# Patient Record
Sex: FEMALE | Race: WHITE | Employment: OTHER | URBAN - NONMETROPOLITAN AREA
[De-identification: names, ages, dates, MRNs, and addresses within clinical notes are randomized per-mention and may not be internally consistent; named-entity substitution may affect disease eponyms.]

---

## 2017-01-03 ENCOUNTER — PREP FOR PROCEDURE (OUTPATIENT)
Dept: CARDIOTHORACIC SURGERY | Age: 67
End: 2017-01-03

## 2017-01-03 DIAGNOSIS — C34.32 MALIGNANT NEOPLASM OF LOWER LOBE OF LEFT LUNG (HCC): Primary | ICD-10-CM

## 2017-01-03 RX ORDER — CHLORHEXIDINE GLUCONATE 4 G/100ML
SOLUTION TOPICAL ONCE
Status: CANCELLED | OUTPATIENT
Start: 2017-01-04

## 2017-01-03 RX ORDER — SODIUM CHLORIDE, SODIUM LACTATE, POTASSIUM CHLORIDE, CALCIUM CHLORIDE 600; 310; 30; 20 MG/100ML; MG/100ML; MG/100ML; MG/100ML
INJECTION, SOLUTION INTRAVENOUS CONTINUOUS
Status: CANCELLED | OUTPATIENT
Start: 2017-01-03

## 2017-01-04 ENCOUNTER — HOSPITAL ENCOUNTER (OUTPATIENT)
Dept: PREADMISSION TESTING | Age: 67
Discharge: HOME | DRG: 165 | End: 2017-01-04
Payer: MEDICARE

## 2017-01-04 ENCOUNTER — HOSPITAL ENCOUNTER (OUTPATIENT)
Dept: GENERAL RADIOLOGY | Age: 67
Discharge: HOME | DRG: 165 | End: 2017-01-04
Payer: MEDICARE

## 2017-01-04 VITALS — HEIGHT: 64 IN | WEIGHT: 118 LBS | BODY MASS INDEX: 20.14 KG/M2

## 2017-01-04 DIAGNOSIS — C34.32 MALIGNANT NEOPLASM OF LOWER LOBE OF LEFT LUNG (HCC): ICD-10-CM

## 2017-01-04 LAB
ABO/RH: NORMAL
ALBUMIN SERPL-MCNC: 4.6 G/DL (ref 3.5–5.2)
ALP BLD-CCNC: 82 U/L (ref 35–104)
ALT SERPL-CCNC: 14 U/L (ref 5–33)
ANION GAP SERPL CALCULATED.3IONS-SCNC: 17 MMOL/L (ref 7–19)
ANTIBODY SCREEN: NORMAL
AST SERPL-CCNC: 15 U/L (ref 5–32)
BACTERIA: ABNORMAL /HPF
BASOPHILS ABSOLUTE: 0 K/UL (ref 0–0.2)
BASOPHILS RELATIVE PERCENT: 0.2 % (ref 0–1)
BILIRUB SERPL-MCNC: 0.5 MG/DL (ref 0.2–1.2)
BILIRUBIN URINE: ABNORMAL
BLOOD, URINE: NEGATIVE
BUN BLDV-MCNC: 18 MG/DL (ref 8–23)
CALCIUM SERPL-MCNC: 10 MG/DL (ref 8.8–10.2)
CHLORIDE BLD-SCNC: 93 MMOL/L (ref 98–111)
CLARITY: ABNORMAL
CO2: 28 MMOL/L (ref 22–29)
COLOR: ABNORMAL
CREAT SERPL-MCNC: 0.5 MG/DL (ref 0.5–0.9)
EOSINOPHILS ABSOLUTE: 0.1 K/UL (ref 0–0.6)
EOSINOPHILS RELATIVE PERCENT: 1.7 % (ref 0–5)
EPITHELIAL CELLS, UA: ABNORMAL /HPF
GFR NON-AFRICAN AMERICAN: >60
GLOBULIN: 2.3 G/DL
GLUCOSE BLD-MCNC: 131 MG/DL (ref 74–109)
GLUCOSE URINE: NEGATIVE MG/DL
HCT VFR BLD CALC: 39.5 % (ref 37–47)
HEMOGLOBIN: 13.1 G/DL (ref 12–16)
KETONES, URINE: NEGATIVE MG/DL
LEUKOCYTE ESTERASE, URINE: ABNORMAL
LYMPHOCYTES ABSOLUTE: 1.4 K/UL (ref 1.1–4.5)
LYMPHOCYTES RELATIVE PERCENT: 28.4 % (ref 20–40)
MCH RBC QN AUTO: 29.4 PG (ref 27–31)
MCHC RBC AUTO-ENTMCNC: 33.2 G/DL (ref 33–37)
MCV RBC AUTO: 88.6 FL (ref 81–99)
MONOCYTES ABSOLUTE: 0.4 K/UL (ref 0–0.9)
MONOCYTES RELATIVE PERCENT: 7.4 % (ref 0–10)
MUCUS: ABNORMAL /LPF
NEUTROPHILS ABSOLUTE: 3 K/UL (ref 1.5–7.5)
NEUTROPHILS RELATIVE PERCENT: 62.1 % (ref 50–65)
NITRITE, URINE: NEGATIVE
PDW BLD-RTO: 13.5 % (ref 11.5–14.5)
PH UA: 6
PLATELET # BLD: 172 K/UL (ref 130–400)
PMV BLD AUTO: 10.8 FL (ref 7.4–10.4)
POTASSIUM SERPL-SCNC: 2.9 MMOL/L (ref 3.5–5)
PROTEIN UA: NEGATIVE MG/DL
RBC # BLD: 4.46 M/UL (ref 4.2–5.4)
RBC UA: ABNORMAL /HPF (ref 0–2)
SODIUM BLD-SCNC: 138 MMOL/L (ref 136–145)
SPECIFIC GRAVITY UA: 1.03
TOTAL PROTEIN: 6.9 G/DL (ref 6.6–8.7)
UROBILINOGEN, URINE: 0.2 E.U./DL
WBC # BLD: 4.8 K/UL (ref 4.8–10.8)
WBC UA: ABNORMAL /HPF (ref 0–5)

## 2017-01-04 PROCEDURE — 87070 CULTURE OTHR SPECIMN AEROBIC: CPT

## 2017-01-04 PROCEDURE — 86900 BLOOD TYPING SEROLOGIC ABO: CPT

## 2017-01-04 PROCEDURE — 93005 ELECTROCARDIOGRAM TRACING: CPT

## 2017-01-04 PROCEDURE — 71020 XR CHEST STANDARD TWO VW: CPT

## 2017-01-04 PROCEDURE — 86850 RBC ANTIBODY SCREEN: CPT

## 2017-01-04 PROCEDURE — 85025 COMPLETE CBC W/AUTO DIFF WBC: CPT

## 2017-01-04 PROCEDURE — 80053 COMPREHEN METABOLIC PANEL: CPT

## 2017-01-04 PROCEDURE — 86901 BLOOD TYPING SEROLOGIC RH(D): CPT

## 2017-01-04 PROCEDURE — 81001 URINALYSIS AUTO W/SCOPE: CPT

## 2017-01-04 PROCEDURE — 87086 URINE CULTURE/COLONY COUNT: CPT

## 2017-01-04 RX ORDER — CHLORHEXIDINE GLUCONATE 4 G/100ML
SOLUTION TOPICAL DAILY PRN
Status: CANCELLED | OUTPATIENT
Start: 2017-01-04

## 2017-01-04 RX ORDER — CHLORHEXIDINE GLUCONATE 4 G/100ML
SOLUTION TOPICAL ONCE
Status: DISCONTINUED | OUTPATIENT
Start: 2017-01-04 | End: 2017-01-04 | Stop reason: CLARIF

## 2017-01-04 RX ORDER — POTASSIUM CHLORIDE 20 MEQ/1
TABLET, EXTENDED RELEASE ORAL
Status: ON HOLD | COMMUNITY
Start: 2016-11-01 | End: 2017-01-11 | Stop reason: HOSPADM

## 2017-01-04 RX ORDER — CYCLOBENZAPRINE HCL 10 MG
10 TABLET ORAL 3 TIMES DAILY PRN
COMMUNITY
End: 2017-09-15

## 2017-01-05 ENCOUNTER — ANESTHESIA EVENT (OUTPATIENT)
Dept: OPERATING ROOM | Age: 67
DRG: 165 | End: 2017-01-05
Payer: MEDICARE

## 2017-01-06 ENCOUNTER — APPOINTMENT (OUTPATIENT)
Dept: GENERAL RADIOLOGY | Age: 67
DRG: 165 | End: 2017-01-06
Attending: THORACIC SURGERY (CARDIOTHORACIC VASCULAR SURGERY)
Payer: MEDICARE

## 2017-01-06 ENCOUNTER — ANESTHESIA (OUTPATIENT)
Dept: OPERATING ROOM | Age: 67
DRG: 165 | End: 2017-01-06
Payer: MEDICARE

## 2017-01-06 VITALS — RESPIRATION RATE: 6 BRPM | OXYGEN SATURATION: 100 %

## 2017-01-06 PROBLEM — C34.32 MALIGNANT NEOPLASM OF LOWER LOBE OF LEFT LUNG (HCC): Status: ACTIVE | Noted: 2017-01-06

## 2017-01-06 LAB
MRSA CULTURE ONLY: NORMAL
URINE CULTURE, ROUTINE: NORMAL

## 2017-01-06 PROCEDURE — 71010 XR CHEST PORTABLE: CPT

## 2017-01-06 PROCEDURE — 6360000002 HC RX W HCPCS

## 2017-01-06 PROCEDURE — 62325 NJX INTERLAMINAR CRV/THRC: CPT

## 2017-01-06 PROCEDURE — 6360000002 HC RX W HCPCS: Performed by: THORACIC SURGERY (CARDIOTHORACIC VASCULAR SURGERY)

## 2017-01-06 PROCEDURE — 2580000003 HC RX 258

## 2017-01-06 PROCEDURE — 2500000003 HC RX 250 WO HCPCS

## 2017-01-06 RX ORDER — SODIUM CHLORIDE, SODIUM LACTATE, POTASSIUM CHLORIDE, CALCIUM CHLORIDE 600; 310; 30; 20 MG/100ML; MG/100ML; MG/100ML; MG/100ML
INJECTION, SOLUTION INTRAVENOUS CONTINUOUS PRN
Status: DISCONTINUED | OUTPATIENT
Start: 2017-01-06 | End: 2017-01-06 | Stop reason: SDUPTHER

## 2017-01-06 RX ORDER — PROPOFOL 10 MG/ML
INJECTION, EMULSION INTRAVENOUS PRN
Status: DISCONTINUED | OUTPATIENT
Start: 2017-01-06 | End: 2017-01-06 | Stop reason: SDUPTHER

## 2017-01-06 RX ORDER — ONDANSETRON 2 MG/ML
INJECTION INTRAMUSCULAR; INTRAVENOUS PRN
Status: DISCONTINUED | OUTPATIENT
Start: 2017-01-06 | End: 2017-01-06 | Stop reason: SDUPTHER

## 2017-01-06 RX ORDER — FENTANYL CITRATE 50 UG/ML
INJECTION, SOLUTION INTRAMUSCULAR; INTRAVENOUS PRN
Status: DISCONTINUED | OUTPATIENT
Start: 2017-01-06 | End: 2017-01-06 | Stop reason: SDUPTHER

## 2017-01-06 RX ORDER — LIDOCAINE HYDROCHLORIDE 10 MG/ML
INJECTION, SOLUTION EPIDURAL; INFILTRATION; INTRACAUDAL; PERINEURAL PRN
Status: DISCONTINUED | OUTPATIENT
Start: 2017-01-06 | End: 2017-01-06 | Stop reason: SDUPTHER

## 2017-01-06 RX ORDER — ROCURONIUM BROMIDE 10 MG/ML
INJECTION, SOLUTION INTRAVENOUS PRN
Status: DISCONTINUED | OUTPATIENT
Start: 2017-01-06 | End: 2017-01-06 | Stop reason: SDUPTHER

## 2017-01-06 RX ORDER — DEXAMETHASONE SODIUM PHOSPHATE 10 MG/ML
INJECTION INTRAMUSCULAR; INTRAVENOUS PRN
Status: DISCONTINUED | OUTPATIENT
Start: 2017-01-06 | End: 2017-01-06 | Stop reason: SDUPTHER

## 2017-01-06 RX ORDER — BUPIVACAINE HYDROCHLORIDE 2.5 MG/ML
INJECTION, SOLUTION EPIDURAL; INFILTRATION; INTRACAUDAL PRN
Status: DISCONTINUED | OUTPATIENT
Start: 2017-01-06 | End: 2017-01-06 | Stop reason: SDUPTHER

## 2017-01-06 RX ADMIN — BUPIVACAINE HYDROCHLORIDE 2 ML: 2.5 INJECTION, SOLUTION EPIDURAL; INFILTRATION; INTRACAUDAL; PERINEURAL at 10:08

## 2017-01-06 RX ADMIN — HYDROMORPHONE HYDROCHLORIDE 0.25 MG: 1 INJECTION, SOLUTION INTRAMUSCULAR; INTRAVENOUS; SUBCUTANEOUS at 10:23

## 2017-01-06 RX ADMIN — BUPIVACAINE HYDROCHLORIDE 2 ML: 2.5 INJECTION, SOLUTION EPIDURAL; INFILTRATION; INTRACAUDAL; PERINEURAL at 10:00

## 2017-01-06 RX ADMIN — Medication 2 G: at 08:57

## 2017-01-06 RX ADMIN — PROPOFOL 100 MG: 10 INJECTION, EMULSION INTRAVENOUS at 08:46

## 2017-01-06 RX ADMIN — LIDOCAINE HYDROCHLORIDE 50 MG: 10 INJECTION, SOLUTION EPIDURAL; INFILTRATION; INTRACAUDAL; PERINEURAL at 08:46

## 2017-01-06 RX ADMIN — ROCURONIUM BROMIDE 20 MG: 10 INJECTION INTRAVENOUS at 09:31

## 2017-01-06 RX ADMIN — ONDANSETRON HYDROCHLORIDE 4 MG: 2 INJECTION, SOLUTION INTRAVENOUS at 10:02

## 2017-01-06 RX ADMIN — FENTANYL CITRATE 50 MCG: 50 INJECTION INTRAMUSCULAR; INTRAVENOUS at 09:17

## 2017-01-06 RX ADMIN — DEXAMETHASONE SODIUM PHOSPHATE 10 MG: 10 INJECTION INTRAMUSCULAR; INTRAVENOUS at 10:02

## 2017-01-06 RX ADMIN — FENTANYL CITRATE 50 MCG: 50 INJECTION INTRAMUSCULAR; INTRAVENOUS at 08:46

## 2017-01-06 RX ADMIN — ROCURONIUM BROMIDE 40 MG: 10 INJECTION INTRAVENOUS at 08:46

## 2017-01-06 RX ADMIN — BUPIVACAINE HYDROCHLORIDE 3 ML: 2.5 INJECTION, SOLUTION EPIDURAL; INFILTRATION; INTRACAUDAL; PERINEURAL at 10:13

## 2017-01-06 RX ADMIN — SODIUM CHLORIDE, SODIUM LACTATE, POTASSIUM CHLORIDE, AND CALCIUM CHLORIDE: 600; 310; 30; 20 INJECTION, SOLUTION INTRAVENOUS at 09:05

## 2017-01-06 RX ADMIN — PHENYLEPHRINE HYDROCHLORIDE 10 MCG/MIN: 10 INJECTION INTRAVENOUS at 09:28

## 2017-01-06 RX ADMIN — FENTANYL CITRATE 50 MCG: 50 INJECTION INTRAMUSCULAR; INTRAVENOUS at 09:31

## 2017-01-06 RX ADMIN — ROCURONIUM BROMIDE 10 MG: 10 INJECTION INTRAVENOUS at 09:17

## 2017-01-06 RX ADMIN — SODIUM CHLORIDE, SODIUM LACTATE, POTASSIUM CHLORIDE, AND CALCIUM CHLORIDE: 600; 310; 30; 20 INJECTION, SOLUTION INTRAVENOUS at 08:37

## 2017-01-06 RX ADMIN — PROPOFOL 50 MG: 10 INJECTION, EMULSION INTRAVENOUS at 09:31

## 2017-01-06 RX ADMIN — SUGAMMADEX 107 MG: 100 INJECTION, SOLUTION INTRAVENOUS at 10:21

## 2017-01-06 RX ADMIN — HYDROMORPHONE HYDROCHLORIDE 0.25 MG: 1 INJECTION, SOLUTION INTRAMUSCULAR; INTRAVENOUS; SUBCUTANEOUS at 10:39

## 2017-01-06 RX ADMIN — PROPOFOL 50 MG: 10 INJECTION, EMULSION INTRAVENOUS at 09:19

## 2017-01-07 ENCOUNTER — APPOINTMENT (OUTPATIENT)
Dept: GENERAL RADIOLOGY | Age: 67
DRG: 165 | End: 2017-01-07
Attending: THORACIC SURGERY (CARDIOTHORACIC VASCULAR SURGERY)
Payer: MEDICARE

## 2017-01-07 PROCEDURE — 71010 XR CHEST PORTABLE: CPT

## 2017-01-08 LAB
EKG P AXIS: -72 DEGREES
EKG P-R INTERVAL: 134 MS
EKG Q-T INTERVAL: 370 MS
EKG QRS DURATION: 96 MS
EKG QTC CALCULATION (BAZETT): 384 MS
EKG T AXIS: -142 DEGREES

## 2017-01-09 ENCOUNTER — APPOINTMENT (OUTPATIENT)
Dept: GENERAL RADIOLOGY | Age: 67
DRG: 165 | End: 2017-01-09
Attending: THORACIC SURGERY (CARDIOTHORACIC VASCULAR SURGERY)
Payer: MEDICARE

## 2017-01-09 PROCEDURE — 71010 XR CHEST PORTABLE: CPT

## 2017-01-10 ENCOUNTER — APPOINTMENT (OUTPATIENT)
Dept: GENERAL RADIOLOGY | Age: 67
DRG: 165 | End: 2017-01-10
Attending: THORACIC SURGERY (CARDIOTHORACIC VASCULAR SURGERY)
Payer: MEDICARE

## 2017-01-10 PROBLEM — E78.2 MIXED HYPERLIPIDEMIA: Chronic | Status: ACTIVE | Noted: 2017-01-10

## 2017-01-10 PROCEDURE — 71010 XR CHEST PORTABLE: CPT

## 2017-01-11 ENCOUNTER — APPOINTMENT (OUTPATIENT)
Dept: GENERAL RADIOLOGY | Age: 67
DRG: 165 | End: 2017-01-11
Attending: THORACIC SURGERY (CARDIOTHORACIC VASCULAR SURGERY)
Payer: MEDICARE

## 2017-01-11 PROCEDURE — 71020 XR CHEST STANDARD TWO VW: CPT

## 2017-02-07 ENCOUNTER — OFFICE VISIT (OUTPATIENT)
Dept: CARDIOTHORACIC SURGERY | Age: 67
End: 2017-02-07

## 2017-02-07 ENCOUNTER — HOSPITAL ENCOUNTER (OUTPATIENT)
Dept: GENERAL RADIOLOGY | Age: 67
Discharge: HOME | End: 2017-02-07
Payer: MEDICARE

## 2017-02-07 VITALS
WEIGHT: 117 LBS | DIASTOLIC BLOOD PRESSURE: 64 MMHG | HEIGHT: 64 IN | HEART RATE: 75 BPM | OXYGEN SATURATION: 98 % | SYSTOLIC BLOOD PRESSURE: 121 MMHG | BODY MASS INDEX: 19.97 KG/M2

## 2017-02-07 DIAGNOSIS — C34.32 PRIMARY MALIGNANT NEOPLASM OF BRONCHUS OF LEFT LOWER LOBE (HCC): ICD-10-CM

## 2017-02-07 DIAGNOSIS — C34.32 PRIMARY MALIGNANT NEOPLASM OF BRONCHUS OF LEFT LOWER LOBE (HCC): Primary | ICD-10-CM

## 2017-02-07 PROCEDURE — 99024 POSTOP FOLLOW-UP VISIT: CPT | Performed by: THORACIC SURGERY (CARDIOTHORACIC VASCULAR SURGERY)

## 2017-02-07 PROCEDURE — 71020 XR CHEST STANDARD TWO VW: CPT

## 2017-02-10 ENCOUNTER — TELEPHONE (OUTPATIENT)
Dept: CARDIOTHORACIC SURGERY | Age: 67
End: 2017-02-10

## 2017-02-28 ENCOUNTER — HOSPITAL ENCOUNTER (OUTPATIENT)
Dept: ULTRASOUND IMAGING | Age: 67
Discharge: HOME | End: 2017-02-28
Payer: MEDICARE

## 2017-02-28 DIAGNOSIS — N28.1 ACQUIRED CYST OF KIDNEY: ICD-10-CM

## 2017-02-28 PROCEDURE — 76770 US EXAM ABDO BACK WALL COMP: CPT

## 2017-03-08 ENCOUNTER — HOSPITAL ENCOUNTER (OUTPATIENT)
Dept: CT IMAGING | Age: 67
Discharge: HOME | End: 2017-03-08
Payer: MEDICARE

## 2017-03-08 DIAGNOSIS — N28.1 CYST OF KIDNEY, ACQUIRED: ICD-10-CM

## 2017-03-08 PROCEDURE — 6360000004 HC RX CONTRAST MEDICATION: Performed by: INTERNAL MEDICINE

## 2017-03-08 PROCEDURE — 74178 CT ABD&PLV WO CNTR FLWD CNTR: CPT

## 2017-03-08 RX ADMIN — IOVERSOL 75 ML: 741 INJECTION INTRA-ARTERIAL; INTRAVENOUS at 09:50

## 2017-07-27 ENCOUNTER — HOSPITAL ENCOUNTER (OUTPATIENT)
Dept: CT IMAGING | Age: 67
Discharge: HOME | End: 2017-07-27
Payer: MEDICARE

## 2017-07-27 ENCOUNTER — HOSPITAL ENCOUNTER (OUTPATIENT)
Dept: WOMENS IMAGING | Age: 67
Discharge: HOME | End: 2017-07-27
Payer: MEDICARE

## 2017-07-27 DIAGNOSIS — C34.32 CANCER OF BRONCHUS OF LEFT LOWER LOBE (HCC): ICD-10-CM

## 2017-07-27 DIAGNOSIS — Z12.31 VISIT FOR SCREENING MAMMOGRAM: ICD-10-CM

## 2017-07-27 DIAGNOSIS — C34.12 CANCER OF BRONCHUS OF LEFT UPPER LOBE (HCC): ICD-10-CM

## 2017-07-27 DIAGNOSIS — N28.1 RENAL CYST: ICD-10-CM

## 2017-07-27 LAB
GFR NON-AFRICAN AMERICAN: >60
PERFORMED ON: NORMAL
POC CREATININE: 0.9 MG/DL (ref 0.3–1.3)
POC SAMPLE TYPE: NORMAL

## 2017-07-27 PROCEDURE — 77063 BREAST TOMOSYNTHESIS BI: CPT

## 2017-07-27 PROCEDURE — 6360000004 HC RX CONTRAST MEDICATION: Performed by: INTERNAL MEDICINE

## 2017-07-27 PROCEDURE — 71260 CT THORAX DX C+: CPT

## 2017-07-27 PROCEDURE — 74177 CT ABD & PELVIS W/CONTRAST: CPT

## 2017-07-27 PROCEDURE — 82565 ASSAY OF CREATININE: CPT

## 2017-07-27 RX ADMIN — IOVERSOL 75 ML: 741 INJECTION INTRA-ARTERIAL; INTRAVENOUS at 10:53

## 2017-09-15 ENCOUNTER — HOSPITAL ENCOUNTER (EMERGENCY)
Age: 67
Discharge: HOME | End: 2017-09-15
Payer: MEDICARE

## 2017-09-15 VITALS
RESPIRATION RATE: 18 BRPM | TEMPERATURE: 97.8 F | BODY MASS INDEX: 20.83 KG/M2 | HEART RATE: 62 BPM | HEIGHT: 64 IN | OXYGEN SATURATION: 98 % | DIASTOLIC BLOOD PRESSURE: 68 MMHG | WEIGHT: 122 LBS | SYSTOLIC BLOOD PRESSURE: 109 MMHG
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
## 2017-09-15 DIAGNOSIS — S61.452A DOG BITE, HAND, LEFT, INITIAL ENCOUNTER: Primary | ICD-10-CM

## 2017-09-15 PROCEDURE — 99282 EMERGENCY DEPT VISIT SF MDM: CPT | Performed by: NURSE PRACTITIONER

## 2017-09-15 PROCEDURE — 99283 EMERGENCY DEPT VISIT LOW MDM: CPT

## 2017-09-15 RX ORDER — AMOXICILLIN AND CLAVULANATE POTASSIUM 875; 125 MG/1; MG/1
1 TABLET, FILM COATED ORAL 2 TIMES DAILY
Qty: 20 TABLET | Refills: 0 | Status: SHIPPED | OUTPATIENT
Start: 2017-09-15 | End: 2017-09-25

## 2017-10-05 ENCOUNTER — OFFICE VISIT (OUTPATIENT)
Dept: URGENT CARE | Age: 67
End: 2017-10-05
Payer: MEDICARE

## 2017-10-05 VITALS
SYSTOLIC BLOOD PRESSURE: 121 MMHG | BODY MASS INDEX: 20.77 KG/M2 | DIASTOLIC BLOOD PRESSURE: 76 MMHG | TEMPERATURE: 97.9 F | OXYGEN SATURATION: 98 % | RESPIRATION RATE: 20 BRPM | HEART RATE: 66 BPM | WEIGHT: 121 LBS

## 2017-10-05 DIAGNOSIS — N39.0 URINARY TRACT INFECTION WITH HEMATURIA, SITE UNSPECIFIED: ICD-10-CM

## 2017-10-05 DIAGNOSIS — R35.0 FREQUENCY OF URINATION: Primary | ICD-10-CM

## 2017-10-05 DIAGNOSIS — B37.9 YEAST INFECTION: ICD-10-CM

## 2017-10-05 DIAGNOSIS — R31.9 URINARY TRACT INFECTION WITH HEMATURIA, SITE UNSPECIFIED: ICD-10-CM

## 2017-10-05 LAB
BILIRUBIN, POC: ABNORMAL
BLOOD URINE, POC: ABNORMAL
CLARITY, POC: ABNORMAL
COLOR, POC: YELLOW
GLUCOSE URINE, POC: ABNORMAL
KETONES, POC: ABNORMAL
LEUKOCYTE EST, POC: ABNORMAL
NITRITE, POC: ABNORMAL
PH, POC: 6
PROTEIN, POC: 30
SPECIFIC GRAVITY, POC: 1.02
UROBILINOGEN, POC: 0.2

## 2017-10-05 PROCEDURE — 1123F ACP DISCUSS/DSCN MKR DOCD: CPT | Performed by: NURSE PRACTITIONER

## 2017-10-05 PROCEDURE — 4040F PNEUMOC VAC/ADMIN/RCVD: CPT | Performed by: NURSE PRACTITIONER

## 2017-10-05 PROCEDURE — G8484 FLU IMMUNIZE NO ADMIN: HCPCS | Performed by: NURSE PRACTITIONER

## 2017-10-05 PROCEDURE — 3014F SCREEN MAMMO DOC REV: CPT | Performed by: NURSE PRACTITIONER

## 2017-10-05 PROCEDURE — 3017F COLORECTAL CA SCREEN DOC REV: CPT | Performed by: NURSE PRACTITIONER

## 2017-10-05 PROCEDURE — G8427 DOCREV CUR MEDS BY ELIG CLIN: HCPCS | Performed by: NURSE PRACTITIONER

## 2017-10-05 PROCEDURE — G8399 PT W/DXA RESULTS DOCUMENT: HCPCS | Performed by: NURSE PRACTITIONER

## 2017-10-05 PROCEDURE — 1036F TOBACCO NON-USER: CPT | Performed by: NURSE PRACTITIONER

## 2017-10-05 PROCEDURE — 99213 OFFICE O/P EST LOW 20 MIN: CPT | Performed by: NURSE PRACTITIONER

## 2017-10-05 PROCEDURE — 81002 URINALYSIS NONAUTO W/O SCOPE: CPT | Performed by: NURSE PRACTITIONER

## 2017-10-05 PROCEDURE — G8420 CALC BMI NORM PARAMETERS: HCPCS | Performed by: NURSE PRACTITIONER

## 2017-10-05 PROCEDURE — 1090F PRES/ABSN URINE INCON ASSESS: CPT | Performed by: NURSE PRACTITIONER

## 2017-10-05 RX ORDER — FLUCONAZOLE 150 MG/1
150 TABLET ORAL ONCE
Qty: 1 TABLET | Refills: 0 | Status: SHIPPED | OUTPATIENT
Start: 2017-10-05 | End: 2017-10-05

## 2017-10-05 RX ORDER — NITROFURANTOIN 25; 75 MG/1; MG/1
100 CAPSULE ORAL 2 TIMES DAILY
Qty: 10 CAPSULE | Refills: 0 | Status: SHIPPED | OUTPATIENT
Start: 2017-10-05 | End: 2017-10-10

## 2017-10-05 ASSESSMENT — ENCOUNTER SYMPTOMS
ABDOMINAL DISTENTION: 0
VOMITING: 0
EYES NEGATIVE: 1
CONSTIPATION: 0
ALLERGIC/IMMUNOLOGIC NEGATIVE: 1
SHORTNESS OF BREATH: 0
NAUSEA: 0
ABDOMINAL PAIN: 0

## 2017-10-05 NOTE — PROGRESS NOTES
[address] [facility] E CARE  [address] [place] [id number]  Dept: [phone]  Loc: [phone]    Precious Ceja is a 78 y.o. female who presents today for her medical conditions/complaints as noted below. Precious Ceja is c/o of Urinary Frequency and Dysuria        HPI:     HPI   Pt presents to clinic with dysuria since yesterday. States she wants to be treated before it gets any worse. Has history of UTI. Denies fever or any other symptoms.      Past Medical History:   Diagnosis Date    Anxiety     Arthritis     Bowel obstruction     adhesions with colectomy/colostomy    Cancer ([address]     Concussion with no loss of consciousness     COPD (chronic obstructive pulmonary disease) (HCC)     Depression     Hernia     History of blood transfusion     History of broken collarbone     Hyperlipidemia     Seasonal allergies       Past Surgical History:   Procedure Laterality Date    ABDOMEN SURGERY      bowel blockage from scar tissue    COLONOSCOPY      COLOSTOMY      still has    ECTOPIC PREGNANCY SURGERY      ENDOSCOPY, COLON, DIAGNOSTIC      FRACTURE SURGERY      left arm and elbow and finger    HERNIA REPAIR      HYSTERECTOMY      LOBECTOMY Left 1/6/2017    LEFT THORACOTOMY WITH LOBECTOMY  performed by Agapito Roblero MD at [place] [address]         Family History   Problem Relation Age of Onset    High Blood Pressure Mother     Stroke Mother     Cancer Father        Social History   Substance Use Topics    Smoking status: Former Smoker     Quit date: 10/22/2016    Smokeless tobacco: Not on file      Comment: smoked since 15, quit a few times    Alcohol use Yes      Comment: 2-3 glasses of wine      Current Outpatient Prescriptions   Medication Sig Dispense Refill    nitrofurantoin, macrocrystal-monohydrate, (MACROBID) 100 MG capsule Take 1 capsule by mouth 2 times daily for 5 days 10 capsule 0    fluconazole (DIFLUCAN) 150 MG tablet Take 1 tablet by mouth once for 1 dose 1 tablet 0    potassium chloride (KLOR-CON M) 20 MEQ extended release tablet Take 1 tablet by mouth 2 times daily (with meals) 60 tablet 1    olopatadine (PATANASE) 0.6 % SOLN nassl soln 2 sprays 2 times daily Indications: Seasonal Allergy       atorvastatin (LIPITOR) 10 MG tablet 10 mg daily Indications: Changes in Cholesterol       hydrochlorothiazide (HYDRODIURIL) 50 MG tablet Take 50 mg by mouth daily as needed Indications: Fluid Retention       montelukast (SINGULAIR) 10 MG tablet Take 10 mg by mouth nightly Indications: Seasonal Allergy       tiZANidine (ZANAFLEX) 2 MG tablet Take 2 mg by mouth every 6 hours as needed      digoxin (LANOXIN) 250 MCG tablet Take 250 mcg by mouth daily Indications: Rapid Heartbeat       amphetamine-dextroamphetamine (ADDERALL XR) 10 MG extended release capsule Take 10 mg by mouth daily as needed  Indications: Attention Deficit Disorder .  pantoprazole (PROTONIX) 40 MG tablet Take 40 mg by mouth daily Indications: Gastroesophageal Reflux Disease       sertraline (ZOLOFT) 100 MG tablet Take 200 mg by mouth daily Indications: Feeling Anxious       cetirizine (ZYRTEC) 10 MG tablet Take 10 mg by mouth daily Indications: Seasonal Allergy       albuterol-ipratropium (COMBIVENT)  MCG/ACT inhaler Inhale 2 puffs into the lungs every 6 hours as needed for Wheezing       No current facility-administered medications for this visit. No Known Allergies    Health Maintenance   Topic Date Due    Hepatitis C screen  1950    DTaP/Tdap/Td vaccine (1 - Tdap) 10/29/1968    Lipid screen  10/29/1989    Colon cancer screen colonoscopy  10/29/1999    Zostavax vaccine  10/29/2009    Pneumococcal low/med risk (1 of 2 - PCV13) 10/29/2014    Flu vaccine (1) 09/01/2017    Breast cancer screen  07/27/2019    DEXA (modify frequency per FRAX score)  Completed       Subjective:      Review of Systems   Constitutional: Negative. Negative for activity change, appetite change, chills, fatigue and fever. HENT: Negative. Eyes: Negative. Respiratory: Negative for shortness of breath. Gastrointestinal: Negative for abdominal distention, abdominal pain, constipation, nausea and vomiting. Genitourinary: Positive for frequency and urgency. Negative for dysuria, flank pain, hematuria, pelvic pain, vaginal bleeding, vaginal discharge and vaginal pain. Musculoskeletal: Negative. Skin: Negative. Allergic/Immunologic: Negative. Neurological: Negative. Hematological: Negative. Psychiatric/Behavioral: Negative. Objective:     Physical Exam   Constitutional: She is oriented to person, place, and time. Vital signs are normal. She appears well-developed and well-nourished. Non-toxic appearance. She does not have a sickly appearance. She does not appear ill. No distress. HENT:   Head: Normocephalic. Eyes: Conjunctivae are normal.   Cardiovascular: Normal rate and regular rhythm. Pulmonary/Chest: Effort normal and breath sounds normal.   Abdominal: Soft. Bowel sounds are normal. There is no tenderness. There is no rebound and no CVA tenderness. Neurological: She is alert and oriented to person, place, and time. Skin: Skin is warm and intact. Psychiatric: She has a normal mood and affect. Her behavior is normal. Judgment and thought content normal. Cognition and memory are normal.   Vitals reviewed. /76  Pulse 66  Temp 97.9 °F (36.6 °C)  Resp 20  Wt 121 lb (54.9 kg)  SpO2 98%  BMI 20.77 kg/m2    Assessment:      1. Frequency of urination  POCT urinalysis dipstick   2. Urinary tract infection with hematuria, site unspecified  nitrofurantoin, macrocrystal-monohydrate, (MACROBID) 100 MG capsule    Urine Culture   3.  Yeast infection  fluconazole (DIFLUCAN) 150 MG tablet       Plan:      Orders Placed This Encounter   Procedures    Urine Culture     Order Specific Question:   Specify (ex-cath,

## 2017-10-05 NOTE — MR AVS SNAPSHOT
After Visit Summary             Gabe Almaguer   10/5/2017 12:15 PM   Office Visit    Description:  Female : 1950   Provider:  Berenice Louis CNP   Department:  [facility]              Your Follow-Up and Future Appointments         Below is a list of your follow-up and future appointments. This may not be a complete list as you may have made appointments directly with providers that we are not aware of or your providers may have made some for you. Please call your providers to confirm appointments. It is important to keep your appointments. Please bring your current insurance card, photo ID, co-pay, and all medication bottles to your appointment. If self-pay, payment is expected at the time of service. Information from Your Visit        Department     Name Address Phone       [facility] [address] [place] [address] [phone]       You Were Seen for:         Comments    Frequency of urination   [[id number]]         Vital Signs     Blood Pressure Pulse Temperature Respirations Weight Oxygen Saturation    121/76 66 97.9 °F (36.6 °C) 20 121 lb (54.9 kg) 98%    Body Mass Index Smoking Status                20.77 kg/m2 Former Smoker          Instructions    1. Take antibiotic as prescribed  2. Will call with results of urine culture  3. Return to clinic if symptoms worsen or fail to improve            Today's Medication Changes          These changes are accurate as of: 10/5/17 12:51 PM.  If you have any questions, ask your nurse or doctor. START taking these medications           nitrofurantoin (macrocrystal-monohydrate) 100 MG capsule   Commonly known as:  MACROBID   Instructions:   Take 1 capsule by mouth 2 times daily for 5 days   Quantity:  10 capsule   Refills:  0   Started by:  Berenice Louis CNP            Where to Get Your Medications      These medications were sent to [address] [place] [address] - 1950 Female White Non-/Non  English English      Problem List as of 10/5/2017  Date Reviewed: 2/9/2017                Malignant neoplasm of lower lobe of left lung (HCC)    Mixed hyperlipidemia (Chronic)      Preventive Care        Date Due    Hepatitis C screening is recommended for all adults regardless of risk factors born between [facility] at least once (lifetime) who have never been tested. 1950    Tetanus Combination Vaccine (1 - Tdap) 10/29/1968    Cholesterol Screening 10/29/1989    Colonoscopy 10/29/1999    Zoster Vaccine 10/29/2009    Pneumococcal Vaccines (two) for all adults aged 72 and over (1 of 2 - PCV13) 10/29/2014    Yearly Flu Vaccine (1) 9/1/2017    Mammograms are recommended every 2 years for low/average risk patients aged 48 - 69, and every year for high risk patients per updated national guidelines. However these guidelines can be individualized by your provider. 7/27/2019            [employer] Signup           Our records indicate that you have an active [employer] account. You can view your After Visit Summary by going to [email or web address] and logging in with your [employer] username and password. If you don't have a [employer] username and password but a parent or guardian has access to your record, the parent or guardian should login with their own [employer] username and password and access your record to view the After Visit Summary. Additional Information  If you have questions, please contact the physician practice where you receive care. Remember, [employer] is NOT to be used for urgent needs. For medical emergencies, dial 911. For questions regarding your [employer] account call [phone]. If you have a clinical question, please call your doctor's office.

## 2017-10-06 ENCOUNTER — TELEPHONE (OUTPATIENT)
Dept: URGENT CARE | Age: 67
End: 2017-10-06

## 2017-10-07 LAB
ORGANISM: ABNORMAL
ORGANISM: ABNORMAL
URINE CULTURE, ROUTINE: ABNORMAL

## 2017-10-08 DIAGNOSIS — B37.41 YEAST CYSTITIS: Primary | ICD-10-CM

## 2017-10-08 RX ORDER — FLUCONAZOLE 150 MG/1
150 TABLET ORAL ONCE
Qty: 1 TABLET | Refills: 0 | Status: SHIPPED | OUTPATIENT
Start: 2017-10-08 | End: 2017-10-08

## 2017-12-15 ENCOUNTER — OFFICE VISIT (OUTPATIENT)
Dept: URGENT CARE | Age: 67
End: 2017-12-15
Payer: MEDICARE

## 2017-12-15 VITALS
TEMPERATURE: 97.3 F | SYSTOLIC BLOOD PRESSURE: 110 MMHG | DIASTOLIC BLOOD PRESSURE: 82 MMHG | WEIGHT: 128.38 LBS | HEART RATE: 73 BPM | BODY MASS INDEX: 21.92 KG/M2 | RESPIRATION RATE: 18 BRPM | HEIGHT: 64 IN | OXYGEN SATURATION: 98 %

## 2017-12-15 DIAGNOSIS — B02.9 HERPES ZOSTER WITHOUT COMPLICATION: Primary | ICD-10-CM

## 2017-12-15 DIAGNOSIS — R41.0 CONFUSION: ICD-10-CM

## 2017-12-15 LAB
APPEARANCE FLUID: ABNORMAL
BILIRUBIN, POC: NEGATIVE
BLOOD URINE, POC: ABNORMAL
CLARITY, POC: ABNORMAL
COLOR, POC: YELLOW
GLUCOSE URINE, POC: NEGATIVE
KETONES, POC: ABNORMAL
LEUKOCYTE EST, POC: ABNORMAL
NITRITE, POC: NEGATIVE
PH, POC: 6.5
PROTEIN, POC: 30
SPECIFIC GRAVITY, POC: 1.02
UROBILINOGEN, POC: 0.2

## 2017-12-15 PROCEDURE — 3014F SCREEN MAMMO DOC REV: CPT | Performed by: PHYSICIAN ASSISTANT

## 2017-12-15 PROCEDURE — 1036F TOBACCO NON-USER: CPT | Performed by: PHYSICIAN ASSISTANT

## 2017-12-15 PROCEDURE — G8420 CALC BMI NORM PARAMETERS: HCPCS | Performed by: PHYSICIAN ASSISTANT

## 2017-12-15 PROCEDURE — G8484 FLU IMMUNIZE NO ADMIN: HCPCS | Performed by: PHYSICIAN ASSISTANT

## 2017-12-15 PROCEDURE — 99213 OFFICE O/P EST LOW 20 MIN: CPT | Performed by: PHYSICIAN ASSISTANT

## 2017-12-15 PROCEDURE — 1123F ACP DISCUSS/DSCN MKR DOCD: CPT | Performed by: PHYSICIAN ASSISTANT

## 2017-12-15 PROCEDURE — G8399 PT W/DXA RESULTS DOCUMENT: HCPCS | Performed by: PHYSICIAN ASSISTANT

## 2017-12-15 PROCEDURE — 1090F PRES/ABSN URINE INCON ASSESS: CPT | Performed by: PHYSICIAN ASSISTANT

## 2017-12-15 PROCEDURE — G8427 DOCREV CUR MEDS BY ELIG CLIN: HCPCS | Performed by: PHYSICIAN ASSISTANT

## 2017-12-15 PROCEDURE — 4040F PNEUMOC VAC/ADMIN/RCVD: CPT | Performed by: PHYSICIAN ASSISTANT

## 2017-12-15 PROCEDURE — 3017F COLORECTAL CA SCREEN DOC REV: CPT | Performed by: PHYSICIAN ASSISTANT

## 2017-12-15 RX ORDER — ATORVASTATIN CALCIUM 20 MG/1
10 TABLET, FILM COATED ORAL NIGHTLY
COMMUNITY
Start: 2017-11-29

## 2017-12-15 RX ORDER — FLUCONAZOLE 150 MG/1
TABLET ORAL
Status: ON HOLD | COMMUNITY
Start: 2017-10-09 | End: 2017-12-22 | Stop reason: HOSPADM

## 2017-12-15 RX ORDER — SULFAMETHOXAZOLE AND TRIMETHOPRIM 800; 160 MG/1; MG/1
TABLET ORAL
Status: ON HOLD | COMMUNITY
Start: 2017-10-30 | End: 2017-12-22 | Stop reason: HOSPADM

## 2017-12-15 RX ORDER — VALACYCLOVIR HYDROCHLORIDE 1 G/1
1000 TABLET, FILM COATED ORAL 3 TIMES DAILY
Qty: 21 TABLET | Refills: 0 | Status: SHIPPED | OUTPATIENT
Start: 2017-12-15 | End: 2017-12-22

## 2017-12-15 NOTE — PROGRESS NOTES
Subjective:      Patient ID: Sb Juan is a 75 y.o. female. HPI    Ryan Molina presents today with a rash. Rash is located on her right side. States that she was sick approximately 1 week ago. She was having pain in the area before the rash developed. Rash developed a few days ago. She has had an increase in the frequency of her headaches. No fever noted. States that it hurts for her clothes to rub up against the rash. Has applied Cortizone to rash. Caregiver with patient today states that Ryan Molina has been just slightly more confused than normal. She is misplacing things. This symptom is already being evaluated by another physical and a PET scan has been ordered. Review of Systems   Constitutional: Negative for chills and fever. Skin: Positive for rash. Psychiatric/Behavioral: Positive for confusion. All other systems reviewed and are negative. Objective:   Physical Exam   Constitutional: She appears well-developed and well-nourished. No distress. HENT:   Head: Normocephalic and atraumatic. Right Ear: External ear normal.   Left Ear: External ear normal.   Nose: Nose normal.   Mouth/Throat: Oropharynx is clear and moist. No oropharyngeal exudate. Eyes: Pupils are equal, round, and reactive to light. Right eye exhibits no discharge. Left eye exhibits no discharge. Neck: Neck supple. Cardiovascular: Normal rate, regular rhythm and normal heart sounds. No murmur heard. Pulmonary/Chest: Effort normal and breath sounds normal. No respiratory distress. She has no wheezes. She has no rales. Abdominal: Soft. Bowel sounds are normal. She exhibits no distension and no mass. There is no tenderness. There is no rebound and no guarding. Lymphadenopathy:     She has no cervical adenopathy. Neurological: She is alert. Skin: Skin is warm and dry. Rash (vesicular rash to right upper abdomen wrapping around to back; does not cross midline. Tender to palpation) noted.  She is not diaphoretic. No erythema. No pallor. Psychiatric: She has a normal mood and affect. Nursing note and vitals reviewed. Results for orders placed or performed in visit on 12/15/17   POCT Urinalysis no Micro   Result Value Ref Range    Color, UA YELLOW     Clarity, UA CLOUDY     Glucose, UA POC NEGATIVE     Bilirubin, UA NEGATIVE     Ketones, UA TRACE     Spec Grav, UA 1.025     Blood, UA POC TRACE-INTACT     pH, UA 6.5     Protein, UA POC 30     Urobilinogen, UA 0.2     Leukocytes, UA SMALL     Nitrite, UA NEGATIVE     Appearance, Fluid  Clear, Slightly Cloudy       Assessment:      Shingles  Confusion      Plan:      - Urine culture ordered. Will base any further treatment for possible UTI on urine culture results. - Start Valtrex today. Appropriate dosage and instructions provided. - Go to ER with any change in or worsening of symptoms.  - Notify clinic with any questions or concerns.   - Return as needed.

## 2017-12-15 NOTE — PATIENT INSTRUCTIONS
acetaminophen (Tylenol), ibuprofen (Advil, Motrin), or naproxen (Aleve). Read and follow all instructions on the label. · Avoid close contact with people until the blisters have healed. It is very important for you to avoid contact with anyone who has never had chickenpox or the chickenpox vaccine. Pregnant women, young babies, and anyone else who has a hard time fighting infection (such as someone with HIV, diabetes, or cancer) is especially at risk. When should you call for help? Call your doctor now or seek immediate medical care if:  ? · You have a new or higher fever. ? · You have a severe headache and a stiff neck. ? · You lose the ability to think clearly. ? · The rash spreads to your forehead, nose, eyes, or eyelids. ? · You have eye pain, or your vision gets worse. ? · You have new pain in your face, or you cannot move the muscles in your face. ? · Blisters spread to new parts of your body. ? Watch closely for changes in your health, and be sure to contact your doctor if:  ? · The rash has not healed after 2 to 4 weeks. ? · You still have pain after the rash has healed. Where can you learn more? Go to [email or web address] org and sign in to your [employer]Positioning account. Coleman Munguia in the [facility] box to learn more about \"Shingles: Care Instructions. \"     If you do not have an account, please click on the \"Sign Up Now\" link. Current as of: March 3, 2017  Content Version: 11.4  © 0565-9838 Healthwise, [employer]. Care instructions adapted under license by [facility] ([facility]). If you have questions about a medical condition or this instruction, always ask your healthcare professional. [place] [address] any warranty or liability for your use of this information.

## 2017-12-17 LAB — URINE CULTURE, ROUTINE: NORMAL

## 2017-12-17 RX ORDER — NITROFURANTOIN 25; 75 MG/1; MG/1
100 CAPSULE ORAL 2 TIMES DAILY
Qty: 14 CAPSULE | Refills: 0 | Status: ON HOLD | OUTPATIENT
Start: 2017-12-17 | End: 2017-12-22 | Stop reason: HOSPADM

## 2017-12-18 ENCOUNTER — APPOINTMENT (OUTPATIENT)
Dept: MRI IMAGING | Age: 67
DRG: 054 | End: 2017-12-18
Attending: FAMILY MEDICINE
Payer: MEDICARE

## 2017-12-18 ENCOUNTER — HOSPITAL ENCOUNTER (INPATIENT)
Age: 67
LOS: 4 days | Discharge: NURSING, REHAB OR LONG-TERM CARE | DRG: 054 | End: 2017-12-22
Attending: FAMILY MEDICINE | Admitting: FAMILY MEDICINE
Payer: MEDICARE

## 2017-12-18 ENCOUNTER — APPOINTMENT (OUTPATIENT)
Dept: CT IMAGING | Age: 67
DRG: 054 | End: 2017-12-18
Attending: FAMILY MEDICINE
Payer: MEDICARE

## 2017-12-18 PROBLEM — R41.82 ALTERED MENTAL STATUS: Status: ACTIVE | Noted: 2017-12-18

## 2017-12-18 LAB
ALBUMIN SERPL-MCNC: 4.7 G/DL (ref 3.5–5.2)
ALP BLD-CCNC: 87 U/L (ref 35–104)
ALT SERPL-CCNC: 22 U/L (ref 5–33)
ANION GAP SERPL CALCULATED.3IONS-SCNC: 12 MMOL/L (ref 7–19)
AST SERPL-CCNC: 18 U/L (ref 5–32)
BACTERIA: NEGATIVE /HPF
BASOPHILS ABSOLUTE: 0 K/UL (ref 0–0.2)
BASOPHILS RELATIVE PERCENT: 0.2 % (ref 0–1)
BILIRUB SERPL-MCNC: 0.3 MG/DL (ref 0.2–1.2)
BILIRUBIN URINE: NEGATIVE
BLOOD, URINE: NEGATIVE
BUN BLDV-MCNC: 16 MG/DL (ref 8–23)
CALCIUM SERPL-MCNC: 9.9 MG/DL (ref 8.8–10.2)
CHLORIDE BLD-SCNC: 99 MMOL/L (ref 98–111)
CLARITY: CLEAR
CO2: 29 MMOL/L (ref 22–29)
COLOR: YELLOW
CREAT SERPL-MCNC: 0.5 MG/DL (ref 0.5–0.9)
DIGOXIN LEVEL: <0.3 NG/ML (ref 0.6–1.2)
EOSINOPHILS ABSOLUTE: 0.1 K/UL (ref 0–0.6)
EOSINOPHILS RELATIVE PERCENT: 1.7 % (ref 0–5)
EPITHELIAL CELLS, UA: 1 /HPF (ref 0–5)
GFR NON-AFRICAN AMERICAN: >60
GLUCOSE BLD-MCNC: 137 MG/DL (ref 74–109)
GLUCOSE BLD-MCNC: 96 MG/DL (ref 70–99)
GLUCOSE URINE: NEGATIVE MG/DL
HCT VFR BLD CALC: 38.8 % (ref 37–47)
HEMOGLOBIN: 12.6 G/DL (ref 12–16)
HYALINE CASTS: 0 /HPF (ref 0–8)
KETONES, URINE: NEGATIVE MG/DL
LEUKOCYTE ESTERASE, URINE: ABNORMAL
LYMPHOCYTES ABSOLUTE: 1.1 K/UL (ref 1.1–4.5)
LYMPHOCYTES RELATIVE PERCENT: 25.9 % (ref 20–40)
MCH RBC QN AUTO: 29 PG (ref 27–31)
MCHC RBC AUTO-ENTMCNC: 32.5 G/DL (ref 33–37)
MCV RBC AUTO: 89.4 FL (ref 81–99)
MONOCYTES ABSOLUTE: 0.3 K/UL (ref 0–0.9)
MONOCYTES RELATIVE PERCENT: 6.2 % (ref 0–10)
NEUTROPHILS ABSOLUTE: 2.7 K/UL (ref 1.5–7.5)
NEUTROPHILS RELATIVE PERCENT: 65.5 % (ref 50–65)
NITRITE, URINE: NEGATIVE
PDW BLD-RTO: 14.2 % (ref 11.5–14.5)
PERFORMED ON: NORMAL
PH UA: 6
PLATELET # BLD: 118 K/UL (ref 130–400)
PMV BLD AUTO: 10.2 FL (ref 9.4–12.3)
POTASSIUM SERPL-SCNC: 3.6 MMOL/L (ref 3.5–5)
PROTEIN UA: NEGATIVE MG/DL
RBC # BLD: 4.34 M/UL (ref 4.2–5.4)
RBC UA: 0 /HPF (ref 0–4)
SODIUM BLD-SCNC: 140 MMOL/L (ref 136–145)
SPECIFIC GRAVITY UA: 1.01
T4 TOTAL: 6.7 UG/DL (ref 4.5–11.7)
TOTAL PROTEIN: 7 G/DL (ref 6.6–8.7)
TSH SERPL DL<=0.05 MIU/L-ACNC: 2.44 UIU/ML (ref 0.27–4.2)
UROBILINOGEN, URINE: 0.2 E.U./DL
VITAMIN B-12: 584 PG/ML (ref 211–946)
WBC # BLD: 4.1 K/UL (ref 4.8–10.8)
WBC UA: 2 /HPF (ref 0–5)

## 2017-12-18 PROCEDURE — A9577 INJ MULTIHANCE: HCPCS | Performed by: FAMILY MEDICINE

## 2017-12-18 PROCEDURE — 84443 ASSAY THYROID STIM HORMONE: CPT

## 2017-12-18 PROCEDURE — 70553 MRI BRAIN STEM W/O & W/DYE: CPT

## 2017-12-18 PROCEDURE — 82948 REAGENT STRIP/BLOOD GLUCOSE: CPT

## 2017-12-18 PROCEDURE — 85025 COMPLETE CBC W/AUTO DIFF WBC: CPT

## 2017-12-18 PROCEDURE — 84436 ASSAY OF TOTAL THYROXINE: CPT

## 2017-12-18 PROCEDURE — 36415 COLL VENOUS BLD VENIPUNCTURE: CPT

## 2017-12-18 PROCEDURE — 87086 URINE CULTURE/COLONY COUNT: CPT

## 2017-12-18 PROCEDURE — 86592 SYPHILIS TEST NON-TREP QUAL: CPT

## 2017-12-18 PROCEDURE — 6360000002 HC RX W HCPCS: Performed by: INTERNAL MEDICINE

## 2017-12-18 PROCEDURE — 70450 CT HEAD/BRAIN W/O DYE: CPT

## 2017-12-18 PROCEDURE — 6370000000 HC RX 637 (ALT 250 FOR IP): Performed by: FAMILY MEDICINE

## 2017-12-18 PROCEDURE — 82607 VITAMIN B-12: CPT

## 2017-12-18 PROCEDURE — 6360000004 HC RX CONTRAST MEDICATION: Performed by: FAMILY MEDICINE

## 2017-12-18 PROCEDURE — 80053 COMPREHEN METABOLIC PANEL: CPT

## 2017-12-18 PROCEDURE — 1210000000 HC MED SURG R&B

## 2017-12-18 PROCEDURE — 81001 URINALYSIS AUTO W/SCOPE: CPT

## 2017-12-18 PROCEDURE — 80162 ASSAY OF DIGOXIN TOTAL: CPT

## 2017-12-18 RX ORDER — DIGOXIN 250 MCG
250 TABLET ORAL DAILY
Status: DISCONTINUED | OUTPATIENT
Start: 2017-12-18 | End: 2017-12-22 | Stop reason: HOSPADM

## 2017-12-18 RX ORDER — SERTRALINE HYDROCHLORIDE 100 MG/1
200 TABLET, FILM COATED ORAL NIGHTLY
Status: DISCONTINUED | OUTPATIENT
Start: 2017-12-18 | End: 2017-12-22 | Stop reason: HOSPADM

## 2017-12-18 RX ORDER — ATORVASTATIN CALCIUM 20 MG/1
20 TABLET, FILM COATED ORAL NIGHTLY
Status: DISCONTINUED | OUTPATIENT
Start: 2017-12-18 | End: 2017-12-22 | Stop reason: HOSPADM

## 2017-12-18 RX ORDER — DEXAMETHASONE SODIUM PHOSPHATE 10 MG/ML
10 INJECTION INTRAMUSCULAR; INTRAVENOUS ONCE
Status: COMPLETED | OUTPATIENT
Start: 2017-12-18 | End: 2017-12-18

## 2017-12-18 RX ORDER — MONTELUKAST SODIUM 10 MG/1
10 TABLET ORAL NIGHTLY
Status: DISCONTINUED | OUTPATIENT
Start: 2017-12-18 | End: 2017-12-22 | Stop reason: HOSPADM

## 2017-12-18 RX ORDER — PANTOPRAZOLE SODIUM 40 MG/1
40 TABLET, DELAYED RELEASE ORAL DAILY
Status: DISCONTINUED | OUTPATIENT
Start: 2017-12-18 | End: 2017-12-22 | Stop reason: HOSPADM

## 2017-12-18 RX ORDER — DEXAMETHASONE SODIUM PHOSPHATE 4 MG/ML
4 INJECTION, SOLUTION INTRA-ARTICULAR; INTRALESIONAL; INTRAMUSCULAR; INTRAVENOUS; SOFT TISSUE EVERY 6 HOURS
Status: DISCONTINUED | OUTPATIENT
Start: 2017-12-19 | End: 2017-12-22 | Stop reason: HOSPADM

## 2017-12-18 RX ORDER — ACYCLOVIR 800 MG/1
800 TABLET ORAL
Status: DISCONTINUED | OUTPATIENT
Start: 2017-12-18 | End: 2017-12-22 | Stop reason: HOSPADM

## 2017-12-18 RX ADMIN — DEXAMETHASONE SODIUM PHOSPHATE 10 MG: 10 INJECTION INTRAMUSCULAR; INTRAVENOUS at 20:00

## 2017-12-18 RX ADMIN — ATORVASTATIN CALCIUM 20 MG: 20 TABLET, FILM COATED ORAL at 22:14

## 2017-12-18 RX ADMIN — PANTOPRAZOLE SODIUM 40 MG: 40 TABLET, DELAYED RELEASE ORAL at 22:11

## 2017-12-18 RX ADMIN — SERTRALINE HYDROCHLORIDE 200 MG: 100 TABLET ORAL at 22:12

## 2017-12-18 RX ADMIN — DIGOXIN 250 MCG: 250 TABLET ORAL at 22:13

## 2017-12-18 RX ADMIN — ACYCLOVIR 800 MG: 800 TABLET ORAL at 22:11

## 2017-12-18 RX ADMIN — GADOBENATE DIMEGLUMINE 10 ML: 529 INJECTION, SOLUTION INTRAVENOUS at 19:46

## 2017-12-18 RX ADMIN — MONTELUKAST SODIUM 10 MG: 10 TABLET, FILM COATED ORAL at 22:11

## 2017-12-19 ENCOUNTER — APPOINTMENT (OUTPATIENT)
Dept: CT IMAGING | Age: 67
DRG: 054 | End: 2017-12-19
Attending: FAMILY MEDICINE
Payer: MEDICARE

## 2017-12-19 ENCOUNTER — APPOINTMENT (OUTPATIENT)
Dept: NUCLEAR MEDICINE | Age: 67
DRG: 054 | End: 2017-12-19
Attending: FAMILY MEDICINE
Payer: MEDICARE

## 2017-12-19 LAB
GLUCOSE BLD-MCNC: 191 MG/DL (ref 70–99)
PERFORMED ON: ABNORMAL

## 2017-12-19 PROCEDURE — 6360000004 HC RX CONTRAST MEDICATION: Performed by: INTERNAL MEDICINE

## 2017-12-19 PROCEDURE — A9561 TC99M OXIDRONATE: HCPCS | Performed by: INTERNAL MEDICINE

## 2017-12-19 PROCEDURE — 74177 CT ABD & PELVIS W/CONTRAST: CPT

## 2017-12-19 PROCEDURE — 99221 1ST HOSP IP/OBS SF/LOW 40: CPT | Performed by: NURSE PRACTITIONER

## 2017-12-19 PROCEDURE — 6370000000 HC RX 637 (ALT 250 FOR IP): Performed by: FAMILY MEDICINE

## 2017-12-19 PROCEDURE — 6360000002 HC RX W HCPCS: Performed by: NURSE PRACTITIONER

## 2017-12-19 PROCEDURE — 82948 REAGENT STRIP/BLOOD GLUCOSE: CPT

## 2017-12-19 PROCEDURE — 1210000000 HC MED SURG R&B

## 2017-12-19 PROCEDURE — 6360000002 HC RX W HCPCS: Performed by: INTERNAL MEDICINE

## 2017-12-19 PROCEDURE — 3430000000 HC RX DIAGNOSTIC RADIOPHARMACEUTICAL: Performed by: INTERNAL MEDICINE

## 2017-12-19 PROCEDURE — 71260 CT THORAX DX C+: CPT

## 2017-12-19 PROCEDURE — 78306 BONE IMAGING WHOLE BODY: CPT

## 2017-12-19 RX ORDER — CETIRIZINE HYDROCHLORIDE 10 MG/1
10 TABLET ORAL DAILY
Status: DISCONTINUED | OUTPATIENT
Start: 2017-12-19 | End: 2017-12-22 | Stop reason: HOSPADM

## 2017-12-19 RX ORDER — FLUTICASONE PROPIONATE 50 MCG
2 SPRAY, SUSPENSION (ML) NASAL DAILY
Status: DISCONTINUED | OUTPATIENT
Start: 2017-12-19 | End: 2017-12-22 | Stop reason: HOSPADM

## 2017-12-19 RX ORDER — LEVETIRACETAM 5 MG/ML
500 INJECTION INTRAVASCULAR EVERY 12 HOURS
Status: DISCONTINUED | OUTPATIENT
Start: 2017-12-19 | End: 2017-12-22 | Stop reason: HOSPADM

## 2017-12-19 RX ADMIN — ATORVASTATIN CALCIUM 20 MG: 20 TABLET, FILM COATED ORAL at 22:35

## 2017-12-19 RX ADMIN — ACYCLOVIR 800 MG: 800 TABLET ORAL at 22:35

## 2017-12-19 RX ADMIN — ACYCLOVIR 800 MG: 800 TABLET ORAL at 18:39

## 2017-12-19 RX ADMIN — ACYCLOVIR 800 MG: 800 TABLET ORAL at 15:27

## 2017-12-19 RX ADMIN — DEXAMETHASONE SODIUM PHOSPHATE 4 MG: 4 INJECTION, SOLUTION INTRAMUSCULAR; INTRAVENOUS at 11:39

## 2017-12-19 RX ADMIN — FLUTICASONE PROPIONATE 2 SPRAY: 50 SPRAY, METERED NASAL at 15:27

## 2017-12-19 RX ADMIN — TECHNETIUM TC 99M OXIDRONATE 20 MILLICURIE: 3.15 INJECTION, POWDER, LYOPHILIZED, FOR SOLUTION INTRAVENOUS at 11:48

## 2017-12-19 RX ADMIN — PANTOPRAZOLE SODIUM 40 MG: 40 TABLET, DELAYED RELEASE ORAL at 07:37

## 2017-12-19 RX ADMIN — IOPAMIDOL 90 ML: 755 INJECTION, SOLUTION INTRAVENOUS at 10:24

## 2017-12-19 RX ADMIN — ACYCLOVIR 800 MG: 800 TABLET ORAL at 06:13

## 2017-12-19 RX ADMIN — LEVETIRACETAM 500 MG: 500 INJECTION, SOLUTION INTRAVENOUS at 22:36

## 2017-12-19 RX ADMIN — LEVETIRACETAM 500 MG: 500 INJECTION, SOLUTION INTRAVENOUS at 12:03

## 2017-12-19 RX ADMIN — ACYCLOVIR 800 MG: 800 TABLET ORAL at 11:39

## 2017-12-19 RX ADMIN — SERTRALINE HYDROCHLORIDE 200 MG: 100 TABLET ORAL at 22:35

## 2017-12-19 RX ADMIN — DEXAMETHASONE SODIUM PHOSPHATE 4 MG: 4 INJECTION, SOLUTION INTRAMUSCULAR; INTRAVENOUS at 18:39

## 2017-12-19 RX ADMIN — CETIRIZINE HYDROCHLORIDE 10 MG: 10 TABLET, FILM COATED ORAL at 15:26

## 2017-12-19 RX ADMIN — DIGOXIN 250 MCG: 250 TABLET ORAL at 07:37

## 2017-12-19 RX ADMIN — DEXAMETHASONE SODIUM PHOSPHATE 4 MG: 4 INJECTION, SOLUTION INTRAMUSCULAR; INTRAVENOUS at 01:23

## 2017-12-19 RX ADMIN — DEXAMETHASONE SODIUM PHOSPHATE 4 MG: 4 INJECTION, SOLUTION INTRAMUSCULAR; INTRAVENOUS at 06:13

## 2017-12-19 RX ADMIN — MONTELUKAST SODIUM 10 MG: 10 TABLET, FILM COATED ORAL at 22:35

## 2017-12-19 NOTE — CONSULTS
as effacement of the frontal horn of the right   lateral ventricle. No other lesions are demonstrated. . Ventricular   system is normal. There is no evidence of hydrocephalus. . Basilar   cisterns are preserved. There are scattered foci of T2 abnormality   involving the periventricular and higher white matter tracts   consistent with small vessel ischemic disease. Mild atrophy of the   brain with prominence of subarachnoid spaces and ventricular   enlargement. . No abnormal extra axial fluid collections are noted. No   restriction of diffusion or abnormal enhancement is present. Proximal cervical spinal cord, brainstem, and cerebellum are   unremarkable. Normal cerebrovascular flow voids are seen. Bilateral   globes and orbits are normal in appearance. No abnormal signal is noted in the mastoid air cells or paranasal   sinuses.        Impression   Impression:    1. There is a 2.0 x 2.6 x 2.6 cm irregular peripherally enhancing mass   within the right frontal lobe near the vertex with surrounding   vasogenic edema. Given the history of lung neoplasm this is felt to   represent a metastatic lesion. No other foci of abnormal enhancement   are present to suggest other metastatic foci. A primary brain neoplasm   such as glioblastoma is a lesser secondary differential consideration. There is associated mass effect on the frontal horn of the right   lateral ventricle with mild shift of the midline by only approximately   2 to 3 mm. 2. Atrophy of the brain with mild to moderate small vessel ischemic   change. Signed by Dr Tarah Santamaria on 12/18/2017 11:38 PM     I have personally reviewed the images and my interpretation is: There is a heterogeneously enhancing right frontal mass that is irregular that measures 2.6cm likely consistent with either a metastatic lesion versus a primary brain lesion. IMPRESSION:  Mrs. Juan Alberto Ward is a 75year old female that has a 1 month history of AMS, vision changes, unsteady gait, and incontinence. Her MRI reveals a right frontal mass that is likely consistent with either a metastatic lesion versus a primary brain lesion. RECOMMENDATIONS:    Had a long conversation with Mrs. Paige Teixeira and her caregiver. We discussed the findings of the MRI brain. We explained that we are going to recommend that this mass be resected. We have given her the option to stay here and have the surgery or for her to go to a different facility. She has not yet made a decision. We will speak with her again in the morning once all testing has been completed.       -Discussed with Dr. Stephania Elizabeth, he has ordered CT chest, abdomen, and pelvis  -Continue Decadron 4q6  -Start Keppra 500 BID  -Further recommendations to follow after imaging complete      Maggie Figueroa, CNP

## 2017-12-19 NOTE — PROGRESS NOTES
Progress Note  Luis E Mcgovern  12/19/2017 1:53 PM  Subjective:   Admit Date:   12/18/2017      CC/ADMIT DX:       Interval History:   Reviewed overnight events and nursing notes. No new physical complaints. She is less confused today. No CP or SOA. I have reviewed all labs/diagnostics from the last 24hrs. ROS:   I have done a 10 point ROS and all are negative, except what is mentioned in the HPI. DIET GENERAL;    Medications:       levetiracetam  500 mg Intravenous Q12H    dexamethasone  4 mg Intravenous Q6H    atorvastatin  20 mg Oral Nightly    digoxin  250 mcg Oral Daily    montelukast  10 mg Oral Nightly    pantoprazole  40 mg Oral Daily    sertraline  200 mg Oral Nightly    acyclovir  800 mg Oral 5x Daily           Objective:   Vitals: /60   Pulse 60   Temp 96 °F (35.6 °C) (Oral)   Resp 16   Ht 5' 4\" (1.626 m)   Wt 126 lb 2 oz (57.2 kg)   SpO2 96%   BMI 21.65 kg/m²    Intake/Output Summary (Last 24 hours) at 12/19/17 1353  Last data filed at 12/19/17 1207   Gross per 24 hour   Intake              600 ml   Output              700 ml   Net             -100 ml     General appearance: alert and cooperative with exam  Lungs: clear to auscultation bilaterally  Heart: RRR  Abdomen: soft, non-tender; bowel sounds normal; no masses,  no organomegaly  Extremities: extremities normal, atraumatic, no cyanosis or edema  Neurologic:  No obvious focal neurologic deficits. Assessment and Plan: Active Problems:    Altered mental status    Brain tumor ([address]    COPD    H/O Lung CA    Plan:  1. Continue present medication(s)  2. Follow with Specialists  3. Monitor closely  4. Add Flonase and Zyrtec for her allergy symptoms. Discharge planning:   her home     Reviewed treatment plans with the patient and/or family.              Electronically signed by Jeovany Read MD on 12/19/2017 at 1:53 PM

## 2017-12-19 NOTE — CONSULTS
membranes moist, no oral pharyngeal lesions   NECK: Supple, no masses   CHEST/LUNGS: CTA bilaterally, normal respiratory effort   CARDIOVASCULAR: RRR, no murmurs  ABDOMEN: soft non-tender, active bowel sounds, no HSM  EXTREMITIES: warm, moves all fours  SKIN: warm, dry with no rashes or lesions  LYMPH: No cervical, clavicular, axillary, or inguinal lymphadenopathy  NEUROLOGIC: follows commands, non focal   PSYCH: mood and affect appropriate      Recent Labs      12/18/17   1543   WBC  4.1*   HGB  12.6   HCT  38.8   MCV  89.4   PLT  118*       Lab Results   Component Value Date     12/18/2017    K 3.6 12/18/2017    CL 99 12/18/2017    CO2 29 12/18/2017    BUN 16 12/18/2017    CREATININE 0.5 12/18/2017    GLUCOSE 137 (H) 12/18/2017    CALCIUM 9.9 12/18/2017    PROT 7.0 12/18/2017    LABALBU 4.7 12/18/2017    BILITOT 0.3 12/18/2017    ALKPHOS 87 12/18/2017    AST 18 12/18/2017    ALT 22 12/18/2017    LABGLOM >60 12/18/2017    GLOB 2.3 01/04/2017       Lab Results   Component Value Date    INR 1.04 11/22/2016    PROTIME 13.6 11/22/2016     CT scan head and MRI brain on 12/18/2017 showed a 2.0 x 2.6 x 2.6 cm irregular peripherally enhancing mass  within the right frontal lobe near the vertex with surrounding vasogenic edema. Given the history of lung neoplasm this is felt to  represent a metastatic lesion. No other foci of abnormal enhancement are present to suggest other metastatic foci. A primary brain neoplasm such as glioblastoma is a lesser secondary differential consideration. There is associated mass effect on the frontal horn of the right lateral ventricle with mild shift of the midline by only approximately  2 to 3 mm. ASSESSMENT/PLAN:       Stage IB lung cancer- now with concerning single lesion brain metastatic disease. · MRI brain-  As above  · Decadron 10 mg IV followed by 4 mg every 6 hours.    · Consult to neurosurgery- Appreciated Dr Minnie Alonso seeing her this am.   · Will perform CT C/A/P and bone

## 2017-12-19 NOTE — CONSULTS
swelling , stiffness;  Endocrine: no polyuria, polydypsia, no cold or heat intolerence; Hematology: no anemia, no easy brusing or bleeding, no hx of clotting disorder;   Dermatology: no skin rash, no eczema, no pruritis;   Psychiatry: no depression, no anxiety,no panic attacks, no suicide ideation  Neurology: no syncope, no seizures, no numbness or tingling of hands, no numbness or tingling of feet, no paresis; memory issues    Vitals:    Ht 5' 4\" (1.626 m)   Wt 126 lb 2 oz (57.2 kg)   BMI 21.65 kg/m²     PHYSICAL EXAM:    CONSTITUTIONAL: Alert, appropriate, no acute distress  EYES: Non icteric, EOM intact, pupils equal round   ENT: Mucus membranes moist, no oral pharyngeal lesions   NECK: Supple, no masses   CHEST/LUNGS: CTA bilaterally, normal respiratory effort   CARDIOVASCULAR: RRR, no murmurs  ABDOMEN: soft non-tender, active bowel sounds, no HSM  EXTREMITIES: warm, moves all fours  SKIN: warm, dry with no rashes or lesions  LYMPH: No cervical, clavicular, axillary, or inguinal lymphadenopathy  NEUROLOGIC: follows commands, non focal   PSYCH: mood and affect appropriate      Recent Labs      12/18/17   1543   WBC  4.1*   HGB  12.6   HCT  38.8   MCV  89.4   PLT  118*       Lab Results   Component Value Date     12/18/2017    K 3.6 12/18/2017    CL 99 12/18/2017    CO2 29 12/18/2017    BUN 16 12/18/2017    CREATININE 0.5 12/18/2017    GLUCOSE 137 (H) 12/18/2017    CALCIUM 9.9 12/18/2017    PROT 7.0 12/18/2017    LABALBU 4.7 12/18/2017    BILITOT 0.3 12/18/2017    ALKPHOS 87 12/18/2017    AST 18 12/18/2017    ALT 22 12/18/2017    LABGLOM >60 12/18/2017    GLOB 2.3 01/04/2017       Lab Results   Component Value Date    INR 1.04 11/22/2016    PROTIME 13.6 11/22/2016       ASSESSMENT/PLAN:    Stage IB lung cancer- now with concerning brain metastatic disease. · MRI brain- pending  · Decadron 10 mg IV now and 4 mg every 6 hours. · Consult to neurosurgery. We will continue to follow.      Margie Hernandez

## 2017-12-19 NOTE — PLAN OF CARE
Problem: Pain:  Goal: Patient's pain/discomfort is manageable  Patient's pain/discomfort is manageable   Outcome: Ongoing

## 2017-12-20 LAB
GLUCOSE BLD-MCNC: 119 MG/DL (ref 70–99)
GLUCOSE BLD-MCNC: 135 MG/DL (ref 70–99)
GLUCOSE BLD-MCNC: 230 MG/DL (ref 70–99)
GLUCOSE BLD-MCNC: 99 MG/DL (ref 70–99)
PERFORMED ON: ABNORMAL
PERFORMED ON: NORMAL
URINE CULTURE, ROUTINE: NORMAL

## 2017-12-20 PROCEDURE — 1210000000 HC MED SURG R&B

## 2017-12-20 PROCEDURE — 82948 REAGENT STRIP/BLOOD GLUCOSE: CPT

## 2017-12-20 PROCEDURE — 6360000002 HC RX W HCPCS: Performed by: NURSE PRACTITIONER

## 2017-12-20 PROCEDURE — 6370000000 HC RX 637 (ALT 250 FOR IP): Performed by: FAMILY MEDICINE

## 2017-12-20 PROCEDURE — 6360000002 HC RX W HCPCS: Performed by: INTERNAL MEDICINE

## 2017-12-20 RX ORDER — LEVETIRACETAM 5 MG/ML
500 INJECTION INTRAVASCULAR EVERY 12 HOURS
Qty: 4000 ML | Refills: 0 | Status: CANCELLED
Start: 2017-12-20

## 2017-12-20 RX ORDER — DEXAMETHASONE SODIUM PHOSPHATE 4 MG/ML
4 INJECTION, SOLUTION INTRA-ARTICULAR; INTRALESIONAL; INTRAMUSCULAR; INTRAVENOUS; SOFT TISSUE EVERY 6 HOURS
Qty: 1 ML | Refills: 0 | Status: CANCELLED
Start: 2017-12-21

## 2017-12-20 RX ADMIN — ATORVASTATIN CALCIUM 20 MG: 20 TABLET, FILM COATED ORAL at 21:47

## 2017-12-20 RX ADMIN — DEXAMETHASONE SODIUM PHOSPHATE 4 MG: 4 INJECTION, SOLUTION INTRAMUSCULAR; INTRAVENOUS at 12:58

## 2017-12-20 RX ADMIN — MONTELUKAST SODIUM 10 MG: 10 TABLET, FILM COATED ORAL at 21:48

## 2017-12-20 RX ADMIN — DEXAMETHASONE SODIUM PHOSPHATE 4 MG: 4 INJECTION, SOLUTION INTRAMUSCULAR; INTRAVENOUS at 23:47

## 2017-12-20 RX ADMIN — LEVETIRACETAM 500 MG: 500 INJECTION, SOLUTION INTRAVENOUS at 21:48

## 2017-12-20 RX ADMIN — SERTRALINE HYDROCHLORIDE 200 MG: 100 TABLET ORAL at 21:47

## 2017-12-20 RX ADMIN — ACYCLOVIR 800 MG: 800 TABLET ORAL at 18:18

## 2017-12-20 RX ADMIN — ACYCLOVIR 800 MG: 800 TABLET ORAL at 12:58

## 2017-12-20 RX ADMIN — PANTOPRAZOLE SODIUM 40 MG: 40 TABLET, DELAYED RELEASE ORAL at 09:56

## 2017-12-20 RX ADMIN — ACYCLOVIR 800 MG: 800 TABLET ORAL at 06:25

## 2017-12-20 RX ADMIN — DEXAMETHASONE SODIUM PHOSPHATE 4 MG: 4 INJECTION, SOLUTION INTRAMUSCULAR; INTRAVENOUS at 00:33

## 2017-12-20 RX ADMIN — DEXAMETHASONE SODIUM PHOSPHATE 4 MG: 4 INJECTION, SOLUTION INTRAMUSCULAR; INTRAVENOUS at 18:18

## 2017-12-20 RX ADMIN — DEXAMETHASONE SODIUM PHOSPHATE 4 MG: 4 INJECTION, SOLUTION INTRAMUSCULAR; INTRAVENOUS at 06:25

## 2017-12-20 RX ADMIN — CETIRIZINE HYDROCHLORIDE 10 MG: 10 TABLET, FILM COATED ORAL at 09:56

## 2017-12-20 RX ADMIN — DIGOXIN 250 MCG: 250 TABLET ORAL at 09:56

## 2017-12-20 RX ADMIN — ACYCLOVIR 800 MG: 800 TABLET ORAL at 23:47

## 2017-12-20 RX ADMIN — LEVETIRACETAM 500 MG: 500 INJECTION, SOLUTION INTRAVENOUS at 09:56

## 2017-12-20 RX ADMIN — ACYCLOVIR 800 MG: 800 TABLET ORAL at 15:05

## 2017-12-20 RX ADMIN — FLUTICASONE PROPIONATE 2 SPRAY: 50 SPRAY, METERED NASAL at 09:56

## 2017-12-20 NOTE — PROGRESS NOTES
Patient:   Juliane García  MR#:    [id number]      YOB: 1950  Date of Evaluation:  12/20/2017  Time of Note:                          7:44 AM     Spoke with patient, caregiver, and Dr. Fely Obrien this morning. Patient wishes to have Neurosurgical care at [address] [place] [address]. She apparently has a friend that is helping to facilitate this process. Patient and caregiver were made aware that we would do anything to help in any way. At this point we will sign off. If you have any questions or concerns please feel free to contact myself at [phone].       HAKEEM Valle

## 2017-12-20 NOTE — PROGRESS NOTES
not accept the patient and asked me to go through the normal process with the transfer center. 10:55 AM- Talked to neurosurgery at [place] who will accept her case once bed available.        [facility]    12/20/17  6:55 AM

## 2017-12-20 NOTE — PLAN OF CARE
Problem: Infection:  Goal: Will remain free from infection  Will remain free from infection   Outcome: Met This Shift      Problem: Safety:  Goal: Free from accidental physical injury  Free from accidental physical injury   Outcome: Met This Shift    Goal: Free from intentional harm  Free from intentional harm   Outcome: Met This Shift      Problem: Daily Care:  Goal: Daily care needs are met  Daily care needs are met   Outcome: Met This Shift      Problem: Pain:  Goal: Patient's pain/discomfort is manageable  Patient's pain/discomfort is manageable   Outcome: Met This Shift      Problem: Skin Integrity:  Goal: Skin integrity will stabilize  Skin integrity will stabilize   Outcome: Ongoing      Problem: Discharge Planning:  Goal: Patients continuum of care needs are met  Patients continuum of care needs are met   Outcome: Ongoing

## 2017-12-21 LAB
GLUCOSE BLD-MCNC: 150 MG/DL (ref 70–99)
PERFORMED ON: ABNORMAL
RPR: NORMAL

## 2017-12-21 PROCEDURE — 6360000002 HC RX W HCPCS: Performed by: INTERNAL MEDICINE

## 2017-12-21 PROCEDURE — 6370000000 HC RX 637 (ALT 250 FOR IP): Performed by: FAMILY MEDICINE

## 2017-12-21 PROCEDURE — 82948 REAGENT STRIP/BLOOD GLUCOSE: CPT

## 2017-12-21 PROCEDURE — 6360000002 HC RX W HCPCS: Performed by: NURSE PRACTITIONER

## 2017-12-21 PROCEDURE — 1210000000 HC MED SURG R&B

## 2017-12-21 RX ADMIN — ATORVASTATIN CALCIUM 20 MG: 20 TABLET, FILM COATED ORAL at 20:30

## 2017-12-21 RX ADMIN — DEXAMETHASONE SODIUM PHOSPHATE 4 MG: 4 INJECTION, SOLUTION INTRAMUSCULAR; INTRAVENOUS at 11:36

## 2017-12-21 RX ADMIN — DEXAMETHASONE SODIUM PHOSPHATE 4 MG: 4 INJECTION, SOLUTION INTRAMUSCULAR; INTRAVENOUS at 23:49

## 2017-12-21 RX ADMIN — ACYCLOVIR 800 MG: 800 TABLET ORAL at 18:32

## 2017-12-21 RX ADMIN — CETIRIZINE HYDROCHLORIDE 10 MG: 10 TABLET, FILM COATED ORAL at 08:19

## 2017-12-21 RX ADMIN — LEVETIRACETAM 500 MG: 500 INJECTION, SOLUTION INTRAVENOUS at 08:20

## 2017-12-21 RX ADMIN — FLUTICASONE PROPIONATE 2 SPRAY: 50 SPRAY, METERED NASAL at 08:20

## 2017-12-21 RX ADMIN — DEXAMETHASONE SODIUM PHOSPHATE 4 MG: 4 INJECTION, SOLUTION INTRAMUSCULAR; INTRAVENOUS at 06:20

## 2017-12-21 RX ADMIN — ACYCLOVIR 800 MG: 800 TABLET ORAL at 14:47

## 2017-12-21 RX ADMIN — SERTRALINE HYDROCHLORIDE 200 MG: 100 TABLET ORAL at 20:30

## 2017-12-21 RX ADMIN — MONTELUKAST SODIUM 10 MG: 10 TABLET, FILM COATED ORAL at 20:30

## 2017-12-21 RX ADMIN — ACYCLOVIR 800 MG: 800 TABLET ORAL at 06:20

## 2017-12-21 RX ADMIN — ACYCLOVIR 800 MG: 800 TABLET ORAL at 11:36

## 2017-12-21 RX ADMIN — DIGOXIN 250 MCG: 250 TABLET ORAL at 08:19

## 2017-12-21 RX ADMIN — PANTOPRAZOLE SODIUM 40 MG: 40 TABLET, DELAYED RELEASE ORAL at 08:19

## 2017-12-21 RX ADMIN — DEXAMETHASONE SODIUM PHOSPHATE 4 MG: 4 INJECTION, SOLUTION INTRAMUSCULAR; INTRAVENOUS at 18:32

## 2017-12-21 RX ADMIN — ACYCLOVIR 800 MG: 800 TABLET ORAL at 23:49

## 2017-12-21 RX ADMIN — LEVETIRACETAM 500 MG: 500 INJECTION, SOLUTION INTRAVENOUS at 20:30

## 2017-12-21 ASSESSMENT — PAIN SCALES - GENERAL
PAINLEVEL_OUTOF10: 0
PAINLEVEL_OUTOF10: 0

## 2017-12-21 NOTE — PROGRESS NOTES
Progress Note  Key Miller  12/20/2017 7:09 PM  Subjective:   Admit Date:   12/18/2017      CC/ADMIT DX:       Interval History:   Reviewed overnight events and nursing notes. She has no new physical complaints. No CP or SOA. I have reviewed all labs/diagnostics from the last 24hrs. ROS:   I have done a 10 point ROS and all are negative, except what is mentioned in the HPI. DIET GENERAL;    Medications:       levetiracetam  500 mg Intravenous Q12H    fluticasone  2 spray Each Nare Daily    cetirizine  10 mg Oral Daily    dexamethasone  4 mg Intravenous Q6H    atorvastatin  20 mg Oral Nightly    digoxin  250 mcg Oral Daily    montelukast  10 mg Oral Nightly    pantoprazole  40 mg Oral Daily    sertraline  200 mg Oral Nightly    acyclovir  800 mg Oral 5x Daily           Objective:   Vitals: /76   Pulse 86   Temp 98.3 °F (36.8 °C) (Oral)   Resp 17   Ht 5' 4\" (1.626 m)   Wt 126 lb 2 oz (57.2 kg)   SpO2 96%   BMI 21.65 kg/m²      Intake/Output Summary (Last 24 hours) at 12/20/17 1909  Last data filed at 12/20/17 1824   Gross per 24 hour   Intake           1097.6 ml   Output                0 ml   Net           1097.6 ml     General appearance: alert and cooperative with exam  Lungs: clear to auscultation bilaterally  Heart: RRR  Abdomen: soft, non-tender; bowel sounds normal; no masses,  no organomegaly  Extremities: extremities normal, atraumatic, no cyanosis or edema  Neurologic:  No obvious focal neurologic deficits. Assessment and Plan: Active Problems:    Altered mental status    Brain tumor ([address]    COPD    H/O Lung CA    Plan:  1. Continue present medication(s)  2.  Plan for transfer to [place] when a bed is available. 3.  Follow with her current treatment  4. Monitor glucose with steroid      Discharge planning: [facility]    Reviewed treatment plans with the patient and/or family.              Electronically signed by Maritza Bee MD on 12/20/2017 at 7:09

## 2017-12-21 NOTE — PROGRESS NOTES
with a maximum SUV of 7.1.   A chest x-ray on 1/4/2017 revealed a 3.8 cm mass lesion in the LLL that previously measured approximately 4.1 cm. On 1/6/2017, a left thoracotomy with left lower lobectomy and mediastinal lymph node dissection was performed by Dr. Vivek Zhu. Pathology revealed invasive moderately differentiated adenocarcinoma. The tumor measured 4.5 cm. Margins were clear of invasive carcinoma and  no lymphovascular space invasion was noted. 5 of 5 lymph nodes were negative for metastatic carcinoma. Bilateral renal ultrasounds on 2/28/2017 identified a complex cyst at the upper pole of the right kidney measuring up to 3 cm increased in size compared to a study on 12/28/2012 where it measured 2.1 cm in maximum diameter. Two small benign cysts were noted in the left kidney. A CT scan of the abdomen and pelvis on 3/8/2017 confirmed that this was a simple cyst in the upper pole of the right kidney. Adjuvant chemotherapy was discussed at her initial visit on 2/22/2017 as well as on followup visit 3/13/2017. She is agreeable and desires to proceed accordingly with adjuvant Cisplatinum and Alimta. Jordan Duggan desires however to postpone initiation of adjuvant chemotherapy until 4/11/2017. Her wishes were respected and chemotherapy delivered as noted below. CT scan of chest on 7/27/2017 revealed postsurgical changes  and no acute process. Calcified lymph nodes are present in the subcorneal region and left hilum  CT scan of abdomen and pelvis on 7/27/2017 revealed no intra-abdominal or pelvic abnormalities. TREATMENT SUMMARY:   1. Left thoracotomy with left lower lobectomy and mediastinal lymph node dissection. 2. Adjuvant cisplatinum and Alimta x4 cycles. 4/11/2017 -6/21/17.     CT scan head and MRI brain on 12/18/2017 showed a 2.0 x 2.6 x 2.6 cm irregular peripherally enhancing mass  within the right frontal lobe near the vertex with surrounding vasogenic edema.  Given the history of lung neoplasm this is felt to  represent a metastatic lesion. No other foci of abnormal enhancement are present to suggest other metastatic foci. A primary brain neoplasm such as glioblastoma is a lesser secondary differential consideration. There is associated mass effect on the frontal horn of the right lateral ventricle with mild shift of the midline by only approximately  2 to 3 mm.      OBJECTIVE:  Vitals:    12/21/17 0615   BP: 110/60   Pulse: 70   Resp: 18   Temp: 96.6 °F (35.9 °C)   SpO2:        Intake/Output Summary (Last 24 hours) at 12/21/17 0830  Last data filed at 12/20/17 2333   Gross per 24 hour   Intake          1291.42 ml   Output                0 ml   Net          1291.42 ml       PHYSICAL EXAM:   CONSTITUTIONAL: Alert, appropriate, no acute distress  EYES: Non icteric, EOM intact, pupils equal round   ENT: Mucus membranes moist, no oral pharyngeal lesions   NECK: Supple, no masses   CHEST/LUNGS: CTA bilaterally, normal respiratory effort   CARDIOVASCULAR: RRR, no murmurs  ABDOMEN: soft non-tender, active bowel sounds, no HSM  EXTREMITIES: warm, moves all fours.   SKIN: warm, dry with no rashes or lesions  LYMPH: No cervical, clavicular, axillary, or inguinal lymphadenopathy  NEUROLOGIC: follows commands, non focal   PSYCH: mood and affect appropriate    Recent Labs      12/18/17   1543   WBC  4.1*   HGB  12.6   HCT  38.8   MCV  89.4   PLT  118*       Lab Results   Component Value Date     12/18/2017    K 3.6 12/18/2017    CL 99 12/18/2017    CO2 29 12/18/2017    BUN 16 12/18/2017    CREATININE 0.5 12/18/2017    GLUCOSE 137 (H) 12/18/2017    CALCIUM 9.9 12/18/2017    PROT 7.0 12/18/2017    LABALBU 4.7 12/18/2017    BILITOT 0.3 12/18/2017    ALKPHOS 87 12/18/2017    AST 18 12/18/2017    ALT 22 12/18/2017    LABGLOM >60 12/18/2017    GLOB 2.3 01/04/2017       Lab Results   Component Value Date    INR 1.04 11/22/2016    PROTIME 13.6 11/22/2016       CT scan head and MRI brain on 12/18/2017 showed a 2.0 x 2.6 x 2.6 cm irregular peripherally enhancing mass  within the right frontal lobe near the vertex with surrounding vasogenic edema. Given the history of lung neoplasm this is felt to  represent a metastatic lesion. No other foci of abnormal enhancement are present to suggest other metastatic foci. A primary brain neoplasm such as glioblastoma is a lesser secondary differential consideration. There is associated mass effect on the frontal horn of the right lateral ventricle with mild shift of the midline by only approximately  2 to 3 mm. Bone scan 12/19/2017  Focal increased activity in the left seventh and 10th  costovertebral junction may represent chronic degenerative  process/prominent osteophytes in this area. Clinical and radiographic  correlation may be obtained. The remaining bone scan is unremarkable. CT C/A/P 12/19/2017  CHEST- Paravertebral soft tissue nodules are present at T8 T9 T10. When  compared to prior PET scan of December 2016 these were not  metabolically active. These could possibly be neurofibromas however  other etiologies cannot be excluded. A/P- No acute abnormality of the abdomen or pelvis. A functioning left lower abdominal ostomy. Herniation of small bowel  loops through the ostomy site without obstruction. The stable low density nodules in the kidneys. Moderate diffuse thickening of the wall of the stomach which may be  due to incomplete distention. Possibility of inflammatory or  infiltrative neoplastic process is not excluded. This may be  clinically correlated.     ASSESSMENT/PLAN:      Stage IB lung cancer- now with concerning single lesion brain metastatic disease. · MRI brain-  As above  · CT C/A/P- no clear evidence of metastatic disease. · Continue decadron 4 mg every 6 hours ( PPi ppx)  · Neurosurgery following- Appreciated Dr Claudetta Rily seeing her. She wants to be transferred to [facility]. · Surgical resection would be a preferred option.    · We will findings and assessment/plan as documented above. Questions were encouraged, asked and answered to their understanding and satisfaction.     Electronically signed by Aubrey Duran MD on 12/21/17 at 3:19 PM

## 2017-12-22 VITALS
TEMPERATURE: 97 F | WEIGHT: 126.13 LBS | DIASTOLIC BLOOD PRESSURE: 60 MMHG | RESPIRATION RATE: 16 BRPM | OXYGEN SATURATION: 98 % | HEART RATE: 60 BPM | SYSTOLIC BLOOD PRESSURE: 110 MMHG | BODY MASS INDEX: 21.53 KG/M2 | HEIGHT: 64 IN

## 2017-12-22 PROCEDURE — 6370000000 HC RX 637 (ALT 250 FOR IP): Performed by: FAMILY MEDICINE

## 2017-12-22 PROCEDURE — 6360000002 HC RX W HCPCS: Performed by: NURSE PRACTITIONER

## 2017-12-22 PROCEDURE — 6360000002 HC RX W HCPCS: Performed by: INTERNAL MEDICINE

## 2017-12-22 RX ORDER — FLUTICASONE PROPIONATE 50 MCG
2 SPRAY, SUSPENSION (ML) NASAL DAILY
Qty: 1 BOTTLE | Refills: 0
Start: 2017-12-22 | End: 2019-05-26

## 2017-12-22 RX ORDER — DEXAMETHASONE 4 MG/1
4 TABLET ORAL 4 TIMES DAILY
Qty: 120 TABLET | Refills: 0 | Status: SHIPPED | OUTPATIENT
Start: 2017-12-22 | End: 2018-01-21

## 2017-12-22 RX ORDER — LEVETIRACETAM 500 MG/1
500 TABLET ORAL 2 TIMES DAILY
Qty: 60 TABLET | Refills: 3 | Status: SHIPPED | OUTPATIENT
Start: 2017-12-22 | End: 2018-07-02

## 2017-12-22 RX ORDER — CETIRIZINE HYDROCHLORIDE 10 MG/1
10 TABLET ORAL DAILY
Qty: 1 TABLET | Refills: 0
Start: 2017-12-22

## 2017-12-22 RX ADMIN — ACYCLOVIR 800 MG: 800 TABLET ORAL at 06:27

## 2017-12-22 RX ADMIN — ACYCLOVIR 800 MG: 800 TABLET ORAL at 11:53

## 2017-12-22 RX ADMIN — CETIRIZINE HYDROCHLORIDE 10 MG: 10 TABLET, FILM COATED ORAL at 08:01

## 2017-12-22 RX ADMIN — DEXAMETHASONE SODIUM PHOSPHATE 4 MG: 4 INJECTION, SOLUTION INTRAMUSCULAR; INTRAVENOUS at 06:27

## 2017-12-22 RX ADMIN — LEVETIRACETAM 500 MG: 500 INJECTION, SOLUTION INTRAVENOUS at 08:01

## 2017-12-22 RX ADMIN — FLUTICASONE PROPIONATE 2 SPRAY: 50 SPRAY, METERED NASAL at 08:38

## 2017-12-22 RX ADMIN — DEXAMETHASONE SODIUM PHOSPHATE 4 MG: 4 INJECTION, SOLUTION INTRAMUSCULAR; INTRAVENOUS at 11:53

## 2017-12-22 RX ADMIN — DIGOXIN 250 MCG: 250 TABLET ORAL at 08:01

## 2017-12-22 RX ADMIN — PANTOPRAZOLE SODIUM 40 MG: 40 TABLET, DELAYED RELEASE ORAL at 08:01

## 2017-12-22 ASSESSMENT — PAIN SCALES - GENERAL
PAINLEVEL_OUTOF10: 0
PAINLEVEL_OUTOF10: 0

## 2017-12-22 NOTE — CARE COORDINATION
Referral to [facility] today; will follow for approve/deny. LUIS and [facility] spoke with Pt last evening and she is agreeable to short-term placement     Pt is hoping to d/c to [facility] today; she has an appt with Dr. Wilson Second office at 12:15 re: surgery.     Electronically signed by YOLETTE Kunz Arm on 12/22/2017 at 10:00 AM

## 2017-12-22 NOTE — DISCHARGE SUMMARY
Hospital Discharge Summary    Kusum Iyer  :  1950  MRN:  [id number]    Admit date:  2017  Discharge date:  18    Admitting Physician:    Shweta Bain MD    Discharge Diagnoses: Active Problems:    Altered mental status    Brain tumor ([address]    H/O Lung Cancer    H/O Tobacco Abuse    Hospital Course:   I admitted her to the Hospital from my office with increased confusion, ataxia. She had imaging study, and was found to have a mass in the right frontal lobe. She has been started on steroids and antiepileptics. She has been seen by Neurosurgery and Oncology. The recommendation is for removal of the tumor. She has decided to see Dr. Adelita Fermin at [facility] to discuss surgery. She can not be cared for at home by her , so will need NH placement for care and therapy, leading up to and after surgery. She is stable on d/c. I will follow at [facility].      Discharge Medications:       [facility] KOH:[id number]    Printed on:17 4204   Medication Information                      albuterol-ipratropium (COMBIVENT)  MCG/ACT inhaler  Inhale 2 puffs into the lungs every 6 hours as needed for Wheezing              atorvastatin (LIPITOR) 20 MG tablet  Take 20 mg by mouth nightly              cetirizine (ZYRTEC) 10 MG tablet  Take 1 tablet by mouth daily             dexamethasone (DECADRON) 4 MG tablet  Take 1 tablet by mouth 4 times daily             digoxin (LANOXIN) 250 MCG tablet  Take 250 mcg by mouth daily Indications: Rapid Heartbeat              fluticasone (FLONASE) 50 MCG/ACT nasal spray  2 sprays by Nasal route daily             levETIRAcetam (KEPPRA) 500 MG tablet  Take 1 tablet by mouth 2 times daily             montelukast (SINGULAIR) 10 MG tablet  Take 10 mg by mouth nightly Indications: Seasonal Allergy              pantoprazole (PROTONIX) 40 MG tablet  Take 40 mg by mouth daily Indications: Gastroesophageal Reflux Disease              sertraline (ZOLOFT) 100 MG tablet  Take 200 mg by mouth nightly              valACYclovir (VALTREX) 1 g tablet  Take 1 tablet by mouth 3 times daily for 7 days                 Consults:  Neurosurgery, Oncology    Significant Diagnostic Studies: see complete inpatient record      Disposition:  To NH in stable condition    Diet: As tolerated    Activity: As tolerated with assistance    Special Instructions: Accucheck BID      The patient or family, or Facility are to call or return if there are any problems, questions, concerns or change in her condition.      Signed:  Christine Mcdermott MD  12/22/2017, 11:08 AM

## 2017-12-22 NOTE — CARE COORDINATION
[facility] offered bed for Pt. LUIS and Katey met with Pt and caregiver in room; she is agreeable to transfer to [facility] until her re-scheduled appt with Dr. Darryl Quiñones on January 5, 2018. [facility] will take Pt to [place] via personal vehicle. LUIS placed call to [facility] for Katey and at Pt's request and cancelled bed at this point.      [address] [place] [address]  [phone] J  [phone] F  Electronically signed by YOLETTE Lu on 12/22/2017 at 12:27 PM

## 2017-12-22 NOTE — PROGRESS NOTES
lung neoplasm this is felt to  represent a metastatic lesion. No other foci of abnormal enhancement are present to suggest other metastatic foci. A primary brain neoplasm such as glioblastoma is a lesser secondary differential consideration. There is associated mass effect on the frontal horn of the right lateral ventricle with mild shift of the midline by only approximately  2 to 3 mm. Bone scan 12/19/2017  Focal increased activity in the left seventh and 10th  costovertebral junction may represent chronic degenerative  process/prominent osteophytes in this area. Clinical and radiographic  correlation may be obtained. The remaining bone scan is unremarkable. CT C/A/P 12/19/2017  CHEST- Paravertebral soft tissue nodules are present at T8 T9 T10. When  compared to prior PET scan of December 2016 these were not  metabolically active. These could possibly be neurofibromas however  other etiologies cannot be excluded. A/P- No acute abnormality of the abdomen or pelvis. A functioning left lower abdominal ostomy. Herniation of small bowel  loops through the ostomy site without obstruction. The stable low density nodules in the kidneys. Moderate diffuse thickening of the wall of the stomach which may be  due to incomplete distention. Possibility of inflammatory or  infiltrative neoplastic process is not excluded. This may be  clinically correlated.     ASSESSMENT/PLAN:      Stage IB lung cancer- now with concerning single lesion brain metastatic disease. · MRI brain-  As above  · CT C/A/P- no clear evidence of metastatic disease. · Continue decadron 4 mg every 6 hours ( PPi ppx)  · Neurosurgery following- Appreciated Dr Checo Zmaudio seeing her. She wants to be transferred to [address] [place] [address]. · Surgical resection would be a preferred option. · I have contacted [address] [place] [address] as well.      Disposition: Patient's  mentioned that Dr Real Emerson from [address] [place] [address] had accepted the patient.  I talked to Dr Juany Lees who told me he could not accept the patient and asked me to go through the normal process with the transfer center. Talked to neurosurgery at [facility]. Awaiting for a bed to be available.         [facility]    12/22/17  9:23 AM

## 2017-12-23 NOTE — DISCHARGE SUMMARY
Addendum to D/C:  Patient was accepted at [facility] and d/c in stable condition. She was not able to see Dr. Magaly Patel at d/c, because of an emergency with Dr. Magaly Patel. He has rescheduled her appointment at the beginning of January. Dr. Kingston Dueñas has said that d/c is fine, and rescheduled appointment is ok. The patient did have an opportunity to transfer to [facility] today for care by their Neurosurgical team, but has decided to stay in [place], and go to [facility] until her appointment and Surgery with Dr. Magaly Patel.

## 2018-01-05 ENCOUNTER — OFFICE VISIT (OUTPATIENT)
Dept: NEUROSURGERY | Facility: CLINIC | Age: 68
End: 2018-01-05

## 2018-01-05 VITALS
SYSTOLIC BLOOD PRESSURE: 126 MMHG | WEIGHT: 127 LBS | DIASTOLIC BLOOD PRESSURE: 68 MMHG | BODY MASS INDEX: 21.68 KG/M2 | HEIGHT: 64 IN

## 2018-01-05 DIAGNOSIS — Z87.891 FORMER SMOKER: ICD-10-CM

## 2018-01-05 DIAGNOSIS — IMO0001 NORMAL BODY MASS INDEX (BMI): ICD-10-CM

## 2018-01-05 DIAGNOSIS — D43.2 NEOPLASM OF UNCERTAIN BEHAVIOR OF BRAIN (HCC): Primary | ICD-10-CM

## 2018-01-05 PROCEDURE — 99203 OFFICE O/P NEW LOW 30 MIN: CPT | Performed by: NEUROLOGICAL SURGERY

## 2018-01-05 RX ORDER — LANOLIN ALCOHOL/MO/W.PET/CERES
1 CREAM (GRAM) TOPICAL
COMMUNITY
End: 2018-01-08

## 2018-01-05 RX ORDER — LEVETIRACETAM 500 MG/1
500 TABLET ORAL 2 TIMES DAILY
COMMUNITY
End: 2018-07-12

## 2018-01-05 RX ORDER — PANTOPRAZOLE SODIUM 40 MG/1
40 TABLET, DELAYED RELEASE ORAL DAILY
COMMUNITY
End: 2018-07-12

## 2018-01-05 RX ORDER — CETIRIZINE HYDROCHLORIDE 10 MG/1
10 TABLET ORAL DAILY
COMMUNITY

## 2018-01-05 RX ORDER — ATORVASTATIN CALCIUM 20 MG/1
20 TABLET, FILM COATED ORAL NIGHTLY
COMMUNITY

## 2018-01-05 RX ORDER — DIGOXIN 250 MCG
250 TABLET ORAL
COMMUNITY
End: 2018-04-25 | Stop reason: HOSPADM

## 2018-01-05 RX ORDER — FLUTICASONE PROPIONATE 50 MCG
2 SPRAY, SUSPENSION (ML) NASAL DAILY
COMMUNITY
End: 2018-04-12 | Stop reason: SDUPTHER

## 2018-01-05 RX ORDER — MONTELUKAST SODIUM 10 MG/1
10 TABLET ORAL NIGHTLY
COMMUNITY
End: 2018-10-23 | Stop reason: SDUPTHER

## 2018-01-05 RX ORDER — DEXTROAMPHETAMINE SACCHARATE, AMPHETAMINE ASPARTATE MONOHYDRATE, DEXTROAMPHETAMINE SULFATE AND AMPHETAMINE SULFATE 2.5; 2.5; 2.5; 2.5 MG/1; MG/1; MG/1; MG/1
1 CAPSULE, EXTENDED RELEASE ORAL
COMMUNITY
End: 2018-01-08

## 2018-01-05 RX ORDER — SERTRALINE HYDROCHLORIDE 100 MG/1
200 TABLET, FILM COATED ORAL DAILY
COMMUNITY
End: 2018-10-11 | Stop reason: SDUPTHER

## 2018-01-05 RX ORDER — TIZANIDINE 2 MG/1
1 TABLET ORAL
COMMUNITY
End: 2018-01-08

## 2018-01-05 RX ORDER — POTASSIUM CHLORIDE 20 MEQ/1
TABLET, EXTENDED RELEASE ORAL
COMMUNITY
Start: 2017-10-06 | End: 2018-01-08
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
## 2018-01-08 ENCOUNTER — PREP FOR SURGERY (OUTPATIENT)
Dept: OTHER | Facility: HOSPITAL | Age: 68
End: 2018-01-08

## 2018-01-08 ENCOUNTER — HOSPITAL ENCOUNTER (OUTPATIENT)
Dept: GENERAL RADIOLOGY | Facility: HOSPITAL | Age: 68
Discharge: HOME | End: 2018-01-08
Admitting: NURSE PRACTITIONER

## 2018-01-08 ENCOUNTER — APPOINTMENT (OUTPATIENT)
Dept: PREADMISSION TESTING | Facility: HOSPITAL | Age: 68
End: 2018-01-08

## 2018-01-08 VITALS
BODY MASS INDEX: 23.75 KG/M2 | OXYGEN SATURATION: 98 % | HEIGHT: 63 IN | HEART RATE: 58 BPM | RESPIRATION RATE: 16 BRPM | SYSTOLIC BLOOD PRESSURE: 122 MMHG | WEIGHT: 134.04 LBS | DIASTOLIC BLOOD PRESSURE: 77 MMHG

## 2018-01-08 DIAGNOSIS — D49.6 BRAIN TUMOR (HCC): Primary | ICD-10-CM

## 2018-01-08 DIAGNOSIS — G93.6 VASOGENIC CEREBRAL EDEMA (HCC): ICD-10-CM

## 2018-01-08 DIAGNOSIS — C71.1 MALIGNANT NEOPLASM OF FRONTAL LOBE (HCC): ICD-10-CM

## 2018-01-08 DIAGNOSIS — D49.6 BRAIN TUMOR (HCC): ICD-10-CM

## 2018-01-08 LAB
ALBUMIN SERPL-MCNC: 4 G/DL (ref 3.5–5)
ALBUMIN/GLOB SERPL: 1.7 G/DL (ref 1.1–2.5)
ALP SERPL-CCNC: 62 U/L (ref 24–120)
ALT SERPL W P-5'-P-CCNC: 32 U/L (ref 0–54)
ANION GAP SERPL CALCULATED.3IONS-SCNC: 13 MMOL/L (ref 4–13)
AST SERPL-CCNC: 25 U/L (ref 7–45)
BACTERIA UR QL AUTO: ABNORMAL /HPF
BASOPHILS # BLD AUTO: 0.02 10*3/MM3 (ref 0–0.2)
BASOPHILS NFR BLD AUTO: 0.3 % (ref 0–2)
BILIRUB SERPL-MCNC: 0.3 MG/DL (ref 0.1–1)
BILIRUB UR QL STRIP: NEGATIVE
BUN BLD-MCNC: 19 MG/DL (ref 5–21)
BUN/CREAT SERPL: 21.8 (ref 7–25)
CALCIUM SPEC-SCNC: 9.5 MG/DL (ref 8.4–10.4)
CHLORIDE SERPL-SCNC: 99 MMOL/L (ref 98–110)
CLARITY UR: CLEAR
CO2 SERPL-SCNC: 27 MMOL/L (ref 24–31)
COLOR UR: YELLOW
CREAT BLD-MCNC: 0.87 MG/DL (ref 0.5–1.4)
DEPRECATED RDW RBC AUTO: 43.9 FL (ref 40–54)
EOSINOPHIL # BLD AUTO: 0.05 10*3/MM3 (ref 0–0.7)
EOSINOPHIL NFR BLD AUTO: 0.8 % (ref 0–4)
ERYTHROCYTE [DISTWIDTH] IN BLOOD BY AUTOMATED COUNT: 14.2 % (ref 12–15)
GFR SERPL CREATININE-BSD FRML MDRD: 65 ML/MIN/1.73
GLOBULIN UR ELPH-MCNC: 2.4 GM/DL
GLUCOSE BLD-MCNC: 85 MG/DL (ref 70–100)
GLUCOSE UR STRIP-MCNC: NEGATIVE MG/DL
HCT VFR BLD AUTO: 36.8 % (ref 37–47)
HGB BLD-MCNC: 12.5 G/DL (ref 12–16)
HGB UR QL STRIP.AUTO: NEGATIVE
HYALINE CASTS UR QL AUTO: ABNORMAL /LPF
IMM GRANULOCYTES # BLD: 0.15 10*3/MM3 (ref 0–0.03)
IMM GRANULOCYTES NFR BLD: 2.3 % (ref 0–5)
KETONES UR QL STRIP: NEGATIVE
LEUKOCYTE ESTERASE UR QL STRIP.AUTO: ABNORMAL
LYMPHOCYTES # BLD AUTO: 2.33 10*3/MM3 (ref 0.72–4.86)
LYMPHOCYTES NFR BLD AUTO: 36.1 % (ref 15–45)
MCH RBC QN AUTO: 28.9 PG (ref 28–32)
MCHC RBC AUTO-ENTMCNC: 34 G/DL (ref 33–36)
MCV RBC AUTO: 85 FL (ref 82–98)
MONOCYTES # BLD AUTO: 0.5 10*3/MM3 (ref 0.19–1.3)
MONOCYTES NFR BLD AUTO: 7.8 % (ref 4–12)
NEUTROPHILS # BLD AUTO: 3.4 10*3/MM3 (ref 1.87–8.4)
NEUTROPHILS NFR BLD AUTO: 52.7 % (ref 39–78)
NITRITE UR QL STRIP: NEGATIVE
NRBC BLD MANUAL-RTO: 0 /100 WBC (ref 0–0)
PH UR STRIP.AUTO: <=5 [PH] (ref 5–8)
PLATELET # BLD AUTO: 171 10*3/MM3 (ref 130–400)
PMV BLD AUTO: 9.7 FL (ref 6–12)
POTASSIUM BLD-SCNC: 3.6 MMOL/L (ref 3.5–5.3)
PROT SERPL-MCNC: 6.4 G/DL (ref 6.3–8.7)
PROT UR QL STRIP: NEGATIVE
RBC # BLD AUTO: 4.33 10*6/MM3 (ref 4.2–5.4)
RBC # UR: ABNORMAL /HPF
REF LAB TEST METHOD: ABNORMAL
SODIUM BLD-SCNC: 139 MMOL/L (ref 135–145)
SP GR UR STRIP: 1.02 (ref 1–1.03)
SQUAMOUS #/AREA URNS HPF: ABNORMAL /HPF
UROBILINOGEN UR QL STRIP: ABNORMAL
WBC NRBC COR # BLD: 6.45 10*3/MM3 (ref 4.8–10.8)
WBC UR QL AUTO: ABNORMAL /HPF

## 2018-01-08 PROCEDURE — 85025 COMPLETE CBC W/AUTO DIFF WBC: CPT | Performed by: NURSE PRACTITIONER

## 2018-01-08 PROCEDURE — 71046 X-RAY EXAM CHEST 2 VIEWS: CPT

## 2018-01-08 PROCEDURE — 93005 ELECTROCARDIOGRAM TRACING: CPT

## 2018-01-08 PROCEDURE — 93010 ELECTROCARDIOGRAM REPORT: CPT | Performed by: INTERNAL MEDICINE

## 2018-01-08 PROCEDURE — 87086 URINE CULTURE/COLONY COUNT: CPT | Performed by: NURSE PRACTITIONER

## 2018-01-08 PROCEDURE — 80053 COMPREHEN METABOLIC PANEL: CPT | Performed by: NURSE PRACTITIONER

## 2018-01-08 PROCEDURE — 81001 URINALYSIS AUTO W/SCOPE: CPT | Performed by: NURSE PRACTITIONER

## 2018-01-08 PROCEDURE — 36415 COLL VENOUS BLD VENIPUNCTURE: CPT

## 2018-01-08 RX ORDER — DEXAMETHASONE 4 MG/1
4 TABLET ORAL
COMMUNITY
End: 2018-01-18 | Stop reason: HOSPADM

## 2018-01-08 RX ORDER — ALPRAZOLAM 0.25 MG/1
0.25 TABLET ORAL 2 TIMES DAILY PRN
Status: ON HOLD | COMMUNITY
End: 2018-01-18 | Stop reason: SDUPTHER

## 2018-01-08 NOTE — DISCHARGE INSTRUCTIONS
DAY OF SURGERY INSTRUCTIONS        YOUR SURGEON: Constantine Schmidt    PROCEDURE: Right Frontal Craniotomy for Brain Tumor    DATE OF SURGERY: January 15, 2018    ARRIVAL TIME: AS DIRECTED BY OFFICE    DAY OF SURGERY TAKE ONLY THESE MEDICATIONS: Digoxin, Keppra            BEFORE YOU COME TO THE HOSPITAL  (Pre-op instructions)  • Do not eat, drink, smoke or chew gum after midnight the night before surgery.  This also includes no mints.  • Morning of surgery take only the medicines you have been instructed with a sip of water unless otherwise instructed  by your physician.  • Do not shave, wear makeup or dark nail polish.  • Remove all jewelry including rings.  • Leave anything you consider valuable at home.  • Leave your suitcase in the car until after your surgery.  • Bring the following with you if applicable:  o Picture ID and insurance, Medicare or Medicaid cards  o Co-pay/deductible required by insurance (cash, check, credit card)  o Copy of advance directive, living will or power-of- documents if not brought to PAT  o CPAP or BIPAP mask and tubing  o Relaxation aids (MP3 player, book, magazine)  • On the day of surgery check in at registration located at the main entrance of the hospital.       Outpatient Surgery Guidelines, Adult  Outpatient procedures are those for which the person having the procedure is allowed to go home the same day as the procedure. Various procedures are done on an outpatient basis. You should follow some general guidelines if you will be having an outpatient procedure.  LET YOUR HEALTH CARE PROVIDER KNOW ABOUT:  · Any allergies you have.  · All medicines you are taking, including vitamins, herbs, eye drops, creams, and over-the-counter medicines.  · Previous problems you or members of your family have had with the use of anesthetics.  · Any blood disorders you have.  · Previous surgeries you have had.  · Medical conditions you have.  RISKS AND COMPLICATIONS  Your health care  provider will discuss possible risks and complications with you before surgery. Common risks and complications include:    · Problems due to the use of anesthetics.  · Blood loss and replacement (does not apply to minor surgical procedures).  · Temporary increase in pain due to surgery.  · Uncorrected pain or problems that the surgery was meant to correct.  · Infection.  · New damage.  BEFORE THE PROCEDURE  · Ask your health care provider about changing or stopping your regular medicines. You may need to stop taking certain medicines in the days or weeks before the procedure.  · Stop smoking at least 2 weeks before surgery. This lowers your risk for complications during and after surgery. Ask your health care provider for help with this if needed.  · Eat your usual meals and a light supper the day before surgery. Continue fluid intake. Do not drink alcohol.  · Do not eat or drink after midnight the night before your surgery.   · Arrange for someone to take you home and to stay with you for 24 hours after the procedure. Medicine given for your procedure may affect your ability to drive or to care for yourself.  · Call your health care provider's office if you develop an illness or problem that may prevent you from safely having your procedure.  AFTER THE PROCEDURE  After surgery, you will be taken to a recovery area, where your progress will be monitored. If there are no complications, you will be allowed to go home when you are awake, stable, and taking fluids well. You may have numbness around the surgical site. Healing will take some time. You will have tenderness at the surgical site and may have some swelling and bruising. You may also have some nausea.  HOME CARE INSTRUCTIONS  · Do not drive for 24 hours, or as directed by your health care provider. Do not drive while taking prescription pain medicines.  · Do not drink alcohol for 24 hours.  · Do not make important decisions or sign legal documents for 24  hours.  · You may resume a normal diet and activities as directed.  · Do not lift anything heavier than 10 pounds (4.5 kg) or play contact sports until your health care provider says it is okay.  · Change your bandages (dressings) as directed.  · Only take over-the-counter or prescription medicines as directed by your health care provider.  · Follow up with your health care provider as directed.  SEEK MEDICAL CARE IF:  · You have increased bleeding (more than a small spot) from the surgical site.  · You have redness, swelling, or increasing pain in the wound.  · You see pus coming from the wound.  · You have a fever.  · You notice a bad smell coming from the wound or dressing.  · You feel lightheaded or faint.  · You develop a rash.  · You have trouble breathing.  · You develop allergies.  MAKE SURE YOU:  · Understand these instructions.  · Will watch your condition.  · Will get help right away if you are not doing well or get worse.     This information is not intended to replace advice given to you by your health care provider. Make sure you discuss any questions you have with your health care provider.     Document Released: 09/12/2002 Document Revised: 05/03/2016 Document Reviewed: 05/22/2014  [employer] Interactive Patient Education ©2016 [employer]       Fall Prevention in Hospitals, Adult  As a hospital patient, your condition and the treatments you receive can increase your risk for falls. Some additional risk factors for falls in a hospital include:  · Being in an unfamiliar environment.  · Being on bed rest.  · Your surgery.  · Taking certain medicines.  · Your tubing requirements, such as intravenous (IV) therapy or catheters.  It is important that you learn how to decrease fall risks while at the hospital. Below are important tips that can help prevent falls.  SAFETY TIPS FOR PREVENTING FALLS  Talk about your risk of falling.  · Ask your health care provider why you are at risk for falling. Is it your  medicine, illness, tubing placement, or something else?  · Make a plan with your health care provider to keep you safe from falls.  · Ask your health care provider or pharmacist about side effects of your medicines. Some medicines can make you dizzy or affect your coordination.  Ask for help.  · Ask for help before getting out of bed. You may need to press your call button.  · Ask for assistance in getting safely to the toilet.  · Ask for a walker or cane to be put at your bedside. Ask that most of the side rails on your bed be placed up before your health care provider leaves the room.  · Ask family or friends to sit with you.  · Ask for things that are out of your reach, such as your glasses, hearing aids, telephone, bedside table, or call button.  Follow these tips to avoid falling:  · Stay lying or seated, rather than standing, while waiting for help.  · Wear rubber-soled slippers or shoes whenever you walk in the hospital.  · Avoid quick, sudden movements.  ¨ Change positions slowly.  ¨ Sit on the side of your bed before standing.  ¨ Stand up slowly and wait before you start to walk.  · Let your health care provider know if there is a spill on the floor.  · Pay careful attention to the medical equipment, electrical cords, and tubes around you.  · When you need help, use your call button by your bed or in the bathroom. Wait for one of your health care providers to help you.  · If you feel dizzy or unsure of your footing, return to bed and wait for assistance.  · Avoid being distracted by the TV, telephone, or another person in your room.  · Do not lean or support yourself on rolling objects, such as IV poles or bedside tables.     This information is not intended to replace advice given to you by your health care provider. Make sure you discuss any questions you have with your health care provider.     Document Released: 12/15/2001 Document Revised: 01/08/2016 Document Reviewed: 08/25/2013  [employer]  Patient Education ©2016 [employer]       Surgical Site Infections FAQs  What is a Surgical Site Infection (SSI)?  A surgical site infection is an infection that occurs after surgery in the part of the body where the surgery took place. Most patients who have surgery do not develop an infection. However, infections develop in about 1 to 3 out of every 100 patients who have surgery.  Some of the common symptoms of a surgical site infection are:  · Redness and pain around the area where you had surgery  · Drainage of cloudy fluid from your surgical wound  · Fever  Can SSIs be treated?  Yes. Most surgical site infections can be treated with antibiotics. The antibiotic given to you depends on the bacteria (germs) causing the infection. Sometimes patients with SSIs also need another surgery to treat the infection.  What are some of the things that hospitals are doing to prevent SSIs?  To prevent SSIs, doctors, nurses, and other healthcare providers:  · Clean their hands and arms up to their elbows with an antiseptic agent just before the surgery.  · Clean their hands with soap and water or an alcohol-based hand rub before and after caring for each patient.  · May remove some of your hair immediately before your surgery using electric clippers if the hair is in the same area where the procedure will occur. They should not shave you with a razor.  · Wear special hair covers, masks, gowns, and gloves during surgery to keep the surgery area clean.  · Give you antibiotics before your surgery starts. In most cases, you should get antibiotics within 60 minutes before the surgery starts and the antibiotics should be stopped within 24 hours after surgery.  · Clean the skin at the site of your surgery with a special soap that kills germs.  What can I do to help prevent SSIs?  Before your surgery:  · Tell your doctor about other medical problems you may have. Health problems such as allergies, diabetes, and obesity could affect  your surgery and your treatment.  · Quit smoking. Patients who smoke get more infections. Talk to your doctor about how you can quit before your surgery.  · Do not shave near where you will have surgery. Shaving with a razor can irritate your skin and make it easier to develop an infection.  At the time of your surgery:  · Speak up if someone tries to shave you with a razor before surgery. Ask why you need to be shaved and talk with your surgeon if you have any concerns.  · Ask if you will get antibiotics before surgery.  After your surgery:  · Make sure that your healthcare providers clean their hands before examining you, either with soap and water or an alcohol-based hand rub.  · If you do not see your providers clean their hands, please ask them to do so.  · Family and friends who visit you should not touch the surgical wound or dressings.  · Family and friends should clean their hands with soap and water or an alcohol-based hand rub before and after visiting you. If you do not see them clean their hands, ask them to clean their hands.  What do I need to do when I go home from the hospital?  · Before you go home, your doctor or nurse should explain everything you need to know about taking care of your wound. Make sure you understand how to care for your wound before you leave the hospital.  · Always clean your hands before and after caring for your wound.  · Before you go home, make sure you know who to contact if you have questions or problems after you get home.  · If you have any symptoms of an infection, such as redness and pain at the surgery site, drainage, or fever, call your doctor immediately.  If you have additional questions, please ask your doctor or nurse.  Developed and co-sponsored by The Society for Healthcare Epidemiology of [place] (SHEA); Infectious Diseases Society of [place] (IDSA); American Hospital Association; Association for Professionals in Infection Control and Epidemiology (APIC);  Centers for Disease Control and Prevention (CDC); and The Joint Commission.     This information is not intended to replace advice given to you by your health care provider. Make sure you discuss any questions you have with your health care provider.     Document Released: 12/23/2014 Document Revised: 01/08/2016 Document Reviewed: 03/02/2016  [employer] Interactive Patient Education ©2016 [employer]     PATIENT/FAMILY/RESPONSIBLE PARTY VERBALIZES UNDERSTANDING OF ABOVE EDUCATION.  COPY OF PAIN SCALE GIVEN AND REVIEWED WITH VERBALIZED UNDERSTANDING.

## 2018-01-10 LAB — BACTERIA SPEC AEROBE CULT: NORMAL

## 2018-01-12 ENCOUNTER — ANESTHESIA EVENT (OUTPATIENT)
Dept: PERIOP | Facility: HOSPITAL | Age: 68
End: 2018-01-12

## 2018-01-12 ENCOUNTER — HOSPITAL ENCOUNTER (OUTPATIENT)
Dept: MRI IMAGING | Facility: HOSPITAL | Age: 68
Discharge: HOME | End: 2018-01-12
Attending: NEUROLOGICAL SURGERY | Admitting: NEUROLOGICAL SURGERY

## 2018-01-12 PROCEDURE — A9577 INJ MULTIHANCE: HCPCS | Performed by: NEUROLOGICAL SURGERY

## 2018-01-12 PROCEDURE — 70552 MRI BRAIN STEM W/DYE: CPT

## 2018-01-12 PROCEDURE — 0 GADOBENATE DIMEGLUMINE 529 MG/ML SOLUTION: Performed by: NEUROLOGICAL SURGERY

## 2018-01-12 RX ADMIN — GADOBENATE DIMEGLUMINE 10 ML: 529 INJECTION, SOLUTION INTRAVENOUS at 11:30

## 2018-01-15 ENCOUNTER — ANESTHESIA (OUTPATIENT)
Dept: PERIOP | Facility: HOSPITAL | Age: 68
End: 2018-01-15

## 2018-01-15 ENCOUNTER — HOSPITAL ENCOUNTER (INPATIENT)
Facility: HOSPITAL | Age: 68
LOS: 3 days | Discharge: NURSING, REHAB OR LONG-TERM CARE | End: 2018-01-18
Attending: NEUROLOGICAL SURGERY | Admitting: NEUROLOGICAL SURGERY

## 2018-01-15 DIAGNOSIS — Z78.9 DECREASED ACTIVITIES OF DAILY LIVING (ADL): ICD-10-CM

## 2018-01-15 DIAGNOSIS — D49.6 BRAIN TUMOR (HCC): ICD-10-CM

## 2018-01-15 DIAGNOSIS — Z74.09 IMPAIRED MOBILITY: ICD-10-CM

## 2018-01-15 LAB
ABO GROUP BLD: NORMAL
BLD GP AB SCN SERPL QL: NEGATIVE
RH BLD: NEGATIVE

## 2018-01-15 PROCEDURE — 25010000003 CEFAZOLIN PER 500 MG: Performed by: NURSE PRACTITIONER

## 2018-01-15 PROCEDURE — 88360 TUMOR IMMUNOHISTOCHEM/MANUAL: CPT | Performed by: NEUROLOGICAL SURGERY

## 2018-01-15 PROCEDURE — 87075 CULTR BACTERIA EXCEPT BLOOD: CPT | Performed by: NEUROLOGICAL SURGERY

## 2018-01-15 PROCEDURE — 94799 UNLISTED PULMONARY SVC/PX: CPT

## 2018-01-15 PROCEDURE — 86901 BLOOD TYPING SEROLOGIC RH(D): CPT | Performed by: NURSE PRACTITIONER

## 2018-01-15 PROCEDURE — 87070 CULTURE OTHR SPECIMN AEROBIC: CPT | Performed by: NEUROLOGICAL SURGERY

## 2018-01-15 PROCEDURE — 25010000002 ONDANSETRON PER 1 MG: Performed by: NURSE ANESTHETIST, CERTIFIED REGISTERED

## 2018-01-15 PROCEDURE — 88334 PATH CONSLTJ SURG CYTO XM EA: CPT | Performed by: NEUROLOGICAL SURGERY

## 2018-01-15 PROCEDURE — 88341 IMHCHEM/IMCYTCHM EA ADD ANTB: CPT | Performed by: NEUROLOGICAL SURGERY

## 2018-01-15 PROCEDURE — 25010000002 DEXAMETHASONE PER 1 MG: Performed by: ANESTHESIOLOGY

## 2018-01-15 PROCEDURE — 25010000002 PROPOFOL 10 MG/ML EMULSION: Performed by: NURSE ANESTHETIST, CERTIFIED REGISTERED

## 2018-01-15 PROCEDURE — 88342 IMHCHEM/IMCYTCHM 1ST ANTB: CPT | Performed by: NEUROLOGICAL SURGERY

## 2018-01-15 PROCEDURE — 25010000002 NEOSTIGMINE PER 0.5 MG: Performed by: NURSE ANESTHETIST, CERTIFIED REGISTERED

## 2018-01-15 PROCEDURE — 87205 SMEAR GRAM STAIN: CPT | Performed by: NEUROLOGICAL SURGERY

## 2018-01-15 PROCEDURE — 88331 PATH CONSLTJ SURG 1 BLK 1SPC: CPT | Performed by: NEUROLOGICAL SURGERY

## 2018-01-15 PROCEDURE — 00B00ZZ EXCISION OF BRAIN, OPEN APPROACH: ICD-10-PCS | Performed by: NEUROLOGICAL SURGERY

## 2018-01-15 PROCEDURE — 25010000002 FENTANYL CITRATE (PF) 250 MCG/5ML SOLUTION: Performed by: NURSE ANESTHETIST, CERTIFIED REGISTERED

## 2018-01-15 PROCEDURE — 86900 BLOOD TYPING SEROLOGIC ABO: CPT | Performed by: NURSE PRACTITIONER

## 2018-01-15 PROCEDURE — 25010000003 LEVETIRACETAM IN NACL 0.75% 1000 MG/100ML SOLUTION: Performed by: NURSE PRACTITIONER

## 2018-01-15 PROCEDURE — 25010000002 HYDROMORPHONE PER 4 MG: Performed by: ANESTHESIOLOGY

## 2018-01-15 PROCEDURE — C1713 ANCHOR/SCREW BN/BN,TIS/BN: HCPCS | Performed by: NEUROLOGICAL SURGERY

## 2018-01-15 PROCEDURE — 61510 CRNEC TREPH EXC BRN TUM STTL: CPT | Performed by: NEUROLOGICAL SURGERY

## 2018-01-15 PROCEDURE — 88307 TISSUE EXAM BY PATHOLOGIST: CPT | Performed by: NEUROLOGICAL SURGERY

## 2018-01-15 PROCEDURE — 94760 N-INVAS EAR/PLS OXIMETRY 1: CPT

## 2018-01-15 PROCEDURE — 25010000002 DEXAMETHASONE PER 1 MG: Performed by: NURSE PRACTITIONER

## 2018-01-15 PROCEDURE — 25010000002 MIDAZOLAM PER 1 MG: Performed by: ANESTHESIOLOGY

## 2018-01-15 PROCEDURE — 25010000002 SUCCINYLCHOLINE PER 20 MG: Performed by: NURSE ANESTHETIST, CERTIFIED REGISTERED

## 2018-01-15 PROCEDURE — 86850 RBC ANTIBODY SCREEN: CPT | Performed by: NURSE PRACTITIONER

## 2018-01-15 PROCEDURE — 25010000002 HYDROMORPHONE PER 4 MG: Performed by: NEUROLOGICAL SURGERY

## 2018-01-15 DEVICE — IMPLANTABLE DEVICE
Type: IMPLANTABLE DEVICE | Status: FUNCTIONAL
Brand: THINFLAP SYSTEM

## 2018-01-15 DEVICE — DURAGEN® PLUS DURAL REGENERATION MATRIX, 1 IN X 3 IN (2.5 CM X 7.5 CM)
Type: IMPLANTABLE DEVICE | Status: FUNCTIONAL
Brand: DURAGEN® PLUS

## 2018-01-15 RX ORDER — NALOXONE HCL 0.4 MG/ML
0.04 VIAL (ML) INJECTION AS NEEDED
Status: DISCONTINUED | OUTPATIENT
Start: 2018-01-15 | End: 2018-01-15 | Stop reason: HOSPADM

## 2018-01-15 RX ORDER — MIDAZOLAM HYDROCHLORIDE 1 MG/ML
2 INJECTION INTRAMUSCULAR; INTRAVENOUS
Status: DISCONTINUED | OUTPATIENT
Start: 2018-01-15 | End: 2018-01-15 | Stop reason: HOSPADM

## 2018-01-15 RX ORDER — LABETALOL HYDROCHLORIDE 5 MG/ML
10 INJECTION, SOLUTION INTRAVENOUS
Status: DISCONTINUED | OUTPATIENT
Start: 2018-01-15 | End: 2018-01-16

## 2018-01-15 RX ORDER — METOCLOPRAMIDE HYDROCHLORIDE 5 MG/ML
5 INJECTION INTRAMUSCULAR; INTRAVENOUS
Status: DISCONTINUED | OUTPATIENT
Start: 2018-01-15 | End: 2018-01-15 | Stop reason: HOSPADM

## 2018-01-15 RX ORDER — SODIUM CHLORIDE, SODIUM LACTATE, POTASSIUM CHLORIDE, CALCIUM CHLORIDE 600; 310; 30; 20 MG/100ML; MG/100ML; MG/100ML; MG/100ML
100 INJECTION, SOLUTION INTRAVENOUS CONTINUOUS
Status: DISCONTINUED | OUTPATIENT
Start: 2018-01-15 | End: 2018-01-15 | Stop reason: HOSPADM

## 2018-01-15 RX ORDER — SODIUM CHLORIDE 9 MG/ML
75 INJECTION, SOLUTION INTRAVENOUS CONTINUOUS
Status: DISCONTINUED | OUTPATIENT
Start: 2018-01-15 | End: 2018-01-18 | Stop reason: HOSPADM

## 2018-01-15 RX ORDER — MEPERIDINE HYDROCHLORIDE 25 MG/ML
12.5 INJECTION INTRAMUSCULAR; INTRAVENOUS; SUBCUTANEOUS
Status: DISCONTINUED | OUTPATIENT
Start: 2018-01-15 | End: 2018-01-15 | Stop reason: HOSPADM

## 2018-01-15 RX ORDER — DEXAMETHASONE SODIUM PHOSPHATE 4 MG/ML
4 INJECTION, SOLUTION INTRA-ARTICULAR; INTRALESIONAL; INTRAMUSCULAR; INTRAVENOUS; SOFT TISSUE ONCE AS NEEDED
Status: COMPLETED | OUTPATIENT
Start: 2018-01-15 | End: 2018-01-15

## 2018-01-15 RX ORDER — DIGOXIN 250 MCG
250 TABLET ORAL
Status: DISCONTINUED | OUTPATIENT
Start: 2018-01-16 | End: 2018-01-18 | Stop reason: HOSPADM

## 2018-01-15 RX ORDER — ONDANSETRON 2 MG/ML
4 INJECTION INTRAMUSCULAR; INTRAVENOUS AS NEEDED
Status: DISCONTINUED | OUTPATIENT
Start: 2018-01-15 | End: 2018-01-15 | Stop reason: HOSPADM

## 2018-01-15 RX ORDER — IPRATROPIUM BROMIDE AND ALBUTEROL SULFATE 2.5; .5 MG/3ML; MG/3ML
3 SOLUTION RESPIRATORY (INHALATION) ONCE
Status: COMPLETED | OUTPATIENT
Start: 2018-01-15 | End: 2018-01-15

## 2018-01-15 RX ORDER — BACITRACIN ZINC 500 [USP'U]/G
OINTMENT TOPICAL AS NEEDED
Status: DISCONTINUED | OUTPATIENT
Start: 2018-01-15 | End: 2018-01-15 | Stop reason: HOSPADM

## 2018-01-15 RX ORDER — ONDANSETRON 2 MG/ML
INJECTION INTRAMUSCULAR; INTRAVENOUS AS NEEDED
Status: DISCONTINUED | OUTPATIENT
Start: 2018-01-15 | End: 2018-01-15 | Stop reason: SURG

## 2018-01-15 RX ORDER — SODIUM CHLORIDE 0.9 % (FLUSH) 0.9 %
3 SYRINGE (ML) INJECTION AS NEEDED
Status: DISCONTINUED | OUTPATIENT
Start: 2018-01-15 | End: 2018-01-15 | Stop reason: HOSPADM

## 2018-01-15 RX ORDER — GLYCOPYRROLATE 0.2 MG/ML
INJECTION INTRAMUSCULAR; INTRAVENOUS AS NEEDED
Status: DISCONTINUED | OUTPATIENT
Start: 2018-01-15 | End: 2018-01-15 | Stop reason: SURG

## 2018-01-15 RX ORDER — PANTOPRAZOLE SODIUM 40 MG/1
40 TABLET, DELAYED RELEASE ORAL DAILY
Status: DISCONTINUED | OUTPATIENT
Start: 2018-01-15 | End: 2018-01-18 | Stop reason: HOSPADM

## 2018-01-15 RX ORDER — MONTELUKAST SODIUM 10 MG/1
10 TABLET ORAL NIGHTLY
Status: DISCONTINUED | OUTPATIENT
Start: 2018-01-15 | End: 2018-01-18 | Stop reason: HOSPADM

## 2018-01-15 RX ORDER — MORPHINE SULFATE 2 MG/ML
1 INJECTION, SOLUTION INTRAMUSCULAR; INTRAVENOUS EVERY 4 HOURS PRN
Status: DISCONTINUED | OUTPATIENT
Start: 2018-01-15 | End: 2018-01-15

## 2018-01-15 RX ORDER — FLUMAZENIL 0.1 MG/ML
0.2 INJECTION INTRAVENOUS AS NEEDED
Status: DISCONTINUED | OUTPATIENT
Start: 2018-01-15 | End: 2018-01-15 | Stop reason: HOSPADM

## 2018-01-15 RX ORDER — MAGNESIUM HYDROXIDE 1200 MG/15ML
LIQUID ORAL AS NEEDED
Status: DISCONTINUED | OUTPATIENT
Start: 2018-01-15 | End: 2018-01-15 | Stop reason: HOSPADM

## 2018-01-15 RX ORDER — HYDRALAZINE HYDROCHLORIDE 20 MG/ML
5 INJECTION INTRAMUSCULAR; INTRAVENOUS
Status: DISCONTINUED | OUTPATIENT
Start: 2018-01-15 | End: 2018-01-15 | Stop reason: HOSPADM

## 2018-01-15 RX ORDER — SODIUM CHLORIDE, SODIUM LACTATE, POTASSIUM CHLORIDE, CALCIUM CHLORIDE 600; 310; 30; 20 MG/100ML; MG/100ML; MG/100ML; MG/100ML
1000 INJECTION, SOLUTION INTRAVENOUS CONTINUOUS
Status: DISCONTINUED | OUTPATIENT
Start: 2018-01-15 | End: 2018-01-15 | Stop reason: HOSPADM

## 2018-01-15 RX ORDER — BUPIVACAINE HYDROCHLORIDE AND EPINEPHRINE 5; 5 MG/ML; UG/ML
INJECTION, SOLUTION PERINEURAL AS NEEDED
Status: DISCONTINUED | OUTPATIENT
Start: 2018-01-15 | End: 2018-01-15 | Stop reason: HOSPADM

## 2018-01-15 RX ORDER — SODIUM CHLORIDE 0.9 % (FLUSH) 0.9 %
1-10 SYRINGE (ML) INJECTION AS NEEDED
Status: DISCONTINUED | OUTPATIENT
Start: 2018-01-15 | End: 2018-01-15 | Stop reason: HOSPADM

## 2018-01-15 RX ORDER — HYDROCODONE BITARTRATE AND ACETAMINOPHEN 7.5; 325 MG/1; MG/1
1 TABLET ORAL EVERY 4 HOURS PRN
Status: DISCONTINUED | OUTPATIENT
Start: 2018-01-15 | End: 2018-01-18 | Stop reason: HOSPADM

## 2018-01-15 RX ORDER — PHENYLEPHRINE HCL IN 0.9% NACL 0.8MG/10ML
SYRINGE (ML) INTRAVENOUS AS NEEDED
Status: DISCONTINUED | OUTPATIENT
Start: 2018-01-15 | End: 2018-01-15 | Stop reason: SURG

## 2018-01-15 RX ORDER — MIDAZOLAM HYDROCHLORIDE 1 MG/ML
1 INJECTION INTRAMUSCULAR; INTRAVENOUS
Status: DISCONTINUED | OUTPATIENT
Start: 2018-01-15 | End: 2018-01-15 | Stop reason: HOSPADM

## 2018-01-15 RX ORDER — IPRATROPIUM BROMIDE AND ALBUTEROL SULFATE 2.5; .5 MG/3ML; MG/3ML
3 SOLUTION RESPIRATORY (INHALATION) ONCE AS NEEDED
Status: DISCONTINUED | OUTPATIENT
Start: 2018-01-15 | End: 2018-01-15 | Stop reason: HOSPADM

## 2018-01-15 RX ORDER — ROCURONIUM BROMIDE 10 MG/ML
INJECTION, SOLUTION INTRAVENOUS AS NEEDED
Status: DISCONTINUED | OUTPATIENT
Start: 2018-01-15 | End: 2018-01-15 | Stop reason: SURG

## 2018-01-15 RX ORDER — SODIUM CHLORIDE 9 MG/ML
INJECTION, SOLUTION INTRAVENOUS AS NEEDED
Status: DISCONTINUED | OUTPATIENT
Start: 2018-01-15 | End: 2018-01-15 | Stop reason: HOSPADM

## 2018-01-15 RX ORDER — LEVETIRACETAM 10 MG/ML
1000 INJECTION INTRAVASCULAR EVERY 12 HOURS
Status: DISCONTINUED | OUTPATIENT
Start: 2018-01-15 | End: 2018-01-16

## 2018-01-15 RX ORDER — ATORVASTATIN CALCIUM 10 MG/1
20 TABLET, FILM COATED ORAL NIGHTLY
Status: DISCONTINUED | OUTPATIENT
Start: 2018-01-15 | End: 2018-01-18 | Stop reason: HOSPADM

## 2018-01-15 RX ORDER — CETIRIZINE HYDROCHLORIDE 10 MG/1
10 TABLET ORAL DAILY
Status: DISCONTINUED | OUTPATIENT
Start: 2018-01-15 | End: 2018-01-18 | Stop reason: HOSPADM

## 2018-01-15 RX ORDER — FLUTICASONE PROPIONATE 50 MCG
2 SPRAY, SUSPENSION (ML) NASAL DAILY
Status: DISCONTINUED | OUTPATIENT
Start: 2018-01-15 | End: 2018-01-18 | Stop reason: HOSPADM

## 2018-01-15 RX ORDER — SODIUM CHLORIDE, SODIUM LACTATE, POTASSIUM CHLORIDE, CALCIUM CHLORIDE 600; 310; 30; 20 MG/100ML; MG/100ML; MG/100ML; MG/100ML
30 INJECTION, SOLUTION INTRAVENOUS CONTINUOUS
Status: DISCONTINUED | OUTPATIENT
Start: 2018-01-15 | End: 2018-01-15 | Stop reason: HOSPADM

## 2018-01-15 RX ORDER — FENTANYL CITRATE 50 UG/ML
INJECTION, SOLUTION INTRAMUSCULAR; INTRAVENOUS AS NEEDED
Status: DISCONTINUED | OUTPATIENT
Start: 2018-01-15 | End: 2018-01-15 | Stop reason: SURG

## 2018-01-15 RX ORDER — PROPOFOL 10 MG/ML
VIAL (ML) INTRAVENOUS AS NEEDED
Status: DISCONTINUED | OUTPATIENT
Start: 2018-01-15 | End: 2018-01-15 | Stop reason: SURG

## 2018-01-15 RX ORDER — MORPHINE SULFATE 2 MG/ML
2 INJECTION, SOLUTION INTRAMUSCULAR; INTRAVENOUS AS NEEDED
Status: DISCONTINUED | OUTPATIENT
Start: 2018-01-15 | End: 2018-01-15 | Stop reason: HOSPADM

## 2018-01-15 RX ORDER — LIDOCAINE HYDROCHLORIDE 20 MG/ML
INJECTION, SOLUTION INFILTRATION; PERINEURAL AS NEEDED
Status: DISCONTINUED | OUTPATIENT
Start: 2018-01-15 | End: 2018-01-15 | Stop reason: SURG

## 2018-01-15 RX ORDER — SERTRALINE HYDROCHLORIDE 100 MG/1
200 TABLET, FILM COATED ORAL DAILY
Status: DISCONTINUED | OUTPATIENT
Start: 2018-01-15 | End: 2018-01-18 | Stop reason: HOSPADM

## 2018-01-15 RX ORDER — SUCCINYLCHOLINE CHLORIDE 20 MG/ML
INJECTION INTRAMUSCULAR; INTRAVENOUS AS NEEDED
Status: DISCONTINUED | OUTPATIENT
Start: 2018-01-15 | End: 2018-01-15 | Stop reason: SURG

## 2018-01-15 RX ORDER — NALOXONE HCL 0.4 MG/ML
0.4 VIAL (ML) INJECTION
Status: DISCONTINUED | OUTPATIENT
Start: 2018-01-15 | End: 2018-01-18 | Stop reason: HOSPADM

## 2018-01-15 RX ORDER — DEXAMETHASONE SODIUM PHOSPHATE 4 MG/ML
4 INJECTION, SOLUTION INTRA-ARTICULAR; INTRALESIONAL; INTRAMUSCULAR; INTRAVENOUS; SOFT TISSUE EVERY 6 HOURS
Status: DISCONTINUED | OUTPATIENT
Start: 2018-01-15 | End: 2018-01-17

## 2018-01-15 RX ORDER — ALPRAZOLAM 0.25 MG/1
0.25 TABLET ORAL 2 TIMES DAILY PRN
Status: DISCONTINUED | OUTPATIENT
Start: 2018-01-15 | End: 2018-01-18 | Stop reason: HOSPADM

## 2018-01-15 RX ORDER — LABETALOL HYDROCHLORIDE 5 MG/ML
5 INJECTION, SOLUTION INTRAVENOUS
Status: DISCONTINUED | OUTPATIENT
Start: 2018-01-15 | End: 2018-01-15 | Stop reason: HOSPADM

## 2018-01-15 RX ORDER — ONDANSETRON 2 MG/ML
4 INJECTION INTRAMUSCULAR; INTRAVENOUS EVERY 6 HOURS PRN
Status: DISCONTINUED | OUTPATIENT
Start: 2018-01-15 | End: 2018-01-18 | Stop reason: HOSPADM

## 2018-01-15 RX ADMIN — EPHEDRINE SULFATE 10 MG: 50 INJECTION INTRAMUSCULAR; INTRAVENOUS; SUBCUTANEOUS at 09:11

## 2018-01-15 RX ADMIN — FENTANYL CITRATE 150 MCG: 50 INJECTION INTRAMUSCULAR; INTRAVENOUS at 07:41

## 2018-01-15 RX ADMIN — SERTRALINE 200 MG: 100 TABLET, FILM COATED ORAL at 13:21

## 2018-01-15 RX ADMIN — HYDROMORPHONE HYDROCHLORIDE 1 MG: 1 INJECTION, SOLUTION INTRAMUSCULAR; INTRAVENOUS; SUBCUTANEOUS at 16:34

## 2018-01-15 RX ADMIN — LIDOCAINE HYDROCHLORIDE 0.5 ML: 10 INJECTION, SOLUTION EPIDURAL; INFILTRATION; INTRACAUDAL; PERINEURAL at 06:01

## 2018-01-15 RX ADMIN — FENTANYL CITRATE 100 MCG: 50 INJECTION INTRAMUSCULAR; INTRAVENOUS at 07:35

## 2018-01-15 RX ADMIN — ATORVASTATIN CALCIUM 20 MG: 10 TABLET, FILM COATED ORAL at 20:01

## 2018-01-15 RX ADMIN — DEXAMETHASONE SODIUM PHOSPHATE 4 MG: 4 INJECTION, SOLUTION INTRAMUSCULAR; INTRAVENOUS at 13:21

## 2018-01-15 RX ADMIN — HYDROCODONE BITARTRATE AND ACETAMINOPHEN 1 TABLET: 7.5; 325 TABLET ORAL at 13:02

## 2018-01-15 RX ADMIN — EPHEDRINE SULFATE 10 MG: 50 INJECTION INTRAMUSCULAR; INTRAVENOUS; SUBCUTANEOUS at 09:10

## 2018-01-15 RX ADMIN — HYDROCODONE BITARTRATE AND ACETAMINOPHEN 1 TABLET: 7.5; 325 TABLET ORAL at 20:00

## 2018-01-15 RX ADMIN — HYDROMORPHONE HYDROCHLORIDE 1 MG: 1 INJECTION, SOLUTION INTRAMUSCULAR; INTRAVENOUS; SUBCUTANEOUS at 10:12

## 2018-01-15 RX ADMIN — FLUTICASONE PROPIONATE 2 SPRAY: 50 SPRAY, METERED NASAL at 13:22

## 2018-01-15 RX ADMIN — LEVETIRACETAM 1000 MG: 1000 INJECTION, SOLUTION INTRAVENOUS at 23:21

## 2018-01-15 RX ADMIN — PROPOFOL 150 MG: 10 INJECTION, EMULSION INTRAVENOUS at 07:35

## 2018-01-15 RX ADMIN — LIDOCAINE HYDROCHLORIDE 60 MG: 20 INJECTION, SOLUTION INFILTRATION; PERINEURAL at 07:35

## 2018-01-15 RX ADMIN — GLYCOPYRROLATE 0.4 MG: 0.2 INJECTION, SOLUTION INTRAMUSCULAR; INTRAVENOUS at 09:30

## 2018-01-15 RX ADMIN — SODIUM CHLORIDE, POTASSIUM CHLORIDE, SODIUM LACTATE AND CALCIUM CHLORIDE 1000 ML: 600; 310; 30; 20 INJECTION, SOLUTION INTRAVENOUS at 06:03

## 2018-01-15 RX ADMIN — CEFAZOLIN 2 G: 1 INJECTION, POWDER, FOR SOLUTION INTRAMUSCULAR; INTRAVENOUS at 16:34

## 2018-01-15 RX ADMIN — MONTELUKAST SODIUM 10 MG: 10 TABLET, FILM COATED ORAL at 20:00

## 2018-01-15 RX ADMIN — DEXAMETHASONE SODIUM PHOSPHATE 4 MG: 4 INJECTION, SOLUTION INTRAMUSCULAR; INTRAVENOUS at 06:52

## 2018-01-15 RX ADMIN — ONDANSETRON HYDROCHLORIDE 4 MG: 2 SOLUTION INTRAMUSCULAR; INTRAVENOUS at 09:11

## 2018-01-15 RX ADMIN — IPRATROPIUM BROMIDE AND ALBUTEROL SULFATE 3 ML: 2.5; .5 SOLUTION RESPIRATORY (INHALATION) at 06:52

## 2018-01-15 RX ADMIN — CEFAZOLIN 2 G: 1 INJECTION, POWDER, FOR SOLUTION INTRAMUSCULAR; INTRAVENOUS at 23:21

## 2018-01-15 RX ADMIN — MIDAZOLAM HYDROCHLORIDE 2 MG: 1 INJECTION, SOLUTION INTRAMUSCULAR; INTRAVENOUS at 07:11

## 2018-01-15 RX ADMIN — SODIUM CHLORIDE, POTASSIUM CHLORIDE, SODIUM LACTATE AND CALCIUM CHLORIDE 30 ML/HR: 600; 310; 30; 20 INJECTION, SOLUTION INTRAVENOUS at 06:01

## 2018-01-15 RX ADMIN — HYDROMORPHONE HYDROCHLORIDE 1 MG: 1 INJECTION, SOLUTION INTRAMUSCULAR; INTRAVENOUS; SUBCUTANEOUS at 23:31

## 2018-01-15 RX ADMIN — SUCCINYLCHOLINE CHLORIDE 80 MG: 20 INJECTION, SOLUTION INTRAMUSCULAR; INTRAVENOUS at 07:35

## 2018-01-15 RX ADMIN — SODIUM CHLORIDE, POTASSIUM CHLORIDE, SODIUM LACTATE AND CALCIUM CHLORIDE: 600; 310; 30; 20 INJECTION, SOLUTION INTRAVENOUS at 07:34

## 2018-01-15 RX ADMIN — Medication 3 MG: at 09:30

## 2018-01-15 RX ADMIN — CETIRIZINE HYDROCHLORIDE 10 MG: 10 TABLET, FILM COATED ORAL at 13:21

## 2018-01-15 RX ADMIN — ROCURONIUM BROMIDE 5 MG: 10 INJECTION INTRAVENOUS at 07:35

## 2018-01-15 RX ADMIN — Medication 80 MCG: at 08:55

## 2018-01-15 RX ADMIN — DEXAMETHASONE SODIUM PHOSPHATE 4 MG: 4 INJECTION, SOLUTION INTRAMUSCULAR; INTRAVENOUS at 18:32

## 2018-01-15 RX ADMIN — SODIUM CHLORIDE 75 ML/HR: 9 INJECTION, SOLUTION INTRAVENOUS at 12:15

## 2018-01-15 RX ADMIN — Medication 2 G: at 07:45

## 2018-01-15 RX ADMIN — ROCURONIUM BROMIDE 45 MG: 10 INJECTION INTRAVENOUS at 07:41

## 2018-01-15 RX ADMIN — PANTOPRAZOLE SODIUM 40 MG: 40 TABLET, DELAYED RELEASE ORAL at 13:22

## 2018-01-15 RX ADMIN — DEXAMETHASONE SODIUM PHOSPHATE 4 MG: 4 INJECTION, SOLUTION INTRAMUSCULAR; INTRAVENOUS at 23:21

## 2018-01-15 RX ADMIN — LEVETIRACETAM 1000 MG: 1000 INJECTION, SOLUTION INTRAVENOUS at 13:22

## 2018-01-15 NOTE — ANESTHESIA PROCEDURE NOTES
Airway  Urgency: elective    Airway not difficult    General Information and Staff    Patient location during procedure: OR  CRNA: JIM THOMPSON    Indications and Patient Condition  Indications for airway management: airway protection    Preoxygenated: yes  Mask difficulty assessment: 1 - vent by mask    Final Airway Details  Final airway type: endotracheal airway      Successful airway: ETT    Successful intubation technique: direct laryngoscopy  Endotracheal tube insertion site: oral  Blade: Tyrone  Blade size: 3.5.  ETT size: 7.5 mm  Cormack-Lehane Classification: grade IIb - view of arytenoids or posterior of glottis only  Placement verified by: chest auscultation and capnometry   Measured from: lips

## 2018-01-15 NOTE — OP NOTE
Procedure Note  Preop Diagnosis: Brain tumor [D49.6]    Post-Op Diagnosis Codes:     * Brain tumor [D49.6]     Procedure Name:Right Craniotomy for tumor  Use of the operative microscope  Use of image guided stereotactic navigation    Indications:  A MRI brain revealed findings of brain tumor. The patient now presents for craniotomy after discussing therapeutic alternatives.          Surgeon: Constantine Schmidt MD     Assistants: none    Anesthesia: General endotracheal anesthesia    ASA Class: 3    Procedure Details   After obtaining informed consent, having the risks and benefits of the procedure explained including but not limited to infection, bleeding, paralysis, spinal fluid leak, bowel bladder incontinence, speech and memory problems, stroke, coma, and death.  The patient was brought to the operative room.  She was given general anesthesia via an endotracheal tube.  She was supine on operating table.  Her head was placed in 3 point Osborn head harness and turned to the left.  A small amount of hair was removed using clippers.  The patient was registered with stereotactic navigation.  The patient was then prepped and draped in standard sterile fashion.  The preplanned incision was infiltrated Marcaine and epinephrine.  10 blade scalpel was used to make incisions the dermis and epidermis.  Bovie cautery was used to extend the incision down to the subjacent soft tissues to level of the galea.  Pancho clips were applied to the skin edges.  A cerebellar retractor was placed into the wound.  Navigation was used to identify midline and the location of the tumor.  A craniotome was then used to drill 2 bur holes in the right frontal area.  A Penfield 3 was then used to separate the dura from the inner table the skull.  A second cutting bur with a footplate was then used to turn a cranial flap.  The flap was then placed into antibiotic solution.  A 4-0 Nurolon was then used to tent the dura up laterally.  The dura was  then incised using a 15 blade scalpel a small dural flap was then turned with the base towards the superior sagittal sinus using dural scissors.  The flap was then placed under tension using 4 Nurolon's.  Navigation identified the shortest route to the tumor.  Bipolar cautery was used to coag at the surface the dot.  A corticectomy was then done with a 15 blade scalpel.  The tumor was immediately evident underneath the cortex.  The tumor was then removed using pituitary rongeur to place tension on it and half by half cottonoids to isolate the tumor from the surrounding normal brain.  Once the tumor was removed the tumor bed was inspected for hemostasis it was packed with thrombin Gelfoam.  Once we are satisfied that we had adequate hemostasis the tumor bed was lined with Surgicel.  The dura was then reapproximated using a running 4-0 Nurolon.  The dural incision was burred with a small piece of DuraGen.  The bone flap was then replaced and held in place using bur hole covers and mini screws.  The wound was then copiously irrigated with antibiotic solution.  It was inspected for hemostasis.  The galea was reapproximated using a series of inverted interrupted 2-0 Vicryl sutures.  The skin was closed using a running 4 Monocryl.  All sponge needle and instrument counts were correct at the end of the procedure.  The patient was extubated in stable condition returned recovery with about 10 mL of blood loss    Findings:  Secondary malignant brain tumor]    Estimated Blood Loss:  10           Drains: None           Total IV Fluids: ml           Specimens:   ID Type Source Tests Collected by Time Destination   1 : brain area around tumor Wound Brain ANAEROBIC CULTURE, GRAM STAIN, WOUND CULTURE, FUNGAL ABS, CSF (CF) Constantine Schmidt MD 1/15/2018 0836    A : BRAIN TUMOR FOR FROZEN Tissue Brain TISSUE EXAM Constantine Schmidt MD 1/15/2018 0836    B : tumor Tissue Brain TISSUE EXAM Constantine Schmidt MD 1/15/2018 0900                Implants:   Implant Name Type Inv. Item Serial No.  Lot No. LRB No. Used   DURALMATRIX DURAGEN PLS 1X3IN 5PK - [id number] Implant DURALMATRIX DURAGEN PLS 1X3IN 5PK  [employer] [id number] Right 1   PLT THINFLAP BUR BENT 18.5 - [id number] Implant PLT THINFLAP BUR BENT 18.5  [employer] MICROFIXATION  Right 2   SCRW THINFLAP SD XDRIVE 1.5X4 - [id number] Implant SCRW THINFLAP SD XDRIVE 1.5X4   [employer] MICROFIXATION   Right 8              Complications:  None           Disposition: PACU - hemodynamically stable.           Condition: stable        Constantine Schmidt MD

## 2018-01-15 NOTE — ANESTHESIA POSTPROCEDURE EVALUATION
Patient: Marcy Garcia    Procedure Summary     Date Anesthesia Start Anesthesia Stop Room / Location    01/15/18 0734 [id number]  [facility] OR 07 / [facility] OR       Procedure Diagnosis Surgeon Provider    CRANIOTOMY FOR TUMOR STERIOTACTIC WITH BRAIN LAB right frontal craniotomy for brain tumor with neuromonitoring and brain lab (Right Head) Brain tumor  (Brain tumor [D49.6]) MD Ranjith Edwards CRNA          Anesthesia Type: general  Last vitals  BP   98/50 (01/15/18 1029)   Temp   97.8 °F (36.6 °C) (01/15/18 1029)   Pulse   57 (01/15/18 1029)   Resp   14 (01/15/18 1029)     SpO2   92 % (01/15/18 1029)     Post Anesthesia Care and Evaluation    Patient location during evaluation: PACU  Patient participation: complete - patient participated  Level of consciousness: awake and alert  Pain management: adequate  Airway patency: patent  Anesthetic complications: No anesthetic complications  PONV Status: none  Cardiovascular status: acceptable and hemodynamically stable  Respiratory status: acceptable  Hydration status: acceptable    Comments: Blood pressure 98/50, pulse 57, temperature 97.8 °F (36.6 °C), temperature source Temporal Artery , resp. rate 14, SpO2 92 %, not currently breastfeeding.    Patient discharged from PACU based upon Alfredo score. Please see RN notes for further details

## 2018-01-15 NOTE — ANESTHESIA PREPROCEDURE EVALUATION
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 Associated extensive vasogenic edema is present. No additional area of abnormal enhancement.      There is no midline shift. No acute hydrocephalus. No evidence of leptomeningeal disease.      IMPRESSION:  Enhancing mass with associated vasogenic edema in the right frontal lobe. Differential consideration does include metastatic disease and primary glioma.  GI/Hepatic/Renal/Endo    (+)  GERD,   (-) liver disease, no renal disease, diabetes    ROS Comment: Colostomy 12/2013    Musculoskeletal     Abdominal    Substance History      OB/GYN          Other                                                Anesthesia Plan    ASA 3     general     intravenous induction   Anesthetic plan and risks discussed with patient.
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
## 2018-01-15 NOTE — PLAN OF CARE
Problem: Patient Care Overview (Adult)  Goal: Plan of Care Review  Outcome: Ongoing (interventions implemented as appropriate)   01/15/18 1013   Coping/Psychosocial Response Interventions   Plan Of Care Reviewed With patient   Patient Care Overview   Progress progress toward functional goals as expected   Outcome Evaluation   Outcome Summary/Follow up Plan vss, meets criteria for discharge to critical care       Problem: Perioperative Period (Adult)  Goal: Signs and Symptoms of Listed Potential Problems Will be Absent or Manageable (Perioperative Period)  Outcome: Ongoing (interventions implemented as appropriate)

## 2018-01-15 NOTE — PLAN OF CARE
Problem: Patient Care Overview (Adult)  Goal: Plan of Care Review  Outcome: Ongoing (interventions implemented as appropriate)   01/15/18 0645   Coping/Psychosocial Response Interventions   Plan Of Care Reviewed With patient   Patient Care Overview   Progress no change       Problem: Perioperative Period (Adult)  Goal: Signs and Symptoms of Listed Potential Problems Will be Absent or Manageable (Perioperative Period)  Outcome: Ongoing (interventions implemented as appropriate)   01/15/18 0645   Perioperative Period   Problems Assessed (Perioperative Period) pain;perioperative injury;infection;situational response   Problems Present (Perioperative Period) situational response

## 2018-01-16 ENCOUNTER — APPOINTMENT (OUTPATIENT)
Dept: MRI IMAGING | Facility: HOSPITAL | Age: 68
End: 2018-01-16

## 2018-01-16 LAB
ANION GAP SERPL CALCULATED.3IONS-SCNC: 13 MMOL/L (ref 4–13)
BUN BLD-MCNC: 16 MG/DL (ref 5–21)
BUN/CREAT SERPL: 26.2 (ref 7–25)
CALCIUM SPEC-SCNC: 9.1 MG/DL (ref 8.4–10.4)
CHLORIDE SERPL-SCNC: 102 MMOL/L (ref 98–110)
CO2 SERPL-SCNC: 26 MMOL/L (ref 24–31)
CREAT BLD-MCNC: 0.61 MG/DL (ref 0.5–1.4)
DEPRECATED RDW RBC AUTO: 44.7 FL (ref 40–54)
ERYTHROCYTE [DISTWIDTH] IN BLOOD BY AUTOMATED COUNT: 14.1 % (ref 12–15)
GFR SERPL CREATININE-BSD FRML MDRD: 98 ML/MIN/1.73
GLUCOSE BLD-MCNC: 115 MG/DL (ref 70–100)
HCT VFR BLD AUTO: 34.7 % (ref 37–47)
HGB BLD-MCNC: 11.5 G/DL (ref 12–16)
MCH RBC QN AUTO: 28.5 PG (ref 28–32)
MCHC RBC AUTO-ENTMCNC: 33.1 G/DL (ref 33–36)
MCV RBC AUTO: 86.1 FL (ref 82–98)
PLATELET # BLD AUTO: 151 10*3/MM3 (ref 130–400)
PMV BLD AUTO: 10 FL (ref 6–12)
POTASSIUM BLD-SCNC: 4.3 MMOL/L (ref 3.5–5.3)
RBC # BLD AUTO: 4.03 10*6/MM3 (ref 4.2–5.4)
SODIUM BLD-SCNC: 141 MMOL/L (ref 135–145)
WBC NRBC COR # BLD: 10.5 10*3/MM3 (ref 4.8–10.8)

## 2018-01-16 PROCEDURE — G8978 MOBILITY CURRENT STATUS: HCPCS | Performed by: PHYSICAL THERAPIST

## 2018-01-16 PROCEDURE — 25010000002 HYDROMORPHONE PER 4 MG: Performed by: NEUROLOGICAL SURGERY

## 2018-01-16 PROCEDURE — G8979 MOBILITY GOAL STATUS: HCPCS | Performed by: PHYSICAL THERAPIST

## 2018-01-16 PROCEDURE — 80048 BASIC METABOLIC PNL TOTAL CA: CPT | Performed by: NURSE PRACTITIONER

## 2018-01-16 PROCEDURE — G8987 SELF CARE CURRENT STATUS: HCPCS | Performed by: OCCUPATIONAL THERAPIST

## 2018-01-16 PROCEDURE — 97161 PT EVAL LOW COMPLEX 20 MIN: CPT

## 2018-01-16 PROCEDURE — 99024 POSTOP FOLLOW-UP VISIT: CPT | Performed by: NEUROLOGICAL SURGERY

## 2018-01-16 PROCEDURE — 85027 COMPLETE CBC AUTOMATED: CPT | Performed by: NURSE PRACTITIONER

## 2018-01-16 PROCEDURE — 97166 OT EVAL MOD COMPLEX 45 MIN: CPT | Performed by: OCCUPATIONAL THERAPIST

## 2018-01-16 PROCEDURE — G8988 SELF CARE GOAL STATUS: HCPCS | Performed by: OCCUPATIONAL THERAPIST

## 2018-01-16 PROCEDURE — 0 GADOBENATE DIMEGLUMINE 529 MG/ML SOLUTION: Performed by: NEUROLOGICAL SURGERY

## 2018-01-16 PROCEDURE — A9577 INJ MULTIHANCE: HCPCS | Performed by: NEUROLOGICAL SURGERY

## 2018-01-16 PROCEDURE — 70553 MRI BRAIN STEM W/O & W/DYE: CPT

## 2018-01-16 PROCEDURE — 25010000002 DEXAMETHASONE PER 1 MG: Performed by: NURSE PRACTITIONER

## 2018-01-16 RX ADMIN — HYDROCODONE BITARTRATE AND ACETAMINOPHEN 1 TABLET: 7.5; 325 TABLET ORAL at 09:32

## 2018-01-16 RX ADMIN — SERTRALINE 200 MG: 100 TABLET, FILM COATED ORAL at 08:53

## 2018-01-16 RX ADMIN — MONTELUKAST SODIUM 10 MG: 10 TABLET, FILM COATED ORAL at 20:42

## 2018-01-16 RX ADMIN — HYDROCODONE BITARTRATE AND ACETAMINOPHEN 1 TABLET: 7.5; 325 TABLET ORAL at 03:31

## 2018-01-16 RX ADMIN — DEXAMETHASONE SODIUM PHOSPHATE 4 MG: 4 INJECTION, SOLUTION INTRAMUSCULAR; INTRAVENOUS at 11:27

## 2018-01-16 RX ADMIN — GADOBENATE DIMEGLUMINE 10 ML: 529 INJECTION, SOLUTION INTRAVENOUS at 11:30

## 2018-01-16 RX ADMIN — HYDROMORPHONE HYDROCHLORIDE 0.5 MG: 1 INJECTION, SOLUTION INTRAMUSCULAR; INTRAVENOUS; SUBCUTANEOUS at 11:27

## 2018-01-16 RX ADMIN — HYDROMORPHONE HYDROCHLORIDE 1 MG: 1 INJECTION, SOLUTION INTRAMUSCULAR; INTRAVENOUS; SUBCUTANEOUS at 20:42

## 2018-01-16 RX ADMIN — FLUTICASONE PROPIONATE 2 SPRAY: 50 SPRAY, METERED NASAL at 08:54

## 2018-01-16 RX ADMIN — CETIRIZINE HYDROCHLORIDE 10 MG: 10 TABLET, FILM COATED ORAL at 08:53

## 2018-01-16 RX ADMIN — DIGOXIN 250 MCG: 0.25 TABLET ORAL at 11:27

## 2018-01-16 RX ADMIN — DEXAMETHASONE SODIUM PHOSPHATE 4 MG: 4 INJECTION, SOLUTION INTRAMUSCULAR; INTRAVENOUS at 17:59

## 2018-01-16 RX ADMIN — ALPRAZOLAM 0.25 MG: 0.25 TABLET ORAL at 17:58

## 2018-01-16 RX ADMIN — PANTOPRAZOLE SODIUM 40 MG: 40 TABLET, DELAYED RELEASE ORAL at 08:53

## 2018-01-16 RX ADMIN — HYDROCODONE BITARTRATE AND ACETAMINOPHEN 1 TABLET: 7.5; 325 TABLET ORAL at 17:58

## 2018-01-16 RX ADMIN — ATORVASTATIN CALCIUM 20 MG: 10 TABLET, FILM COATED ORAL at 20:42

## 2018-01-16 RX ADMIN — DEXAMETHASONE SODIUM PHOSPHATE 4 MG: 4 INJECTION, SOLUTION INTRAMUSCULAR; INTRAVENOUS at 05:26

## 2018-01-16 RX ADMIN — ALPRAZOLAM 0.25 MG: 0.25 TABLET ORAL at 08:53

## 2018-01-16 NOTE — PROGRESS NOTES
"Progress Note    Patient:  Marcy Garcia  YOB: 1950  MRN: [id number]   Admit date: 1/15/2018   Admitting Physician: Constantine Schmidt MD  Primary Care Physician: Mary Henning MD    Chief Complaint/Interval History: headache. Tolerating PO, doing well this am. Voiding, ambulating with staff    Intake/Output Summary (Last 24 hours) at 01/16/18 0811  Last data filed at 01/16/18 0700   Gross per 24 hour   Intake             2487 ml   Output             2550 ml   Net              -63 ml     Allergies:   Allergies   Allergen Reactions   • Kiwi Extract Shortness Of Breath   • Pineapple Shortness Of Breath     Current Scheduled Medications:     atorvastatin 20 mg Oral Nightly   cetirizine 10 mg Oral Daily   dexamethasone 4 mg Intravenous Q6H   digoxin 250 mcg Oral Daily   fluticasone 2 spray Nasal Daily   levETIRAcetam 1,000 mg Intravenous Q12H   montelukast 10 mg Oral Nightly   pantoprazole 40 mg Oral Daily   sertraline 200 mg Oral Daily   sodium chloride 500 mL Intravenous Once     Current PRN Medications:  •  ALPRAZolam  •  HYDROcodone-acetaminophen  •  HYDROmorphone  •  labetalol  •  [DISCONTINUED] Morphine **AND** naloxone  •  ondansetron    Review of Systems   Neurological: Positive for headaches.   All other systems reviewed and are negative.      Vital Signs:  BP 95/55  Pulse 69  Temp 98.5 °F (36.9 °C) (Oral)   Resp 18  Ht 154.9 cm (61\")  Wt 65.5 kg (144 lb 6.4 oz)  SpO2 94%  Breastfeeding? No  BMI 27.28 kg/m2    Physical Exam   Constitutional: She is oriented to person, place, and time. She appears well-developed and well-nourished.   Cardiovascular: Normal rate and regular rhythm.    Pulmonary/Chest: Effort normal. No respiratory distress.   Abdominal: Soft. She exhibits no distension. There is no tenderness.   Neurological: She is oriented to person, place, and time. She has normal strength. Gait normal.   Psychiatric: Her speech is normal.     Vital signs reviewed.  Line/IV site: " "No erythema, warmth, induration, or tenderness.    Objective:  Vital signs: (most recent): Blood pressure 95/55, pulse 69, temperature 98.5 °F (36.9 °C), temperature source Oral, resp. rate 18, height 154.9 cm (61\"), weight 65.5 kg (144 lb 6.4 oz), SpO2 94 %, not currently breastfeeding.    Lungs:  Normal effort.  She is not in respiratory distress.    Heart: Normal rate.  Regular rhythm.    Abdomen: Abdomen is soft and non-distended.        Neurologic Exam     Mental Status   Oriented to person, place, and time.   Attention: normal.   Speech: speech is normal   Level of consciousness: alert  Knowledge: good.     Cranial Nerves   Cranial nerves II through XII intact.     Motor Exam   Muscle bulk: normal  Overall muscle tone: normal  Right arm pronator drift: absent  Left arm pronator drift: absent    Strength   Strength 5/5 throughout.     Sensory Exam   Light touch normal.   Pinprick normal.     Gait, Coordination, and Reflexes     Gait  Gait: normal      Lab Results:  ABG:     CBC:   Results from last 7 days  Lab Units 01/16/18  0201   WBC 10*3/mm3 10.50   HEMOGLOBIN g/dL 11.5*   HEMATOCRIT % 34.7*   PLATELETS 10*3/mm3 151     BMP:  Results from last 7 days  Lab Units 01/16/18  0201   SODIUM mmol/L 141   POTASSIUM mmol/L 4.3   CHLORIDE mmol/L 102   CO2 mmol/L 26.0   BUN mg/dL 16   CREATININE mg/dL 0.61   GLUCOSE mg/dL 115*   CALCIUM mg/dL 9.1     Culture Results:   Wound Culture   Date Value Ref Range Status   01/15/2018 No growth at 24 hours  Preliminary       Principal Problem:    Brain tumor      Radiology:     Additional Studies Reviewed:     Impression/Recommendations:   CV: HR, BP stable, some relative hypotension bradycardia  RESP: Oxygenating well  GI: Tolerating PO  : BUN, Cr UOP stable  NEURO: Doing well, MRi this morning, transfer to floor  RESP:  Constantine Schmidt MD  "

## 2018-01-16 NOTE — PROGRESS NOTES
Discharge Planning Assessment  [facility]     Patient Name: Marcy Garcia  MRN: [id number]  Today's Date: 1/16/2018    Admit Date: 1/15/2018          Discharge Needs Assessment       01/16/18 0920    Living Environment    Lives With facility resident;spouse    Living Arrangements house;extended care facility    Transportation Available car;family or friend will provide    Living Environment    Provides Primary Care For no one    Quality Of Family Relationships supportive    Able to Return to Prior Living Arrangements yes    Discharge Needs Assessment    Concerns To Be Addressed care coordination/care conferences;discharge planning concerns    Readmission Within The Last 30 Days no previous admission in last 30 days    Anticipated Changes Related to Illness none    Equipment Currently Used at Home cane, quad;walker, standard;wheelchair    Discharge Facility/Level Of Care Needs acute rehab;nursing facility, skilled    Discharge Planning Comments The chart reads that patient was at [facility] then admitted here.  SW asked patient if she was at [facility] prior to admission and patient stated yes.  SW spoke with Dior in admissions at [facility] who advised patient had went home last week but they were anticipating she may need to return after surgery.  Patient states she would prefer to go home if possible and would need HH.  Patient has a  that is there daily.  Patient also had a sitter present in hospital room who states she will be at patient's home daily.  Patient's  is disabled and not able to assist with her care.  Will follow patient's progress to determine plan/need.  Acute rehab may also be an option.            Discharge Plan     None        Discharge Placement     No information found                Demographic Summary     None            Functional Status     None            Psychosocial     None            Abuse/Neglect     None            Legal     None            Substance Abuse     None             Patient Forms     None          CLAUDINE Duckworth

## 2018-01-16 NOTE — THERAPY EVALUATION
Acute Care - Occupational Therapy Initial Evaluation  [facility]     Patient Name: Marcy Garcia  : 1950  MRN: [id number]  Today's Date: 2018  Onset of Illness/Injury or Date of Surgery Date: 01/15/18  Date of Referral to OT: 01/15/18  Referring Physician: Dr. Schmidt    Admit Date: 1/15/2018       ICD-10-CM ICD-9-CM   1. Brain tumor D49.6 239.6   2. Decreased activities of daily living (ADL) Z78.9 V49.89   3. Impaired mobility Z74.09 799.89     Patient Active Problem List   Diagnosis   • Normal body mass index (BMI)   • Former smoker   • Neoplasm of uncertain behavior of brain   • Brain tumor     Past Medical History:   Diagnosis Date   • Adenocarcinoma, lung, left    • Anemia    • Anxiety    • Arthritis    • Ataxia    • Brain tumor    • Cancer     lung cancer - pt had chemo and was told she was cancer free, but recently a brain tumor was found   • Colostomy in place    • Confusion    • COPD (chronic obstructive pulmonary disease)    • Depression    • Dizziness    • GERD (gastroesophageal reflux disease)    • Headache    • Memory loss    • Panic attacks    • Seasonal allergies    • Urgency of urination    • Weakness      Past Surgical History:   Procedure Laterality Date   • COLON SURGERY      BLOCKED BOWEL - COLOSTOMY   • CRANIOTOMY FOR TUMOR Right 1/15/2018    Procedure: CRANIOTOMY FOR TUMOR STERIOTACTIC WITH BRAIN LAB right frontal craniotomy for brain tumor with neuromonitoring and brain lab;  Surgeon: Constantine Schmidt MD;  Location: [facility];  Service:    • ECTOPIC PREGNANCY SURGERY     • HERNIA REPAIR     • HYSTERECTOMY     • LUNG CANCER SURGERY Left     Dr Marlow - Lower Lobectomy   • ORIF FINGER FRACTURE      left pinky   • SHOULDER SURGERY Left     BROKEN COLLAR BONE & SHOULDER SURGERY & ELBOW   • TONSILLECTOMY AND ADENOIDECTOMY            OT ASSESSMENT FLOWSHEET (last 72 hours)      OT Evaluation       18 0757 18 0726 01/15/18 1633 01/15/18 1627       Rehab  Evaluation    Document Type evaluation  -CH (P)  evaluation  -MM       Subjective Information agree to therapy;complains of;pain  -CH (P)  agree to therapy;complains of;pain   Impulsive, See MAR  -MM       Symptoms Noted Comment  (P)  Pain in head   -MM       General Information    Patient Profile Review yes  -CH (P)  yes  -MM       Onset of Illness/Injury or Date of Surgery Date 01/15/18  -CH (P)  01/15/18  -MM       Referring Physician Dr. Schmidt  - (P)  Dr. Schmidt  -MM       General Observations seated in chair with PT, dressing at head,   -CH (P)  In bed, in no apparent distress  -MM       Pertinent History Of Current Problem R crani for R frontal met   -CH (P)  R. craniotomy for R. frontal met, previous lung cancer in jan 2017  -MM       Precautions/Limitations fall precautions  -CH (P)  fall precautions  -MM       Prior Level of Function  (P)  independent:  -MM       Equipment Currently Used at Home cane, quad;walker, standard;wheelchair  - (P)  cane, quad;walker, standard;wheelchair  -MM  walker, standard  -     Plans/Goals Discussed With patient;agreed upon  -CH (P)  patient;agreed upon  -MM       Risks Reviewed patient:;LOB;increased discomfort  -CH (P)  patient:;LOB;other (comment)   impulsive  -MM       Benefits Reviewed patient:;improve function;increase independence;increase strength;increase balance  -CH (P)  patient:;improve function;increase balance  -MM       Barriers to Rehab  (P)  physical barrier  -MM       Living Environment    Lives With   facility resident;spouse  -      Living Arrangements   house;extended care facility  -      Home Accessibility   no concerns  -SM      Stair Railings at Home   inside, present at both sides;outside, present at both sides  -      Type of Financial/Environmental Concern   none  -SM      Transportation Available   car;family or friend will provide  -      Living Environment Comment Pt. was at [facility] as direct admit from MD.  That is not her  permanent residence.    -        Clinical Impression    Date of Referral to OT 01/15/18  -        OT Diagnosis decline in ADLs  -        Impairments Found (describe specific impairments) gait, locomotion, and balance;muscle performance  -        Patient/Family Goals Statement return home, get out of bed  -        Criteria for Skilled Therapeutic Interventions Met yes;treatment indicated  -        Rehab Potential good, to achieve stated therapy goals  -        Therapy Frequency 3-5 times/wk  -        Predicted Duration of Therapy Intervention (days/wks) until DC from facility  -        Functional Level Prior    Ambulation    0-->independent  -SM     Transferring    0-->independent  -SM     Toileting    0-->independent  -SM     Bathing    0-->independent  -SM     Dressing    0-->independent  -SM     Eating    0-->independent  -SM     Communication    0-->understands/communicates without difficulty  -     Swallowing    0-->swallows foods/liquids without difficulty  -     Pain Assessment    Pain Assessment 0-10  - (P)  0-10  -MM       Pain Score 5  - (P)  4  -MM       Pain Type Acute pain  - (P)  Acute pain  -MM       Pain Location Head  - (P)  Head  -MM       Pain Orientation Right;Anterior  - (P)  Anterior  -MM       Pain Descriptors Dull;Throbbing  - (P)  Dull;Throbbing  -MM       Pain Frequency Constant/continuous  - (P)  Constant/continuous  -MM       Pain Onset  (P)  Gradual  -MM       Clinical Progression  (P)  Gradually improving  -MM       Pain Intervention(s) Repositioned  - (P)  Repositioned;Ambulation/increased activity  -MM       Response to Interventions  (P)  no change  -MM       Vision Assessment/Intervention    Visual Impairment --  -        Visual Impairment Comment Pt. reports increase in floaters  -        Visual Tracking Comment very minor nystagmus with tracking to the Left  -        Cognitive Assessment/Intervention    Current Cognitive/Communication  Assessment  (P)  functional  -MM       Orientation Status oriented to;person;place;situation  -CH (P)  oriented x 4  -MM       Follows Commands/Answers Questions 100% of the time;able to follow single-step instructions  -CH (P)  needs cueing;75% of the time  -MM       Personal Safety decreased awareness, need for assist;decreased awareness, need for safety;at risk behaviors demonstrated;impulsive  -CH (P)  moderate impairment;impulsive  -MM       Personal Safety Interventions fall prevention program maintained;gait belt;muscle strengthening facilitated;nonskid shoes/slippers when out of bed;supervised activity  -CH (P)  gait belt;muscle strengthening facilitated;nonskid shoes/slippers when out of bed  -MM       Short/Long Term Memory  (P)  intact long term memory;intact short term memory  -MM       ROM (Range of Motion)    General ROM  (P)  no range of motion deficits identified  -MM       General ROM Detail BUE AROM WFL  -CH (P)  WFL  -MM       MMT (Manual Muscle Testing)    General MMT Assessment Detail BUE 4+/5  -CH (P)  BLE hip flexion 4/5, BLE knee ext. 4/5  -MM       Bed Mobility, Assessment/Treatment    Bed Mobility, Roll Left, [place]  (P)  independent;supervision required  -MM       Bed Mob, Supine to Sit, [place]  (P)  independent;supervision required  -MM       Bed Mob, Sit to Supine, [place]  (P)  independent;supervision required  -MM       Bed Mobility, Comment up in room, assisted to chair   -        Transfer Assessment/Treatment    Transfers, Bed-Chair [place]  (P)  supervision required;verbal cues required  -MM       Transfers, Sit-Stand [place] supervision required;contact guard assist  - (P)  supervision required  -MM       Transfers, Stand-Sit [place] supervision required;contact guard assist  -        Transfer, Safety Issues impulsivity;balance decreased during turns  -CH (P)  impulsivity  -MM       Transfer, Impairments impaired balance  -        Transfer,  Comment  (P)  Pt. was implusive and getting up out of bed without recognition of safety.   -MM       Functional Mobility    Functional Mobility- Ind. Level contact guard assist;supervision required  -        Functional Mobility- Device --   HHA  -        Functional Mobility- Safety Issues balance decreased during turns  -        ADL Assessment/Intervention    Additional Documentation Self-Feeding Assessment/Training (Group)  -        Lower Body Dressing Assessment/Training    LB Dressing Assess/Train, Clothing Type donning:;socks  -        LB Dressing Assess/Train, Position sitting  -        LB Dressing Assess/Train, [place] independent  -        Self-Feeding Assessment/Training    Self-Feeding Assess/Train, Position supported sitting  -        Self-Feeding Assess/Train, [place] independent  -        Motor Skills/Interventions    Additional Documentation Balance Skills Training (Group)  -        Balance Skills Training    Sitting-Level of Assistance Distant supervision  -        Sitting-Balance Support Feet supported  -        Standing-Level of Assistance Contact guard  -        Static Standing Balance Support Left upper extremity supported  -        Gait Balance-Level of Assistance Contact guard  -        Gait Balance Support Left upper extremity supported  -        Sensory Assessment/Intervention    Sensory Impairment --   WNL per pt.   -        General Therapy Interventions    Planned Therapy Interventions activity intolerance;ADL retraining;balance training;bed mobility training;stretching;transfer training;home exercise program  -        Balance Training  (P)  Increase awareness of safety   -MM       Positioning and Restraints    Pre-Treatment Position standing in room  - (P)  in bed  -MM       Post Treatment Position chair  - (P)  chair  -       In Chair sitting;call light within reach;encouraged to call for assist;with family/caregiver;notified [facility]  - (P)   sitting;with OT  -MM         User Key  (r) = Recorded By, (t) = Taken By, (c) = Cosigned By    Initials Name Effective Dates     Nidia Matson, OTR/L 08/02/16 -      Rach Kruse, RN 08/02/16 -     ROYA Solo, PT Student 01/10/18 -            Occupational Therapy Education     Title: PT OT SLP Therapies (Active)     Topic: Occupational Therapy (Done)     Point: ADL training (Done)    Description: Instruct learner(s) on proper safety adaptation and remediation techniques during self care or transfers.   Instruct in proper use of assistive devices.    Learning Progress Summary    Learner Readiness Method Response Comment Documented by Status   Patient Eager E VU Pt. able to perform bed moblity and transfers independently although requires close supervision due to impulsivity. She is able to amubulate with CGA and aknowledges needs for safety.  01/16/18 0843 Done    Acceptance E,D RAZA,NR purpose & benefits of OT eval, OT tx, activity; safety during mobility & ADLs  01/16/18 0839 Done               Point: Home exercise program (Done)    Description: Instruct learner(s) on appropriate technique for monitoring, assisting and/or progressing therapeutic exercises/activities.    Learning Progress Summary    Learner Readiness Method Response Comment Documented by Status   Patient Eager E VU Pt. able to perform bed moblity and transfers independently although requires close supervision due to impulsivity. She is able to amubulate with CGA and aknowledges needs for safety.  01/16/18 0843 Done               Point: Precautions (Done)    Description: Instruct learner(s) on prescribed precautions during self-care and functional transfers.    Learning Progress Summary    Learner Readiness Method Response Comment Documented by Status   Patient Eager E VU Pt. able to perform bed moblity and transfers independently although requires close supervision due to impulsivity. She is able to amubulate with CGA and aknowledges  "needs for safety.  01/16/18 0843 Done               Point: Body mechanics (Done)    Description: Instruct learner(s) on proper positioning and spine alignment during self-care, functional mobility activities and/or exercises.    Learning Progress Summary    Learner Readiness Method Response Comment Documented by Status   Patient Veronica PERSON Pt. able to perform bed moblity and transfers independently although requires close supervision due to impulsivity. She is able to amubulate with CGA and aknowledges needs for safety.  01/16/18 0843 Done                      User Key     Initials Effective Dates Name Provider Type Discipline     08/02/16 -  Nidia Matson, OTR/L Occupational Therapist OT     01/10/18 -  Allison Solo, PT Student PT Student PT                  OT Recommendation and Plan  Planned Therapy Interventions: activity intolerance, ADL retraining, balance training, bed mobility training, stretching, transfer training, home exercise program  Therapy Frequency: 3-5 times/wk  Plan of Care Review  Plan Of Care Reviewed With: patient  Progress: progress toward functional goals as expected  Outcome Summary/Follow up Plan: OT cruzito completed.  Pt. performed t/f & ambulation at S/CGA 2' impulsivity.  She performed seated ADL I'ly but I anticipate she will require S/CGA for ADLs that require any standing.  She demo's imbalance, decreased safety, impusivity, & would benefit from OT to improve her safety & Indep prior to DC.  I rec 24/7 S at home from sitter or family.  Per pt. spouse is \"disabled\" and unable to assist her or provide S or perform any IADLs.  Pt. is not safe to return home without assist.            OT Goals       01/16/18 0757          Patient Education OT LTG    Patient Education OT LTG, Date Established 01/16/18  -      Patient Education OT LTG, Time to Achieve by discharge  -      Patient Education OT LTG, Education Type home safety  -      Patient Education OT LTG, Education " Understanding demonstrates adequately  -      ADL OT LTG    ADL OT LTG, Date Established 01/16/18  -      ADL OT LTG, Activity Type ADL skills  -      ADL OT LTG, [place] Level modified independent  -        User Key  (r) = Recorded By, (t) = Taken By, (c) = Cosigned By    Initials Name Provider Type     Nidia Matson, OTR/L Occupational Therapist                Outcome Measures       01/16/18 0800 01/16/18 0757       How much help from another person do you currently need...    Turning from your back to your side while in flat bed without using bedrails? (P)  4  -MM      Moving from lying on back to sitting on the side of a flat bed without bedrails? (P)  4  -MM      Moving to and from a bed to a chair (including a wheelchair)? (P)  3  -MM      Standing up from a chair using your arms (e.g., wheelchair, bedside chair)? (P)  3  -MM      Climbing 3-5 steps with a railing? (P)  3  -MM      To walk in hospital room? (P)  3  -MM      AM-PAC 6 Clicks Score (P)  20  -MS (r) MM (t)      How much help from another is currently needed...    Putting on and taking off regular lower body clothing?  4  -CH     Bathing (including washing, rinsing, and drying)  3  -CH     Toileting (which includes using toilet bed pan or urinal)  3  -CH     Putting on and taking off regular upper body clothing  4  -CH     Taking care of personal grooming (such as brushing teeth)  4  -CH     Eating meals  4  -CH     Score  22  -CH     Functional Assessment    Outcome Measure Options (P)  AM-PAC 6 Clicks Basic Mobility (PT)  -MM AM-PAC 6 Clicks Daily Activity (OT)  -       User Key  (r) = Recorded By, (t) = Taken By, (c) = Cosigned By    Initials Name Provider Type    UNA Matson, OTR/L Occupational Therapist    MS Glenny Donahue, PT DPT Physical Therapist    ROYA Solo, PT Student PT Student          Time Calculation:   OT Start Time: 0757  OT Stop Time: 0835  OT Time Calculation (min): 38 min    Therapy Charges for  Today     Code Description Service Date Service Provider Modifiers Qty    92307661168 HC OT SELFCARE CURRENT 1/16/2018 Nidia Matson OTR/L GO, CJ 1    64661490655 HC OT SELFCARE PROJECTED 1/16/2018 Nidia Matson OTR/L GO, CI 1    [id number]  OT EVAL MOD COMPLEXITY 3 1/16/2018 Nidia Matson OTR/L GO, KX 1          OT G-codes  OT Professional Judgement Used?: Yes  OT Functional Scales Options: AM-PAC 6 Clicks Daily Activity (OT)  Score: 22  Functional Limitation: Self care  Self Care Current Status (): At least 20 percent but less than 40 percent impaired, limited or restricted  Self Care Goal Status (): At least 1 percent but less than 20 percent impaired, limited or restricted    Nidia Matson OTR/L  1/16/2018

## 2018-01-16 NOTE — PLAN OF CARE
Problem: Patient Care Overview (Adult)  Goal: Plan of Care Review  Outcome: Ongoing (interventions implemented as appropriate)   01/16/18 0757 01/16/18 0844   Coping/Psychosocial Response Interventions   Plan Of Care Reviewed With --  patient   Patient Care Overview   Progress progress toward functional goals as expected --    Outcome Evaluation   Outcome Summary/Follow up Plan --  PT evaluation complete. Pt. was able to complete bed mobilty and transfers with increased supervision due to impulsitivy. She is able to ambulate to [facility] and approx. 185 ft with CGA for increased safety awareness and function. Pt. would benefit from skill therapy to increase awareness to surrondings and safety during functional activities. PT expects pt. to discharge to home with 24/7 assistance due to impulsivity.,        Problem: Inpatient Physical Therapy  Goal: Transfer Training Goal 1 LTG- PT  Outcome: Ongoing (interventions implemented as appropriate)   01/16/18 0844 01/16/18 1112   Transfer Training PT LTG   Transfer Training PT LTG, Time to Achieve by discharge --    Transfer Training PT LTG, [place] Level independent --    Transfer Training PT LTG, Additional Goal --  doesn't demostrate any safety issues      Goal: Gait Training Goal LTG- PT  Outcome: Ongoing (interventions implemented as appropriate)   01/16/18 0844 01/16/18 1112   Gait Training PT LTG   Gait Training Goal PT LTG, Date Established 01/16/18 --    Gait Training Goal PT LTG, Time to Achieve by discharge --    Gait Training Goal PT LTG, [place] Level supervision required --    Gait Training Goal PT LTG, Distance to Achieve --  300 ft with no safety issues      Goal: Dynamic Standing Balance Goal LTG- PT  Outcome: Ongoing (interventions implemented as appropriate)   01/16/18 0844 01/16/18 1112   Dynamic Standing Balance PT LTG   Dynamic Standing Balance PT LTG, Date Established 01/16/18 --    Dynamic Standing Balance PT LTG, Time to Achieve by discharge --     Dynamic Standing Balance PT LTG, [place] Level conditional independence --    Dynamic Standing Balance PT LTG, Additional Goal --  1 UE supported

## 2018-01-16 NOTE — PLAN OF CARE
Problem: Patient Care Overview (Adult)  Goal: Plan of Care Review  Outcome: Ongoing (interventions implemented as appropriate)   01/16/18 0448   Outcome Evaluation   Outcome Summary/Follow up Plan Neuro WDL, complaint of pain at surgical site treated with prn norco and dilaudid, VSS     Goal: Adult Individualization and Mutuality  Outcome: Ongoing (interventions implemented as appropriate)    Goal: Discharge Needs Assessment  Outcome: Ongoing (interventions implemented as appropriate)      Problem: Perioperative Period (Adult)  Goal: Signs and Symptoms of Listed Potential Problems Will be Absent or Manageable (Perioperative Period)  Outcome: Ongoing (interventions implemented as appropriate)      Problem: Skin Integrity Impairment, Risk/Actual (Adult)  Goal: Identify Related Risk Factors and Signs and Symptoms  Outcome: Ongoing (interventions implemented as appropriate)    Goal: Skin Integrity/Wound Healing  Outcome: Ongoing (interventions implemented as appropriate)      Problem: Fall Risk (Adult)  Goal: Identify Related Risk Factors and Signs and Symptoms  Outcome: Ongoing (interventions implemented as appropriate)    Goal: Absence of Falls  Outcome: Ongoing (interventions implemented as appropriate)      Problem: Craniotomy/Craniectomy/Cranioplasty (Adult)  Goal: Signs and Symptoms of Listed Potential Problems Will be Absent or Manageable (Craniotomy/Craniectomy/Cranioplasty)  Outcome: Ongoing (interventions implemented as appropriate)

## 2018-01-16 NOTE — PLAN OF CARE
"Problem: Patient Care Overview (Adult)  Goal: Plan of Care Review  Outcome: Ongoing (interventions implemented as appropriate)   01/16/18 0757   Coping/Psychosocial Response Interventions   Plan Of Care Reviewed With patient   Patient Care Overview   Progress progress toward functional goals as expected   Outcome Evaluation   Outcome Summary/Follow up Plan OT cruzito completed. Pt. performed t/f & ambulation at S/CGA 2' impulsivity. She performed seated ADL I'ly but I anticipate she will require S/CGA for ADLs that require any standing. She demo's imbalance, decreased safety, impusivity, & would benefit from OT to improve her safety & Indep prior to DC. I rec 24/7 S at home from sitter or family. Per pt. spouse is \"disabled\" and unable to assist her or provide S or perform any IADLs. Pt. is not safe to return home without assist.        Problem: Inpatient Occupational Therapy  Goal: Patient Education Goal LTG- OT  Outcome: Ongoing (interventions implemented as appropriate)   01/16/18 0757   Patient Education OT LTG   Patient Education OT LTG, Date Established 01/16/18   Patient Education OT LTG, Time to Achieve by discharge   Patient Education OT LTG, Education Type home safety   Patient Education OT LTG, Education Understanding demonstrates adequately     Goal: ADL Goal LTG- OT  Outcome: Ongoing (interventions implemented as appropriate)   01/16/18 0757   ADL OT LTG   ADL OT LTG, Date Established 01/16/18   ADL OT LTG, Activity Type ADL skills   ADL OT LTG, [place] Level modified independent         "

## 2018-01-16 NOTE — NURSING NOTE
Pt requested the arterial line not to be taken out. I explained to her that it was not working correctly anymore and that it needed to be removed. The patient has been neuro intact throughout the whole shift, but is a little confused this morning regarding her medications and the monitoring of her vitals.     Mayco Taylor RN

## 2018-01-16 NOTE — THERAPY EVALUATION
Acute Care - Physical Therapy Initial Evaluation  [facility]     Patient Name: Marcy Garcia  : 1950  MRN: [id number]  Today's Date: 2018   Onset of Illness/Injury or Date of Surgery Date: 01/15/18  Date of Referral to PT: 01/15/18  Referring Physician: Dr. Schmidt      Admit Date: 1/15/2018     Visit Dx:    ICD-10-CM ICD-9-CM   1. Brain tumor D49.6 239.6   2. Decreased activities of daily living (ADL) Z78.9 V49.89   3. Impaired mobility Z74.09 799.89     Patient Active Problem List   Diagnosis   • Normal body mass index (BMI)   • Former smoker   • Neoplasm of uncertain behavior of brain   • Brain tumor     Past Medical History:   Diagnosis Date   • Adenocarcinoma, lung, left    • Anemia    • Anxiety    • Arthritis    • Ataxia    • Brain tumor    • Cancer     lung cancer - pt had chemo and was told she was cancer free, but recently a brain tumor was found   • Colostomy in place    • Confusion    • COPD (chronic obstructive pulmonary disease)    • Depression    • Dizziness    • GERD (gastroesophageal reflux disease)    • Headache    • Memory loss    • Panic attacks    • Seasonal allergies    • Urgency of urination    • Weakness      Past Surgical History:   Procedure Laterality Date   • COLON SURGERY      BLOCKED BOWEL - COLOSTOMY   • CRANIOTOMY FOR TUMOR Right 1/15/2018    Procedure: CRANIOTOMY FOR TUMOR STERIOTACTIC WITH BRAIN LAB right frontal craniotomy for brain tumor with neuromonitoring and brain lab;  Surgeon: Constantine Schmidt MD;  Location: [facility];  Service:    • ECTOPIC PREGNANCY SURGERY     • HERNIA REPAIR     • HYSTERECTOMY     • LUNG CANCER SURGERY Left     Dr Marlow - Lower Lobectomy   • ORIF FINGER FRACTURE      left pinky   • SHOULDER SURGERY Left     BROKEN COLLAR BONE & SHOULDER SURGERY & ELBOW   • TONSILLECTOMY AND ADENOIDECTOMY            PT ASSESSMENT (last 72 hours)      PT Evaluation       18 0920 18 0757    Rehab Evaluation    Document Type   evaluation  -    Subjective Information  agree to therapy;complains of;pain  -    General Information    Patient Profile Review  yes  -    Onset of Illness/Injury or Date of Surgery Date  01/15/18  -    Referring Physician  Dr. Schmidt  -    General Observations  seated in chair with PT, dressing at head,   -    Pertinent History Of Current Problem  R crani for R frontal met   -    Precautions/Limitations  fall precautions  -    Equipment Currently Used at Home cane, quad;walker, standard;wheelchair  - cane, quad;walker, standard;wheelchair  -    Plans/Goals Discussed With  patient;agreed upon  -    Risks Reviewed  patient:;LOB;increased discomfort  -    Benefits Reviewed  patient:;improve function;increase independence;increase strength;increase balance  -    Living Environment    Lives With facility resident;spouse  -     Living Arrangements house;extended care facility  -     Transportation Available car;family or friend will provide  -     Living Environment Comment  Pt. was at [facility] as direct admit from [place].  That is not her permanent residence.    -    Pain Assessment    Pain Assessment  0-10  -    Pain Score  5  -    Pain Type  Acute pain  -    Pain Location  Head  -    Pain Orientation  Right;Anterior  -    Pain Descriptors  Dull;Throbbing  -    Pain Frequency  Constant/continuous  -    Pain Intervention(s)  Repositioned  -    Vision Assessment/Intervention    Visual Impairment  --  -    Visual Impairment Comment  Pt. reports increase in floaters  -    Visual Tracking Comment  very minor nystagmus with tracking to the Left  -    Cognitive Assessment/Intervention    Orientation Status  oriented to;person;place;situation  -    Follows Commands/Answers Questions  100% of the time;able to follow single-step instructions  -    Personal Safety  decreased awareness, need for assist;decreased awareness, need for safety;at risk behaviors  demonstrated;impulsive  -    Personal Safety Interventions  fall prevention program maintained;gait belt;muscle strengthening facilitated;nonskid shoes/slippers when out of bed;supervised activity  -    ROM (Range of Motion)    General ROM Detail  BUE AROM WFL  -    MMT (Manual Muscle Testing)    General MMT Assessment Detail  BUE 4+/5  -CH    Bed Mobility, Assessment/Treatment    Bed Mobility, Comment  up in room, assisted to chair   -    Transfer Assessment/Treatment    Transfers, Sit-Stand [place]  supervision required;contact guard assist  -    Transfers, Stand-Sit [place]  supervision required;contact guard assist  -CH    Transfer, Safety Issues  impulsivity;balance decreased during turns  -    Transfer, Impairments  impaired balance  -    Motor Skills/Interventions    Additional Documentation  Balance Skills Training (Group)  -    Balance Skills Training    Sitting-Level of Assistance  Distant supervision  -    Sitting-Balance Support  Feet supported  -    Standing-Level of Assistance  Contact guard  -    Static Standing Balance Support  Left upper extremity supported  -    Gait Balance-Level of Assistance  Contact guard  -    Gait Balance Support  Left upper extremity supported  -    Sensory Assessment/Intervention    Sensory Impairment  --   WNL per pt.   -    Positioning and Restraints    Pre-Treatment Position  standing in room  -    Post Treatment Position  chair  -    In Chair  sitting;call light within reach;encouraged to call for assist;with family/caregiver;notified [facility]      01/16/18 0726 01/15/18 6453    Rehab Evaluation    Document Type evaluation  -MS (r) MM (t) MS (c)     Subjective Information agree to therapy;complains of;pain   Impulsive, See MAR  -MS (r) MM (t) MS (c)     Symptoms Noted Comment Pain in head   -MS (r) MM (t) MS (c)     General Information    Patient Profile Review yes  -MS (r) MM (t) MS (c)     Onset of Illness/Injury or Date of  Surgery Date 01/15/18  -MS (r) MM (t) MS (c)     Referring Physician Dr. Schmidt  -MS (r) MM (t) MS (c)     General Observations In bed, in no apparent distress  -MS (r) MM (t) MS (c)     Pertinent History Of Current Problem R. craniotomy for R. frontal met with memory loss and behaviour changes, previous lung cancer in jan 2017  -MS (r) MM (t) MS (c)     Precautions/Limitations fall precautions;other (see comments)   impulsive   -MS (r) MM (t) MS (c)     Prior Level of Function independent:;all household mobility;gait  -MS (r) MM (t) MS (c)     Equipment Currently Used at Home cane, quad;walker, standard;wheelchair  -MS (r) MM (t) MS (c)     Plans/Goals Discussed With patient;agreed upon  -MS (r) MM (t) MS (c)     Risks Reviewed patient:;LOB;other (comment)   impulsive  -MS (r) MM (t) MS (c)     Benefits Reviewed patient:;improve function;increase balance  -MS (r) MM (t) MS (c)     Barriers to Rehab physical barrier;cognitive status  -MS (r) MM (t) MS (c)     Living Environment    Lives With  facility resident;spouse  -    Living Arrangements  house;extended care facility  -    Home Accessibility  no concerns  -    Stair Railings at Home  inside, present at both sides;outside, present at both sides  -    Type of Financial/Environmental Concern  none  -SM    Transportation Available  car;family or friend will provide  -    Clinical Impression    Date of Referral to PT 01/15/18  -MS (r) MM (t) MS (c)     PT Diagnosis impaired moblity   -MS (r) MM (t) MS (c)     Prognosis good  -MS (r) MM (t) MS (c)     Patient/Family Goals Statement Pt. wants to return home   -MS (r) MM (t) MS (c)     Criteria for Skilled Therapeutic Interventions Met yes;treatment indicated  -MS (r) MM (t) MS (c)     Pathology/Pathophysiology Noted (Describe Specifically for Each System) musculoskeletal  -MS (r) MM (t) MS (c)     Impairments Found (describe specific impairments) gait, locomotion, and balance  -MS (r) MM (t) MS (c)      Functional Limitations in Following Categories (Describe Specific Limitations) self-care;community/leisure;home management  -MS (r) MM (t) MS (c)     Risk Reduction/Prevention (Describe Specific Areas of risk reduction/prevention) risk factors  -MS (r) MM (t) MS (c)     Rehab Potential good, to achieve stated therapy goals  -MS (r) MM (t) MS (c)     Predicted Duration of Therapy Intervention (days/wks) until discharge  -MS (r) MM (t) MS (c)     Pain Assessment    Pain Assessment 0-10  -MS (r) MM (t) MS (c)     Pain Score 4  -MS (r) MM (t) MS (c)     Pain Type Acute pain  -MS (r) MM (t) MS (c)     Pain Location Head  -MS (r) MM (t) MS (c)     Pain Orientation Anterior  -MS (r) MM (t) MS (c)     Pain Descriptors Dull;Throbbing  -MS (r) MM (t) MS (c)     Pain Frequency Constant/continuous  -MS (r) MM (t) MS (c)     Pain Onset Gradual  -MS (r) MM (t) MS (c)     Clinical Progression Gradually improving  -MS (r) MM (t) MS (c)     Pain Intervention(s) Repositioned;Ambulation/increased activity  -MS (r) MM (t) MS (c)     Response to Interventions no change  -MS (r) MM (t) MS (c)     Cognitive Assessment/Intervention    Current Cognitive/Communication Assessment functional  -MS (r) MM (t) MS (c)     Orientation Status oriented x 4  -MS (r) MM (t) MS (c)     Follows Commands/Answers Questions needs cueing;75% of the time  -MS (r) MM (t) MS (c)     Personal Safety moderate impairment;impulsive  -MS (r) MM (t) MS (c)     Personal Safety Interventions gait belt;muscle strengthening facilitated;nonskid shoes/slippers when out of bed  -MS (r) MM (t) MS (c)     Short/Long Term Memory intact long term memory;intact short term memory  -MS (r) MM (t) MS (c)     ROM (Range of Motion)    General ROM no range of motion deficits identified  -MS (r) MM (t) MS (c)     General ROM Detail WFL  -MS (r) MM (t) MS (c)     MMT (Manual Muscle Testing)    General MMT Assessment Detail BLE hip flexion 4/5, BLE knee ext. 4/5  -MS (r) MM (t) MS (c)      Bed Mobility, Assessment/Treatment    Bed Mobility, Roll Left, [place] supervision required  -MS (r) MM (t) MS (c)     Bed Mob, Supine to Sit, [place] supervision required  -MS (r) MM (t) MS (c)     Bed Mob, Sit to Supine, [place] supervision required  -MS (r) MM (t) MS (c)     Bed Mobility, Comment supervision due to impulsivity  -MS (r) MM (t) MS (c)     Transfer Assessment/Treatment    Transfers, Bed-Chair [place] supervision required;verbal cues required  -MS (r) MM (t) MS (c)     Transfers, Sit-Stand [place] supervision required  -MS (r) MM (t) MS (c)     Transfer, Safety Issues impulsivity  -MS (r) MM (t) MS (c)     Transfer, Comment Pt. was implusive and getting up out of bed without recognition of safety.   -MS (r) MM (t) MS (c)     Gait Assessment/Treatment    Gait, [place] Level contact guard assist  -MS (r) MM (t) MS (c)     Gait, Distance (Feet) 185  -MS (r) MM (t) MS (c)     Gait, Safety Issues step length decreased  -MS (r) MM (t) MS (c)     Gait, Comment Pt. is distracted when walking and will turn if see something she likes.   -MS (r) MM (t) MS (c)     Motor Skills/Interventions    Additional Documentation Balance Skills Training (Group)  -MS (r) MM (t) MS (c)     Balance Skills Training    Standing Balance # of Minutes standing eyes open feet apart posterior lean, standing eyes closed feet apart backward lean, standing tandem step deviation to left.   -MS (r) MM (t) MS (c)     Gait Balance-Level of Assistance Contact guard  -MS (r) MM (t) MS (c)     Gait Balance Activities backwards;side-stepping  -MS (r) MM (t) MS (c)     Sensory Treatment    Sensory Treatment Comment intact  -MS (r) MM (t) MS (c)     General Interventions    Balance Training Increase awareness of safety   -MS (r) MM (t) MS (c)     Gait Training increase awareness for safety  -MS (r) MM (t) MS (c)     Positioning and Restraints    Pre-Treatment Position in bed  -MS (r) MM (t) MS (c)     Post  Treatment Position chair  -MS (r) MM (t) MS (c)     In Chair sitting;with OT  -MS (r) MM (t) MS (c)       01/15/18 2918       General Information    Equipment Currently Used at Home walker, standard  -SM       User Key  (r) = Recorded By, (t) = Taken By, (c) = Cosigned By    Initials Name Provider Type    CH Nidia Matson, OTR/L Occupational Therapist    MS Glenny Donahue, PT DPT Physical Therapist     Rach Kruse, RN Registered Nurse    BISI Ochoa, BSW     MM Allison Solo, PT Student PT Student          Physical Therapy Education     Title: PT OT SLP Therapies (Active)     Topic: Physical Therapy (Active)     Point: Mobility training (Done)    Learning Progress Summary    Learner Readiness Method Response Comment Documented by Status   Patient Eager E RAZA Pt. able to perform bed moblity and transfers independently although requires close supervision due to impulsivity. She is able to amubulate with CGA and aknowledges needs for safety.  01/16/18 0843 Done                      User Key     Initials Effective Dates Name Provider Type Discipline     01/10/18 -  Allison Solo, PT Student PT Student PT                PT Recommendation and Plan  Anticipated Discharge Disposition: home with 24/7 care  Planned Therapy Interventions: balance training, gait training, patient/family education, other (see comments) (safety to be addressed)  PT Frequency: 2 times/day  Plan of Care Review  Plan Of Care Reviewed With: patient  Outcome Summary/Follow up Plan: PT evaluation complete. Pt. was able to complete bed mobilty and transfers with increased supervision due to impulsitivy. She is able to ambulate to BS and approx. 185 ft with CGA  for increased safety awareness and function. Pt. would benefit from skill therapy to increase awareness to surrondings and safety during functional activities. PT expects pt. to discharge to home with 24/7 assistance due to impulsivity.,           IP PT Goals        01/16/18 1112 01/16/18 0844       Transfer Training PT LTG    Transfer Training PT LTG, Time to Achieve  by discharge  -MS (r) MM (t) MS (c)     Transfer Training PT LTG, [place] Level  independent  -MS (r) MM (t) MS (c)     Transfer Training PT LTG, Additional Goal doesn't demostrate any safety issues   -MS (r) MM (t) MS (c) increased awareness to safety  -MS (r) MM (t) MS (c)     Gait Training PT LTG    Gait Training Goal PT LTG, Date Established  01/16/18  -MS (r) MM (t) MS (c)     Gait Training Goal PT LTG, Time to Achieve  by discharge  -MS (r) MM (t) MS (c)     Gait Training Goal PT LTG, [place] Level  supervision required  -MS (r) MM (t) MS (c)     Gait Training Goal PT LTG, Distance to Achieve 300 ft with no safety issues   -MS (r) MM (t) MS (c) 300 ft, with increased awareness to safety  -MS (r) MM (t) MS (c)     Dynamic Standing Balance PT LTG    Dynamic Standing Balance PT LTG, Date Established  01/16/18  -MS (r) MM (t) MS (c)     Dynamic Standing Balance PT LTG, Time to Achieve  by discharge  -MS (r) MM (t) MS (c)     Dynamic Standing Balance PT LTG, [place] Level  conditional independence  -MS (r) MM (t) MS (c)     Dynamic Standing Balance PT LTG, Assist Device  other (comments)  -MS (r) MM (t) MS (c)     Dynamic Standing Balance PT LTG, Additional Goal 1 UE supported   -MS (r) MM (t) MS (c) hand hold with increased knowledge to safety  -MS (r) MM (t) MS (c)       User Key  (r) = Recorded By, (t) = Taken By, (c) = Cosigned By    Initials Name Provider Type    MS Glenny Donahue, PT DPT Physical Therapist    ROYA Solo, PT Student PT Student                Outcome Measures       01/16/18 0800 01/16/18 7237       How much help from another person do you currently need...    Turning from your back to your side while in flat bed without using bedrails? 4  -MS (r) MM (t) MS (c)      Moving from lying on back to sitting on the side of a flat bed without bedrails? 4  -MS (r) MM (t) MS (c)       Moving to and from a bed to a chair (including a wheelchair)? 3  -MS (r) MM (t) MS (c)      Standing up from a chair using your arms (e.g., wheelchair, bedside chair)? 3  -MS (r) MM (t) MS (c)      Climbing 3-5 steps with a railing? 3  -MS (r) MM (t) MS (c)      To walk in hospital room? 3  -MS (r) MM (t) MS (c)      AM-PAC 6 Clicks Score 20  -MS (r) MM (t)      How much help from another is currently needed...    Putting on and taking off regular lower body clothing?  4  -CH     Bathing (including washing, rinsing, and drying)  3  -CH     Toileting (which includes using toilet bed pan or urinal)  3  -CH     Putting on and taking off regular upper body clothing  4  -CH     Taking care of personal grooming (such as brushing teeth)  4  -CH     Eating meals  4  -CH     Score  22  -CH     Functional Assessment    Outcome Measure Options AM-PAC 6 Clicks Basic Mobility (PT)  -MS (r) MM (t) MS (c) AM-PAC 6 Clicks Daily Activity (OT)  -CH       User Key  (r) = Recorded By, (t) = Taken By, (c) = Cosigned By    Initials Name Provider Type     Nidia Matson, OTR/L Occupational Therapist    MS Glenny Donahue, PT DPT Physical Therapist    ROYA Solo, PT Student PT Student           Time Calculation:         PT Charges       01/16/18 0841          Time Calculation    Start Time 0726  -MS (r) MM (t) MS (c)      Stop Time 0807  -MS (r) MM (t) MS (c)      Time Calculation (min) 41 min  -MS (r) MM (t)      PT Received On 01/16/18  -MS (r) MM (t) MS (c)      PT Goal Re-Cert Due Date 01/26/18  -MS (r) MM (t) MS (c)        User Key  (r) = Recorded By, (t) = Taken By, (c) = Cosigned By    Initials Name Provider Type    MS Glenny Donahue, PT DPT Physical Therapist    ROYA Solo, PT Student PT Student          Therapy Charges for Today     Code Description Service Date Service Provider Modifiers Qty    [id number] HC PT EVAL LOW COMPLEXITY 3 1/16/2018 Allison Solo, PT Student GP, KX 1          PT G-Mariela  PT  Professional Judgement Used?: Yes (Needs assistance for safety due to impulsivity)  Outcome Measure Options: AM-PAC 6 Clicks Basic Mobility (PT)  Score: 20  Functional Limitation: Mobility: Walking and moving around  Mobility: Walking and Moving Around Current Status (): At least 20 percent but less than 40 percent impaired, limited or restricted  Mobility: Walking and Moving Around Goal Status (): At least 20 percent but less than 40 percent impaired, limited or restricted      Allison Solo, PT Student  1/16/2018

## 2018-01-17 PROCEDURE — 97110 THERAPEUTIC EXERCISES: CPT

## 2018-01-17 PROCEDURE — 99024 POSTOP FOLLOW-UP VISIT: CPT | Performed by: NURSE PRACTITIONER

## 2018-01-17 PROCEDURE — 25010000002 DEXAMETHASONE PER 1 MG: Performed by: NURSE PRACTITIONER

## 2018-01-17 PROCEDURE — 97535 SELF CARE MNGMENT TRAINING: CPT

## 2018-01-17 PROCEDURE — 97116 GAIT TRAINING THERAPY: CPT

## 2018-01-17 RX ORDER — LEVETIRACETAM 500 MG/1
500 TABLET ORAL 2 TIMES DAILY
Status: DISCONTINUED | OUTPATIENT
Start: 2018-01-17 | End: 2018-01-18 | Stop reason: HOSPADM

## 2018-01-17 RX ORDER — DEXAMETHASONE SODIUM PHOSPHATE 4 MG/ML
4 INJECTION, SOLUTION INTRA-ARTICULAR; INTRALESIONAL; INTRAMUSCULAR; INTRAVENOUS; SOFT TISSUE EVERY 8 HOURS
Status: DISCONTINUED | OUTPATIENT
Start: 2018-01-17 | End: 2018-01-18 | Stop reason: HOSPADM

## 2018-01-17 RX ADMIN — ATORVASTATIN CALCIUM 20 MG: 10 TABLET, FILM COATED ORAL at 21:36

## 2018-01-17 RX ADMIN — HYDROCODONE BITARTRATE AND ACETAMINOPHEN 1 TABLET: 7.5; 325 TABLET ORAL at 13:01

## 2018-01-17 RX ADMIN — DEXAMETHASONE SODIUM PHOSPHATE 4 MG: 4 INJECTION, SOLUTION INTRA-ARTICULAR; INTRALESIONAL; INTRAMUSCULAR; INTRAVENOUS; SOFT TISSUE at 15:59

## 2018-01-17 RX ADMIN — MONTELUKAST SODIUM 10 MG: 10 TABLET, FILM COATED ORAL at 21:36

## 2018-01-17 RX ADMIN — DEXAMETHASONE SODIUM PHOSPHATE 4 MG: 4 INJECTION, SOLUTION INTRAMUSCULAR; INTRAVENOUS at 07:13

## 2018-01-17 RX ADMIN — DEXAMETHASONE SODIUM PHOSPHATE 4 MG: 4 INJECTION, SOLUTION INTRAMUSCULAR; INTRAVENOUS at 00:21

## 2018-01-17 RX ADMIN — LEVETIRACETAM 500 MG: 500 TABLET, FILM COATED ORAL at 21:36

## 2018-01-17 RX ADMIN — ALPRAZOLAM 0.25 MG: 0.25 TABLET ORAL at 09:08

## 2018-01-17 RX ADMIN — ALPRAZOLAM 0.25 MG: 0.25 TABLET ORAL at 21:36

## 2018-01-17 RX ADMIN — CETIRIZINE HYDROCHLORIDE 10 MG: 10 TABLET, FILM COATED ORAL at 09:08

## 2018-01-17 RX ADMIN — DIGOXIN 250 MCG: 0.25 TABLET ORAL at 12:59

## 2018-01-17 RX ADMIN — FLUTICASONE PROPIONATE 2 SPRAY: 50 SPRAY, METERED NASAL at 09:08

## 2018-01-17 RX ADMIN — PANTOPRAZOLE SODIUM 40 MG: 40 TABLET, DELAYED RELEASE ORAL at 09:08

## 2018-01-17 RX ADMIN — HYDROCODONE BITARTRATE AND ACETAMINOPHEN 1 TABLET: 7.5; 325 TABLET ORAL at 21:36

## 2018-01-17 RX ADMIN — LEVETIRACETAM 500 MG: 500 TABLET, FILM COATED ORAL at 09:12

## 2018-01-17 RX ADMIN — SERTRALINE 200 MG: 100 TABLET, FILM COATED ORAL at 09:08

## 2018-01-17 NOTE — PLAN OF CARE
Problem: Patient Care Overview (Adult)  Goal: Plan of Care Review  Outcome: Ongoing (interventions implemented as appropriate)   01/17/18 1113   Coping/Psychosocial Response Interventions   Plan Of Care Reviewed With patient   Patient Care Overview   Progress progress toward functional goals as expected   Outcome Evaluation   Outcome Summary/Follow up Plan PT tx completed. Reports her head is throbbing. I bed mobility, transfers, and 300' of walking. No loss of balance during gait. Performed high level balance ex's without difficulty.

## 2018-01-17 NOTE — THERAPY TREATMENT NOTE
Acute Care - Physical Therapy Treatment Note  [facility]     Patient Name: Marcy Garcia  : 1950  MRN: [id number]  Today's Date: 2018  Onset of Illness/Injury or Date of Surgery Date: 01/15/18  Date of Referral to PT: 01/15/18  Referring Physician: Dr. Schmidt    Admit Date: 1/15/2018    Visit Dx:    ICD-10-CM ICD-9-CM   1. Brain tumor D49.6 239.6   2. Decreased activities of daily living (ADL) Z78.9 V49.89   3. Impaired mobility Z74.09 799.89     Patient Active Problem List   Diagnosis   • Normal body mass index (BMI)   • Former smoker   • Neoplasm of uncertain behavior of brain   • Brain tumor               Adult Rehabilitation Note       18 1113 18 0938       Rehab Assessment/Intervention    Discipline physical therapy assistant  -KJ occupational therapy assistant  -TS     Document Type therapy note (daily note)  -KJ therapy note (daily note)  -TS     Subjective Information agree to therapy  -KJ      Patient Effort, Rehab Treatment good  -KJ      Precautions/Limitations fall precautions  -KJ      Recorded by [KJ] Geneva Leary PTA [TS] uSe Conti, ALEJANDRO/L     Pain Assessment    Pain Assessment 0-10  -KJ      Pain Score 6  -KJ      Post Pain Score 6  -KJ      Pain Type Acute pain  -KJ      Pain Location Head  -KJ      Pain Descriptors Throbbing  -KJ      Pain Frequency Constant/continuous  -KJ      Pain Intervention(s) Repositioned  -KJ      Recorded by [KJ] Geneva Leary PTA      Bed Mobility, Assessment/Treatment    Bed Mob, Supine to Sit, [place] independent  -KJ      Bed Mob, Sit to Supine, [place] independent  -KJ      Recorded by [KJ] Geneva Leary PTA      Transfer Assessment/Treatment    Transfers, Sit-Stand [place] conditional independence  -KJ      Transfers, Stand-Sit [place] conditional independence  -KJ      Toilet Transfer, [place] independent  -KJ      Transfer, Comment no loss of balance with transfers  -KJ      Recorded by [KJ] Geneva  SAJAN Leary PTA      Gait Assessment/Treatment    Gait, [place] Level independent  -KJ      Gait, Distance (Feet) 300  -KJ      Gait, Comment no loss of balance with gait  -KJ      Recorded by [KJ] Geneva Leary PTA      Balance Skills Training    Gait Balance Activities tandem;side-stepping;scanning environment R/L;backwards;braiding / front  -KJ      Recorded by [KJ] Geneva Leary PTA      Positioning and Restraints    Pre-Treatment Position in bed  -KJ      Post Treatment Position bathroom  -KJ      Recorded by [KJ] Geneva Leary PTA        User Key  (r) = Recorded By, (t) = Taken By, (c) = Cosigned By    Initials Name Effective Dates    KJ Geneva Leary PTA 08/02/16 -     TS ELIANA Moody 08/02/16 -                 IP PT Goals       01/16/18 1112 01/16/18 0844       Transfer Training PT LTG    Transfer Training PT LTG, Time to Achieve  by discharge  -MS (r) MM (t) MS (c)     Transfer Training PT LTG, [place] Level  independent  -MS (r) MM (t) MS (c)     Transfer Training PT LTG, Additional Goal doesn't demostrate any safety issues   -MS (r) MM (t) MS (c) increased awareness to safety  -MS (r) MM (t) MS (c)     Gait Training PT LTG    Gait Training Goal PT LTG, Date Established  01/16/18  -MS (r) MM (t) MS (c)     Gait Training Goal PT LTG, Time to Achieve  by discharge  -MS (r) MM (t) MS (c)     Gait Training Goal PT LTG, [place] Level  supervision required  -MS (r) MM (t) MS (c)     Gait Training Goal PT LTG, Distance to Achieve 300 ft with no safety issues   -MS (r) MM (t) MS (c) 300 ft, with increased awareness to safety  -MS (r) MM (t) MS (c)     Dynamic Standing Balance PT LTG    Dynamic Standing Balance PT LTG, Date Established  01/16/18  -MS (r) MM (t) MS (c)     Dynamic Standing Balance PT LTG, Time to Achieve  by discharge  -MS (r) MM (t) MS (c)     Dynamic Standing Balance PT LTG, [place] Level  conditional independence  -MS (r) MM (t) MS (c)     Dynamic Standing  Balance PT LTG, Assist Device  other (comments)  -MS (r) MM (t) MS (c)     Dynamic Standing Balance PT LTG, Additional Goal 1 UE supported   -MS (r) MM (t) MS (c) hand hold with increased knowledge to safety  -MS (r) MM (t) MS (c)       User Key  (r) = Recorded By, (t) = Taken By, (c) = Cosigned By    Initials Name Provider Type    MS Glenny NABOR Donahue, PT DPT Physical Therapist    MM Allison Solo, PT Student PT Student          Physical Therapy Education     Title: PT OT SLP Therapies (Active)     Topic: Physical Therapy (Active)     Point: Mobility training (Active)    Learning Progress Summary    Learner Readiness Method Response Comment Documented by Status   Patient Acceptance E NR recognizing safety awareness and taking her time with activities  01/17/18 1132 Active    Eager E VU Pt. able to perform bed moblity and transfers independently although requires close supervision due to impulsivity. She is able to amubulate with CGA and aknowledges needs for safety.  01/16/18 0843 Done                      User Key     Initials Effective Dates Name Provider Type Discipline     08/02/16 -  Geneva Leary, PTA Physical Therapy Assistant PT     01/10/18 -  Allison Solo, PT Student PT Student PT                    PT Recommendation and Plan  Anticipated Discharge Disposition: home with 24/7 care  Planned Therapy Interventions: balance training, gait training, patient/family education, other (see comments) (safety to be addressed)  PT Frequency: 2 times/day  Plan of Care Review  Plan Of Care Reviewed With: patient  Progress: progress toward functional goals as expected  Outcome Summary/Follow up Plan: PT tx completed. Reports her head is throbbing. I bed mobility, transfers, and 300' of walking. No loss of balance during gait. Performed high level balance ex's without difficulty.          Outcome Measures       01/16/18 0800 01/16/18 0757       How much help from another person do you currently need...    Turning  from your back to your side while in flat bed without using bedrails? 4  -MS (r) MM (t) MS (c)      Moving from lying on back to sitting on the side of a flat bed without bedrails? 4  -MS (r) MM (t) MS (c)      Moving to and from a bed to a chair (including a wheelchair)? 3  -MS (r) MM (t) MS (c)      Standing up from a chair using your arms (e.g., wheelchair, bedside chair)? 3  -MS (r) MM (t) MS (c)      Climbing 3-5 steps with a railing? 3  -MS (r) MM (t) MS (c)      To walk in hospital room? 3  -MS (r) MM (t) MS (c)      AM-PAC 6 Clicks Score 20  -MS (r) MM (t)      How much help from another is currently needed...    Putting on and taking off regular lower body clothing?  4  -CH     Bathing (including washing, rinsing, and drying)  3  -CH     Toileting (which includes using toilet bed pan or urinal)  3  -CH     Putting on and taking off regular upper body clothing  4  -CH     Taking care of personal grooming (such as brushing teeth)  4  -CH     Eating meals  4  -CH     Score  22  -CH     Functional Assessment    Outcome Measure Options AM-PAC 6 Clicks Basic Mobility (PT)  -MS (r) MM (t) MS (c) AM-PAC 6 Clicks Daily Activity (OT)  -CH       User Key  (r) = Recorded By, (t) = Taken By, (c) = Cosigned By    Initials Name Provider Type     Nidia Matson, OTR/L Occupational Therapist    MS Glenny Donahue, PT DPT Physical Therapist    MM Allison Solo, PT Student PT Student           Time Calculation:           PT G-Codes  PT Professional Judgement Used?: Yes (Needs assistance for safety due to impulsivity)  Outcome Measure Options: AM-PAC 6 Clicks Basic Mobility (PT)  Score: 20  Functional Limitation: Mobility: Walking and moving around  Mobility: Walking and Moving Around Current Status (): At least 20 percent but less than 40 percent impaired, limited or restricted  Mobility: Walking and Moving Around Goal Status (): At least 20 percent but less than 40 percent impaired, limited or restricted    Geneva  BRET Leary, PTA  1/17/2018

## 2018-01-17 NOTE — PLAN OF CARE
Problem: Patient Care Overview (Adult)  Goal: Plan of Care Review  Outcome: Ongoing (interventions implemented as appropriate)   01/17/18 6793   Coping/Psychosocial Response Interventions   Plan Of Care Reviewed With patient;caregiver   Patient Care Overview   Progress improving   Outcome Evaluation   Outcome Summary/Follow up Plan PO PAIN MEDS X 1, ANXIOLYTICS AS ORDERED ALSO. UP AD ALBINO AROUND ROOM, INCISION ADY, TO SUPERIOR TOMORROW.      Goal: Adult Individualization and Mutuality  Outcome: Ongoing (interventions implemented as appropriate)    Goal: Discharge Needs Assessment  Outcome: Ongoing (interventions implemented as appropriate)      Problem: Fall Risk (Adult)  Goal: Absence of Falls  Outcome: Ongoing (interventions implemented as appropriate)      Problem: Craniotomy/Craniectomy/Cranioplasty (Adult)  Goal: Signs and Symptoms of Listed Potential Problems Will be Absent or Manageable (Craniotomy/Craniectomy/Cranioplasty)  Outcome: Ongoing (interventions implemented as appropriate)

## 2018-01-17 NOTE — THERAPY TREATMENT NOTE
Acute Care - Occupational Therapy Treatment Note  [facility]     Patient Name: Marcy Garcia  : 1950  MRN: [id number]  Today's Date: 2018  Onset of Illness/Injury or Date of Surgery Date: 01/15/18  Date of Referral to OT: 01/15/18  Referring Physician: Dr. Schmidt      Admit Date: 1/15/2018    Visit Dx:     ICD-10-CM ICD-9-CM   1. Brain tumor D49.6 239.6   2. Decreased activities of daily living (ADL) Z78.9 V49.89   3. Impaired mobility Z74.09 799.89     Patient Active Problem List   Diagnosis   • Normal body mass index (BMI)   • Former smoker   • Neoplasm of uncertain behavior of brain   • Brain tumor             Adult Rehabilitation Note       18 1113 18 0938       Rehab Assessment/Intervention    Discipline physical therapy assistant  -KJ occupational therapy assistant  -TS     Document Type therapy note (daily note)  -KJ therapy note (daily note)  -TS     Subjective Information agree to therapy  -KJ agree to therapy;no complaints  -TS     Patient Effort, Rehab Treatment good  -KJ good  -TS     Precautions/Limitations fall precautions  -KJ fall precautions  -TS     Recorded by [KJ] Geneva Leary PTA [TS] FLAVIA Moody/L     Pain Assessment    Pain Assessment 0-10  -KJ No/denies pain  -TS     Pain Score 6  -KJ      Post Pain Score 6  -KJ      Pain Type Acute pain  -KJ      Pain Location Head  -KJ      Pain Descriptors Throbbing  -KJ      Pain Frequency Constant/continuous  -KJ      Pain Intervention(s) Repositioned  -KJ      Recorded by [KJ] Geneva Leary PTA [TS] FLAVIA Moody/L     Cognitive Assessment/Intervention    Personal Safety  impulsive  -TS     Personal Safety Interventions  fall prevention program maintained;nonskid shoes/slippers when out of bed  -TS     Recorded by  [TS] FLAVIA Moody/L     Bed Mobility, Assessment/Treatment    Bed Mob, Supine to Sit, [place] independent  -KJ      Bed Mob, Sit to Supine, [place] independent   -KJ      Recorded by [KJ] Geneva Leary PTA      Transfer Assessment/Treatment    Transfers, Sit-Stand [place] conditional independence  -KJ independent  -TS     Transfers, Stand-Sit [place] conditional independence  -KJ independent  -TS     Toilet Transfer, [place] independent  -KJ independent  -TS     Transfer, Comment no loss of balance with transfers  -KJ      Recorded by [KJ] Geneva Leary PTA [TS] TANMAY MoodyA/L     Gait Assessment/Treatment    Gait, [place] Level independent  -KJ      Gait, Distance (Feet) 300  -KJ      Gait, Comment no loss of balance with gait  -KJ      Recorded by [KJ] Geneva Leary PTA      Toileting Assessment/Training    Toileting Assess/Train, Comment  pt was able to complete cleaning and emptying of colostomy bag  -TS     Recorded by  [TS] TANMAY MoodyA/L     Balance Skills Training    Gait Balance Activities tandem;side-stepping;scanning environment R/L;backwards;braiding / front  -KJ      Recorded by [KJ] Geneva Leary PTA      Positioning and Restraints    Pre-Treatment Position in bed  -KJ sitting in chair/recliner  -TS     Post Treatment Position bathroom  -KJ chair  -TS     In Chair  sitting;call light within reach;encouraged to call for assist;with family/caregiver  -TS     Recorded by [KJ] Geneva Leary PTA [TS] TANMAY MoodyA/L       User Key  (r) = Recorded By, (t) = Taken By, (c) = Cosigned By    Initials Name Effective Dates    KJ Geneva Leray PTA 08/02/16 -     TS ELIANA Moody 08/02/16 -                 OT Goals       01/16/18 0757          Patient Education OT LTG    Patient Education OT LTG, Date Established 01/16/18  -CH      Patient Education OT LTG, Time to Achieve by discharge  -CH      Patient Education OT LTG, Education Type home safety  -CH      Patient Education OT LTG, Education Understanding demonstrates adequately  -CH      ADL OT LTG    ADL OT LTG, Date Established 01/16/18   -      ADL OT LTG, Activity Type ADL skills  -      ADL OT LTG, [place] Level modified independent  -        User Key  (r) = Recorded By, (t) = Taken By, (c) = Cosigned By    Initials Name Provider Type     Nidia Matson, OTR/L Occupational Therapist          Occupational Therapy Education     Title: PT OT SLP Therapies (Active)     Topic: Occupational Therapy (Done)     Point: ADL training (Done)    Description: Instruct learner(s) on proper safety adaptation and remediation techniques during self care or transfers.   Instruct in proper use of assistive devices.    Learning Progress Summary    Learner Readiness Method Response Comment Documented by Status   Patient Acceptance E,D VU,NR transfers, progression of POC, home safety TS 01/17/18 1322 Done    Eager SHE PERSON Pt. able to perform bed moblity and transfers independently although requires close supervision due to impulsivity. She is able to amubulate with CGA and aknowledges needs for safety.  01/16/18 0843 Done    Acceptance E,D VU,NR purpose & benefits of OT eval, OT tx, activity; safety during mobility & ADLs  01/16/18 0839 Done               Point: Home exercise program (Done)    Description: Instruct learner(s) on appropriate technique for monitoring, assisting and/or progressing therapeutic exercises/activities.    Learning Progress Summary    Learner Readiness Method Response Comment Documented by Status   Patient Eager E VU Pt. able to perform bed moblity and transfers independently although requires close supervision due to impulsivity. She is able to amubulate with CGA and aknowledges needs for safety.  01/16/18 0843 Done               Point: Precautions (Done)    Description: Instruct learner(s) on prescribed precautions during self-care and functional transfers.    Learning Progress Summary    Learner Readiness Method Response Comment Documented by Status   Patient Eager E VU Pt. able to perform bed moblity and transfers  independently although requires close supervision due to impulsivity. She is able to amubulate with CGA and aknowledges needs for safety.  01/16/18 0843 Done               Point: Body mechanics (Done)    Description: Instruct learner(s) on proper positioning and spine alignment during self-care, functional mobility activities and/or exercises.    Learning Progress Summary    Learner Readiness Method Response Comment Documented by Status   Patient Veronica PERSON Pt. able to perform bed moblity and transfers independently although requires close supervision due to impulsivity. She is able to amubulate with CGA and aknowledges needs for safety.  01/16/18 0843 Done                      User Key     Initials Effective Dates Name Provider Type Discipline     08/02/16 -  Nidia Matson OTR/L Occupational Therapist OT    TS 08/02/16 -  FLAVIA Moody/L Occupational Therapy Assistant OT     01/10/18 -  Allison Solo PT Student PT Student PT                  OT Recommendation and Plan  Planned Therapy Interventions: activity intolerance, ADL retraining, balance training, bed mobility training, stretching, transfer training, home exercise program  Therapy Frequency: 3-5 times/wk  Plan of Care Review  Plan Of Care Reviewed With: patient  Progress: improving  Outcome Summary/Follow up Plan: Pt demonstrates impulsivity and some decreased safety. Pt demonstrates independence with transfers and ambulation in room. Pt was able to complete colonostomy care independently while retrieving needed supplies from bags in room. Pt would benefit from 24/7 supervision at home, excluding her  who is dependent on her for his care. Continue OT POC         Outcome Measures       01/17/18 1300 01/16/18 0800 01/16/18 5097    How much help from another person do you currently need...    Turning from your back to your side while in flat bed without using bedrails?  4  -MS (r) MM (t) MS (c)     Moving from lying on back to  sitting on the side of a flat bed without bedrails?  4  -MS (r) MM (t) MS (c)     Moving to and from a bed to a chair (including a wheelchair)?  3  -MS (r) MM (t) MS (c)     Standing up from a chair using your arms (e.g., wheelchair, bedside chair)?  3  -MS (r) MM (t) MS (c)     Climbing 3-5 steps with a railing?  3  -MS (r) MM (t) MS (c)     To walk in hospital room?  3  -MS (r) MM (t) MS (c)     AM-PAC 6 Clicks Score  20  -MS (r) MM (t)     How much help from another is currently needed...    Putting on and taking off regular lower body clothing? 4  -TS  4  -CH    Bathing (including washing, rinsing, and drying) 4  -TS  3  -CH    Toileting (which includes using toilet bed pan or urinal) 4  -TS  3  -CH    Putting on and taking off regular upper body clothing 4  -TS  4  -CH    Taking care of personal grooming (such as brushing teeth) 4  -TS  4  -CH    Eating meals 4  -TS  4  -CH    Score 24  -TS  22  -CH    Functional Assessment    Outcome Measure Options AM-PAC 6 Clicks Daily Activity (OT)  -TS AM-PAC 6 Clicks Basic Mobility (PT)  -MS (r) MM (t) MS (c) AM-PAC 6 Clicks Daily Activity (OT)  -CH      User Key  (r) = Recorded By, (t) = Taken By, (c) = Cosigned By    Initials Name Provider Type     GIANNA Lopez/L Occupational Therapist     FLAVIA Moody/L Occupational Therapy Assistant    MS Glenny Donahue, PT DPT Physical Therapist    MM Allison Solo, PT Student PT Student           Time Calculation:         Time Calculation- OT       01/17/18 1326          Time Calculation- OT    OT Start Time 0938  -TS      OT Stop Time 1020  -TS      OT Time Calculation (min) 42 min  -TS      Total Timed Code Minutes- OT 42 minute(s)  -TS      OT Received On 01/17/18  -TS        User Key  (r) = Recorded By, (t) = Taken By, (c) = Cosigned By    Initials Name Provider Type     FLAVIA Moody/L Occupational Therapy Assistant           Therapy Charges for Today     Code Description Service Date  Service Provider Modifiers Qty    [id number] HC OT SELF CARE/MGMT/TRAIN EA 15 MIN 1/17/2018 FLAVIA Moody/L GO, KX 3          OT G-codes  OT Professional Judgement Used?: Yes  OT Functional Scales Options: AM-PAC 6 Clicks Daily Activity (OT)  Score: 22  Functional Limitation: Self care  Self Care Current Status (): At least 20 percent but less than 40 percent impaired, limited or restricted  Self Care Goal Status (): At least 1 percent but less than 20 percent impaired, limited or restricted    ELIANA Nash  1/17/2018

## 2018-01-17 NOTE — PROGRESS NOTES
Continued Stay Note  [facility]     Patient Name: Marcy Garcia  MRN: [id number]  Today's Date: 1/17/2018    Admit Date: 1/15/2018          Discharge Plan       01/17/18 0839    Case Management/Social Work Plan    Plan [place]    Additional Comments LULÚ spoke to Dior in admissions at [place]. Dior confirmed patient can return when ready for discharge. LULÚ will follow for return to [place].               Discharge Codes     None            CLAUDINE Donald

## 2018-01-17 NOTE — PLAN OF CARE
Problem: Patient Care Overview (Adult)  Goal: Plan of Care Review  Outcome: Ongoing (interventions implemented as appropriate)   01/16/18 1500   Coping/Psychosocial Response Interventions   Plan Of Care Reviewed With patient   Patient Care Overview   Progress no change   Outcome Evaluation   Outcome Summary/Follow up Plan Pt to room 352 from ICU. Pt A&Ox4, pt very anxious at this time. Pt upset that family did not bring ostomy supplies from home. Hosp does not carry the same bag and supplies as home use. Pt kerlix in place to head. Pt c/o head pain 3/10. Pt med as ordered. Pt ambulating with stand by assist to BR.       Problem: Perioperative Period (Adult)  Goal: Signs and Symptoms of Listed Potential Problems Will be Absent or Manageable (Perioperative Period)  Outcome: Ongoing (interventions implemented as appropriate)      Problem: Skin Integrity Impairment, Risk/Actual (Adult)  Goal: Identify Related Risk Factors and Signs and Symptoms  Outcome: Outcome(s) achieved Date Met: 01/16/18    Goal: Skin Integrity/Wound Healing  Outcome: Ongoing (interventions implemented as appropriate)      Problem: Fall Risk (Adult)  Goal: Identify Related Risk Factors and Signs and Symptoms  Outcome: Outcome(s) achieved Date Met: 01/16/18    Goal: Absence of Falls  Outcome: Ongoing (interventions implemented as appropriate)      Problem: Craniotomy/Craniectomy/Cranioplasty (Adult)  Goal: Signs and Symptoms of Listed Potential Problems Will be Absent or Manageable (Craniotomy/Craniectomy/Cranioplasty)  Outcome: Ongoing (interventions implemented as appropriate)

## 2018-01-17 NOTE — PLAN OF CARE
Problem: Patient Care Overview (Adult)  Goal: Plan of Care Review  Outcome: Ongoing (interventions implemented as appropriate)   01/17/18 0430   Coping/Psychosocial Response Interventions   Plan Of Care Reviewed With patient   Patient Care Overview   Progress no change   Outcome Evaluation   Outcome Summary/Follow up Plan Pt anxious and restless at times. CO pain, prn dilaudid at HS, pt rested well. A&O, no neuro changes. Pt changes own colostomy. Sitter at bedside, assists to BR. Free from falls. Drsg CDI.        Problem: Fall Risk (Adult)  Goal: Absence of Falls  Outcome: Ongoing (interventions implemented as appropriate)      Problem: Craniotomy/Craniectomy/Cranioplasty (Adult)  Goal: Signs and Symptoms of Listed Potential Problems Will be Absent or Manageable (Craniotomy/Craniectomy/Cranioplasty)  Outcome: Ongoing (interventions implemented as appropriate)

## 2018-01-17 NOTE — PLAN OF CARE
Problem: Patient Care Overview (Adult)  Goal: Plan of Care Review  Outcome: Ongoing (interventions implemented as appropriate)   01/17/18 1323   Coping/Psychosocial Response Interventions   Plan Of Care Reviewed With patient   Patient Care Overview   Progress improving   Outcome Evaluation   Outcome Summary/Follow up Plan Pt demonstrates impulsivity and some decreased safety. Pt demonstrates independence with transfers and ambulation in room. Pt was able to complete colonostomy care independently while retrieving needed supplies from bags in room. Pt would benefit from 24/7 supervision at home, excluding her  who is dependent on her for his care. Continue OT POC

## 2018-01-17 NOTE — PROGRESS NOTES
"Marcy Garcia  67 y.o.      Chief complaint:   Status post craniotomy from January 15.  No complaints    Subjective  No events    Temp:  [97.6 °F (36.4 °C)-98.5 °F (36.9 °C)] 98 °F (36.7 °C)  Heart Rate:  [50-73] 50  Resp:  [18] 18  BP: ()/(38-76) 102/48      Objective:  General Appearance:  Comfortable, well-appearing, in no acute distress and not in pain.    Vital signs: (most recent): Blood pressure 102/48, pulse 50, temperature 98 °F (36.7 °C), temperature source Oral, resp. rate 18, height 154.9 cm (61\"), weight 65.5 kg (144 lb 6.4 oz), SpO2 95 %, not currently breastfeeding.  Vital signs are normal.  No fever.    Output: Producing urine.    Lungs:  Normal respiratory rate and normal effort.    Heart: Normal rate.  Regular rhythm.        Neurologic Exam     Mental Status   Oriented to person, place, and time.   Attention: normal. Concentration: normal.   Speech: speech is normal   Level of consciousness: alert    Cranial Nerves   Cranial nerves II through XII intact.     Motor Exam   Muscle bulk: normal    Strength   Strength 5/5 throughout.     Sensory Exam   Light touch normal.     Gait, Coordination, and Reflexes     Gait  Gait: normal      Principal Problem:    Brain tumor      Lab Results (last 24 hours)     Procedure Component Value Units Date/Time    Anaerobic Culture - Wound, Brain [[id number]]  (Normal) Collected:  01/15/18 0836    Specimen:  Wound from Brain Updated:  01/16/18 1126     Culture No anaerobes isolated at 24 hours    Wound Culture - Wound, Brain [[id number]]  (Normal) Collected:  01/15/18 0836    Specimen:  Wound from Brain Updated:  01/16/18 1142     Wound Culture No growth at 24 hours     Gram Stain Result Many (4+) WBCs seen      No organisms seen    Tissue Pathology Exam - Tissue, Brain [[id number]] Collected:  01/15/18 0836    Specimen:  Tissue from Brain, Tissue from Brain Updated:  01/16/18 9854     Case Report --     Surgical Pathology Report                         Case: " "[id number]                                  Authorizing Provider:  Constantine Schmidt MD        Collected:           01/15/2018 08:36 AM          Ordering Location:     [facility] OR  Received:            01/15/2018 09:01 AM          Pathologist:           Rick Mendoza MD                                                      Intraop:               Rick Mendoza MD                                                      Specimens:   1) - Brain, BRAIN TUMOR FOR FROZEN                                                                  2) - Brain, tumor                                                                           Final Diagnosis --     Brain, biopsy: Metastatic adenocarcinoma with papillary features, consistent with lung origin.       Comment --     The immunohistochemical staining pattern is consistent with an adenocarcinoma of lung origin.  Clinical correlation is suggested.       Intraoperative Consultation --       Specimen Brain   FROZEN SECTION DIAGNOSIS: Brain, biopsy of right frontal lesion:  Metastatic non-small cell carcinoma.  Comment:   Reported to Dr. Schmidt on 1/15/2018 at 9:16 AM.       Gross Description --        Specimen #1 is received fresh for frozen section labeled with the patient's name, date of birth, and \"brain tumor\".  The specimen consists of 3 fragments of red-tan-pink soft tissue aggregating to 2.4 x 1.3 x 0.7 cm.  The fragments appear slightly hemorrhagic and soft.  Representative sections are submitted for touch implant and frozen section analysis.  Representative sections are submitted as follows: Block 1A frozen section control.  Blocks 1B and 1C remainder of specimen after formalin fixation.           Specimen #2 is received in a formalin filled container, labeled with the patient's name, date of birth, and \"brain tumor\".  The specimen consists of multiple yellow-pink soft tissue fragments aggregating to 1.3 x 0.8 x 0.3 cm.  The fragments have a slight " rubbery texture and are wrapped in lens paper and submitted in block 2A.       Microscopic Description --     1.  Microscopic examination reveal sections of brain tissue demonstrating replacement of the majority of the specimen by cribriform to papillary structures lined by atypical epithelial cells demonstrating high nuclear to cytoplasmic ratios and markedly pleomorphic nuclei with coarse chromatin and prominent nucleoli.  Frequent mitotic figures are observed as well as very focal areas of tumor necrosis.    2.  Microscopic examination reveal sections of brain tissue demonstrating replacement of the majority of the specimen by cribriform to papillary structures lined by atypical epithelial cells demonstrating high nuclear to cytoplasmic ratios and markedly pleomorphic nuclei with coarse chromatin and prominent nucleoli.  Frequent mitotic figures are observed as well as very focal areas of tumor necrosis.   After reviewing the H&E stained slides immunohistochemical stains for CK 5/6, CK 7, CK 20, ROSALINO-3, GCDFP-15, TTF-1, PAX 8, Napsin A and estrogen receptor are performed using appropriately staining positive controls and the tumor cells stain positively for CK 7, TTF-1 and Napsin A only.  They are negative for the remaining stains.       Embedded Images --              Plan:   At this point patient appears to be doing very well.  We will look into decreasing her steroids as well as her Keppra dose.  We will look into getting the patient to [facility] for further rehabilitation nursing care.    SATINDER Bhagat

## 2018-01-18 VITALS
BODY MASS INDEX: 27.26 KG/M2 | RESPIRATION RATE: 20 BRPM | DIASTOLIC BLOOD PRESSURE: 65 MMHG | WEIGHT: 144.4 LBS | HEIGHT: 61 IN | SYSTOLIC BLOOD PRESSURE: 125 MMHG | HEART RATE: 53 BPM | TEMPERATURE: 98 F | OXYGEN SATURATION: 97 %

## 2018-01-18 PROCEDURE — 99024 POSTOP FOLLOW-UP VISIT: CPT | Performed by: NURSE PRACTITIONER

## 2018-01-18 PROCEDURE — 97535 SELF CARE MNGMENT TRAINING: CPT

## 2018-01-18 PROCEDURE — 97116 GAIT TRAINING THERAPY: CPT

## 2018-01-18 PROCEDURE — 25010000002 DEXAMETHASONE PER 1 MG: Performed by: NURSE PRACTITIONER

## 2018-01-18 PROCEDURE — 97530 THERAPEUTIC ACTIVITIES: CPT

## 2018-01-18 RX ORDER — HYDROCODONE BITARTRATE AND ACETAMINOPHEN 7.5; 325 MG/1; MG/1
1 TABLET ORAL EVERY 6 HOURS PRN
Qty: 120 TABLET | Refills: 0 | Status: SHIPPED | OUTPATIENT
Start: 2018-01-18 | End: 2018-01-25

## 2018-01-18 RX ORDER — ALPRAZOLAM 0.25 MG/1
0.25 TABLET ORAL 2 TIMES DAILY PRN
Qty: 60 TABLET | Refills: 0 | Status: ON HOLD | OUTPATIENT
Start: 2018-01-18 | End: 2018-03-30

## 2018-01-18 RX ORDER — METHYLPREDNISOLONE 4 MG/1
TABLET ORAL
Qty: 1 EACH | Refills: 0 | Status: SHIPPED | OUTPATIENT
Start: 2018-01-18 | End: 2018-02-08

## 2018-01-18 RX ADMIN — LEVETIRACETAM 500 MG: 500 TABLET, FILM COATED ORAL at 08:58

## 2018-01-18 RX ADMIN — SERTRALINE 200 MG: 100 TABLET, FILM COATED ORAL at 08:58

## 2018-01-18 RX ADMIN — FLUTICASONE PROPIONATE 2 SPRAY: 50 SPRAY, METERED NASAL at 08:58

## 2018-01-18 RX ADMIN — DEXAMETHASONE SODIUM PHOSPHATE 4 MG: 4 INJECTION, SOLUTION INTRA-ARTICULAR; INTRALESIONAL; INTRAMUSCULAR; INTRAVENOUS; SOFT TISSUE at 08:58

## 2018-01-18 RX ADMIN — PANTOPRAZOLE SODIUM 40 MG: 40 TABLET, DELAYED RELEASE ORAL at 08:58

## 2018-01-18 RX ADMIN — DIGOXIN 250 MCG: 0.25 TABLET ORAL at 12:06

## 2018-01-18 RX ADMIN — ALPRAZOLAM 0.25 MG: 0.25 TABLET ORAL at 09:03

## 2018-01-18 RX ADMIN — DEXAMETHASONE SODIUM PHOSPHATE 4 MG: 4 INJECTION, SOLUTION INTRA-ARTICULAR; INTRALESIONAL; INTRAMUSCULAR; INTRAVENOUS; SOFT TISSUE at 00:34

## 2018-01-18 RX ADMIN — CETIRIZINE HYDROCHLORIDE 10 MG: 10 TABLET, FILM COATED ORAL at 08:58

## 2018-01-18 NOTE — PLAN OF CARE
Problem: Patient Care Overview (Adult)  Goal: Plan of Care Review  Outcome: Ongoing (interventions implemented as appropriate)   01/18/18 0820   Coping/Psychosocial Response Interventions   Plan Of Care Reviewed With patient   Patient Care Overview   Progress progress toward functional goals as expected   Outcome Evaluation   Outcome Summary/Follow up Plan PT tx completed. Denies any pn. I with bed mobility, transfers, and ambulate 300' without any loss of balance. Plan for discharge today to SNF.

## 2018-01-18 NOTE — PROGRESS NOTES
Continued Stay Note   [place]     Patient Name: Marcy Garcia  MRN: [id number]  Today's Date: 1/18/2018    Admit Date: 1/15/2018          Discharge Plan       01/18/18 0845    Case Management/Social Work Plan    Plan [facility]    Final Note    Final Note Patient is discharged today to [facility]. LULÚ spoke to Rima at [facility] to notify of discharge today. Copy of narc scripts will need to be faxed to [facility] at [phone]. Patient's nurse will call report to [phone]. Plan transport by private car.               Discharge Codes       01/18/18 0845    Discharge Codes    Discharge Codes 03  Discharged/transferred to skilled nursing facility (SNF) with Medicare certification in anticipation of skilled care        Expected Discharge Date and Time     Expected Discharge Date Expected Discharge Time    Jan 18, 2018             CLAUDINE Donald

## 2018-01-18 NOTE — DISCHARGE SUMMARY
Date of Discharge:  1/18/2018    Discharge Diagnosis: Brain tumor with cervical edema    Presenting Problem/History of Present Illness  Brain tumor [D49.6]  Brain tumor [D49.6]       Hospital Course  Patient is a 67 y.o. female presented with brain tumor.  Patient was taken to the operating room on 01/14/2018 for a right frontal craniotomy.  Patient tolerated procedure well.  Currently this patient's awake alert and oriented ×3.  She is tolerating by mouth P2 voiding spontaneous.  She is ambulating.  Blood pressure has been maintained under control.  She has not had any seizure activity.  The patient is still impulsive and does have difficulty with gait and balance.  It is loose on the patient would benefit from going to skilled nursing facility receiving further rehabilitation.  She is scheduled to be transferred to [facility] today..      Procedures Performed  Procedure(s):  CRANIOTOMY FOR TUMOR STERIOTACTIC WITH BRAIN LAB right frontal craniotomy for brain tumor with neuromonitoring and brain lab       Consults:   Consults     No orders found from 12/17/2017 to 1/16/2018.              Condition on Discharge:  Stable    Vital Signs  Temp:  [97.2 °F (36.2 °C)-98.1 °F (36.7 °C)] 98 °F (36.7 °C)  Heart Rate:  [50-64] 53  Resp:  [16-20] 20  BP: ()/(51-66) 125/65    Physical Exam:   Physical Exam   Constitutional: She is oriented to person, place, and time. She appears well-developed and well-nourished.   HENT:   Head: Normocephalic.   Eyes: EOM are normal. Pupils are equal, round, and reactive to light.   Neck: Normal range of motion.   Pulmonary/Chest: Effort normal.   Musculoskeletal: Normal range of motion.   Neurological: She is alert and oriented to person, place, and time. She has normal strength and normal reflexes. No cranial nerve deficit or sensory deficit. Gait normal. GCS eye subscore is 4. GCS verbal subscore is 5. GCS motor subscore is 6.   Skin: Skin is warm.   Incision clean dry  and intact   Psychiatric: She has a normal mood and affect. Her speech is normal and behavior is normal. Thought content normal.        Neurologic Exam     Mental Status   Oriented to person, place, and time.   Speech: speech is normal     Cranial Nerves     CN III, IV, VI   Pupils are equal, round, and reactive to light.  Extraocular motions are normal.     Motor Exam     Strength   Strength 5/5 throughout.          Discharge Disposition  Skilled Nursing Facility (DC - External)    Discharge Medications   Marcy Garcia   Home Medication Instructions LAMONT:[id number]    Printed on:01/18/18 5497   Medication Information                      ALPRAZolam (XANAX) 0.25 MG tablet  Take 0.25 mg by mouth 2 (Two) Times a Day As Needed for Anxiety.             atorvastatin (LIPITOR) 20 MG tablet  Take 20 mg by mouth Daily.             cetirizine (zyrTEC) 10 MG tablet  Take 10 mg by mouth Daily.             Cetirizine-Pseudoephedrine (ZYRTEC-D PO)  Take 5 mg by mouth Daily.             digoxin (LANOXIN) 250 MCG tablet  Take 250 mcg by mouth Daily.             fluticasone (FLONASE) 50 MCG/ACT nasal spray  2 sprays into each nostril Daily.             HYDROcodone-acetaminophen (NORCO) 7.5-325 MG per tablet  Take 1 tablet by mouth Every 6 (Six) Hours As Needed for Moderate Pain  for up to 7 days.             ipratropium-albuterol (COMBIVENT RESPIMAT)  MCG/ACT inhaler  Inhale 1 puff As Needed for Wheezing.             levETIRAcetam (KEPPRA) 500 MG tablet  Take 500 mg by mouth 2 (Two) Times a Day.             MethylPREDNISolone (MEDROL, ALBERTO,) 4 MG tablet  Take as directed on package instructions.             montelukast (SINGULAIR) 10 MG tablet  Take 10 mg by mouth Every Night.             pantoprazole (PROTONIX) 40 MG EC tablet  Take 40 mg by mouth Daily.             sertraline (ZOLOFT) 100 MG tablet  Take 200 mg by mouth Daily.                 Discharge Diet:   Diet Instructions     Diet: Regular; Thin       Discharge  Diet:  Regular   Fluid Consistency:  Thin                 Activity at Discharge:   Activity Instructions     Discharge Activity Restrictions       1) No driving for till seen in the office    3) May shower / sponge bathe.  Less dry here thoroughly.  No wet hair on incision  4) Do not lift / push / pull more then 15 lbs.                 Follow-up Appointments  No future appointments.  Additional Instructions for the Follow-ups that You Need to Schedule     Call MD With Problems / Concerns    As directed    Instructions: Worsening headache, drainage from wound, seizures, fever   Order Comments:  Instructions: Worsening headache, drainage from wound, seizures, fever            Discharge Follow-up with Specified Provider: dr song; 3 Weeks    As directed    To:  dr song    Follow Up:  3 Weeks                     Test Results Pending at Discharge   Order Current Status    Anaerobic Culture - Wound, Brain Preliminary result    Wound Culture - Wound, Brain Preliminary result           Edmundo Garay, SATINDER  01/18/18  8:16 AM    Time: Discharge 30 min

## 2018-01-18 NOTE — PLAN OF CARE
Problem: Patient Care Overview (Adult)  Goal: Plan of Care Review  Outcome: Ongoing (interventions implemented as appropriate)   01/18/18 0620   Coping/Psychosocial Response Interventions   Plan Of Care Reviewed With patient   Patient Care Overview   Progress no change   Outcome Evaluation   Outcome Summary/Follow up Plan PT A&O, no neuro changes. Bed alarm on at HS, no sitter at BS, free from falls. Inc ADY. VSS. PLans to go to [facility] today.       Problem: Fall Risk (Adult)  Goal: Absence of Falls  Outcome: Ongoing (interventions implemented as appropriate)      Problem: Craniotomy/Craniectomy/Cranioplasty (Adult)  Goal: Signs and Symptoms of Listed Potential Problems Will be Absent or Manageable (Craniotomy/Craniectomy/Cranioplasty)  Outcome: Ongoing (interventions implemented as appropriate)

## 2018-01-18 NOTE — THERAPY TREATMENT NOTE
Acute Care - Physical Therapy Treatment Note  [facility]     Patient Name: Marcy Garcia  : 1950  MRN: [id number]  Today's Date: 2018  Onset of Illness/Injury or Date of Surgery Date: 01/15/18  Date of Referral to PT: 01/15/18  Referring Physician: Dr. Schmidt    Admit Date: 1/15/2018    Visit Dx:    ICD-10-CM ICD-9-CM   1. Brain tumor D49.6 239.6   2. Decreased activities of daily living (ADL) Z78.9 V49.89   3. Impaired mobility Z74.09 799.89     Patient Active Problem List   Diagnosis   • Normal body mass index (BMI)   • Former smoker   • Neoplasm of uncertain behavior of brain   • Brain tumor               Adult Rehabilitation Note       18 0820 18 1113 18 0938    Rehab Assessment/Intervention    Discipline physical therapy assistant  -KJ physical therapy assistant  -KJ occupational therapy assistant  -TS    Document Type therapy note (daily note)  -KJ therapy note (daily note)  -KJ therapy note (daily note)  -TS    Subjective Information agree to therapy  -KJ agree to therapy  -KJ agree to therapy;no complaints  -TS    Patient Effort, Rehab Treatment good  -KJ good  -KJ good  -TS    Precautions/Limitations fall precautions  -KJ fall precautions  -KJ fall precautions  -TS    Recorded by [KJ] Geneva Leary PTA [KJ] Geneva Leary, PTA [TS] Sue Conti, ALEJANDRO/L    Pain Assessment    Pain Assessment No/denies pain  -KJ 0-10  -KJ No/denies pain  -TS    Pain Score  6  -KJ     Post Pain Score  6  -KJ     Pain Type  Acute pain  -KJ     Pain Location  Head  -KJ     Pain Descriptors  Throbbing  -KJ     Pain Frequency  Constant/continuous  -KJ     Pain Intervention(s)  Repositioned  -KJ     Recorded by [KJ] Geneva Leary PTA [KJ] Geneva Leary PTA [TS] TANMAY MoodyA/L    Cognitive Assessment/Intervention    Personal Safety   impulsive  -TS    Personal Safety Interventions   fall prevention program maintained;nonskid shoes/slippers when out of bed  -TS    Recorded by    [TS] TANMAY MoodyA/L    Bed Mobility, Assessment/Treatment    Bed Mob, Supine to Sit, Starr  independent  -KJ     Bed Mob, Sit to Supine, Starr independent  -KJ independent  -KJ     Bed Mob, Sidelying to Sit, Starr independent  -KJ      Recorded by [KJ] Geneva Leary PTA [KJ] Geneva Leary PTA     Transfer Assessment/Treatment    Transfers, Sit-Stand Starr independent  -KJ conditional independence  -KJ independent  -TS    Transfers, Stand-Sit Starr independent  -KJ conditional independence  -KJ independent  -TS    Toilet Transfer, Starr  independent  -KJ independent  -TS    Transfer, Comment  no loss of balance with transfers  -KJ     Recorded by [KJ] Geneva Leary PTA [KJ] Geneva Leary PTA [TS] FLAVIA Moody/L    Gait Assessment/Treatment    Gait, Starr Level independent  -KJ independent  -KJ     Gait, Distance (Feet) 300  -  -KJ     Gait, Comment no loss of balance noted during gait  -KJ no loss of balance with gait  -KJ     Recorded by [KJ] Geneva Leary PTA [KJ] Geneva Leary PTA     Toileting Assessment/Training    Toileting Assess/Train, Comment   pt was able to complete cleaning and emptying of colostomy bag  -TS    Recorded by   [TS] TANMAY MoodyA/L    Balance Skills Training    Gait Balance Activities  tandem;side-stepping;scanning environment R/L;backwards;braiding / front  -KJ     Recorded by  [KJ] Geneva Leary PTA     Positioning and Restraints    Pre-Treatment Position sitting in chair/recliner  -KJ in bed  -KJ sitting in chair/recliner  -TS    Post Treatment Position chair  -KJ bathroom  -KJ chair  -TS    In Chair   sitting;call light within reach;encouraged to call for assist;with family/caregiver  -TS    Recorded by [KJ] Geneva Leary PTA [KJ] Geneva Leary PTA [TS] TANMAY MoodyA/L      User Key  (r) = Recorded By, (t) = Taken By, (c) = Cosigned By    Initials Name Effective  Dates    KJ Geneva Leary, PTA 08/02/16 -     TS Sue CANDE Conti, ALEJANDRO/L 08/02/16 -                 IP PT Goals       01/16/18 1112 01/16/18 0844       Transfer Training PT LTG    Transfer Training PT LTG, Time to Achieve  by discharge  -MS (r) MM (t) MS (c)     Transfer Training PT LTG, [place] Level  independent  -MS (r) MM (t) MS (c)     Transfer Training PT LTG, Additional Goal doesn't demostrate any safety issues   -MS (r) MM (t) MS (c) increased awareness to safety  -MS (r) MM (t) MS (c)     Gait Training PT LTG    Gait Training Goal PT LTG, Date Established  01/16/18  -MS (r) MM (t) MS (c)     Gait Training Goal PT LTG, Time to Achieve  by discharge  -MS (r) MM (t) MS (c)     Gait Training Goal PT LTG, [place] Level  supervision required  -MS (r) MM (t) MS (c)     Gait Training Goal PT LTG, Distance to Achieve 300 ft with no safety issues   -MS (r) MM (t) MS (c) 300 ft, with increased awareness to safety  -MS (r) MM (t) MS (c)     Dynamic Standing Balance PT LTG    Dynamic Standing Balance PT LTG, Date Established  01/16/18  -MS (r) MM (t) MS (c)     Dynamic Standing Balance PT LTG, Time to Achieve  by discharge  -MS (r) MM (t) MS (c)     Dynamic Standing Balance PT LTG, [place] Level  conditional independence  -MS (r) MM (t) MS (c)     Dynamic Standing Balance PT LTG, Assist Device  other (comments)  -MS (r) MM (t) MS (c)     Dynamic Standing Balance PT LTG, Additional Goal 1 UE supported   -MS (r) MM (t) MS (c) hand hold with increased knowledge to safety  -MS (r) MM (t) MS (c)       User Key  (r) = Recorded By, (t) = Taken By, (c) = Cosigned By    Initials Name Provider Type    MS Glenny Donahue, PT DPT Physical Therapist    ROYA Solo, PT Student PT Student          Physical Therapy Education     Title: PT OT SLP Therapies (Active)     Topic: Physical Therapy (Active)     Point: Mobility training (Active)    Learning Progress Summary    Learner Readiness Method Response Comment  Documented by Status   Patient Acceptance E NR recognizing safety awareness and taking her time with activities  01/17/18 1132 Active    Eager E VU Pt. able to perform bed moblity and transfers independently although requires close supervision due to impulsivity. She is able to amubulate with CGA and aknowledges needs for safety.  01/16/18 0843 Done                      User Key     Initials Effective Dates Name Provider Type Discipline     08/02/16 -  Geneva Leary, PTA Physical Therapy Assistant PT     01/10/18 -  Allison Solo, PT Student PT Student PT                    PT Recommendation and Plan  Anticipated Discharge Disposition: home with 24/7 care  Planned Therapy Interventions: balance training, gait training, patient/family education, other (see comments) (safety to be addressed)  PT Frequency: 2 times/day  Plan of Care Review  Plan Of Care Reviewed With: patient  Progress: progress toward functional goals as expected  Outcome Summary/Follow up Plan: PT tx completed. Denies any pn. I with bed mobility, transfers, and ambulate 300' without any loss of balance. Plan for discharge today to SNF.          Outcome Measures       01/17/18 1300 01/16/18 0800 01/16/18 0757    How much help from another person do you currently need...    Turning from your back to your side while in flat bed without using bedrails?  4  -MS (r) MM (t) MS (c)     Moving from lying on back to sitting on the side of a flat bed without bedrails?  4  -MS (r) MM (t) MS (c)     Moving to and from a bed to a chair (including a wheelchair)?  3  -MS (r) MM (t) MS (c)     Standing up from a chair using your arms (e.g., wheelchair, bedside chair)?  3  -MS (r) MM (t) MS (c)     Climbing 3-5 steps with a railing?  3  -MS (r) MM (t) MS (c)     To walk in hospital room?  3  -MS (r) MM (t) MS (c)     AM-PAC 6 Clicks Score  20  -MS (r) MM (t)     How much help from another is currently needed...    Putting on and taking off regular lower body  clothing? 4  -TS  4  -CH    Bathing (including washing, rinsing, and drying) 4  -TS  3  -CH    Toileting (which includes using toilet bed pan or urinal) 4  -TS  3  -CH    Putting on and taking off regular upper body clothing 4  -TS  4  -CH    Taking care of personal grooming (such as brushing teeth) 4  -TS  4  -CH    Eating meals 4  -TS  4  -CH    Score 24  -TS  22  -CH    Functional Assessment    Outcome Measure Options AM-PAC 6 Clicks Daily Activity (OT)  -TS AM-PAC 6 Clicks Basic Mobility (PT)  -MS (r) MM (t) MS (c) AM-PAC 6 Clicks Daily Activity (OT)  -CH      User Key  (r) = Recorded By, (t) = Taken By, (c) = Cosigned By    Initials Name Provider Type    CH Nidia Matson, OTR/L Occupational Therapist    TS Sue Conti, ALEJANDRO/L Occupational Therapy Assistant    MS Glenny Donahue, PT DPT Physical Therapist    MM Allison Solo, PT Student PT Student           Time Calculation:       Therapy Charges for Today     Code Description Service Date Service Provider Modifiers Qty    65210798399 HC GAIT TRAINING EA 15 MIN 1/17/2018 Geneva Leary, PTA GP, KX 1    15703182603 HC PT THER PROC EA 15 MIN 1/17/2018 Geneva Leary, PTA GP, KX 1          PT G-Codes  PT Professional Judgement Used?: Yes (Needs assistance for safety due to impulsivity)  Outcome Measure Options: AM-PAC 6 Clicks Daily Activity (OT)  Score: 20  Functional Limitation: Mobility: Walking and moving around  Mobility: Walking and Moving Around Current Status (): At least 20 percent but less than 40 percent impaired, limited or restricted  Mobility: Walking and Moving Around Goal Status (): At least 20 percent but less than 40 percent impaired, limited or restricted    Geneva Leary PTA  1/18/2018

## 2018-01-19 NOTE — PLAN OF CARE
Problem: Inpatient Physical Therapy  Goal: Transfer Training Goal 1 LTG- PT  Outcome: Unable to achieve outcome(s) by discharge Date Met: 01/19/18 01/16/18 0844 01/16/18 1112 01/19/18 0835   Transfer Training PT LTG   Transfer Training PT LTG, Time to Achieve by discharge --  --    Transfer Training PT LTG, [place] Level independent --  --    Transfer Training PT LTG, Additional Goal --  doesn't demostrate any safety issues  --    Transfer Training PT LTG, Date Goal Reviewed --  --  01/19/18   Transfer Training PT LTG, Outcome --  --  goal not met   Transfer Training PT LTG, Reason Goal Not Met --  --  discharged from facility     Goal: Gait Training Goal LTG- PT  Outcome: Outcome(s) achieved Date Met: 01/19/18 01/16/18 0844 01/16/18 1112 01/19/18 0835   Gait Training PT LTG   Gait Training Goal PT LTG, Date Established 01/16/18 --  --    Gait Training Goal PT LTG, Time to Achieve by discharge --  --    Gait Training Goal PT LTG, [place] Level supervision required --  --    Gait Training Goal PT LTG, Distance to Achieve --  300 ft with no safety issues  --    Gait Training Goal PT LTG, Date Goal Reviewed --  --  01/19/18   Gait Training Goal PT LTG, Outcome --  --  goal met     Goal: Dynamic Standing Balance Goal LTG- PT  Outcome: Outcome(s) achieved Date Met: 01/19/18 01/16/18 0844 01/16/18 1112 01/19/18 0835   Dynamic Standing Balance PT LTG   Dynamic Standing Balance PT LTG, Date Established 01/16/18 --  --    Dynamic Standing Balance PT LTG, Time to Achieve by discharge --  --    Dynamic Standing Balance PT LTG, [place] Level conditional independence --  --    Dynamic Standing Balance PT LTG, Assist Device other (comments) --  --    Dynamic Standing Balance PT LTG, Additional Goal --  1 UE supported  --    Dynamic Standing Balance PT LTG, Date Goal Reviewed --  --  01/19/18   Dynamic Standing Balance PT LTG, Outcome --  --  goal met   Dynamic Standing Balance PT LTG, Reason Goal Not Met --   --  discharged from facility

## 2018-01-19 NOTE — PLAN OF CARE
Problem: Inpatient Occupational Therapy  Goal: Patient Education Goal LTG- OT  Outcome: Unable to achieve outcome(s) by discharge Date Met: 01/19/18 01/16/18 0757 01/19/18 0834   Patient Education OT LTG   Patient Education OT LTG, Date Established 01/16/18 --    Patient Education OT LTG, Time to Achieve by discharge --    Patient Education OT LTG, Education Type home safety --    Patient Education OT LTG, Education Understanding demonstrates adequately --    Patient Education OT LTG, Date Goal Reviewed --  01/19/18   Patient Education OT LTG Outcome --  goal not met   Patient Education OT LTG, Reason Goal Not Met --  discharged from facility     Goal: ADL Goal LTG- OT  Outcome: Unable to achieve outcome(s) by discharge Date Met: 01/19/18 01/16/18 0757 01/19/18 0834   ADL OT LTG   ADL OT LTG, Date Established 01/16/18 --    ADL OT LTG, Activity Type ADL skills --    ADL OT LTG, [place] Level modified independent --    ADL OT LTG, Date Goal Reviewed --  01/19/18   ADL OT LTG, Outcome --  goal not met   ADL OT LTG, Reason Goal Not Met --  discharged from facility

## 2018-01-19 NOTE — THERAPY DISCHARGE NOTE
Acute Care - Occupational Therapy Discharge Summary  [facility]     Patient Name: Marcy Garcia  : 1950  MRN: [id number]    Today's Date: 2018  Onset of Illness/Injury or Date of Surgery Date: 01/15/18    Date of Referral to OT: 01/15/18  Referring Physician: Dr. Schmidt      Admit Date: 1/15/2018        OT Recommendation and Plan    Visit Dx:    ICD-10-CM ICD-9-CM   1. Brain tumor D49.6 239.6   2. Decreased activities of daily living (ADL) Z78.9 V49.89   3. Impaired mobility Z74.09 799.89                     OT Goals       18 0834 18 0757       Patient Education OT LTG    Patient Education OT LTG, Date Established  18  -     Patient Education OT LTG, Time to Achieve  by discharge  -     Patient Education OT LTG, Education Type  home safety  -     Patient Education OT LTG, Education Understanding  demonstrates adequately  -     Patient Education OT LTG, Date Goal Reviewed 18  -      Patient Education OT LTG Outcome goal not met  -      Patient Education OT LTG, Reason Goal Not Met discharged from facility  -      ADL OT LTG    ADL OT LTG, Date Established  18  -     ADL OT LTG, Activity Type  ADL skills  -     ADL OT LTG, [place] Level  modified independent  -     ADL OT LTG, Date Goal Reviewed 18  -      ADL OT LTG, Outcome goal not met  -      ADL OT LTG, Reason Goal Not Met discharged from facility  -        User Key  (r) = Recorded By, (t) = Taken By, (c) = Cosigned By    Initials Name Provider Type     Bear Lugo, OTR/L Occupational Therapist     Nidia Matson OTR/L Occupational Therapist                Outcome Measures       18 1300          How much help from another is currently needed...    Putting on and taking off regular lower body clothing? 4  -TS      Bathing (including washing, rinsing, and drying) 4  -TS      Toileting (which includes using toilet bed pan or urinal) 4  -TS      Putting on and taking off  regular upper body clothing 4  -TS      Taking care of personal grooming (such as brushing teeth) 4  -TS      Eating meals 4  -TS      Score 24  -TS      Functional Assessment    Outcome Measure Options AM-PAC 6 Clicks Daily Activity (OT)  -TS        User Key  (r) = Recorded By, (t) = Taken By, (c) = Cosigned By    Initials Name Provider Type    TS Sue Conti ALEJANDOR/L Occupational Therapy Assistant              OT Discharge Summary  Anticipated Discharge Disposition: skilled nursing facility  Reason for Discharge: Discharge from facility  Outcomes Achieved: Refer to plan of care for updates on goals achieved  Discharge Destination: SNF      Bear Lugo OTR/L  1/19/2018

## 2018-01-19 NOTE — THERAPY DISCHARGE NOTE
Acute Care - Physical Therapy Discharge Summary  [facility]       Patient Name: Marcy Garcia  : 1950  MRN: [id number]    Today's Date: 2018  Onset of Illness/Injury or Date of Surgery Date: 01/15/18    Date of Referral to PT: 01/15/18  Referring Physician: Dr. Schmidt      Admit Date: 1/15/2018      PT Recommendation and Plan    Visit Dx:    ICD-10-CM ICD-9-CM   1. Brain tumor D49.6 239.6   2. Decreased activities of daily living (ADL) Z78.9 V49.89   3. Impaired mobility Z74.09 799.89             Outcome Measures       18 1300          How much help from another is currently needed...    Putting on and taking off regular lower body clothing? 4  -TS      Bathing (including washing, rinsing, and drying) 4  -TS      Toileting (which includes using toilet bed pan or urinal) 4  -TS      Putting on and taking off regular upper body clothing 4  -TS      Taking care of personal grooming (such as brushing teeth) 4  -TS      Eating meals 4  -TS      Score 24  -TS      Functional Assessment    Outcome Measure Options AM-PAC 6 Clicks Daily Activity (OT)  -TS        User Key  (r) = Recorded By, (t) = Taken By, (c) = Cosigned By    Initials Name Provider Type    TS FLAVIA Moody/L Occupational Therapy Assistant                      IP PT Goals       18 0835 18 1112 18 0844    Transfer Training PT LTG    Transfer Training PT LTG, Time to Achieve   by discharge  -MS (r) MM (t) MS (c)    Transfer Training PT LTG, [place] Level   independent  -MS (r) MM (t) MS (c)    Transfer Training PT LTG, Additional Goal  doesn't demostrate any safety issues   -MS (r) MM (t) MS (c) increased awareness to safety  -MS (r) MM (t) MS (c)    Transfer Training PT  LTG, Date Goal Reviewed 18  -NW      Transfer Training PT LTG, Outcome goal not met  -NW      Transfer Training PT LTG, Reason Goal Not Met discharged from facility  -NW      Gait Training PT LTG    Gait Training Goal PT LTG, Date  Established   01/16/18  -MS (r) MM (t) MS (c)    Gait Training Goal PT LTG, Time to Achieve   by discharge  -MS (r) MM (t) MS (c)    Gait Training Goal PT LTG, [place] Level   supervision required  -MS (r) MM (t) MS (c)    Gait Training Goal PT LTG, Distance to Achieve  300 ft with no safety issues   -MS (r) MM (t) MS (c) 300 ft, with increased awareness to safety  -MS (r) MM (t) MS (c)    Gait Training Goal PT LTG, Date Goal Reviewed 01/19/18  -NW      Gait Training Goal PT LTG, Outcome goal met  -NW      Dynamic Standing Balance PT LTG    Dynamic Standing Balance PT LTG, Date Established   01/16/18  -MS (r) MM (t) MS (c)    Dynamic Standing Balance PT LTG, Time to Achieve   by discharge  -MS (r) MM (t) MS (c)    Dynamic Standing Balance PT LTG, [place] Level   conditional independence  -MS (r) MM (t) MS (c)    Dynamic Standing Balance PT LTG, Assist Device   other (comments)  -MS (r) MM (t) MS (c)    Dynamic Standing Balance PT LTG, Additional Goal  1 UE supported   -MS (r) MM (t) MS (c) hand hold with increased knowledge to safety  -MS (r) MM (t) MS (c)    Dynamic Standing Balance PT LTG, Date Goal Reviewed 01/19/18  -NW      Dynamic Standing Balance PT LTG, Outcome goal met  -NW      Dynamic Standing Balance PT LTG, Reason Goal Not Met discharged from facility  -NW        User Key  (r) = Recorded By, (t) = Taken By, (c) = Cosigned By    Initials Name Provider Type    MS Glenny Donahue, PT DPT Physical Therapist    NW Isamar Singh, PTA Physical Therapy Assistant    MM Allison Solo, PT Student PT Student              PT Discharge Summary  Anticipated Discharge Disposition: home  Reason for Discharge: Discharge from facility  Outcomes Achieved: Refer to plan of care for updates on goals achieved  Discharge Destination: Home      Isamar Singh, TINA   1/19/2018

## 2018-01-20 LAB
BACTERIA SPEC AEROBE CULT: NORMAL
BACTERIA SPEC ANAEROBE CULT: NORMAL
GRAM STN SPEC: NORMAL
GRAM STN SPEC: NORMAL

## 2018-02-01 ENCOUNTER — HOSPITAL ENCOUNTER (OUTPATIENT)
Dept: RADIATION ONCOLOGY | Facility: HOSPITAL | Age: 68
Setting detail: RADIATION/ONCOLOGY SERIES
End: 2018-02-01

## 2018-02-06 ENCOUNTER — HOSPITAL ENCOUNTER (OUTPATIENT)
Dept: NUCLEAR MEDICINE | Age: 68
Discharge: HOME | End: 2018-02-08
Payer: MEDICARE

## 2018-02-06 DIAGNOSIS — C34.12 CANCER OF BRONCHUS OF LEFT UPPER LOBE (HCC): ICD-10-CM

## 2018-02-06 DIAGNOSIS — C34.32 CANCER OF BRONCHUS OF LEFT LOWER LOBE (HCC): ICD-10-CM

## 2018-02-06 LAB
GLUCOSE BLD-MCNC: 124 MG/DL (ref 70–99)
PERFORMED ON: ABNORMAL

## 2018-02-06 PROCEDURE — A9552 F18 FDG: HCPCS | Performed by: INTERNAL MEDICINE

## 2018-02-06 PROCEDURE — 78815 PET IMAGE W/CT SKULL-THIGH: CPT

## 2018-02-06 PROCEDURE — 3430000000 HC RX DIAGNOSTIC RADIOPHARMACEUTICAL: Performed by: INTERNAL MEDICINE

## 2018-02-06 PROCEDURE — 82948 REAGENT STRIP/BLOOD GLUCOSE: CPT

## 2018-02-06 RX ORDER — FLUDEOXYGLUCOSE F 18 200 MCI/ML
10 INJECTION, SOLUTION INTRAVENOUS
Status: COMPLETED | OUTPATIENT
Start: 2018-02-06 | End: 2018-02-06

## 2018-02-06 RX ADMIN — FLUDEOXYGLUCOSE F 18 10 MILLICURIE: 200 INJECTION, SOLUTION INTRAVENOUS at 12:08

## 2018-02-08 ENCOUNTER — OFFICE VISIT (OUTPATIENT)
Dept: NEUROSURGERY | Facility: CLINIC | Age: 68
End: 2018-02-08

## 2018-02-08 VITALS — HEIGHT: 61 IN | BODY MASS INDEX: 27.19 KG/M2 | WEIGHT: 144 LBS

## 2018-02-08 DIAGNOSIS — C79.31 SECONDARY MALIGNANT NEOPLASM OF BRAIN AND SPINAL CORD (HCC): Primary | ICD-10-CM

## 2018-02-08 DIAGNOSIS — Z87.891 FORMER SMOKER: ICD-10-CM

## 2018-02-08 DIAGNOSIS — C79.49 SECONDARY MALIGNANT NEOPLASM OF BRAIN AND SPINAL CORD (HCC): Primary | ICD-10-CM

## 2018-02-08 PROCEDURE — 99024 POSTOP FOLLOW-UP VISIT: CPT | Performed by: NEUROLOGICAL SURGERY

## 2018-02-08 RX ORDER — HYDROCODONE BITARTRATE AND ACETAMINOPHEN 7.5; 325 MG/1; MG/1
1 TABLET ORAL EVERY 6 HOURS PRN
Status: ON HOLD | COMMUNITY
Start: 2018-01-30 | End: 2018-03-30

## 2018-02-08 NOTE — PROGRESS NOTES
SUBJECTIVE:  Patient ID: Marcy Garcia is a 67 y.o. female is here today for follow-up.    Chief Complaint: Brain tumor  Chief Complaint   Patient presents with   • Brain Tumor     patient here for a postop wound check after a R) frontal crani for tumor       HPI  67 -year-old female went to the operating room on 01/15/2018 or a right frontal craniotomy for metastatic lung cancer.  She tolerated the procedure well.  She is home at this point.  She does have some headaches and a variety of different arthralgias.  She does not admit any seizures.  The family is very satisfied with her progress.  They feel that her mental status has improved to some degree.    The following portions of the patient's history were reviewed and updated as appropriate: allergies, current medications, past family history, past medical history, past social history, past surgical history and problem list.    OBJECTIVE:    Review of Systems   Musculoskeletal: Positive for arthralgias.   Neurological: Positive for headaches.   All other systems reviewed and are negative.         Physical Exam   Constitutional: She is oriented to person, place, and time.   Neurological: She is oriented to person, place, and time. She has normal strength. She has a normal Finger-Nose-Finger Test. Gait normal.   Psychiatric: Her speech is normal.       Neurologic Exam     Mental Status   Oriented to person, place, and time.   Attention: normal.   Speech: speech is normal   Level of consciousness: alert       , Some confusion and disorientation     Cranial Nerves   Cranial nerves II through XII intact.     Motor Exam   Muscle bulk: normal  Overall muscle tone: normal  Right arm pronator drift: absent  Left arm pronator drift: absent    Strength   Strength 5/5 throughout.     Sensory Exam   Light touch normal.   Pinprick normal.     Gait, Coordination, and Reflexes     Gait  Gait: normal    Coordination   Finger to nose coordination: normal    Reflexes   Reflexes  2+ except as noted.       Independent Review of Radiographic Studies:       ASSESSMENT/PLAN:  The patient is doing very well after her surgery.  She is lined up with Dr. Blake for stereotactic radiosurgery to the cavity.  She is working with Dr. Price on a chemotherapy plan we will see her in follow-up in about 3 months with repeat imaging      1. 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 Former smoker    3. BMI 27.0-27.9,adult            Return in about 3 months (around 5/8/2018) for follow up w/scan - DR SCHMITZ.      Constantine Schmitz MD

## 2018-02-08 NOTE — PATIENT INSTRUCTIONS

## 2018-02-16 ENCOUNTER — CONSULT (OUTPATIENT)
Dept: RADIATION ONCOLOGY | Facility: HOSPITAL | Age: 68
End: 2018-02-16

## 2018-02-16 ENCOUNTER — HOSPITAL ENCOUNTER (OUTPATIENT)
Dept: RADIATION ONCOLOGY | Facility: HOSPITAL | Age: 68
Discharge: HOME | End: 2018-02-16

## 2018-02-16 VITALS
DIASTOLIC BLOOD PRESSURE: 60 MMHG | BODY MASS INDEX: 24.17 KG/M2 | HEIGHT: 61 IN | WEIGHT: 128 LBS | SYSTOLIC BLOOD PRESSURE: 108 MMHG

## 2018-02-16 DIAGNOSIS — Z71.9 ENCOUNTER FOR CONSULTATION: ICD-10-CM

## 2018-02-16 DIAGNOSIS — Z87.891 FORMER SMOKER: ICD-10-CM

## 2018-02-16 DIAGNOSIS — C79.49 SECONDARY MALIGNANT NEOPLASM OF BRAIN AND SPINAL CORD (HCC): Primary | ICD-10-CM

## 2018-02-16 DIAGNOSIS — C79.31 SECONDARY MALIGNANT NEOPLASM OF BRAIN AND SPINAL CORD (HCC): Primary | ICD-10-CM

## 2018-02-16 DIAGNOSIS — Z98.890 S/P CRANIOTOMY: ICD-10-CM

## 2018-02-16 PROCEDURE — 77334 RADIATION TREATMENT AID(S): CPT | Performed by: RADIOLOGY

## 2018-02-16 PROCEDURE — 77290 THER RAD SIMULAJ FIELD CPLX: CPT | Performed by: RADIOLOGY

## 2018-02-16 NOTE — PROGRESS NOTES
[facility]  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 She is here today in clinic with her family. Reports activity change, fatigue, weakness and headaches. Uses cane for assistance with ambulation. Denies appetite change, unexpected weight change, dizziness, seizures, facial asymmetry, speech difficulty, light-headedness, and numbness. She follows  with last appointment on 02/01/2018 and next appointment in 3 weeks and  with last appointment on 02/08/2018 and next appointment in 3 months with Brain MRI prior to appointment.     Lung Cancer  10/31/2016 - CT Chest with contrast:  • A spiculated, partly necrotic, mass in the left lower lobe represent a neoplastic process.  • No evidence of mediastinal or hilar mass or lymphadenopathy.  • A low-density nodule in the upper pole of the right kidney probably represent a cyst.   11/22/2016 - Transthorasic needle biopsy performed by :  • Revealed evidence of adenocarcinoma.   12/16/2016 - PET Scan:  • Hypermetabolic left lower lobe pulmonary lesion compatible with malignancy, lung carcinoma. Cavitary change observed centrally.  No scintigraphic evidence of additional  hypermetabolic neoplastic disease/metastases.  12/30/2016 - Full PFT study with pre and post:  • Spirometry shows moderate airflow obstruction pre-bronchodilator.  • Following bronchodilator, there is moderate airflow obstruction but a significant 16% improvement in FEV1 such that post-bronchodilator FEV1 is 1.76 L.  • Mid flows are severely reduced but markedly improved following bronchodilator.  • Maximum ventilation is reduced in the setting of obstruction and improved following bronchodilator.  • Lung volume measurements show elevated values for total lung capacity and residual volume, indicating hyperinflation and gas trapping.  01/04/2017 - Chest x-ray:  • Revealed a 3.8 cm mass lesion in the LLL that previously measured approximately 4.1 cm.   01/06/2017- Left thoracotomy with left lower lobectomy and mediastinal lymph node dissection- Dr. Hodges:  • Pathology revealed invasive moderately differentiated adenocarcinoma.   • The tumor measured 4.5 cm  • Margins were clear of invasive carcinoma and no lymphovascular space invasion was noted.   • 5 of 5 lymph nodes were negative for metastatic carcinoma.   02/28/2017 - US Renal complete:  • Complex cyst at the upper pole of the right kidney measuring up to 3 cm, this has increased in size compared with the study in 2012 measured 2.1 cm maximum. There are also 2 tiny cortical cysts in the left kidney which were not clearly seen on the previous ultrasound.  03/08/2017 - CT Abdomen/Pelvis with and without contrast:  • A simple cyst in the upper pole of the right kidney. There are 2 tiny low density nodules in the mid left kidney which are too small to be further characterized.   • Moderately enlarged retrocrural lymph nodes. This was also noted in the previous CT scan of the abdomen   • Loculated left basal pleural effusion.  • Bilateral small adrenal nodules.  06/21/2017 - Completed 4 cycles of adjuvant Cisplatin/Alimta   07/27/2017 - CT Abdomen/Pelvis with  contrast:  • No acute intra-abdominal or pelvic abnormalities identified.  07/27/2017 - CT Chest with contrast:  • Postsurgical change from left lobectomy. The lungs are clear. No acute process is identified.   07/27/2017 - Bilateral Mammogram:  • No mammographic evidence of malignancy. Recommendation is for the patient to return for routine mammography in one year or sooner, if clinically indicated.     Recurrent Lung Carcinoma to the Brain   12/18/2017 - Presented to [facility] with a progressive headache. Head CT and Brain MRI was obtained.  12/18/2017 - CT Head without contrast:  • A mass lesion in the right frontal lobe with surrounding vasogenic edema and extrinsic pressure on the right lateral ventricle and midline shift. This may represent a metastatic focus. Further evaluation with contrast enhanced MR imaging of the brain is recommended.  12/18/2017 - MRI Brain with and without contrast:  • There is a 2.0 x 2.6 x 2.6 cm irregular peripherally enhancing mass within the right frontal lobe near the vertex with surrounding vasogenic edema. Given the history of lung neoplasm this is felt to represent a metastatic lesion. No other foci of abnormal enhancement are present to suggest other metastatic foci. A primary brain neoplasm such as glioblastoma is a lesser secondary differential consideration. There is associated mass effect on the frontal horn of the right lateral ventricle with mild shift of the midline by only approximately 2 to 3 mm.   • Atrophy of the brain with mild to moderate small vessel ischemic change.  12/19/2017 - CT Chest with contrast:  • Stable CT of the chest.  Paravertebral soft tissue nodules are present at T8 T9 T10. When compared to prior PET scan of December 2016 these were not metabolically active. These could possibly be neurofibromas however other etiologies cannot be excluded.  12/19/2017 - CT Abdomen/Pelvis with contrast:  • No acute abnormality of the abdomen or  pelvis.  12/19/2017 - Bone Scan:  • Small focal areas of increased activity are seen in the left seventh and 10th costovertebral junction.   • A focal area of radioactivity accumulation is seen in the ethmoid sinus area.   • A mild diffuse increased activity in the shoulder joints and the knee joint suggest a chronic arthritic process.  01/12/2018 - MRI Brain without and with contrast:  • 2.5 x 2.3 cm heterogeneously enhancing intra-axial mass centered within the right frontal lobe. Associated extensive vasogenic edema is present.   • There is no midline shift. No acute hydrocephalus. No evidence of leptomeningeal disease.  01/15/2018 - Craniotomy performed by :  Brain, biopsy:   • Metastatic adenocarcinoma with papillary features, consistent with lung origin.  01/16/2018 - MRI Brain without and with contrast:  • Interval right frontal craniotomy and resection of previously seen right frontal mass. Expected postsurgical changes include craniotomy defects, blood product within the surgical cavity, trace fluid in the right subdural space, near hemispheric. There is no evidence of residual nodular enhancement. Mass effect appears similar to minimally decreased.  • No additional area of abnormal enhancement identified.  02/01/2018 - Appointment with :  • Referral to  for SRT to the bed of the brain tumor resection.  • Recommendations for systemic chemotherapy   • PET Scan ordered.   • Follow up in 3 weeks.   02/06/2018 - PET Scan:  • No scintigraphic evidence of recurrent malignancy or metastatic disease.  • Incidentally noted left lower quadrant ostomy with small bowel containing parastomal hernia.  02/08/2018 - Appointment with :  • Follow up in 3 months with repeat imaging.                                History obtained from  PATIENT, FAMILY and CHART    PAST MEDICAL HISTORY   Past Medical History:   Diagnosis Date   • Adenocarcinoma, lung, left    • Anemia    • Anxiety    •  Arthritis    • Ataxia    • Brain tumor    • Cancer     lung cancer - pt had chemo and was told she was cancer free, but recently a brain tumor was found   • Colostomy in place    • Confusion    • COPD (chronic obstructive pulmonary disease)    • Depression    • Dizziness    • GERD (gastroesophageal reflux disease)    • Headache    • Memory loss    • Panic attacks    • Seasonal allergies    • Urgency of urination    • Weakness       PAST SURGICAL HISTORY   Past Surgical History:   Procedure Laterality Date   • COLON SURGERY  2013    BLOCKED BOWEL - COLOSTOMY   • CRANIOTOMY FOR TUMOR Right 1/15/2018    Procedure: CRANIOTOMY FOR TUMOR STERIOTACTIC WITH BRAIN LAB right frontal craniotomy for brain tumor with neuromonitoring and brain lab;  Surgeon: Constantine Schmidt MD;  Location: [facility] OR;  Service:    • ECTOPIC PREGNANCY SURGERY  1983   • HERNIA REPAIR     • HYSTERECTOMY  1987   • LUNG CANCER SURGERY Left     Dr Marlow - Lower Lobectomy   • ORIF FINGER FRACTURE      left pinky   • SHOULDER SURGERY Left     BROKEN COLLAR BONE & SHOULDER SURGERY & ELBOW   • TONSILLECTOMY AND ADENOIDECTOMY  1954      SOCIAL HISTORY   Social History   Substance Use Topics   • Smoking status: Former Smoker     Years: 51.00     Types: Cigarettes     Quit date: 10/2016   • Smokeless tobacco: Never Used      Comment: when pt smoked she smoked 2 packs a day on average   • Alcohol use No      ALLERGIES  Kiwi extract and Pineapple     MEDICATIONS   Current Outpatient Prescriptions   Medication Sig Dispense Refill   • ALPRAZolam (XANAX) 0.25 MG tablet Take 1 tablet by mouth 2 (Two) Times a Day As Needed for Anxiety. 60 tablet 0   • atorvastatin (LIPITOR) 20 MG tablet Take 20 mg by mouth Daily.     • B Complex Vitamins (VITAMIN B COMPLEX PO) Take 1 tablet by mouth.     • calcium-vitamin D (OSCAL-500) 500-200 MG-UNIT per tablet Take  by mouth.     • cetirizine (zyrTEC) 10 MG tablet Take 10 mg by mouth Daily.     • Cetirizine-Pseudoephedrine  "(ZYRTEC-D PO) Take 5 mg by mouth Daily.     • digoxin (LANOXIN) 250 MCG tablet Take 250 mcg by mouth Daily.     • fluticasone (FLONASE) 50 MCG/ACT nasal spray 2 sprays into each nostril Daily.     • HYDROcodone-acetaminophen (NORCO) 7.5-325 MG per tablet      • ipratropium-albuterol (COMBIVENT RESPIMAT)  MCG/ACT inhaler Inhale 1 puff As Needed for Wheezing.     • levETIRAcetam (KEPPRA) 500 MG tablet Take 500 mg by mouth 2 (Two) Times a Day.     • montelukast (SINGULAIR) 10 MG tablet Take 10 mg by mouth Every Night.     • pantoprazole (PROTONIX) 40 MG EC tablet Take 40 mg by mouth Daily.     • sertraline (ZOLOFT) 100 MG tablet Take 200 mg by mouth Daily.       No current facility-administered medications for this visit.       The following portions of the patient's history were reviewed and updated as appropriate: allergies, current medications, past family history, past medical history, past social history, past surgical history and problem list.    REVIEW OF SYSTEMS  Review of Systems   Constitutional: Positive for activity change and fatigue. Negative for appetite change, chills, diaphoresis, fever and unexpected weight change.   Eyes: Negative.         Glasses     Respiratory: Negative.    Gastrointestinal:        Colostomy related to scar tissue   Endocrine: Negative.    Genitourinary: Negative.    Musculoskeletal: Negative.         Using a cane  Arthritis   Skin: Negative.    Allergic/Immunologic: Negative.    Neurological: Positive for weakness and headaches. Negative for dizziness, tremors, seizures, syncope, facial asymmetry, speech difficulty, light-headedness and numbness.   Hematological: Negative.    Psychiatric/Behavioral: Negative.      PHYSICAL EXAM  VITAL SIGNS:   Vitals:    02/16/18 1433   BP: 108/60   Weight: 58.1 kg (128 lb)   Height: 154.9 cm (61\")   PainSc: 0-No pain  Comment: Arthritis/ head hurts at times     General Appearance: Alert, cooperative, no acute distress, appears stated age. " Vitals reviewed.  Head: Normocephalic, without obvious abnormality, atraumatic  Eyes: Normal conjunctivae and sclerae normal  Ears: Ears appear intact with no abnormalities noted, hearing grossly normal  Throat: No oral lesions, no thrush, oral mucosa moist  Neck: No adenopathy, supple, trachea midline, no JVD  Lungs: CTA bilaterally, respirations regular  Heart: Regular rhythm and normal rate  Abdomen: Normal bowel sounds, no masses  Genitalia: Deferred  Extremities: CHAO well  Skin: No bleeding, bruising or rash  Lymph nodes: No palpable adenopathy  Neurologic: Grossly intact though not formally tested  Psych: alert,oriented, normal situational behavior    Performance Status: ECOG (2) Ambulatory and capable of self care, unable to carry out work activity, up and about > 50% or waking hours    Clinical Quality Measures   Pain Documented PQRS #131 Pain severity quantified; no pain present, no followup plan required.0    Care Plan: ADVANCED CARE  Care plan discussed, no care plan provided   TOBACCO SCREENING AND INTERVENTION  PQRS #226 Screened & identified as tobacco non-user. Former smoker      INFLUENZA Vaccine: Received Injection?  NO- I am recommending patient to see PCP for annual wellness exam      WEIGHT SCREENING/BMI Body mass index is 24.19 kg/(m^2). Calculated BMI within normal parameteres & documented Plan: no follow-up required     ASSESSMENT AND PLAN  1. 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 S/P craniotomy    3. Former smoker    4. Encounter for consultation      Orders Placed This Encounter   Procedures   • CT Radiation Therapy Planning     Order Specific Question:   Reason for Exam:     Answer:   brain mets   • Ambulatory Referral to Social Work     Referral Priority:   Routine     Referral Type:   Consultation     Referral Reason:   Specialty Services Required     Referred to Provider:   Lalita Gerber LCSW     Requested Specialty:        Number of Visits  Requested:   1     RECOMMENDATIONS: Indications and rationale of radiation therapy according to the NCCN Guidelines to the brain have been discussed with the patient today. I have extensively reviewed the risks, benefits and alternatives of therapy, risks include headaches, hair loss, nausea, vomiting, fatigue, hearing loss, skin and scalp changes, trouble with memory and speech, seizures and progression of disease in spite of therapy with either local or systemic failure.      We discussed the risks and benefits of stereotactic radiosurgery vs whole brain radiation.  Will refer patient to neurosurgeon for coordination of care to identify the target volumes for planned stereotactic radiosurgery treatment to these lesions.      I have recommended stereotactic radiosurgery which will consist of 1-5 treatment fractions, total cGy dose to be determined at the end of treatment planning.  The patient verbalizes understanding of this discussion and voices no further questions and wishes to proceed with recommended therapy.  We will proceed with CT simulation today (02/16/2018) to initiate the treatment planning and notify the patient when complete to begin.     Return for initiation of treatment when plan completed..    Today, total time spent with this patient was 54 minutes and of that time, well over 50% was spent in counseling and coordination of care as follows:  diagnosis, intent of treatment discussing radiation therapy specifics: logistics, possible and probable side effects and after effects, staging of cancer, standard of care in for this stage of this cancer and treatment options  Moises Blake III, MD  02/16/2018  2:44 PM

## 2018-02-27 ENCOUNTER — DOCUMENTATION (OUTPATIENT)
Dept: RADIATION ONCOLOGY | Facility: HOSPITAL | Age: 68
End: 2018-02-27

## 2018-02-27 DIAGNOSIS — C79.31 BRAIN METASTASES: Primary | ICD-10-CM

## 2018-02-27 PROBLEM — Z98.890 S/P CRANIOTOMY: Status: ACTIVE | Noted: 2018-02-27

## 2018-02-27 PROBLEM — Z71.9 ENCOUNTER FOR CONSULTATION: Status: ACTIVE | Noted: 2018-02-27

## 2018-02-28 NOTE — PROGRESS NOTES
Dr. Schmidt worked on the recent CT simulation with MRI fusion on the postcraniotomy site for stereotactic radiotherapy.  He has requested a repeat CT scan due to questionable hemorrhage versus residual tumor in the craniotomy site.

## 2018-03-01 ENCOUNTER — HOSPITAL ENCOUNTER (OUTPATIENT)
Dept: RADIATION ONCOLOGY | Facility: HOSPITAL | Age: 68
Setting detail: RADIATION/ONCOLOGY SERIES
End: 2018-03-01

## 2018-03-05 ENCOUNTER — DOCUMENTATION (OUTPATIENT)
Dept: RADIATION ONCOLOGY | Facility: HOSPITAL | Age: 68
End: 2018-03-05

## 2018-03-05 DIAGNOSIS — Z98.890 S/P CRANIOTOMY: ICD-10-CM

## 2018-03-05 DIAGNOSIS — C79.49 SECONDARY MALIGNANT NEOPLASM OF BRAIN AND SPINAL CORD (HCC): Primary | ICD-10-CM

## 2018-03-05 DIAGNOSIS — C79.31 SECONDARY MALIGNANT NEOPLASM OF BRAIN AND SPINAL CORD (HCC): Primary | ICD-10-CM

## 2018-03-06 ENCOUNTER — HOSPITAL ENCOUNTER (OUTPATIENT)
Dept: MRI IMAGING | Facility: HOSPITAL | Age: 68
Discharge: HOME | End: 2018-03-06
Admitting: PHYSICIAN ASSISTANT

## 2018-03-06 DIAGNOSIS — Z98.890 S/P CRANIOTOMY: ICD-10-CM

## 2018-03-06 DIAGNOSIS — C79.31 SECONDARY MALIGNANT NEOPLASM OF BRAIN AND SPINAL CORD (HCC): ICD-10-CM

## 2018-03-06 DIAGNOSIS — C79.49 SECONDARY MALIGNANT NEOPLASM OF BRAIN AND SPINAL CORD (HCC): ICD-10-CM

## 2018-03-06 LAB — CREAT BLDA-MCNC: 1.2 MG/DL (ref 0.6–1.3)

## 2018-03-06 PROCEDURE — 70553 MRI BRAIN STEM W/O & W/DYE: CPT

## 2018-03-06 PROCEDURE — 0 GADOBENATE DIMEGLUMINE 529 MG/ML SOLUTION: Performed by: PHYSICIAN ASSISTANT

## 2018-03-06 PROCEDURE — 82565 ASSAY OF CREATININE: CPT

## 2018-03-06 PROCEDURE — A9577 INJ MULTIHANCE: HCPCS | Performed by: PHYSICIAN ASSISTANT

## 2018-03-06 RX ADMIN — GADOBENATE DIMEGLUMINE 12 ML: 529 INJECTION, SOLUTION INTRAVENOUS at 16:41

## 2018-03-06 NOTE — PROGRESS NOTES
Upon review of the stereotactic radiotherapy treatment planning simulation fused with postoperative MRI, Dr. Schmidt has requested repeating the MRI as some of the changes visualized are likely postoperative hemorrhage rather than tumor cavity.  We will obtain a repeat MRI and again review contour the tumor bed with Dr. Schmidt.

## 2018-03-08 ENCOUNTER — TELEPHONE (OUTPATIENT)
Dept: NEUROSURGERY | Facility: CLINIC | Age: 68
End: 2018-03-08

## 2018-03-08 NOTE — TELEPHONE ENCOUNTER
"Patient questions whether or not she needed to keep MRI/appt with our office in May since she just had an MRI on 3/6/18 for Dr Blake.  Per Dr Schmidt: r/s the MRI & our office appt to June which should put her back on the every 90 day schedule.    I called the patient and she was good with that but had some questions while I was on the phone.  I have forwarded these questions to Dr Schmidt and will let the patient know what he says on Monday when I return to the office.      1. Can you look at her MRI from 3/6/18 - Dr Price wants her to restart chemo b/c he said it looked like she still had some \"damage\" from the surgery.  Remember she gets confused so may not be exactly what he said!!     2. She wants to know if she can drive yet?  She is having vision changes and is seeing Dr Woods next week or the next to check her vision.  She is also still on Keppra for the seizures and was told not to drive while on this medication - caregiver doesn't think she needs to be driving just yet.     3. Should she continue the Keppra until she sees you in June?     4. Can she drink alcohol while on Keppra (the bottle says no alcohol consumption while taking this medication!!)?     "

## 2018-03-13 PROCEDURE — 77334 RADIATION TREATMENT AID(S): CPT | Performed by: RADIOLOGY

## 2018-03-13 PROCEDURE — 77300 RADIATION THERAPY DOSE PLAN: CPT | Performed by: RADIOLOGY

## 2018-03-13 PROCEDURE — 77295 3-D RADIOTHERAPY PLAN: CPT | Performed by: RADIOLOGY

## 2018-03-13 NOTE — TELEPHONE ENCOUNTER
I talked w/Dr Schmidt and he says the following:  Patient doesn't need to drive at this time  Patient needs to continue Keppra  Alcohol in moderation  MRI shows no immediate concern    I notified the patient by phone of the above and she is agreeable.  She doesn't want to do chemo at this time but I instructed her she would have to discuss this with Dr Price.    mi baker CMA

## 2018-03-20 LAB
CYTO UR: NORMAL
LAB AP CASE REPORT: NORMAL
LAB AP DIAGNOSIS COMMENT: NORMAL
Lab: NORMAL
Lab: NORMAL
PATH REPORT.FINAL DX SPEC: NORMAL
PATH REPORT.GROSS SPEC: NORMAL

## 2018-03-26 ENCOUNTER — HOSPITAL ENCOUNTER (OUTPATIENT)
Dept: RADIATION ONCOLOGY | Facility: HOSPITAL | Age: 68
Setting detail: RADIATION/ONCOLOGY SERIES
Discharge: HOME | End: 2018-03-26

## 2018-03-26 PROCEDURE — 77372 SRS LINEAR BASED: CPT | Performed by: RADIOLOGY

## 2018-03-26 PROCEDURE — 77336 RADIATION PHYSICS CONSULT: CPT | Performed by: RADIOLOGY

## 2018-03-27 ENCOUNTER — APPOINTMENT (OUTPATIENT)
Dept: GENERAL RADIOLOGY | Facility: HOSPITAL | Age: 68
End: 2018-03-27

## 2018-03-27 ENCOUNTER — APPOINTMENT (OUTPATIENT)
Dept: CT IMAGING | Facility: HOSPITAL | Age: 68
End: 2018-03-27

## 2018-03-27 ENCOUNTER — HOSPITAL ENCOUNTER (INPATIENT)
Facility: HOSPITAL | Age: 68
LOS: 3 days | Discharge: HOME | End: 2018-03-30
Attending: EMERGENCY MEDICINE | Admitting: NEUROLOGICAL SURGERY

## 2018-03-27 DIAGNOSIS — Z78.9 DECREASED ACTIVITIES OF DAILY LIVING (ADL): ICD-10-CM

## 2018-03-27 DIAGNOSIS — D49.6 BRAIN TUMOR (HCC): Primary | ICD-10-CM

## 2018-03-27 DIAGNOSIS — Z74.09 IMPAIRED FUNCTIONAL MOBILITY, BALANCE, GAIT, AND ENDURANCE: ICD-10-CM

## 2018-03-27 LAB
ALBUMIN SERPL-MCNC: 4.5 G/DL (ref 3.5–5)
ALBUMIN/GLOB SERPL: 1.7 G/DL (ref 1.1–2.5)
ALP SERPL-CCNC: 92 U/L (ref 24–120)
ALT SERPL W P-5'-P-CCNC: 39 U/L (ref 0–54)
ANION GAP SERPL CALCULATED.3IONS-SCNC: 11 MMOL/L (ref 4–13)
ANION GAP SERPL CALCULATED.3IONS-SCNC: 12 MMOL/L (ref 4–13)
AST SERPL-CCNC: 28 U/L (ref 7–45)
BASOPHILS # BLD AUTO: 0.02 10*3/MM3 (ref 0–0.2)
BASOPHILS NFR BLD AUTO: 0.3 % (ref 0–2)
BILIRUB SERPL-MCNC: 0.5 MG/DL (ref 0.1–1)
BILIRUB UR QL STRIP: NEGATIVE
BUN BLD-MCNC: 16 MG/DL (ref 5–21)
BUN BLD-MCNC: 19 MG/DL (ref 5–21)
BUN/CREAT SERPL: 23.9 (ref 7–25)
BUN/CREAT SERPL: 30.2 (ref 7–25)
CALCIUM SPEC-SCNC: 10 MG/DL (ref 8.4–10.4)
CALCIUM SPEC-SCNC: 9.4 MG/DL (ref 8.4–10.4)
CHLORIDE SERPL-SCNC: 96 MMOL/L (ref 98–110)
CHLORIDE SERPL-SCNC: 98 MMOL/L (ref 98–110)
CLARITY UR: CLEAR
CO2 SERPL-SCNC: 31 MMOL/L (ref 24–31)
CO2 SERPL-SCNC: 32 MMOL/L (ref 24–31)
COLOR UR: YELLOW
CREAT BLD-MCNC: 0.63 MG/DL (ref 0.5–1.4)
CREAT BLD-MCNC: 0.67 MG/DL (ref 0.5–1.4)
D-LACTATE SERPL-SCNC: 1.3 MMOL/L (ref 0.5–2)
DEPRECATED RDW RBC AUTO: 39.5 FL (ref 40–54)
DEPRECATED RDW RBC AUTO: 39.8 FL (ref 40–54)
DIGOXIN SERPL-MCNC: <0.4 NG/ML (ref 0.8–2)
EOSINOPHIL # BLD AUTO: 0.14 10*3/MM3 (ref 0–0.7)
EOSINOPHIL NFR BLD AUTO: 2.4 % (ref 0–4)
ERYTHROCYTE [DISTWIDTH] IN BLOOD BY AUTOMATED COUNT: 13.4 % (ref 12–15)
ERYTHROCYTE [DISTWIDTH] IN BLOOD BY AUTOMATED COUNT: 13.5 % (ref 12–15)
GFR SERPL CREATININE-BSD FRML MDRD: 88 ML/MIN/1.73
GFR SERPL CREATININE-BSD FRML MDRD: 94 ML/MIN/1.73
GLOBULIN UR ELPH-MCNC: 2.7 GM/DL
GLUCOSE BLD-MCNC: 110 MG/DL (ref 70–100)
GLUCOSE BLD-MCNC: 123 MG/DL (ref 70–100)
GLUCOSE BLDC GLUCOMTR-MCNC: 144 MG/DL (ref 70–130)
GLUCOSE UR STRIP-MCNC: NEGATIVE MG/DL
HCT VFR BLD AUTO: 36.4 % (ref 37–47)
HCT VFR BLD AUTO: 36.9 % (ref 37–47)
HGB BLD-MCNC: 12.5 G/DL (ref 12–16)
HGB BLD-MCNC: 12.6 G/DL (ref 12–16)
HGB UR QL STRIP.AUTO: NEGATIVE
HOLD SPECIMEN: NORMAL
HOLD SPECIMEN: NORMAL
IMM GRANULOCYTES # BLD: 0.03 10*3/MM3 (ref 0–0.03)
IMM GRANULOCYTES NFR BLD: 0.5 % (ref 0–5)
INR PPP: 0.95 (ref 0.91–1.09)
KETONES UR QL STRIP: NEGATIVE
LEUKOCYTE ESTERASE UR QL STRIP.AUTO: NEGATIVE
LYMPHOCYTES # BLD AUTO: 1.22 10*3/MM3 (ref 0.72–4.86)
LYMPHOCYTES NFR BLD AUTO: 20.6 % (ref 15–45)
MCH RBC QN AUTO: 27.7 PG (ref 28–32)
MCH RBC QN AUTO: 27.8 PG (ref 28–32)
MCHC RBC AUTO-ENTMCNC: 34.1 G/DL (ref 33–36)
MCHC RBC AUTO-ENTMCNC: 34.3 G/DL (ref 33–36)
MCV RBC AUTO: 80.5 FL (ref 82–98)
MCV RBC AUTO: 81.5 FL (ref 82–98)
MONOCYTES # BLD AUTO: 0.49 10*3/MM3 (ref 0.19–1.3)
MONOCYTES NFR BLD AUTO: 8.3 % (ref 4–12)
NEUTROPHILS # BLD AUTO: 4.02 10*3/MM3 (ref 1.87–8.4)
NEUTROPHILS NFR BLD AUTO: 67.9 % (ref 39–78)
NITRITE UR QL STRIP: NEGATIVE
NRBC BLD MANUAL-RTO: 0 /100 WBC (ref 0–0)
PH UR STRIP.AUTO: 7 [PH] (ref 5–8)
PLATELET # BLD AUTO: 153 10*3/MM3 (ref 130–400)
PLATELET # BLD AUTO: 172 10*3/MM3 (ref 130–400)
PMV BLD AUTO: 10.1 FL (ref 6–12)
PMV BLD AUTO: 10.5 FL (ref 6–12)
POTASSIUM BLD-SCNC: 2.9 MMOL/L (ref 3.5–5.3)
POTASSIUM BLD-SCNC: 2.9 MMOL/L (ref 3.5–5.3)
PROT SERPL-MCNC: 7.2 G/DL (ref 6.3–8.7)
PROT UR QL STRIP: NEGATIVE
PROTHROMBIN TIME: 13 SECONDS (ref 11.9–14.6)
RBC # BLD AUTO: 4.52 10*6/MM3 (ref 4.2–5.4)
RBC # BLD AUTO: 4.53 10*6/MM3 (ref 4.2–5.4)
SODIUM BLD-SCNC: 139 MMOL/L (ref 135–145)
SODIUM BLD-SCNC: 141 MMOL/L (ref 135–145)
SP GR UR STRIP: 1.02 (ref 1–1.03)
UROBILINOGEN UR QL STRIP: NORMAL
WBC NRBC COR # BLD: 5.11 10*3/MM3 (ref 4.8–10.8)
WBC NRBC COR # BLD: 5.92 10*3/MM3 (ref 4.8–10.8)
WHOLE BLOOD HOLD SPECIMEN: NORMAL
WHOLE BLOOD HOLD SPECIMEN: NORMAL

## 2018-03-27 PROCEDURE — 25010000002 POTASSIUM CHLORIDE PER 2 MEQ: Performed by: EMERGENCY MEDICINE

## 2018-03-27 PROCEDURE — 85025 COMPLETE CBC W/AUTO DIFF WBC: CPT | Performed by: EMERGENCY MEDICINE

## 2018-03-27 PROCEDURE — 83605 ASSAY OF LACTIC ACID: CPT | Performed by: EMERGENCY MEDICINE

## 2018-03-27 PROCEDURE — 80162 ASSAY OF DIGOXIN TOTAL: CPT | Performed by: EMERGENCY MEDICINE

## 2018-03-27 PROCEDURE — 81003 URINALYSIS AUTO W/O SCOPE: CPT | Performed by: EMERGENCY MEDICINE

## 2018-03-27 PROCEDURE — 71045 X-RAY EXAM CHEST 1 VIEW: CPT

## 2018-03-27 PROCEDURE — 70450 CT HEAD/BRAIN W/O DYE: CPT

## 2018-03-27 PROCEDURE — 36415 COLL VENOUS BLD VENIPUNCTURE: CPT | Performed by: NEUROLOGICAL SURGERY

## 2018-03-27 PROCEDURE — 85610 PROTHROMBIN TIME: CPT | Performed by: NEUROLOGICAL SURGERY

## 2018-03-27 PROCEDURE — 87040 BLOOD CULTURE FOR BACTERIA: CPT | Performed by: EMERGENCY MEDICINE

## 2018-03-27 PROCEDURE — 99285 EMERGENCY DEPT VISIT HI MDM: CPT

## 2018-03-27 PROCEDURE — 25010000003 POTASSIUM CHLORIDE 10 MEQ/100ML SOLUTION: Performed by: NEUROLOGICAL SURGERY

## 2018-03-27 PROCEDURE — 80053 COMPREHEN METABOLIC PANEL: CPT | Performed by: EMERGENCY MEDICINE

## 2018-03-27 PROCEDURE — 99024 POSTOP FOLLOW-UP VISIT: CPT | Performed by: NEUROLOGICAL SURGERY

## 2018-03-27 PROCEDURE — 82962 GLUCOSE BLOOD TEST: CPT

## 2018-03-27 PROCEDURE — 85027 COMPLETE CBC AUTOMATED: CPT | Performed by: NEUROLOGICAL SURGERY

## 2018-03-27 RX ORDER — METOPROLOL TARTRATE 5 MG/5ML
5 INJECTION INTRAVENOUS EVERY 6 HOURS PRN
Status: DISCONTINUED | OUTPATIENT
Start: 2018-03-27 | End: 2018-03-30 | Stop reason: HOSPADM

## 2018-03-27 RX ORDER — DOCUSATE SODIUM 100 MG/1
100 CAPSULE, LIQUID FILLED ORAL 2 TIMES DAILY
Status: DISCONTINUED | OUTPATIENT
Start: 2018-03-27 | End: 2018-03-30 | Stop reason: HOSPADM

## 2018-03-27 RX ORDER — IPRATROPIUM BROMIDE AND ALBUTEROL SULFATE 2.5; .5 MG/3ML; MG/3ML
3 SOLUTION RESPIRATORY (INHALATION) EVERY 4 HOURS PRN
Status: DISCONTINUED | OUTPATIENT
Start: 2018-03-27 | End: 2018-03-30 | Stop reason: HOSPADM

## 2018-03-27 RX ORDER — DEXAMETHASONE SODIUM PHOSPHATE 10 MG/ML
10 INJECTION INTRAMUSCULAR; INTRAVENOUS ONCE
Status: DISCONTINUED | OUTPATIENT
Start: 2018-03-27 | End: 2018-03-30 | Stop reason: HOSPADM

## 2018-03-27 RX ORDER — ATORVASTATIN CALCIUM 10 MG/1
20 TABLET, FILM COATED ORAL NIGHTLY
Status: DISCONTINUED | OUTPATIENT
Start: 2018-03-27 | End: 2018-03-30 | Stop reason: HOSPADM

## 2018-03-27 RX ORDER — LEVETIRACETAM 500 MG/1
500 TABLET ORAL EVERY 12 HOURS SCHEDULED
Status: DISCONTINUED | OUTPATIENT
Start: 2018-03-27 | End: 2018-03-30 | Stop reason: HOSPADM

## 2018-03-27 RX ORDER — POTASSIUM CHLORIDE 14.9 MG/ML
20 INJECTION INTRAVENOUS ONCE
Status: COMPLETED | OUTPATIENT
Start: 2018-03-27 | End: 2018-03-27

## 2018-03-27 RX ORDER — POTASSIUM CHLORIDE 7.45 MG/ML
10 INJECTION INTRAVENOUS
Status: COMPLETED | OUTPATIENT
Start: 2018-03-27 | End: 2018-03-28

## 2018-03-27 RX ORDER — ALPRAZOLAM 0.5 MG/1
0.25 TABLET ORAL 2 TIMES DAILY PRN
Status: DISCONTINUED | OUTPATIENT
Start: 2018-03-27 | End: 2018-03-30 | Stop reason: HOSPADM

## 2018-03-27 RX ORDER — SODIUM CHLORIDE 0.9 % (FLUSH) 0.9 %
1-10 SYRINGE (ML) INJECTION AS NEEDED
Status: DISCONTINUED | OUTPATIENT
Start: 2018-03-27 | End: 2018-03-30 | Stop reason: HOSPADM

## 2018-03-27 RX ORDER — NICOTINE POLACRILEX 4 MG
15 LOZENGE BUCCAL
Status: DISCONTINUED | OUTPATIENT
Start: 2018-03-27 | End: 2018-03-30 | Stop reason: HOSPADM

## 2018-03-27 RX ORDER — SODIUM CHLORIDE 9 MG/ML
100 INJECTION, SOLUTION INTRAVENOUS CONTINUOUS
Status: DISCONTINUED | OUTPATIENT
Start: 2018-03-27 | End: 2018-03-30 | Stop reason: HOSPADM

## 2018-03-27 RX ORDER — PANTOPRAZOLE SODIUM 40 MG/1
40 TABLET, DELAYED RELEASE ORAL
Status: DISCONTINUED | OUTPATIENT
Start: 2018-03-27 | End: 2018-03-30 | Stop reason: HOSPADM

## 2018-03-27 RX ORDER — DIGOXIN 250 MCG
250 TABLET ORAL
Status: DISCONTINUED | OUTPATIENT
Start: 2018-03-28 | End: 2018-03-30 | Stop reason: HOSPADM

## 2018-03-27 RX ORDER — SODIUM CHLORIDE 0.9 % (FLUSH) 0.9 %
10 SYRINGE (ML) INJECTION AS NEEDED
Status: DISCONTINUED | OUTPATIENT
Start: 2018-03-27 | End: 2018-03-30 | Stop reason: HOSPADM

## 2018-03-27 RX ORDER — DEXAMETHASONE SODIUM PHOSPHATE 4 MG/ML
4 INJECTION, SOLUTION INTRA-ARTICULAR; INTRALESIONAL; INTRAMUSCULAR; INTRAVENOUS; SOFT TISSUE EVERY 6 HOURS SCHEDULED
Status: DISCONTINUED | OUTPATIENT
Start: 2018-03-28 | End: 2018-03-29

## 2018-03-27 RX ORDER — HYDROCODONE BITARTRATE AND ACETAMINOPHEN 7.5; 325 MG/1; MG/1
1 TABLET ORAL EVERY 4 HOURS PRN
Status: DISCONTINUED | OUTPATIENT
Start: 2018-03-27 | End: 2018-03-30 | Stop reason: HOSPADM

## 2018-03-27 RX ORDER — SERTRALINE HYDROCHLORIDE 100 MG/1
200 TABLET, FILM COATED ORAL DAILY
Status: DISCONTINUED | OUTPATIENT
Start: 2018-03-27 | End: 2018-03-30 | Stop reason: HOSPADM

## 2018-03-27 RX ORDER — DEXTROSE MONOHYDRATE 25 G/50ML
25 INJECTION, SOLUTION INTRAVENOUS
Status: DISCONTINUED | OUTPATIENT
Start: 2018-03-27 | End: 2018-03-30 | Stop reason: HOSPADM

## 2018-03-27 RX ADMIN — SERTRALINE 200 MG: 100 TABLET, FILM COATED ORAL at 21:39

## 2018-03-27 RX ADMIN — POTASSIUM CHLORIDE 10 MEQ: 10 INJECTION, SOLUTION INTRAVENOUS at 21:39

## 2018-03-27 RX ADMIN — LEVETIRACETAM 500 MG: 500 TABLET, FILM COATED ORAL at 21:39

## 2018-03-27 RX ADMIN — HYDROCODONE BITARTRATE AND ACETAMINOPHEN 1 TABLET: 7.5; 325 TABLET ORAL at 21:39

## 2018-03-27 RX ADMIN — SODIUM CHLORIDE 100 ML/HR: 9 INJECTION, SOLUTION INTRAVENOUS at 18:34

## 2018-03-27 RX ADMIN — DEXAMETHASONE SODIUM PHOSPHATE 4 MG: 4 INJECTION, SOLUTION INTRA-ARTICULAR; INTRALESIONAL; INTRAMUSCULAR; INTRAVENOUS; SOFT TISSUE at 23:31

## 2018-03-27 RX ADMIN — ATORVASTATIN CALCIUM 20 MG: 10 TABLET, FILM COATED ORAL at 21:39

## 2018-03-27 RX ADMIN — POTASSIUM CHLORIDE 20 MEQ: 200 INJECTION, SOLUTION INTRAVENOUS at 14:56

## 2018-03-27 RX ADMIN — POTASSIUM CHLORIDE 10 MEQ: 10 INJECTION, SOLUTION INTRAVENOUS at 23:31

## 2018-03-28 ENCOUNTER — APPOINTMENT (OUTPATIENT)
Dept: MRI IMAGING | Facility: HOSPITAL | Age: 68
End: 2018-03-28

## 2018-03-28 LAB
ANION GAP SERPL CALCULATED.3IONS-SCNC: 12 MMOL/L (ref 4–13)
BUN BLD-MCNC: 11 MG/DL (ref 5–21)
BUN/CREAT SERPL: 17.7 (ref 7–25)
CALCIUM SPEC-SCNC: 9 MG/DL (ref 8.4–10.4)
CHLORIDE SERPL-SCNC: 102 MMOL/L (ref 98–110)
CO2 SERPL-SCNC: 26 MMOL/L (ref 24–31)
CREAT BLD-MCNC: 0.62 MG/DL (ref 0.5–1.4)
GFR SERPL CREATININE-BSD FRML MDRD: 96 ML/MIN/1.73
GLUCOSE BLD-MCNC: 107 MG/DL (ref 70–100)
GLUCOSE BLDC GLUCOMTR-MCNC: 119 MG/DL (ref 70–130)
GLUCOSE BLDC GLUCOMTR-MCNC: 129 MG/DL (ref 70–130)
GLUCOSE BLDC GLUCOMTR-MCNC: 136 MG/DL (ref 70–130)
GLUCOSE BLDC GLUCOMTR-MCNC: 153 MG/DL (ref 70–130)
POTASSIUM BLD-SCNC: 3.2 MMOL/L (ref 3.5–5.3)
SODIUM BLD-SCNC: 140 MMOL/L (ref 135–145)

## 2018-03-28 PROCEDURE — 82962 GLUCOSE BLOOD TEST: CPT

## 2018-03-28 PROCEDURE — 70553 MRI BRAIN STEM W/O & W/DYE: CPT

## 2018-03-28 PROCEDURE — 97162 PT EVAL MOD COMPLEX 30 MIN: CPT

## 2018-03-28 PROCEDURE — A9577 INJ MULTIHANCE: HCPCS | Performed by: NEUROLOGICAL SURGERY

## 2018-03-28 PROCEDURE — 97167 OT EVAL HIGH COMPLEX 60 MIN: CPT

## 2018-03-28 PROCEDURE — 25010000003 POTASSIUM CHLORIDE 10 MEQ/100ML SOLUTION: Performed by: NEUROLOGICAL SURGERY

## 2018-03-28 PROCEDURE — G8988 SELF CARE GOAL STATUS: HCPCS

## 2018-03-28 PROCEDURE — 99024 POSTOP FOLLOW-UP VISIT: CPT | Performed by: NEUROLOGICAL SURGERY

## 2018-03-28 PROCEDURE — G8979 MOBILITY GOAL STATUS: HCPCS

## 2018-03-28 PROCEDURE — G8987 SELF CARE CURRENT STATUS: HCPCS

## 2018-03-28 PROCEDURE — 0 GADOBENATE DIMEGLUMINE 529 MG/ML SOLUTION: Performed by: NEUROLOGICAL SURGERY

## 2018-03-28 PROCEDURE — 25010000002 DEXAMETHASONE PER 1 MG: Performed by: NEUROLOGICAL SURGERY

## 2018-03-28 PROCEDURE — 80048 BASIC METABOLIC PNL TOTAL CA: CPT | Performed by: NEUROLOGICAL SURGERY

## 2018-03-28 PROCEDURE — G8978 MOBILITY CURRENT STATUS: HCPCS

## 2018-03-28 RX ORDER — PSEUDOEPHEDRINE HCL 30 MG
30 TABLET ORAL EVERY 4 HOURS PRN
COMMUNITY
End: 2018-04-25 | Stop reason: HOSPADM

## 2018-03-28 RX ORDER — DEXTROAMPHETAMINE SACCHARATE, AMPHETAMINE ASPARTATE, DEXTROAMPHETAMINE SULFATE AND AMPHETAMINE SULFATE 2.5; 2.5; 2.5; 2.5 MG/1; MG/1; MG/1; MG/1
10 TABLET ORAL EVERY MORNING
Status: ON HOLD | COMMUNITY
End: 2018-03-30

## 2018-03-28 RX ORDER — HYDROCHLOROTHIAZIDE 50 MG/1
50 TABLET ORAL DAILY
COMMUNITY
End: 2018-04-25 | Stop reason: HOSPADM

## 2018-03-28 RX ADMIN — DOCUSATE SODIUM 100 MG: 100 CAPSULE, LIQUID FILLED ORAL at 21:45

## 2018-03-28 RX ADMIN — HYDROCODONE BITARTRATE AND ACETAMINOPHEN 1 TABLET: 7.5; 325 TABLET ORAL at 14:25

## 2018-03-28 RX ADMIN — DEXAMETHASONE SODIUM PHOSPHATE 4 MG: 4 INJECTION, SOLUTION INTRA-ARTICULAR; INTRALESIONAL; INTRAMUSCULAR; INTRAVENOUS; SOFT TISSUE at 17:45

## 2018-03-28 RX ADMIN — SODIUM CHLORIDE 100 ML/HR: 9 INJECTION, SOLUTION INTRAVENOUS at 05:07

## 2018-03-28 RX ADMIN — LEVETIRACETAM 500 MG: 500 TABLET, FILM COATED ORAL at 07:56

## 2018-03-28 RX ADMIN — HYDROCODONE BITARTRATE AND ACETAMINOPHEN 1 TABLET: 7.5; 325 TABLET ORAL at 05:07

## 2018-03-28 RX ADMIN — DEXAMETHASONE SODIUM PHOSPHATE 4 MG: 4 INJECTION, SOLUTION INTRA-ARTICULAR; INTRALESIONAL; INTRAMUSCULAR; INTRAVENOUS; SOFT TISSUE at 05:07

## 2018-03-28 RX ADMIN — DEXAMETHASONE SODIUM PHOSPHATE 4 MG: 4 INJECTION, SOLUTION INTRA-ARTICULAR; INTRALESIONAL; INTRAMUSCULAR; INTRAVENOUS; SOFT TISSUE at 23:28

## 2018-03-28 RX ADMIN — SODIUM CHLORIDE 100 ML/HR: 9 INJECTION, SOLUTION INTRAVENOUS at 17:45

## 2018-03-28 RX ADMIN — SERTRALINE 200 MG: 100 TABLET, FILM COATED ORAL at 07:56

## 2018-03-28 RX ADMIN — POTASSIUM CHLORIDE 10 MEQ: 10 INJECTION, SOLUTION INTRAVENOUS at 01:37

## 2018-03-28 RX ADMIN — ATORVASTATIN CALCIUM 20 MG: 10 TABLET, FILM COATED ORAL at 21:45

## 2018-03-28 RX ADMIN — DIGOXIN 250 MCG: 0.25 TABLET ORAL at 12:25

## 2018-03-28 RX ADMIN — GADOBENATE DIMEGLUMINE 10 ML: 529 INJECTION, SOLUTION INTRAVENOUS at 07:30

## 2018-03-28 RX ADMIN — DOCUSATE SODIUM 100 MG: 100 CAPSULE, LIQUID FILLED ORAL at 07:56

## 2018-03-28 RX ADMIN — DEXAMETHASONE SODIUM PHOSPHATE 4 MG: 4 INJECTION, SOLUTION INTRA-ARTICULAR; INTRALESIONAL; INTRAMUSCULAR; INTRAVENOUS; SOFT TISSUE at 12:25

## 2018-03-28 RX ADMIN — LEVETIRACETAM 500 MG: 500 TABLET, FILM COATED ORAL at 21:45

## 2018-03-28 RX ADMIN — POTASSIUM CHLORIDE 10 MEQ: 10 INJECTION, SOLUTION INTRAVENOUS at 00:27

## 2018-03-28 RX ADMIN — PANTOPRAZOLE SODIUM 40 MG: 40 TABLET, DELAYED RELEASE ORAL at 05:07

## 2018-03-28 RX ADMIN — HYDROCODONE BITARTRATE AND ACETAMINOPHEN 1 TABLET: 7.5; 325 TABLET ORAL at 21:46

## 2018-03-29 LAB — GLUCOSE BLDC GLUCOMTR-MCNC: 124 MG/DL (ref 70–130)

## 2018-03-29 PROCEDURE — 97110 THERAPEUTIC EXERCISES: CPT

## 2018-03-29 PROCEDURE — 99024 POSTOP FOLLOW-UP VISIT: CPT | Performed by: NURSE PRACTITIONER

## 2018-03-29 PROCEDURE — 97116 GAIT TRAINING THERAPY: CPT

## 2018-03-29 PROCEDURE — 82962 GLUCOSE BLOOD TEST: CPT

## 2018-03-29 PROCEDURE — 25010000002 DEXAMETHASONE PER 1 MG: Performed by: NEUROLOGICAL SURGERY

## 2018-03-29 PROCEDURE — 97530 THERAPEUTIC ACTIVITIES: CPT

## 2018-03-29 PROCEDURE — 25010000002 DEXAMETHASONE PER 1 MG: Performed by: NURSE PRACTITIONER

## 2018-03-29 RX ORDER — DEXAMETHASONE SODIUM PHOSPHATE 4 MG/ML
6 INJECTION, SOLUTION INTRA-ARTICULAR; INTRALESIONAL; INTRAMUSCULAR; INTRAVENOUS; SOFT TISSUE EVERY 6 HOURS SCHEDULED
Status: DISCONTINUED | OUTPATIENT
Start: 2018-03-29 | End: 2018-03-30 | Stop reason: HOSPADM

## 2018-03-29 RX ORDER — CETIRIZINE HYDROCHLORIDE 10 MG/1
10 TABLET ORAL DAILY
Status: DISCONTINUED | OUTPATIENT
Start: 2018-03-29 | End: 2018-03-30 | Stop reason: HOSPADM

## 2018-03-29 RX ORDER — HYDROCHLOROTHIAZIDE 25 MG/1
50 TABLET ORAL DAILY
Status: DISCONTINUED | OUTPATIENT
Start: 2018-03-29 | End: 2018-03-30 | Stop reason: HOSPADM

## 2018-03-29 RX ADMIN — DEXAMETHASONE SODIUM PHOSPHATE 6 MG: 4 INJECTION, SOLUTION INTRA-ARTICULAR; INTRALESIONAL; INTRAMUSCULAR; INTRAVENOUS; SOFT TISSUE at 17:47

## 2018-03-29 RX ADMIN — SERTRALINE 200 MG: 100 TABLET, FILM COATED ORAL at 08:36

## 2018-03-29 RX ADMIN — DEXAMETHASONE SODIUM PHOSPHATE 4 MG: 4 INJECTION, SOLUTION INTRA-ARTICULAR; INTRALESIONAL; INTRAMUSCULAR; INTRAVENOUS; SOFT TISSUE at 06:59

## 2018-03-29 RX ADMIN — LEVETIRACETAM 500 MG: 500 TABLET, FILM COATED ORAL at 08:36

## 2018-03-29 RX ADMIN — DIGOXIN 250 MCG: 0.25 TABLET ORAL at 13:00

## 2018-03-29 RX ADMIN — HYDROCODONE BITARTRATE AND ACETAMINOPHEN 1 TABLET: 7.5; 325 TABLET ORAL at 11:18

## 2018-03-29 RX ADMIN — DEXAMETHASONE SODIUM PHOSPHATE 6 MG: 4 INJECTION, SOLUTION INTRA-ARTICULAR; INTRALESIONAL; INTRAMUSCULAR; INTRAVENOUS; SOFT TISSUE at 12:43

## 2018-03-29 RX ADMIN — ATORVASTATIN CALCIUM 20 MG: 10 TABLET, FILM COATED ORAL at 20:34

## 2018-03-29 RX ADMIN — DOCUSATE SODIUM 100 MG: 100 CAPSULE, LIQUID FILLED ORAL at 08:37

## 2018-03-29 RX ADMIN — HYDROCODONE BITARTRATE AND ACETAMINOPHEN 1 TABLET: 7.5; 325 TABLET ORAL at 03:34

## 2018-03-29 RX ADMIN — ALPRAZOLAM 0.25 MG: 0.5 TABLET ORAL at 03:34

## 2018-03-29 RX ADMIN — SODIUM CHLORIDE 100 ML/HR: 9 INJECTION, SOLUTION INTRAVENOUS at 04:33

## 2018-03-29 RX ADMIN — DOCUSATE SODIUM 100 MG: 100 CAPSULE, LIQUID FILLED ORAL at 20:34

## 2018-03-29 RX ADMIN — DEXAMETHASONE SODIUM PHOSPHATE 6 MG: 4 INJECTION, SOLUTION INTRA-ARTICULAR; INTRALESIONAL; INTRAMUSCULAR; INTRAVENOUS; SOFT TISSUE at 23:20

## 2018-03-29 RX ADMIN — PANTOPRAZOLE SODIUM 40 MG: 40 TABLET, DELAYED RELEASE ORAL at 06:59

## 2018-03-29 RX ADMIN — SODIUM CHLORIDE 100 ML/HR: 9 INJECTION, SOLUTION INTRAVENOUS at 16:14

## 2018-03-29 RX ADMIN — CETIRIZINE HYDROCHLORIDE 10 MG: 10 TABLET, FILM COATED ORAL at 13:00

## 2018-03-29 RX ADMIN — LEVETIRACETAM 500 MG: 500 TABLET, FILM COATED ORAL at 20:34

## 2018-03-30 ENCOUNTER — HOSPITAL ENCOUNTER (EMERGENCY)
Facility: HOSPITAL | Age: 68
Discharge: HOME | End: 2018-03-31
Attending: FAMILY MEDICINE | Admitting: FAMILY MEDICINE

## 2018-03-30 VITALS
OXYGEN SATURATION: 97 % | DIASTOLIC BLOOD PRESSURE: 70 MMHG | SYSTOLIC BLOOD PRESSURE: 120 MMHG | TEMPERATURE: 97.8 F | RESPIRATION RATE: 18 BRPM | HEIGHT: 64 IN | BODY MASS INDEX: 22.33 KG/M2 | WEIGHT: 130.8 LBS | HEART RATE: 60 BPM

## 2018-03-30 DIAGNOSIS — S00.03XA CONTUSION OF SCALP, INITIAL ENCOUNTER: ICD-10-CM
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
## 2018-03-30 LAB
GLUCOSE BLDC GLUCOMTR-MCNC: 115 MG/DL (ref 70–130)
GLUCOSE BLDC GLUCOMTR-MCNC: 163 MG/DL (ref 70–130)

## 2018-03-30 PROCEDURE — 99284 EMERGENCY DEPT VISIT MOD MDM: CPT

## 2018-03-30 PROCEDURE — 97116 GAIT TRAINING THERAPY: CPT

## 2018-03-30 PROCEDURE — 99024 POSTOP FOLLOW-UP VISIT: CPT | Performed by: NURSE PRACTITIONER

## 2018-03-30 PROCEDURE — 82962 GLUCOSE BLOOD TEST: CPT

## 2018-03-30 PROCEDURE — 97110 THERAPEUTIC EXERCISES: CPT

## 2018-03-30 PROCEDURE — 25010000002 DEXAMETHASONE PER 1 MG: Performed by: NURSE PRACTITIONER

## 2018-03-30 RX ORDER — DEXAMETHASONE 4 MG/1
8 TABLET ORAL
Qty: 60 TABLET | Refills: 0 | Status: ON HOLD | OUTPATIENT
Start: 2018-03-30 | End: 2018-04-21

## 2018-03-30 RX ORDER — ALPRAZOLAM 0.25 MG/1
0.25 TABLET ORAL 2 TIMES DAILY PRN
Qty: 60 TABLET | Refills: 0 | Status: SHIPPED | OUTPATIENT
Start: 2018-03-30 | End: 2020-01-06 | Stop reason: ALTCHOICE

## 2018-03-30 RX ORDER — DEXTROAMPHETAMINE SACCHARATE, AMPHETAMINE ASPARTATE, DEXTROAMPHETAMINE SULFATE AND AMPHETAMINE SULFATE 2.5; 2.5; 2.5; 2.5 MG/1; MG/1; MG/1; MG/1
10 TABLET ORAL EVERY MORNING
Qty: 30 TABLET | Refills: 0 | Status: SHIPPED | OUTPATIENT
Start: 2018-03-30 | End: 2018-10-23

## 2018-03-30 RX ORDER — HYDROCODONE BITARTRATE AND ACETAMINOPHEN 7.5; 325 MG/1; MG/1
1 TABLET ORAL EVERY 6 HOURS PRN
Qty: 120 TABLET | Refills: 0 | Status: SHIPPED | OUTPATIENT
Start: 2018-03-30 | End: 2018-07-12

## 2018-03-30 RX ADMIN — DEXAMETHASONE SODIUM PHOSPHATE 6 MG: 4 INJECTION, SOLUTION INTRA-ARTICULAR; INTRALESIONAL; INTRAMUSCULAR; INTRAVENOUS; SOFT TISSUE at 11:28

## 2018-03-30 RX ADMIN — CETIRIZINE HYDROCHLORIDE 10 MG: 10 TABLET, FILM COATED ORAL at 08:15

## 2018-03-30 RX ADMIN — DEXAMETHASONE SODIUM PHOSPHATE 6 MG: 4 INJECTION, SOLUTION INTRA-ARTICULAR; INTRALESIONAL; INTRAMUSCULAR; INTRAVENOUS; SOFT TISSUE at 05:39

## 2018-03-30 RX ADMIN — SERTRALINE 200 MG: 100 TABLET, FILM COATED ORAL at 08:15

## 2018-03-30 RX ADMIN — HYDROCODONE BITARTRATE AND ACETAMINOPHEN 1 TABLET: 7.5; 325 TABLET ORAL at 05:39

## 2018-03-30 RX ADMIN — SODIUM CHLORIDE 100 ML/HR: 9 INJECTION, SOLUTION INTRAVENOUS at 02:38

## 2018-03-30 RX ADMIN — ALPRAZOLAM 0.25 MG: 0.5 TABLET ORAL at 05:41

## 2018-03-30 RX ADMIN — HYDROCHLOROTHIAZIDE 50 MG: 25 TABLET ORAL at 08:15

## 2018-03-30 RX ADMIN — PANTOPRAZOLE SODIUM 40 MG: 40 TABLET, DELAYED RELEASE ORAL at 05:39

## 2018-03-30 RX ADMIN — LEVETIRACETAM 500 MG: 500 TABLET, FILM COATED ORAL at 08:15

## 2018-03-31 ENCOUNTER — APPOINTMENT (OUTPATIENT)
Dept: CT IMAGING | Facility: HOSPITAL | Age: 68
End: 2018-03-31

## 2018-03-31 VITALS
OXYGEN SATURATION: 99 % | HEART RATE: 65 BPM | WEIGHT: 130.8 LBS | BODY MASS INDEX: 22.33 KG/M2 | HEIGHT: 64 IN | SYSTOLIC BLOOD PRESSURE: 120 MMHG | RESPIRATION RATE: 16 BRPM | DIASTOLIC BLOOD PRESSURE: 70 MMHG | TEMPERATURE: 98 F

## 2018-03-31 PROCEDURE — 72125 CT NECK SPINE W/O DYE: CPT

## 2018-03-31 PROCEDURE — 70450 CT HEAD/BRAIN W/O DYE: CPT

## 2018-03-31 PROCEDURE — 72192 CT PELVIS W/O DYE: CPT

## 2018-04-01 LAB
BACTERIA SPEC AEROBE CULT: NORMAL
BACTERIA SPEC AEROBE CULT: NORMAL

## 2018-04-01 NOTE — THERAPY DISCHARGE NOTE
Acute Care - Physical Therapy Discharge Summary  [facility]       Patient Name: Marcy Garcia  : 1950  MRN: [id number]    Today's Date: 2018  Onset of Illness/Injury or Date of Surgery: 18    Date of Referral to PT: 18  Referring Physician: Dr. Schmidt      Admit Date: 3/27/2018      PT Recommendation and Plan    Visit Dx:    ICD-10-CM ICD-9-CM   1. Brain tumor D49.6 239.6   2. Impaired functional mobility, balance, gait, and endurance Z74.09 V49.89   3. Decreased activities of daily living (ADL) Z78.9 V49.89             Outcome Measures     Row Name 18 1500             How much help from another is currently needed...    Putting on and taking off regular lower body clothing? 2  -TS      Bathing (including washing, rinsing, and drying) 2  -TS      Toileting (which includes using toilet bed pan or urinal) 3  -TS      Putting on and taking off regular upper body clothing 3  -TS      Taking care of personal grooming (such as brushing teeth) 3  -TS      Eating meals 3  -TS      Score 16  -TS         Functional Assessment    Outcome Measure Options AM-PAC 6 Clicks Daily Activity (OT)  -TS        User Key  (r) = Recorded By, (t) = Taken By, (c) = Cosigned By    Initials Name Provider Type    TS FLAVIA Moody/L Occupational Therapy Assistant                      PT Rehab Goals     Row Name 18 1300 18 0940          Bed Mobility Goal 1 (PT)    Activity/Assistive Device (Bed Mobility Goal 1, PT)  -- rolling to left;rolling to right;sit to supine;supine to sit  -OZZY (r) CS (t) OZZY (c)     [place] Level/Cues Needed (Bed Mobility Goal 1, PT)  -- standby assist  -OZZY (r) CS (t) OZZY (c)     Time Frame (Bed Mobility Goal 1, PT)  -- long term goal (LTG);10 days  -OZZY (r) CS (t) OZZY (c)     Barriers (Bed Mobility Goal 1, PT)  -- weakness  -OZZY (r) CS (t) OZZY (c)     Progress/Outcomes (Bed Mobility Goal 1, PT) goal not met  -KJ goal ongoing  -OZZY (r) CS (t) OZZY (c)        Transfer Goal  1 (PT)    Activity/Assistive Device (Transfer Goal 1, PT)  -- sit-to-stand/stand-to-sit;bed-to-chair/chair-to-bed  -OZZY (r) CS (t) OZZY (c)     [place] Level/Cues Needed (Transfer Goal 1, PT)  -- minimum assist (75% or more patient effort);contact guard assist   x1  -OZZY     Time Frame (Transfer Goal 1, PT)  -- long term goal (LTG);10 days  -OZZY (r) CS (t) OZZY (c)     Barriers (Transfers Goal 1, PT)  -- weakness  -OZZY (r) CS (t) OZZY (c)     Progress/Outcome (Transfer Goal 1, PT) goal met  -KJ goal ongoing  -OZZY (r) CS (t) OZZY (c)        Gait Training Goal 1 (PT)    Activity/Assistive Device (Gait Training Goal 1, PT)  -- gait (walking locomotion)  -OZZY (r) CS (t) OZZY (c)     [place] Level (Gait Training Goal 1, PT)  -- minimum assist (75% or more patient effort);contact guard assist  -OZZY     Distance (Gait Goal 1, PT)  -- 50  -OZZY     Time Frame (Gait Training Goal 1, PT)  -- long term goal (LTG);10 days  -OZZY (r) CS (t) OZZY (c)     Barriers (Gait Training Goal 1, PT)  -- weakness  -OZZY (r) CS (t) OZZY (c)     Progress/Outcome (Gait Training Goal 1, PT) goal not met  -KJ goal ongoing  -OZZY (r) CS (t) OZZY (c)       User Key  (r) = Recorded By, (t) = Taken By, (c) = Cosigned By    Initials Name Provider Type    VIC Leary PTA Physical Therapy Assistant    OZZY Oliveira, PT DPT Physical Therapist    JJ Peck, PT Student PT Student              PT Discharge Summary  Anticipated Discharge Disposition (PT): home with home health care  Reason for Discharge: Discharge from facility  Outcomes Achieved: Refer to plan of care for updates on goals achieved  Discharge Destination: Home with assist      Geneva Leary PTA   4/1/2018

## 2018-04-03 ENCOUNTER — TELEPHONE (OUTPATIENT)
Dept: NEUROSURGERY | Facility: CLINIC | Age: 68
End: 2018-04-03

## 2018-04-03 NOTE — TELEPHONE ENCOUNTER
Patient's  called stating patient is at [facility] and they are not happy with her being there - they wanted her to go to [facility] but he has talked w/[facility] and they won't take her in the condition she is in at this time.  She has fallen twice and even ended back in the ER here at [facility].  He is talking with [facility] again today to try to get them to agree to take her if she starts to improve.  He said he would keep us posted.    mi baker CMA

## 2018-04-05 ENCOUNTER — HOSPITAL ENCOUNTER (OUTPATIENT)
Dept: RADIATION ONCOLOGY | Facility: HOSPITAL | Age: 68
Setting detail: RADIATION/ONCOLOGY SERIES
End: 2018-04-05

## 2018-04-12 ENCOUNTER — OFFICE VISIT (OUTPATIENT)
Dept: OTOLARYNGOLOGY | Facility: CLINIC | Age: 68
End: 2018-04-12

## 2018-04-12 ENCOUNTER — PROCEDURE VISIT (OUTPATIENT)
Dept: OTOLARYNGOLOGY | Facility: CLINIC | Age: 68
End: 2018-04-12

## 2018-04-12 VITALS
TEMPERATURE: 98 F | RESPIRATION RATE: 20 BRPM | HEIGHT: 64 IN | WEIGHT: 125 LBS | HEART RATE: 80 BPM | BODY MASS INDEX: 21.34 KG/M2 | DIASTOLIC BLOOD PRESSURE: 73 MMHG | SYSTOLIC BLOOD PRESSURE: 122 MMHG

## 2018-04-12 DIAGNOSIS — H69.82 DYSFUNCTION OF LEFT EUSTACHIAN TUBE: ICD-10-CM

## 2018-04-12 DIAGNOSIS — H90.3 SENSORINEURAL HEARING LOSS (SNHL) OF BOTH EARS: Primary | ICD-10-CM

## 2018-04-12 DIAGNOSIS — D49.6 BRAIN TUMOR (HCC): ICD-10-CM

## 2018-04-12 DIAGNOSIS — J30.9 ALLERGIC RHINITIS, UNSPECIFIED CHRONICITY, UNSPECIFIED SEASONALITY, UNSPECIFIED TRIGGER: ICD-10-CM

## 2018-04-12 PROCEDURE — 92557 COMPREHENSIVE HEARING TEST: CPT | Performed by: AUDIOLOGIST-HEARING AID FITTER

## 2018-04-12 PROCEDURE — 92567 TYMPANOMETRY: CPT | Performed by: AUDIOLOGIST-HEARING AID FITTER

## 2018-04-12 PROCEDURE — 99203 OFFICE O/P NEW LOW 30 MIN: CPT | Performed by: NURSE PRACTITIONER

## 2018-04-12 RX ORDER — AZELASTINE 1 MG/ML
2 SPRAY, METERED NASAL 2 TIMES DAILY
Qty: 30 ML | Refills: 6 | Status: SHIPPED | OUTPATIENT
Start: 2018-04-12 | End: 2018-05-12

## 2018-04-12 RX ORDER — FLUTICASONE PROPIONATE 50 MCG
2 SPRAY, SUSPENSION (ML) NASAL DAILY
Qty: 1 BOTTLE | Refills: 6 | Status: SHIPPED | OUTPATIENT
Start: 2018-04-12 | End: 2018-05-12

## 2018-04-12 NOTE — PATIENT INSTRUCTIONS
(1) See the medical provider as scheduled due to the hearing loss and possible MHL at the left ear.  (2) Receive audiological testing as needed.  (3) Amplification may be warranted. This was discussed briefly.

## 2018-04-12 NOTE — PROGRESS NOTES
PRIMARY CARE PROVIDER: Mary Henning MD  REFERRING PROVIDER: Mary Henning MD    Chief Complaint   Patient presents with   • Hearing Loss     HEARING LOSS       Subjective   History of Present Illness:  Marcy Garcia is a  67 y.o. female who complains of decreased hearing. The symptoms are localized to the right ear. The patient has had mild symptoms. The symptoms have been present for the last 1 year. There have been no identified factors that aggravate the symptoms. There have been no factors that have improved the symptoms. She reports she noticed this while undergoing chemotherapy. She has also had popping of the left ear and mild pressure/fullness of the left ear.     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 She is s/p right frontal craniotomy by Dr. Schmidt on 1/15/18 and had SRS on 3/26/18. She is being followed by Dr. Price, Dr. Blake, and Dr. Schmidt. Last MRI Brain on 3/28/18    Review of Systems:  Review of Systems   Constitutional: Positive for activity change and fatigue. Negative for chills and fever.   HENT: Positive for congestion, hearing loss, postnasal drip and rhinorrhea. Negative for ear discharge, ear pain, trouble swallowing and voice change.    Respiratory: Negative for shortness of breath.    Cardiovascular: Negative for chest pain.   Gastrointestinal: Negative for nausea and vomiting.   Musculoskeletal: Positive for gait problem.   Neurological: Positive for weakness and headaches. Negative for dizziness and syncope.   Psychiatric/Behavioral: Negative for agitation and confusion.       Past History:  Past Medical History:   Diagnosis Date   • Adenocarcinoma, lung, left    • Anemia    • Anxiety    • Arthritis    • Ataxia    • Brain tumor    • Cancer     lung cancer - pt had chemo and was told she was cancer free, but recently a brain tumor was found   • Colostomy in place    • Confusion    • COPD (chronic obstructive pulmonary disease)    •  Depression    • Dizziness    • GERD (gastroesophageal reflux disease)    • Headache    • Memory loss    • Panic attacks    • Seasonal allergies    • Urgency of urination    • Weakness      Past Surgical History:   Procedure Laterality Date   • COLON SURGERY  2013    BLOCKED BOWEL - COLOSTOMY   • CRANIOTOMY FOR TUMOR Right 1/15/2018    Procedure: CRANIOTOMY FOR TUMOR STERIOTACTIC WITH BRAIN LAB right frontal craniotomy for brain tumor with neuromonitoring and brain lab;  Surgeon: Constantine Schmidt MD;  Location: [facility] OR;  Service:    • ECTOPIC PREGNANCY SURGERY  1983   • HERNIA REPAIR     • HYSTERECTOMY  1987   • LUNG CANCER SURGERY Left     Dr Marlow - Lower Lobectomy   • ORIF FINGER FRACTURE      left pinky   • SHOULDER SURGERY Left     BROKEN COLLAR BONE & SHOULDER SURGERY & ELBOW   • TONSILLECTOMY AND ADENOIDECTOMY  1954     Family History   Problem Relation Age of Onset   • Stroke Mother    • Cancer Father      Social History   Substance Use Topics   • Smoking status: Former Smoker     Years: 51.00     Types: Cigarettes     Quit date: 10/2016   • Smokeless tobacco: Never Used      Comment: when pt smoked she smoked 2 packs a day on average   • Alcohol use No     Allergies:  Kiwi extract and Pineapple    Current Outpatient Prescriptions:   •  ALPRAZolam (XANAX) 0.25 MG tablet, Take 1 tablet by mouth 2 (Two) Times a Day As Needed for Anxiety., Disp: 60 tablet, Rfl: 0  •  amphetamine-dextroamphetamine (ADDERALL) 10 MG tablet, Take 1 tablet by mouth Every Morning., Disp: 30 tablet, Rfl: 0  •  atorvastatin (LIPITOR) 20 MG tablet, Take 20 mg by mouth Daily., Disp: , Rfl:   •  B Complex Vitamins (VITAMIN B COMPLEX PO), Take 1 tablet by mouth Daily., Disp: , Rfl:   •  calcium-vitamin D (OSCAL-500) 500-200 MG-UNIT per tablet, Take 1 tablet by mouth Daily., Disp: , Rfl:   •  cetirizine (zyrTEC) 10 MG tablet, Take 10 mg by mouth Daily., Disp: , Rfl:   •  dexamethasone (DECADRON) 4 MG tablet, Take 2 tablets by mouth  Every 3 (Three) Hours., Disp: 60 tablet, Rfl: 0  •  digoxin (LANOXIN) 250 MCG tablet, Take 250 mcg by mouth Daily., Disp: , Rfl:   •  fluticasone (FLONASE) 50 MCG/ACT nasal spray, 2 sprays into each nostril Daily for 30 days., Disp: 1 bottle, Rfl: 6  •  hydrochlorothiazide (HYDRODIURIL) 50 MG tablet, Take 50 mg by mouth Daily., Disp: , Rfl:   •  HYDROcodone-acetaminophen (NORCO) 7.5-325 MG per tablet, Take 1 tablet by mouth Every 6 (Six) Hours As Needed for Moderate Pain ., Disp: 120 tablet, Rfl: 0  •  Ipratropium-Albuterol (COMBIVENT IN), Inhale., Disp: , Rfl:   •  ipratropium-albuterol (COMBIVENT RESPIMAT)  MCG/ACT inhaler, Inhale 1 puff As Needed for Wheezing., Disp: , Rfl:   •  levETIRAcetam (KEPPRA) 500 MG tablet, Take 500 mg by mouth 2 (Two) Times a Day., Disp: , Rfl:   •  montelukast (SINGULAIR) 10 MG tablet, Take 10 mg by mouth Every Night., Disp: , Rfl:   •  pantoprazole (PROTONIX) 40 MG EC tablet, Take 40 mg by mouth Daily., Disp: , Rfl:   •  pseudoephedrine (SUDAFED) 30 MG tablet, Take 30 mg by mouth Every 4 (Four) Hours As Needed for Congestion., Disp: , Rfl:   •  sertraline (ZOLOFT) 100 MG tablet, Take 200 mg by mouth Daily., Disp: , Rfl:   •  azelastine (ASTELIN) 0.1 % nasal spray, 2 sprays into each nostril 2 (Two) Times a Day for 30 days. Use in each nostril as directed, Disp: 30 mL, Rfl: 6      Objective     Vital Signs:  Temp:  [98 °F (36.7 °C)] 98 °F (36.7 °C)  Heart Rate:  [80] 80  Resp:  [20] 20  BP: (122)/(73) 122/73    Physical Exam:  Physical Exam  CONSTITUTIONAL: well nourished, well-developed, alert, oriented, in no acute distress   COMMUNICATION AND VOICE: able to communicate normally, normal voice quality  HEAD: normocephalic, no lesions, atraumatic, no tenderness, no masses   FACE: appearance normal, no lesions, no tenderness, no deformities, facial motion symmetric  SALIVARY GLANDS: parotid glands with no tenderness, no swelling, no masses, submandibular glands with normal size,  nontender  EYES: ocular motility normal, eyelids normal, orbits normal, no proptosis, conjunctiva normal , pupils equal, round   EARS:  Hearing: response to conversational voice normal bilaterally   External Ears: auricles without lesions  Otoscopic: tympanic membrane appearance normal, no lesions, no perforation, normal mobility, no fluid  NOSE:  External Nose: structure normal, no tenderness on palpation, no nasal discharge, no lesions, no evidence of trauma, nostrils patent   Intranasal Exam: nasal mucosa appears allergic, vestibule within normal limits, inferior turbinate normal, nasal septum midline   ORAL:  Lips: upper and lower lips without lesion   Teeth: dentition within normal limits for age   Gums: gingivae healthy   Oral Mucosa: oral mucosa normal, no mucosal lesions   Floor of Mouth: Warthin’s duct patent, mucosa normal  Tongue: lingual mucosa normal without lesions, normal tongue mobility   Palate: soft and hard palates with normal mucosa and structure  Oropharynx: oropharyngeal mucosa normal  NECK: neck appearance normal, no masses or tenderness  LYMPH NODES: no lymphadenopathy  CHEST/RESPIRATORY: respiratory effort normal, normal breath sounds   CARDIOVASCULAR: rate and rhythm normal, extremities without cyanosis or edema    NEUROLOGIC/PSYCHIATRIC: oriented to time, place and person, mood normal, affect appropriate, CN II-XII intact grossly    Results Review:       Assessment   Assessment:  1. Sensorineural hearing loss (SNHL) of both ears    2. Dysfunction of left eustachian tube    3. Allergic rhinitis, unspecified chronicity, unspecified seasonality, unspecified trigger    4. Brain tumor        Plan   Plan:    New Medications Ordered This Visit   Medications   • fluticasone (FLONASE) 50 MCG/ACT nasal spray     Si sprays into each nostril Daily for 30 days.     Dispense:  1 bottle     Refill:  6   • azelastine (ASTELIN) 0.1 % nasal spray     Si sprays into each nostril 2 (Two) Times a Day  for 30 days. Use in each nostril as directed     Dispense:  30 mL     Refill:  6     Start nasal sprays. The proper use of nasal inhalers was discussed including the need for regular use and build up of drug levels before full effects. Will closely monitor with repeat audiogram. Call for ear drainage, ear pain, fever over 101, or hearing loss. Call for problems or worsening symptoms.     Return in about 3 months (around 7/12/2018), or if symptoms worsen or fail to improve, for Recheck.    My findings and recommendations were discussed and questions were answered.     Smitha Valentin, SATINDER  04/12/18  1:03 PM

## 2018-04-16 ENCOUNTER — TELEPHONE (OUTPATIENT)
Dept: NEUROSURGERY | Facility: CLINIC | Age: 68
End: 2018-04-16

## 2018-04-16 NOTE — TELEPHONE ENCOUNTER
Patient called & left a message asking me to call her back b/c she is in [facility] and wants us to give an order for her to leave.  I talked w/Dr Schmidt and he said she would need to talk to her family & her caregiver at the NH about discharge orders.  I tried calling her back but had to leave a message.    mi baker CMA

## 2018-04-16 NOTE — TELEPHONE ENCOUNTER
Called [facility] now known as [facility], and spoke with Susanne who says the patient refuses to leave on her own and Susanne says she is working with the oncologist to get her discharged w/home health nursing.    mi baker CMA

## 2018-04-19 ENCOUNTER — HOSPITAL ENCOUNTER (INPATIENT)
Facility: HOSPITAL | Age: 68
LOS: 4 days | Discharge: HOME HEALTH | End: 2018-04-25
Attending: FAMILY MEDICINE | Admitting: FAMILY MEDICINE

## 2018-04-19 ENCOUNTER — APPOINTMENT (OUTPATIENT)
Dept: GENERAL RADIOLOGY | Facility: HOSPITAL | Age: 68
End: 2018-04-19

## 2018-04-19 DIAGNOSIS — L03.119 CELLULITIS OF UPPER EXTREMITY, UNSPECIFIED LATERALITY: ICD-10-CM

## 2018-04-19 DIAGNOSIS — T14.8XXA ANIMAL BITE: Primary | ICD-10-CM

## 2018-04-19 LAB
ALBUMIN SERPL-MCNC: 4.2 G/DL (ref 3.5–5)
ALBUMIN/GLOB SERPL: 1.5 G/DL (ref 1.1–2.5)
ALP SERPL-CCNC: 91 U/L (ref 24–120)
ALT SERPL W P-5'-P-CCNC: 38 U/L (ref 0–54)
ANION GAP SERPL CALCULATED.3IONS-SCNC: 11 MMOL/L (ref 4–13)
AST SERPL-CCNC: 27 U/L (ref 7–45)
BACTERIA UR QL AUTO: ABNORMAL /HPF
BASOPHILS # BLD AUTO: 0.01 10*3/MM3 (ref 0–0.2)
BASOPHILS NFR BLD AUTO: 0.1 % (ref 0–2)
BILIRUB SERPL-MCNC: 0.6 MG/DL (ref 0.1–1)
BILIRUB UR QL STRIP: NEGATIVE
BUN BLD-MCNC: 14 MG/DL (ref 5–21)
BUN/CREAT SERPL: 25.9 (ref 7–25)
CALCIUM SPEC-SCNC: 9.7 MG/DL (ref 8.4–10.4)
CHLORIDE SERPL-SCNC: 96 MMOL/L (ref 98–110)
CLARITY UR: CLEAR
CO2 SERPL-SCNC: 28 MMOL/L (ref 24–31)
COLOR UR: YELLOW
CREAT BLD-MCNC: 0.54 MG/DL (ref 0.5–1.4)
DEPRECATED RDW RBC AUTO: 41.6 FL (ref 40–54)
DIGOXIN SERPL-MCNC: 0.5 NG/ML (ref 0.8–2)
EOSINOPHIL # BLD AUTO: 0.09 10*3/MM3 (ref 0–0.7)
EOSINOPHIL NFR BLD AUTO: 1.2 % (ref 0–4)
ERYTHROCYTE [DISTWIDTH] IN BLOOD BY AUTOMATED COUNT: 14.4 % (ref 12–15)
GFR SERPL CREATININE-BSD FRML MDRD: 112 ML/MIN/1.73
GLOBULIN UR ELPH-MCNC: 2.8 GM/DL
GLUCOSE BLD-MCNC: 99 MG/DL (ref 70–100)
GLUCOSE UR STRIP-MCNC: NEGATIVE MG/DL
HCT VFR BLD AUTO: 36.5 % (ref 37–47)
HGB BLD-MCNC: 12.3 G/DL (ref 12–16)
HGB UR QL STRIP.AUTO: NEGATIVE
HOLD SPECIMEN: NORMAL
HOLD SPECIMEN: NORMAL
HYALINE CASTS UR QL AUTO: ABNORMAL /LPF
IMM GRANULOCYTES # BLD: 0.04 10*3/MM3 (ref 0–0.03)
IMM GRANULOCYTES NFR BLD: 0.5 % (ref 0–5)
KETONES UR QL STRIP: NEGATIVE
LEUKOCYTE ESTERASE UR QL STRIP.AUTO: ABNORMAL
LYMPHOCYTES # BLD AUTO: 1.06 10*3/MM3 (ref 0.72–4.86)
LYMPHOCYTES NFR BLD AUTO: 14.3 % (ref 15–45)
MCH RBC QN AUTO: 27.3 PG (ref 28–32)
MCHC RBC AUTO-ENTMCNC: 33.7 G/DL (ref 33–36)
MCV RBC AUTO: 81.1 FL (ref 82–98)
MONOCYTES # BLD AUTO: 0.46 10*3/MM3 (ref 0.19–1.3)
MONOCYTES NFR BLD AUTO: 6.2 % (ref 4–12)
NEUTROPHILS # BLD AUTO: 5.76 10*3/MM3 (ref 1.87–8.4)
NEUTROPHILS NFR BLD AUTO: 77.7 % (ref 39–78)
NITRITE UR QL STRIP: NEGATIVE
NRBC BLD MANUAL-RTO: 0 /100 WBC (ref 0–0)
PH UR STRIP.AUTO: 6.5 [PH] (ref 5–8)
PLATELET # BLD AUTO: 108 10*3/MM3 (ref 130–400)
PMV BLD AUTO: 9.9 FL (ref 6–12)
POTASSIUM BLD-SCNC: 3.2 MMOL/L (ref 3.5–5.3)
PROT SERPL-MCNC: 7 G/DL (ref 6.3–8.7)
PROT UR QL STRIP: NEGATIVE
RBC # BLD AUTO: 4.5 10*6/MM3 (ref 4.2–5.4)
RBC # UR: ABNORMAL /HPF
REF LAB TEST METHOD: ABNORMAL
SODIUM BLD-SCNC: 135 MMOL/L (ref 135–145)
SP GR UR STRIP: 1.02 (ref 1–1.03)
SQUAMOUS #/AREA URNS HPF: ABNORMAL /HPF
UROBILINOGEN UR QL STRIP: ABNORMAL
WBC NRBC COR # BLD: 7.42 10*3/MM3 (ref 4.8–10.8)
WBC UR QL AUTO: ABNORMAL /HPF
WHOLE BLOOD HOLD SPECIMEN: NORMAL
WHOLE BLOOD HOLD SPECIMEN: NORMAL

## 2018-04-19 PROCEDURE — 81001 URINALYSIS AUTO W/SCOPE: CPT | Performed by: FAMILY MEDICINE

## 2018-04-19 PROCEDURE — 25010000003 CEFAZOLIN PER 500 MG: Performed by: FAMILY MEDICINE

## 2018-04-19 PROCEDURE — G0378 HOSPITAL OBSERVATION PER HR: HCPCS

## 2018-04-19 PROCEDURE — 25010000003 CEFAZOLIN PER 500 MG: Performed by: NURSE PRACTITIONER

## 2018-04-19 PROCEDURE — 99284 EMERGENCY DEPT VISIT MOD MDM: CPT

## 2018-04-19 PROCEDURE — 87086 URINE CULTURE/COLONY COUNT: CPT | Performed by: FAMILY MEDICINE

## 2018-04-19 PROCEDURE — 73090 X-RAY EXAM OF FOREARM: CPT

## 2018-04-19 PROCEDURE — 63710000001 DEXAMETHASONE PER 0.25 MG: Performed by: FAMILY MEDICINE

## 2018-04-19 PROCEDURE — 25010000002 ENOXAPARIN PER 10 MG: Performed by: FAMILY MEDICINE

## 2018-04-19 PROCEDURE — 80053 COMPREHEN METABOLIC PANEL: CPT | Performed by: NURSE PRACTITIONER

## 2018-04-19 PROCEDURE — 85025 COMPLETE CBC W/AUTO DIFF WBC: CPT | Performed by: NURSE PRACTITIONER

## 2018-04-19 PROCEDURE — 80162 ASSAY OF DIGOXIN TOTAL: CPT | Performed by: FAMILY MEDICINE

## 2018-04-19 RX ORDER — HYDROMORPHONE HCL 110MG/55ML
1 PATIENT CONTROLLED ANALGESIA SYRINGE INTRAVENOUS ONCE
Status: DISCONTINUED | OUTPATIENT
Start: 2018-04-19 | End: 2018-04-19

## 2018-04-19 RX ORDER — AZELASTINE 1 MG/ML
2 SPRAY, METERED NASAL DAILY
Status: DISCONTINUED | OUTPATIENT
Start: 2018-04-19 | End: 2018-04-25 | Stop reason: HOSPADM

## 2018-04-19 RX ORDER — ONDANSETRON 2 MG/ML
4 INJECTION INTRAMUSCULAR; INTRAVENOUS ONCE
Status: DISCONTINUED | OUTPATIENT
Start: 2018-04-19 | End: 2018-04-19

## 2018-04-19 RX ORDER — HYDROCHLOROTHIAZIDE 25 MG/1
25 TABLET ORAL DAILY
Status: DISCONTINUED | OUTPATIENT
Start: 2018-04-19 | End: 2018-04-25 | Stop reason: HOSPADM

## 2018-04-19 RX ORDER — SERTRALINE HYDROCHLORIDE 100 MG/1
100 TABLET, FILM COATED ORAL DAILY
Status: DISCONTINUED | OUTPATIENT
Start: 2018-04-19 | End: 2018-04-20

## 2018-04-19 RX ORDER — HYDROCODONE BITARTRATE AND ACETAMINOPHEN 7.5; 325 MG/1; MG/1
1 TABLET ORAL EVERY 6 HOURS PRN
Status: DISCONTINUED | OUTPATIENT
Start: 2018-04-19 | End: 2018-04-25 | Stop reason: HOSPADM

## 2018-04-19 RX ORDER — DEXAMETHASONE 4 MG/1
4 TABLET ORAL EVERY 8 HOURS SCHEDULED
Status: DISCONTINUED | OUTPATIENT
Start: 2018-04-19 | End: 2018-04-22

## 2018-04-19 RX ORDER — ALPRAZOLAM 0.25 MG/1
0.25 TABLET ORAL 2 TIMES DAILY PRN
Status: DISCONTINUED | OUTPATIENT
Start: 2018-04-19 | End: 2018-04-25 | Stop reason: HOSPADM

## 2018-04-19 RX ORDER — PANTOPRAZOLE SODIUM 40 MG/1
40 TABLET, DELAYED RELEASE ORAL DAILY
Status: DISCONTINUED | OUTPATIENT
Start: 2018-04-19 | End: 2018-04-25 | Stop reason: HOSPADM

## 2018-04-19 RX ORDER — LEVETIRACETAM 500 MG/1
500 TABLET ORAL EVERY 12 HOURS SCHEDULED
Status: DISCONTINUED | OUTPATIENT
Start: 2018-04-19 | End: 2018-04-25 | Stop reason: HOSPADM

## 2018-04-19 RX ORDER — HYDROCODONE BITARTRATE AND ACETAMINOPHEN 7.5; 325 MG/1; MG/1
1 TABLET ORAL ONCE
Status: COMPLETED | OUTPATIENT
Start: 2018-04-19 | End: 2018-04-19

## 2018-04-19 RX ORDER — MONTELUKAST SODIUM 10 MG/1
10 TABLET ORAL NIGHTLY
Status: DISCONTINUED | OUTPATIENT
Start: 2018-04-19 | End: 2018-04-25 | Stop reason: HOSPADM

## 2018-04-19 RX ORDER — FLUTICASONE PROPIONATE 50 MCG
2 SPRAY, SUSPENSION (ML) NASAL DAILY
Status: DISCONTINUED | OUTPATIENT
Start: 2018-04-19 | End: 2018-04-25 | Stop reason: HOSPADM

## 2018-04-19 RX ORDER — POTASSIUM CHLORIDE 750 MG/1
20 CAPSULE, EXTENDED RELEASE ORAL ONCE
Status: COMPLETED | OUTPATIENT
Start: 2018-04-19 | End: 2018-04-19

## 2018-04-19 RX ADMIN — POTASSIUM CHLORIDE 20 MEQ: 750 CAPSULE, EXTENDED RELEASE ORAL at 19:57

## 2018-04-19 RX ADMIN — CEFAZOLIN 1 G: 1 INJECTION, POWDER, FOR SOLUTION INTRAVENOUS at 21:27

## 2018-04-19 RX ADMIN — HYDROCODONE BITARTRATE AND ACETAMINOPHEN 1 TABLET: 7.5; 325 TABLET ORAL at 13:46

## 2018-04-19 RX ADMIN — LEVETIRACETAM 500 MG: 500 TABLET, FILM COATED ORAL at 21:11

## 2018-04-19 RX ADMIN — HYDROCHLOROTHIAZIDE 25 MG: 25 TABLET ORAL at 21:11

## 2018-04-19 RX ADMIN — HYDROCODONE BITARTRATE AND ACETAMINOPHEN 1 TABLET: 7.5; 325 TABLET ORAL at 19:57

## 2018-04-19 RX ADMIN — CEFAZOLIN 1 G: 1 INJECTION, POWDER, FOR SOLUTION INTRAMUSCULAR; INTRAVENOUS at 14:05

## 2018-04-19 RX ADMIN — DEXAMETHASONE 4 MG: 4 TABLET ORAL at 21:11

## 2018-04-19 RX ADMIN — MONTELUKAST SODIUM 10 MG: 10 TABLET, FILM COATED ORAL at 21:10

## 2018-04-19 RX ADMIN — ENOXAPARIN SODIUM 40 MG: 40 INJECTION SUBCUTANEOUS at 19:56

## 2018-04-19 RX ADMIN — SODIUM CHLORIDE 500 ML: 9 INJECTION, SOLUTION INTRAVENOUS at 15:35

## 2018-04-19 RX ADMIN — SERTRALINE 100 MG: 100 TABLET, FILM COATED ORAL at 19:57

## 2018-04-19 RX ADMIN — AZELASTINE HYDROCHLORIDE 2 SPRAY: 137 SPRAY, METERED NASAL at 21:11

## 2018-04-19 RX ADMIN — FLUTICASONE PROPIONATE 2 SPRAY: 50 SPRAY, METERED NASAL at 21:11

## 2018-04-20 LAB
ANION GAP SERPL CALCULATED.3IONS-SCNC: 9 MMOL/L (ref 4–13)
BASOPHILS # BLD AUTO: 0.01 10*3/MM3 (ref 0–0.2)
BASOPHILS NFR BLD AUTO: 0.2 % (ref 0–2)
BUN BLD-MCNC: 13 MG/DL (ref 5–21)
BUN/CREAT SERPL: 23.6 (ref 7–25)
CALCIUM SPEC-SCNC: 9.4 MG/DL (ref 8.4–10.4)
CHLORIDE SERPL-SCNC: 98 MMOL/L (ref 98–110)
CO2 SERPL-SCNC: 31 MMOL/L (ref 24–31)
CREAT BLD-MCNC: 0.55 MG/DL (ref 0.5–1.4)
DEPRECATED RDW RBC AUTO: 42.1 FL (ref 40–54)
EOSINOPHIL # BLD AUTO: 0.01 10*3/MM3 (ref 0–0.7)
EOSINOPHIL NFR BLD AUTO: 0.2 % (ref 0–4)
ERYTHROCYTE [DISTWIDTH] IN BLOOD BY AUTOMATED COUNT: 14.2 % (ref 12–15)
GFR SERPL CREATININE-BSD FRML MDRD: 110 ML/MIN/1.73
GLUCOSE BLD-MCNC: 142 MG/DL (ref 70–100)
HCT VFR BLD AUTO: 35.2 % (ref 37–47)
HGB BLD-MCNC: 11.8 G/DL (ref 12–16)
IMM GRANULOCYTES # BLD: 0.06 10*3/MM3 (ref 0–0.03)
IMM GRANULOCYTES NFR BLD: 1 % (ref 0–5)
LYMPHOCYTES # BLD AUTO: 0.41 10*3/MM3 (ref 0.72–4.86)
LYMPHOCYTES NFR BLD AUTO: 6.7 % (ref 15–45)
MCH RBC QN AUTO: 27.3 PG (ref 28–32)
MCHC RBC AUTO-ENTMCNC: 33.5 G/DL (ref 33–36)
MCV RBC AUTO: 81.5 FL (ref 82–98)
MONOCYTES # BLD AUTO: 0.15 10*3/MM3 (ref 0.19–1.3)
MONOCYTES NFR BLD AUTO: 2.4 % (ref 4–12)
NEUTROPHILS # BLD AUTO: 5.5 10*3/MM3 (ref 1.87–8.4)
NEUTROPHILS NFR BLD AUTO: 89.5 % (ref 39–78)
NRBC BLD MANUAL-RTO: 0 /100 WBC (ref 0–0)
PLATELET # BLD AUTO: 103 10*3/MM3 (ref 130–400)
PMV BLD AUTO: 10.2 FL (ref 6–12)
POTASSIUM BLD-SCNC: 3.7 MMOL/L (ref 3.5–5.3)
RBC # BLD AUTO: 4.32 10*6/MM3 (ref 4.2–5.4)
SODIUM BLD-SCNC: 138 MMOL/L (ref 135–145)
WBC NRBC COR # BLD: 6.14 10*3/MM3 (ref 4.8–10.8)

## 2018-04-20 PROCEDURE — G0378 HOSPITAL OBSERVATION PER HR: HCPCS

## 2018-04-20 PROCEDURE — 85025 COMPLETE CBC W/AUTO DIFF WBC: CPT | Performed by: FAMILY MEDICINE

## 2018-04-20 PROCEDURE — 25010000003 AMPICILLIN-SULBACTAM PER 1.5 G: Performed by: INTERNAL MEDICINE

## 2018-04-20 PROCEDURE — 63710000001 DEXAMETHASONE PER 0.25 MG: Performed by: FAMILY MEDICINE

## 2018-04-20 PROCEDURE — 25010000003 CEFAZOLIN PER 500 MG: Performed by: FAMILY MEDICINE

## 2018-04-20 PROCEDURE — 80048 BASIC METABOLIC PNL TOTAL CA: CPT | Performed by: FAMILY MEDICINE

## 2018-04-20 PROCEDURE — 25010000002 ENOXAPARIN PER 10 MG: Performed by: FAMILY MEDICINE

## 2018-04-20 RX ADMIN — PANTOPRAZOLE SODIUM 40 MG: 40 TABLET, DELAYED RELEASE ORAL at 08:41

## 2018-04-20 RX ADMIN — ALPRAZOLAM 0.25 MG: 0.25 TABLET ORAL at 21:30

## 2018-04-20 RX ADMIN — HYDROCHLOROTHIAZIDE 25 MG: 25 TABLET ORAL at 08:41

## 2018-04-20 RX ADMIN — AMPICILLIN SODIUM AND SULBACTAM SODIUM 3 G: 2; 1 INJECTION, POWDER, FOR SOLUTION INTRAMUSCULAR; INTRAVENOUS at 17:13

## 2018-04-20 RX ADMIN — HYDROCODONE BITARTRATE AND ACETAMINOPHEN 1 TABLET: 7.5; 325 TABLET ORAL at 18:22

## 2018-04-20 RX ADMIN — LEVETIRACETAM 500 MG: 500 TABLET, FILM COATED ORAL at 21:24

## 2018-04-20 RX ADMIN — FLUTICASONE PROPIONATE 2 SPRAY: 50 SPRAY, METERED NASAL at 08:41

## 2018-04-20 RX ADMIN — MONTELUKAST SODIUM 10 MG: 10 TABLET, FILM COATED ORAL at 21:24

## 2018-04-20 RX ADMIN — DEXAMETHASONE 4 MG: 4 TABLET ORAL at 14:14

## 2018-04-20 RX ADMIN — CEFAZOLIN 1 G: 1 INJECTION, POWDER, FOR SOLUTION INTRAVENOUS at 14:14

## 2018-04-20 RX ADMIN — AMPICILLIN SODIUM AND SULBACTAM SODIUM 3 G: 2; 1 INJECTION, POWDER, FOR SOLUTION INTRAMUSCULAR; INTRAVENOUS at 23:40

## 2018-04-20 RX ADMIN — ENOXAPARIN SODIUM 40 MG: 40 INJECTION SUBCUTANEOUS at 18:15

## 2018-04-20 RX ADMIN — HYDROCODONE BITARTRATE AND ACETAMINOPHEN 1 TABLET: 7.5; 325 TABLET ORAL at 05:34

## 2018-04-20 RX ADMIN — CEFAZOLIN 1 G: 1 INJECTION, POWDER, FOR SOLUTION INTRAVENOUS at 05:23

## 2018-04-20 RX ADMIN — DEXAMETHASONE 4 MG: 4 TABLET ORAL at 21:24

## 2018-04-20 RX ADMIN — ALPRAZOLAM 0.25 MG: 0.25 TABLET ORAL at 14:14

## 2018-04-20 RX ADMIN — LEVETIRACETAM 500 MG: 500 TABLET, FILM COATED ORAL at 08:41

## 2018-04-20 RX ADMIN — DEXAMETHASONE 4 MG: 4 TABLET ORAL at 05:23

## 2018-04-20 RX ADMIN — SERTRALINE 100 MG: 100 TABLET, FILM COATED ORAL at 08:41

## 2018-04-20 RX ADMIN — AZELASTINE HYDROCHLORIDE 2 SPRAY: 137 SPRAY, METERED NASAL at 08:41

## 2018-04-21 PROBLEM — C34.92 MALIGNANT NEOPLASM OF LEFT LUNG (HCC): Status: ACTIVE | Noted: 2018-04-21

## 2018-04-21 PROBLEM — C79.49 SECONDARY MALIGNANT NEOPLASM OF BRAIN AND SPINAL CORD (HCC): Chronic | Status: ACTIVE | Noted: 2018-02-08

## 2018-04-21 PROBLEM — C34.90 LUNG CANCER (HCC): Chronic | Status: ACTIVE | Noted: 2018-04-21

## 2018-04-21 PROBLEM — J44.9 COPD (CHRONIC OBSTRUCTIVE PULMONARY DISEASE) (HCC): Chronic | Status: ACTIVE | Noted: 2018-04-21

## 2018-04-21 PROBLEM — C79.31 SECONDARY MALIGNANT NEOPLASM OF BRAIN AND SPINAL CORD (HCC): Chronic | Status: ACTIVE | Noted: 2018-02-08

## 2018-04-21 PROBLEM — C34.92 MALIGNANT NEOPLASM OF LEFT LUNG (HCC): Chronic | Status: ACTIVE | Noted: 2018-04-21

## 2018-04-21 LAB
BACTERIA SPEC AEROBE CULT: ABNORMAL
BACTERIA SPEC AEROBE CULT: ABNORMAL

## 2018-04-21 PROCEDURE — 25010000003 AMPICILLIN-SULBACTAM PER 1.5 G: Performed by: INTERNAL MEDICINE

## 2018-04-21 PROCEDURE — 63710000001 DEXAMETHASONE PER 0.25 MG: Performed by: FAMILY MEDICINE

## 2018-04-21 PROCEDURE — 25010000002 ENOXAPARIN PER 10 MG: Performed by: FAMILY MEDICINE

## 2018-04-21 RX ORDER — SERTRALINE HYDROCHLORIDE 100 MG/1
200 TABLET, FILM COATED ORAL DAILY
Status: DISCONTINUED | OUTPATIENT
Start: 2018-04-22 | End: 2018-04-25 | Stop reason: HOSPADM

## 2018-04-21 RX ORDER — CETIRIZINE HYDROCHLORIDE 10 MG/1
10 TABLET ORAL DAILY
Status: DISCONTINUED | OUTPATIENT
Start: 2018-04-21 | End: 2018-04-25 | Stop reason: HOSPADM

## 2018-04-21 RX ORDER — ATORVASTATIN CALCIUM 10 MG/1
20 TABLET, FILM COATED ORAL NIGHTLY
Status: DISCONTINUED | OUTPATIENT
Start: 2018-04-21 | End: 2018-04-25 | Stop reason: HOSPADM

## 2018-04-21 RX ORDER — DEXAMETHASONE 2 MG/1
2 TABLET ORAL
COMMUNITY
End: 2018-10-23 | Stop reason: ALTCHOICE

## 2018-04-21 RX ORDER — PSEUDOEPHEDRINE HCL 30 MG
30 TABLET ORAL EVERY 6 HOURS PRN
Status: DISCONTINUED | OUTPATIENT
Start: 2018-04-21 | End: 2018-04-25 | Stop reason: HOSPADM

## 2018-04-21 RX ORDER — ATORVASTATIN CALCIUM 10 MG/1
20 TABLET, FILM COATED ORAL DAILY
Status: DISCONTINUED | OUTPATIENT
Start: 2018-04-21 | End: 2018-04-21

## 2018-04-21 RX ORDER — DEXTROAMPHETAMINE SACCHARATE, AMPHETAMINE ASPARTATE, DEXTROAMPHETAMINE SULFATE AND AMPHETAMINE SULFATE 2.5; 2.5; 2.5; 2.5 MG/1; MG/1; MG/1; MG/1
10 TABLET ORAL 2 TIMES DAILY
Status: DISCONTINUED | OUTPATIENT
Start: 2018-04-21 | End: 2018-04-25 | Stop reason: HOSPADM

## 2018-04-21 RX ADMIN — DEXAMETHASONE 4 MG: 4 TABLET ORAL at 15:40

## 2018-04-21 RX ADMIN — MONTELUKAST SODIUM 10 MG: 10 TABLET, FILM COATED ORAL at 20:28

## 2018-04-21 RX ADMIN — AMPICILLIN SODIUM AND SULBACTAM SODIUM 3 G: 2; 1 INJECTION, POWDER, FOR SOLUTION INTRAMUSCULAR; INTRAVENOUS at 11:11

## 2018-04-21 RX ADMIN — FLUTICASONE PROPIONATE 2 SPRAY: 50 SPRAY, METERED NASAL at 09:24

## 2018-04-21 RX ADMIN — AMPICILLIN SODIUM AND SULBACTAM SODIUM 3 G: 2; 1 INJECTION, POWDER, FOR SOLUTION INTRAMUSCULAR; INTRAVENOUS at 05:26

## 2018-04-21 RX ADMIN — SERTRALINE 150 MG: 100 TABLET, FILM COATED ORAL at 09:24

## 2018-04-21 RX ADMIN — ENOXAPARIN SODIUM 40 MG: 40 INJECTION SUBCUTANEOUS at 18:32

## 2018-04-21 RX ADMIN — LEVETIRACETAM 500 MG: 500 TABLET, FILM COATED ORAL at 20:28

## 2018-04-21 RX ADMIN — CETIRIZINE HYDROCHLORIDE 10 MG: 10 TABLET, FILM COATED ORAL at 11:11

## 2018-04-21 RX ADMIN — HYDROCHLOROTHIAZIDE 25 MG: 25 TABLET ORAL at 09:24

## 2018-04-21 RX ADMIN — ALPRAZOLAM 0.25 MG: 0.25 TABLET ORAL at 20:28

## 2018-04-21 RX ADMIN — ATORVASTATIN CALCIUM 20 MG: 10 TABLET, FILM COATED ORAL at 20:28

## 2018-04-21 RX ADMIN — LEVETIRACETAM 500 MG: 500 TABLET, FILM COATED ORAL at 09:24

## 2018-04-21 RX ADMIN — DEXAMETHASONE 4 MG: 4 TABLET ORAL at 05:26

## 2018-04-21 RX ADMIN — PANTOPRAZOLE SODIUM 40 MG: 40 TABLET, DELAYED RELEASE ORAL at 09:24

## 2018-04-21 RX ADMIN — AZELASTINE HYDROCHLORIDE 2 SPRAY: 137 SPRAY, METERED NASAL at 09:24

## 2018-04-21 RX ADMIN — DEXAMETHASONE 4 MG: 4 TABLET ORAL at 20:28

## 2018-04-21 RX ADMIN — ALPRAZOLAM 0.25 MG: 0.25 TABLET ORAL at 12:23

## 2018-04-21 RX ADMIN — AMPICILLIN SODIUM AND SULBACTAM SODIUM 3 G: 2; 1 INJECTION, POWDER, FOR SOLUTION INTRAMUSCULAR; INTRAVENOUS at 17:37

## 2018-04-21 RX ADMIN — HYDROCODONE BITARTRATE AND ACETAMINOPHEN 1 TABLET: 7.5; 325 TABLET ORAL at 20:28

## 2018-04-22 PROCEDURE — 87070 CULTURE OTHR SPECIMN AEROBIC: CPT | Performed by: FAMILY MEDICINE

## 2018-04-22 PROCEDURE — 87205 SMEAR GRAM STAIN: CPT | Performed by: FAMILY MEDICINE

## 2018-04-22 PROCEDURE — 87147 CULTURE TYPE IMMUNOLOGIC: CPT | Performed by: FAMILY MEDICINE

## 2018-04-22 PROCEDURE — 25010000003 AMPICILLIN-SULBACTAM PER 1.5 G: Performed by: INTERNAL MEDICINE

## 2018-04-22 PROCEDURE — 25010000002 ENOXAPARIN PER 10 MG: Performed by: FAMILY MEDICINE

## 2018-04-22 PROCEDURE — 63710000001 DEXAMETHASONE PER 0.25 MG: Performed by: FAMILY MEDICINE

## 2018-04-22 RX ORDER — DEXAMETHASONE 2 MG/1
2 TABLET ORAL
Status: DISCONTINUED | OUTPATIENT
Start: 2018-04-23 | End: 2018-04-25 | Stop reason: HOSPADM

## 2018-04-22 RX ADMIN — AMPICILLIN SODIUM AND SULBACTAM SODIUM 3 G: 2; 1 INJECTION, POWDER, FOR SOLUTION INTRAMUSCULAR; INTRAVENOUS at 10:36

## 2018-04-22 RX ADMIN — ALPRAZOLAM 0.25 MG: 0.25 TABLET ORAL at 20:37

## 2018-04-22 RX ADMIN — ENOXAPARIN SODIUM 40 MG: 40 INJECTION SUBCUTANEOUS at 18:41

## 2018-04-22 RX ADMIN — FLUTICASONE PROPIONATE 2 SPRAY: 50 SPRAY, METERED NASAL at 08:46

## 2018-04-22 RX ADMIN — PANTOPRAZOLE SODIUM 40 MG: 40 TABLET, DELAYED RELEASE ORAL at 08:46

## 2018-04-22 RX ADMIN — ALPRAZOLAM 0.25 MG: 0.25 TABLET ORAL at 02:07

## 2018-04-22 RX ADMIN — SERTRALINE 200 MG: 100 TABLET, FILM COATED ORAL at 08:46

## 2018-04-22 RX ADMIN — DEXAMETHASONE 4 MG: 4 TABLET ORAL at 05:55

## 2018-04-22 RX ADMIN — HYDROCODONE BITARTRATE AND ACETAMINOPHEN 1 TABLET: 7.5; 325 TABLET ORAL at 18:46

## 2018-04-22 RX ADMIN — AZELASTINE HYDROCHLORIDE 2 SPRAY: 137 SPRAY, METERED NASAL at 08:46

## 2018-04-22 RX ADMIN — ATORVASTATIN CALCIUM 20 MG: 10 TABLET, FILM COATED ORAL at 20:37

## 2018-04-22 RX ADMIN — CETIRIZINE HYDROCHLORIDE 10 MG: 10 TABLET, FILM COATED ORAL at 08:46

## 2018-04-22 RX ADMIN — AMPICILLIN SODIUM AND SULBACTAM SODIUM 3 G: 2; 1 INJECTION, POWDER, FOR SOLUTION INTRAMUSCULAR; INTRAVENOUS at 23:36

## 2018-04-22 RX ADMIN — LEVETIRACETAM 500 MG: 500 TABLET, FILM COATED ORAL at 08:46

## 2018-04-22 RX ADMIN — AMPICILLIN SODIUM AND SULBACTAM SODIUM 3 G: 2; 1 INJECTION, POWDER, FOR SOLUTION INTRAMUSCULAR; INTRAVENOUS at 05:55

## 2018-04-22 RX ADMIN — HYDROCHLOROTHIAZIDE 25 MG: 25 TABLET ORAL at 08:46

## 2018-04-22 RX ADMIN — AMPICILLIN SODIUM AND SULBACTAM SODIUM 3 G: 2; 1 INJECTION, POWDER, FOR SOLUTION INTRAMUSCULAR; INTRAVENOUS at 16:19

## 2018-04-22 RX ADMIN — AMPICILLIN SODIUM AND SULBACTAM SODIUM 3 G: 2; 1 INJECTION, POWDER, FOR SOLUTION INTRAMUSCULAR; INTRAVENOUS at 00:01

## 2018-04-22 RX ADMIN — MONTELUKAST SODIUM 10 MG: 10 TABLET, FILM COATED ORAL at 20:37

## 2018-04-22 RX ADMIN — LEVETIRACETAM 500 MG: 500 TABLET, FILM COATED ORAL at 20:37

## 2018-04-23 PROCEDURE — 25010000002 ENOXAPARIN PER 10 MG: Performed by: FAMILY MEDICINE

## 2018-04-23 PROCEDURE — 25010000003 AMPICILLIN-SULBACTAM PER 1.5 G: Performed by: INTERNAL MEDICINE

## 2018-04-23 RX ADMIN — AMPICILLIN SODIUM AND SULBACTAM SODIUM 3 G: 2; 1 INJECTION, POWDER, FOR SOLUTION INTRAMUSCULAR; INTRAVENOUS at 16:55

## 2018-04-23 RX ADMIN — AMPICILLIN SODIUM AND SULBACTAM SODIUM 3 G: 2; 1 INJECTION, POWDER, FOR SOLUTION INTRAMUSCULAR; INTRAVENOUS at 05:04

## 2018-04-23 RX ADMIN — HYDROCODONE BITARTRATE AND ACETAMINOPHEN 1 TABLET: 7.5; 325 TABLET ORAL at 08:07

## 2018-04-23 RX ADMIN — MONTELUKAST SODIUM 10 MG: 10 TABLET, FILM COATED ORAL at 19:48

## 2018-04-23 RX ADMIN — LEVETIRACETAM 500 MG: 500 TABLET, FILM COATED ORAL at 08:07

## 2018-04-23 RX ADMIN — AZELASTINE HYDROCHLORIDE 2 SPRAY: 137 SPRAY, METERED NASAL at 19:49

## 2018-04-23 RX ADMIN — PANTOPRAZOLE SODIUM 40 MG: 40 TABLET, DELAYED RELEASE ORAL at 08:27

## 2018-04-23 RX ADMIN — AMPICILLIN SODIUM AND SULBACTAM SODIUM 3 G: 2; 1 INJECTION, POWDER, FOR SOLUTION INTRAMUSCULAR; INTRAVENOUS at 23:31

## 2018-04-23 RX ADMIN — FLUTICASONE PROPIONATE 2 SPRAY: 50 SPRAY, METERED NASAL at 08:07

## 2018-04-23 RX ADMIN — ATORVASTATIN CALCIUM 20 MG: 10 TABLET, FILM COATED ORAL at 19:48

## 2018-04-23 RX ADMIN — HYDROCHLOROTHIAZIDE 25 MG: 25 TABLET ORAL at 08:07

## 2018-04-23 RX ADMIN — SERTRALINE 200 MG: 100 TABLET, FILM COATED ORAL at 08:07

## 2018-04-23 RX ADMIN — ENOXAPARIN SODIUM 40 MG: 40 INJECTION SUBCUTANEOUS at 18:16

## 2018-04-23 RX ADMIN — HYDROCODONE BITARTRATE AND ACETAMINOPHEN 1 TABLET: 7.5; 325 TABLET ORAL at 18:14

## 2018-04-23 RX ADMIN — LEVETIRACETAM 500 MG: 500 TABLET, FILM COATED ORAL at 19:48

## 2018-04-23 RX ADMIN — AMPICILLIN SODIUM AND SULBACTAM SODIUM 3 G: 2; 1 INJECTION, POWDER, FOR SOLUTION INTRAMUSCULAR; INTRAVENOUS at 10:54

## 2018-04-23 RX ADMIN — CETIRIZINE HYDROCHLORIDE 10 MG: 10 TABLET, FILM COATED ORAL at 08:07

## 2018-04-23 RX ADMIN — DEXAMETHASONE 2 MG: 2 TABLET ORAL at 08:07

## 2018-04-23 RX ADMIN — AZELASTINE HYDROCHLORIDE 2 SPRAY: 137 SPRAY, METERED NASAL at 08:07

## 2018-04-24 LAB
ANION GAP SERPL CALCULATED.3IONS-SCNC: 9 MMOL/L (ref 4–13)
ANISOCYTOSIS BLD QL: ABNORMAL
BUN BLD-MCNC: 19 MG/DL (ref 5–21)
BUN/CREAT SERPL: 38.8 (ref 7–25)
CALCIUM SPEC-SCNC: 8.9 MG/DL (ref 8.4–10.4)
CHLORIDE SERPL-SCNC: 101 MMOL/L (ref 98–110)
CO2 SERPL-SCNC: 30 MMOL/L (ref 24–31)
CREAT BLD-MCNC: 0.49 MG/DL (ref 0.5–1.4)
DEPRECATED RDW RBC AUTO: 42.2 FL (ref 40–54)
ERYTHROCYTE [DISTWIDTH] IN BLOOD BY AUTOMATED COUNT: 14.4 % (ref 12–15)
GFR SERPL CREATININE-BSD FRML MDRD: 126 ML/MIN/1.73
GLUCOSE BLD-MCNC: 79 MG/DL (ref 70–100)
HCT VFR BLD AUTO: 33.1 % (ref 37–47)
HGB BLD-MCNC: 11.2 G/DL (ref 12–16)
LYMPHOCYTES # BLD MANUAL: 2.32 10*3/MM3 (ref 0.72–4.86)
LYMPHOCYTES NFR BLD MANUAL: 3 % (ref 4–12)
LYMPHOCYTES NFR BLD MANUAL: 43.4 % (ref 15–45)
MCH RBC QN AUTO: 27.4 PG (ref 28–32)
MCHC RBC AUTO-ENTMCNC: 33.8 G/DL (ref 33–36)
MCV RBC AUTO: 80.9 FL (ref 82–98)
MICROCYTES BLD QL: ABNORMAL
MONOCYTES # BLD AUTO: 0.16 10*3/MM3 (ref 0.19–1.3)
NEUTROPHILS # BLD AUTO: 2.8 10*3/MM3 (ref 1.87–8.4)
NEUTROPHILS NFR BLD MANUAL: 52.5 % (ref 39–78)
PLAT MORPH BLD: NORMAL
PLATELET # BLD AUTO: 142 10*3/MM3 (ref 130–400)
PMV BLD AUTO: 9.5 FL (ref 6–12)
POTASSIUM BLD-SCNC: 3.7 MMOL/L (ref 3.5–5.3)
RBC # BLD AUTO: 4.09 10*6/MM3 (ref 4.2–5.4)
SMUDGE CELLS BLD QL SMEAR: ABNORMAL
SODIUM BLD-SCNC: 140 MMOL/L (ref 135–145)
VARIANT LYMPHS NFR BLD MANUAL: 1 % (ref 0–5)
WBC NRBC COR # BLD: 5.34 10*3/MM3 (ref 4.8–10.8)

## 2018-04-24 PROCEDURE — 85025 COMPLETE CBC W/AUTO DIFF WBC: CPT | Performed by: FAMILY MEDICINE

## 2018-04-24 PROCEDURE — 80048 BASIC METABOLIC PNL TOTAL CA: CPT | Performed by: FAMILY MEDICINE

## 2018-04-24 PROCEDURE — 85007 BL SMEAR W/DIFF WBC COUNT: CPT | Performed by: FAMILY MEDICINE

## 2018-04-24 PROCEDURE — 25010000002 ENOXAPARIN PER 10 MG: Performed by: FAMILY MEDICINE

## 2018-04-24 PROCEDURE — 25010000003 AMPICILLIN-SULBACTAM PER 1.5 G: Performed by: INTERNAL MEDICINE

## 2018-04-24 RX ORDER — AMOXICILLIN AND CLAVULANATE POTASSIUM 875; 125 MG/1; MG/1
1 TABLET, FILM COATED ORAL EVERY 12 HOURS SCHEDULED
Status: DISCONTINUED | OUTPATIENT
Start: 2018-04-24 | End: 2018-04-24

## 2018-04-24 RX ORDER — AMOXICILLIN AND CLAVULANATE POTASSIUM 875; 125 MG/1; MG/1
1 TABLET, FILM COATED ORAL EVERY 12 HOURS SCHEDULED
Status: DISCONTINUED | OUTPATIENT
Start: 2018-04-24 | End: 2018-04-25 | Stop reason: HOSPADM

## 2018-04-24 RX ADMIN — ATORVASTATIN CALCIUM 20 MG: 10 TABLET, FILM COATED ORAL at 20:49

## 2018-04-24 RX ADMIN — AZELASTINE HYDROCHLORIDE 2 SPRAY: 137 SPRAY, METERED NASAL at 09:36

## 2018-04-24 RX ADMIN — HYDROCODONE BITARTRATE AND ACETAMINOPHEN 1 TABLET: 7.5; 325 TABLET ORAL at 05:47

## 2018-04-24 RX ADMIN — MONTELUKAST SODIUM 10 MG: 10 TABLET, FILM COATED ORAL at 20:49

## 2018-04-24 RX ADMIN — HYDROCHLOROTHIAZIDE 25 MG: 25 TABLET ORAL at 09:38

## 2018-04-24 RX ADMIN — CETIRIZINE HYDROCHLORIDE 10 MG: 10 TABLET, FILM COATED ORAL at 09:38

## 2018-04-24 RX ADMIN — AMOXICILLIN AND CLAVULANATE POTASSIUM 1 TABLET: 875; 125 TABLET, FILM COATED ORAL at 20:49

## 2018-04-24 RX ADMIN — ALPRAZOLAM 0.25 MG: 0.25 TABLET ORAL at 13:31

## 2018-04-24 RX ADMIN — SERTRALINE 200 MG: 100 TABLET, FILM COATED ORAL at 09:36

## 2018-04-24 RX ADMIN — LEVETIRACETAM 500 MG: 500 TABLET, FILM COATED ORAL at 09:38

## 2018-04-24 RX ADMIN — HYDROCODONE BITARTRATE AND ACETAMINOPHEN 1 TABLET: 7.5; 325 TABLET ORAL at 20:48

## 2018-04-24 RX ADMIN — ENOXAPARIN SODIUM 40 MG: 40 INJECTION SUBCUTANEOUS at 18:25

## 2018-04-24 RX ADMIN — FLUTICASONE PROPIONATE 2 SPRAY: 50 SPRAY, METERED NASAL at 09:36

## 2018-04-24 RX ADMIN — AMPICILLIN SODIUM AND SULBACTAM SODIUM 3 G: 2; 1 INJECTION, POWDER, FOR SOLUTION INTRAMUSCULAR; INTRAVENOUS at 12:12

## 2018-04-24 RX ADMIN — AMPICILLIN SODIUM AND SULBACTAM SODIUM 3 G: 2; 1 INJECTION, POWDER, FOR SOLUTION INTRAMUSCULAR; INTRAVENOUS at 04:34

## 2018-04-24 RX ADMIN — DEXAMETHASONE 2 MG: 2 TABLET ORAL at 09:38

## 2018-04-24 RX ADMIN — LEVETIRACETAM 500 MG: 500 TABLET, FILM COATED ORAL at 20:49

## 2018-04-24 RX ADMIN — PANTOPRAZOLE SODIUM 40 MG: 40 TABLET, DELAYED RELEASE ORAL at 09:41

## 2018-04-25 ENCOUNTER — TELEPHONE (OUTPATIENT)
Dept: NEUROSURGERY | Facility: CLINIC | Age: 68
End: 2018-04-25

## 2018-04-25 VITALS
HEART RATE: 64 BPM | DIASTOLIC BLOOD PRESSURE: 58 MMHG | SYSTOLIC BLOOD PRESSURE: 113 MMHG | BODY MASS INDEX: 23.39 KG/M2 | WEIGHT: 137 LBS | RESPIRATION RATE: 16 BRPM | OXYGEN SATURATION: 96 % | TEMPERATURE: 97.8 F | HEIGHT: 64 IN

## 2018-04-25 RX ORDER — HYDROCHLOROTHIAZIDE 25 MG/1
25 TABLET ORAL DAILY
Qty: 30 TABLET | Refills: 1 | Status: SHIPPED | OUTPATIENT
Start: 2018-04-26 | End: 2018-09-12 | Stop reason: DRUGHIGH

## 2018-04-25 RX ORDER — AMOXICILLIN AND CLAVULANATE POTASSIUM 875; 125 MG/1; MG/1
1 TABLET, FILM COATED ORAL EVERY 12 HOURS SCHEDULED
Qty: 12 TABLET | Refills: 0 | Status: SHIPPED | OUTPATIENT
Start: 2018-04-25 | End: 2018-05-01

## 2018-04-25 RX ADMIN — DEXAMETHASONE 2 MG: 2 TABLET ORAL at 08:03

## 2018-04-25 RX ADMIN — PANTOPRAZOLE SODIUM 40 MG: 40 TABLET, DELAYED RELEASE ORAL at 08:03

## 2018-04-25 RX ADMIN — LEVETIRACETAM 500 MG: 500 TABLET, FILM COATED ORAL at 08:03

## 2018-04-25 RX ADMIN — CETIRIZINE HYDROCHLORIDE 10 MG: 10 TABLET, FILM COATED ORAL at 08:03

## 2018-04-25 RX ADMIN — HYDROCHLOROTHIAZIDE 25 MG: 25 TABLET ORAL at 08:03

## 2018-04-25 RX ADMIN — AZELASTINE HYDROCHLORIDE 2 SPRAY: 137 SPRAY, METERED NASAL at 08:04

## 2018-04-25 RX ADMIN — FLUTICASONE PROPIONATE 2 SPRAY: 50 SPRAY, METERED NASAL at 08:04

## 2018-04-25 RX ADMIN — AMOXICILLIN AND CLAVULANATE POTASSIUM 1 TABLET: 875; 125 TABLET, FILM COATED ORAL at 08:03

## 2018-04-25 RX ADMIN — SERTRALINE 200 MG: 100 TABLET, FILM COATED ORAL at 08:03

## 2018-04-25 NOTE — TELEPHONE ENCOUNTER
Patient called asking that I call her back b/c she wants to know why Dr Henning stopped her Digoxin and Sudafed.  I called and spoke w/Berta who was the nurse on 3C - she stated she told the patient Dr Henning had a reason why she stopped these meds and if the patient wanted further clarification she should call Dr Henning's office.  She stated the patient called all of her doctors offices (ENT, our office, oncology, etc).  I have tried to contact the patient by phone to tell her to call Dr Henning's office but she didn't answer so I left her a voicemail instructing her to call Dr Henning's office.    mi baker CMA

## 2018-04-25 NOTE — TELEPHONE ENCOUNTER
I called and spoke dione/Summer @ Dr Henning's office and told her what was going on - she is going to be waiting for the patient's call.    mi baker CMA

## 2018-04-27 LAB
BACTERIA SPEC AEROBE CULT: ABNORMAL
BACTERIA SPEC AEROBE CULT: ABNORMAL
GRAM STN SPEC: ABNORMAL
GRAM STN SPEC: ABNORMAL

## 2018-05-09 ENCOUNTER — LAB REQUISITION (OUTPATIENT)
Dept: LAB | Facility: HOSPITAL | Age: 68
End: 2018-05-09

## 2018-05-09 DIAGNOSIS — Z00.00 ENCOUNTER FOR GENERAL ADULT MEDICAL EXAMINATION WITHOUT ABNORMAL FINDINGS: ICD-10-CM

## 2018-05-09 LAB
ANION GAP SERPL CALCULATED.3IONS-SCNC: 16 MMOL/L (ref 4–13)
BUN BLD-MCNC: 25 MG/DL (ref 5–21)
BUN/CREAT SERPL: 28.4 (ref 7–25)
CALCIUM SPEC-SCNC: 10 MG/DL (ref 8.4–10.4)
CHLORIDE SERPL-SCNC: 96 MMOL/L (ref 98–110)
CO2 SERPL-SCNC: 25 MMOL/L (ref 24–31)
CREAT BLD-MCNC: 0.88 MG/DL (ref 0.5–1.4)
GFR SERPL CREATININE-BSD FRML MDRD: 64 ML/MIN/1.73
GLUCOSE BLD-MCNC: 93 MG/DL (ref 70–100)
POTASSIUM BLD-SCNC: 3.3 MMOL/L (ref 3.5–5.3)
SODIUM BLD-SCNC: 137 MMOL/L (ref 135–145)

## 2018-05-09 PROCEDURE — 80048 BASIC METABOLIC PNL TOTAL CA: CPT | Performed by: FAMILY MEDICINE

## 2018-05-10 ENCOUNTER — TELEPHONE (OUTPATIENT)
Dept: NEUROSURGERY | Facility: CLINIC | Age: 68
End: 2018-05-10

## 2018-05-10 NOTE — TELEPHONE ENCOUNTER
Patient had a question about whether or not she should do the chemo that Dr Price is suggesting for her.  I called her back and told her she needed to keep appointment with Dr Price today and follow his instructions when it comes to chemo treatment b/c that is his area of expertise.    mi baker CMA

## 2018-06-12 ENCOUNTER — OFFICE VISIT (OUTPATIENT)
Dept: NEUROSURGERY | Facility: CLINIC | Age: 68
End: 2018-06-12

## 2018-06-12 ENCOUNTER — HOSPITAL ENCOUNTER (OUTPATIENT)
Dept: MRI IMAGING | Facility: HOSPITAL | Age: 68
Discharge: HOME | End: 2018-06-12
Attending: NEUROLOGICAL SURGERY | Admitting: NEUROLOGICAL SURGERY

## 2018-06-12 VITALS
BODY MASS INDEX: 21.89 KG/M2 | SYSTOLIC BLOOD PRESSURE: 122 MMHG | HEIGHT: 64 IN | DIASTOLIC BLOOD PRESSURE: 68 MMHG | WEIGHT: 128.2 LBS

## 2018-06-12 DIAGNOSIS — C79.31 SECONDARY MALIGNANT NEOPLASM OF BRAIN AND SPINAL CORD (HCC): Primary | Chronic | ICD-10-CM

## 2018-06-12 DIAGNOSIS — C79.49 SECONDARY MALIGNANT NEOPLASM OF BRAIN AND SPINAL CORD (HCC): Primary | Chronic | ICD-10-CM

## 2018-06-12 DIAGNOSIS — Z87.891 FORMER SMOKER: ICD-10-CM

## 2018-06-12 DIAGNOSIS — IMO0001 NORMAL BODY MASS INDEX (BMI): ICD-10-CM

## 2018-06-12 DIAGNOSIS — C79.31 SECONDARY MALIGNANT NEOPLASM OF BRAIN AND SPINAL CORD (HCC): ICD-10-CM

## 2018-06-12 DIAGNOSIS — C79.49 SECONDARY MALIGNANT NEOPLASM OF BRAIN AND SPINAL CORD (HCC): ICD-10-CM

## 2018-06-12 LAB — CREAT BLDA-MCNC: 0.5 MG/DL (ref 0.6–1.3)

## 2018-06-12 PROCEDURE — 82565 ASSAY OF CREATININE: CPT

## 2018-06-12 PROCEDURE — 0 GADOBENATE DIMEGLUMINE 529 MG/ML SOLUTION: Performed by: NEUROLOGICAL SURGERY

## 2018-06-12 PROCEDURE — 70553 MRI BRAIN STEM W/O & W/DYE: CPT

## 2018-06-12 PROCEDURE — 99024 POSTOP FOLLOW-UP VISIT: CPT | Performed by: NEUROLOGICAL SURGERY

## 2018-06-12 PROCEDURE — A9577 INJ MULTIHANCE: HCPCS | Performed by: NEUROLOGICAL SURGERY

## 2018-06-12 RX ORDER — DIGOXIN 125 MCG
TABLET ORAL
COMMUNITY
Start: 2018-05-31 | End: 2018-07-12

## 2018-06-12 RX ORDER — POTASSIUM CHLORIDE 20 MEQ/1
20 TABLET, EXTENDED RELEASE ORAL DAILY
COMMUNITY
Start: 2018-05-19 | End: 2019-04-16 | Stop reason: HOSPADM

## 2018-06-12 RX ADMIN — GADOBENATE DIMEGLUMINE 10 ML: 529 INJECTION, SOLUTION INTRAVENOUS at 12:12

## 2018-06-12 NOTE — PATIENT INSTRUCTIONS
PATIENT TO CONTINUE TO FOLLOW UP WITH HER PRIMARY CARE PROVIDER FOR YEARLY PHYSICAL EXAMS TO ENSURE COMPLETE HEALTH MAINTENANCE.

## 2018-06-12 NOTE — PROGRESS NOTES
SUBJECTIVE:  Patient ID: Marcy Garcia is a 67 y.o. female is here today for follow-up.    Chief Complaint:brain tumor   Chief Complaint   Patient presents with   • Brain Tumor     patient had MRI today @ [facility] and is here for results; she had R) crani for tumor on 1/15/18; she underwent SRS in April 2018.       HPI  67-year-old female with a history of lung cancer who underwent craniotomy in January 2018 and then stereotactic radiosurgery.  She was readmitted for altered mental status and was found to have some brain edema chief.  It was felt that this is likely a radiation related edema.  She was treated with steroids and was discharged home.  She is done pretty well.  Her lung cancers under good control she reports.  She is getting chemotherapy once a month.  She is tolerating it well.  Her and her friend relates some urinary incontinence some memory issues and impulsive behavior.  They thought maybe this is related to her Adderall being stopped.  She is back on her Adderall now.    The following portions of the patient's history were reviewed and updated as appropriate: allergies, current medications, past family history, past medical history, past social history, past surgical history and problem list.    OBJECTIVE:    Review of Systems   Psychiatric/Behavioral: Positive for behavioral problems and confusion.   All other systems reviewed and are negative.         Physical Exam   Constitutional: She is oriented to person, place, and time. She appears well-developed and well-nourished.   HENT:   Head: Normocephalic and atraumatic.   Right Ear: Hearing normal.   Left Ear: Hearing normal.   Eyes: EOM are normal. Pupils are equal, round, and reactive to light.   Neck: Normal range of motion.   Neurological: She is alert and oriented to person, place, and time. She has normal strength and normal reflexes. No cranial nerve deficit or sensory deficit. She displays a negative Romberg sign. GCS eye subscore is 4. GCS  verbal subscore is 5. GCS motor subscore is 6. She displays no Babinski's sign on the right side. She displays no Babinski's sign on the left side.   Psychiatric: Her speech is normal. Judgment normal. Cognition and memory are normal.       Neurologic Exam     Mental Status   Oriented to person, place, and time.   Speech: speech is normal     Cranial Nerves     CN III, IV, VI   Pupils are equal, round, and reactive to light.  Extraocular motions are normal.     Motor Exam     Strength   Strength 5/5 throughout.       Independent Review of Radiographic Studies:   MRI the brain with and without contrast shows a area of previous enhancement which is markedly smaller compared to the MRI in March.  Her right frontal edema is also decreased      ASSESSMENT/PLAN: Marcy's  neurologic exam is grossly nonfocal.  They do relate some frontal lobe type symptoms which may be consistent with the location of her tumor in the white matter changes and edema in the frontal lobe.  The MRI is definitely improved compared to several weeks ago.  We will reimage her in about 3 weeks.      1. 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 Former smoker    3. Normal body mass index (BMI)            Return in about 3 months (around 9/12/2018) for follow up w/scan - DR SCHMITZ.      Constantine Schmitz MD

## 2018-06-13 ENCOUNTER — TELEPHONE (OUTPATIENT)
Dept: NEUROSURGERY | Facility: CLINIC | Age: 68
End: 2018-06-13

## 2018-06-13 NOTE — TELEPHONE ENCOUNTER
Patient called and questioned whether or not she can stop the Keppra?  Will discuss w/Dr Schmidt and let her know.    Per Dr Schmidt: 500 mg once a day x 7 days then stop it.    Patient notified by phone (left message with instructions and asked that she call me back to let me know she got my message).    mi baker CMA

## 2018-06-14 NOTE — TELEPHONE ENCOUNTER
Patient called & left a message on my voicemail stating she got my messages about the Keppra.    mi baker CMA

## 2018-06-28 ENCOUNTER — ANESTHESIA EVENT (OUTPATIENT)
Dept: OPERATING ROOM | Age: 68
End: 2018-06-28

## 2018-07-02 ENCOUNTER — HOSPITAL ENCOUNTER (OUTPATIENT)
Dept: GENERAL RADIOLOGY | Age: 68
Discharge: HOME | End: 2018-07-02
Payer: MEDICARE

## 2018-07-02 ENCOUNTER — HOSPITAL ENCOUNTER (OUTPATIENT)
Dept: PREADMISSION TESTING | Age: 68
Setting detail: OUTPATIENT SURGERY
Discharge: HOME | End: 2018-07-06

## 2018-07-02 ENCOUNTER — HOSPITAL ENCOUNTER (OUTPATIENT)
Dept: NON INVASIVE DIAGNOSTICS | Age: 68
Discharge: HOME | End: 2018-07-02
Payer: MEDICARE

## 2018-07-02 DIAGNOSIS — Z01.818 PREOP EXAMINATION: ICD-10-CM

## 2018-07-02 PROCEDURE — 71046 X-RAY EXAM CHEST 2 VIEWS: CPT

## 2018-07-02 PROCEDURE — 93005 ELECTROCARDIOGRAM TRACING: CPT

## 2018-07-03 ENCOUNTER — ANESTHESIA (OUTPATIENT)
Dept: OPERATING ROOM | Age: 68
End: 2018-07-03

## 2018-07-03 ENCOUNTER — HOSPITAL ENCOUNTER (OUTPATIENT)
Age: 68
Setting detail: OUTPATIENT SURGERY
Discharge: HOME | End: 2018-07-03
Attending: SPECIALIST | Admitting: SPECIALIST

## 2018-07-03 ENCOUNTER — HOSPITAL ENCOUNTER (OUTPATIENT)
Dept: GENERAL RADIOLOGY | Age: 68
Discharge: HOME | End: 2018-07-03
Payer: MEDICARE

## 2018-07-03 VITALS
TEMPERATURE: 97.4 F | OXYGEN SATURATION: 97 % | HEART RATE: 72 BPM | SYSTOLIC BLOOD PRESSURE: 107 MMHG | DIASTOLIC BLOOD PRESSURE: 60 MMHG | HEIGHT: 64 IN | RESPIRATION RATE: 18 BRPM | WEIGHT: 130 LBS | BODY MASS INDEX: 22.2 KG/M2

## 2018-07-03 VITALS — SYSTOLIC BLOOD PRESSURE: 98 MMHG | OXYGEN SATURATION: 98 % | DIASTOLIC BLOOD PRESSURE: 62 MMHG

## 2018-07-03 DIAGNOSIS — C34.32 MALIGNANT NEOPLASM OF LOWER LOBE OF LEFT LUNG (HCC): Primary | ICD-10-CM

## 2018-07-03 DIAGNOSIS — D49.6 BRAIN TUMOR (HCC): ICD-10-CM

## 2018-07-03 PROCEDURE — G8918 PT W/O PREOP ORDER IV AB PRO: HCPCS

## 2018-07-03 PROCEDURE — 71045 X-RAY EXAM CHEST 1 VIEW: CPT

## 2018-07-03 PROCEDURE — C1788 PORT, INDWELLING, IMP: HCPCS | Performed by: SPECIALIST

## 2018-07-03 PROCEDURE — 36561 INSERT TUNNELED CV CATH: CPT

## 2018-07-03 PROCEDURE — G8907 PT DOC NO EVENTS ON DISCHARG: HCPCS

## 2018-07-03 DEVICE — PORT VASC ACCS SGL LUMN DETACHED SIL CATH 9.6 FR SMRT PRT: Type: IMPLANTABLE DEVICE | Site: CHEST | Status: FUNCTIONAL

## 2018-07-03 RX ORDER — HYDROCODONE BITARTRATE AND ACETAMINOPHEN 7.5; 325 MG/1; MG/1
1 TABLET ORAL
Status: COMPLETED | OUTPATIENT
Start: 2018-07-03 | End: 2018-07-03

## 2018-07-03 RX ORDER — FENTANYL CITRATE 50 UG/ML
INJECTION, SOLUTION INTRAMUSCULAR; INTRAVENOUS PRN
Status: DISCONTINUED | OUTPATIENT
Start: 2018-07-03 | End: 2018-07-03 | Stop reason: SDUPTHER

## 2018-07-03 RX ORDER — PROPOFOL 10 MG/ML
INJECTION, EMULSION INTRAVENOUS PRN
Status: DISCONTINUED | OUTPATIENT
Start: 2018-07-03 | End: 2018-07-03 | Stop reason: SDUPTHER

## 2018-07-03 RX ORDER — HEPARIN SODIUM (PORCINE) LOCK FLUSH IV SOLN 100 UNIT/ML 100 UNIT/ML
SOLUTION INTRAVENOUS PRN
Status: DISCONTINUED | OUTPATIENT
Start: 2018-07-03 | End: 2018-07-03 | Stop reason: HOSPADM

## 2018-07-03 RX ORDER — LIDOCAINE HYDROCHLORIDE 10 MG/ML
INJECTION, SOLUTION INFILTRATION; PERINEURAL PRN
Status: DISCONTINUED | OUTPATIENT
Start: 2018-07-03 | End: 2018-07-03 | Stop reason: HOSPADM

## 2018-07-03 RX ORDER — MIDAZOLAM HYDROCHLORIDE 1 MG/ML
INJECTION INTRAMUSCULAR; INTRAVENOUS PRN
Status: DISCONTINUED | OUTPATIENT
Start: 2018-07-03 | End: 2018-07-03 | Stop reason: SDUPTHER

## 2018-07-03 RX ORDER — ACETAMINOPHEN 325 MG/1
650 TABLET ORAL EVERY 6 HOURS PRN
COMMUNITY

## 2018-07-03 RX ORDER — CEFAZOLIN SODIUM 1 G/3ML
INJECTION, POWDER, FOR SOLUTION INTRAMUSCULAR; INTRAVENOUS PRN
Status: DISCONTINUED | OUTPATIENT
Start: 2018-07-03 | End: 2018-07-03 | Stop reason: SDUPTHER

## 2018-07-03 RX ORDER — LIDOCAINE HYDROCHLORIDE 10 MG/ML
1 INJECTION, SOLUTION EPIDURAL; INFILTRATION; INTRACAUDAL; PERINEURAL
Status: DISCONTINUED | OUTPATIENT
Start: 2018-07-03 | End: 2018-07-03 | Stop reason: HOSPADM

## 2018-07-03 RX ORDER — ALPRAZOLAM 0.25 MG/1
0.5 TABLET ORAL 3 TIMES DAILY PRN
Status: ON HOLD | COMMUNITY
End: 2019-07-15 | Stop reason: HOSPADM

## 2018-07-03 RX ORDER — SODIUM CHLORIDE, SODIUM LACTATE, POTASSIUM CHLORIDE, CALCIUM CHLORIDE 600; 310; 30; 20 MG/100ML; MG/100ML; MG/100ML; MG/100ML
INJECTION, SOLUTION INTRAVENOUS CONTINUOUS
Status: DISCONTINUED | OUTPATIENT
Start: 2018-07-03 | End: 2018-07-03 | Stop reason: HOSPADM

## 2018-07-03 RX ADMIN — CEFAZOLIN SODIUM 1000 MG: 1 INJECTION, POWDER, FOR SOLUTION INTRAMUSCULAR; INTRAVENOUS at 13:39

## 2018-07-03 RX ADMIN — HYDROCODONE BITARTRATE AND ACETAMINOPHEN 1 TABLET: 7.5; 325 TABLET ORAL at 14:59

## 2018-07-03 RX ADMIN — PROPOFOL 50 MG: 10 INJECTION, EMULSION INTRAVENOUS at 13:58

## 2018-07-03 RX ADMIN — FENTANYL CITRATE 25 MCG: 50 INJECTION, SOLUTION INTRAMUSCULAR; INTRAVENOUS at 13:49

## 2018-07-03 RX ADMIN — SODIUM CHLORIDE, SODIUM LACTATE, POTASSIUM CHLORIDE, CALCIUM CHLORIDE: 600; 310; 30; 20 INJECTION, SOLUTION INTRAVENOUS at 12:59

## 2018-07-03 RX ADMIN — PROPOFOL 50 MG: 10 INJECTION, EMULSION INTRAVENOUS at 13:39

## 2018-07-03 RX ADMIN — PROPOFOL 50 MG: 10 INJECTION, EMULSION INTRAVENOUS at 13:46

## 2018-07-03 RX ADMIN — MIDAZOLAM HYDROCHLORIDE 2 MG: 1 INJECTION INTRAMUSCULAR; INTRAVENOUS at 13:35

## 2018-07-03 RX ADMIN — PROPOFOL 50 MG: 10 INJECTION, EMULSION INTRAVENOUS at 14:04

## 2018-07-03 RX ADMIN — FENTANYL CITRATE 50 MCG: 50 INJECTION, SOLUTION INTRAMUSCULAR; INTRAVENOUS at 13:39

## 2018-07-03 RX ADMIN — FENTANYL CITRATE 25 MCG: 50 INJECTION, SOLUTION INTRAMUSCULAR; INTRAVENOUS at 14:00

## 2018-07-03 ASSESSMENT — PAIN SCALES - GENERAL: PAINLEVEL_OUTOF10: 6

## 2018-07-03 NOTE — OP NOTE
Operative Note  [address] [place] [address]          Pre- op Dx:  ***    Post-op Dx:  Same    Procedure: Port placement    Surgeon:  Noris Grullon M.D. Anesthesia:  MAC        Description of Procedure: The patient was brought to the operating room, positioned supine, prepped and draped sterilely. Lidocaine was injected for local anesthesia. The subclavian vein was accessed, wire passed and fluoro was used to confirm placement. The pocket was developed. The catheter was tunneled, cut to size, secured to the port and port was sutured into place. The sheath was passed, the catheter fed and fluoro confirmed proper positioning. Good flush and backflow confirmed. Wounds were closed with 3-0 and 4-0 vicryl suture. Dressing placed, patient awakened and transferred to recovery room in stable condition. At end of procedure, all counts were correct.         Noris Grullon M.D.

## 2018-07-12 ENCOUNTER — OFFICE VISIT (OUTPATIENT)
Dept: OTOLARYNGOLOGY | Facility: CLINIC | Age: 68
End: 2018-07-12

## 2018-07-12 VITALS
HEART RATE: 73 BPM | SYSTOLIC BLOOD PRESSURE: 116 MMHG | BODY MASS INDEX: 22.2 KG/M2 | TEMPERATURE: 97.8 F | HEIGHT: 64 IN | DIASTOLIC BLOOD PRESSURE: 64 MMHG | RESPIRATION RATE: 20 BRPM | WEIGHT: 130 LBS

## 2018-07-12 DIAGNOSIS — J30.9 ALLERGIC RHINITIS, UNSPECIFIED CHRONICITY, UNSPECIFIED SEASONALITY, UNSPECIFIED TRIGGER: ICD-10-CM

## 2018-07-12 DIAGNOSIS — M26.609 TMJ (TEMPOROMANDIBULAR JOINT DISORDER): ICD-10-CM

## 2018-07-12 DIAGNOSIS — H90.3 SENSORINEURAL HEARING LOSS (SNHL) OF BOTH EARS: Primary | ICD-10-CM

## 2018-07-12 PROCEDURE — 99214 OFFICE O/P EST MOD 30 MIN: CPT | Performed by: NURSE PRACTITIONER

## 2018-07-12 NOTE — PROGRESS NOTES
PRIMARY CARE PROVIDER: Mary Henning MD  REFERRING PROVIDER: No ref. provider found    Chief Complaint   Patient presents with   • Follow-up     HEARING LOSS       Subjective   History of Present Illness:  Marcy Garcia is a  67 y.o. female who is here to follow-up from complaints of decreased hearing. The symptoms are localized to the right ear. The patient has had mild symptoms. The symptoms have been present for the last 1 year. There have been no identified factors that aggravate the symptoms. There have been no factors that have improved the symptoms. She reports she noticed this while undergoing chemotherapy - she has been treated with Cisplatin, Alimta, Keytruda, and Carboplatin.     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 She is s/p right frontal craniotomy by Dr. Schmidt on 1/15/18 and had SRS on 3/26/18. She is being followed by Dr. Price, Dr. Blake, and Dr. Schmidt. Last MRI Brain on 3/28/18    Review of Systems:  Review of Systems   Constitutional: Positive for activity change and fatigue. Negative for chills and fever.   HENT: Positive for congestion, hearing loss, postnasal drip and rhinorrhea. Negative for ear discharge, ear pain, trouble swallowing and voice change.    Respiratory: Negative for shortness of breath.    Cardiovascular: Negative for chest pain.   Gastrointestinal: Negative for nausea and vomiting.   Musculoskeletal: Positive for gait problem.   Neurological: Positive for weakness and headaches. Negative for dizziness and syncope.   Psychiatric/Behavioral: Negative for agitation and confusion.       Past History:  Past Medical History:   Diagnosis Date   • Adenocarcinoma, lung, left (CMS/HCC)    • Anemia    • Anxiety    • Arthritis    • Ataxia    • Brain tumor (CMS/HCC)    • Cancer (CMS/HCC)     lung cancer - pt had chemo and was told she was cancer free, but recently a brain tumor was found   • Colostomy in place (CMS/HCC)    • Confusion     • COPD (chronic obstructive pulmonary disease) (CMS/HCC)    • Depression    • Dizziness    • GERD (gastroesophageal reflux disease)    • Headache    • Memory loss    • Panic attacks    • Seasonal allergies    • Urgency of urination    • Weakness      Past Surgical History:   Procedure Laterality Date   • COLON SURGERY  2013    BLOCKED BOWEL - COLOSTOMY   • CRANIOTOMY FOR TUMOR Right 1/15/2018    Procedure: CRANIOTOMY FOR TUMOR STERIOTACTIC WITH BRAIN LAB right frontal craniotomy for brain tumor with neuromonitoring and brain lab;  Surgeon: Constantine Schmidt MD;  [facility];  Service:    • ECTOPIC PREGNANCY SURGERY  1983   • HERNIA REPAIR     • HYSTERECTOMY  1987   • LUNG CANCER SURGERY Left     Dr Marlow - Lower Lobectomy   • ORIF FINGER FRACTURE      left pinky   • SHOULDER SURGERY Left     BROKEN COLLAR BONE & SHOULDER SURGERY & ELBOW   • TONSILLECTOMY AND ADENOIDECTOMY  1954     Family History   Problem Relation Age of Onset   • Stroke Mother    • Cancer Father      Social History   Substance Use Topics   • Smoking status: Former Smoker     Years: 51.00     Types: Cigarettes     Quit date: 10/2016   • Smokeless tobacco: Never Used      Comment: when pt smoked she smoked 2 packs a day on average   • Alcohol use No     Allergies:  Kiwi extract; Pineapple; and Dilaudid [hydromorphone hcl]    Current Outpatient Prescriptions:   •  ALPRAZolam (XANAX) 0.25 MG tablet, Take 1 tablet by mouth 2 (Two) Times a Day As Needed for Anxiety., Disp: 60 tablet, Rfl: 0  •  amphetamine-dextroamphetamine (ADDERALL) 10 MG tablet, Take 1 tablet by mouth Every Morning., Disp: 30 tablet, Rfl: 0  •  atorvastatin (LIPITOR) 20 MG tablet, Take 20 mg by mouth Daily., Disp: , Rfl:   •  calcium-vitamin D (OSCAL-500) 500-200 MG-UNIT per tablet, Take 1 tablet by mouth Daily., Disp: , Rfl:   •  cetirizine (zyrTEC) 10 MG tablet, Take 10 mg by mouth Daily., Disp: , Rfl:   •  dexamethasone (DECADRON) 2 MG tablet, Take 2 mg by mouth Daily  "With Breakfast. Scheduled to end 04/18/18 per NH paperwork, Disp: , Rfl:   •  hydrochlorothiazide (HYDRODIURIL) 25 MG tablet, Take 1 tablet by mouth Daily., Disp: 30 tablet, Rfl: 1  •  ipratropium-albuterol (COMBIVENT RESPIMAT)  MCG/ACT inhaler, Inhale 1 puff 4 (Four) Times a Day As Needed for Wheezing., Disp: , Rfl:   •  montelukast (SINGULAIR) 10 MG tablet, Take 10 mg by mouth Every Night., Disp: , Rfl:   •  potassium chloride (K-DUR,KLOR-CON) 20 MEQ CR tablet, , Disp: , Rfl:   •  sertraline (ZOLOFT) 100 MG tablet, Take 200 mg by mouth Daily., Disp: , Rfl:   •  azelastine (ASTELIN) 0.1 % nasal spray, 2 sprays into each nostril 2 (Two) Times a Day. Use in each nostril as directed, Disp: 90 mL, Rfl: 3      Objective     Vital Signs:     /64   Pulse 73   Temp 97.8 °F (36.6 °C)   Resp 20   Ht 162.6 cm (64\")   Wt 59 kg (130 lb)   BMI 22.31 kg/m²     Physical Exam:  Physical Exam  CONSTITUTIONAL: well nourished, well-developed, alert, oriented, in no acute distress   COMMUNICATION AND VOICE: able to communicate normally, normal voice quality  HEAD: normocephalic, no lesions, atraumatic, no tenderness, no masses   FACE: appearance normal, no lesions, no tenderness, no deformities, facial motion symmetric, bilateral (right>left) TMJ clicking and tenderness to palpation  SALIVARY GLANDS: parotid glands with no tenderness, no swelling, no masses, submandibular glands with normal size, nontender  EYES: ocular motility normal, eyelids normal, orbits normal, no proptosis, conjunctiva normal , pupils equal, round   EARS:  Hearing: response to conversational voice normal bilaterally   External Ears: auricles without lesions  Otoscopic: tympanic membrane appearance normal, no lesions, no perforation, normal mobility, no fluid, type A tympanograms bilaterally  NOSE:  External Nose: structure normal, no tenderness on palpation, no nasal discharge, no lesions, no evidence of trauma, nostrils patent   Intranasal " Exam: nasal mucosa appears allergic, vestibule within normal limits, inferior turbinate normal, nasal septum midline   ORAL:  Lips: upper and lower lips without lesion   Teeth: dentition within normal limits for age   Gums: gingivae healthy   Oral Mucosa: oral mucosa normal, no mucosal lesions   Floor of Mouth: Warthin’s duct patent, mucosa normal  Tongue: lingual mucosa normal without lesions, normal tongue mobility   Palate: soft and hard palates with normal mucosa and structure  Oropharynx: oropharyngeal mucosa normal  NECK: neck appearance normal, no masses or tenderness  LYMPH NODES: no lymphadenopathy  CHEST/RESPIRATORY: respiratory effort normal, normal breath sounds   CARDIOVASCULAR: rate and rhythm normal, extremities without cyanosis or edema    NEUROLOGIC/PSYCHIATRIC: oriented to time, place and person, mood normal, affect appropriate, CN II-XII intact grossly    Results Review:       Assessment   Assessment:  1. Sensorineural hearing loss (SNHL) of both ears    2. Allergic rhinitis, unspecified chronicity, unspecified seasonality, unspecified trigger    3. TMJ (temporomandibular joint disorder)        Plan   Plan:    New Medications Ordered This Visit   Medications   • azelastine (ASTELIN) 0.1 % nasal spray     Si sprays into each nostril 2 (Two) Times a Day. Use in each nostril as directed     Dispense:  90 mL     Refill:  3     Continue nasal sprays. The proper use of nasal inhalers was discussed including the need for regular use and build up of drug levels before full effects. Will closely monitor with repeat audiogram. TMJ precautions discussed and handout given. Call for ear drainage, ear pain, fever over 101, or hearing loss. Call for problems or worsening symptoms.     Return in about 6 months (around 2019) for With Audio.    My findings and recommendations were discussed and questions were answered.     Smitha Valentin, APRN

## 2018-07-16 RX ORDER — AZELASTINE 1 MG/ML
2 SPRAY, METERED NASAL 2 TIMES DAILY
Qty: 90 ML | Refills: 3 | Status: SHIPPED | OUTPATIENT
Start: 2018-07-16 | End: 2019-12-03 | Stop reason: SDUPTHER

## 2018-07-17 NOTE — OP NOTE
Operative Note  [address] [facility]          Pre- op Dx:  Lung cancer    Post-op Dx:  Same    Procedure: Port placement    Surgeon:  Jace Patton M.D. Anesthesia:  MAC        Description of Procedure: The patient was brought to the operating room, positioned supine, prepped and draped sterilely. Lidocaine was injected for local anesthesia. The subclavian vein was accessed, wire passed and fluoro was used to confirm placement. The pocket was developed. The catheter was tunneled, cut to size, secured to the port and port was sutured into place. The sheath was passed, the catheter fed and fluoro confirmed proper positioning. Good flush and backflow confirmed. Wounds were closed with 3-0 and 4-0 vicryl suture. Dressing placed, patient awakened and transferred to recovery room in stable condition. At end of procedure, all counts were correct.         Jace Patton M.D.

## 2018-07-22 PROBLEM — H69.82 DYSFUNCTION OF LEFT EUSTACHIAN TUBE: Status: RESOLVED | Noted: 2018-04-12 | Resolved: 2018-07-22

## 2018-08-20 ENCOUNTER — HOSPITAL ENCOUNTER (OUTPATIENT)
Dept: WOMENS IMAGING | Age: 68
Discharge: HOME | End: 2018-08-20
Payer: MEDICARE

## 2018-08-20 DIAGNOSIS — Z12.31 VISIT FOR SCREENING MAMMOGRAM: ICD-10-CM

## 2018-08-20 PROCEDURE — 77063 BREAST TOMOSYNTHESIS BI: CPT

## 2018-08-28 ENCOUNTER — HOSPITAL ENCOUNTER (OUTPATIENT)
Dept: WOMENS IMAGING | Age: 68
Discharge: HOME | End: 2018-08-28
Payer: MEDICARE

## 2018-08-28 ENCOUNTER — HOSPITAL ENCOUNTER (OUTPATIENT)
Dept: WOMENS IMAGING | Age: 68
End: 2018-08-28
Payer: MEDICARE

## 2018-08-28 DIAGNOSIS — R92.0 MICROCALCIFICATIONS OF THE BREAST: ICD-10-CM

## 2018-08-28 PROCEDURE — 77065 DX MAMMO INCL CAD UNI: CPT

## 2018-09-12 ENCOUNTER — OFFICE VISIT (OUTPATIENT)
Dept: NEUROSURGERY | Facility: CLINIC | Age: 68
End: 2018-09-12

## 2018-09-12 ENCOUNTER — HOSPITAL ENCOUNTER (OUTPATIENT)
Dept: MRI IMAGING | Facility: HOSPITAL | Age: 68
Discharge: HOME | End: 2018-09-12
Attending: NEUROLOGICAL SURGERY | Admitting: NEUROLOGICAL SURGERY

## 2018-09-12 VITALS
DIASTOLIC BLOOD PRESSURE: 64 MMHG | BODY MASS INDEX: 21.85 KG/M2 | HEIGHT: 64 IN | SYSTOLIC BLOOD PRESSURE: 96 MMHG | WEIGHT: 128 LBS

## 2018-09-12 DIAGNOSIS — IMO0001 NORMAL BODY MASS INDEX (BMI): ICD-10-CM

## 2018-09-12 DIAGNOSIS — C79.49 SECONDARY MALIGNANT NEOPLASM OF BRAIN AND SPINAL CORD (HCC): Chronic | ICD-10-CM

## 2018-09-12 DIAGNOSIS — C79.49 SECONDARY MALIGNANT NEOPLASM OF BRAIN AND SPINAL CORD (HCC): Primary | Chronic | ICD-10-CM

## 2018-09-12 DIAGNOSIS — Z87.891 FORMER SMOKER: ICD-10-CM

## 2018-09-12 DIAGNOSIS — C79.31 SECONDARY MALIGNANT NEOPLASM OF BRAIN AND SPINAL CORD (HCC): Primary | Chronic | ICD-10-CM

## 2018-09-12 DIAGNOSIS — C79.31 SECONDARY MALIGNANT NEOPLASM OF BRAIN AND SPINAL CORD (HCC): Chronic | ICD-10-CM

## 2018-09-12 LAB — CREAT BLDA-MCNC: 0.6 MG/DL (ref 0.6–1.3)

## 2018-09-12 PROCEDURE — 82565 ASSAY OF CREATININE: CPT

## 2018-09-12 PROCEDURE — A9577 INJ MULTIHANCE: HCPCS | Performed by: NEUROLOGICAL SURGERY

## 2018-09-12 PROCEDURE — 0 GADOBENATE DIMEGLUMINE 529 MG/ML SOLUTION: Performed by: NEUROLOGICAL SURGERY

## 2018-09-12 PROCEDURE — 70553 MRI BRAIN STEM W/O & W/DYE: CPT

## 2018-09-12 PROCEDURE — 99213 OFFICE O/P EST LOW 20 MIN: CPT | Performed by: NEUROLOGICAL SURGERY

## 2018-09-12 RX ORDER — DIGOXIN 125 MCG
125 TABLET ORAL
COMMUNITY
Start: 2018-08-24 | End: 2019-05-06

## 2018-09-12 RX ORDER — FLUCONAZOLE 100 MG/1
TABLET ORAL
COMMUNITY
Start: 2018-08-13 | End: 2019-04-16 | Stop reason: HOSPADM

## 2018-09-12 RX ORDER — HYDROCHLOROTHIAZIDE 50 MG/1
TABLET ORAL
COMMUNITY
Start: 2018-08-20 | End: 2019-04-16 | Stop reason: HOSPADM

## 2018-09-12 RX ADMIN — GADOBENATE DIMEGLUMINE 10 ML: 529 INJECTION, SOLUTION INTRAVENOUS at 13:01

## 2018-09-12 NOTE — PATIENT INSTRUCTIONS
PATIENT TO CONTINUE TO FOLLOW UP WITH HER PRIMARY CARE PROVIDER FOR YEARLY PHYSICAL EXAMS TO ENSURE COMPLETE HEALTH MAINTENANCE

## 2018-09-12 NOTE — PROGRESS NOTES
SUBJECTIVE:  Patient ID: Marcy Garcia is a 67 y.o. female is here today for follow-up.    Chief Complaint:Brain tumor  No chief complaint on file.      HPI  67-year-old female with a history of lung cancer who underwent craniotomy in January 2018 and then stereotactic radiosurgery.  She complains of some memory issues and some confusion at times.  She lives with her disabled  and is functional for most of her ADLs.  She does not drive.  She does have in-house help during the day to do her some of her shopping and cleaning etc.  She is still a taking chemotherapy.  She is scheduled for a repeat CT scan of her chest x-ray    The following portions of the patient's history were reviewed and updated as appropriate: allergies, current medications, past family history, past medical history, past social history, past surgical history and problem list.    OBJECTIVE:    Review of Systems   Psychiatric/Behavioral: Positive for behavioral problems and confusion.   All other systems reviewed and are negative.         Physical Exam   Constitutional: She is oriented to person, place, and time. She appears well-developed and well-nourished.   HENT:   Head: Normocephalic and atraumatic.   Right Ear: Hearing normal.   Left Ear: Hearing normal.   Eyes: Pupils are equal, round, and reactive to light. EOM are normal.   Neck: Normal range of motion.   Neurological: She is alert and oriented to person, place, and time. She has normal strength and normal reflexes. No cranial nerve deficit or sensory deficit. She displays a negative Romberg sign. GCS eye subscore is 4. GCS verbal subscore is 5. GCS motor subscore is 6. She displays no Babinski's sign on the right side. She displays no Babinski's sign on the left side.   Psychiatric: Her speech is normal. Judgment normal. Cognition and memory are normal.       Neurologic Exam     Mental Status   Oriented to person, place, and time.   Speech: speech is normal     Cranial Nerves      CN III, IV, VI   Pupils are equal, round, and reactive to light.  Extraocular motions are normal.     Motor Exam     Strength   Strength 5/5 throughout.       Independent Review of Radiographic Studies:   MRI the brain with and without contrast shows no new areas of enhancement, no area of recurrence    ASSESSMENT/PLAN:  The patient is done very well.  Her MRI shows no new areas of concern.  We will see her in follow up in 3 months      No diagnosis found.        No Follow-up on file.      Constantine Schmidt MD

## 2018-09-24 ENCOUNTER — HOSPITAL ENCOUNTER (OUTPATIENT)
Dept: CT IMAGING | Age: 68
Discharge: HOME | End: 2018-09-24
Payer: MEDICARE

## 2018-09-24 DIAGNOSIS — C34.12 MALIGNANT NEOPLASM OF UPPER LOBE BRONCHUS, LEFT (HCC): ICD-10-CM

## 2018-09-24 LAB
GFR NON-AFRICAN AMERICAN: >60
PERFORMED ON: NORMAL
POC CREATININE: 0.8 MG/DL (ref 0.3–1.3)
POC SAMPLE TYPE: NORMAL

## 2018-09-24 PROCEDURE — 82565 ASSAY OF CREATININE: CPT

## 2018-09-24 PROCEDURE — 71260 CT THORAX DX C+: CPT

## 2018-09-24 PROCEDURE — 6360000004 HC RX CONTRAST MEDICATION: Performed by: INTERNAL MEDICINE

## 2018-09-24 RX ADMIN — IOPAMIDOL 60 ML: 755 INJECTION, SOLUTION INTRAVENOUS at 14:11

## 2018-10-11 ENCOUNTER — OFFICE VISIT (OUTPATIENT)
Dept: INTERNAL MEDICINE | Facility: CLINIC | Age: 68
End: 2018-10-11

## 2018-10-11 VITALS
DIASTOLIC BLOOD PRESSURE: 59 MMHG | HEART RATE: 56 BPM | OXYGEN SATURATION: 96 % | SYSTOLIC BLOOD PRESSURE: 131 MMHG | HEIGHT: 64 IN | WEIGHT: 127.9 LBS | RESPIRATION RATE: 16 BRPM | BODY MASS INDEX: 21.83 KG/M2

## 2018-10-11 DIAGNOSIS — J30.9 ALLERGIC RHINITIS, UNSPECIFIED SEASONALITY, UNSPECIFIED TRIGGER: ICD-10-CM

## 2018-10-11 DIAGNOSIS — C34.92 ADENOCARCINOMA OF LUNG, STAGE 4, LEFT (HCC): ICD-10-CM

## 2018-10-11 DIAGNOSIS — F32.A ANXIETY AND DEPRESSION: ICD-10-CM

## 2018-10-11 DIAGNOSIS — R41.3 MEMORY LOSS DUE TO MEDICAL CONDITION: Primary | ICD-10-CM

## 2018-10-11 DIAGNOSIS — E78.2 MIXED HYPERLIPIDEMIA: ICD-10-CM

## 2018-10-11 DIAGNOSIS — J43.2 CENTRILOBULAR EMPHYSEMA (HCC): Chronic | ICD-10-CM

## 2018-10-11 DIAGNOSIS — C79.31 METASTATIC ADENOCARCINOMA TO BRAIN (HCC): ICD-10-CM

## 2018-10-11 DIAGNOSIS — I10 ESSENTIAL HYPERTENSION: ICD-10-CM

## 2018-10-11 DIAGNOSIS — F41.9 ANXIETY AND DEPRESSION: ICD-10-CM

## 2018-10-11 PROBLEM — T14.8XXA ANIMAL BITE: Status: RESOLVED | Noted: 2018-04-19 | Resolved: 2018-10-11

## 2018-10-11 PROCEDURE — 99204 OFFICE O/P NEW MOD 45 MIN: CPT | Performed by: INTERNAL MEDICINE

## 2018-10-11 RX ORDER — SERTRALINE HYDROCHLORIDE 100 MG/1
150 TABLET, FILM COATED ORAL DAILY
Qty: 30 TABLET | Refills: 5 | Status: SHIPPED | OUTPATIENT
Start: 2018-10-11 | End: 2018-12-17

## 2018-10-11 RX ORDER — DICLOFENAC SODIUM AND MISOPROSTOL 75; 200 MG/1; UG/1
1 TABLET, DELAYED RELEASE ORAL DAILY
COMMUNITY
End: 2018-10-15 | Stop reason: SDUPTHER

## 2018-10-11 NOTE — PROGRESS NOTES
"CC: establish care for lung cancer    History:  Marcy Garcia is a 67 y.o. female who presents today for evaluation of the above problems. She notes she has been doing reasonably well, though she has experienced \"brain fog.\" She is worried that this could be related to her chemotherapy, though she has also gone through radiation to chest and brain, has had brain surgery due to a metastatic tumor and also continues on multiple centrally acting medications. Regarding her lung cancer, she was first diagnosed in 1/2017 and underwent chemotherapy and radiation. She had a single brain met for which she underwent craniotomy and resection in 1/2018. She then underwent WBRT. She has been on Keytruda most recently, but feels her brain fog is worsening. Her mood does well, though she is on sertraline 200mg as well as Adderall and Xanax as needed to assist with her lack of energy and anxiety respectively.     ROS:  Review of Systems   Constitutional: Negative for chills and fever.   HENT: Negative for congestion and sore throat.    Eyes: Negative for visual disturbance.   Respiratory: Negative for cough and shortness of breath.    Cardiovascular: Negative for chest pain, palpitations and leg swelling.   Gastrointestinal: Negative for abdominal pain, constipation and nausea.   Endocrine: Negative for cold intolerance and heat intolerance.   Genitourinary: Negative for difficulty urinating and frequency.   Musculoskeletal: Negative for arthralgias and back pain.   Skin: Negative for rash.   Neurological: Negative for dizziness, seizures, syncope, weakness and headaches.   Psychiatric/Behavioral: Positive for confusion and decreased concentration. Negative for dysphoric mood. The patient is nervous/anxious.        Allergies   Allergen Reactions   • Kiwi Extract Shortness Of Breath   • Pineapple Shortness Of Breath   • Dilaudid [Hydromorphone Hcl] Delirium     Past Medical History:   Diagnosis Date   • Adenocarcinoma, lung, left " (CMS/HCC)    • Anemia    • Anxiety    • Arthritis    • Ataxia    • Brain tumor (CMS/HCC)    • Cancer (CMS/HCC)     lung cancer - pt had chemo and was told she was cancer free, but recently a brain tumor was found   • Colostomy in place (CMS/HCC)    • Confusion    • COPD (chronic obstructive pulmonary disease) (CMS/HCC)    • Depression    • Dizziness    • GERD (gastroesophageal reflux disease)    • Headache    • Memory loss    • Panic attacks    • Seasonal allergies    • Urgency of urination    • Weakness      Past Surgical History:   Procedure Laterality Date   • COLON SURGERY  2013    BLOCKED BOWEL - COLOSTOMY   • CRANIOTOMY FOR TUMOR Right 1/15/2018    Procedure: CRANIOTOMY FOR TUMOR STERIOTACTIC WITH BRAIN LAB right frontal craniotomy for brain tumor with neuromonitoring and brain lab;  Surgeon: Constantine Schmidt MD;  Location: [facility] OR;  Service:    • ECTOPIC PREGNANCY SURGERY  1983   • HERNIA REPAIR     • HYSTERECTOMY  1987   • LUNG CANCER SURGERY Left     Dr Marlow - Lower Lobectomy   • ORIF FINGER FRACTURE      left pinky   • SHOULDER SURGERY Left     BROKEN COLLAR BONE & SHOULDER SURGERY & ELBOW   • TONSILLECTOMY AND ADENOIDECTOMY  1954     Family History   Problem Relation Age of Onset   • Stroke Mother    • Osteoporosis Mother    • Cancer Father    • Cancer Maternal Grandmother    • Heart disease Maternal Grandfather       reports that she quit smoking about 2 years ago. Her smoking use included Cigarettes. She quit after 51.00 years of use. She has never used smokeless tobacco. She reports that she does not drink alcohol or use drugs.      Current Outpatient Prescriptions:   •  ALPRAZolam (XANAX) 0.25 MG tablet, Take 1 tablet by mouth 2 (Two) Times a Day As Needed for Anxiety. (Patient taking differently: Take 0.25 mg by mouth 2 (Two) Times a Day As Needed for Anxiety. Dr Mary Henning), Disp: 60 tablet, Rfl: 0  •  amphetamine-dextroamphetamine (ADDERALL) 10 MG tablet, Take 1 tablet by mouth Every  "Morning. (Patient taking differently: Take 10 mg by mouth Every Morning. Dr Mary Henning), Disp: 30 tablet, Rfl: 0  •  atorvastatin (LIPITOR) 20 MG tablet, Take 20 mg by mouth Daily., Disp: , Rfl:   •  azelastine (ASTELIN) 0.1 % nasal spray, 2 sprays into each nostril 2 (Two) Times a Day. Use in each nostril as directed, Disp: 90 mL, Rfl: 3  •  calcium-vitamin D (OSCAL-500) 500-200 MG-UNIT per tablet, Take 1 tablet by mouth Daily., Disp: , Rfl:   •  cetirizine (zyrTEC) 10 MG tablet, Take 10 mg by mouth Daily., Disp: , Rfl:   •  dexamethasone (DECADRON) 2 MG tablet, Take 2 mg by mouth Daily With Breakfast. Scheduled to end 04/18/18 per [facility] paperwork, Disp: , Rfl:   •  diclofenac-misoprostol (ARTHROTEC 75) 75-0.2 MG EC tablet, Take 1 tablet by mouth Daily., Disp: , Rfl:   •  digoxin (LANOXIN) 125 MCG tablet, , Disp: , Rfl:   •  fluconazole (DIFLUCAN) 100 MG tablet, , Disp: , Rfl:   •  hydrochlorothiazide (HYDRODIURIL) 50 MG tablet, , Disp: , Rfl:   •  ipratropium-albuterol (COMBIVENT RESPIMAT)  MCG/ACT inhaler, Inhale 1 puff 4 (Four) Times a Day As Needed for Wheezing., Disp: , Rfl:   •  montelukast (SINGULAIR) 10 MG tablet, Take 10 mg by mouth Every Night., Disp: , Rfl:   •  potassium chloride (K-DUR,KLOR-CON) 20 MEQ CR tablet, , Disp: , Rfl:   •  sertraline (ZOLOFT) 100 MG tablet, Take 1.5 tablets by mouth Daily., Disp: 30 tablet, Rfl: 5    OBJECTIVE:  /59 (BP Location: Left arm, Patient Position: Sitting, Cuff Size: Adult)   Pulse 56   Resp 16   Ht 162.6 cm (64\")   Wt 58 kg (127 lb 14.4 oz)   SpO2 96%   Breastfeeding? No   BMI 21.95 kg/m²    Physical Exam   Constitutional: She is oriented to person, place, and time. She appears well-developed and well-nourished. No distress.   HENT:   Head: Normocephalic and atraumatic.   Right Ear: External ear normal.   Left Ear: External ear normal.   Nose: Nose normal.   Mouth/Throat: Oropharynx is clear and moist. No oropharyngeal exudate.   Eyes: EOM are " normal. No scleral icterus.   Neck: Normal range of motion. Neck supple.   Cardiovascular: Normal rate, regular rhythm and normal heart sounds.    No murmur heard.  Pulmonary/Chest: Effort normal and breath sounds normal. No accessory muscle usage. No respiratory distress. She has no wheezes.   Abdominal: Soft. Bowel sounds are normal. She exhibits no distension. There is no tenderness.   Musculoskeletal: Normal range of motion. She exhibits edema (trace in the BLE ).   Lymphadenopathy:     She has no cervical adenopathy.   Neurological: She is alert and oriented to person, place, and time. Coordination and gait normal.   Skin: Skin is warm and dry. No cyanosis or erythema. Nails show no clubbing.   No jaundice   Psychiatric: She has a normal mood and affect. Her behavior is normal. Her mood appears not anxious. She does not exhibit a depressed mood.       Assessment/Plan    Diagnoses and all orders for this visit:    Memory loss due to medical condition  I believe her cognitive complaints are likely multifactorial. She has had chemotherapy, radiation to both chest and brain, prior surgical intervention to the brain, and also continues on multiple centrally acting medications, which potentially counteract one another. Firstly, her dose of sertraline is quite high, so we will decrease to 150mg and further pending tolerance of this change. We discussed that Adderall could be contributing to her anxiety given its action as a stimulant. It potentially has a role in treating cognitive impairment and fatigue due to cancer and chemotherapy, but I recommend she stop it to see how she feels without it completely. We will continue Xanax at this time as needed, but it may improve without Adderall.     Adenocarcinoma of lung, stage 4, left (CMS/HCC)  Metastatic adenocarcinoma to brain (CMS/HCC)  We discussed the palliative nature of her treatments and the fact that she is able to dictate all further decisions in her treatment.  She does wish to continue on all current treatments at this time, which is reasonable as she is tolerating things well.     Anxiety and depression  -     sertraline (ZOLOFT) 100 MG tablet; Take 1.5 tablets by mouth Daily.  Decrease zoloft to 150mg from 200mg given long-term stability.    Mixed hyperlipidemia  Stable on moderate intensity statin therapy per ACC/AHA guidelines.    Essential hypertension  Well controlled, BP goal for age is <140/90 per JNC 8 guidelines and continue current medications    Centrilobular emphysema (CMS/HCC)  Stable on inhaled therapy without exacerbation.     Allergic rhinitis, unspecified seasonality, unspecified trigger  Continue Astelin and Singulair.      An After Visit Summary was printed and given to the patient at discharge.  Return in about 2 months (around 12/11/2018) for Medicare Wellness.         Tim Samaniego D.O. 10/13/2018

## 2018-10-13 PROBLEM — I10 ESSENTIAL HYPERTENSION: Status: ACTIVE | Noted: 2018-10-13

## 2018-10-15 RX ORDER — DICLOFENAC SODIUM AND MISOPROSTOL 75; 200 MG/1; UG/1
1 TABLET, DELAYED RELEASE ORAL DAILY
Qty: 30 TABLET | Refills: 3 | Status: SHIPPED | OUTPATIENT
Start: 2018-10-15 | End: 2018-10-23 | Stop reason: SDUPTHER

## 2018-10-18 RX ORDER — DICLOFENAC SODIUM AND MISOPROSTOL 75; 200 MG/1; UG/1
1 TABLET, DELAYED RELEASE ORAL DAILY
Qty: 60 TABLET | Refills: 1 | Status: CANCELLED | OUTPATIENT
Start: 2018-10-18

## 2018-10-18 RX ORDER — DICLOFENAC SODIUM 75 MG/1
75 TABLET, DELAYED RELEASE ORAL 2 TIMES DAILY PRN
Qty: 60 TABLET | Refills: 1 | Status: SHIPPED | OUTPATIENT
Start: 2018-10-18 | End: 2018-10-23 | Stop reason: SDUPTHER

## 2018-10-18 NOTE — TELEPHONE ENCOUNTER
The pharmacy called stating that she had an rx for diclofenac before which she never filled and was to take twice a day.  The packaging is #60 and cannot be broken up.  Do you want to change how she takes it or give a 60 day supply?    MM

## 2018-10-23 ENCOUNTER — OFFICE VISIT (OUTPATIENT)
Dept: INTERNAL MEDICINE | Facility: CLINIC | Age: 68
End: 2018-10-23

## 2018-10-23 VITALS
HEIGHT: 64 IN | TEMPERATURE: 98.1 F | OXYGEN SATURATION: 98 % | DIASTOLIC BLOOD PRESSURE: 73 MMHG | BODY MASS INDEX: 21.73 KG/M2 | SYSTOLIC BLOOD PRESSURE: 116 MMHG | HEART RATE: 83 BPM | RESPIRATION RATE: 16 BRPM | WEIGHT: 127.3 LBS

## 2018-10-23 DIAGNOSIS — R52 BODY ACHES: ICD-10-CM

## 2018-10-23 DIAGNOSIS — K94.19 ALTERED BOWEL ELIMINATION DUE TO INTESTINAL OSTOMY (HCC): Primary | ICD-10-CM

## 2018-10-23 DIAGNOSIS — R10.84 GENERALIZED ABDOMINAL PAIN: ICD-10-CM

## 2018-10-23 DIAGNOSIS — J44.9 CHRONIC OBSTRUCTIVE PULMONARY DISEASE, UNSPECIFIED COPD TYPE (HCC): Chronic | ICD-10-CM

## 2018-10-23 PROCEDURE — 99214 OFFICE O/P EST MOD 30 MIN: CPT | Performed by: NURSE PRACTITIONER

## 2018-10-23 RX ORDER — MONTELUKAST SODIUM 10 MG/1
10 TABLET ORAL NIGHTLY
Qty: 30 TABLET | Refills: 3 | Status: SHIPPED | OUTPATIENT
Start: 2018-10-23 | End: 2019-01-28 | Stop reason: SDUPTHER

## 2018-10-23 RX ORDER — DEXAMETHASONE 4 MG/1
TABLET ORAL
COMMUNITY
Start: 2018-10-16 | End: 2019-04-16 | Stop reason: HOSPADM

## 2018-10-23 RX ORDER — DICLOFENAC SODIUM AND MISOPROSTOL 75; 200 MG/1; UG/1
1 TABLET, DELAYED RELEASE ORAL DAILY
Qty: 30 TABLET | Refills: 3 | Status: SHIPPED | OUTPATIENT
Start: 2018-10-23 | End: 2018-10-24 | Stop reason: SDUPTHER

## 2018-10-23 NOTE — PROGRESS NOTES
"CC: Shortness of breath, abdominal pain    History:  Marcy Garcia is a 67 y.o. female who presents today for follow-up for evaluation of the above:  Patient presents today for multiple complaints. She state that since her last visit on 10/11/2018 she has restarted Chemotherapy and is not feeling well. She feels that \"my brain is worse\". She states she is short of breath and doesn't fell well.   She states that she feels like feces is stuck and will not pass through her anus. She states she can feel something in her colon with her finger that she has inserted into her anus and through her vaginal wall.  Has ostomy site in left lower abdomen and she feels this is sticking out too far. She also c/o right sided abdominal pain in upper and lower quadrants.   C/o generalized pain that has worsened with starting chemo but does improve with arthrotec.   Getting MRI every three months with Dr. Schmidt  She asks if she can restart Provigil   Seeing Dr. Price in 2 weeks      ROS:  Review of Systems   Constitutional: Positive for fatigue. Negative for unexpected weight change.   Eyes: Negative.    Respiratory: Positive for shortness of breath.    Cardiovascular: Negative.    Gastrointestinal: Positive for abdominal pain. Negative for constipation and diarrhea.   Endocrine: Negative.    Genitourinary: Negative for difficulty urinating, dyspareunia, genital sores, menstrual problem, pelvic pain, vaginal bleeding, vaginal discharge and vaginal pain.   Musculoskeletal: Positive for myalgias.   Skin: Negative.    Neurological: Positive for headaches.   Psychiatric/Behavioral: Negative.        Ms. Garcia  reports that she quit smoking about 2 years ago. Her smoking use included Cigarettes. She quit after 51.00 years of use. She has never used smokeless tobacco. She reports that she does not drink alcohol or use drugs.      Current Outpatient Prescriptions:   •  ALPRAZolam (XANAX) 0.25 MG tablet, Take 1 tablet by mouth 2 (Two) " "Times a Day As Needed for Anxiety. (Patient taking differently: Take 0.25 mg by mouth 2 (Two) Times a Day As Needed for Anxiety. Dr Mary Henning), Disp: 60 tablet, Rfl: 0  •  atorvastatin (LIPITOR) 20 MG tablet, Take 20 mg by mouth Daily., Disp: , Rfl:   •  azelastine (ASTELIN) 0.1 % nasal spray, 2 sprays into each nostril 2 (Two) Times a Day. Use in each nostril as directed, Disp: 90 mL, Rfl: 3  •  cetirizine (zyrTEC) 10 MG tablet, Take 10 mg by mouth Daily., Disp: , Rfl:   •  dexamethasone (DECADRON) 4 MG tablet, , Disp: , Rfl:   •  digoxin (LANOXIN) 125 MCG tablet, Take 125 mcg by mouth Daily., Disp: , Rfl:   •  hydrochlorothiazide (HYDRODIURIL) 50 MG tablet, , Disp: , Rfl:   •  ipratropium-albuterol (COMBIVENT RESPIMAT)  MCG/ACT inhaler, Inhale 1 puff 4 (Four) Times a Day As Needed for Wheezing., Disp: , Rfl:   •  montelukast (SINGULAIR) 10 MG tablet, Take 1 tablet by mouth Every Night., Disp: 30 tablet, Rfl: 3  •  potassium chloride (K-DUR,KLOR-CON) 20 MEQ CR tablet, , Disp: , Rfl:   •  sertraline (ZOLOFT) 100 MG tablet, Take 1.5 tablets by mouth Daily., Disp: 30 tablet, Rfl: 5  •  calcium-vitamin D (OSCAL-500) 500-200 MG-UNIT per tablet, Take 1 tablet by mouth Daily., Disp: , Rfl:   •  diclofenac-misoprostol (ARTHROTEC 75) 75-0.2 MG EC tablet, Take 1 tablet by mouth Daily., Disp: 30 tablet, Rfl: 3  •  fluconazole (DIFLUCAN) 100 MG tablet, , Disp: , Rfl:       OBJECTIVE:  /73 (BP Location: Right arm, Patient Position: Sitting)   Pulse 83   Temp 98.1 °F (36.7 °C) (Oral)   Resp 16   Ht 162.6 cm (64\")   Wt 57.7 kg (127 lb 4.8 oz)   SpO2 98%   BMI 21.85 kg/m²    Physical Exam    Assessment/Plan    Marcy was seen today for shortness of breath and med refill.    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 Will refer to GI for exam. She is not currently seeing a specialist for her ostomy care.     Body aches  -     diclofenac-misoprostol (ARTHROTEC 75) 75-0.2 MG EC tablet; Take 1 tablet by mouth Daily.      Chronic obstructive pulmonary disease, unspecified COPD type (CMS/HCC)  -     montelukast (SINGULAIR) 10 MG tablet; Take 1 tablet by mouth Every Night.            An After Visit Summary was printed and given to the patient at discharge.  Return for Next scheduled follow up. Sooner if problems arise.         Wandy RAJAN. 10/24/2018

## 2018-10-24 ENCOUNTER — TELEPHONE (OUTPATIENT)
Dept: INTERNAL MEDICINE | Facility: CLINIC | Age: 68
End: 2018-10-24

## 2018-10-24 DIAGNOSIS — R52 BODY ACHES: ICD-10-CM

## 2018-10-24 RX ORDER — DICLOFENAC SODIUM AND MISOPROSTOL 75; 200 MG/1; UG/1
1 TABLET, DELAYED RELEASE ORAL DAILY
Qty: 60 TABLET | Refills: 1 | Status: SHIPPED | OUTPATIENT
Start: 2018-10-24 | End: 2018-12-17

## 2018-10-24 RX ORDER — KETOCONAZOLE 20 MG/ML
SHAMPOO TOPICAL 2 TIMES WEEKLY
Qty: 120 ML | Refills: 1 | Status: ON HOLD | OUTPATIENT
Start: 2018-10-25 | End: 2019-06-19

## 2018-10-24 NOTE — TELEPHONE ENCOUNTER
Patient was informed        ----- Message from Tim Samaniego DO sent at 10/24/2018  2:32 PM CDT -----  Pt should take extra potassium for the next 5 days based on recent lab results per Dr. Price.

## 2018-10-24 NOTE — TELEPHONE ENCOUNTER
Patient called and stated she has a rash on her head and would like ketoconazole shampoo prescribed.  She has had the rash before and the shampoo worked for her.

## 2018-10-24 NOTE — TELEPHONE ENCOUNTER
Marla called back again and said that her Arthrotec only comes in a #60 package that cannot be . We had the same problem the other day, Dr Samaniego changed the medication and sent a new RX, and I explained to the patient, but she requested this change at her visit yesterday.   ROYA

## 2018-10-25 ENCOUNTER — TELEPHONE (OUTPATIENT)
Dept: INTERNAL MEDICINE | Facility: CLINIC | Age: 68
End: 2018-10-25

## 2018-10-25 ENCOUNTER — TELEPHONE (OUTPATIENT)
Dept: GASTROENTEROLOGY | Facility: CLINIC | Age: 68
End: 2018-10-25

## 2018-10-25 DIAGNOSIS — R10.84 GENERALIZED ABDOMINAL PAIN: ICD-10-CM

## 2018-10-25 DIAGNOSIS — K94.19 ALTERED BOWEL ELIMINATION DUE TO INTESTINAL OSTOMY (HCC): Primary | ICD-10-CM

## 2018-10-25 NOTE — TELEPHONE ENCOUNTER
PT HAD CALLED DR. WOODS'S OFFICE COMPLAINING TO THE DR THAT SOMEONE HERE HAD CALLED HER ABOUT A REFERRAL THAT WE RECEIVED ON 10/25/2018 AND WE WOULDN'T PUT HER WITH THE DR THAT SHE WANTED. THAT IS BECAUSE SHE IS AN EXISTING [facility] PT AND WE ARE NOT SUPPOSE TO SWITCH DRS IF THE PT IS ESTABLISHED. I TOLD THE DR OFFICE THAT THIS IS OUR POLICY AND SHE NEEDS TO STAY WITH THE ESTABLISHED DR.

## 2018-10-25 NOTE — TELEPHONE ENCOUNTER
Patient called and stated she was referred to Gastro and got a call from Dr. Becca Tamayo's office this morning to schedule an appointment.  The patient previously had a bad experience with Dr. Tamayo and would like a referral to see a different physician.    Wandy informed.    I called [facility] and spoke to Neida who stated if the patient has already seen a physician in their office, they are scheduled with that physician, per their policy.  When I asked if the patient has the option to choose a different physician, she said that is not their policy.

## 2018-10-27 ENCOUNTER — NURSE TRIAGE (OUTPATIENT)
Dept: CALL CENTER | Facility: HOSPITAL | Age: 68
End: 2018-10-27

## 2018-10-27 NOTE — TELEPHONE ENCOUNTER
"She has itching after last treatment. Dr. Price instructed her to take benadryl for this and it is not helping. What should I do? She will call and speak to MD on call for Dr. Price for further instructions.     Reason for Disposition  • Taking prescription medication that could cause itching (e.g., codeine/morphine/other opiates, aspirin)    Additional Information  • Negative: [1] Life-threatening reaction (anaphylaxis) in the past to similar substance (e.g., food, insect bite/sting, chemical, etc.) AND [2] < 2 hours since exposure  • Negative: Difficulty breathing or wheezing  • Negative: [1] Difficulty swallowing or slurred speech AND [2] sudden onset  • Negative: Sounds like a life-threatening emergency to the triager  • Negative: Could be severe allergic reaction  • Negative: Insect bites suspected  • Negative: Looks like hives (pink bumps with pale centers)  • Negative: Yellowish color of the skin AND sclera (white of the eye)  • Negative: Itching in just one area or spot  • Negative: Widespread rash also present  • Negative: Patient sounds very sick or weak to the triager  • Negative: [1] MODERATE-SEVERE widespread itching (i.e., interferes with sleep, normal activities or school) AND [2] not improved after 24 hours of itching Care Advice    Answer Assessment - Initial Assessment Questions  1. DESCRIPTION: \"Describe the itching you are having.\"      All over chest and neck area  2. SEVERITY: \"How bad is it?\"     - MILD - doesn't interfere with normal activities    - MODERATE-SEVERE: interferes with work, school, sleep, or other activities       severe  3. SCRATCHING: \"Are there any scratch marks? Bleeding?\"     Scratch marks all over  4. ONSET: \"When did this begin?\"       Few days  5. CAUSE: \"What do you think is causing the itching?\" (ask about swimming pools, pollen, animals, soaps, etc.)      Chemo treatment  6. OTHER SYMPTOMS: \"Do you have any other symptoms?\"       denies  7. PREGNANCY: \"Is there " "any chance you are pregnant?\" \"When was your last menstrual period?\"      no    Protocols used: ITCHING - WIDESPREAD-ADULT-AH      "

## 2018-11-26 ENCOUNTER — TRANSCRIBE ORDERS (OUTPATIENT)
Dept: ADMINISTRATIVE | Facility: HOSPITAL | Age: 68
End: 2018-11-26

## 2018-11-26 DIAGNOSIS — Z13.6 ENCOUNTER FOR SCREENING FOR VASCULAR DISEASE: Primary | ICD-10-CM

## 2018-12-04 ENCOUNTER — HOSPITAL ENCOUNTER (OUTPATIENT)
Dept: GENERAL RADIOLOGY | Age: 68
Discharge: HOME | End: 2018-12-04
Payer: MEDICARE
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
## 2018-12-04 PROCEDURE — 72020 X-RAY EXAM OF SPINE 1 VIEW: CPT

## 2018-12-04 PROCEDURE — 70250 X-RAY EXAM OF SKULL: CPT

## 2018-12-04 PROCEDURE — 73030 X-RAY EXAM OF SHOULDER: CPT

## 2018-12-04 PROCEDURE — 73502 X-RAY EXAM HIP UNI 2-3 VIEWS: CPT

## 2018-12-05 ENCOUNTER — TELEPHONE (OUTPATIENT)
Dept: INTERNAL MEDICINE | Facility: CLINIC | Age: 68
End: 2018-12-05

## 2018-12-05 ENCOUNTER — HOSPITAL ENCOUNTER (OUTPATIENT)
Dept: GENERAL RADIOLOGY | Age: 68
Discharge: HOME | End: 2018-12-05
Payer: MEDICARE

## 2018-12-05 ENCOUNTER — HOSPITAL ENCOUNTER (OUTPATIENT)
Dept: CT IMAGING | Age: 68
Discharge: HOME | End: 2018-12-05
Payer: MEDICARE

## 2018-12-05 DIAGNOSIS — R26.2 DIFFICULTY WALKING: ICD-10-CM

## 2018-12-05 DIAGNOSIS — R29.6 FALL IN ELDERLY PATIENT: ICD-10-CM

## 2018-12-05 PROCEDURE — 72192 CT PELVIS W/O DYE: CPT

## 2018-12-05 PROCEDURE — 73030 X-RAY EXAM OF SHOULDER: CPT

## 2018-12-05 NOTE — TELEPHONE ENCOUNTER
Patient's  called and wanted you to know that she has fell and broken her hip and possibly her shoulder also.  He is also very concerned that he is not going to be able to care for her at home because he is in a wheelchair.    MM

## 2018-12-06 DIAGNOSIS — C34.92 ADENOCARCINOMA OF LUNG, STAGE 4, LEFT (HCC): ICD-10-CM

## 2018-12-06 DIAGNOSIS — C79.31 SECONDARY MALIGNANT NEOPLASM OF BRAIN AND SPINAL CORD (HCC): Primary | Chronic | ICD-10-CM

## 2018-12-06 DIAGNOSIS — C79.49 SECONDARY MALIGNANT NEOPLASM OF BRAIN AND SPINAL CORD (HCC): Primary | Chronic | ICD-10-CM

## 2018-12-06 NOTE — TELEPHONE ENCOUNTER
If she is in the hospital, they will help to arrange things to discuss a safe discharge. A  or  will be able to help in the hosptial.

## 2018-12-07 ENCOUNTER — TELEPHONE (OUTPATIENT)
Dept: INTERNAL MEDICINE | Facility: CLINIC | Age: 68
End: 2018-12-07

## 2018-12-07 NOTE — TELEPHONE ENCOUNTER
"Patient stated she has not been to the hospital, but has seen Dr. Price and he ordered some scans.  Patient stated her shoulders are bruised and she may have a hip fracture.  Patient said \"my  wants to put me in a nursing home, but I'm not going.\"  I asked the patient how she is feeling and she said she was okay right now but would like to know if she has a hip fracture.  "

## 2018-12-07 NOTE — TELEPHONE ENCOUNTER
Let's contact Dr. Price's office for records and what tests he ordered. Without that, I cannot make a decision at this point.

## 2018-12-13 ENCOUNTER — HOSPITAL ENCOUNTER (OUTPATIENT)
Dept: MRI IMAGING | Facility: HOSPITAL | Age: 68
Discharge: HOME | End: 2018-12-13
Attending: NEUROLOGICAL SURGERY | Admitting: NEUROLOGICAL SURGERY

## 2018-12-13 ENCOUNTER — OFFICE VISIT (OUTPATIENT)
Dept: NEUROSURGERY | Facility: CLINIC | Age: 68
End: 2018-12-13

## 2018-12-13 VITALS
DIASTOLIC BLOOD PRESSURE: 76 MMHG | WEIGHT: 129 LBS | SYSTOLIC BLOOD PRESSURE: 122 MMHG | BODY MASS INDEX: 22.02 KG/M2 | HEIGHT: 64 IN

## 2018-12-13 DIAGNOSIS — Z87.891 FORMER SMOKER: ICD-10-CM

## 2018-12-13 DIAGNOSIS — M54.2 NECK PAIN: ICD-10-CM

## 2018-12-13 DIAGNOSIS — C79.49 SECONDARY MALIGNANT NEOPLASM OF BRAIN AND SPINAL CORD (HCC): Chronic | ICD-10-CM

## 2018-12-13 DIAGNOSIS — C79.31 SECONDARY MALIGNANT NEOPLASM OF BRAIN AND SPINAL CORD (HCC): Chronic | ICD-10-CM

## 2018-12-13 DIAGNOSIS — C79.31 METASTATIC ADENOCARCINOMA TO BRAIN (HCC): Primary | ICD-10-CM

## 2018-12-13 DIAGNOSIS — IMO0001 NORMAL BODY MASS INDEX (BMI): ICD-10-CM

## 2018-12-13 DIAGNOSIS — M25.511 CHRONIC PAIN OF BOTH SHOULDERS: ICD-10-CM

## 2018-12-13 DIAGNOSIS — M25.512 CHRONIC PAIN OF BOTH SHOULDERS: ICD-10-CM

## 2018-12-13 DIAGNOSIS — G89.29 CHRONIC PAIN OF BOTH SHOULDERS: ICD-10-CM

## 2018-12-13 PROCEDURE — 99213 OFFICE O/P EST LOW 20 MIN: CPT | Performed by: NEUROLOGICAL SURGERY

## 2018-12-13 PROCEDURE — 0 GADOBENATE DIMEGLUMINE 529 MG/ML SOLUTION: Performed by: NEUROLOGICAL SURGERY

## 2018-12-13 PROCEDURE — A9577 INJ MULTIHANCE: HCPCS | Performed by: NEUROLOGICAL SURGERY

## 2018-12-13 PROCEDURE — 70553 MRI BRAIN STEM W/O & W/DYE: CPT

## 2018-12-13 RX ORDER — DICLOFENAC SODIUM 75 MG/1
TABLET, DELAYED RELEASE ORAL 2 TIMES DAILY PRN
COMMUNITY
Start: 2018-11-14 | End: 2019-04-16 | Stop reason: HOSPADM

## 2018-12-13 RX ADMIN — GADOBENATE DIMEGLUMINE 10 ML: 529 INJECTION, SOLUTION INTRAVENOUS at 12:20

## 2018-12-13 NOTE — PROGRESS NOTES
SUBJECTIVE:  Patient ID: Marcy Garcia is a 68 y.o. female is here today for follow-up.    Chief Complaint: Brain tumor  Chief Complaint   Patient presents with   • Brain Tumor     patient is here for follow up; she had an MRI today @ [facility]       HPI  67-year-old female with a history of lung cancer who underwent craniotomy in January 2018 and then stereotactic radiosurgery.  she has no new complaints.  She is on chemotherapy per her oncologist.  She is switching to Dr. Zurita tomorrow.        The following portions of the patient's history were reviewed and updated as appropriate: allergies, current medications, past family history, past medical history, past social history, past surgical history and problem list.    OBJECTIVE:    Review of Systems   All other systems reviewed and are negative.         Physical Exam   Constitutional: She is oriented to person, place, and time. She appears well-developed and well-nourished.   HENT:   Head: Normocephalic and atraumatic.   Right Ear: Hearing normal.   Left Ear: Hearing normal.   Eyes: EOM are normal. Pupils are equal, round, and reactive to light.   Neck: Normal range of motion.   Neurological: She is alert and oriented to person, place, and time. She has normal strength and normal reflexes. No cranial nerve deficit or sensory deficit. She displays a negative Romberg sign. GCS eye subscore is 4. GCS verbal subscore is 5. GCS motor subscore is 6. She displays no Babinski's sign on the right side. She displays no Babinski's sign on the left side.   Psychiatric: Her speech is normal. Judgment normal. Cognition and memory are normal.       Neurologic Exam     Mental Status   Oriented to person, place, and time.   Speech: speech is normal     Cranial Nerves     CN III, IV, VI   Pupils are equal, round, and reactive to light.  Extraocular motions are normal.     Motor Exam     Strength   Strength 5/5 throughout.       Independent Review of Radiographic Studies:   MRI the brain  with and without contrast shows no new areas of enhancement no areas of recurrence.    ASSESSMENT/PLAN:  The patient is doing very well after her brain surgery and radiation treatment.  Her cancer since it this point seems to be under good control.  We will reimage her in 3 months.      No diagnosis found.        No Follow-up on file.      Constantine Schmidt MD

## 2018-12-17 ENCOUNTER — OFFICE VISIT (OUTPATIENT)
Dept: INTERNAL MEDICINE | Facility: CLINIC | Age: 68
End: 2018-12-17

## 2018-12-17 VITALS
DIASTOLIC BLOOD PRESSURE: 82 MMHG | BODY MASS INDEX: 22.45 KG/M2 | WEIGHT: 131.5 LBS | RESPIRATION RATE: 16 BRPM | OXYGEN SATURATION: 98 % | SYSTOLIC BLOOD PRESSURE: 122 MMHG | HEART RATE: 67 BPM | HEIGHT: 64 IN

## 2018-12-17 DIAGNOSIS — C34.92 ADENOCARCINOMA OF LUNG, STAGE 4, LEFT (HCC): Primary | ICD-10-CM

## 2018-12-17 DIAGNOSIS — C79.31 METASTATIC ADENOCARCINOMA TO BRAIN (HCC): ICD-10-CM

## 2018-12-17 DIAGNOSIS — F41.9 ANXIETY AND DEPRESSION: ICD-10-CM

## 2018-12-17 DIAGNOSIS — F32.A ANXIETY AND DEPRESSION: ICD-10-CM

## 2018-12-17 DIAGNOSIS — R92.8 ABNORMAL MAMMOGRAM OF RIGHT BREAST: ICD-10-CM

## 2018-12-17 DIAGNOSIS — I10 ESSENTIAL HYPERTENSION: ICD-10-CM

## 2018-12-17 PROCEDURE — G0438 PPPS, INITIAL VISIT: HCPCS | Performed by: INTERNAL MEDICINE

## 2018-12-17 PROCEDURE — 99214 OFFICE O/P EST MOD 30 MIN: CPT | Performed by: INTERNAL MEDICINE

## 2018-12-17 RX ORDER — SERTRALINE HYDROCHLORIDE 100 MG/1
200 TABLET, FILM COATED ORAL DAILY
Qty: 60 TABLET | Refills: 5 | Status: SHIPPED | OUTPATIENT
Start: 2018-12-17

## 2018-12-17 NOTE — PROGRESS NOTES
QUICK REFERENCE INFORMATION:  The ABCs of the Annual Wellness Visit    Subsequent Medicare Wellness Visit    HEALTH RISK ASSESSMENT    1950    Recent Hospitalizations:  Recently treated at the following:  Other: [facility].        Current Medical Providers:  Patient Care Team:  Tim Samaniego DO as PCP - General (Internal Medicine)  Ranjith Goodrich MD as Consulting Physician (Otolaryngology)  Mary Henning MD as Referring Physician (Family Medicine)  Mark Ogden MD as Cardiologist (Cardiology)  Becca Tamayo MD as Consulting Physician (Gastroenterology)  Cosme Zurita MD as Consulting Physician (Hematology and Oncology)  Gabrielle Reynoso MD as Consulting Physician (Hematology and Oncology)        Smoking Status:  Social History     Tobacco Use   Smoking Status Former Smoker   • Years: 51.00   • Types: Cigarettes   • Last attempt to quit: 10/2016   • Years since quittin.2   Smokeless Tobacco Never Used   Tobacco Comment    when pt smoked she smoked 2 packs a day on average       Alcohol Consumption:  Social History     Substance and Sexual Activity   Alcohol Use No       Depression Screen:   PHQ-2/PHQ-9 Depression Screening 2018   Little interest or pleasure in doing things 0   Feeling down, depressed, or hopeless 0   Trouble falling or staying asleep, or sleeping too much -   Feeling tired or having little energy -   Poor appetite or overeating -   Feeling bad about yourself - or that you are a failure or have let yourself or your family down -   Trouble concentrating on things, such as reading the newspaper or watching television -   Moving or speaking so slowly that other people could have noticed. Or the opposite - being so fidgety or restless that you have been moving around a lot more than usual -   Thoughts that you would be better off dead, or of hurting yourself in some way -   Total Score 0   If you checked off any problems, how difficult have these problems  made it for you to do your work, take care of things at home, or get along with other people? -       Health Habits and Functional and Cognitive Screening:  Functional & Cognitive Status 12/17/2018   Do you have difficulty preparing food and eating? Yes   Do you have difficulty bathing yourself, getting dressed or grooming yourself? No   Do you have difficulty using the toilet? No   Do you have difficulty moving around from place to place? No   Do you have trouble with steps or getting out of a bed or a chair? No   In the past year have you fallen or experienced a near fall? Yes   Current Diet Unhealthy Diet   Dental Exam Not up to date   Eye Exam Up to date   Exercise (times per week) 1 times per week   Current Exercise Activities Include Walking   Do you need help using the phone?  No   Are you deaf or do you have serious difficulty hearing?  Yes   Do you need help with transportation? Yes   Do you need help shopping? Yes   Do you need help preparing meals?  Yes   Do you need help with housework?  Yes   Do you need help with laundry? Yes   Do you need help taking your medications? No   Do you need help managing money? Yes   Do you ever drive or ride in a car without wearing a seat belt? No   Have you felt unusual stress, anger or loneliness in the last month? Yes   Who do you live with? Spouse   If you need help, do you have trouble finding someone available to you? No   Have you been bothered in the last four weeks by sexual problems? No   Do you have difficulty concentrating, remembering or making decisions? Yes           Does the patient have evidence of cognitive impairment? Yes    Aspirin use counseling: Does not need ASA (and currently is not on it)      Recent Lab Results:  CMP:  Lab Results   Component Value Date    BUN 25 (H) 05/09/2018    CREATININE 0.60 09/12/2018    EGFRIFNONA 64 05/09/2018    BCR 28.4 (H) 05/09/2018     05/09/2018    K 3.3 (L) 05/09/2018    CO2 25.0 05/09/2018    CALCIUM 10.0  05/09/2018    ALBUMIN 4.20 04/19/2018    BILITOT 0.6 04/19/2018    ALKPHOS 91 04/19/2018    AST 27 04/19/2018    ALT 38 04/19/2018     Lipid Panel:     HbA1c:       Visual Acuity:  No exam data present    Age-appropriate Screening Schedule:  Refer to the list below for future screening recommendations based on patient's age, sex and/or medical conditions. Orders for these recommended tests are listed in the plan section. The patient has been provided with a written plan.    Health Maintenance   Topic Date Due   • ZOSTER VACCINE (2 of 3) 02/26/2017   • COLONOSCOPY  01/05/2018   • LIPID PANEL  10/11/2018   • PNEUMOCOCCAL VACCINES (65+ LOW/MEDIUM RISK) (1 of 2 - PCV13) 06/03/2019 (Originally 10/29/2014)   • MAMMOGRAM  08/28/2020   • TDAP/TD VACCINES (2 - Td) 03/01/2028   • INFLUENZA VACCINE  Completed        Subjective   History of Present Illness    Marcy Garcia is a 68 y.o. female who presents for an Subsequent Wellness Visit.    The following portions of the patient's history were reviewed and updated as appropriate: allergies, current medications, past family history, past medical history, past social history, past surgical history and problem list.    Outpatient Medications Prior to Visit   Medication Sig Dispense Refill   • ALPRAZolam (XANAX) 0.25 MG tablet Take 1 tablet by mouth 2 (Two) Times a Day As Needed for Anxiety. (Patient taking differently: Take 0.25 mg by mouth 2 (Two) Times a Day As Needed for Anxiety. Dr Mary Henning) 60 tablet 0   • atorvastatin (LIPITOR) 20 MG tablet Take 20 mg by mouth Daily.     • azelastine (ASTELIN) 0.1 % nasal spray 2 sprays into each nostril 2 (Two) Times a Day. Use in each nostril as directed 90 mL 3   • calcium-vitamin D (OSCAL-500) 500-200 MG-UNIT per tablet Take 1 tablet by mouth Daily.     • cetirizine (zyrTEC) 10 MG tablet Take 10 mg by mouth Daily.     • dexamethasone (DECADRON) 4 MG tablet      • diclofenac (VOLTAREN) 75 MG EC tablet      • digoxin (LANOXIN) 125  MCG tablet Take 125 mcg by mouth Daily.     • fluconazole (DIFLUCAN) 100 MG tablet      • hydrochlorothiazide (HYDRODIURIL) 50 MG tablet      • ipratropium-albuterol (COMBIVENT RESPIMAT)  MCG/ACT inhaler Inhale 1 puff 4 (Four) Times a Day As Needed for Wheezing.     • ketoconazole (NIZORAL) 2 % shampoo Apply  topically to the appropriate area as directed 2 (Two) Times a Week. 120 mL 1   • montelukast (SINGULAIR) 10 MG tablet Take 1 tablet by mouth Every Night. 30 tablet 3   • potassium chloride (K-DUR,KLOR-CON) 20 MEQ CR tablet      • diclofenac-misoprostol (ARTHROTEC 75) 75-0.2 MG EC tablet Take 1 tablet by mouth Daily. 60 tablet 1   • sertraline (ZOLOFT) 100 MG tablet Take 1.5 tablets by mouth Daily. 30 tablet 5     No facility-administered medications prior to visit.        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 Have asked the patient to send this to us to add to record.    Identification of Risk Factors:  Risk factors include: weight , unhealthy diet, inactivity, increased fall risk, cognitive impairment and hearing limitations.    Review of Systems See  note    Compared to one year ago, the patient feels her physical health is worse.  Compared to one year ago, the patient feels her mental health is  "worse.    Objective     Physical Exam See  note    Vitals:    12/17/18 1342   BP: 122/82   BP Location: Left arm   Patient Position: Sitting   Cuff Size: Adult   Pulse: 67   Resp: 16   SpO2: 98%   Weight: 59.6 kg (131 lb 8 oz)   Height: 162.6 cm (64\")       Patient's Body mass index is 22.57 kg/m². BMI is within normal parameters. No follow-up required.      Assessment/Plan   Patient Self-Management and Personalized Health Advice  The patient has been provided with information about: diet, exercise, weight management and designing advance directives and preventive services including:   · Advance directive, Exercise counseling provided, Fall Risk assessment done, Shingrix..    Visit Diagnoses:    ICD-10-CM ICD-9-CM   1. Abnormal mammogram of right breast R92.8 793.80   2. Adenocarcinoma of lung, stage 4, left (CMS/HCC) C34.92 162.9   3. Anxiety and depression F41.9 300.00    F32.9 311   4. Metastatic adenocarcinoma to brain (CMS/HCC) C79.31 198.3   5. Essential hypertension I10 401.9       Orders Placed This Encounter   Procedures   • Mammo diagnostic digital tomosynthesis right w CAD     Standing Status:   Future     Standing Expiration Date:   12/17/2019     Scheduling Instructions:      Jessica     Order Specific Question:   Is an Ultrasound Breast required?  If 'Yes\", Please enter an order for the US Breast as well.     Answer:   Prn     Order Specific Question:   Reason for Exam:     Answer:   f/u BIRADS 3       Outpatient Encounter Medications as of 12/17/2018   Medication Sig Dispense Refill   • ALPRAZolam (XANAX) 0.25 MG tablet Take 1 tablet by mouth 2 (Two) Times a Day As Needed for Anxiety. (Patient taking differently: Take 0.25 mg by mouth 2 (Two) Times a Day As Needed for Anxiety. Dr Mary Henning) 60 tablet 0   • atorvastatin (LIPITOR) 20 MG tablet Take 20 mg by mouth Daily.     • azelastine (ASTELIN) 0.1 % nasal spray 2 sprays into each nostril 2 (Two) Times a Day. Use in each nostril as " directed 90 mL 3   • calcium-vitamin D (OSCAL-500) 500-200 MG-UNIT per tablet Take 1 tablet by mouth Daily.     • cetirizine (zyrTEC) 10 MG tablet Take 10 mg by mouth Daily.     • dexamethasone (DECADRON) 4 MG tablet      • diclofenac (VOLTAREN) 75 MG EC tablet      • digoxin (LANOXIN) 125 MCG tablet Take 125 mcg by mouth Daily.     • fluconazole (DIFLUCAN) 100 MG tablet      • hydrochlorothiazide (HYDRODIURIL) 50 MG tablet      • ipratropium-albuterol (COMBIVENT RESPIMAT)  MCG/ACT inhaler Inhale 1 puff 4 (Four) Times a Day As Needed for Wheezing.     • ketoconazole (NIZORAL) 2 % shampoo Apply  topically to the appropriate area as directed 2 (Two) Times a Week. 120 mL 1   • montelukast (SINGULAIR) 10 MG tablet Take 1 tablet by mouth Every Night. 30 tablet 3   • potassium chloride (K-DUR,KLOR-CON) 20 MEQ CR tablet      • sertraline (ZOLOFT) 100 MG tablet Take 2 tablets by mouth Daily. 60 tablet 5   • [DISCONTINUED] diclofenac-misoprostol (ARTHROTEC 75) 75-0.2 MG EC tablet Take 1 tablet by mouth Daily. 60 tablet 1   • [DISCONTINUED] sertraline (ZOLOFT) 100 MG tablet Take 1.5 tablets by mouth Daily. 30 tablet 5     No facility-administered encounter medications on file as of 12/17/2018.        Reviewed use of high risk medication in the elderly: yes  Reviewed for potential of harmful drug interactions in the elderly: yes    Follow Up:  Return in about 3 months (around 3/17/2019) for Recheck.     An After Visit Summary and PPPS with all of these plans were given to the patient.

## 2018-12-17 NOTE — PROGRESS NOTES
CC: f/u lung CA    History:  Marcy Garcia is a 68 y.o. female who presents today for follow-up for evaluation of the above:  She notes she has been doing reasonably well, but she is planning to restart chemo tomorrow. She feels her mood has been worse since decreasing Zoloft to 150mg. She insists that her focus has been worse and she notes that Provigil has done well for her in the past. She feels her symptoms are even worse around chemo, but admits that steroids could be playing a part as well. She did have an abnormal mammogram and is supposed to have a follow-up, though she has not noticed any concerning findings herself. She notes her BP has done well on current meds without side effects.     ROS:  Review of Systems   Constitutional: Positive for fatigue. Negative for chills and fever.   Respiratory: Negative for cough and shortness of breath.    Cardiovascular: Negative for chest pain and palpitations.   Psychiatric/Behavioral: Positive for decreased concentration, dysphoric mood and sleep disturbance. The patient is nervous/anxious.        Ms. Garcia  reports that she quit smoking about 2 years ago. Her smoking use included cigarettes. She quit after 51.00 years of use. she has never used smokeless tobacco. She reports that she does not drink alcohol or use drugs.      Current Outpatient Medications:   •  ALPRAZolam (XANAX) 0.25 MG tablet, Take 1 tablet by mouth 2 (Two) Times a Day As Needed for Anxiety. (Patient taking differently: Take 0.25 mg by mouth 2 (Two) Times a Day As Needed for Anxiety. Dr Mary Henning), Disp: 60 tablet, Rfl: 0  •  atorvastatin (LIPITOR) 20 MG tablet, Take 20 mg by mouth Daily., Disp: , Rfl:   •  azelastine (ASTELIN) 0.1 % nasal spray, 2 sprays into each nostril 2 (Two) Times a Day. Use in each nostril as directed, Disp: 90 mL, Rfl: 3  •  calcium-vitamin D (OSCAL-500) 500-200 MG-UNIT per tablet, Take 1 tablet by mouth Daily., Disp: , Rfl:   •  cetirizine (zyrTEC) 10 MG tablet,  "Take 10 mg by mouth Daily., Disp: , Rfl:   •  dexamethasone (DECADRON) 4 MG tablet, , Disp: , Rfl:   •  diclofenac (VOLTAREN) 75 MG EC tablet, , Disp: , Rfl:   •  diclofenac-misoprostol (ARTHROTEC 75) 75-0.2 MG EC tablet, Take 1 tablet by mouth Daily., Disp: 60 tablet, Rfl: 1  •  digoxin (LANOXIN) 125 MCG tablet, Take 125 mcg by mouth Daily., Disp: , Rfl:   •  fluconazole (DIFLUCAN) 100 MG tablet, , Disp: , Rfl:   •  hydrochlorothiazide (HYDRODIURIL) 50 MG tablet, , Disp: , Rfl:   •  ipratropium-albuterol (COMBIVENT RESPIMAT)  MCG/ACT inhaler, Inhale 1 puff 4 (Four) Times a Day As Needed for Wheezing., Disp: , Rfl:   •  ketoconazole (NIZORAL) 2 % shampoo, Apply  topically to the appropriate area as directed 2 (Two) Times a Week., Disp: 120 mL, Rfl: 1  •  montelukast (SINGULAIR) 10 MG tablet, Take 1 tablet by mouth Every Night., Disp: 30 tablet, Rfl: 3  •  potassium chloride (K-DUR,KLOR-CON) 20 MEQ CR tablet, , Disp: , Rfl:   •  sertraline (ZOLOFT) 100 MG tablet, Take 1.5 tablets by mouth Daily., Disp: 30 tablet, Rfl: 5      OBJECTIVE:  /82 (BP Location: Left arm, Patient Position: Sitting, Cuff Size: Adult)   Pulse 67   Resp 16   Ht 162.6 cm (64\")   Wt 59.6 kg (131 lb 8 oz)   SpO2 98%   Breastfeeding? No   BMI 22.57 kg/m²    Physical Exam   Constitutional: She is oriented to person, place, and time. She appears well-developed. No distress.   Pulmonary/Chest: Effort normal. No respiratory distress.   Neurological: She is alert and oriented to person, place, and time.   Psychiatric: She has a normal mood and affect.       Assessment/Plan    Diagnoses and all orders for this visit:    Adenocarcinoma of lung, stage 4, left (CMS/HCC)  Metastatic adenocarcinoma to brain (CMS/HCC)  Further chemo per Dr. Price starting tomorrow. She understands that her treatment is palliative in nature. She does admit that she finds treatment to be quite burdensome, but she does not want to see progression of " disease.    Anxiety and depression  -     sertraline (ZOLOFT) 100 MG tablet; Take 2 tablets by mouth Daily.  Increase zoloft back to 200mg. She is quite insistent that she needs Provigil, but I feel this has worsened risk of anxiety, for which she is already taking benzodiazepine. As such, I would like to avoid creating iatrogenic side effects. We will increase zoloft and she does admit her mood and overall concentration worsen with steroid for chemo.     Essential hypertension  Well controlled, BP goal for age is <140/90 per JNC 8 guidelines and continue current medications    Abnormal mammogram of right breast  -     Mammo diagnostic digital tomosynthesis right w CAD; Future  Mammo ordered for follow-up.       An After Visit Summary was printed and given to the patient at discharge.  Return in about 3 months (around 3/17/2019) for Recheck. Sooner if problems arise.         Tim Samaniego D.O. 12/17/2018

## 2018-12-18 NOTE — PATIENT INSTRUCTIONS
"  Medicare Wellness  Personal Prevention Plan of Service     Date of Office Visit:  2018  Encounter Provider:  Tim Samaniego DO  Place of Service:  [facility]  Patient Name: Marcy Garcia  :  1950    As part of the Medicare Wellness portion of your visit today, we are providing you with this personalized preventive plan of services (PPPS). This plan is based upon recommendations of the [place] Preventive Services Task Force (USPSTF) and the Advisory Committee on Immunization Practices (ACIP).    This lists the preventive care services that should be considered, and provides dates of when you are due. Items listed as completed are up-to-date and do not require any further intervention.    Health Maintenance   Topic Date Due   • HEPATITIS A VACCINE ADULT (1 of 2) 10/29/1967   • ZOSTER VACCINE (2 of 3) 2017   • HEPATITIS C SCREENING  2018   • COLONOSCOPY  2018   • LIPID PANEL  10/11/2018   • PNEUMOCOCCAL VACCINES (65+ LOW/MEDIUM RISK) (1 of 2 - PCV13) 2019 (Originally 10/29/2014)   • MEDICARE ANNUAL WELLNESS  2019   • MAMMOGRAM  2020   • TDAP/TD VACCINES (2 - Td) 2028   • INFLUENZA VACCINE  Completed       Orders Placed This Encounter   Procedures   • Mammo diagnostic digital tomosynthesis right w CAD     Standing Status:   Future     Standing Expiration Date:   2019     Scheduling Instructions:      Jessica     Order Specific Question:   Is an Ultrasound Breast required?  If 'Yes\", Please enter an order for the US Breast as well.     Answer:   Prn     Order Specific Question:   Reason for Exam:     Answer:   f/u BIRADS 3       Return in about 3 months (around 3/17/2019) for Recheck.        "

## 2018-12-18 NOTE — PROGRESS NOTES
QUICK REFERENCE INFORMATION:  The ABCs of the Annual Wellness Visit    Initial Medicare Wellness Visit    HEALTH RISK ASSESSMENT    1950    Recent Hospitalizations:  Recently treated at the following:  Other: [facility].        Current Medical Providers:  Patient Care Team:  Tim Samaniego DO as PCP - General (Internal Medicine)  Ranjith Goodrich MD as Consulting Physician (Otolaryngology)  Mary Henning MD as Referring Physician (Family Medicine)  Mark Ogden MD as Cardiologist (Cardiology)  Becca Tamayo MD as Consulting Physician (Gastroenterology)  Cosme Zurita MD as Consulting Physician (Hematology and Oncology)  Gabrielle Reynoso MD as Consulting Physician (Hematology and Oncology)        Smoking Status:  Social History     Tobacco Use   Smoking Status Former Smoker   • Years: 51.00   • Types: Cigarettes   • Last attempt to quit: 10/2016   • Years since quittin.2   Smokeless Tobacco Never Used   Tobacco Comment    when pt smoked she smoked 2 packs a day on average       Alcohol Consumption:  Social History     Substance and Sexual Activity   Alcohol Use No       Depression Screen:   PHQ-2/PHQ-9 Depression Screening 2018   Little interest or pleasure in doing things 0   Feeling down, depressed, or hopeless 0   Trouble falling or staying asleep, or sleeping too much -   Feeling tired or having little energy -   Poor appetite or overeating -   Feeling bad about yourself - or that you are a failure or have let yourself or your family down -   Trouble concentrating on things, such as reading the newspaper or watching television -   Moving or speaking so slowly that other people could have noticed. Or the opposite - being so fidgety or restless that you have been moving around a lot more than usual -   Thoughts that you would be better off dead, or of hurting yourself in some way -   Total Score 0   If you checked off any problems, how difficult have these problems  made it for you to do your work, take care of things at home, or get along with other people? -       Health Habits and Functional and Cognitive Screening:  Functional & Cognitive Status 12/17/2018   Do you have difficulty preparing food and eating? Yes   Do you have difficulty bathing yourself, getting dressed or grooming yourself? No   Do you have difficulty using the toilet? No   Do you have difficulty moving around from place to place? No   Do you have trouble with steps or getting out of a bed or a chair? No   In the past year have you fallen or experienced a near fall? Yes   Current Diet Unhealthy Diet   Dental Exam Not up to date   Eye Exam Up to date   Exercise (times per week) 1 times per week   Current Exercise Activities Include Walking   Do you need help using the phone?  No   Are you deaf or do you have serious difficulty hearing?  Yes   Do you need help with transportation? Yes   Do you need help shopping? Yes   Do you need help preparing meals?  Yes   Do you need help with housework?  Yes   Do you need help with laundry? Yes   Do you need help taking your medications? No   Do you need help managing money? Yes   Do you ever drive or ride in a car without wearing a seat belt? No   Have you felt unusual stress, anger or loneliness in the last month? Yes   Who do you live with? Spouse   If you need help, do you have trouble finding someone available to you? No   Have you been bothered in the last four weeks by sexual problems? No   Do you have difficulty concentrating, remembering or making decisions? Yes           Does the patient have evidence of cognitive impairment? Yes    Asiprin use counseling: Does not need ASA (and currently is not on it)      Recent Lab Results:    Visual Acuity:  No exam data present    Age-appropriate Screening Schedule:  Refer to the list below for future screening recommendations based on patient's age, sex and/or medical conditions. Orders for these recommended tests are  listed in the plan section. The patient has been provided with a written plan.    Health Maintenance   Topic Date Due   • ZOSTER VACCINE (2 of 3) 02/26/2017   • COLONOSCOPY  01/05/2018   • LIPID PANEL  10/11/2018   • PNEUMOCOCCAL VACCINES (65+ LOW/MEDIUM RISK) (1 of 2 - PCV13) 06/03/2019 (Originally 10/29/2014)   • MAMMOGRAM  08/28/2020   • TDAP/TD VACCINES (2 - Td) 03/01/2028   • INFLUENZA VACCINE  Completed        Subjective   History of Present Illness    Marcy Garcia is a 68 y.o. female who presents for an Annual Wellness Visit.    The following portions of the patient's history were reviewed and updated as appropriate: allergies, current medications, past family history, past medical history, past social history, past surgical history and problem list.    Outpatient Medications Prior to Visit   Medication Sig Dispense Refill   • ALPRAZolam (XANAX) 0.25 MG tablet Take 1 tablet by mouth 2 (Two) Times a Day As Needed for Anxiety. (Patient taking differently: Take 0.25 mg by mouth 2 (Two) Times a Day As Needed for Anxiety. Dr Mary Henning) 60 tablet 0   • atorvastatin (LIPITOR) 20 MG tablet Take 20 mg by mouth Daily.     • azelastine (ASTELIN) 0.1 % nasal spray 2 sprays into each nostril 2 (Two) Times a Day. Use in each nostril as directed 90 mL 3   • calcium-vitamin D (OSCAL-500) 500-200 MG-UNIT per tablet Take 1 tablet by mouth Daily.     • cetirizine (zyrTEC) 10 MG tablet Take 10 mg by mouth Daily.     • dexamethasone (DECADRON) 4 MG tablet      • diclofenac (VOLTAREN) 75 MG EC tablet      • digoxin (LANOXIN) 125 MCG tablet Take 125 mcg by mouth Daily.     • fluconazole (DIFLUCAN) 100 MG tablet      • hydrochlorothiazide (HYDRODIURIL) 50 MG tablet      • ipratropium-albuterol (COMBIVENT RESPIMAT)  MCG/ACT inhaler Inhale 1 puff 4 (Four) Times a Day As Needed for Wheezing.     • ketoconazole (NIZORAL) 2 % shampoo Apply  topically to the appropriate area as directed 2 (Two) Times a Week. 120 mL 1   •  "montelukast (SINGULAIR) 10 MG tablet Take 1 tablet by mouth Every Night. 30 tablet 3   • potassium chloride (K-DUR,KLOR-CON) 20 MEQ CR tablet      • diclofenac-misoprostol (ARTHROTEC 75) 75-0.2 MG EC tablet Take 1 tablet by mouth Daily. 60 tablet 1   • sertraline (ZOLOFT) 100 MG tablet Take 1.5 tablets by mouth Daily. 30 tablet 5     No facility-administered medications prior to visit.        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 Have asked the patient to send this to us to add to record.    Identification of Risk Factors:  Risk factors include: unhealthy diet, cardiovascular risk, inactivity, increased fall risk, depression, cognitive impairment and hearing limitations.    Review of Systems See  note    Compared to one year ago, the patient feels her physical health is worse.  Compared to one year ago, the patient feels her mental health is worse.    Objective     Physical Exam See  note    Vitals:    12/17/18 1342   BP: 122/82   BP Location: Left arm   Patient Position: Sitting   Cuff Size: Adult   Pulse: 67   Resp: 16   SpO2: 98%   Weight: 59.6 kg (131 lb 8 oz)   Height: 162.6 cm (64\")       Patient's Body mass index is 22.57 kg/m². BMI is within normal parameters. No follow-up required.    EKG tracings " "reviewed from [facility] and she does have previous EKG from [facility] earlier this year. As such, no further EKG is performed today.     Assessment/Plan   Patient Self-Management and Personalized Health Advice  The patient has been provided with information about: diet, exercise, fall prevention and mental health concerns and preventive services including:   · Advance directive, Exercise counseling provided, Fall Risk assessment done.    Visit Diagnoses:    ICD-10-CM ICD-9-CM   1. Adenocarcinoma of lung, stage 4, left (CMS/[facility]) C34.92 162.9   2. Metastatic adenocarcinoma to brain (CMS/[facility]) C79.31 198.3   3. Anxiety and depression F41.9 300.00    F32.9 311   4. Essential hypertension I10 401.9   5. Abnormal mammogram of right breast R92.8 793.80       Orders Placed This Encounter   Procedures   • Mammo diagnostic digital tomosynthesis right w CAD     Standing Status:   Future     Standing Expiration Date:   12/17/2019     Scheduling Instructions:      Jessica     Order Specific Question:   Is an Ultrasound Breast required?  If 'Yes\", Please enter an order for the US Breast as well.     Answer:   Prn     Order Specific Question:   Reason for Exam:     Answer:   f/u BIRADS 3       Outpatient Encounter Medications as of 12/17/2018   Medication Sig Dispense Refill   • ALPRAZolam (XANAX) 0.25 MG tablet Take 1 tablet by mouth 2 (Two) Times a Day As Needed for Anxiety. (Patient taking differently: Take 0.25 mg by mouth 2 (Two) Times a Day As Needed for Anxiety. Dr Mary Henning) 60 tablet 0   • atorvastatin (LIPITOR) 20 MG tablet Take 20 mg by mouth Daily.     • azelastine (ASTELIN) 0.1 % nasal spray 2 sprays into each nostril 2 (Two) Times a Day. Use in each nostril as directed 90 mL 3   • calcium-vitamin D (OSCAL-500) 500-200 MG-UNIT per tablet Take 1 tablet by mouth Daily.     • cetirizine (zyrTEC) 10 MG tablet Take 10 mg by mouth Daily.     • dexamethasone (DECADRON) 4 MG tablet      • diclofenac (VOLTAREN) 75 MG EC tablet  "     • digoxin (LANOXIN) 125 MCG tablet Take 125 mcg by mouth Daily.     • fluconazole (DIFLUCAN) 100 MG tablet      • hydrochlorothiazide (HYDRODIURIL) 50 MG tablet      • ipratropium-albuterol (COMBIVENT RESPIMAT)  MCG/ACT inhaler Inhale 1 puff 4 (Four) Times a Day As Needed for Wheezing.     • ketoconazole (NIZORAL) 2 % shampoo Apply  topically to the appropriate area as directed 2 (Two) Times a Week. 120 mL 1   • montelukast (SINGULAIR) 10 MG tablet Take 1 tablet by mouth Every Night. 30 tablet 3   • potassium chloride (K-DUR,KLOR-CON) 20 MEQ CR tablet      • sertraline (ZOLOFT) 100 MG tablet Take 2 tablets by mouth Daily. 60 tablet 5   • [DISCONTINUED] diclofenac-misoprostol (ARTHROTEC 75) 75-0.2 MG EC tablet Take 1 tablet by mouth Daily. 60 tablet 1   • [DISCONTINUED] sertraline (ZOLOFT) 100 MG tablet Take 1.5 tablets by mouth Daily. 30 tablet 5     No facility-administered encounter medications on file as of 12/17/2018.        Reviewed use of high risk medication in the elderly: yes  Reviewed for potential of harmful drug interactions in the elderly: yes    Follow Up:  Return in about 3 months (around 3/17/2019) for Recheck.     An After Visit Summary and PPPS with all of these plans were given to the patient.

## 2018-12-20 ENCOUNTER — HOSPITAL ENCOUNTER (OUTPATIENT)
Dept: ULTRASOUND IMAGING | Facility: HOSPITAL | Age: 68
Discharge: HOME | End: 2018-12-20
Attending: INTERNAL MEDICINE | Admitting: INTERNAL MEDICINE

## 2018-12-20 DIAGNOSIS — Z13.6 ENCOUNTER FOR SCREENING FOR VASCULAR DISEASE: ICD-10-CM

## 2018-12-20 PROCEDURE — 93799 UNLISTED CV SVC/PROCEDURE: CPT

## 2019-01-02 ENCOUNTER — HOSPITAL ENCOUNTER (OUTPATIENT)
Dept: CT IMAGING | Age: 69
Discharge: HOME | End: 2019-01-02
Payer: MEDICARE

## 2019-01-02 DIAGNOSIS — C34.12 MALIGNANT NEOPLASM OF UPPER LOBE BRONCHUS, LEFT (HCC): ICD-10-CM

## 2019-01-02 DIAGNOSIS — C79.31 MALIGNANT NEOPLASM METASTATIC TO BRAIN (HCC): ICD-10-CM

## 2019-01-02 PROCEDURE — 74177 CT ABD & PELVIS W/CONTRAST: CPT

## 2019-01-02 PROCEDURE — 71260 CT THORAX DX C+: CPT

## 2019-01-02 PROCEDURE — 6360000004 HC RX CONTRAST MEDICATION: Performed by: INTERNAL MEDICINE

## 2019-01-02 RX ADMIN — IOPAMIDOL 75 ML: 755 INJECTION, SOLUTION INTRAVENOUS at 10:05

## 2019-01-03 ENCOUNTER — APPOINTMENT (OUTPATIENT)
Dept: PHYSICAL THERAPY | Facility: HOSPITAL | Age: 69
End: 2019-01-03
Attending: NEUROLOGICAL SURGERY

## 2019-01-07 ENCOUNTER — HOSPITAL ENCOUNTER (OUTPATIENT)
Dept: ULTRASOUND IMAGING | Age: 69
Discharge: HOME | End: 2019-01-07
Payer: MEDICARE

## 2019-01-07 DIAGNOSIS — C34.32 CANCER OF BRONCHUS OF LEFT LOWER LOBE (HCC): ICD-10-CM

## 2019-01-07 DIAGNOSIS — N28.1 RENAL CYST: ICD-10-CM

## 2019-01-07 PROCEDURE — 76770 US EXAM ABDO BACK WALL COMP: CPT

## 2019-01-07 RX ORDER — DIGOXIN 125 MCG
125 TABLET ORAL
Qty: 30 TABLET | Refills: 6 | OUTPATIENT
Start: 2019-01-07

## 2019-01-07 NOTE — TELEPHONE ENCOUNTER
I'm not seeing any diagnosis that would indicate a need for this, but she has apparently been taking it?  Do you want to continue?

## 2019-01-07 NOTE — TELEPHONE ENCOUNTER
I would like to d/c this med. Please d/c it on med list as well. We will monitor. No history of a fib nor CHF.

## 2019-01-07 NOTE — TELEPHONE ENCOUNTER
I called [facility] and was told the digoxin was prescribed by Dr. Mary Henning and was last filled on 12/11/18.  The last script was for #30 with no refills.

## 2019-01-08 ENCOUNTER — TELEPHONE (OUTPATIENT)
Dept: INTERNAL MEDICINE | Facility: CLINIC | Age: 69
End: 2019-01-08

## 2019-01-08 NOTE — TELEPHONE ENCOUNTER
That would be fine. Even so, I think we can try off of it. If a fib recurs, there are other medications that I would treat her with before the digoxin, but I think given her other conditions right now, we are better without the digoxin to see how things respond.

## 2019-01-08 NOTE — TELEPHONE ENCOUNTER
"Patient informed we will not be refilling digoxin and would like for her to d/c the medication due to no history of a-fib or CHF.  Patient stated she does have a history of a fib and was prescribed the medication while in the hospital.  She first stated she was in the hospital at [facility] when it was prescribed and then said \"No, I think it was at [facility].\"  I asked the patient when it was first prescribed and she said \"around 2013.\"  Patient is going to try to obtain medical records to show her history of a-fib.  "

## 2019-01-09 ENCOUNTER — TELEPHONE (OUTPATIENT)
Dept: INTERNAL MEDICINE | Facility: CLINIC | Age: 69
End: 2019-01-09

## 2019-01-09 NOTE — TELEPHONE ENCOUNTER
Patient called to let you know that she spoke to Dr Henning's office and she was originally put on the Digoxin because of rapid hear rate.  She reports that her HR has been fine for the last 3 days since she ran out of it and she discussed it with Dr Price today and he told her that he also thought she would be fine without it.    MM

## 2019-01-13 ENCOUNTER — HOSPITAL ENCOUNTER (EMERGENCY)
Facility: HOSPITAL | Age: 69
Discharge: HOME | End: 2019-01-13
Admitting: EMERGENCY MEDICINE

## 2019-01-13 ENCOUNTER — APPOINTMENT (OUTPATIENT)
Dept: GENERAL RADIOLOGY | Facility: HOSPITAL | Age: 69
End: 2019-01-13

## 2019-01-13 ENCOUNTER — APPOINTMENT (OUTPATIENT)
Dept: CT IMAGING | Facility: HOSPITAL | Age: 69
End: 2019-01-13

## 2019-01-13 VITALS
HEART RATE: 73 BPM | BODY MASS INDEX: 21.85 KG/M2 | WEIGHT: 128 LBS | RESPIRATION RATE: 18 BRPM | OXYGEN SATURATION: 100 % | TEMPERATURE: 97.8 F | HEIGHT: 64 IN | DIASTOLIC BLOOD PRESSURE: 51 MMHG | SYSTOLIC BLOOD PRESSURE: 97 MMHG

## 2019-01-13 DIAGNOSIS — M79.10 GENERALIZED MUSCLE ACHE: Primary | ICD-10-CM

## 2019-01-13 DIAGNOSIS — C34.90 ADENOCARCINOMA OF LUNG, UNSPECIFIED LATERALITY (HCC): ICD-10-CM

## 2019-01-13 LAB
ALBUMIN SERPL-MCNC: 3.9 G/DL (ref 3.5–5)
ALBUMIN/GLOB SERPL: 1.3 G/DL (ref 1.1–2.5)
ALP SERPL-CCNC: 105 U/L (ref 24–120)
ALT SERPL W P-5'-P-CCNC: 51 U/L (ref 0–54)
ANION GAP SERPL CALCULATED.3IONS-SCNC: 12 MMOL/L (ref 4–13)
ANISOCYTOSIS BLD QL: ABNORMAL
AST SERPL-CCNC: 55 U/L (ref 7–45)
BACTERIA UR QL AUTO: ABNORMAL /HPF
BILIRUB SERPL-MCNC: 1.8 MG/DL (ref 0.1–1)
BILIRUB UR QL STRIP: NEGATIVE
BUN BLD-MCNC: 14 MG/DL (ref 5–21)
BUN/CREAT SERPL: 25.5 (ref 7–25)
CALCIUM SPEC-SCNC: 9.2 MG/DL (ref 8.4–10.4)
CHLORIDE SERPL-SCNC: 83 MMOL/L (ref 98–110)
CLARITY UR: CLEAR
CLUMPED PLATELETS: PRESENT
CO2 SERPL-SCNC: 35 MMOL/L (ref 24–31)
COLOR UR: ABNORMAL
CREAT BLD-MCNC: 0.55 MG/DL (ref 0.5–1.4)
D-LACTATE SERPL-SCNC: 1.6 MMOL/L (ref 0.5–2)
DEPRECATED RDW RBC AUTO: 48.3 FL (ref 40–54)
DIGOXIN SERPL-MCNC: <0.4 NG/ML (ref 0.8–2)
ERYTHROCYTE [DISTWIDTH] IN BLOOD BY AUTOMATED COUNT: 16.1 % (ref 12–15)
FLUAV AG NPH QL: NEGATIVE
FLUBV AG NPH QL IA: NEGATIVE
GFR SERPL CREATININE-BSD FRML MDRD: 110 ML/MIN/1.73
GLOBULIN UR ELPH-MCNC: 3 GM/DL
GLUCOSE BLD-MCNC: 123 MG/DL (ref 70–100)
GLUCOSE UR STRIP-MCNC: ABNORMAL MG/DL
HCT VFR BLD AUTO: 30.7 % (ref 37–47)
HGB BLD-MCNC: 10.6 G/DL (ref 12–16)
HGB UR QL STRIP.AUTO: NEGATIVE
HYALINE CASTS UR QL AUTO: ABNORMAL /LPF
KETONES UR QL STRIP: NEGATIVE
LEUKOCYTE ESTERASE UR QL STRIP.AUTO: ABNORMAL
LYMPHOCYTES # BLD MANUAL: 0.75 10*3/MM3 (ref 0.72–4.86)
LYMPHOCYTES NFR BLD MANUAL: 1 % (ref 4–12)
LYMPHOCYTES NFR BLD MANUAL: 13 % (ref 15–45)
MAGNESIUM SERPL-MCNC: 1.2 MG/DL (ref 1.4–2.2)
MCH RBC QN AUTO: 29.1 PG (ref 28–32)
MCHC RBC AUTO-ENTMCNC: 34.5 G/DL (ref 33–36)
MCV RBC AUTO: 84.3 FL (ref 82–98)
MICROCYTES BLD QL: ABNORMAL
MONOCYTES # BLD AUTO: 0.06 10*3/MM3 (ref 0.19–1.3)
NEUTROPHILS # BLD AUTO: 4.91 10*3/MM3 (ref 1.87–8.4)
NEUTROPHILS NFR BLD MANUAL: 85 % (ref 39–78)
NITRITE UR QL STRIP: NEGATIVE
PH UR STRIP.AUTO: 6 [PH] (ref 5–8)
PLATELET # BLD AUTO: 142 10*3/MM3 (ref 130–400)
PMV BLD AUTO: 9.6 FL (ref 6–12)
POIKILOCYTOSIS BLD QL SMEAR: ABNORMAL
POTASSIUM BLD-SCNC: 2.7 MMOL/L (ref 3.5–5.3)
PROT SERPL-MCNC: 6.9 G/DL (ref 6.3–8.7)
PROT UR QL STRIP: ABNORMAL
RBC # BLD AUTO: 3.64 10*6/MM3 (ref 4.2–5.4)
RBC # UR: ABNORMAL /HPF
REF LAB TEST METHOD: ABNORMAL
SODIUM BLD-SCNC: 130 MMOL/L (ref 135–145)
SP GR UR STRIP: 1.02 (ref 1–1.03)
SPHEROCYTES BLD QL SMEAR: ABNORMAL
SQUAMOUS #/AREA URNS HPF: ABNORMAL /HPF
TROPONIN I SERPL-MCNC: <0.012 NG/ML (ref 0–0.03)
UROBILINOGEN UR QL STRIP: ABNORMAL
VARIANT LYMPHS NFR BLD MANUAL: 1 % (ref 0–5)
WBC MORPH BLD: NORMAL
WBC NRBC COR # BLD: 5.78 10*3/MM3 (ref 4.8–10.8)
WBC UR QL AUTO: ABNORMAL /HPF

## 2019-01-13 PROCEDURE — 70450 CT HEAD/BRAIN W/O DYE: CPT

## 2019-01-13 PROCEDURE — 84484 ASSAY OF TROPONIN QUANT: CPT | Performed by: NURSE PRACTITIONER

## 2019-01-13 PROCEDURE — 96368 THER/DIAG CONCURRENT INF: CPT

## 2019-01-13 PROCEDURE — 85025 COMPLETE CBC W/AUTO DIFF WBC: CPT | Performed by: NURSE PRACTITIONER

## 2019-01-13 PROCEDURE — 99285 EMERGENCY DEPT VISIT HI MDM: CPT

## 2019-01-13 PROCEDURE — 83735 ASSAY OF MAGNESIUM: CPT | Performed by: PHYSICIAN ASSISTANT

## 2019-01-13 PROCEDURE — 80053 COMPREHEN METABOLIC PANEL: CPT | Performed by: NURSE PRACTITIONER

## 2019-01-13 PROCEDURE — 87040 BLOOD CULTURE FOR BACTERIA: CPT | Performed by: NURSE PRACTITIONER

## 2019-01-13 PROCEDURE — 25010000002 PIPERACILLIN SOD-TAZOBACTAM PER 1 G: Performed by: NURSE PRACTITIONER

## 2019-01-13 PROCEDURE — 96367 TX/PROPH/DG ADDL SEQ IV INF: CPT

## 2019-01-13 PROCEDURE — 71046 X-RAY EXAM CHEST 2 VIEWS: CPT

## 2019-01-13 PROCEDURE — 81001 URINALYSIS AUTO W/SCOPE: CPT | Performed by: NURSE PRACTITIONER

## 2019-01-13 PROCEDURE — 36415 COLL VENOUS BLD VENIPUNCTURE: CPT | Performed by: NURSE PRACTITIONER

## 2019-01-13 PROCEDURE — 83605 ASSAY OF LACTIC ACID: CPT | Performed by: NURSE PRACTITIONER

## 2019-01-13 PROCEDURE — 25010000002 MAGNESIUM SULFATE IN D5W 1G/100ML (PREMIX) 1-5 GM/100ML-% SOLUTION: Performed by: PHYSICIAN ASSISTANT

## 2019-01-13 PROCEDURE — 80162 ASSAY OF DIGOXIN TOTAL: CPT | Performed by: NURSE PRACTITIONER

## 2019-01-13 PROCEDURE — 93005 ELECTROCARDIOGRAM TRACING: CPT | Performed by: NURSE PRACTITIONER

## 2019-01-13 PROCEDURE — 85007 BL SMEAR W/DIFF WBC COUNT: CPT | Performed by: NURSE PRACTITIONER

## 2019-01-13 PROCEDURE — 87086 URINE CULTURE/COLONY COUNT: CPT | Performed by: NURSE PRACTITIONER

## 2019-01-13 PROCEDURE — 93010 ELECTROCARDIOGRAM REPORT: CPT | Performed by: INTERNAL MEDICINE

## 2019-01-13 PROCEDURE — 96365 THER/PROPH/DIAG IV INF INIT: CPT

## 2019-01-13 PROCEDURE — 25010000002 POTASSIUM CHLORIDE PER 2 MEQ: Performed by: PHYSICIAN ASSISTANT

## 2019-01-13 PROCEDURE — 87804 INFLUENZA ASSAY W/OPTIC: CPT | Performed by: NURSE PRACTITIONER

## 2019-01-13 RX ORDER — ONDANSETRON 4 MG/1
4 TABLET, ORALLY DISINTEGRATING ORAL ONCE
Status: COMPLETED | OUTPATIENT
Start: 2019-01-13 | End: 2019-01-13

## 2019-01-13 RX ORDER — MAGNESIUM SULFATE 1 G/100ML
1 INJECTION INTRAVENOUS ONCE
Status: COMPLETED | OUTPATIENT
Start: 2019-01-13 | End: 2019-01-13

## 2019-01-13 RX ORDER — HYDROCODONE BITARTRATE AND ACETAMINOPHEN 5; 325 MG/1; MG/1
1 TABLET ORAL EVERY 6 HOURS PRN
Qty: 12 TABLET | Refills: 0 | Status: SHIPPED | OUTPATIENT
Start: 2019-01-13 | End: 2019-06-25 | Stop reason: HOSPADM

## 2019-01-13 RX ORDER — HYDROCODONE BITARTRATE AND ACETAMINOPHEN 7.5; 325 MG/1; MG/1
1 TABLET ORAL ONCE
Status: COMPLETED | OUTPATIENT
Start: 2019-01-13 | End: 2019-01-13

## 2019-01-13 RX ORDER — POTASSIUM CHLORIDE 14.9 MG/ML
20 INJECTION INTRAVENOUS ONCE
Status: COMPLETED | OUTPATIENT
Start: 2019-01-13 | End: 2019-01-13

## 2019-01-13 RX ORDER — POTASSIUM CHLORIDE 750 MG/1
20 CAPSULE, EXTENDED RELEASE ORAL ONCE
Status: COMPLETED | OUTPATIENT
Start: 2019-01-13 | End: 2019-01-13

## 2019-01-13 RX ADMIN — MAGNESIUM SULFATE HEPTAHYDRATE 1 G: 1 INJECTION, SOLUTION INTRAVENOUS at 16:55

## 2019-01-13 RX ADMIN — SODIUM CHLORIDE 1000 ML: 9 INJECTION, SOLUTION INTRAVENOUS at 17:06

## 2019-01-13 RX ADMIN — PIPERACILLIN SODIUM AND TAZOBACTAM SODIUM 3.38 G: 3; .375 INJECTION, POWDER, LYOPHILIZED, FOR SOLUTION INTRAVENOUS at 15:18

## 2019-01-13 RX ADMIN — POTASSIUM CHLORIDE 20 MEQ: 200 INJECTION, SOLUTION INTRAVENOUS at 16:24

## 2019-01-13 RX ADMIN — SODIUM CHLORIDE 1000 ML: 9 INJECTION, SOLUTION INTRAVENOUS at 16:04

## 2019-01-13 RX ADMIN — ONDANSETRON 4 MG: 4 TABLET, ORALLY DISINTEGRATING ORAL at 14:57

## 2019-01-13 RX ADMIN — POTASSIUM CHLORIDE 20 MEQ: 750 CAPSULE, EXTENDED RELEASE ORAL at 16:04

## 2019-01-13 RX ADMIN — HYDROCODONE BITARTRATE AND ACETAMINOPHEN 1 TABLET: 7.5; 325 TABLET ORAL at 14:57

## 2019-01-13 NOTE — DISCHARGE INSTRUCTIONS
It is very important to follow-up with her primary care provider and your oncologist as soon as possible.  Return to the ER if you develop any new, worsening or other concerning symptoms.      What are the side effects of chemotherapy?  Side effects depend on a variety of factors, including:  The specific type of chemotherapy medicine used.  The dosage.  How long the medicine is used for.  Your overall health.     Some of the side effects you may experience include:  Fatigue and decreased energy.  Decreased appetite.  Changes in your sense of smell or taste.  Nausea.  Vomiting.  Constipation or diarrhea.  Hair loss.  Increased susceptibility to infection.  Easy bleeding.  Mouth sores.  Burning or tingling in the hands or feet.  Memory problems.

## 2019-01-13 NOTE — ED PROVIDER NOTES
Subjective   Patient is a 69 yo female presents with cough and congestion. She has hx of adenocarcinoma to the lung with metastatic adenocarcinoma to the brain. She had craniotomy performed by Dr. Schmidt on 1/15/18. She is followed by Dr. Price, Dr. Reynoso, Dr. Blake, and Dr. Samaniego. Last chemotherapy was Tuesday (5 days ago). Patient states she has had a productive cough with congestion for the past several days associated with post nasal drip. She complains of headache sxs, body aches, nausea, and recorded fever of 100. She feels as if she may have the flu. She states she took an oxycontin left over from previous craniotomy which seemed to help with headache sxs. Due to sxs described she came to the ER for evaluation and treatment.         Illness   Severity:  Moderate  Chronicity:  New  Context:  Cough, congestion, runny nose, fever, body aches, headache, hx of lung and brain cancer  Associated symptoms: congestion, cough, fever, headaches, myalgias, nausea and rhinorrhea    Associated symptoms: no abdominal pain, no chest pain, no diarrhea, no rash and no vomiting        Review of Systems   Constitutional: Positive for appetite change and fever.   HENT: Positive for congestion and rhinorrhea.    Respiratory: Positive for cough.    Cardiovascular: Negative for chest pain.   Gastrointestinal: Positive for nausea. Negative for abdominal pain, diarrhea and vomiting.   Genitourinary: Negative for decreased urine volume.   Musculoskeletal: Positive for myalgias. Negative for neck pain and neck stiffness.   Skin: Negative for color change, pallor, rash and wound.   Neurological: Positive for headaches.   All other systems reviewed and are negative.      Past Medical History:   Diagnosis Date   • Adenocarcinoma, lung, left (CMS/HCC)    • Allergic rhinitis 4/12/2018   • Anemia    • Anxiety    • Arthritis    • Ataxia    • Brain tumor (CMS/HCC)    • Cancer (CMS/HCC)     lung cancer - pt had chemo and was told she was  cancer free, but recently a brain tumor was found   • Colostomy in place (CMS/HCC)    • Confusion    • COPD (chronic obstructive pulmonary disease) (CMS/HCC)    • Depression    • Dizziness    • GERD (gastroesophageal reflux disease)    • Headache    • Memory loss    • Panic attacks    • Seasonal allergies    • Sensorineural hearing loss (SNHL) of both ears 2018   • Urgency of urination    • Weakness        Allergies   Allergen Reactions   • Kiwi Extract Shortness Of Breath   • Pineapple Shortness Of Breath   • Dilaudid [Hydromorphone Hcl] Delirium       Past Surgical History:   Procedure Laterality Date   • COLON SURGERY      BLOCKED BOWEL - COLOSTOMY   • CRANIOTOMY FOR TUMOR Right 1/15/2018    Procedure: CRANIOTOMY FOR TUMOR STERIOTACTIC WITH BRAIN LAB right frontal craniotomy for brain tumor with neuromonitoring and brain lab;  Surgeon: Constantine Schmidt MD;  Location: [facility] OR;  Service:    • ECTOPIC PREGNANCY SURGERY     • HERNIA REPAIR     • HYSTERECTOMY     • LUNG CANCER SURGERY Left     Dr Marlow - Lower Lobectomy   • ORIF FINGER FRACTURE      left pinky   • SHOULDER SURGERY Left     BROKEN COLLAR BONE & SHOULDER SURGERY & ELBOW   • TONSILLECTOMY AND ADENOIDECTOMY         Family History   Problem Relation Age of Onset   • Stroke Mother    • Osteoporosis Mother    • Cancer Father    • Cancer Maternal Grandmother    • Heart disease Maternal Grandfather        Social History     Socioeconomic History   • Marital status:      Spouse name: Not on file   • Number of children: Not on file   • Years of education: Not on file   • Highest education level: Not on file   Tobacco Use   • Smoking status: Former Smoker     Years: 51.00     Types: Cigarettes     Last attempt to quit: 10/2016     Years since quittin.2   • Smokeless tobacco: Never Used   • Tobacco comment: when pt smoked she smoked 2 packs a day on average   Substance and Sexual Activity   • Alcohol use: No   • Drug use: No    • Sexual activity: Defer     Partners: Male     Birth control/protection: None           Objective   Physical Exam   Constitutional: She is oriented to person, place, and time. She appears well-developed and well-nourished.   HENT:   Head: Normocephalic and atraumatic.   Nose: Nose normal.   Mouth/Throat: Oropharynx is clear and moist.   Eyes: Conjunctivae and EOM are normal. Pupils are equal, round, and reactive to light.   Neck: Normal range of motion. Neck supple.   Cardiovascular: Normal rate, regular rhythm, normal heart sounds and intact distal pulses.   Pulmonary/Chest: Effort normal. She has rales.   Abdominal: Soft. Bowel sounds are normal.   Musculoskeletal: Normal range of motion.   Neurological: She is alert and oriented to person, place, and time.   Skin: Skin is warm and dry. Capillary refill takes less than 2 seconds.   Psychiatric: She has a normal mood and affect. Her behavior is normal. Judgment and thought content normal.   Nursing note and vitals reviewed.      Procedures           ED Course this will be a turn over for Brennon ALLEN. See his notes for details.   ED Course as of Jan 13 1749   Sun Jan 13, 2019   1548 Impression     No acute findings.  This report was finalized on 01/13/2019 15:41 by Dr Gio Hays, .  Lab and Collection     XR Chest 2 View - 1/13/2019     [CP]   1553 Pt reports proctor is improving with medication.   [CP]   1702    Impression     No acute findings.  This report was finalized on 01/13/2019 16:55 by Dr Gio Hays, .  Lab and Collection     CT Head Without Contrast - 1/13/2019     [CP]      ED Course User Index  [CP] Rick Stockton PA-C                  MDM  Number of Diagnoses or Management Options  Adenocarcinoma of lung, unspecified laterality (CMS/HCC): new and requires workup  Generalized muscle ache: new and requires workup  Diagnosis management comments: This is a 68-year-old  female who presents to the emergency department with URI symptoms,  fever, worsening headaches, generalized body aches, status post chemotherapy 5 days prior to arrival.  Patient has known adenocarcinoma of the lungs with metastases to the brain.  Previous craniotomy.  Due to the worsening headache CT scan was obtained which was unchanged from prior.  Lab workup positive for hypokalemia, hypo-magnesium.  This was repleted.  Patient's symptoms did seem to improve with pain medication.  Patient was afebrile here but had a 100.0 otic temperature prior to arrival.  Labs were otherwise essentially unremarkable.  This case was transferred to care after she received prophylactic Zosyn.  I called Dr. Price however Dr. Keren FRENCH was on call.  Discussed all results and patient's presentation.  State with normal CT and rather unremarkable labs patient can be safely discharged home with close follow-up.  Patient is requesting discharge at this time.  I think this is reasonable.  At time of discharge improved symptoms no acute distress with normal vital signs.  Very strict return precautions given.       Amount and/or Complexity of Data Reviewed  Clinical lab tests: reviewed and ordered  Tests in the radiology section of CPT®: reviewed and ordered  Tests in the medicine section of CPT®: reviewed  Decide to obtain previous medical records or to obtain history from someone other than the patient: yes  Review and summarize past medical records: yes  Discuss the patient with other providers: yes    Risk of Complications, Morbidity, and/or Mortality  Presenting problems: moderate  Diagnostic procedures: moderate  Management options: moderate    Patient Progress  Patient progress: improved        Final diagnoses:   Generalized muscle ache   Adenocarcinoma of lung, unspecified laterality (CMS/[facility])            Rick Stockton PA-C  01/13/19 [id number]

## 2019-01-15 LAB — BACTERIA SPEC AEROBE CULT: NORMAL

## 2019-01-18 LAB
BACTERIA SPEC AEROBE CULT: NORMAL
BACTERIA SPEC AEROBE CULT: NORMAL

## 2019-01-21 ENCOUNTER — APPOINTMENT (OUTPATIENT)
Dept: PHYSICAL THERAPY | Facility: HOSPITAL | Age: 69
End: 2019-01-21
Attending: NEUROLOGICAL SURGERY

## 2019-01-28 DIAGNOSIS — J44.9 CHRONIC OBSTRUCTIVE PULMONARY DISEASE, UNSPECIFIED COPD TYPE (HCC): Chronic | ICD-10-CM

## 2019-01-28 RX ORDER — MONTELUKAST SODIUM 10 MG/1
TABLET ORAL
Qty: 90 TABLET | Refills: 3 | Status: ON HOLD | OUTPATIENT
Start: 2019-01-28 | End: 2019-06-19

## 2019-02-20 ENCOUNTER — TELEPHONE (OUTPATIENT)
Dept: NEUROSURGERY | Facility: CLINIC | Age: 69
End: 2019-02-20

## 2019-02-20 NOTE — TELEPHONE ENCOUNTER
Summer parham/Dr Henning states the patient is needing to start back on Provigil but wants to make sure it is okay with Dr Schmidt.    mi baker CMA

## 2019-03-04 ENCOUNTER — HOSPITAL ENCOUNTER (OUTPATIENT)
Dept: WOMENS IMAGING | Age: 69
Discharge: HOME | End: 2019-03-04
Payer: MEDICARE

## 2019-03-04 DIAGNOSIS — R92.0 MICROCALCIFICATIONS OF THE BREAST: ICD-10-CM

## 2019-03-04 PROCEDURE — 77065 DX MAMMO INCL CAD UNI: CPT

## 2019-03-20 ENCOUNTER — HOSPITAL ENCOUNTER (OUTPATIENT)
Dept: ULTRASOUND IMAGING | Age: 69
Discharge: HOME | End: 2019-03-20
Payer: MEDICARE

## 2019-03-20 DIAGNOSIS — R94.5 ABNORMAL RESULTS OF LIVER FUNCTION STUDIES: ICD-10-CM

## 2019-03-20 PROCEDURE — 76700 US EXAM ABDOM COMPLETE: CPT

## 2019-03-21 DIAGNOSIS — J44.9 CHRONIC OBSTRUCTIVE PULMONARY DISEASE, UNSPECIFIED COPD TYPE (HCC): ICD-10-CM

## 2019-03-21 DIAGNOSIS — C79.31 METASTATIC ADENOCARCINOMA TO BRAIN (HCC): ICD-10-CM

## 2019-03-21 DIAGNOSIS — M54.2 NECK PAIN: Primary | ICD-10-CM

## 2019-03-21 DIAGNOSIS — M25.511 CHRONIC PAIN OF BOTH SHOULDERS: ICD-10-CM

## 2019-03-21 DIAGNOSIS — K94.19 ALTERED BOWEL ELIMINATION DUE TO INTESTINAL OSTOMY (HCC): ICD-10-CM

## 2019-03-21 DIAGNOSIS — G89.29 CHRONIC PAIN OF BOTH SHOULDERS: ICD-10-CM

## 2019-03-21 DIAGNOSIS — M25.512 CHRONIC PAIN OF BOTH SHOULDERS: ICD-10-CM

## 2019-03-22 ENCOUNTER — TELEPHONE (OUTPATIENT)
Dept: NEUROSURGERY | Facility: CLINIC | Age: 69
End: 2019-03-22

## 2019-03-22 NOTE — TELEPHONE ENCOUNTER
Patient had called to request another PT order and as well to get an order for lymphedema therapy b/c she states she is having BLE pain and swelling.  Per Dr Schmidt: she needs to see her PCP or Dr Price for this to rule out other causes of the swelling/pain (cardiac, DVT, medication induced, etc).  We will give a PT order to [facility] for her neck therapy but not an order for lymphedema therapy.  I have notified the patient by phone today and she is going to contact Dr Price's office to discuss the BLE issues with them.      mi baker CMA

## 2019-03-25 ENCOUNTER — HOSPITAL ENCOUNTER (OUTPATIENT)
Dept: NON INVASIVE DIAGNOSTICS | Age: 69
Discharge: HOME | End: 2019-03-25
Payer: MEDICARE

## 2019-03-25 PROCEDURE — 93970 EXTREMITY STUDY: CPT

## 2019-03-26 ENCOUNTER — TRANSCRIBE ORDERS (OUTPATIENT)
Dept: ADMINISTRATIVE | Facility: HOSPITAL | Age: 69
End: 2019-03-26

## 2019-03-26 DIAGNOSIS — R06.02 SHORTNESS OF BREATH: Primary | ICD-10-CM

## 2019-03-27 ENCOUNTER — HOSPITAL ENCOUNTER (OUTPATIENT)
Dept: CARDIOLOGY | Facility: HOSPITAL | Age: 69
Discharge: HOME | End: 2019-03-27
Admitting: FAMILY MEDICINE

## 2019-03-27 VITALS
DIASTOLIC BLOOD PRESSURE: 51 MMHG | HEIGHT: 64 IN | SYSTOLIC BLOOD PRESSURE: 97 MMHG | WEIGHT: 128 LBS | BODY MASS INDEX: 21.85 KG/M2

## 2019-03-27 DIAGNOSIS — R06.02 SHORTNESS OF BREATH: ICD-10-CM

## 2019-03-27 PROCEDURE — 0399T ADULT TRANSTHORACIC ECHO COMPLETE W/ CONT IF NECESSARY PER PROTOCOL: CPT | Performed by: INTERNAL MEDICINE

## 2019-03-27 PROCEDURE — 93306 TTE W/DOPPLER COMPLETE: CPT

## 2019-03-27 PROCEDURE — 0399T HC MYOCARDL STRAIN IMAG QUAN ASSMT PER SESS: CPT

## 2019-03-27 PROCEDURE — 93306 TTE W/DOPPLER COMPLETE: CPT | Performed by: INTERNAL MEDICINE

## 2019-03-28 ENCOUNTER — HOSPITAL ENCOUNTER (OUTPATIENT)
Dept: MRI IMAGING | Facility: HOSPITAL | Age: 69
Discharge: HOME | End: 2019-03-28
Attending: NEUROLOGICAL SURGERY | Admitting: NEUROLOGICAL SURGERY

## 2019-03-28 ENCOUNTER — OFFICE VISIT (OUTPATIENT)
Dept: NEUROSURGERY | Facility: CLINIC | Age: 69
End: 2019-03-28

## 2019-03-28 VITALS
WEIGHT: 129.2 LBS | HEIGHT: 64 IN | DIASTOLIC BLOOD PRESSURE: 80 MMHG | BODY MASS INDEX: 22.06 KG/M2 | SYSTOLIC BLOOD PRESSURE: 144 MMHG

## 2019-03-28 DIAGNOSIS — C79.31 SECONDARY MALIGNANT NEOPLASM OF BRAIN AND SPINAL CORD (HCC): Primary | Chronic | ICD-10-CM

## 2019-03-28 DIAGNOSIS — Z87.891 FORMER SMOKER: ICD-10-CM

## 2019-03-28 DIAGNOSIS — C79.49 SECONDARY MALIGNANT NEOPLASM OF BRAIN AND SPINAL CORD (HCC): Primary | Chronic | ICD-10-CM

## 2019-03-28 DIAGNOSIS — C79.31 METASTATIC ADENOCARCINOMA TO BRAIN (HCC): ICD-10-CM

## 2019-03-28 LAB
BH CV ECHO MEAS - AO MAX PG (FULL): 8.4 MMHG
BH CV ECHO MEAS - AO MAX PG: 12.3 MMHG
BH CV ECHO MEAS - AO MEAN PG (FULL): 4 MMHG
BH CV ECHO MEAS - AO MEAN PG: 6 MMHG
BH CV ECHO MEAS - AO ROOT AREA (BSA CORRECTED): 2
BH CV ECHO MEAS - AO ROOT AREA: 8 CM^2
BH CV ECHO MEAS - AO ROOT DIAM: 3.2 CM
BH CV ECHO MEAS - AO V2 MAX: 175 CM/SEC
BH CV ECHO MEAS - AO V2 MEAN: 116 CM/SEC
BH CV ECHO MEAS - AO V2 VTI: 37.9 CM
BH CV ECHO MEAS - AVA(I,A): 2 CM^2
BH CV ECHO MEAS - AVA(I,D): 2 CM^2
BH CV ECHO MEAS - AVA(V,A): 1.8 CM^2
BH CV ECHO MEAS - AVA(V,D): 1.8 CM^2
BH CV ECHO MEAS - BSA(HAYCOCK): 1.6 M^2
BH CV ECHO MEAS - BSA: 1.6 M^2
BH CV ECHO MEAS - BZI_BMI: 22 KILOGRAMS/M^2
BH CV ECHO MEAS - BZI_METRIC_HEIGHT: 162.6 CM
BH CV ECHO MEAS - BZI_METRIC_WEIGHT: 58.1 KG
BH CV ECHO MEAS - EDV(CUBED): 88.7 ML
BH CV ECHO MEAS - EDV(TEICH): 90.5 ML
BH CV ECHO MEAS - EF(CUBED): 77.6 %
BH CV ECHO MEAS - EF(TEICH): 69.9 %
BH CV ECHO MEAS - ESV(CUBED): 19.9 ML
BH CV ECHO MEAS - ESV(TEICH): 27.3 ML
BH CV ECHO MEAS - FS: 39.2 %
BH CV ECHO MEAS - IVS/LVPW: 0.98
BH CV ECHO MEAS - IVSD: 0.86 CM
BH CV ECHO MEAS - LA DIMENSION: 3.5 CM
BH CV ECHO MEAS - LA/AO: 1.1
BH CV ECHO MEAS - LAT PEAK E' VEL: 8.7 CM/SEC
BH CV ECHO MEAS - LV MASS(C)D: 124 GRAMS
BH CV ECHO MEAS - LV MASS(C)DI: 76.6 GRAMS/M^2
BH CV ECHO MEAS - LV MAX PG: 3.8 MMHG
BH CV ECHO MEAS - LV MEAN PG: 2 MMHG
BH CV ECHO MEAS - LV V1 MAX: 97.5 CM/SEC
BH CV ECHO MEAS - LV V1 MEAN: 61.9 CM/SEC
BH CV ECHO MEAS - LV V1 VTI: 24.1 CM
BH CV ECHO MEAS - LVIDD: 4.5 CM
BH CV ECHO MEAS - LVIDS: 2.7 CM
BH CV ECHO MEAS - LVOT AREA (M): 3.1 CM^2
BH CV ECHO MEAS - LVOT AREA: 3.1 CM^2
BH CV ECHO MEAS - LVOT DIAM: 2 CM
BH CV ECHO MEAS - LVPWD: 0.87 CM
BH CV ECHO MEAS - MED PEAK E' VEL: 7.6 CM/SEC
BH CV ECHO MEAS - MR ALIAS VEL: 38.5 CM/SEC
BH CV ECHO MEAS - MR ERO: 0.07 CM^2
BH CV ECHO MEAS - MR FLOW RATE: 38.7 CM^3/SEC
BH CV ECHO MEAS - MR MAX PG: 121.9 MMHG
BH CV ECHO MEAS - MR MAX VEL: 549.7 CM/SEC
BH CV ECHO MEAS - MR MEAN PG: 86 MMHG
BH CV ECHO MEAS - MR MEAN VEL: 432 CM/SEC
BH CV ECHO MEAS - MR PISA RADIUS: 0.4 CM
BH CV ECHO MEAS - MR PISA: 1 CM^2
BH CV ECHO MEAS - MR VOLUME: 14.6 ML
BH CV ECHO MEAS - MR VTI: 208 CM
BH CV ECHO MEAS - MV A MAX VEL: 113 CM/SEC
BH CV ECHO MEAS - MV DEC SLOPE: 482 CM/SEC^2
BH CV ECHO MEAS - MV DEC TIME: 0.24 SEC
BH CV ECHO MEAS - MV E MAX VEL: 104 CM/SEC
BH CV ECHO MEAS - MV E/A: 0.92
BH CV ECHO MEAS - MV P1/2T MAX VEL: 121 CM/SEC
BH CV ECHO MEAS - MV P1/2T: 73.5 MSEC
BH CV ECHO MEAS - MVA P1/2T LCG: 1.8 CM^2
BH CV ECHO MEAS - MVA(P1/2T): 3 CM^2
BH CV ECHO MEAS - PI END-D VEL: 124 CM/SEC
BH CV ECHO MEAS - RAP SYSTOLE: 5 MMHG
BH CV ECHO MEAS - RVSP: 28.6 MMHG
BH CV ECHO MEAS - SI(AO): 188.4 ML/M^2
BH CV ECHO MEAS - SI(CUBED): 42.5 ML/M^2
BH CV ECHO MEAS - SI(LVOT): 46.8 ML/M^2
BH CV ECHO MEAS - SI(TEICH): 39.1 ML/M^2
BH CV ECHO MEAS - SV(AO): 304.8 ML
BH CV ECHO MEAS - SV(CUBED): 68.8 ML
BH CV ECHO MEAS - SV(LVOT): 75.7 ML
BH CV ECHO MEAS - SV(TEICH): 63.3 ML
BH CV ECHO MEAS - TR MAX VEL: 243 CM/SEC
BH CV ECHO MEASUREMENTS AVERAGE E/E' RATIO: 12.76
CREAT BLDA-MCNC: 0.7 MG/DL (ref 0.6–1.3)
LEFT ATRIUM VOLUME INDEX: 19.7 ML/M2
LV EF 2D ECHO EST: 60 %
MAXIMAL PREDICTED HEART RATE: 151 BPM
STRESS TARGET HR: 128 BPM

## 2019-03-28 PROCEDURE — 82565 ASSAY OF CREATININE: CPT

## 2019-03-28 PROCEDURE — 99213 OFFICE O/P EST LOW 20 MIN: CPT | Performed by: NEUROLOGICAL SURGERY

## 2019-03-28 PROCEDURE — 70553 MRI BRAIN STEM W/O & W/DYE: CPT

## 2019-03-28 PROCEDURE — A9577 INJ MULTIHANCE: HCPCS | Performed by: NEUROLOGICAL SURGERY

## 2019-03-28 PROCEDURE — 0 GADOBENATE DIMEGLUMINE 529 MG/ML SOLUTION: Performed by: NEUROLOGICAL SURGERY

## 2019-03-28 RX ORDER — LIFITEGRAST 50 MG/ML
SOLUTION/ DROPS OPHTHALMIC
Status: ON HOLD | COMMUNITY
Start: 2019-03-20 | End: 2019-06-19

## 2019-03-28 RX ORDER — DEXTROAMPHETAMINE SACCHARATE, AMPHETAMINE ASPARTATE, DEXTROAMPHETAMINE SULFATE AND AMPHETAMINE SULFATE 2.5; 2.5; 2.5; 2.5 MG/1; MG/1; MG/1; MG/1
TABLET ORAL
Status: ON HOLD | COMMUNITY
Start: 2019-02-18 | End: 2019-04-08

## 2019-03-28 RX ADMIN — GADOBENATE DIMEGLUMINE 10 ML: 529 INJECTION, SOLUTION INTRAVENOUS at 13:12

## 2019-03-28 RX ADMIN — SODIUM CHLORIDE, PRESERVATIVE FREE 500 UNITS: 5 INJECTION INTRAVENOUS at 13:15

## 2019-03-28 NOTE — PROGRESS NOTES
SUBJECTIVE:  Patient ID: Marcy Garcia is a 68 y.o. female is here today for follow-up.    Chief Complaint: Metastatic brain tumor  Chief Complaint   Patient presents with   • Brain Tumor     patient with known lung CA with mets to brain; patient had MRI today @ [facility]       HPI  68-year-old female with a history of lung cancer who underwent craniotomy in January 2018 and then stereotactic radiosurgery.  She has no new issues or complaints.  She is on maintenance chemotherapy.  Per report her disease is under good control.  She has been dealing with some edema in her lower extremities.        The following portions of the patient's history were reviewed and updated as appropriate: allergies, current medications, past family history, past medical history, past social history, past surgical history and problem list.    OBJECTIVE:    Review of Systems   All other systems reviewed and are negative.         Physical Exam   Constitutional: She is oriented to person, place, and time. She appears well-developed and well-nourished.   HENT:   Head: Normocephalic and atraumatic.   Right Ear: Hearing normal.   Left Ear: Hearing normal.   Eyes: EOM are normal. Pupils are equal, round, and reactive to light.   Neck: Normal range of motion.   Neurological: She is alert and oriented to person, place, and time. She has normal strength and normal reflexes. No cranial nerve deficit or sensory deficit. She displays a negative Romberg sign. GCS eye subscore is 4. GCS verbal subscore is 5. GCS motor subscore is 6. She displays no Babinski's sign on the right side. She displays no Babinski's sign on the left side.   Psychiatric: Her speech is normal. Judgment normal. Cognition and memory are normal.       Neurologic Exam     Mental Status   Oriented to person, place, and time.   Speech: speech is normal     Cranial Nerves     CN III, IV, VI   Pupils are equal, round, and reactive to light.  Extraocular motions are normal.     Motor Exam      Strength   Strength 5/5 throughout.       Independent Review of Radiographic Studies:   MRI of the brain with and without contrast compared to previous imaging shows stable right frontal postoperative changes.  There is no new areas of concern.    ASSESSMENT/PLAN:  Danita is doing very well after her craniotomy and radiation.  Her systemic disease seems to be under good control.  We will see her in follow-up in 3 or 4 months.      1. 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 Former smoker    3. BMI 22.0-22.9, adult            No Follow-up on file.      Constantine Schmitd MD

## 2019-03-28 NOTE — PATIENT INSTRUCTIONS
PATIENT TO CONTINUE TO FOLLOW UP WITH HER PRIMARY CARE PROVIDER FOR YEARLY PHYSICAL EXAMS TO ENSURE COMPLETE HEALTH MAINTENANCE        Steps to Quit Smoking  Smoking tobacco can be harmful to your health and can affect almost every organ in your body. Smoking puts you, and those around you, at risk for developing many serious chronic diseases. Quitting smoking is difficult, but it is one of the best things that you can do for your health. It is never too late to quit.  What are the benefits of quitting smoking?  When you quit smoking, you lower your risk of developing serious diseases and conditions, such as:  · Lung cancer or lung disease, such as COPD.  · Heart disease.  · Stroke.  · Heart attack.  · Infertility.  · Osteoporosis and bone fractures.    Additionally, symptoms such as coughing, wheezing, and shortness of breath may get better when you quit. You may also find that you get sick less often because your body is stronger at fighting off colds and infections. If you are pregnant, quitting smoking can help to reduce your chances of having a baby of low birth weight.  How do I get ready to quit?  When you decide to quit smoking, create a plan to make sure that you are successful. Before you quit:  · Pick a date to quit. Set a date within the next two weeks to give you time to prepare.  · Write down the reasons why you are quitting. Keep this list in places where you will see it often, such as on your bathroom mirror or in your car or wallet.  · Identify the people, places, things, and activities that make you want to smoke (triggers) and avoid them. Make sure to take these actions:  ? Throw away all cigarettes at home, at work, and in your car.  ? Throw away smoking accessories, such as ashtrays and lighters.  ? Clean your car and make sure to empty the ashtray.  ? Clean your home, including curtains and carpets.  · Tell your family, friends, and coworkers that you are quitting. Support from your loved ones  can make quitting easier.  · Talk with your health care provider about your options for quitting smoking.  · Find out what treatment options are covered by your health insurance.    What strategies can I use to quit smoking?  Talk with your healthcare provider about different strategies to quit smoking. Some strategies include:  · Quitting smoking altogether instead of gradually lessening how much you smoke over a period of time. Research shows that quitting “cold turkey” is more successful than gradually quitting.  · Attending in-person counseling to help you build problem-solving skills. You are more likely to have success in quitting if you attend several counseling sessions. Even short sessions of 10 minutes can be effective.  · Finding resources and support systems that can help you to quit smoking and remain smoke-free after you quit. These resources are most helpful when you use them often. They can include:  ? Online chats with a counselor.  ? Telephone quitlines.  ? Printed self-help materials.  ? Support groups or group counseling.  ? Text messaging programs.  ? Mobile phone applications.  · Taking medicines to help you quit smoking. (If you are pregnant or breastfeeding, talk with your health care provider first.) Some medicines contain nicotine and some do not. Both types of medicines help with cravings, but the medicines that include nicotine help to relieve withdrawal symptoms. Your health care provider may recommend:  ? Nicotine patches, gum, or lozenges.  ? Nicotine inhalers or sprays.  ? Non-nicotine medicine that is taken by mouth.    Talk with your health care provider about combining strategies, such as taking medicines while you are also receiving in-person counseling. Using these two strategies together makes you more likely to succeed in quitting than if you used either strategy on its own.  If you are pregnant or breastfeeding, talk with your health care provider about finding counseling or  other support strategies to quit smoking. Do not take medicine to help you quit smoking unless told to do so by your health care provider.  What things can I do to make it easier to quit?  Quitting smoking might feel overwhelming at first, but there is a lot that you can do to make it easier. Take these important actions:  · Reach out to your family and friends and ask that they support and encourage you during this time. Call telephone quitlines, reach out to support groups, or work with a counselor for support.  · Ask people who smoke to avoid smoking around you.  · Avoid places that trigger you to smoke, such as bars, parties, or smoke-break areas at work.  · Spend time around people who do not smoke.  · Lessen stress in your life, because stress can be a smoking trigger for some people. To lessen stress, try:  ? Exercising regularly.  ? Deep-breathing exercises.  ? Yoga.  ? Meditating.  ? Performing a body scan. This involves closing your eyes, scanning your body from head to toe, and noticing which parts of your body are particularly tense. Purposefully relax the muscles in those areas.  · Download or purchase mobile phone or tablet apps (applications) that can help you stick to your quit plan by providing reminders, tips, and encouragement. There are many free apps, such as QuitGuide from the CDC (Centers for Disease Control and Prevention). You can find other support for quitting smoking (smoking cessation) through smokefree.gov and other websites.    How will I feel when I quit smoking?  Within the first 24 hours of quitting smoking, you may start to feel some withdrawal symptoms. These symptoms are usually most noticeable 2-3 days after quitting, but they usually do not last beyond 2-3 weeks. Changes or symptoms that you might experience include:  · Mood swings.  · Restlessness, anxiety, or irritation.  · Difficulty concentrating.  · Dizziness.  · Strong cravings for sugary foods in addition to  nicotine.  · Mild weight gain.  · Constipation.  · Nausea.  · Coughing or a sore throat.  · Changes in how your medicines work in your body.  · A depressed mood.  · Difficulty sleeping (insomnia).    After the first 2-3 weeks of quitting, you may start to notice more positive results, such as:  · Improved sense of smell and taste.  · Decreased coughing and sore throat.  · Slower heart rate.  · Lower blood pressure.  · Clearer skin.  · The ability to breathe more easily.  · Fewer sick days.    Quitting smoking is very challenging for most people. Do not get discouraged if you are not successful the first time. Some people need to make many attempts to quit before they achieve long-term success. Do your best to stick to your quit plan, and talk with your health care provider if you have any questions or concerns.  This information is not intended to replace advice given to you by your health care provider. Make sure you discuss any questions you have with your health care provider.  Document Released: 12/12/2002 Document Revised: 07/24/2018 Document Reviewed: 05/03/2016  [employer] Interactive Patient Education © 2019 [employer]

## 2019-04-08 ENCOUNTER — HOSPITAL ENCOUNTER (INPATIENT)
Facility: HOSPITAL | Age: 69
LOS: 5 days | Discharge: HOME | End: 2019-04-16
Attending: FAMILY MEDICINE | Admitting: FAMILY MEDICINE

## 2019-04-08 ENCOUNTER — APPOINTMENT (OUTPATIENT)
Dept: GENERAL RADIOLOGY | Facility: HOSPITAL | Age: 69
End: 2019-04-08

## 2019-04-08 DIAGNOSIS — C79.31 SECONDARY MALIGNANT NEOPLASM OF BRAIN AND SPINAL CORD (HCC): Primary | Chronic | ICD-10-CM

## 2019-04-08 DIAGNOSIS — L03.116 CELLULITIS OF BOTH LOWER EXTREMITIES: ICD-10-CM

## 2019-04-08 DIAGNOSIS — L03.115 CELLULITIS OF BOTH LOWER EXTREMITIES: ICD-10-CM

## 2019-04-08 DIAGNOSIS — C79.49 SECONDARY MALIGNANT NEOPLASM OF BRAIN AND SPINAL CORD (HCC): Primary | Chronic | ICD-10-CM

## 2019-04-08 LAB
ALBUMIN SERPL-MCNC: 3.1 G/DL (ref 3.5–5)
ALBUMIN/GLOB SERPL: 1.2 G/DL (ref 1.1–2.5)
ALP SERPL-CCNC: 121 U/L (ref 24–120)
ALT SERPL W P-5'-P-CCNC: 45 U/L (ref 0–54)
ANION GAP SERPL CALCULATED.3IONS-SCNC: 11 MMOL/L (ref 4–13)
ANISOCYTOSIS BLD QL: ABNORMAL
AST SERPL-CCNC: 63 U/L (ref 7–45)
BILIRUB SERPL-MCNC: 0.5 MG/DL (ref 0.1–1)
BUN BLD-MCNC: 18 MG/DL (ref 5–21)
BUN/CREAT SERPL: 22 (ref 7–25)
CALCIUM SPEC-SCNC: 8.6 MG/DL (ref 8.4–10.4)
CHLORIDE SERPL-SCNC: 93 MMOL/L (ref 98–110)
CO2 SERPL-SCNC: 31 MMOL/L (ref 24–31)
CREAT BLD-MCNC: 0.82 MG/DL (ref 0.5–1.4)
DEPRECATED RDW RBC AUTO: 46.4 FL (ref 40–54)
DIGOXIN SERPL-MCNC: <0.4 NG/ML (ref 0.8–2)
ERYTHROCYTE [DISTWIDTH] IN BLOOD BY AUTOMATED COUNT: 15.5 % (ref 12–15)
GFR SERPL CREATININE-BSD FRML MDRD: 69 ML/MIN/1.73
GLOBULIN UR ELPH-MCNC: 2.6 GM/DL
GLUCOSE BLD-MCNC: 118 MG/DL (ref 70–100)
HCT VFR BLD AUTO: 26.2 % (ref 37–47)
HGB BLD-MCNC: 8.8 G/DL (ref 12–16)
LYMPHOCYTES # BLD MANUAL: 0.94 10*3/MM3 (ref 0.72–4.86)
LYMPHOCYTES NFR BLD MANUAL: 14.1 % (ref 15–45)
LYMPHOCYTES NFR BLD MANUAL: 6.1 % (ref 4–12)
MCH RBC QN AUTO: 28.5 PG (ref 28–32)
MCHC RBC AUTO-ENTMCNC: 33.6 G/DL (ref 33–36)
MCV RBC AUTO: 84.8 FL (ref 82–98)
METAMYELOCYTES NFR BLD MANUAL: 1 % (ref 0–0)
MICROCYTES BLD QL: ABNORMAL
MONOCYTES # BLD AUTO: 0.41 10*3/MM3 (ref 0.19–1.3)
NEUTROPHILS # BLD AUTO: 5.17 10*3/MM3 (ref 1.87–8.4)
NEUTROPHILS NFR BLD MANUAL: 77.8 % (ref 39–78)
NT-PROBNP SERPL-MCNC: 646 PG/ML (ref 0–900)
PLAT MORPH BLD: NORMAL
PLATELET # BLD AUTO: 184 10*3/MM3 (ref 130–400)
PMV BLD AUTO: 9.7 FL (ref 6–12)
POIKILOCYTOSIS BLD QL SMEAR: ABNORMAL
POTASSIUM BLD-SCNC: 2.7 MMOL/L (ref 3.5–5.3)
PROT SERPL-MCNC: 5.7 G/DL (ref 6.3–8.7)
RBC # BLD AUTO: 3.09 10*6/MM3 (ref 4.2–5.4)
SODIUM BLD-SCNC: 135 MMOL/L (ref 135–145)
VARIANT LYMPHS NFR BLD MANUAL: 1 % (ref 0–5)
WBC MORPH BLD: NORMAL
WBC NRBC COR # BLD: 6.65 10*3/MM3 (ref 4.8–10.8)

## 2019-04-08 PROCEDURE — 80053 COMPREHEN METABOLIC PANEL: CPT | Performed by: FAMILY MEDICINE

## 2019-04-08 PROCEDURE — 25010000002 ENOXAPARIN PER 10 MG: Performed by: FAMILY MEDICINE

## 2019-04-08 PROCEDURE — 85007 BL SMEAR W/DIFF WBC COUNT: CPT | Performed by: FAMILY MEDICINE

## 2019-04-08 PROCEDURE — 71046 X-RAY EXAM CHEST 2 VIEWS: CPT

## 2019-04-08 PROCEDURE — 83880 ASSAY OF NATRIURETIC PEPTIDE: CPT | Performed by: FAMILY MEDICINE

## 2019-04-08 PROCEDURE — 85025 COMPLETE CBC W/AUTO DIFF WBC: CPT | Performed by: FAMILY MEDICINE

## 2019-04-08 PROCEDURE — 80162 ASSAY OF DIGOXIN TOTAL: CPT | Performed by: FAMILY MEDICINE

## 2019-04-08 PROCEDURE — 25010000002 CEFTRIAXONE PER 250 MG: Performed by: FAMILY MEDICINE

## 2019-04-08 PROCEDURE — 87040 BLOOD CULTURE FOR BACTERIA: CPT | Performed by: FAMILY MEDICINE

## 2019-04-08 RX ORDER — DIGOXIN 125 MCG
125 TABLET ORAL DAILY
Status: DISCONTINUED | OUTPATIENT
Start: 2019-04-09 | End: 2019-04-16 | Stop reason: HOSPADM

## 2019-04-08 RX ORDER — SODIUM CHLORIDE 0.9 % (FLUSH) 0.9 %
10 SYRINGE (ML) INJECTION EVERY 12 HOURS SCHEDULED
Status: DISCONTINUED | OUTPATIENT
Start: 2019-04-08 | End: 2019-04-16 | Stop reason: HOSPADM

## 2019-04-08 RX ORDER — POTASSIUM CHLORIDE 750 MG/1
20 CAPSULE, EXTENDED RELEASE ORAL DAILY
Status: DISCONTINUED | OUTPATIENT
Start: 2019-04-09 | End: 2019-04-11

## 2019-04-08 RX ORDER — SERTRALINE HYDROCHLORIDE 100 MG/1
200 TABLET, FILM COATED ORAL DAILY
Status: DISCONTINUED | OUTPATIENT
Start: 2019-04-09 | End: 2019-04-16 | Stop reason: HOSPADM

## 2019-04-08 RX ORDER — DEXTROAMPHETAMINE SACCHARATE, AMPHETAMINE ASPARTATE, DEXTROAMPHETAMINE SULFATE AND AMPHETAMINE SULFATE 2.5; 2.5; 2.5; 2.5 MG/1; MG/1; MG/1; MG/1
10 TABLET ORAL DAILY PRN
Status: ON HOLD | COMMUNITY
End: 2019-06-21

## 2019-04-08 RX ORDER — AZELASTINE 1 MG/ML
2 SPRAY, METERED NASAL 2 TIMES DAILY
Status: DISCONTINUED | OUTPATIENT
Start: 2019-04-08 | End: 2019-04-16 | Stop reason: HOSPADM

## 2019-04-08 RX ORDER — ALPRAZOLAM 0.25 MG/1
0.25 TABLET ORAL 2 TIMES DAILY PRN
Status: DISCONTINUED | OUTPATIENT
Start: 2019-04-08 | End: 2019-04-16 | Stop reason: HOSPADM

## 2019-04-08 RX ORDER — POTASSIUM CHLORIDE 750 MG/1
40 CAPSULE, EXTENDED RELEASE ORAL ONCE
Status: COMPLETED | OUTPATIENT
Start: 2019-04-08 | End: 2019-04-08

## 2019-04-08 RX ORDER — HYDROCHLOROTHIAZIDE 25 MG/1
25 TABLET ORAL DAILY
Status: DISCONTINUED | OUTPATIENT
Start: 2019-04-09 | End: 2019-04-11

## 2019-04-08 RX ORDER — SODIUM CHLORIDE 0.9 % (FLUSH) 0.9 %
10 SYRINGE (ML) INJECTION AS NEEDED
Status: DISCONTINUED | OUTPATIENT
Start: 2019-04-08 | End: 2019-04-16 | Stop reason: HOSPADM

## 2019-04-08 RX ORDER — ATORVASTATIN CALCIUM 10 MG/1
20 TABLET, FILM COATED ORAL DAILY
Status: DISCONTINUED | OUTPATIENT
Start: 2019-04-09 | End: 2019-04-16 | Stop reason: HOSPADM

## 2019-04-08 RX ORDER — SODIUM CHLORIDE 0.9 % (FLUSH) 0.9 %
20 SYRINGE (ML) INJECTION AS NEEDED
Status: DISCONTINUED | OUTPATIENT
Start: 2019-04-08 | End: 2019-04-16 | Stop reason: HOSPADM

## 2019-04-08 RX ORDER — POTASSIUM CHLORIDE 750 MG/1
20 TABLET, EXTENDED RELEASE ORAL DAILY
Status: DISCONTINUED | OUTPATIENT
Start: 2019-04-09 | End: 2019-04-08 | Stop reason: CLARIF

## 2019-04-08 RX ADMIN — AZELASTINE HYDROCHLORIDE 2 SPRAY: 137 SPRAY, METERED NASAL at 22:53

## 2019-04-08 RX ADMIN — POTASSIUM CHLORIDE 40 MEQ: 750 CAPSULE, EXTENDED RELEASE ORAL at 23:24

## 2019-04-08 RX ADMIN — ENOXAPARIN SODIUM 40 MG: 100 INJECTION SUBCUTANEOUS at 23:24

## 2019-04-08 RX ADMIN — SODIUM CHLORIDE, PRESERVATIVE FREE 10 ML: 5 INJECTION INTRAVENOUS at 20:58

## 2019-04-08 RX ADMIN — CEFTRIAXONE SODIUM 1 G: 1 INJECTION, POWDER, FOR SOLUTION INTRAMUSCULAR; INTRAVENOUS at 23:26

## 2019-04-08 RX ADMIN — ALPRAZOLAM 0.25 MG: 0.25 TABLET ORAL at 23:24

## 2019-04-08 NOTE — PLAN OF CARE
Problem: Patient Care Overview  Goal: Plan of Care Review  Outcome: Ongoing (interventions implemented as appropriate)   04/08/19 9841   Coping/Psychosocial   Plan of Care Reviewed With patient   Plan of Care Review   Progress no change   OTHER   Outcome Summary PATIENT DIRECT ADMIT FROM DR SILVA'S OFFICE. BLE RED, SWOLLEN, WARM, AND TENDER. PULSES DOPPLERED. VSS. SATS WNL ON RA. REPORTS PRODUCTIVE COUGH. LEFT CHEST PORT ACCESSED. DR SILVA NOTIFIED FOR ORDERS. PATIENT RECEIVES CHEMOTHERAPY EVERY 3 WEEKS- HX LUNG CA WITH METS TO BRAIN. CONTINUE TO MONITOR.     Goal: Individualization and Mutuality  Outcome: Ongoing (interventions implemented as appropriate)    Goal: Discharge Needs Assessment  Outcome: Ongoing (interventions implemented as appropriate)    Goal: Interprofessional Rounds/Family Conf  Outcome: Ongoing (interventions implemented as appropriate)

## 2019-04-09 ENCOUNTER — APPOINTMENT (OUTPATIENT)
Dept: CT IMAGING | Facility: HOSPITAL | Age: 69
End: 2019-04-09

## 2019-04-09 PROBLEM — L03.116 CELLULITIS OF BOTH LOWER EXTREMITIES: Status: ACTIVE | Noted: 2019-04-09

## 2019-04-09 PROBLEM — L03.115 CELLULITIS OF BOTH LOWER EXTREMITIES: Status: ACTIVE | Noted: 2019-04-09

## 2019-04-09 LAB
ANION GAP SERPL CALCULATED.3IONS-SCNC: 9 MMOL/L (ref 4–13)
BASOPHILS # BLD AUTO: 0.02 10*3/MM3 (ref 0–0.2)
BASOPHILS NFR BLD AUTO: 0.2 % (ref 0–2)
BUN BLD-MCNC: 14 MG/DL (ref 5–21)
BUN/CREAT SERPL: 21.9 (ref 7–25)
CALCIUM SPEC-SCNC: 8.8 MG/DL (ref 8.4–10.4)
CHLORIDE SERPL-SCNC: 98 MMOL/L (ref 98–110)
CO2 SERPL-SCNC: 30 MMOL/L (ref 24–31)
CREAT BLD-MCNC: 0.64 MG/DL (ref 0.5–1.4)
DEPRECATED RDW RBC AUTO: 47.4 FL (ref 40–54)
EOSINOPHIL # BLD AUTO: 0.2 10*3/MM3 (ref 0–0.7)
EOSINOPHIL NFR BLD AUTO: 2.2 % (ref 0–4)
ERYTHROCYTE [DISTWIDTH] IN BLOOD BY AUTOMATED COUNT: 15.5 % (ref 12–15)
GFR SERPL CREATININE-BSD FRML MDRD: 92 ML/MIN/1.73
GLUCOSE BLD-MCNC: 97 MG/DL (ref 70–100)
HCT VFR BLD AUTO: 27.3 % (ref 37–47)
HGB BLD-MCNC: 9 G/DL (ref 12–16)
IMM GRANULOCYTES # BLD AUTO: 0.34 10*3/MM3 (ref 0–0.05)
IMM GRANULOCYTES NFR BLD AUTO: 3.7 % (ref 0–5)
LYMPHOCYTES # BLD AUTO: 1.46 10*3/MM3 (ref 0.72–4.86)
LYMPHOCYTES NFR BLD AUTO: 15.9 % (ref 15–45)
MAGNESIUM SERPL-MCNC: 1.6 MG/DL (ref 1.4–2.2)
MCH RBC QN AUTO: 28.8 PG (ref 28–32)
MCHC RBC AUTO-ENTMCNC: 33 G/DL (ref 33–36)
MCV RBC AUTO: 87.2 FL (ref 82–98)
MONOCYTES # BLD AUTO: 0.89 10*3/MM3 (ref 0.19–1.3)
MONOCYTES NFR BLD AUTO: 9.7 % (ref 4–12)
NEUTROPHILS # BLD AUTO: 6.3 10*3/MM3 (ref 1.87–8.4)
NEUTROPHILS NFR BLD AUTO: 68.3 % (ref 39–78)
NRBC BLD AUTO-RTO: 0 /100 WBC (ref 0–0)
PLATELET # BLD AUTO: 268 10*3/MM3 (ref 130–400)
PMV BLD AUTO: 10 FL (ref 6–12)
POTASSIUM BLD-SCNC: 3.5 MMOL/L (ref 3.5–5.3)
RBC # BLD AUTO: 3.13 10*6/MM3 (ref 4.2–5.4)
SODIUM BLD-SCNC: 137 MMOL/L (ref 135–145)
WBC NRBC COR # BLD: 9.21 10*3/MM3 (ref 4.8–10.8)

## 2019-04-09 PROCEDURE — 83735 ASSAY OF MAGNESIUM: CPT | Performed by: FAMILY MEDICINE

## 2019-04-09 PROCEDURE — 25010000002 ENOXAPARIN PER 10 MG: Performed by: FAMILY MEDICINE

## 2019-04-09 PROCEDURE — 99214 OFFICE O/P EST MOD 30 MIN: CPT | Performed by: SURGERY

## 2019-04-09 PROCEDURE — 25010000002 CEFTRIAXONE PER 250 MG: Performed by: FAMILY MEDICINE

## 2019-04-09 PROCEDURE — G0378 HOSPITAL OBSERVATION PER HR: HCPCS

## 2019-04-09 PROCEDURE — 85025 COMPLETE CBC W/AUTO DIFF WBC: CPT | Performed by: FAMILY MEDICINE

## 2019-04-09 PROCEDURE — 25010000002 IOPAMIDOL 61 % SOLUTION: Performed by: FAMILY MEDICINE

## 2019-04-09 PROCEDURE — 0 IOHEXOL 300 MG/ML SOLUTION: Performed by: FAMILY MEDICINE

## 2019-04-09 PROCEDURE — 80048 BASIC METABOLIC PNL TOTAL CA: CPT | Performed by: FAMILY MEDICINE

## 2019-04-09 PROCEDURE — 74177 CT ABD & PELVIS W/CONTRAST: CPT

## 2019-04-09 RX ORDER — IBUPROFEN 400 MG/1
400 TABLET ORAL EVERY 8 HOURS SCHEDULED
Status: DISCONTINUED | OUTPATIENT
Start: 2019-04-09 | End: 2019-04-09

## 2019-04-09 RX ORDER — IBUPROFEN 400 MG/1
400 TABLET ORAL EVERY 8 HOURS PRN
Status: DISCONTINUED | OUTPATIENT
Start: 2019-04-09 | End: 2019-04-10

## 2019-04-09 RX ADMIN — ALPRAZOLAM 0.25 MG: 0.25 TABLET ORAL at 21:29

## 2019-04-09 RX ADMIN — SODIUM CHLORIDE, PRESERVATIVE FREE 10 ML: 5 INJECTION INTRAVENOUS at 13:17

## 2019-04-09 RX ADMIN — AZELASTINE HYDROCHLORIDE 2 SPRAY: 137 SPRAY, METERED NASAL at 13:15

## 2019-04-09 RX ADMIN — SERTRALINE 200 MG: 100 TABLET, FILM COATED ORAL at 13:16

## 2019-04-09 RX ADMIN — AZELASTINE HYDROCHLORIDE 2 SPRAY: 137 SPRAY, METERED NASAL at 21:32

## 2019-04-09 RX ADMIN — POTASSIUM CHLORIDE 20 MEQ: 750 CAPSULE, EXTENDED RELEASE ORAL at 13:15

## 2019-04-09 RX ADMIN — SODIUM CHLORIDE, PRESERVATIVE FREE 10 ML: 5 INJECTION INTRAVENOUS at 21:29

## 2019-04-09 RX ADMIN — IOPAMIDOL 100 ML: 612 INJECTION, SOLUTION INTRAVENOUS at 10:05

## 2019-04-09 RX ADMIN — DIGOXIN 125 MCG: 125 TABLET ORAL at 13:15

## 2019-04-09 RX ADMIN — ENOXAPARIN SODIUM 40 MG: 100 INJECTION SUBCUTANEOUS at 21:29

## 2019-04-09 RX ADMIN — HYDROCHLOROTHIAZIDE 25 MG: 25 TABLET ORAL at 13:16

## 2019-04-09 RX ADMIN — IOHEXOL 50 ML: 300 INJECTION, SOLUTION INTRAVENOUS at 07:49

## 2019-04-09 RX ADMIN — ATORVASTATIN CALCIUM 20 MG: 10 TABLET, FILM COATED ORAL at 13:16

## 2019-04-09 RX ADMIN — CEFTRIAXONE SODIUM 1 G: 1 INJECTION, POWDER, FOR SOLUTION INTRAMUSCULAR; INTRAVENOUS at 21:31

## 2019-04-09 RX ADMIN — IBUPROFEN 400 MG: 400 TABLET, FILM COATED ORAL at 21:29

## 2019-04-09 NOTE — PLAN OF CARE
Problem: Patient Care Overview  Goal: Plan of Care Review  Outcome: Ongoing (interventions implemented as appropriate)   04/09/19 0527   Coping/Psychosocial   Plan of Care Reviewed With patient   Plan of Care Review   Progress no change   OTHER   Outcome Summary Pt denies any pain. VSS. S 85-92. Pt became agitated when told her home meds had not yet been resumed and threw her shoes on the floor. Once it was explained to her that the meds could easily be ordered via phone call to Dr. Henning, pt demeanor relaxed and pt apologized for throwing the shoes. K+ 2.7--PO K+ given. Port accessed yesterday-BC x2 drawn. IV Rocephin ordered per MD. BLE red and edematous- Pedal pulses 2+ doppler. CT abd/pelvis with contrast this AM. Will continue to monitor.      Goal: Individualization and Mutuality  Outcome: Ongoing (interventions implemented as appropriate)    Goal: Discharge Needs Assessment  Outcome: Ongoing (interventions implemented as appropriate)      Problem: Fall Risk (Adult)  Goal: Identify Related Risk Factors and Signs and Symptoms  Outcome: Outcome(s) achieved Date Met: 04/09/19    Goal: Absence of Fall  Outcome: Ongoing (interventions implemented as appropriate)   04/09/19 0527   Fall Risk (Adult)   Absence of Fall achieves outcome       Problem: Skin and Soft Tissue Infection (Adult)  Goal: Signs and Symptoms of Listed Potential Problems Will be Absent, Minimized or Managed (Skin and Soft Tissue Infection)  Outcome: Ongoing (interventions implemented as appropriate)   04/09/19 0527   Goal/Outcome Evaluation   Problems Assessed (Skin and Soft Tissue Infection) all   Problems Present (Skin/Soft Tissue Inf) pain;situational response

## 2019-04-09 NOTE — H&P
History and Physical      CHIEF COMPLAINT:  LE pain, redness, edema    Reason for Admission:  LE cellulitis, Edema    History Obtained From:  patient    HISTORY OF PRESENT ILLNESS:      The patient is a 68 y.o. female who I admitted with > 2 week h/o increasing LE edema, and now redness. She has difficulty with walking because of edema. She has no c/o CP or SOA. No GI issues. No fevers. She has no improvement she says with elevation. She has had an ECHO, venous US were ok.     Past Medical History:    Past Medical History:   Diagnosis Date   • Adenocarcinoma, lung, left (CMS/HCC)    • Allergic rhinitis 4/12/2018   • Anemia    • Anxiety    • Arthritis    • Ataxia    • Brain tumor (CMS/HCC)    • Cancer (CMS/HCC)     lung cancer - pt had chemo and was told she was cancer free, but recently a brain tumor was found   • Colostomy in place (CMS/HCC)    • Confusion    • COPD (chronic obstructive pulmonary disease) (CMS/HCC)    • Depression    • Dizziness    • GERD (gastroesophageal reflux disease)    • Headache    • Memory loss    • Panic attacks    • Seasonal allergies    • Sensorineural hearing loss (SNHL) of both ears 4/12/2018   • Urgency of urination    • Weakness      Past Surgical History:    Past Surgical History:   Procedure Laterality Date   • COLON SURGERY  2013    BLOCKED BOWEL - COLOSTOMY   • CRANIOTOMY FOR TUMOR Right 1/15/2018    Procedure: CRANIOTOMY FOR TUMOR STERIOTACTIC WITH BRAIN LAB right frontal craniotomy for brain tumor with neuromonitoring and brain lab;  Surgeon: Constantine Schmidt MD;  Location: [facility];  Service:    • ECTOPIC PREGNANCY SURGERY  1983   • HERNIA REPAIR     • HYSTERECTOMY  1987   • LUNG CANCER SURGERY Left     Dr Marlow - Lower Lobectomy   • ORIF FINGER FRACTURE      left pinky   • SHOULDER SURGERY Left     BROKEN COLLAR BONE & SHOULDER SURGERY & ELBOW   • TONSILLECTOMY AND ADENOIDECTOMY  1954         Medications Prior to Admission:    Medications Prior to Admission   Medication  Sig Dispense Refill Last Dose   • amphetamine-dextroamphetamine (ADDERALL) 10 MG tablet Take 10 mg by mouth Daily As Needed.      • ALPRAZolam (XANAX) 0.25 MG tablet Take 1 tablet by mouth 2 (Two) Times a Day As Needed for Anxiety. (Patient taking differently: Take 0.25 mg by mouth 2 (Two) Times a Day As Needed for Anxiety. Dr Mary Henning) 60 tablet 0 Taking   • atorvastatin (LIPITOR) 20 MG tablet Take 20 mg by mouth Daily.   Taking   • azelastine (ASTELIN) 0.1 % nasal spray 2 sprays into each nostril 2 (Two) Times a Day. Use in each nostril as directed 90 mL 3 Taking   • calcium-vitamin D (OSCAL-500) 500-200 MG-UNIT per tablet Take 1 tablet by mouth Daily.   Taking   • cetirizine (zyrTEC) 10 MG tablet Take 10 mg by mouth Daily.   Taking   • dexamethasone (DECADRON) 4 MG tablet    Taking   • diclofenac (VOLTAREN) 75 MG EC tablet 2 (Two) Times a Day As Needed. VOLTAREN GEL   Taking   • digoxin (LANOXIN) 125 MCG tablet Take 125 mcg by mouth Daily.   Taking   • fluconazole (DIFLUCAN) 100 MG tablet    Taking   • hydrochlorothiazide (HYDRODIURIL) 50 MG tablet    Taking   • HYDROcodone-acetaminophen (NORCO) 5-325 MG per tablet Take 1 tablet by mouth Every 6 (Six) Hours As Needed for Moderate Pain  for up to 12 doses. 12 tablet 0 Taking   • ketoconazole (NIZORAL) 2 % shampoo Apply  topically to the appropriate area as directed 2 (Two) Times a Week. 120 mL 1 Taking   • montelukast (SINGULAIR) 10 MG tablet TAKE ONE TABLET BY MOUTH EVERY NIGHT 90 tablet 3 Taking   • potassium chloride (K-DUR,KLOR-CON) 20 MEQ CR tablet 20 mEq Daily.   Taking   • sertraline (ZOLOFT) 100 MG tablet Take 2 tablets by mouth Daily. 60 tablet 5 Taking   • XIIDRA 5 % solution    Taking       Allergies:  Kiwi extract; Pineapple; and Dilaudid [hydromorphone hcl]    Social History:   TOBACCO:   reports that she quit smoking about 2 years ago. Her smoking use included cigarettes. She quit after 51.00 years of use. She has never used smokeless  "tobacco.  ETOH:   reports that she does not drink alcohol.  DRUGS:   reports that she does not use drugs.  MARITAL STATUS:    OCCUPATION:  Not working  Patient currently lives with Family      Family History:   Family History   Problem Relation Age of Onset   • Stroke Mother    • Osteoporosis Mother    • Cancer Father    • Cancer Maternal Grandmother    • Heart disease Maternal Grandfather      REVIEW OF SYSTEMS:  Constitutional: neg  CV: neg  Pulmonary: neg  GI: neg  : neg  Psych: neg  Neuro: neg  Skin: neg  MusculoSkeletal: LE edema, redness  HEENT: neg  Joints: neg  Vitals:  /58 (BP Location: Right arm, Patient Position: Lying)   Pulse 87   Temp 98.4 °F (36.9 °C) (Oral)   Resp 18   Ht 162.6 cm (64\")   Wt 57.3 kg (126 lb 4.8 oz)   SpO2 95%   BMI 21.68 kg/m²     PHYSICAL EXAM:  Gen: NAD, alert, pleasant  HEENT: WNL  Lymph: no LAD  Neck: no JVD or masses  Chest: CTA bilat  CV: RRR  Abdomen: NT/ND  Extrem: no C/C/2 + LE edema, redness  Neuro: non focal  Skin: no rashes  Joints: no redness  DATA:  I have reviewed the admission labs and imaging tests.    ASSESSMENT AND PLAN:      LE edema, cellulitis--continue IV antibiotics, elevate legs, CT abdomen to R/O mass as cause for her edema  H/O Metastatic Lung CA  COPD--no issues      Mary Henning MD  4:01 PM 4/9/2019  "

## 2019-04-09 NOTE — PROGRESS NOTES
Discharge Planning Assessment  [facility]     Patient Name: Marcy Garcia  MRN: [id number]  Today's Date: 4/9/2019    Admit Date: 4/8/2019    Discharge Needs Assessment     Row Name 04/09/19 1146       Living Environment    Lives With  spouse      Name(s) of Who Lives With Patient  Jackson      Current Living Arrangements  home/apartment/condo      Primary Care Provided by  self      Provides Primary Care For  no one      Family Caregiver if Needed  spouse;friend(s)      Family Caregiver Names  Jackson; Shasta      Quality of Family Relationships  involved;helpful;supportive      Able to Return to Prior Arrangements  yes         Resource/Environmental Concerns    Resource/Environmental Concerns  none      Transportation Concerns  car, none         Transition Planning    Patient/Family Anticipates Transition to  home with family      Patient/Family Anticipated Services at Transition  none      Transportation Anticipated  family or friend will provide         Discharge Needs Assessment    Readmission Within the Last 30 Days  no previous admission in last 30 days      Concerns to be Addressed  denies needs/concerns at this time      Equipment Currently Used at Home  walker, rolling;wheelchair;cane, straight;shower chair;commode      Anticipated Changes Related to Illness  none      Equipment Needed After Discharge  none      Discharge Coordination/Progress  Pt has RX coverage and a PCP. Pt states that she plans on returning home with  at discharge. Pt is unsure about needing HH. Pt denies any needs or concerns at this time. SW will follow.          Discharge Plan    No documentation.       Destination      No service coordination in this encounter.      Durable Medical Equipment      No service coordination in this encounter.      Dialysis/Infusion      No service coordination in this encounter.      Home Medical Care      No service coordination in this encounter.      Therapy      No service coordination in  this encounter.      Community Resources      No service coordination in this encounter.          Demographic Summary    No documentation.       Functional Status    No documentation.       Psychosocial    No documentation.       Abuse/Neglect    No documentation.       Legal    No documentation.       Substance Abuse    No documentation.       Patient Forms    No documentation.           Ofelia Banks

## 2019-04-10 ENCOUNTER — APPOINTMENT (OUTPATIENT)
Dept: ULTRASOUND IMAGING | Facility: HOSPITAL | Age: 69
End: 2019-04-10

## 2019-04-10 ENCOUNTER — APPOINTMENT (OUTPATIENT)
Dept: CT IMAGING | Facility: HOSPITAL | Age: 69
End: 2019-04-10

## 2019-04-10 PROCEDURE — G0378 HOSPITAL OBSERVATION PER HR: HCPCS

## 2019-04-10 PROCEDURE — 25010000002 CEFTRIAXONE PER 250 MG: Performed by: FAMILY MEDICINE

## 2019-04-10 PROCEDURE — 93970 EXTREMITY STUDY: CPT | Performed by: SURGERY

## 2019-04-10 PROCEDURE — 25010000002 ENOXAPARIN PER 10 MG: Performed by: FAMILY MEDICINE

## 2019-04-10 PROCEDURE — 93970 EXTREMITY STUDY: CPT

## 2019-04-10 PROCEDURE — 70450 CT HEAD/BRAIN W/O DYE: CPT

## 2019-04-10 RX ORDER — ACETAMINOPHEN 325 MG/1
650 TABLET ORAL EVERY 6 HOURS PRN
Status: DISCONTINUED | OUTPATIENT
Start: 2019-04-10 | End: 2019-04-16 | Stop reason: HOSPADM

## 2019-04-10 RX ADMIN — SODIUM CHLORIDE, PRESERVATIVE FREE 10 ML: 5 INJECTION INTRAVENOUS at 21:05

## 2019-04-10 RX ADMIN — AZELASTINE HYDROCHLORIDE 2 SPRAY: 137 SPRAY, METERED NASAL at 21:04

## 2019-04-10 RX ADMIN — SERTRALINE 200 MG: 100 TABLET, FILM COATED ORAL at 08:32

## 2019-04-10 RX ADMIN — CEFTRIAXONE SODIUM 1 G: 1 INJECTION, POWDER, FOR SOLUTION INTRAMUSCULAR; INTRAVENOUS at 23:13

## 2019-04-10 RX ADMIN — POTASSIUM CHLORIDE 20 MEQ: 750 CAPSULE, EXTENDED RELEASE ORAL at 08:32

## 2019-04-10 RX ADMIN — ATORVASTATIN CALCIUM 20 MG: 10 TABLET, FILM COATED ORAL at 08:33

## 2019-04-10 RX ADMIN — SODIUM CHLORIDE, PRESERVATIVE FREE 10 ML: 5 INJECTION INTRAVENOUS at 08:33

## 2019-04-10 RX ADMIN — ALPRAZOLAM 0.25 MG: 0.25 TABLET ORAL at 21:08

## 2019-04-10 RX ADMIN — ACETAMINOPHEN 650 MG: 325 TABLET, FILM COATED ORAL at 23:13

## 2019-04-10 RX ADMIN — AZELASTINE HYDROCHLORIDE 2 SPRAY: 137 SPRAY, METERED NASAL at 08:33

## 2019-04-10 RX ADMIN — ENOXAPARIN SODIUM 40 MG: 100 INJECTION SUBCUTANEOUS at 21:05

## 2019-04-10 RX ADMIN — HYDROCHLOROTHIAZIDE 25 MG: 25 TABLET ORAL at 08:32

## 2019-04-10 RX ADMIN — DIGOXIN 125 MCG: 125 TABLET ORAL at 08:32

## 2019-04-10 NOTE — PLAN OF CARE
"Problem: Patient Care Overview  Goal: Plan of Care Review  Outcome: Ongoing (interventions implemented as appropriate)   04/10/19 3079   Coping/Psychosocial   Plan of Care Reviewed With patient   Plan of Care Review   Progress no change   OTHER   Outcome Summary VSS. Denies pain this shift. NSR 85-95 on tele with PAC's. US venous doppler done today, see results. BLE edematous, red, and war,. Doppler pedal pulses. IV abx cont. Pt refused TEDs, explained importance. Pt states, \"I think they will just make my legs worse.\" Safety maintained. Will cont to monitor and call MD with any changes.       Problem: Fall Risk (Adult)  Goal: Absence of Fall  Outcome: Ongoing (interventions implemented as appropriate)      Problem: Skin and Soft Tissue Infection (Adult)  Goal: Signs and Symptoms of Listed Potential Problems Will be Absent, Minimized or Managed (Skin and Soft Tissue Infection)  Outcome: Ongoing (interventions implemented as appropriate)        "

## 2019-04-10 NOTE — PLAN OF CARE
Problem: Patient Care Overview  Goal: Plan of Care Review  Outcome: Ongoing (interventions implemented as appropriate)   04/10/19 0558   Coping/Psychosocial   Plan of Care Reviewed With patient   Plan of Care Review   Progress no change   OTHER   Outcome Summary No c/o pain this shift. Port dressing reinforced this shift. Pt. requesting port dressing be changed today. Pedal pulses 2+ per doppler. Edema continues. Pt. encouraged to elevated legs. IV antibiotics continue. VSS. Safety maintained. Will continue to monitor.     Goal: Individualization and Mutuality  Outcome: Ongoing (interventions implemented as appropriate)   04/10/19 0558   Individualization   Patient Specific Interventions Vascular study to be done today.     Goal: Interprofessional Rounds/Family Conf  Outcome: Ongoing (interventions implemented as appropriate)   04/10/19 0558   Interdisciplinary Rounds/Family Conf   Participants patient;nursing       Problem: Fall Risk (Adult)  Goal: Absence of Fall  Outcome: Ongoing (interventions implemented as appropriate)   04/10/19 0558   Fall Risk (Adult)   Absence of Fall making progress toward outcome       Problem: Skin and Soft Tissue Infection (Adult)  Goal: Signs and Symptoms of Listed Potential Problems Will be Absent, Minimized or Managed (Skin and Soft Tissue Infection)  Outcome: Ongoing (interventions implemented as appropriate)   04/10/19 0558   Goal/Outcome Evaluation   Problems Assessed (Skin and Soft Tissue Infection) all   Problems Present (Skin/Soft Tissue Inf) situational response

## 2019-04-10 NOTE — PROGRESS NOTES
"Progress Note  Marcy Garcia  4/10/2019 2:02 PM  Subjective:   Admit Date:   4/8/2019      CC/ADMIT DX:       Interval History:   Reviewed overnight events and nursing notes.  She has no c/o CP or SOA. SHe has LE edema/redness, although both are improved.       I have reviewed all labs/diagnostics from the last 24hrs.       ROS:   I have done a 10 point ROS and all are negative, except what is mentioned in the HPI.    Diet Regular    Medications:        atorvastatin 20 mg Oral Daily   azelastine 2 spray Each Nare BID   ceftriaxone 1 g Intravenous Q24H   digoxin 125 mcg Oral Daily   enoxaparin 40 mg Subcutaneous Nightly   hydrochlorothiazide 25 mg Oral Daily   potassium chloride 20 mEq Oral Daily   sertraline 200 mg Oral Daily   sodium chloride 10 mL Intravenous Q12H           Objective:   Vitals: /54 (BP Location: Left arm, Patient Position: Lying)   Pulse 84   Temp 98.2 °F (36.8 °C) (Oral)   Resp 18   Ht 162.6 cm (64\")   Wt 57.3 kg (126 lb 4.8 oz)   SpO2 98%   BMI 21.68 kg/m²    Intake/Output Summary (Last 24 hours) at 4/10/2019 1402  Last data filed at 4/10/2019 1300  Gross per 24 hour   Intake 820 ml   Output --   Net 820 ml     General appearance: alert and cooperative with exam  Lungs: clear to auscultation bilaterally  Heart: RRR  Abdomen: soft, non-tender; bowel sounds normal; no masses,  no organomegaly  Extremities: no C/C 1 + LE edema, with redness  Neurologic:  No obvious focal neurologic deficits.    Assessment and Plan:     Cellulitis of both lower extremities    LE edema, venous insuff    Metastatic Lung CA    COPD    Plan:  1.  Continue present medication(s)   2.  Add LE compression stockings, elevate legs  3.  Continue antibiotics  4.  Recheck labs in am      Discharge planning:   her home     Reviewed treatment plans with the patient and/or family.             Electronically signed by Mary Henning MD on 4/10/2019 at 2:02 PM  "

## 2019-04-10 NOTE — CONSULTS
Marcy Garcia  [id number]  [id number]  431/1  Mary Henning MD  4/8/2019    CC: B/L LE edema, erythema      HPI: Marcy Garcia is a 68 y.o. female who presented with B/L LE edema. She has a h/o lung CA with mets to the brain. She is currently on maintenance chemotherapy. She reports increasing lower extremity edema for the past few weeks. She has also developed erythema to the B/L feet for last few days. Admitted to medical service with cellulitis, on IV abx. Currently on Rocephin. Recent venous duplex at [facility] 3/26 showed no DVT, but + SVT in lesser saphenous vein of RLE. Echo shows normal EF. Reports difficulty walking due to edema. Pain to palpation of feet. Denies prior DVT. No prior lower extremity edema before 2-3 weeks ago. No other complaints.    Past Medical History:   Diagnosis Date   • Adenocarcinoma, lung, left (CMS/HCC)    • Allergic rhinitis 4/12/2018   • Anemia    • Anxiety    • Arthritis    • Ataxia    • Brain tumor (CMS/HCC)    • Cancer (CMS/HCC)     lung cancer - pt had chemo and was told she was cancer free, but recently a brain tumor was found   • Colostomy in place (CMS/HCC)    • Confusion    • COPD (chronic obstructive pulmonary disease) (CMS/HCC)    • Depression    • Dizziness    • GERD (gastroesophageal reflux disease)    • Headache    • Memory loss    • Panic attacks    • Seasonal allergies    • Sensorineural hearing loss (SNHL) of both ears 4/12/2018   • Urgency of urination    • Weakness        Past Surgical History:   Procedure Laterality Date   • COLON SURGERY  2013    BLOCKED BOWEL - COLOSTOMY   • CRANIOTOMY FOR TUMOR Right 1/15/2018    Procedure: CRANIOTOMY FOR TUMOR STERIOTACTIC WITH BRAIN LAB right frontal craniotomy for brain tumor with neuromonitoring and brain lab;  Surgeon: Constantine Schmidt MD;  Location: [facility] OR;  Service:    • ECTOPIC PREGNANCY SURGERY  1983   • HERNIA REPAIR     • HYSTERECTOMY  1987   • LUNG CANCER SURGERY Left     Dr Marlow - Lower Lobectomy    • ORIF FINGER FRACTURE      left pinky   • SHOULDER SURGERY Left     BROKEN COLLAR BONE & SHOULDER SURGERY & ELBOW   • TONSILLECTOMY AND ADENOIDECTOMY         Family History   Problem Relation Age of Onset   • Stroke Mother    • Osteoporosis Mother    • Cancer Father    • Cancer Maternal Grandmother    • Heart disease Maternal Grandfather        Social History     Socioeconomic History   • Marital status:      Spouse name: Not on file   • Number of children: Not on file   • Years of education: Not on file   • Highest education level: Not on file   Tobacco Use   • Smoking status: Former Smoker     Years: 51.00     Types: Cigarettes     Last attempt to quit: 10/2016     Years since quittin.5   • Smokeless tobacco: Never Used   • Tobacco comment: when pt smoked she smoked 2 packs a day on average   Substance and Sexual Activity   • Alcohol use: No   • Drug use: No   • Sexual activity: Defer     Partners: Male     Birth control/protection: None       Allergies   Allergen Reactions   • Kiwi Extract Shortness Of Breath   • Pineapple Shortness Of Breath   • Dilaudid [Hydromorphone Hcl] Delirium       Hospital Medications (active)       Dose Frequency Start End    ALPRAZolam (XANAX) tablet 0.25 mg 0.25 mg 2 Times Daily PRN 2019     Sig - Route: Take 1 tablet by mouth 2 (Two) Times a Day As Needed for Anxiety. - Oral    atorvastatin (LIPITOR) tablet 20 mg 20 mg Daily 2019     Sig - Route: Take 2 tablets by mouth Daily. - Oral    azelastine (ASTELIN) nasal spray 2 spray 2 spray 2 Times Daily 2019     Sig - Route: 2 sprays by Each Nare route 2 (Two) Times a Day. - Each Nare    cefTRIAXone (ROCEPHIN) 1 g/100 mL 0.9% NS (MBP) 1 g Every 24 Hours 2019 4/15/2019    Sig - Route: Infuse 100 mL into a venous catheter Daily. - Intravenous    digoxin (LANOXIN) tablet 125 mcg 125 mcg Daily 2019     Sig - Route: Take 1 tablet by mouth Daily. - Oral    enoxaparin (LOVENOX) syringe 40 mg 40 mg Nightly  4/8/2019     Sig - Route: Inject 0.4 mL under the skin into the appropriate area as directed Every Night. - Subcutaneous    heparin flush (porcine) 100 UNIT/ML injection 500 Units 5 mL As Needed 4/8/2019     Sig - Route: Infuse 5 mL into a venous catheter As Needed for Line Care (Prior to De-Accessing Port). - Intravenous    hydrochlorothiazide (HYDRODIURIL) tablet 25 mg 25 mg Daily 4/9/2019     Sig - Route: Take 1 tablet by mouth Daily. - Oral    ibuprofen (ADVIL,MOTRIN) tablet 400 mg 400 mg Every 8 Hours PRN 4/9/2019     Sig - Route: Take 1 tablet by mouth Every 8 (Eight) Hours As Needed for Mild Pain . - Oral    iohexol (OMNIPAQUE) 300 MG/ML oral solution 50 mL 50 mL Once As Needed 4/9/2019 4/9/2019    Sig - Route: Take 50 mL by mouth 1 (One) Time As Needed for Contrast. - Oral    iopamidol (ISOVUE-300) 61 % injection 100 mL 100 mL Once in Imaging 4/9/2019 4/9/2019    Sig - Route: Infuse 100 mL into a venous catheter Once. - Intravenous    potassium chloride (MICRO-K) CR capsule 20 mEq 20 mEq Daily 4/9/2019     Sig - Route: Take 2 capsules by mouth Daily. - Oral    potassium chloride (MICRO-K) CR capsule 40 mEq 40 mEq Once 4/8/2019 4/8/2019    Sig - Route: Take 4 capsules by mouth 1 (One) Time. - Oral    sertraline (ZOLOFT) tablet 200 mg 200 mg Daily 4/9/2019     Sig - Route: Take 2 tablets by mouth Daily. - Oral    sodium chloride 0.9 % flush 10 mL 10 mL Every 12 Hours Scheduled 4/8/2019     Sig - Route: Infuse 10 mL into a venous catheter Every 12 (Twelve) Hours. - Intravenous    sodium chloride 0.9 % flush 10 mL 10 mL As Needed 4/8/2019     Sig - Route: Infuse 10 mL into a venous catheter As Needed for Line Care (After Medication Administration & Prior to De-Accessing Port). - Intravenous    sodium chloride 0.9 % flush 20 mL 20 mL As Needed 4/8/2019     Sig - Route: Infuse 20 mL into a venous catheter As Needed for Line Care (After Blood Draws or Blood Product Administration). - Intravenous    ibuprofen  (ADVIL,MOTRIN) tablet 400 mg (Discontinued) 400 mg Every 8 Hours Scheduled 4/9/2019 4/9/2019    Sig - Route: Take 1 tablet by mouth Every 8 (Eight) Hours. - Oral    potassium chloride (K-DUR,KLOR-CON) CR tablet 20 mEq (Discontinued) 20 mEq Daily 4/9/2019 4/8/2019    Sig - Route: Take 2 tablets by mouth Daily. - Oral    Reason for Discontinue: Formulary change          Review of Systems   Constitutional: Negative.  Negative for activity change, appetite change, chills, diaphoresis, fatigue and fever.   HENT: Negative.  Negative for congestion, sneezing, sore throat and trouble swallowing.    Eyes: Negative.  Negative for visual disturbance.   Respiratory: Positive for shortness of breath (with exertion). Negative for chest tightness.    Cardiovascular: Positive for leg swelling. Negative for chest pain and palpitations.   Gastrointestinal: Negative.  Negative for abdominal distention, abdominal pain, nausea and vomiting.   Endocrine: Negative.    Genitourinary: Negative.    Musculoskeletal: Negative.    Skin:        Redness to B/L feet   Allergic/Immunologic: Negative.    Neurological: Negative.    Hematological: Negative.    Psychiatric/Behavioral: Negative.        Physical Exam   Constitutional: She is oriented to person, place, and time. She appears well-developed and well-nourished.   HENT:   Head: Normocephalic and atraumatic.   Eyes: EOM are normal. Pupils are equal, round, and reactive to light.   Neck: Normal range of motion. Neck supple. No JVD present.   Cardiovascular: Normal rate and regular rhythm.   Pulses:       Carotid pulses are 2+ on the right side, and 2+ on the left side.       Radial pulses are 2+ on the right side, and 2+ on the left side.        Femoral pulses are 2+ on the right side, and 2+ on the left side.       Popliteal pulses are 2+ on the right side, and 2+ on the left side.        Dorsalis pedis pulses are 0 on the right side, and 0 on the left side.        Posterior tibial pulses are  0 on the right side, and 0 on the left side.   Non-palp pedal pulses due to edema. Triphasic DP/PT signals on Doppler    L chest wall port in place.    Pulmonary/Chest: Effort normal. No respiratory distress.   Abdominal: Soft. She exhibits no distension and no mass. There is no tenderness.   Musculoskeletal: Normal range of motion. She exhibits edema (Edema of B/L LE to mid calf) and tenderness (Tenderness to palpation of feet). She exhibits no deformity.   Neurological: She is alert and oriented to person, place, and time. No sensory deficit. She exhibits normal muscle tone.   Skin: Skin is warm and dry. Capillary refill takes less than 2 seconds.   Blanching erythema to B/L feet   Psychiatric: She has a normal mood and affect. Her behavior is normal. Judgment and thought content normal.   Vitals reviewed.      Laboratory Data:  Results from last 7 days   Lab Units 04/09/19  0641 04/08/19  2057   WBC 10*3/mm3 9.21 6.65   HEMOGLOBIN g/dL 9.0* 8.8*   HEMATOCRIT % 27.3* 26.2*   PLATELETS 10*3/mm3 268 184       Results from last 7 days   Lab Units 04/09/19  0641 04/08/19  2057   SODIUM mmol/L 137 135   POTASSIUM mmol/L 3.5 2.7*   CHLORIDE mmol/L 98 93*   CO2 mmol/L 30.0 31.0   BUN mg/dL 14 18   CREATININE mg/dL 0.64 0.82   CALCIUM mg/dL 8.8 8.6   BILIRUBIN mg/dL  --  0.5   ALK PHOS U/L  --  121*   ALT (SGPT) U/L  --  45   AST (SGOT) U/L  --  63*   GLUCOSE mg/dL 97 118*             Diagnostic Data:  Imaging Results (last 24 hours)     Procedure Component Value Units Date/Time    CT Abdomen Pelvis With Contrast [[id number]] Collected:  04/09/19 1046     Updated:  04/09/19 1109    Narrative:          History:  68-year-old with lower extremity edema. History of lung cancer. Evaluate  for mass.     Reference:  CT pelvis 03/31/2018. CT abdomen pelvis February 2013     Technique  Contrast-enhanced CT abdomen/pelvis was performed with coronal and  sagittal reformatted images provided.     For this CT exam, one or more of the  following dose reduction techniques  was employed:  -automated exposure control  -mA and/or kVp adjustment for patient size  -iterative reconstruction      mGy-cm     Findings     Chest  Incidental scanning through the lower chest demonstrates paravertebral  soft tissue masses at the level of T10. On the left this measures 3.2 x  2.2 cm. Similar but smaller masses were present in this area on 2013  reference. There are small but prominent para-aortic lymph nodes as  well.     Abdomen  Tiny 7 mm subcapsular lesion in the posterior right hepatic lobe series  2 image 12. Retrospectively this was present in 2013 indicating this is  most likely a benign hemangioma. Liver otherwise unremarkable with no  evidence of hepatic metastatic disease. Pancreas is unremarkable.  Gallbladder is grossly normal by CT. The spleen is enlarged, hilum to  outer capsular dimension 7 cm. No splenic masses. Slight thickening of  the right adrenal is benign-appearing and stable. No discrete adrenal  mass. 2.2 cm cyst from the upper right renal pole. Couple additional  tiny cortical cysts bilaterally. No solid renal mass or obstructive  uropathy.     Severe atherosclerosis of the abdominal aorta and common iliac arteries  without aneurysm.     No bowel obstruction. Left lower quadrant descending colostomy. A  nonobstructed small bowel loop extends into small parastomal hernia. No  findings of appendicitis. No free air, free fluid, or lymphadenopathy.  Small fat-containing periumbilical hernia.     Pelvis  Nondistended urinary bladder. Previous hysterectomy. No pelvic mass,  free fluid, or lymphadenopathy.     Mild scoliosis. Severe degenerative changes throughout. No evidence of  bone metastatic disease.          Impression:       1. No evidence of metastatic disease in the abdomen or pelvis.  2. Paravertebral soft tissue masses at T10. Metastatic disease is  considered. Would also consider the possibility of  extramedullary  hematopoiesis.  This report was finalized on 04/09/2019 11:06 by Dr Gio Hays, .          Impression: 68F admitted with B/L LE edema to mid calf, with findings consistent with cellulitis of B/L feet    Cellulitis of both lower extremities      Plan: After thoroughly evaluating Marcy Garcia, I believe the best course of action is to remain conservative from a vascular standpoint. She has progressive edema to B/L lower extremity, which is somewhat improved by leg elevation as per patient. She may have some component of venous insufficiency. Will order B/L LE venous duplex with venous insufficiency for further eval. She should continue leg elevation, and I have recommended B/L LE compression with CE wrap or compression stocking to reduce edema. Continue IV abx as per medical team. Will follow. Thank you for allowing me to see Marcy Garcia in consult. Please feel free to reach out with any questions or concerns.    Justin Camacho MD  Vascular Surgery  [phone]

## 2019-04-11 PROBLEM — L03.90 CELLULITIS: Status: ACTIVE | Noted: 2019-04-11

## 2019-04-11 LAB
ANION GAP SERPL CALCULATED.3IONS-SCNC: 10 MMOL/L (ref 4–13)
BUN BLD-MCNC: 10 MG/DL (ref 5–21)
BUN/CREAT SERPL: 15.4 (ref 7–25)
CALCIUM SPEC-SCNC: 9 MG/DL (ref 8.4–10.4)
CHLORIDE SERPL-SCNC: 100 MMOL/L (ref 98–110)
CO2 SERPL-SCNC: 28 MMOL/L (ref 24–31)
CREAT BLD-MCNC: 0.65 MG/DL (ref 0.5–1.4)
GFR SERPL CREATININE-BSD FRML MDRD: 90 ML/MIN/1.73
GLUCOSE BLD-MCNC: 90 MG/DL (ref 70–100)
POTASSIUM BLD-SCNC: 3 MMOL/L (ref 3.5–5.3)
SODIUM BLD-SCNC: 138 MMOL/L (ref 135–145)

## 2019-04-11 PROCEDURE — 25010000002 ENOXAPARIN PER 10 MG: Performed by: FAMILY MEDICINE

## 2019-04-11 PROCEDURE — 25010000002 FUROSEMIDE PER 20 MG: Performed by: FAMILY MEDICINE

## 2019-04-11 PROCEDURE — 25010000002 CEFTRIAXONE PER 250 MG: Performed by: FAMILY MEDICINE

## 2019-04-11 PROCEDURE — 80048 BASIC METABOLIC PNL TOTAL CA: CPT | Performed by: FAMILY MEDICINE

## 2019-04-11 RX ORDER — FUROSEMIDE 10 MG/ML
20 INJECTION INTRAMUSCULAR; INTRAVENOUS EVERY 12 HOURS
Status: DISCONTINUED | OUTPATIENT
Start: 2019-04-11 | End: 2019-04-14

## 2019-04-11 RX ORDER — MONTELUKAST SODIUM 10 MG/1
10 TABLET ORAL NIGHTLY
Status: DISCONTINUED | OUTPATIENT
Start: 2019-04-11 | End: 2019-04-16 | Stop reason: HOSPADM

## 2019-04-11 RX ORDER — POTASSIUM CHLORIDE 750 MG/1
40 CAPSULE, EXTENDED RELEASE ORAL ONCE
Status: COMPLETED | OUTPATIENT
Start: 2019-04-11 | End: 2019-04-11

## 2019-04-11 RX ORDER — POTASSIUM CHLORIDE 750 MG/1
20 CAPSULE, EXTENDED RELEASE ORAL 2 TIMES DAILY WITH MEALS
Status: DISCONTINUED | OUTPATIENT
Start: 2019-04-11 | End: 2019-04-16 | Stop reason: HOSPADM

## 2019-04-11 RX ORDER — CETIRIZINE HYDROCHLORIDE 10 MG/1
10 TABLET ORAL DAILY
Status: DISCONTINUED | OUTPATIENT
Start: 2019-04-11 | End: 2019-04-16 | Stop reason: HOSPADM

## 2019-04-11 RX ADMIN — SODIUM CHLORIDE, PRESERVATIVE FREE 10 ML: 5 INJECTION INTRAVENOUS at 21:07

## 2019-04-11 RX ADMIN — AZELASTINE HYDROCHLORIDE 2 SPRAY: 137 SPRAY, METERED NASAL at 21:07

## 2019-04-11 RX ADMIN — ACETAMINOPHEN 650 MG: 325 TABLET, FILM COATED ORAL at 21:07

## 2019-04-11 RX ADMIN — CEFTRIAXONE SODIUM 1 G: 1 INJECTION, POWDER, FOR SOLUTION INTRAMUSCULAR; INTRAVENOUS at 23:24

## 2019-04-11 RX ADMIN — SERTRALINE 200 MG: 100 TABLET, FILM COATED ORAL at 08:23

## 2019-04-11 RX ADMIN — ATORVASTATIN CALCIUM 20 MG: 10 TABLET, FILM COATED ORAL at 08:23

## 2019-04-11 RX ADMIN — AZELASTINE HYDROCHLORIDE 2 SPRAY: 137 SPRAY, METERED NASAL at 08:24

## 2019-04-11 RX ADMIN — ACETAMINOPHEN 650 MG: 325 TABLET, FILM COATED ORAL at 14:10

## 2019-04-11 RX ADMIN — ENOXAPARIN SODIUM 40 MG: 100 INJECTION SUBCUTANEOUS at 21:07

## 2019-04-11 RX ADMIN — POTASSIUM CHLORIDE 20 MEQ: 750 CAPSULE, EXTENDED RELEASE ORAL at 17:23

## 2019-04-11 RX ADMIN — POTASSIUM CHLORIDE 40 MEQ: 750 CAPSULE, EXTENDED RELEASE ORAL at 15:59

## 2019-04-11 RX ADMIN — SODIUM CHLORIDE, PRESERVATIVE FREE 10 ML: 5 INJECTION INTRAVENOUS at 08:24

## 2019-04-11 RX ADMIN — MONTELUKAST SODIUM 10 MG: 10 TABLET, FILM COATED ORAL at 21:07

## 2019-04-11 RX ADMIN — ALPRAZOLAM 0.25 MG: 0.25 TABLET ORAL at 21:07

## 2019-04-11 RX ADMIN — HYDROCHLOROTHIAZIDE 25 MG: 25 TABLET ORAL at 08:23

## 2019-04-11 RX ADMIN — POTASSIUM CHLORIDE 20 MEQ: 750 CAPSULE, EXTENDED RELEASE ORAL at 08:23

## 2019-04-11 RX ADMIN — CETIRIZINE HYDROCHLORIDE 10 MG: 10 TABLET, FILM COATED ORAL at 21:07

## 2019-04-11 RX ADMIN — FUROSEMIDE 20 MG: 10 INJECTION, SOLUTION INTRAMUSCULAR; INTRAVENOUS at 15:59

## 2019-04-11 RX ADMIN — DIGOXIN 125 MCG: 125 TABLET ORAL at 08:23

## 2019-04-11 NOTE — PLAN OF CARE
Problem: Patient Care Overview  Goal: Plan of Care Review  Outcome: Ongoing (interventions implemented as appropriate)   04/11/19 1526   Coping/Psychosocial   Plan of Care Reviewed With patient   Plan of Care Review   Progress no change   OTHER   Outcome Summary VSS. Patient c/o leg pain today. Medicated with PRN medication with relief. Sats WNL on room air. IV lasix order and PO potassium replacement ordered. Cont to monitor        Problem: Fall Risk (Adult)  Goal: Absence of Fall  Outcome: Ongoing (interventions implemented as appropriate)      Problem: Skin and Soft Tissue Infection (Adult)  Goal: Signs and Symptoms of Listed Potential Problems Will be Absent, Minimized or Managed (Skin and Soft Tissue Infection)  Outcome: Ongoing (interventions implemented as appropriate)

## 2019-04-11 NOTE — PROGRESS NOTES
"Progress Note  Marcy Garcia  4/11/2019 2:00 PM  Subjective:   Admit Date:   4/8/2019      CC/ADMIT DX:       Interval History:   Reviewed overnight events and nursing notes. She no CP or SOA. She has LE edema, but has not had her legs elevated.    I have reviewed all labs/diagnostics from the last 24hrs.       ROS:   I have done a 10 point ROS and all are negative, except what is mentioned in the HPI.    Diet Regular    Medications:        atorvastatin 20 mg Oral Daily   azelastine 2 spray Each Nare BID   ceftriaxone 1 g Intravenous Q24H   digoxin 125 mcg Oral Daily   enoxaparin 40 mg Subcutaneous Nightly   furosemide 20 mg Intravenous Q12H   potassium chloride 20 mEq Oral BID With Meals   potassium chloride 40 mEq Oral Once   sertraline 200 mg Oral Daily   sodium chloride 10 mL Intravenous Q12H           Objective:   Vitals: /70 (BP Location: Right arm, Patient Position: Sitting)   Pulse 67   Temp 97.9 °F (36.6 °C) (Oral)   Resp 18   Ht 162.6 cm (64\")   Wt 57.3 kg (126 lb 4.8 oz)   SpO2 96%   BMI 21.68 kg/m²      Intake/Output Summary (Last 24 hours) at 4/11/2019 1400  Last data filed at 4/11/2019 0900  Gross per 24 hour   Intake 940 ml   Output --   Net 940 ml     General appearance: alert and cooperative with exam  Lungs: clear to auscultation bilaterally  Heart: RRR  Abdomen: soft, non-tender; bowel sounds normal; no masses,  no organomegaly  Extremities: no C/C 1 + LE edema, with redness  Neurologic:  No obvious focal neurologic deficits.    Assessment and Plan:     Cellulitis of both lower extremities    Cellulitis    LE edema, venous insuff    Metastatic Lung CA    COPD    Plan:  1.  Continue present medication(s)   2.  Encouraged wearing LE compression stockings, d/w pt again the importance of elevating legs  3.  Continue antibiotics  4.  Recheck labs in am      Discharge planning:   her home     Reviewed treatment plans with the patient and/or family.             Electronically signed by " Mary Henning MD on 4/11/2019 at 2:00 PM

## 2019-04-11 NOTE — PLAN OF CARE
Problem: Patient Care Overview  Goal: Plan of Care Review  Outcome: Ongoing (interventions implemented as appropriate)   04/11/19 0456   Coping/Psychosocial   Plan of Care Reviewed With patient   Plan of Care Review   Progress no change   OTHER   Outcome Summary VSS. C/O of BLE pain, PRN Tylenol administered with some relief. Pt. fell this shift, states she became dizzy and unsteady. Bed alarm activated. Neuro checks q4hr. NSR/ST  on tele. Will continue to monitor.       Problem: Fall Risk (Adult)  Goal: Absence of Fall  Outcome: Ongoing (interventions implemented as appropriate)   04/11/19 0456   Fall Risk (Adult)   Absence of Fall unable to achieve outcome       Problem: Skin and Soft Tissue Infection (Adult)  Goal: Signs and Symptoms of Listed Potential Problems Will be Absent, Minimized or Managed (Skin and Soft Tissue Infection)  Outcome: Ongoing (interventions implemented as appropriate)   04/11/19 0456   Goal/Outcome Evaluation   Problems Assessed (Skin and Soft Tissue Infection) all   Problems Present (Skin/Soft Tissue Inf) pain;situational response

## 2019-04-11 NOTE — PROGRESS NOTES
LOS: 1 day   Patient Care Team:  Mary Henning MD as PCP - General (Family Medicine)  Ranjith Goodrich MD as Consulting Physician (Otolaryngology)  Mary Henning MD as Referring Physician (Family Medicine)  Mark Ogden MD as Cardiologist (Cardiology)  Becca Tamayo MD as Consulting Physician (Gastroenterology)  Cosme Zurita MD as Consulting Physician (Hematology and Oncology)  Gabrielle Reynoso MD as Consulting Physician (Hematology and Oncology)    Chief Complaint: Bilateral lower extremity swelling, redness    Subjective     Patient seen and examined at bedside.  No acute events overnight.  She is sitting up at the bedside currently.  Overall her lower extremity edema is improved as well as her cellulitis.  Venous duplex with venous insufficiency study was done yesterday which I have reviewed.  It showed no evidence of DVT as well as no evidence of reflux in the bilateral greater saphenous veins.  She was not wearing any compression to the lower extremities this morning.  Otherwise she is without complaint.    Objective     Vital Signs  Temp:  [97.6 °F (36.4 °C)-98.4 °F (36.9 °C)] 97.6 °F (36.4 °C)  Heart Rate:  [81-96] 90  Resp:  [18] 18  BP: (112-140)/(54-67) 114/64    Physical Exam   Constitutional: She is oriented to person, place, and time. She appears well-developed and well-nourished.   HENT:   Head: Normocephalic and atraumatic.   Eyes: EOM are normal. Pupils are equal, round, and reactive to light.   Neck: Normal range of motion. Neck supple. No JVD present.   Cardiovascular: Normal rate and regular rhythm.   Pulses:       Carotid pulses are 2+ on the right side, and 2+ on the left side.       Radial pulses are 2+ on the right side, and 2+ on the left side.        Femoral pulses are 2+ on the right side, and 2+ on the left side.       Popliteal pulses are 2+ on the right side, and 2+ on the left side.        Dorsalis pedis pulses are 0 on the right side, and 0 on the  left side.        Posterior tibial pulses are 0 on the right side, and 0 on the left side.   Due to edema her pedal pulses are not palpable however she has multiphasic Doppler signals present bilaterally at the DP and PT.   Pulmonary/Chest: Effort normal. No respiratory distress.   Abdominal: Soft. She exhibits no distension and no mass. There is no tenderness.   Musculoskeletal: Normal range of motion. She exhibits edema (Edema of the bilateral feet and lower extremities to the level of the mid calf.) and tenderness (Mild tenderness to palpation of the feet.). She exhibits no deformity.   Neurological: She is alert and oriented to person, place, and time.   Skin: Skin is warm and dry. Capillary refill takes less than 2 seconds. There is erythema (Erythema to the bilateral feet to the level of the ankle which is somewhat improved today.).   Psychiatric: She has a normal mood and affect. Her behavior is normal. Judgment and thought content normal.   Vitals reviewed.      Laboratory Data:   Results from last 7 days   Lab Units 04/09/19  0641 04/08/19 2057   WBC 10*3/mm3 9.21 6.65   HEMOGLOBIN g/dL 9.0* 8.8*   HEMATOCRIT % 27.3* 26.2*   PLATELETS 10*3/mm3 268 184       Results from last 7 days   Lab Units 04/11/19  0549 04/09/19  0641 04/08/19 2057   SODIUM mmol/L 138 137 135   POTASSIUM mmol/L 3.0* 3.5 2.7*   CHLORIDE mmol/L 100 98 93*   CO2 mmol/L 28.0 30.0 31.0   BUN mg/dL 10 14 18   CREATININE mg/dL 0.65 0.64 0.82   CALCIUM mg/dL 9.0 8.8 8.6   BILIRUBIN mg/dL  --   --  0.5   ALK PHOS U/L  --   --  121*   ALT (SGPT) U/L  --   --  45   AST (SGOT) U/L  --   --  63*   GLUCOSE mg/dL 90 97 118*                Medication Review: Reviewed, no changes    Assessment/Plan       Cellulitis of both lower extremities      This is a 68-year-old female admitted with bilateral lower extremity edema of the feet and distal calves as well as cellulitis noted to both feet.  There is concern for possible venous insufficiency as a  cause.  However venous duplex was performed and showed no evidence of DVT as well as no evidence of reflux bilaterally.  At this point with the pattern of edema more so to the feet and the distal calves I suspect her edema is likely secondary to her cellulitis.  At this point no further vascular intervention is necessary.  I recommend continued use of compression stockings to decrease lower extremity edema as well as leg elevation whenever possible.  Otherwise continue IV antibiotics for the cellulitis as per the medical team.  Please feel free to reach out with any questions or concerns.    Plan for disposition: As per primary team    Justin Camacho MD  Vascular Surgery  [phone]  04/11/19  9:27 AM

## 2019-04-12 LAB
ANION GAP SERPL CALCULATED.3IONS-SCNC: 10 MMOL/L (ref 4–13)
BUN BLD-MCNC: 13 MG/DL (ref 5–21)
BUN/CREAT SERPL: 16.5 (ref 7–25)
CALCIUM SPEC-SCNC: 9.3 MG/DL (ref 8.4–10.4)
CHLORIDE SERPL-SCNC: 100 MMOL/L (ref 98–110)
CO2 SERPL-SCNC: 29 MMOL/L (ref 24–31)
CREAT BLD-MCNC: 0.79 MG/DL (ref 0.5–1.4)
GFR SERPL CREATININE-BSD FRML MDRD: 72 ML/MIN/1.73
GLUCOSE BLD-MCNC: 95 MG/DL (ref 70–100)
POTASSIUM BLD-SCNC: 3.8 MMOL/L (ref 3.5–5.3)
SODIUM BLD-SCNC: 139 MMOL/L (ref 135–145)

## 2019-04-12 PROCEDURE — 25010000002 CEFTRIAXONE PER 250 MG: Performed by: FAMILY MEDICINE

## 2019-04-12 PROCEDURE — 25010000002 FUROSEMIDE PER 20 MG: Performed by: FAMILY MEDICINE

## 2019-04-12 PROCEDURE — 25010000002 ENOXAPARIN PER 10 MG: Performed by: FAMILY MEDICINE

## 2019-04-12 PROCEDURE — 80048 BASIC METABOLIC PNL TOTAL CA: CPT | Performed by: FAMILY MEDICINE

## 2019-04-12 RX ADMIN — POTASSIUM CHLORIDE 20 MEQ: 750 CAPSULE, EXTENDED RELEASE ORAL at 08:37

## 2019-04-12 RX ADMIN — ACETAMINOPHEN 650 MG: 325 TABLET, FILM COATED ORAL at 15:32

## 2019-04-12 RX ADMIN — ALPRAZOLAM 0.25 MG: 0.25 TABLET ORAL at 21:12

## 2019-04-12 RX ADMIN — AZELASTINE HYDROCHLORIDE 2 SPRAY: 137 SPRAY, METERED NASAL at 08:36

## 2019-04-12 RX ADMIN — CEFTRIAXONE SODIUM 1 G: 1 INJECTION, POWDER, FOR SOLUTION INTRAMUSCULAR; INTRAVENOUS at 23:17

## 2019-04-12 RX ADMIN — ACETAMINOPHEN 650 MG: 325 TABLET, FILM COATED ORAL at 21:12

## 2019-04-12 RX ADMIN — FUROSEMIDE 20 MG: 10 INJECTION, SOLUTION INTRAMUSCULAR; INTRAVENOUS at 05:03

## 2019-04-12 RX ADMIN — SODIUM CHLORIDE, PRESERVATIVE FREE 10 ML: 5 INJECTION INTRAVENOUS at 21:11

## 2019-04-12 RX ADMIN — POTASSIUM CHLORIDE 20 MEQ: 750 CAPSULE, EXTENDED RELEASE ORAL at 17:33

## 2019-04-12 RX ADMIN — ACETAMINOPHEN 650 MG: 325 TABLET, FILM COATED ORAL at 08:38

## 2019-04-12 RX ADMIN — ATORVASTATIN CALCIUM 20 MG: 10 TABLET, FILM COATED ORAL at 08:37

## 2019-04-12 RX ADMIN — AZELASTINE HYDROCHLORIDE 2 SPRAY: 137 SPRAY, METERED NASAL at 21:11

## 2019-04-12 RX ADMIN — POTASSIUM CHLORIDE 20 MEQ: 750 CAPSULE, EXTENDED RELEASE ORAL at 15:32

## 2019-04-12 RX ADMIN — SERTRALINE 200 MG: 100 TABLET, FILM COATED ORAL at 08:37

## 2019-04-12 RX ADMIN — SODIUM CHLORIDE, PRESERVATIVE FREE 10 ML: 5 INJECTION INTRAVENOUS at 08:37

## 2019-04-12 RX ADMIN — FUROSEMIDE 20 MG: 10 INJECTION, SOLUTION INTRAMUSCULAR; INTRAVENOUS at 15:32

## 2019-04-12 RX ADMIN — ENOXAPARIN SODIUM 40 MG: 100 INJECTION SUBCUTANEOUS at 21:12

## 2019-04-12 RX ADMIN — DIGOXIN 125 MCG: 125 TABLET ORAL at 08:37

## 2019-04-12 RX ADMIN — MONTELUKAST SODIUM 10 MG: 10 TABLET, FILM COATED ORAL at 21:12

## 2019-04-12 NOTE — PLAN OF CARE
Problem: Patient Care Overview  Goal: Plan of Care Review  Outcome: Ongoing (interventions implemented as appropriate)   04/12/19 0424   Coping/Psychosocial   Plan of Care Reviewed With patient   Plan of Care Review   Progress no change   OTHER   Outcome Summary VSS. C/o of leg pain, Tylenol administered with relief. Legs elevated on pillows. Safety maintained. NSR 84-89 with PAC on tele. Will continue to monitor.        Problem: Fall Risk (Adult)  Goal: Absence of Fall  Outcome: Ongoing (interventions implemented as appropriate)   04/12/19 0424   Fall Risk (Adult)   Absence of Fall achieves outcome

## 2019-04-12 NOTE — PROGRESS NOTES
"Progress Note  Mracy Garcia  4/12/2019 2:00 PM  Subjective:   Admit Date:   4/8/2019      CC/ADMIT DX:       Interval History:   Reviewed overnight events and nursing notes. She has continued LE edema, and redness. No CP or SOA.   I have reviewed all labs/diagnostics from the last 24hrs.       ROS:   I have done a 10 point ROS and all are negative, except what is mentioned in the HPI.    Diet Regular    Medications:        atorvastatin 20 mg Oral Daily   azelastine 2 spray Each Nare BID   ceftriaxone 1 g Intravenous Q24H   cetirizine 10 mg Oral Daily   digoxin 125 mcg Oral Daily   enoxaparin 40 mg Subcutaneous Nightly   furosemide 20 mg Intravenous Q12H   montelukast 10 mg Oral Nightly   potassium chloride 20 mEq Oral BID With Meals   sertraline 200 mg Oral Daily   sodium chloride 10 mL Intravenous Q12H           Objective:   Vitals: /71 (BP Location: Right arm, Patient Position: Lying)   Pulse 85   Temp 98.7 °F (37.1 °C) (Oral)   Resp 18   Ht 162.6 cm (64\")   Wt 57.7 kg (127 lb 4 oz)   SpO2 95%   BMI 21.84 kg/m²      Intake/Output Summary (Last 24 hours) at 4/12/2019 1400  Last data filed at 4/12/2019 0900  Gross per 24 hour   Intake 1060 ml   Output 3050 ml   Net -1990 ml     General appearance: alert and cooperative with exam  Lungs: clear to auscultation bilaterally  Heart: RRR  Abdomen: soft, non-tender; bowel sounds normal; no masses,  no organomegaly  Extremities: no C/C 1 + LE edema, with redness  Neurologic:  No obvious focal neurologic deficits.    Assessment and Plan:     Cellulitis of both lower extremities    Cellulitis    LE edema, venous insuff    Metastatic Lung CA    COPD    Plan:  1.  Continue present medication(s)   2.  Encouraged to wear LE compression stockings, d/w pt again the importance of elevating legs  3.  Continue antibiotics. Will ask ID to assess. I think the redness of her LE is related to edema, and may not need additional antibiotics. Again, I will consult ID. She has " had no fever and has no LE wounds.   4.  Recheck labs in am      Discharge planning:   her home     Reviewed treatment plans with the patient and/or family.             Electronically signed by Mary Henning MD on 4/12/2019 at 2:00 PM

## 2019-04-13 PROBLEM — L03.90 CELLULITIS: Status: RESOLVED | Noted: 2019-04-11 | Resolved: 2019-04-13

## 2019-04-13 PROBLEM — L53.9 ERYTHEMA OF LOWER EXTREMITY: Status: ACTIVE | Noted: 2019-04-13

## 2019-04-13 LAB
ANION GAP SERPL CALCULATED.3IONS-SCNC: 10 MMOL/L (ref 4–13)
BACTERIA SPEC AEROBE CULT: NORMAL
BACTERIA SPEC AEROBE CULT: NORMAL
BUN BLD-MCNC: 16 MG/DL (ref 5–21)
BUN/CREAT SERPL: 24.6 (ref 7–25)
CALCIUM SPEC-SCNC: 9 MG/DL (ref 8.4–10.4)
CHLORIDE SERPL-SCNC: 99 MMOL/L (ref 98–110)
CO2 SERPL-SCNC: 28 MMOL/L (ref 24–31)
CREAT BLD-MCNC: 0.65 MG/DL (ref 0.5–1.4)
GFR SERPL CREATININE-BSD FRML MDRD: 90 ML/MIN/1.73
GLUCOSE BLD-MCNC: 96 MG/DL (ref 70–100)
POTASSIUM BLD-SCNC: 3 MMOL/L (ref 3.5–5.3)
SODIUM BLD-SCNC: 137 MMOL/L (ref 135–145)

## 2019-04-13 PROCEDURE — 63710000001 DIPHENHYDRAMINE PER 50 MG: Performed by: FAMILY MEDICINE

## 2019-04-13 PROCEDURE — 80048 BASIC METABOLIC PNL TOTAL CA: CPT | Performed by: FAMILY MEDICINE

## 2019-04-13 PROCEDURE — 25010000002 FUROSEMIDE PER 20 MG: Performed by: FAMILY MEDICINE

## 2019-04-13 PROCEDURE — 25010000002 CEFTRIAXONE PER 250 MG: Performed by: FAMILY MEDICINE

## 2019-04-13 PROCEDURE — 25010000002 ENOXAPARIN PER 10 MG: Performed by: FAMILY MEDICINE

## 2019-04-13 RX ORDER — POTASSIUM CHLORIDE 750 MG/1
40 CAPSULE, EXTENDED RELEASE ORAL ONCE
Status: COMPLETED | OUTPATIENT
Start: 2019-04-13 | End: 2019-04-13

## 2019-04-13 RX ORDER — DIPHENHYDRAMINE HCL 25 MG
25 CAPSULE ORAL ONCE
Status: COMPLETED | OUTPATIENT
Start: 2019-04-13 | End: 2019-04-13

## 2019-04-13 RX ORDER — DIPHENHYDRAMINE HCL 25 MG
25 CAPSULE ORAL EVERY 6 HOURS PRN
Status: DISCONTINUED | OUTPATIENT
Start: 2019-04-13 | End: 2019-04-13

## 2019-04-13 RX ADMIN — SERTRALINE 200 MG: 100 TABLET, FILM COATED ORAL at 08:14

## 2019-04-13 RX ADMIN — ACETAMINOPHEN 650 MG: 325 TABLET, FILM COATED ORAL at 08:15

## 2019-04-13 RX ADMIN — ALPRAZOLAM 0.25 MG: 0.25 TABLET ORAL at 21:06

## 2019-04-13 RX ADMIN — POTASSIUM CHLORIDE 20 MEQ: 750 CAPSULE, EXTENDED RELEASE ORAL at 17:19

## 2019-04-13 RX ADMIN — AZELASTINE HYDROCHLORIDE 2 SPRAY: 137 SPRAY, METERED NASAL at 08:13

## 2019-04-13 RX ADMIN — CEFTRIAXONE SODIUM 1 G: 1 INJECTION, POWDER, FOR SOLUTION INTRAMUSCULAR; INTRAVENOUS at 22:32

## 2019-04-13 RX ADMIN — MONTELUKAST SODIUM 10 MG: 10 TABLET, FILM COATED ORAL at 21:03

## 2019-04-13 RX ADMIN — POTASSIUM CHLORIDE 20 MEQ: 750 CAPSULE, EXTENDED RELEASE ORAL at 08:14

## 2019-04-13 RX ADMIN — ENOXAPARIN SODIUM 40 MG: 100 INJECTION SUBCUTANEOUS at 21:04

## 2019-04-13 RX ADMIN — FUROSEMIDE 20 MG: 10 INJECTION, SOLUTION INTRAMUSCULAR; INTRAVENOUS at 17:19

## 2019-04-13 RX ADMIN — ACETAMINOPHEN 650 MG: 325 TABLET, FILM COATED ORAL at 21:06

## 2019-04-13 RX ADMIN — AZELASTINE HYDROCHLORIDE 2 SPRAY: 137 SPRAY, METERED NASAL at 21:03

## 2019-04-13 RX ADMIN — DIPHENHYDRAMINE HYDROCHLORIDE 25 MG: 25 CAPSULE ORAL at 11:56

## 2019-04-13 RX ADMIN — DIGOXIN 125 MCG: 125 TABLET ORAL at 08:14

## 2019-04-13 RX ADMIN — SODIUM CHLORIDE, PRESERVATIVE FREE 10 ML: 5 INJECTION INTRAVENOUS at 21:04

## 2019-04-13 RX ADMIN — POTASSIUM CHLORIDE 40 MEQ: 750 CAPSULE, EXTENDED RELEASE ORAL at 11:58

## 2019-04-13 RX ADMIN — FUROSEMIDE 20 MG: 10 INJECTION, SOLUTION INTRAMUSCULAR; INTRAVENOUS at 05:05

## 2019-04-13 RX ADMIN — CETIRIZINE HYDROCHLORIDE 10 MG: 10 TABLET, FILM COATED ORAL at 08:15

## 2019-04-13 RX ADMIN — ALPRAZOLAM 0.25 MG: 0.25 TABLET ORAL at 12:02

## 2019-04-13 RX ADMIN — ATORVASTATIN CALCIUM 20 MG: 10 TABLET, FILM COATED ORAL at 08:15

## 2019-04-13 NOTE — PROGRESS NOTES
Chief Complaint: Redness of legs      Interval History:     She states that legs are improved.  Less swelling and redness than previously.  Still a little discomfort.  She has been up and walking a small amount.    Review of Systems:   General ROS: negative for - chills or fever  Respiratory ROS: no cough, shortness of breath, or wheezing  Cardiovascular ROS: no chest pain or dyspnea on exertion  Gastrointestinal ROS: negative for - abdominal pain, diarrhea or nausea/vomiting       Vital Signs  Temp:  [97.4 °F (36.3 °C)-98.5 °F (36.9 °C)] 97.6 °F (36.4 °C)  Heart Rate:  [85-94] 92  Resp:  [18-20] 20  BP: (100-122)/(61-83) 100/61    Intake/Output Summary (Last 24 hours) at 4/13/2019 1003  Last data filed at 4/13/2019 0534  Gross per 24 hour   Intake 580 ml   Output 800 ml   Net -220 ml       Physical Exam:     General Appearance:    Alert, cooperative, in no acute distress   Head:    N/A   Throat:   N/A   Neck:   N/A   Lungs:     Clear to auscultation,respirations regular, even and                  unlabored    Heart:    Regular rhythm and normal rate, normal S1 and S2, no            murmur, no gallop, no rub   Abdomen:     Normal bowel sounds, no masses, no organomegaly, soft        non-tender, non-distended, no guarding, no rebound                tenderness   Extremities:  Still significant erythema of both lower extremities below the knee down to the toes, mildly warm, trace edema   Pulses:   N/A   Skin:   N/A   Lymph nodes:   N/A   Neurologic:   N/A          Lab Review:       Lab Results (last 24 hours)     Procedure Component Value Units Date/Time    Basic Metabolic Panel [[id number]]  (Abnormal) Collected:  04/13/19 0505    Specimen:  Blood Updated:  04/13/19 0534     Glucose 96 mg/dL      BUN 16 mg/dL      Creatinine 0.65 mg/dL      Sodium 137 mmol/L      Potassium 3.0 mmol/L      Chloride 99 mmol/L      CO2 28.0 mmol/L      Calcium 9.0 mg/dL      eGFR Non African Amer 90 mL/min/1.73      BUN/Creatinine  Ratio 24.6     Anion Gap 10.0 mmol/L     Narrative:       GFR Normal >60  Chronic Kidney Disease <60  Kidney Failure <15    Blood Culture - Blood, Blood, Central Line [[id number]] Collected:  19 231    Specimen:  Blood, Central Line Updated:  19     Blood Culture No growth at 4 days    Blood Culture - Blood, Arm, Right [[id number]] Collected:  19 230    Specimen:  Blood from Arm, Right Updated:  19     Blood Culture No growth at 4 days        Cultures:    Blood Culture   Date Value Ref Range Status   2019 No growth at 4 days  Preliminary   2019 No growth at 4 days  Preliminary       Radiology Review:  Imaging Results (last 24 hours)     ** No results found for the last 24 hours. 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  Possibly due to cellulitis versus possibly an effect of her chemotherapeutic drugs.  No clear evidence of congestive heart failure.  No evidence of venous insufficiency.    PLAN:    1.  Continue elevation of legs and diuresis  2.  Replete potassium  3.  Antibiotics per ID.  Rocephin is set to  today  4.  We will need to discuss further with oncology whether this might be a side effect of her chemotherapeutic agents      Harley Del Toro MD  19  10:03 AM        Please note that portions of this note may have been completed with a voice recognition program. Efforts were made to edit the dictations, but occasionally words are mistranscribed.

## 2019-04-13 NOTE — PLAN OF CARE
Problem: Patient Care Overview  Goal: Plan of Care Review  Outcome: Ongoing (interventions implemented as appropriate)   04/13/19 1729   Coping/Psychosocial   Plan of Care Reviewed With patient   OTHER   Outcome Summary Up in chair with lower ext elevated most of day, less edema and redness noted, lasix continues, tylenlol for pain as needed, tele reads s/*st 90/102.       04/13/19 1729   Coping/Psychosocial   Plan of Care Reviewed With patient   OTHER   Outcome Summary Up in chair with lower ext elevated most of day, less edema and redness noted, lasix continues, tylenlol for pain as needed, tele reads s/*st 90/102.        Problem: Fall Risk (Adult)  Goal: Absence of Fall  Outcome: Ongoing (interventions implemented as appropriate)      Problem: Skin and Soft Tissue Infection (Adult)  Goal: Signs and Symptoms of Listed Potential Problems Will be Absent, Minimized or Managed (Skin and Soft Tissue Infection)  Outcome: Ongoing (interventions implemented as appropriate)

## 2019-04-13 NOTE — PLAN OF CARE
Problem: Patient Care Overview  Goal: Plan of Care Review  Outcome: Ongoing (interventions implemented as appropriate)   04/13/19 0538   Coping/Psychosocial   Plan of Care Reviewed With patient   Plan of Care Review   Progress improving   OTHER   Outcome Summary VSS. C/o of leg pain, relieved with Tylenol. ALYSE placed on L leg and both legs elevated on pillows. Swelling has decreased in BLE. NSR/ST  on tele.        Problem: Fall Risk (Adult)  Goal: Absence of Fall  Outcome: Ongoing (interventions implemented as appropriate)   04/13/19 0538   Fall Risk (Adult)   Absence of Fall achieves outcome       Problem: Skin and Soft Tissue Infection (Adult)  Goal: Signs and Symptoms of Listed Potential Problems Will be Absent, Minimized or Managed (Skin and Soft Tissue Infection)  Outcome: Ongoing (interventions implemented as appropriate)   04/13/19 0538   Goal/Outcome Evaluation   Problems Assessed (Skin and Soft Tissue Infection) all   Problems Present (Skin/Soft Tissue Inf) pain

## 2019-04-13 NOTE — CONSULTS
INFECTIOUS DISEASES CONSULT NOTE    Patient:  Marcy Garcia 68 y.o. female  ROOM # 431/1  YOB: 1950  MRN: [id number]  [facility]:  [id number]  Admit date: 4/8/2019   Admitting Physician: Mary Henning MD  Primary Care Physician: Mary Henning MD  REFERRING PROVIDER: Mary Henning MD      REASON FOR CONSULTATION : Lower extremity erythema and edema      HISTORY OF PRESENT ILLNESS: Patient is a pleasant 68-year-old female who was admitted on April 8 with lower extremity edema and erythema.  She has been on empiric ceftriaxone and has been evaluated by vascular surgery.    Per report her feet have not been elevated very much and off and on the floor.  Patient reports that her nurse elevated the foot of the her bed yesterday and she feels like her lower extremity edema and erythema is quite improved today.    She has not had any fever or chills.  Highest white count 9.21.    Patient had not noted any trauma.  She does have some dry skin and complains of a flaky area on her right pretibial region is been present for 20 years or so.    He receives chemotherapy every 3 weeks with Keytruda and Alimta    Past Medical History:   Diagnosis Date   • Adenocarcinoma, lung, left (CMS/HCC)    • Allergic rhinitis 4/12/2018   • Anemia    • Anxiety    • Arthritis    • Ataxia    • Brain tumor (CMS/HCC)    • Cancer (CMS/HCC)     lung cancer - pt had chemo and was told she was cancer free, but recently a brain tumor was found   • Colostomy in place (CMS/HCC)    • Confusion    • COPD (chronic obstructive pulmonary disease) (CMS/HCC)    • Depression    • Dizziness    • GERD (gastroesophageal reflux disease)    • Headache    • Memory loss    • Panic attacks    • Seasonal allergies    • Sensorineural hearing loss (SNHL) of both ears 4/12/2018   • Urgency of urination    • Weakness      Past Surgical History:   Procedure Laterality Date   • COLON SURGERY  2013    BLOCKED BOWEL - COLOSTOMY   • CRANIOTOMY FOR  TUMOR Right 1/15/2018    Procedure: CRANIOTOMY FOR TUMOR STERIOTACTIC WITH BRAIN LAB right frontal craniotomy for brain tumor with neuromonitoring and brain lab;  Surgeon: Constantine Schmidt MD;  Location: [facility] OR;  Service:    • ECTOPIC PREGNANCY SURGERY     • HERNIA REPAIR     • HYSTERECTOMY     • LUNG CANCER SURGERY Left     Dr Marlow - Lower Lobectomy   • ORIF FINGER FRACTURE      left pinky   • SHOULDER SURGERY Left     BROKEN COLLAR BONE & SHOULDER SURGERY & ELBOW   • TONSILLECTOMY AND ADENOIDECTOMY       Family History   Problem Relation Age of Onset   • Stroke Mother    • Osteoporosis Mother    • Cancer Father    • Cancer Maternal Grandmother    • Heart disease Maternal Grandfather      Social History     Socioeconomic History   • Marital status:      Spouse name: Not on file   • Number of children: Not on file   • Years of education: Not on file   • Highest education level: Not on file   Tobacco Use   • Smoking status: Former Smoker     Years: 51.00     Types: Cigarettes     Last attempt to quit: 10/2016     Years since quittin.5   • Smokeless tobacco: Never Used   • Tobacco comment: when pt smoked she smoked 2 packs a day on average   Substance and Sexual Activity   • Alcohol use: No   • Drug use: No   • Sexual activity: Defer     Partners: Male     Birth control/protection: None           Current Meds:     Current Facility-Administered Medications   Medication Dose Route Frequency Provider Last Rate Last Dose   • acetaminophen (TYLENOL) tablet 650 mg  650 mg Oral Q6H PRN Mary Henning MD   650 mg at 19   • ALPRAZolam (XANAX) tablet 0.25 mg  0.25 mg Oral BID PRN Mary Henning MD   0.25 mg at 19   • atorvastatin (LIPITOR) tablet 20 mg  20 mg Oral Daily Mary Henning MD   20 mg at 19 0837   • azelastine (ASTELIN) nasal spray 2 spray  2 spray Each Nare BID Mary Henning MD   2 spray at 19   • cefTRIAXone (ROCEPHIN)  1 g/100 mL 0.9% NS (MBP)  1 g Intravenous Q24H Mary Henning MD   Stopped at 04/12/19 2347   • cetirizine (zyrTEC) tablet 10 mg  10 mg Oral Daily Mary Henning MD   10 mg at 04/11/19 2107   • digoxin (LANOXIN) tablet 125 mcg  125 mcg Oral Daily Mary Henning MD   125 mcg at 04/12/19 0837   • enoxaparin (LOVENOX) syringe 40 mg  40 mg Subcutaneous Nightly Mary Henning MD   40 mg at 04/12/19 2112   • furosemide (LASIX) injection 20 mg  20 mg Intravenous Q12H Mary Henning MD   20 mg at 04/13/19 0505   • heparin flush (porcine) 100 UNIT/ML injection 500 Units  5 mL Intravenous PRN Mary Henning MD       • montelukast (SINGULAIR) tablet 10 mg  10 mg Oral Nightly Mary Henning MD   10 mg at 04/12/19 2112   • potassium chloride (MICRO-K) CR capsule 20 mEq  20 mEq Oral BID With Meals Mary Henning MD   20 mEq at 04/12/19 1733   • sertraline (ZOLOFT) tablet 200 mg  200 mg Oral Daily Mary Henning MD   200 mg at 04/12/19 0837   • sodium chloride 0.9 % flush 10 mL  10 mL Intravenous Q12H Mary Henning MD   10 mL at 04/12/19 2111   • sodium chloride 0.9 % flush 10 mL  10 mL Intravenous PRN Mary Henning MD       • sodium chloride 0.9 % flush 20 mL  20 mL Intravenous PRN Mary Henning MD              Allergies   Allergen Reactions   • Kiwi Extract Shortness Of Breath   • Pineapple Shortness Of Breath   • Dilaudid [Hydromorphone Hcl] Delirium       Review of Systems   Constitutional: Negative for fever.   HENT: Negative for congestion.    Eyes: Negative for photophobia.   Respiratory: Negative for wheezing.    Cardiovascular: Positive for leg swelling.   Gastrointestinal: Positive for diarrhea (After CT scan that has resolved).   Musculoskeletal:        Bilateral feet and lower leg pain worse when dependent   Skin: Positive for color change.   Neurological: Positive for weakness.   Hematological:        Anemia         Vital Signs:  BP  "110/83 (BP Location: Left arm, Patient Position: Lying)   Pulse 94   Temp 97.8 °F (36.6 °C) (Oral)   Resp 18   Ht 162.6 cm (64\")   Wt 56.2 kg (124 lb)   SpO2 97%   BMI 21.28 kg/m²   Temp (24hrs), Av °F (36.7 °C), Min:97.4 °F (36.3 °C), Max:98.5 °F (36.9 °C)      Physical Exam   General: The patient is well-appearing lying in bed in the foot of the bed is elevated.  She is in no acute distress  HEENT: Sclera anicteric and noninjected  Neck: Supple  Heart: S1-S2 rate was regular  Lungs: Diminished breath sounds throughout otherwise clear  Abdomen: Bowel sounds are positive, ostomy in the left lower quadrant present  Extremities: Patient does have edema of the left foot greater than the right foot.  There was a compression stocking on the left leg that was removed.  She does have evidence of some wrinkling suggesting recent improvement in edema the lower extremities in the feet bilaterally.  She does have some erythema that is somewhat dark in the bilateral lower extremities worse around the area of the feet.  There is some slight increased warmth.  She does have some dry skin and she does have a flaky chronic area in the right pretibial region.  There is no obvious break in the skin on.  Psych: She is pleasant cooperative  Neuro: She is alert and oriented, speech is clear, gives history without difficulty.      Line/IV site: CC ( port)subclavian left, condition patent and no redness    Results Review:    I reviewed the patient's new clinical results.    Lab Results:  CBC: Lab Results   Lab 19  0641   WBC 6.65 9.21   HEMOGLOBIN 8.8* 9.0*   HEMATOCRIT 26.2* 27.3*   PLATELETS 184 268   LYMPHOCYTE % 14.1*  --    MONOCYTES % 6.1  --        CMP: Lab Results   Lab 19  0549 19  0511 19  0505   SODIUM 135   < > 138 139 137   POTASSIUM 2.7*   < > 3.0* 3.8 3.0*   CHLORIDE 93*   < > 100 100 99   CO2 31.0   < > 28.0 29.0 28.0   BUN 18   < > 10 13 16   CREATININE " 0.82   < > 0.65 0.79 0.65   CALCIUM 8.6   < > 9.0 9.3 9.0   BILIRUBIN 0.5  --   --   --   --    ALK PHOS 121*  --   --   --   --    ALT (SGPT) 45  --   --   --   --    AST (SGOT) 63*  --   --   --   --    GLUCOSE 118*   < > 90 95 96    < > = values in this interval not displayed.       Lab Results (last 72 hours)     Procedure Component Value Units Date/Time    Basic Metabolic Panel [[id number]]  (Abnormal) Collected:  04/13/19 0505    Specimen:  Blood Updated:  04/13/19 0534     Glucose 96 mg/dL      BUN 16 mg/dL      Creatinine 0.65 mg/dL      Sodium 137 mmol/L      Potassium 3.0 mmol/L      Chloride 99 mmol/L      CO2 28.0 mmol/L      Calcium 9.0 mg/dL      eGFR Non African Amer 90 mL/min/1.73      BUN/Creatinine Ratio 24.6     Anion Gap 10.0 mmol/L     Narrative:       GFR Normal >60  Chronic Kidney Disease <60  Kidney Failure <15    Blood Culture - Blood, Blood, Central Line [[id number]] Collected:  04/08/19 2310    Specimen:  Blood, Central Line Updated:  04/12/19 2330     Blood Culture No growth at 4 days    Blood Culture - Blood, Arm, Right [[id number]] Collected:  04/08/19 2306    Specimen:  Blood from Arm, Right Updated:  04/12/19 2315     Blood Culture No growth at 4 days    Basic Metabolic Panel [[id number]]  (Normal) Collected:  04/12/19 0511    Specimen:  Blood Updated:  04/12/19 0542     Glucose 95 mg/dL      BUN 13 mg/dL      Creatinine 0.79 mg/dL      Sodium 139 mmol/L      Potassium 3.8 mmol/L      Chloride 100 mmol/L      CO2 29.0 mmol/L      Calcium 9.3 mg/dL      eGFR Non African Amer 72 mL/min/1.73      BUN/Creatinine Ratio 16.5     Anion Gap 10.0 mmol/L     Narrative:       GFR Normal >60  Chronic Kidney Disease <60  Kidney Failure <15    Basic Metabolic Panel [[id number]]  (Abnormal) Collected:  04/11/19 0549    Specimen:  Blood Updated:  04/11/19 0629     Glucose 90 mg/dL      BUN 10 mg/dL      Creatinine 0.65 mg/dL      Sodium 138 mmol/L      Potassium 3.0 mmol/L      Chloride 100 mmol/L       CO2 28.0 mmol/L      Calcium 9.0 mg/dL      eGFR Non African Amer 90 mL/min/1.73      BUN/Creatinine Ratio 15.4     Anion Gap 10.0 mmol/L     Narrative:       GFR Normal >60  Chronic Kidney Disease <60  Kidney Failure <15          Culture Results:    Blood Culture   Date Value Ref Range Status   04/08/2019 No growth at 4 days  Preliminary   04/08/2019 No growth at 4 days  Preliminary         Radiology:   Imaging Results (last 72 hours)     Procedure Component Value Units Date/Time    CT Head Without Contrast [[id number]] Collected:  04/10/19 2218     Updated:  04/10/19 2223    Narrative:       EXAMINATION: CT head without contrast 04/10/2019     HISTORY: Headache post trauma     FINDINGS: Today's exam is compared to previous study dated 01/13/2019.  Multiple contiguous axial images are obtained from the skull base to the  vertex per protocol without intravenous contrast-enhancement with  reformatted images obtained in the sagittal and coronal projections from  the original data set.     There is atrophy of the brain with prominence of the subarachnoid spaces  and ventricular enlargement. Small vessel ischemic changes are noted  involving the periventricular and higher white matter tracts.     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 There is  encephalomalacia within the right frontal lobe unchanged from the  previous study. There is no mass effect or shift of the midline. No  evidence of acute infarct or hemorrhage. No evidence of acute post  traumatic injury to the brain.       Impression:       1.. Postoperative changes with previous resection of a right frontal  lobe metastasis. There is encephalomalacia within the surgical bed.  2. Atrophy and small vessel disease. No evidence of acute posttraumatic  injury to the brain.  This report was finalized on 04/10/2019 22:20 by Dr. Kodi Walter MD.    US Venous Doppler Lower  Extremity Bilateral (duplex) [[id number]] Collected:  04/10/19 1447     Updated:  04/10/19 1452    Narrative:       History: Venous insufficiency       Comments: Venous valvular insufficiency testing was performed in the  bilateral lower extremities using duplex ultrasound with compression  techniques.  The common femoral vein, superficial femoral, popliteal  vein, posterior tibial vein, peroneal vein, greater saphenous vein, and  lesser saphenous veins were interrogated.      On the right, the greater saphenous vein at the junction measured 4.5mm.  In the mid thigh measured 3mm. Above the knee measured 3mm. In the mid  calf measured 2mm. At the ankle measured 1.1mm. There is no evidence of  venous insufficiency in the right lower extremity greater saphenous  vein. The lesser saphenous vein is not visualized due to swelling. There  is no evidence of DVT.     On the left, the greater saphenous vein at the junction measured 4.8mm.  In the mid thigh measured 2.7mm. Above the knee measured 2.8mm. In the  mid calf not visualized. At the ankle measured 1.4mm. There is no  evidence of venous insufficiency in the right lower extremity greater  saphenous vein.The lesser saphenous vein is not visualized due to  swelling. There is no evidence of DVT.       Impression:       Impression: There is no evidence of venous insufficiency in either lower  extremity greater saphenous vein.        This report was finalized on 04/10/2019 14:49 by Dr. Dejon Barlow MD.            Assessment/Plan       Cellulitis of both lower extremities    Cellulitis      IMPRESSION:  Bilateral lower extremity edema and erythema in the patient with vascular workup revealed no DVT or reflux.  Patient clinically has not had fever or significant leukocytosis.  She has been on the same chemotherapy and it does not seem like this is associated with those medications.  She denies applying anything topically that would suggest a contact dermatitis.  She  reports that she is quite improved today and the only difference has been that the foot of her bed has been elevated manually and she is reportedly been in the bed all evening.  She is Brant been on 6 days of ceftriaxone.    RECOMMENDATION:   · Marked for ceftriaxone to  at 7 days as it was previously ordered.  · Discussed with the patient and RUTSY Navarro that we need to make sure her feet are elevated above the heart at all times.  I told the patient if she was out of the bed she need to be in the recliner with the head back in her feet elevated above the level of her heart.  Her feet should never be on the floor other than to get up to go the bathroom or to move from the bed to the chair.  · Sometimes the dry skin can make the erythema looks more pronounced so we will order Aquaphor to assist with the patient and nursing as well.  · We will reevaluate tomorrow and hopefully can manage with aggressive elevation and compression        Sasha Galvez MD  19  8:05 AM

## 2019-04-14 LAB
ANION GAP SERPL CALCULATED.3IONS-SCNC: 13 MMOL/L (ref 4–13)
BASOPHILS # BLD AUTO: 0.03 10*3/MM3 (ref 0–0.2)
BASOPHILS NFR BLD AUTO: 0.3 % (ref 0–2)
BUN BLD-MCNC: 14 MG/DL (ref 5–21)
BUN/CREAT SERPL: 22.6 (ref 7–25)
CALCIUM SPEC-SCNC: 8.8 MG/DL (ref 8.4–10.4)
CHLORIDE SERPL-SCNC: 97 MMOL/L (ref 98–110)
CO2 SERPL-SCNC: 25 MMOL/L (ref 24–31)
CREAT BLD-MCNC: 0.62 MG/DL (ref 0.5–1.4)
DEPRECATED RDW RBC AUTO: 52.6 FL (ref 40–54)
EOSINOPHIL # BLD AUTO: 0.19 10*3/MM3 (ref 0–0.7)
EOSINOPHIL NFR BLD AUTO: 2.2 % (ref 0–4)
ERYTHROCYTE [DISTWIDTH] IN BLOOD BY AUTOMATED COUNT: 17.8 % (ref 12–15)
GFR SERPL CREATININE-BSD FRML MDRD: 95 ML/MIN/1.73
GLUCOSE BLD-MCNC: 192 MG/DL (ref 70–100)
HCT VFR BLD AUTO: 28.3 % (ref 37–47)
HGB BLD-MCNC: 9.5 G/DL (ref 12–16)
IMM GRANULOCYTES # BLD AUTO: 0.05 10*3/MM3 (ref 0–0.05)
IMM GRANULOCYTES NFR BLD AUTO: 0.6 % (ref 0–5)
LYMPHOCYTES # BLD AUTO: 0.87 10*3/MM3 (ref 0.72–4.86)
LYMPHOCYTES NFR BLD AUTO: 9.9 % (ref 15–45)
MAGNESIUM SERPL-MCNC: 1.4 MG/DL (ref 1.4–2.2)
MCH RBC QN AUTO: 29.5 PG (ref 28–32)
MCHC RBC AUTO-ENTMCNC: 33.6 G/DL (ref 33–36)
MCV RBC AUTO: 87.9 FL (ref 82–98)
MONOCYTES # BLD AUTO: 0.9 10*3/MM3 (ref 0.19–1.3)
MONOCYTES NFR BLD AUTO: 10.3 % (ref 4–12)
NEUTROPHILS # BLD AUTO: 6.73 10*3/MM3 (ref 1.87–8.4)
NEUTROPHILS NFR BLD AUTO: 76.7 % (ref 39–78)
NRBC BLD AUTO-RTO: 0 /100 WBC (ref 0–0)
PLATELET # BLD AUTO: 338 10*3/MM3 (ref 130–400)
PMV BLD AUTO: 9.6 FL (ref 6–12)
POTASSIUM BLD-SCNC: 3.4 MMOL/L (ref 3.5–5.3)
RBC # BLD AUTO: 3.22 10*6/MM3 (ref 4.2–5.4)
SODIUM BLD-SCNC: 135 MMOL/L (ref 135–145)
TSH SERPL DL<=0.05 MIU/L-ACNC: 5.65 MIU/ML (ref 0.47–4.68)
WBC NRBC COR # BLD: 8.77 10*3/MM3 (ref 4.8–10.8)

## 2019-04-14 PROCEDURE — 25010000002 FUROSEMIDE PER 20 MG: Performed by: INTERNAL MEDICINE

## 2019-04-14 PROCEDURE — 80048 BASIC METABOLIC PNL TOTAL CA: CPT | Performed by: INTERNAL MEDICINE

## 2019-04-14 PROCEDURE — 25010000002 FUROSEMIDE PER 20 MG: Performed by: FAMILY MEDICINE

## 2019-04-14 PROCEDURE — 25010000002 ENOXAPARIN PER 10 MG: Performed by: FAMILY MEDICINE

## 2019-04-14 PROCEDURE — 83735 ASSAY OF MAGNESIUM: CPT | Performed by: INTERNAL MEDICINE

## 2019-04-14 PROCEDURE — 84443 ASSAY THYROID STIM HORMONE: CPT | Performed by: NURSE PRACTITIONER

## 2019-04-14 PROCEDURE — 85025 COMPLETE CBC W/AUTO DIFF WBC: CPT | Performed by: INTERNAL MEDICINE

## 2019-04-14 RX ORDER — FUROSEMIDE 10 MG/ML
40 INJECTION INTRAMUSCULAR; INTRAVENOUS EVERY 12 HOURS
Status: DISCONTINUED | OUTPATIENT
Start: 2019-04-14 | End: 2019-04-15

## 2019-04-14 RX ADMIN — SODIUM CHLORIDE, PRESERVATIVE FREE 10 ML: 5 INJECTION INTRAVENOUS at 21:00

## 2019-04-14 RX ADMIN — SERTRALINE 200 MG: 100 TABLET, FILM COATED ORAL at 08:08

## 2019-04-14 RX ADMIN — AZELASTINE HYDROCHLORIDE 2 SPRAY: 137 SPRAY, METERED NASAL at 20:55

## 2019-04-14 RX ADMIN — FUROSEMIDE 20 MG: 10 INJECTION, SOLUTION INTRAMUSCULAR; INTRAVENOUS at 04:12

## 2019-04-14 RX ADMIN — ACETAMINOPHEN 650 MG: 325 TABLET, FILM COATED ORAL at 21:01

## 2019-04-14 RX ADMIN — FUROSEMIDE 40 MG: 10 INJECTION, SOLUTION INTRAVENOUS at 17:21

## 2019-04-14 RX ADMIN — MONTELUKAST SODIUM 10 MG: 10 TABLET, FILM COATED ORAL at 20:55

## 2019-04-14 RX ADMIN — SODIUM CHLORIDE, PRESERVATIVE FREE 10 ML: 5 INJECTION INTRAVENOUS at 08:08

## 2019-04-14 RX ADMIN — SODIUM CHLORIDE, PRESERVATIVE FREE 10 ML: 5 INJECTION INTRAVENOUS at 04:13

## 2019-04-14 RX ADMIN — DIGOXIN 125 MCG: 125 TABLET ORAL at 08:08

## 2019-04-14 RX ADMIN — AZELASTINE HYDROCHLORIDE 2 SPRAY: 137 SPRAY, METERED NASAL at 08:09

## 2019-04-14 RX ADMIN — POTASSIUM CHLORIDE 20 MEQ: 750 CAPSULE, EXTENDED RELEASE ORAL at 08:08

## 2019-04-14 RX ADMIN — ATORVASTATIN CALCIUM 20 MG: 10 TABLET, FILM COATED ORAL at 08:08

## 2019-04-14 RX ADMIN — ENOXAPARIN SODIUM 40 MG: 100 INJECTION SUBCUTANEOUS at 20:55

## 2019-04-14 RX ADMIN — CETIRIZINE HYDROCHLORIDE 10 MG: 10 TABLET, FILM COATED ORAL at 08:08

## 2019-04-14 RX ADMIN — POTASSIUM CHLORIDE 20 MEQ: 750 CAPSULE, EXTENDED RELEASE ORAL at 17:21

## 2019-04-14 RX ADMIN — ALPRAZOLAM 0.25 MG: 0.25 TABLET ORAL at 20:56

## 2019-04-14 NOTE — CONSULTS
ONCOLOGY CONSULTATION    Pt Name: Marcy Garcia  MRN: [id number]  YOB: 1950    Date of Admission: 4/8/2019  Date of Consultation: 4/14/2019  Room: 431    Requesting Physician: Dr. Harley Del Toro  Reason for Consultation:  Continuity of care    History Obtained From:  PATIENT and CHART    HISTORY OF PRESENT ILLNESS:    Marcy is a 68-year-old  female with a diagnosis of resected, stage IB adenocarcinoma of the left lung on 1/06/2017.  She completed 4 cycles of adjuvant Cisplatin and Alimta 6/21/2017.  Marcy remained disease free until 1/15/2018 when she was found to have a recurrent disease to the brain.  Marcy underwent a right craniotomy by Dr. Anmol Schmidt on 1/15/2018 with resection of the 2 x 2.6 x 2.6 cm high right frontal vertex mass.   She received postoperative SRS to the right frontal CNS lobe   She completed 6 cycles of Carboplatin, Alimta and Keytruda on 9/4/2018 and then went on to receive maintenance treatment with Alimta/Keytruda.  Marcy reported experiencing a significant skin rash after receiving Keytruda on 11/27/2018.  Keytruda was held from 12/18/2018-1/29/2019 (x3 cycles) during which time Marcy was only receiving Alimta.    Mrs. Guerrero had repeat CT scans of the chest, abdomen and pelvis on 1/2/2019 that suggested stable disease.    On 2/19/2019, Mrs. Garcia resumed the combination of maintenance treatment with Alimta/Keytruda without rash or problems.  She last received cycle #8 of Alimta/Keytruda on 3/26/19.  Her next dose is due 4/16/19.     Marcy was admitted to [facility] 4/8/19 undergoing treatment for lower extremity cellulitis.    Evaluation has included BLE venous doppler, negative for DVT and there was no evidence of venous insufficiency.    She is followed by ID, receiving Rocephin for cellulitis.     Oncology consultation was requested for opinion.     TARGET TUMOR SITE:  1. Resected LLL of lung 01/06/2017   2. Resected 2 x 2.6 x 2.6 cm mass from the high right  frontal vertex of the brain 1/15/2018    TUMOR HISTORY: Resected, Stage IB moderately differentiated adenocarcinoma of left lung, pT2a, N0,M0. 01/06/2017.  Ms. Marcy Garcia was seen in initial Oncology consultation on 2/22/2017 f referred by Dr. Yinka Marlow with a diagnosis of a resected moderately differentiated adenocarcinoma of the left lung.   In October of 2016 Marcy presented to Dr. Mary Henning with complaints of hemoptysis for 2 months. She has a significant history of nicotine abuse but quit smoking in October 2016.  CT scan of chest on 10/31/2016 revealed a partially necrotic, noncalcified mass measuring 4.1 x 3.2 x 3.7 cm in the posterior segment of the LLL with associated pleural effusions.   There was no evidence of mediastinal or hilar mass nor lymphadenopathy.   A 2.2 cm nodule was noted in the upper pole of the right kidney.  A transthorasic needle biopsy on 11/22/2016 by Dr. Mio Rees revealed evidence of adenocarcinoma.  PET scan on 12/16/2016 revealed a mass in the LLL with hypermetabolic activity with a maximum SUV of 7.1.   A chest x-ray on 1/4/2017 revealed a 3.8 cm mass lesion in the LLL that previously measured approximately 4.1 cm.   On 1/6/2017, a left thoracotomy with left lower lobectomy and mediastinal lymph node dissection was performed by Dr. Mario Alberto Marlow.  Pathology revealed invasive moderately differentiated adenocarcinoma.  The tumor measured 4.5 cm.  Margins were clear of invasive carcinoma and  no lymphovascular space invasion was noted.   5 of 5 lymph nodes were negative for metastatic carcinoma.   Bilateral renal ultrasounds on 2/28/2017 identified a complex cyst at the upper pole of the right kidney measuring up to 3 cm increased in size compared to a study on 12/28/2012 where it measured 2.1 cm in maximum diameter. Two small benign cysts were noted in the left kidney.   A CT scan of the abdomen and pelvis on 3/8/2017 confirmed that this was a simple cyst in the upper  pole of the right kidney.   Adjuvant chemotherapy was discussed at her initial visit on 2/22/2017 as well as on followup visit 3/13/2017.  She is agreeable and desires to proceed accordingly with adjuvant Cisplatinum and Alimta.   Marcy desires however to postpone initiation of adjuvant chemotherapy until 4/11/2017.  Her wishes were respected and chemotherapy delivered as noted below.  CT scan of chest on 7/27/2017 revealed postsurgical changes  and no acute process. Calcified lymph nodes are present in the subcorneal region and left hilum  CT scan of abdomen and pelvis on 7/27/2017 revealed no intra-abdominal or pelvic abnormalities.  ----------------------------------------------------------------------------------------_______________________________________________________   RECURRENT LUNG CARCINOMA TO THE BRAIN 1/15/2018  She presented to  on 12/18/2017 with progressive headache.   MRI of the brain with and without contrast at  on 12/18/2017 revealed a 2 x 2.6 x 2.6 cm rim-enhancing irregular mass within the high right frontal vertex with surrounding vasogenic edema.   CT chest with contrast at  12/19/2017 revealed some paravertebral soft tissue nodules present at T8, T9, T10 with largest being 2.5 cm. These are stable compared to 7/27/2017 PET scan which were NOT metabolically active. I.e. no obvious metastatic lesions  CT abdomen and pelvis with contrast at  12/19/2017 was unrevealing for any obvious metastatic disease.  Bone scan at  12/19/2017 revealed focal areas of increased activity in the left seventh and 10th costovertebral junctions-indeterminate.  A right craniotomy by Dr. Anmol Schmidt on 1/15/2018 was performed with resection of the 2 x 2.6 x 2.6 cm high right frontal vertex mass   Pathology was consistent with metastatic adenocarcinoma with papillary features consistent with lung origin.  Molecular testing on the 1/15/2018 pathology specimen was reported from [facility] on 3/15/2018 as  follows:   EGFR -negative for mutation  ALK -negative for rearrangement  ROS 1 -negative for rearrangement   BRAF -negative for the V600E mutation  PDL-1 - expression is 80% i.e. in Positive  MRI of the brain W & WO for SRS treatment planning purposes was performed on 3/6/2018 by Dr. Moises Blake. Postsurgical changes to the previous resection site from the right frontal lobe mass area with residual enhancement was noted.   A PET scan on 2/6/2018 did not reveal scintigraphic evidence of recurrent malignancy or metastatic disease.  MRI of the brain W & WO for SRS treatment planning purposes was performed on 3/6/2018 by Dr. Moises Blake. Postsurgical changes to the previous resection site from the right frontal lobe mass area with residual enhancement was noted.  Marcy underwent SRS with 1600 cGy in one single treatment fraction on 3/26/2018 to the right frontal CNS lobe by Jama Blake and Anmol Schmidt.  Delays in beginning systemic therapy included issues associated with her CNS treatment delivery and multiple dog bites on her arms and hands that required attention prior to starting systemic therapy.   Marcy received cycle #1 of chemotherapy with Alimta, carboplatin and Keytruda on 5/14/2018.   Marcy completed cycle # 6 of carboplatin, Alimta and Keytruda on 9/4/2018.  MRI of the brain with and without on 9/12/2018 documented a stable 1.6 x 1.1 cm nodular enhancement along the medial aspect of the operative cavity.  Danita was seen by Dr. Anmol Schmidt on 9/12/2018, who felt the MRI of the brain with stable without evidence of recurrence/progression.  A CT scan of the chest on 9/24/2018 did not reveal evidence of new or suspicious for urinary nodules nor intrathoracic lymphadenopathy to suggest recurrent disease in the chest.  She completed 6 cycles of Carboplatin, Alimta and Keytruda on 9/4/2018 and then went on to receive maintenance treatment with Alimta/Keytruda.  Marcy reported experiencing a significant skin  rash after receiving Keytruda on 11/27/2018.  Keytruda was held from 12/18/2018-1/29/2019 (x3 cycles) during which time Marcy was only receiving only received Alimta.  On 2/19/2019, Mrs. Garcia resumed the combination of maintenance treatment with Alimta/Keytruda without rash or problems.    TREATMENT SUMMARY:   1. Left thoracotomy with left lower lobectomy and mediastinal lymph node dissection 01/06/2017 by Dr. Mario Alberto Marlow   2. Adjuvant cisplatinum and Alimta x4 cycles. 4/11/2017 -6/21/17.   3. A right craniotomy by Dr. Anmol Schmidt on 1/15/2018 was performed with resection of the 2 x 2.6 x 2.6 cm high right frontal vertex mass   4. Marcy underwent SRS with 1600 cGy in one single treatment fraction on 3/26/2018 to the right frontal CNS lobe by Jama Blake and Anmol Schmidt   5. Cycle #1 of chemotherapy with Alimta, carboplatin and Keytruda was delivered on 5/14/2018    History  Past Medical History:   Diagnosis Date   • Adenocarcinoma, lung, left (CMS/HCC)    • Allergic rhinitis 4/12/2018   • Anemia    • Anxiety    • Arthritis    • Ataxia    • Brain tumor (CMS/HCC)    • Cancer (CMS/HCC)     lung cancer - pt had chemo and was told she was cancer free, but recently a brain tumor was found   • Colostomy in place (CMS/HCC)    • Confusion    • COPD (chronic obstructive pulmonary disease) (CMS/HCC)    • Depression    • Dizziness    • GERD (gastroesophageal reflux disease)    • Headache    • Memory loss    • Panic attacks    • Seasonal allergies    • Sensorineural hearing loss (SNHL) of both ears 4/12/2018   • Urgency of urination    • Weakness      Past Surgical History:   Procedure Laterality Date   • COLON SURGERY  2013    BLOCKED BOWEL - COLOSTOMY   • CRANIOTOMY FOR TUMOR Right 1/15/2018    Procedure: CRANIOTOMY FOR TUMOR STERIOTACTIC WITH BRAIN LAB right frontal craniotomy for brain tumor with neuromonitoring and brain lab;  Surgeon: Constantine Schmidt MD;  Location: [facility] OR;  Service:    • ECTOPIC PREGNANCY  SURGERY     • HERNIA REPAIR     • HYSTERECTOMY     • LUNG CANCER SURGERY Left     Dr Marlow - Lower Lobectomy   • ORIF FINGER FRACTURE      left pinky   • SHOULDER SURGERY Left     BROKEN COLLAR BONE & SHOULDER SURGERY & ELBOW   • TONSILLECTOMY AND ADENOIDECTOMY       Family History   Problem Relation Age of Onset   • Stroke Mother    • Osteoporosis Mother    • Cancer Father    • Cancer Maternal Grandmother    • Heart disease Maternal Grandfather       Social History     Tobacco Use   • Smoking status: Former Smoker     Years: 51.00     Types: Cigarettes     Last attempt to quit: 10/2016     Years since quittin.5   • Smokeless tobacco: Never Used   • Tobacco comment: when pt smoked she smoked 2 packs a day on average   Substance Use Topics   • Alcohol use: No   • Drug use: No     Medications Prior to Admission   Medication Sig Dispense Refill Last Dose   • amphetamine-dextroamphetamine (ADDERALL) 10 MG tablet Take 10 mg by mouth Daily As Needed.      • ALPRAZolam (XANAX) 0.25 MG tablet Take 1 tablet by mouth 2 (Two) Times a Day As Needed for Anxiety. (Patient taking differently: Take 0.25 mg by mouth 2 (Two) Times a Day As Needed for Anxiety. Dr Mary Henning) 60 tablet 0 Taking   • atorvastatin (LIPITOR) 20 MG tablet Take 20 mg by mouth Daily.   Taking   • azelastine (ASTELIN) 0.1 % nasal spray 2 sprays into each nostril 2 (Two) Times a Day. Use in each nostril as directed 90 mL 3 Taking   • calcium-vitamin D (OSCAL-500) 500-200 MG-UNIT per tablet Take 1 tablet by mouth Daily.   Taking   • cetirizine (zyrTEC) 10 MG tablet Take 10 mg by mouth Daily.   Taking   • dexamethasone (DECADRON) 4 MG tablet    Taking   • diclofenac (VOLTAREN) 75 MG EC tablet 2 (Two) Times a Day As Needed. VOLTAREN GEL   Taking   • digoxin (LANOXIN) 125 MCG tablet Take 125 mcg by mouth Daily.   Taking   • fluconazole (DIFLUCAN) 100 MG tablet    Taking   • hydrochlorothiazide (HYDRODIURIL) 50 MG tablet    Taking   •  HYDROcodone-acetaminophen (NORCO) 5-325 MG per tablet Take 1 tablet by mouth Every 6 (Six) Hours As Needed for Moderate Pain  for up to 12 doses. 12 tablet 0 Taking   • ketoconazole (NIZORAL) 2 % shampoo Apply  topically to the appropriate area as directed 2 (Two) Times a Week. 120 mL 1 Taking   • montelukast (SINGULAIR) 10 MG tablet TAKE ONE TABLET BY MOUTH EVERY NIGHT 90 tablet 3 Taking   • potassium chloride (K-DUR,KLOR-CON) 20 MEQ CR tablet 20 mEq Daily.   Taking   • sertraline (ZOLOFT) 100 MG tablet Take 2 tablets by mouth Daily. 60 tablet 5 Taking   • XIIDRA 5 % solution    Taking      Scheduled Meds:    atorvastatin 20 mg Oral Daily   azelastine 2 spray Each Nare BID   cetirizine 10 mg Oral Daily   digoxin 125 mcg Oral Daily   enoxaparin 40 mg Subcutaneous Nightly   furosemide 40 mg Intravenous Q12H   montelukast 10 mg Oral Nightly   potassium chloride 20 mEq Oral BID With Meals   sertraline 200 mg Oral Daily   sodium chloride 10 mL Intravenous Q12H     PRN Meds:  •  acetaminophen  •  ALPRAZolam  •  heparin flush (porcine)  •  sodium chloride  •  sodium chloride   Allergies:  Kiwi extract; Pineapple; and Dilaudid [hydromorphone hcl]    Subjective .     REVIEW OF SYSTEMS:   History obtained from chart review and the patient  General: positive for  - fatigue   ENT: negative for - oral lesions  Allergy and Immunology: negative   Hematological and Lymphatic: negative for - weight loss  Respiratory: positive for - lung cancer  Cardiovascular: negative for - chest pain or palpitations. Positive for BLE edema  Gastrointestinal: negative for - n/v/d  Genito-Urinary: negative for - dysuria or hematuria  Musculoskeletal: positive for - generalized weakness, LE edema and erythema  Neurological: positive for - h/o resected brain metastases  Dermatological: positive for - BLE rash    Objective     Vital Signs   Temp:  [97.4 °F (36.3 °C)-98.9 °F (37.2 °C)] 98.4 °F (36.9 °C)  Heart Rate:  [83-98] 94  Resp:  [18-20] 18  BP:  (105-125)/(60-72) 121/71    PHYSICAL EXAMINATION:  General Appearance: Alert, cooperative, in no acute distress  Head: Normocephalic, without obvious abnormality, atraumatic  Eyes: Normal conjunctivae and sclerae normal, anicteric, no pallor, corneas clear  Ears: Ears appear intact with no abnormalities noted, hearing grossly normal  Throat: No oral lesions, no thrush, oral mucosa moist  Neck: No adenopathy, supple, trachea midline, no JVD  Lungs: Clear to auscultation, respirations regular, even and unlabored  Heart: Regular rhythm and normal rate, no murmur, no gallop, no rub, no click  Abdomen: Normal bowel sounds, no masses, no organomegaly, soft non-tender, non-distended, no guarding, no rebound tenderness  Genitalia: Deferred  Extremities: Moves all extremities well, no edema, no cyanosis, no redness  Skin: No bleeding,bilateral lower extremity erythema and edema  Lymph nodes: No palpable adenopathy  Neurologic: Grossly intact though not formally tested    CBC  Results from last 7 days   Lab Units 04/09/19  0641 04/08/19  2057   WBC 10*3/mm3 9.21 6.65   HEMOGLOBIN g/dL 9.0* 8.8*   HEMATOCRIT % 27.3* 26.2*   PLATELETS 10*3/mm3 268 184   LYMPHOCYTE % %  --  14.1*   MONOCYTES % %  --  6.1       CMP  Lab Results   Component Value Date    GLUCOSE 96 04/13/2019    BUN 16 04/13/2019    CREATININE 0.65 04/13/2019    EGFRIFNONA 90 04/13/2019    BCR 24.6 04/13/2019    CO2 28.0 04/13/2019    CALCIUM 9.0 04/13/2019    ALBUMIN 3.10 (L) 04/08/2019    AST 63 (H) 04/08/2019    ALT 45 04/08/2019           Blood Culture   Date Value Ref Range Status   04/08/2019 No growth at 5 days  Final   04/08/2019 No growth at 5 days  Final     Imaging Results (last 72 hours)     ** No results found for the last 72 hours. 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 Anmol Schmidt on 1/15/2018 with resection of the 2 x 2.6 x 2.6 cm high right frontal vertex mass.   -  She received postoperative SRS to the right frontal CNS lobe   - currently receiving maintenance treatment with Alimta/Keytruda, cycle #8 last delivered 3/26/19 (next dose due 4/16/19)  -  Check TSH for treatment with immmunotherapy    BLE lower extremity cellulitis  -  BLE venous doppler, negative for DVT and there was no evidence of venous insufficiency  -  BC 4/8/19, no growth x5 days  -  Rocephin per ID  -  Echocardiogram 3/27/19, EF 60%  -  Lasix adjusted by Dr. Del Toro for BLE swelling    Hypokalemia r/t diuretics  -  K+ 3.4 on 4/14/19  -  per attending    Normocytic anemia  - HgB 9.5, MCV 87.9 on 4/14/2019  - receiving B12 injections and folic acid with Alimta  -  follow     I will postpone chemotherapy that was due tomorrow for at least a couple of weeks.  I discussed this with Danita and she is pleased and in agreement with this plan.    I personally saw and examined this patient, performing a face-to-face diagnostic evaluation with plan of care reviewed and developed with  Geneva WHITESIDE and nursing staff.   I have addended and/or modified the above history of present illness, physical examination, and assessment and plan to reflect my findings and impressions.   Essential elements of the care plan were discussed with Geneva WHITESIDE .   Agree with findings and assessment/plan as documented above.   Questions were encouraged, asked and answered to their understanding and satisfaction.    Mayco Price MD  4/15/2019 6:38 AM      SATINDER Vasquez  04/14/19  1:28 PM

## 2019-04-14 NOTE — PROGRESS NOTES
Chief Complaint: Redness of legs      Interval History:     She thinks legs are worse today after being somewhat better yesterday.  She notes increased swelling.  No increase in pain.    Review of Systems:   General ROS: negative for - chills or fever  Respiratory ROS: no cough, shortness of breath, or wheezing  Cardiovascular ROS: no chest pain or dyspnea on exertion  Gastrointestinal ROS: negative for - abdominal pain, diarrhea or nausea/vomiting       Vital Signs  Temp:  [97.4 °F (36.3 °C)-98.9 °F (37.2 °C)] 98 °F (36.7 °C)  Heart Rate:  [83-98] 90  Resp:  [18-20] 20  BP: (105-125)/(60-72) 109/60    Intake/Output Summary (Last 24 hours) at 4/14/2019 0928  Last data filed at 4/13/2019 1840  Gross per 24 hour   Intake 480 ml   Output --   Net 480 ml       Physical Exam:     General Appearance:    Alert, cooperative, in no acute distress   Head:    N/A   Throat:   N/A   Neck:   N/A   Lungs:     Clear to auscultation,respirations regular, even and                  unlabored    Heart:    Regular rhythm and normal rate, normal S1 and S2, no            murmur, no gallop, no rub   Abdomen:     Normal bowel sounds, no masses, no organomegaly, soft        non-tender, non-distended, no guarding, no rebound                tenderness   Extremities:  Still significant erythema of both lower extremities below the knee down to the toes, mildly warm, 1+ edema   Pulses:   N/A   Skin:   N/A   Lymph nodes:   N/A   Neurologic:   N/A          Lab Review:       Lab Results (last 24 hours)     Procedure Component Value Units Date/Time    Blood Culture - Blood, Blood, Central Line [[id number]] Collected:  04/08/19 2310    Specimen:  Blood, Central Line Updated:  04/13/19 2330     Blood Culture No growth at 5 days    Blood Culture - Blood, Arm, Right [[id number]] Collected:  04/08/19 2306    Specimen:  Blood from Arm, Right Updated:  04/13/19 2315     Blood Culture No growth at 5 days        Cultures:    Blood Culture   Date Value Ref  Range Status   04/08/2019 No growth at 4 days  Preliminary   04/08/2019 No growth at 4 days  Preliminary       Radiology Review:  Imaging Results (last 24 hours)     ** No results found for the last 24 hours. 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  Possibly due to cellulitis versus possibly an effect of her chemotherapeutic drugs.  No clear evidence of congestive heart failure.  No evidence of venous insufficiency.     PLAN:    1.  Continue elevation of legs   2.  Increased dose of Lasix to 40 mg twice daily  3.  Recheck BMP in the morning  4.  Defer decision on antibiotics to ID.  I would still question if this is a medication effect due to her chemotherapeutic drugs rather than actual infection      Harley Del Toro MD  04/14/19  9:28 AM        Please note that portions of this note may have been completed with a voice recognition program. Efforts were made to edit the dictations, but occasionally words are mistranscribed.

## 2019-04-14 NOTE — PROGRESS NOTES
INFECTIOUS DISEASES PROGRESS NOTE    Patient:  Marcy Garcia  YOB: 1950  MRN: [id number]   Admit date: 4/8/2019   Admitting Physician: Mary Henning MD  Primary Care Physician: Mary Henning MD    Chief Complaint: Bilateral lower extremity leg tenderness swelling and redness        Interval History: The patient reports that her legs are still quite sore and swollen.  She did state that she had kept them elevated when she was in the bed however, when she was in the chair I do not believe she reclined back and elevated her feet.    She brought up that she got some high when she took Keytruda at one time.    They were not localized to her lower extremities.    Allergies:   Allergies   Allergen Reactions   • Kiwi Extract Shortness Of Breath   • Pineapple Shortness Of Breath   • Dilaudid [Hydromorphone Hcl] Delirium       Current Meds:     Current Facility-Administered Medications:   •  acetaminophen (TYLENOL) tablet 650 mg, 650 mg, Oral, Q6H PRN, Mary Henning MD, 650 mg at 04/13/19 2106  •  ALPRAZolam (XANAX) tablet 0.25 mg, 0.25 mg, Oral, BID PRN, Mary Henning MD, 0.25 mg at 04/13/19 2106  •  atorvastatin (LIPITOR) tablet 20 mg, 20 mg, Oral, Daily, Mary Henning MD, 20 mg at 04/14/19 0808  •  azelastine (ASTELIN) nasal spray 2 spray, 2 spray, Each Nare, BID, Mary Henning MD, 2 spray at 04/14/19 0809  •  cetirizine (zyrTEC) tablet 10 mg, 10 mg, Oral, Daily, Mary Henning MD, 10 mg at 04/14/19 0808  •  digoxin (LANOXIN) tablet 125 mcg, 125 mcg, Oral, Daily, Mary Henning MD, 125 mcg at 04/14/19 0808  •  enoxaparin (LOVENOX) syringe 40 mg, 40 mg, Subcutaneous, Nightly, Mary Henning MD, 40 mg at 04/13/19 2104  •  furosemide (LASIX) injection 20 mg, 20 mg, Intravenous, Q12H, Mary Henning MD, 20 mg at 04/14/19 0412  •  heparin flush (porcine) 100 UNIT/ML injection 500 Units, 5 mL, Intravenous, PRN, Mary Henning,  "MD  •  montelukast (SINGULAIR) tablet 10 mg, 10 mg, Oral, Nightly, Mary Henning MD, 10 mg at 19  •  potassium chloride (MICRO-K) CR capsule 20 mEq, 20 mEq, Oral, BID With Meals, Mary Henning MD, 20 mEq at 19 08  •  sertraline (ZOLOFT) tablet 200 mg, 200 mg, Oral, Daily, Mary Henning MD, 200 mg at 19 0808  •  sodium chloride 0.9 % flush 10 mL, 10 mL, Intravenous, Q12H, Mary Henning MD, 10 mL at 19 0808  •  sodium chloride 0.9 % flush 10 mL, 10 mL, Intravenous, PRN, Mary Henning MD, 10 mL at 19 0413  •  sodium chloride 0.9 % flush 20 mL, 20 mL, Intravenous, PRN, Mary Henning MD      Review of Systems   General: No fever  GI: No diarrhea    Objective     Vital Signs:  Temp (24hrs), Av °F (36.7 °C), Min:97.4 °F (36.3 °C), Max:98.9 °F (37.2 °C)      /60 (BP Location: Left arm, Patient Position: Lying)   Pulse 90   Temp 98 °F (36.7 °C) (Oral)   Resp 20   Ht 162.6 cm (64\")   Wt 55.8 kg (123 lb)   SpO2 99%   BMI 21.11 kg/m²         Physical Exam   General: The patient is nontoxic-appearing sitting up at bedside with her breakfast tray in front of her.  Nurse is giving her morning medications  HEENT: Sclera anicteric  Neck: Supple  Respiratory: Effort even and unlabored  Lower extremities: She does have ALYSE hose in place.  These were removed.  She did complain of tenderness mostly in the lower part of her leg and her feet when these were removed.  Her dryness of her skin has improved as they have been applying Aquaphor.  She still has some erythema but is fairly confluent in the lower extremities is more prominent on the feet and a little more prominent on the right than the left.  I do think she has been having and color changes in the more pretibial region consistent with evolving and improving skin changes.  Psych: She is pleasant cooperative  Neuro: She is alert and oriented, speech is clear  Line/IV site: " CCsubclavian left, condition patent and no redness    Results Review:    I reviewed the patient's new clinical results.    Lab Results:  CBC:   Lab Results   Lab 04/08/19 2057 04/09/19  0641   WBC 6.65 9.21   HEMOGLOBIN 8.8* 9.0*   HEMATOCRIT 26.2* 27.3*   PLATELETS 184 268   LYMPHOCYTE % 14.1*  --    MONOCYTES % 6.1  --          CMP:   Lab Results   Lab 04/08/19 2057 04/11/19  0549 04/12/19  0511 04/13/19  0505   SODIUM 135   < > 138 139 137   POTASSIUM 2.7*   < > 3.0* 3.8 3.0*   CHLORIDE 93*   < > 100 100 99   CO2 31.0   < > 28.0 29.0 28.0   BUN 18   < > 10 13 16   CREATININE 0.82   < > 0.65 0.79 0.65   CALCIUM 8.6   < > 9.0 9.3 9.0   BILIRUBIN 0.5  --   --   --   --    ALK PHOS 121*  --   --   --   --    ALT (SGPT) 45  --   --   --   --    AST (SGOT) 63*  --   --   --   --    GLUCOSE 118*   < > 90 95 96    < > = values in this interval not displayed.         Culture Results:    Blood Culture   Date Value Ref Range Status   04/08/2019 No growth at 5 days  Final   04/08/2019 No growth at 5 days  Final       Microbiology Results Abnormal     Procedure Component Value - Date/Time    Blood Culture - Blood, Blood, Central Line [[id number]] Collected:  04/08/19 2310    Lab Status:  Final result Specimen:  Blood, Central Line Updated:  04/13/19 2330     Blood Culture No growth at 5 days    Blood Culture - Blood, Arm, Right [[id number]] Collected:  04/08/19 2306    Lab Status:  Final result Specimen:  Blood from Arm, Right Updated:  04/13/19 2315     Blood Culture No growth at 5 days                Radiology:   Imaging Results (last 72 hours)     ** No results found for the last 72 hours. 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 Bilateral lower  extremity edema and erythema the patient with normal vascular workup revealing no DVT or reflux, no significant leukocytosis or fever.  She denies applying anything topically that would suggest a contact dermatitis.  She did reports she thought things were significantly better yesterday but I believe that was the first time that the foot of her bed have been elevated      RECOMMENDATION:   · Discontinue ceftriaxone- done  · Continue to keep lower extremities elevated-reviewed this with the patient and with the nurse in the room but this is especially important when she is up in the recliner.  I reminded them that she will have to requiring her head back and use several pillows underneath her legs to be able to elevate them above the level of her heart.  · I think this to be atypical for a drug rash but certainly is possible in this patient who does not have other systemic signs or symptoms to go along with an infectious process.  The erythema is significant enough to make me think about a contact dermatitis however I do not think she is putting anything else on her lower extremities and I think this would be improved sooner if that was the case.        Sasha Galvez MD  04/14/19  8:25 AM

## 2019-04-15 LAB
ANION GAP SERPL CALCULATED.3IONS-SCNC: 11 MMOL/L (ref 4–13)
BUN BLD-MCNC: 14 MG/DL (ref 5–21)
BUN/CREAT SERPL: 20.6 (ref 7–25)
CALCIUM SPEC-SCNC: 9.1 MG/DL (ref 8.4–10.4)
CHLORIDE SERPL-SCNC: 96 MMOL/L (ref 98–110)
CO2 SERPL-SCNC: 31 MMOL/L (ref 24–31)
CREAT BLD-MCNC: 0.68 MG/DL (ref 0.5–1.4)
GFR SERPL CREATININE-BSD FRML MDRD: 86 ML/MIN/1.73
GLUCOSE BLD-MCNC: 97 MG/DL (ref 70–100)
POTASSIUM BLD-SCNC: 3 MMOL/L (ref 3.5–5.3)
SODIUM BLD-SCNC: 138 MMOL/L (ref 135–145)

## 2019-04-15 PROCEDURE — 25010000002 FUROSEMIDE PER 20 MG: Performed by: INTERNAL MEDICINE

## 2019-04-15 PROCEDURE — 25010000002 ENOXAPARIN PER 10 MG: Performed by: FAMILY MEDICINE

## 2019-04-15 PROCEDURE — 80048 BASIC METABOLIC PNL TOTAL CA: CPT | Performed by: INTERNAL MEDICINE

## 2019-04-15 RX ORDER — FUROSEMIDE 20 MG/1
20 TABLET ORAL
Status: DISCONTINUED | OUTPATIENT
Start: 2019-04-15 | End: 2019-04-16 | Stop reason: HOSPADM

## 2019-04-15 RX ORDER — POTASSIUM CHLORIDE 750 MG/1
40 CAPSULE, EXTENDED RELEASE ORAL ONCE
Status: COMPLETED | OUTPATIENT
Start: 2019-04-15 | End: 2019-04-15

## 2019-04-15 RX ADMIN — AZELASTINE HYDROCHLORIDE 2 SPRAY: 137 SPRAY, METERED NASAL at 09:27

## 2019-04-15 RX ADMIN — SODIUM CHLORIDE, PRESERVATIVE FREE 10 ML: 5 INJECTION INTRAVENOUS at 20:42

## 2019-04-15 RX ADMIN — SODIUM CHLORIDE, PRESERVATIVE FREE 10 ML: 5 INJECTION INTRAVENOUS at 09:28

## 2019-04-15 RX ADMIN — ENOXAPARIN SODIUM 40 MG: 100 INJECTION SUBCUTANEOUS at 20:41

## 2019-04-15 RX ADMIN — SERTRALINE 200 MG: 100 TABLET, FILM COATED ORAL at 09:27

## 2019-04-15 RX ADMIN — MONTELUKAST SODIUM 10 MG: 10 TABLET, FILM COATED ORAL at 20:41

## 2019-04-15 RX ADMIN — POTASSIUM CHLORIDE 40 MEQ: 750 CAPSULE, EXTENDED RELEASE ORAL at 14:37

## 2019-04-15 RX ADMIN — CETIRIZINE HYDROCHLORIDE 10 MG: 10 TABLET, FILM COATED ORAL at 09:27

## 2019-04-15 RX ADMIN — ATORVASTATIN CALCIUM 20 MG: 10 TABLET, FILM COATED ORAL at 09:27

## 2019-04-15 RX ADMIN — ACETAMINOPHEN 650 MG: 325 TABLET, FILM COATED ORAL at 17:39

## 2019-04-15 RX ADMIN — ALPRAZOLAM 0.25 MG: 0.25 TABLET ORAL at 20:41

## 2019-04-15 RX ADMIN — FUROSEMIDE 20 MG: 20 TABLET ORAL at 17:38

## 2019-04-15 RX ADMIN — FUROSEMIDE 40 MG: 10 INJECTION, SOLUTION INTRAVENOUS at 04:59

## 2019-04-15 RX ADMIN — POTASSIUM CHLORIDE 20 MEQ: 750 CAPSULE, EXTENDED RELEASE ORAL at 09:27

## 2019-04-15 RX ADMIN — DIGOXIN 125 MCG: 125 TABLET ORAL at 09:27

## 2019-04-15 RX ADMIN — AZELASTINE HYDROCHLORIDE 2 SPRAY: 137 SPRAY, METERED NASAL at 20:42

## 2019-04-15 RX ADMIN — POTASSIUM CHLORIDE 20 MEQ: 750 CAPSULE, EXTENDED RELEASE ORAL at 17:39

## 2019-04-15 NOTE — PLAN OF CARE
Problem: Patient Care Overview  Goal: Plan of Care Review  Outcome: Ongoing (interventions implemented as appropriate)   04/14/19 9890   Coping/Psychosocial   Plan of Care Reviewed With patient   Plan of Care Review   Progress no change   OTHER   Outcome Summary Bilat lower ext. red, tender, warm, they have been elevated all day. S/ST on tele. Continue to monitor overall condition and notify Md of any changes.

## 2019-04-15 NOTE — PROGRESS NOTES
INFECTIOUS DISEASES PROGRESS NOTE    Patient:  Marcy Garcia  YOB: 1950  MRN: [id number]   Admit date: 4/8/2019   Admitting Physician: Mary Henning MD  Primary Care Physician: Mary Henning MD    Chief Complaint: Bilateral lower extremity leg tenderness, swelling and redness        Interval History: The patient nurse reports that she has been unsuccessful keeping the patient's legs elevated today.  She did give her a 2-hour break without the ALYSE hose on.    The patient did not mention to me that she has not had her legs elevated.  She asked me if she needs a lymphedema therapy.    Allergies:   Allergies   Allergen Reactions   • Kiwi Extract Shortness Of Breath   • Pineapple Shortness Of Breath   • Dilaudid [Hydromorphone Hcl] Delirium       Current Meds:     Current Facility-Administered Medications:   •  acetaminophen (TYLENOL) tablet 650 mg, 650 mg, Oral, Q6H PRN, Mary Henning MD, 650 mg at 04/15/19 1739  •  ALPRAZolam (XANAX) tablet 0.25 mg, 0.25 mg, Oral, BID PRN, Mary Henning MD, 0.25 mg at 04/14/19 2056  •  atorvastatin (LIPITOR) tablet 20 mg, 20 mg, Oral, Daily, Mary Henning MD, 20 mg at 04/15/19 0927  •  azelastine (ASTELIN) nasal spray 2 spray, 2 spray, Each Nare, BID, Mary Henning MD, 2 spray at 04/15/19 0927  •  cetirizine (zyrTEC) tablet 10 mg, 10 mg, Oral, Daily, Mary Henning MD, 10 mg at 04/15/19 0927  •  digoxin (LANOXIN) tablet 125 mcg, 125 mcg, Oral, Daily, Mary Henning MD, 125 mcg at 04/15/19 0927  •  enoxaparin (LOVENOX) syringe 40 mg, 40 mg, Subcutaneous, Nightly, Mary Henning MD, 40 mg at 04/14/19 2055  •  furosemide (LASIX) tablet 20 mg, 20 mg, Oral, BID, Mary Henning MD, 20 mg at 04/15/19 1738  •  heparin flush (porcine) 100 UNIT/ML injection 500 Units, 5 mL, Intravenous, PRN, Mary Henning MD  •  montelukast (SINGULAIR) tablet 10 mg, 10 mg, Oral, Nightly, Mary Henning MD,  "10 mg at 19  •  potassium chloride (MICRO-K) CR capsule 20 mEq, 20 mEq, Oral, BID With Meals, Mary Henning MD, 20 mEq at 04/15/19 173  •  sertraline (ZOLOFT) tablet 200 mg, 200 mg, Oral, Daily, Mary Henning MD, 200 mg at 04/15/19 0927  •  sodium chloride 0.9 % flush 10 mL, 10 mL, Intravenous, Q12H, Mary Henning MD, 10 mL at 04/15/19 0928  •  sodium chloride 0.9 % flush 10 mL, 10 mL, Intravenous, PRN, Mary Henning MD, 10 mL at 19 0413  •  sodium chloride 0.9 % flush 20 mL, 20 mL, Intravenous, PRN, Mary Henning MD      Review of Systems   General: No fever  GI: No diarrhea    Objective     Vital Signs:  Temp (24hrs), Av.4 °F (36.9 °C), Min:98.1 °F (36.7 °C), Max:98.7 °F (37.1 °C)      /73 (BP Location: Right arm, Patient Position: Sitting)   Pulse 102   Temp 98.5 °F (36.9 °C) (Oral)   Resp 16   Ht 162.6 cm (64.02\")   Wt 55.3 kg (121 lb 14.6 oz)   SpO2 99%   BMI 20.92 kg/m²         Physical Exam   General: The patient is nontoxic-appearing.  She is actually sitting up on the fiber bed leaning back in the bed up with both of her legs are office out of the bed and her feet are on the floor.  She told me that she was just getting back in the bed  HEENT: Sclera anicteric  Neck: Supple  Respiratory: Effort even and unlabored  Lower extremities: She does have ALYSE hose in place.  These were removed.   She has improved erythema and edema pretibially.  Dorsal feet remain edematous with erythema  But improved   psych: She is pleasant cooperative  Neuro: She is alert and oriented, speech is clear  Line/IV site: CCsubclavian left, condition patent and no redness    Results Review:    I reviewed the patient's new clinical results.    Lab Results:  CBC:   Lab Results   Lab 19 19  0641 19  1741   WBC 6.65 9.21 8.77   HEMOGLOBIN 8.8* 9.0* 9.5*   HEMATOCRIT 26.2* 27.3* 28.3*   PLATELETS 184 268 338   LYMPHOCYTE % 14.1*  --   --  "   MONOCYTES % 6.1  --   --          CMP:   Lab Results   Lab 04/08/19 2057  04/13/19  0505 04/14/19  1741 04/15/19  0726   SODIUM 135   < > 137 135 138   POTASSIUM 2.7*   < > 3.0* 3.4* 3.0*   CHLORIDE 93*   < > 99 97* 96*   CO2 31.0   < > 28.0 25.0 31.0   BUN 18   < > 16 14 14   CREATININE 0.82   < > 0.65 0.62 0.68   CALCIUM 8.6   < > 9.0 8.8 9.1   BILIRUBIN 0.5  --   --   --   --    ALK PHOS 121*  --   --   --   --    ALT (SGPT) 45  --   --   --   --    AST (SGOT) 63*  --   --   --   --    GLUCOSE 118*   < > 96 192* 97    < > = values in this interval not displayed.         Culture Results:    Blood Culture   Date Value Ref Range Status   04/08/2019 No growth at 5 days  Final   04/08/2019 No growth at 5 days  Final       Microbiology Results Abnormal     Procedure Component Value - Date/Time    Blood Culture - Blood, Blood, Central Line [[id number]] Collected:  04/08/19 2310    Lab Status:  Final result Specimen:  Blood, Central Line Updated:  04/13/19 2330     Blood Culture No growth at 5 days    Blood Culture - Blood, Arm, Right [[id number]] Collected:  04/08/19 2306    Lab Status:  Final result Specimen:  Blood from Arm, Right Updated:  04/13/19 2315     Blood Culture No growth at 5 days                Radiology:   Imaging Results (last 72 hours)     ** No results found for the last 72 hours. 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 Bilateral lower extremity edema and erythema the patient with normal vascular workup revealing no DVT or reflux, no significant leukocytosis or fever.  She denies applying anything topically that would suggest a contact dermatitis.  She did reports she thought things were significantly better yesterday but I believe  that was the first time that the foot of her bed have been elevated      RECOMMENDATION:     · Continue to keep lower extremities elevated-reviewed this with the patient again  to explain benefits with elevation above the level of.  Tried to explain how gravity could work on her diet.  · I think this to be atypical for a drug rash but certainly is possible in this patient who does not have other systemic signs or symptoms to go along with an infectious process.  The erythema is significant enough to make me think about a contact dermatitis however I do not think she is putting anything else on her lower extremities and I think this would be improved sooner if that was the case.        Sasha Galvez MD  04/15/19  6:14 PM

## 2019-04-15 NOTE — PLAN OF CARE
Problem: Patient Care Overview  Goal: Plan of Care Review  Outcome: Ongoing (interventions implemented as appropriate)   04/15/19 2351   Coping/Psychosocial   Plan of Care Reviewed With patient   Plan of Care Review   Progress no change   OTHER   Outcome Summary bilateral lower legs swollen and red with small blisters, ALYSE hose in place and legs elevated, IV Abx D/C'd, up with assist of one, safety maintained       Problem: Fall Risk (Adult)  Goal: Absence of Fall  Outcome: Ongoing (interventions implemented as appropriate)      Problem: Skin and Soft Tissue Infection (Adult)  Goal: Signs and Symptoms of Listed Potential Problems Will be Absent, Minimized or Managed (Skin and Soft Tissue Infection)  Outcome: Ongoing (interventions implemented as appropriate)

## 2019-04-15 NOTE — PROGRESS NOTES
"Progress Note  Marcy Garcia  4/15/2019 1:52 PM  Subjective:   Admit Date:   4/8/2019      CC/ADMIT DX:       Interval History:   Reviewed overnight events and nursing notes. Her edema is improved, as is redness with compression and elevation. No CP or SOA.   I have reviewed all labs/diagnostics from the last 24hrs.       ROS:   I have done a 10 point ROS and all are negative, except what is mentioned in the HPI.    Diet Regular    Medications:        atorvastatin 20 mg Oral Daily   azelastine 2 spray Each Nare BID   cetirizine 10 mg Oral Daily   digoxin 125 mcg Oral Daily   enoxaparin 40 mg Subcutaneous Nightly   furosemide 20 mg Oral BID   montelukast 10 mg Oral Nightly   potassium chloride 20 mEq Oral BID With Meals   potassium chloride 40 mEq Oral Once   sertraline 200 mg Oral Daily   sodium chloride 10 mL Intravenous Q12H           Objective:   Vitals: /90 (BP Location: Right arm, Patient Position: Sitting)   Pulse 85   Temp 98.7 °F (37.1 °C) (Oral)   Resp 16   Ht 162.6 cm (64.02\")   Wt 55.3 kg (121 lb 14.6 oz)   SpO2 98%   BMI 20.92 kg/m²      Intake/Output Summary (Last 24 hours) at 4/15/2019 1352  Last data filed at 4/15/2019 0900  Gross per 24 hour   Intake 960 ml   Output 1250 ml   Net -290 ml     General appearance: alert and cooperative with exam  Lungs: clear to auscultation bilaterally  Heart: RRR  Abdomen: soft, non-tender; bowel sounds normal; no masses,  no organomegaly  Extremities: no C/C trace LE edema, redness is improved  Neurologic:  No obvious focal neurologic deficits.    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  Continue present medication(s)   2.  Encouraged to wear compression stocking and elevate legs  3.  Change " to Po Lasix  4.   Replace K+  5.  Recheck labs in am  6.  Home tomorrow if stable      Discharge planning:   her home     Reviewed treatment plans with the patient and/or family.             Electronically signed by Mary Henning MD on 4/15/2019 at 1:52 PM

## 2019-04-15 NOTE — PROGRESS NOTES
Continued Stay Note  [facility]     Patient Name: Marcy Garcia  MRN: [id number]  Today's Date: 4/15/2019    Admit Date: 4/8/2019    Discharge Plan     Row Name 04/15/19 1357       Plan    Plan  HOME    Patient/Family in Agreement with Plan  yes    Plan Comments  REVIEWED CHART; EVENTS NOTED.  WILL CONT. TO FOLLOW FOR ANY D/C PLANNING NEEDS THAT MAY ARISE.  PLEASE ADVISE.  THANKS.         Discharge Codes    No documentation.             REBECCA Hair

## 2019-04-15 NOTE — PLAN OF CARE
Problem: Patient Care Overview  Goal: Plan of Care Review  Outcome: Ongoing (interventions implemented as appropriate)   04/15/19 6643   Coping/Psychosocial   Plan of Care Reviewed With patient   Plan of Care Review   Progress no change   OTHER   Outcome Summary Patient has complained of her legs hurting when they are touched. ALYSE hose currently on. BLE swollen, red, and tender with unopened blisters. Reminded several times to elevate legs. Safety maintained. Continue to monitor

## 2019-04-16 VITALS
RESPIRATION RATE: 18 BRPM | TEMPERATURE: 98.1 F | WEIGHT: 121.19 LBS | OXYGEN SATURATION: 98 % | BODY MASS INDEX: 20.69 KG/M2 | DIASTOLIC BLOOD PRESSURE: 65 MMHG | HEIGHT: 64 IN | HEART RATE: 80 BPM | SYSTOLIC BLOOD PRESSURE: 124 MMHG

## 2019-04-16 LAB
ANION GAP SERPL CALCULATED.3IONS-SCNC: 10 MMOL/L (ref 4–13)
BUN BLD-MCNC: 17 MG/DL (ref 5–21)
BUN/CREAT SERPL: 25.4 (ref 7–25)
CALCIUM SPEC-SCNC: 8.7 MG/DL (ref 8.4–10.4)
CHLORIDE SERPL-SCNC: 99 MMOL/L (ref 98–110)
CO2 SERPL-SCNC: 28 MMOL/L (ref 24–31)
CREAT BLD-MCNC: 0.67 MG/DL (ref 0.5–1.4)
GFR SERPL CREATININE-BSD FRML MDRD: 87 ML/MIN/1.73
GLUCOSE BLD-MCNC: 91 MG/DL (ref 70–100)
POTASSIUM BLD-SCNC: 3.6 MMOL/L (ref 3.5–5.3)
SODIUM BLD-SCNC: 137 MMOL/L (ref 135–145)

## 2019-04-16 PROCEDURE — 80048 BASIC METABOLIC PNL TOTAL CA: CPT | Performed by: FAMILY MEDICINE

## 2019-04-16 RX ORDER — POTASSIUM CHLORIDE 750 MG/1
20 CAPSULE, EXTENDED RELEASE ORAL 2 TIMES DAILY WITH MEALS
Qty: 60 CAPSULE | Refills: 1 | Status: ON HOLD | OUTPATIENT
Start: 2019-04-16 | End: 2019-06-19

## 2019-04-16 RX ORDER — FUROSEMIDE 20 MG/1
20 TABLET ORAL 2 TIMES DAILY
Qty: 60 TABLET | Refills: 1 | Status: SHIPPED | OUTPATIENT
Start: 2019-04-16

## 2019-04-16 RX ADMIN — Medication 500 UNITS: at 13:40

## 2019-04-16 RX ADMIN — SERTRALINE 200 MG: 100 TABLET, FILM COATED ORAL at 08:19

## 2019-04-16 RX ADMIN — CETIRIZINE HYDROCHLORIDE 10 MG: 10 TABLET, FILM COATED ORAL at 08:19

## 2019-04-16 RX ADMIN — DIGOXIN 125 MCG: 125 TABLET ORAL at 08:19

## 2019-04-16 RX ADMIN — POTASSIUM CHLORIDE 20 MEQ: 750 CAPSULE, EXTENDED RELEASE ORAL at 08:19

## 2019-04-16 RX ADMIN — ATORVASTATIN CALCIUM 20 MG: 10 TABLET, FILM COATED ORAL at 08:19

## 2019-04-16 RX ADMIN — SODIUM CHLORIDE, PRESERVATIVE FREE 10 ML: 5 INJECTION INTRAVENOUS at 08:20

## 2019-04-16 RX ADMIN — ACETAMINOPHEN 650 MG: 325 TABLET, FILM COATED ORAL at 03:47

## 2019-04-16 RX ADMIN — AZELASTINE HYDROCHLORIDE 2 SPRAY: 137 SPRAY, METERED NASAL at 08:19

## 2019-04-16 RX ADMIN — FUROSEMIDE 20 MG: 20 TABLET ORAL at 08:19

## 2019-04-16 RX ADMIN — SODIUM CHLORIDE, PRESERVATIVE FREE 10 ML: 5 INJECTION INTRAVENOUS at 13:40

## 2019-04-16 NOTE — PROGRESS NOTES
Case Management Discharge Note    Final Note: REFERRAL CALLED AND FAXED TO [facility] [phone], PER PT'S REQUEST TO DELIVER WALKER TO PT'S HOME.     Destination      No service has been selected for the patient.      Durable Medical Equipment      No service has been selected for the patient.      Dialysis/Infusion      No service has been selected for the patient.      Home Medical Care      No service has been selected for the patient.      Therapy      No service has been selected for the patient.      Community Resources      No service has been selected for the patient.             Final Discharge Disposition Code: 01 - home or self-care

## 2019-04-16 NOTE — PROGRESS NOTES
PROGRESS NOTE  Patient name: Marcy Garcia  Patient : 1950  VISIT # [id number]  MR #[id number]   Room 431    SUBJECTIVE:  Resting OK legs still hurt    INTERVAL HISTORY:  Marcy is a 68-year-old  female with a diagnosis of resected, stage IB adenocarcinoma of the left lung on 2017.  She completed 4 cycles of adjuvant Cisplatin and Alimta 2017.  Marcy remained disease free until 1/15/2018 when she was found to have a recurrent disease to the brain.  Marcy underwent a right craniotomy by Dr. Anmol Schmidt on 1/15/2018 with resection of the 2 x 2.6 x 2.6 cm high right frontal vertex mass.   She received postoperative SRS to the right frontal CNS lobe   She completed 6 cycles of Carboplatin, Alimta and Keytruda on 2018 and then went on to receive maintenance treatment with Alimta/Keytruda.  Marcy reported experiencing a significant skin rash after receiving Keytruda on 2018.  Keytruda was held from 2018-2019 (x3 cycles) during which time Marcy was only receiving Alimta.     Mrs. Guerrero had repeat CT scans of the chest, abdomen and pelvis on 2019 that suggested stable disease.     On 2019, Mrs. Garcia resumed the combination of maintenance treatment with Alimta/Keytruda without rash or problems.  She last received cycle #8 of Alimta/Keytruda on 3/26/19.  Her next dose is due 19.      Marcy was admitted to [facility] 19 undergoing treatment for lower extremity cellulitis.     Evaluation has included BLE venous doppler, negative for DVT and there was no evidence of venous insufficiency.     She is followed by ID, receiving Rocephin for cellulitis.      Oncology consultation was requested for opinion.      TARGET TUMOR SITE:  1. Resected LLL of lung 2017   2. Resected 2 x 2.6 x 2.6 cm mass from the high right frontal vertex of the brain 1/15/2018     TUMOR HISTORY: Resected, Stage IB moderately differentiated adenocarcinoma of left lung, pT2a, N0,M0.  01/06/2017.  Ms. Marcy Garcia was seen in initial Oncology consultation on 2/22/2017 f referred by Dr. Yinka Marlow with a diagnosis of a resected moderately differentiated adenocarcinoma of the left lung.   In October of 2016 Marcy presented to Dr. Mary Henning with complaints of hemoptysis for 2 months. She has a significant history of nicotine abuse but quit smoking in October 2016.  CT scan of chest on 10/31/2016 revealed a partially necrotic, noncalcified mass measuring 4.1 x 3.2 x 3.7 cm in the posterior segment of the LLL with associated pleural effusions.   There was no evidence of mediastinal or hilar mass nor lymphadenopathy.   A 2.2 cm nodule was noted in the upper pole of the right kidney.  A transthorasic needle biopsy on 11/22/2016 by Dr. Mio Rees revealed evidence of adenocarcinoma.  PET scan on 12/16/2016 revealed a mass in the LLL with hypermetabolic activity with a maximum SUV of 7.1.   A chest x-ray on 1/4/2017 revealed a 3.8 cm mass lesion in the LLL that previously measured approximately 4.1 cm.   On 1/6/2017, a left thoracotomy with left lower lobectomy and mediastinal lymph node dissection was performed by Dr. Mario Alberto Marlow.  Pathology revealed invasive moderately differentiated adenocarcinoma.  The tumor measured 4.5 cm.  Margins were clear of invasive carcinoma and  no lymphovascular space invasion was noted.   5 of 5 lymph nodes were negative for metastatic carcinoma.   Bilateral renal ultrasounds on 2/28/2017 identified a complex cyst at the upper pole of the right kidney measuring up to 3 cm increased in size compared to a study on 12/28/2012 where it measured 2.1 cm in maximum diameter. Two small benign cysts were noted in the left kidney.   A CT scan of the abdomen and pelvis on 3/8/2017 confirmed that this was a simple cyst in the upper pole of the right kidney.   Adjuvant chemotherapy was discussed at her initial visit on 2/22/2017 as well as on followup visit 3/13/2017.  She is  agreeable and desires to proceed accordingly with adjuvant Cisplatinum and Alimta.   Marcy desires however to postpone initiation of adjuvant chemotherapy until 4/11/2017.  Her wishes were respected and chemotherapy delivered as noted below.  CT scan of chest on 7/27/2017 revealed postsurgical changes  and no acute process. Calcified lymph nodes are present in the subcorneal region and left hilum  CT scan of abdomen and pelvis on 7/27/2017 revealed no intra-abdominal or pelvic abnormalities.  ----------------------------------------------------------------------------------------_______________________________________________________   RECURRENT LUNG CARCINOMA TO THE BRAIN 1/15/2018  She presented to  on 12/18/2017 with progressive headache.   MRI of the brain with and without contrast at  on 12/18/2017 revealed a 2 x 2.6 x 2.6 cm rim-enhancing irregular mass within the high right frontal vertex with surrounding vasogenic edema.   CT chest with contrast at  12/19/2017 revealed some paravertebral soft tissue nodules present at T8, T9, T10 with largest being 2.5 cm. These are stable compared to 7/27/2017 PET scan which were NOT metabolically active. I.e. no obvious metastatic lesions  CT abdomen and pelvis with contrast at  12/19/2017 was unrevealing for any obvious metastatic disease.  Bone scan at  12/19/2017 revealed focal areas of increased activity in the left seventh and 10th costovertebral junctions-indeterminate.  A right craniotomy by Dr. Anmol Schmidt on 1/15/2018 was performed with resection of the 2 x 2.6 x 2.6 cm high right frontal vertex mass   Pathology was consistent with metastatic adenocarcinoma with papillary features consistent with lung origin.  Molecular testing on the 1/15/2018 pathology specimen was reported from [facility] on 3/15/2018 as follows:   EGFR -negative for mutation  ALK -negative for rearrangement  ROS 1 -negative for rearrangement   BRAF -negative for the V600E  mutation  PDL-1 - expression is 80% i.e. in Positive  MRI of the brain W & WO for SRS treatment planning purposes was performed on 3/6/2018 by Dr. Moises Blake. Postsurgical changes to the previous resection site from the right frontal lobe mass area with residual enhancement was noted.   A PET scan on 2/6/2018 did not reveal scintigraphic evidence of recurrent malignancy or metastatic disease.  MRI of the brain W & WO for SRS treatment planning purposes was performed on 3/6/2018 by Dr. Moises Blake. Postsurgical changes to the previous resection site from the right frontal lobe mass area with residual enhancement was noted.  Marcy underwent SRS with 1600 cGy in one single treatment fraction on 3/26/2018 to the right frontal CNS lobe by Jama Blake and Amnol Schmidt.  Delays in beginning systemic therapy included issues associated with her CNS treatment delivery and multiple dog bites on her arms and hands that required attention prior to starting systemic therapy.   Marcy received cycle #1 of chemotherapy with Alimta, carboplatin and Keytruda on 5/14/2018.   Marcy completed cycle # 6 of carboplatin, Alimta and Keytruda on 9/4/2018.  MRI of the brain with and without on 9/12/2018 documented a stable 1.6 x 1.1 cm nodular enhancement along the medial aspect of the operative cavity.  Danita was seen by Dr. Anmol Schmidt on 9/12/2018, who felt the MRI of the brain with stable without evidence of recurrence/progression.  A CT scan of the chest on 9/24/2018 did not reveal evidence of new or suspicious for urinary nodules nor intrathoracic lymphadenopathy to suggest recurrent disease in the chest.  She completed 6 cycles of Carboplatin, Alimta and Keytruda on 9/4/2018 and then went on to receive maintenance treatment with Alimta/Keytruda.  Marcy reported experiencing a significant skin rash after receiving Keytruda on 11/27/2018.  Keytruda was held from 12/18/2018-1/29/2019 (x3 cycles) during which time Marcy was only  receiving only received Alimta.  On 2/19/2019, Mrs. Garcia resumed the combination of maintenance treatment with Alimta/Keytruda without rash or problems.     TREATMENT SUMMARY:   1. Left thoracotomy with left lower lobectomy and mediastinal lymph node dissection 01/06/2017 by Dr. Mario Alberto Marlow   2. Adjuvant cisplatinum and Alimta x4 cycles. 4/11/2017 -6/21/17.   3. A right craniotomy by Dr. Anmol Schmidt on 1/15/2018 was performed with resection of the 2 x 2.6 x 2.6 cm high right frontal vertex mass   4. Marcy underwent SRS with 1600 cGy in one single treatment fraction on 3/26/2018 to the right frontal CNS lobe by Jama Blake and Anmol Schmidt   5. Cycle #1 of chemotherapy with Alimta, carboplatin and Keytruda was delivered on 5/14/2018     REVIEW OF SYSTEMS:   History obtained from chart review and the patient  General: positive for  - fatigue   ENT: negative for - oral lesions  Allergy and Immunology: negative   Hematological and Lymphatic: negative for - weight loss  Respiratory: positive for - lung cancer  Cardiovascular: negative for - chest pain or palpitations. Positive for BLE edema  Gastrointestinal: negative for - n/v/d  Genito-Urinary: negative for - dysuria or hematuria  Musculoskeletal: positive for - generalized weakness, LE edema and erythema  Neurological: positive for - h/o resected brain metastases  Dermatological: positive for - BLE rash    OBJECTIVE:    Vitals:    04/16/19 0404   BP: 121/76   Pulse: 71   Resp: 18   Temp: 97.9 °F (36.6 °C)   SpO2: 92%       Intake/Output Summary (Last 24 hours) at 4/16/2019 0638  Last data filed at 4/15/2019 2000  Gross per 24 hour   Intake 1080 ml   Output 200 ml   Net 880 ml     PHYSICAL EXAMINATION:  General Appearance: Alert, cooperative, in no acute distress  Head: Normocephalic, without obvious abnormality, atraumatic  Eyes: Normal conjunctivae and sclerae normal, anicteric, no pallor, corneas clear  Ears: Ears appear intact with no abnormalities noted,  hearing grossly normal  Throat: No oral lesions, no thrush, oral mucosa moist  Neck: No adenopathy, supple, trachea midline, no JVD  Lungs: Clear to auscultation, respirations regular, even and unlabored  Heart: Regular rhythm and normal rate, no murmur, no gallop, no rub, no click  Abdomen: Normal bowel sounds, no masses, no organomegaly, soft non-tender, non-distended, no guarding, no rebound tenderness  Genitalia: Deferred  Extremities: Moves all extremities well, no edema, no cyanosis, no redness  Skin: No bleeding, bilateral lower extremity erythema and mild edema from calf down - better  Lymph nodes: No palpable adenopathy  Neurologic: Grossly intact though not formally tested    CBC  Results from last 7 days   Lab Units 04/14/19  1741 04/09/19  0641   WBC 10*3/mm3 8.77 9.21   HEMOGLOBIN g/dL 9.5* 9.0*   HEMATOCRIT % 28.3* 27.3*   PLATELETS 10*3/mm3 338 268         Lab Results   Component Value Date     04/16/2019    K 3.6 04/16/2019    CL 99 04/16/2019    CO2 28.0 04/16/2019    BUN 17 04/16/2019    CREATININE 0.67 04/16/2019    GLUCOSE 91 04/16/2019    CALCIUM 8.7 04/16/2019    BILITOT 0.5 04/08/2019    ALKPHOS 121 (H) 04/08/2019    AST 63 (H) 04/08/2019    ALT 45 04/08/2019    AGRATIO 1.2 04/08/2019    GLOB 2.6 04/08/2019       Lab Results   Component Value Date    INR 0.95 03/27/2018    PROTIME 13.0 03/27/2018       Cultures:    Lab Results   Component Value Date    BLOODCX No growth at 5 days 04/08/2019     No components found for: URINCX    ASSESSMENT/PLAN:  Resected, stage IB adenocarcinoma of the left lung on 1/06/2017 with recurrence to the brain  -  s/p right craniotomy by Dr. Anmol Schmidt on 1/15/2018 with resection of the 2 x 2.6 x 2.6 cm high right frontal vertex mass.   -  She received postoperative SRS to the right frontal CNS lobe   - currently receiving maintenance treatment with Alimta/Keytruda, cycle # 8 last delivered 3/26/19 (next dose due 4/16/19)  -  Check TSH for treatment with  immunotherapy     BLE lower extremity cellulitis  -  BLE venous doppler, negative for DVT and there was no evidence of venous insufficiency  -  BC 4/8/19, no growth x5 days  -  Echocardiogram 3/27/19, EF 60%  -  Lasix 20 PO BID     Hypokalemia r/t diuretics  -  K+ 3.6   -  per attending     Normocytic anemia  - HgB 9.5, MCV 87.9 on 4/14/2019  - receiving B12 injections and folic acid with Alimta  -  follow      Chemotherapy/Immunotherapy that was due 4/15/19 will be delayed for at least a couple of weeks.  This was discussed Danita and she is pleased and in agreement with this plan.      TIFFANY Loco    04/16/19  6:38 AM    I personally saw and examined this patient, performing a face-to-face diagnostic evaluation with plan of care reviewed and developed with  Musa Ulrich PA-C and nursing staff.   I have addended and/or modified the above history of present illness, physical examination, and assessment and plan to reflect my findings and impressions.   Essential elements of the care plan were discussed with  Musa Ulrich PA-C .   Agree with findings and assessment/plan as documented above.   Questions were encouraged, asked and answered to their understanding and satisfaction.    Mayco Price MD  4/16/2019 6:56 AM

## 2019-04-16 NOTE — PLAN OF CARE
Problem: Patient Care Overview  Goal: Plan of Care Review  Outcome: Ongoing (interventions implemented as appropriate)   04/16/19 0418   OTHER   Outcome Summary VSS BP WNL. SR/ST  on tele. Complaints of legs soreness with Tylenol given with relief. Valdo hose on BLE. Port to L chest dressing CDI with caps on tubing. Pending BMP this AM. Possible chemo today per Dr Price recommendations and then possible discharge home? Continue to monitor and call MD with any changes.      Goal: Individualization and Mutuality  Outcome: Ongoing (interventions implemented as appropriate)    Goal: Interprofessional Rounds/Family Conf  Outcome: Ongoing (interventions implemented as appropriate)      Problem: Fall Risk (Adult)  Goal: Identify Related Risk Factors and Signs and Symptoms  Outcome: Ongoing (interventions implemented as appropriate)    Goal: Absence of Fall  Outcome: Ongoing (interventions implemented as appropriate)      Problem: Skin and Soft Tissue Infection (Adult)  Goal: Signs and Symptoms of Listed Potential Problems Will be Absent, Minimized or Managed (Skin and Soft Tissue Infection)  Outcome: Ongoing (interventions implemented as appropriate)

## 2019-04-16 NOTE — DISCHARGE SUMMARY
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 She was initially treated with elevation, compression and antibiotics. She was seen by Vascular who has recommended compression. She was seen by ID, who has not recommended additional antibiotics, instead elevation and treatment of her edema. She has had issues with keeping the legs elevated and wearing the ALYSE hose. SHe will be d/c home with close f/u and instructions to keep legs elevated and to use the ALYSE hose she has been given. She will need HH on d/c.     Discharge Medications:         Discharge Medications      New Medications      Instructions Start Date   furosemide 20 MG tablet  Commonly known as:  LASIX   20 mg, Oral, 2 Times Daily      potassium chloride 10 MEQ CR capsule  Commonly known as:  MICRO-K  Replaces:  potassium chloride 20 MEQ CR tablet   20 mEq, Oral, 2 Times Daily With Meals         Changes to Medications      Instructions Start Date   ALPRAZolam 0.25 MG tablet  Commonly known as:  XANAX  What changed:  additional instructions   0.25 mg, Oral, 2 Times Daily PRN         Continue These Medications      Instructions Start Date   amphetamine-dextroamphetamine 10 MG tablet  Commonly known as:  ADDERALL   10 mg, Oral, Daily PRN      atorvastatin 20 MG tablet  Commonly known as:  LIPITOR   20 mg, Oral, Daily      azelastine 0.1 % nasal spray  Commonly known as:  ASTELIN   2 sprays, Nasal, 2 Times Daily, Use in each nostril as directed      calcium-vitamin D 500-200 MG-UNIT per tablet  Commonly known as:   OSCAL-500   1 tablet, Oral, Daily      cetirizine 10 MG tablet  Commonly known as:  zyrTEC   10 mg, Oral, Daily      digoxin 125 MCG tablet  Commonly known as:  LANOXIN   125 mcg, Oral, Daily Digoxin      HYDROcodone-acetaminophen 5-325 MG per tablet  Commonly known as:  NORCO   1 tablet, Oral, Every 6 Hours PRN      ketoconazole 2 % shampoo  Commonly known as:  NIZORAL   Topical, 2 Times Weekly      montelukast 10 MG tablet  Commonly known as:  SINGULAIR   TAKE ONE TABLET BY MOUTH EVERY NIGHT      sertraline 100 MG tablet  Commonly known as:  ZOLOFT   200 mg, Oral, Daily      XIIDRA 5 % solution  Generic drug:  Lifitegrast   No dose, route, or frequency recorded.         Stop These Medications    dexamethasone 4 MG tablet  Commonly known as:  DECADRON     diclofenac 75 MG EC tablet  Commonly known as:  VOLTAREN     fluconazole 100 MG tablet  Commonly known as:  DIFLUCAN     hydrochlorothiazide 50 MG tablet  Commonly known as:  HYDRODIURIL     potassium chloride 20 MEQ CR tablet  Commonly known as:  K-DUR,KLOR-CON  Replaced by:  potassium chloride 10 MEQ CR capsule            Consults:  Oncology, ID, Vascular Surgery    Significant Diagnostic Studies:  See complete admission record      Disposition:   Home in stable condition  Follow up with Mary Henning MD in 1 week. F/U with other Specialists as they recommend    Diet: low salt    Activity: as tolerated    Special Instructions: keep LE elevated above the level of your heart, wear ALYSE Hose, set up HH on d/c, HH to do BMP in 1 week and call to office      The patient or family are to call or return if there are any problems, questions, concerns or change in her condition.     Signed:  Mary Henning MD MD  4/16/2019, 1:06 PM

## 2019-04-16 NOTE — PROGRESS NOTES
INFECTIOUS DISEASES PROGRESS NOTE    Patient:  Marcy Garcia  YOB: 1950  MRN: [id number]   Admit date: 4/8/2019   Admitting Physician: Mary Henning MD  Primary Care Physician: Mary Henning MD    Chief Complaint: Bilateral lower extremity leg tenderness, swelling and redness        Interval History: Patient reports that she cannot wear the ALYSE hose as it caused too much pain in the middle of the night as she thinks they are too tight.    She is concerned about some blisters forming on her feet.  She also has one on her chest.    Allergies:   Allergies   Allergen Reactions   • Kiwi Extract Shortness Of Breath   • Pineapple Shortness Of Breath   • Dilaudid [Hydromorphone Hcl] Delirium       Current Meds:     Current Facility-Administered Medications:   •  acetaminophen (TYLENOL) tablet 650 mg, 650 mg, Oral, Q6H PRN, Mary Henning MD, 650 mg at 04/16/19 0347  •  ALPRAZolam (XANAX) tablet 0.25 mg, 0.25 mg, Oral, BID PRN, Mary Henning MD, 0.25 mg at 04/15/19 2041  •  atorvastatin (LIPITOR) tablet 20 mg, 20 mg, Oral, Daily, Mary Henning MD, 20 mg at 04/16/19 0819  •  azelastine (ASTELIN) nasal spray 2 spray, 2 spray, Each Nare, BID, Mary Henning MD, 2 spray at 04/16/19 0819  •  cetirizine (zyrTEC) tablet 10 mg, 10 mg, Oral, Daily, Mary Henning MD, 10 mg at 04/16/19 0819  •  digoxin (LANOXIN) tablet 125 mcg, 125 mcg, Oral, Daily, Mary Henning MD, 125 mcg at 04/16/19 0819  •  enoxaparin (LOVENOX) syringe 40 mg, 40 mg, Subcutaneous, Nightly, Mary Henning MD, 40 mg at 04/15/19 2041  •  furosemide (LASIX) tablet 20 mg, 20 mg, Oral, BID, Mary Henning MD, 20 mg at 04/16/19 0819  •  heparin flush (porcine) 100 UNIT/ML injection 500 Units, 5 mL, Intravenous, PRN, Mary Henning MD, 500 Units at 04/16/19 1340  •  montelukast (SINGULAIR) tablet 10 mg, 10 mg, Oral, Nightly, Mary Henning MD, 10 mg at 04/15/19  "  •  potassium chloride (MICRO-K) CR capsule 20 mEq, 20 mEq, Oral, BID With Meals, Mary Henning MD, 20 mEq at 19 08  •  sertraline (ZOLOFT) tablet 200 mg, 200 mg, Oral, Daily, Mary Henning MD, 200 mg at 19 0819  •  sodium chloride 0.9 % flush 10 mL, 10 mL, Intravenous, Q12H, Mary Henning MD, 10 mL at 19 0820  •  sodium chloride 0.9 % flush 10 mL, 10 mL, Intravenous, PRN, Mary Henning MD, 10 mL at 19 1340  •  sodium chloride 0.9 % flush 20 mL, 20 mL, Intravenous, PRN, Mary Henning MD      Review of Systems   General: No fever  GI: No diarrhea    Objective     Vital Signs:  Temp (24hrs), Av.2 °F (36.8 °C), Min:97.9 °F (36.6 °C), Max:98.9 °F (37.2 °C)      /65 (BP Location: Right arm, Patient Position: Lying)   Pulse 80   Temp 98.1 °F (36.7 °C) (Oral)   Resp 18   Ht 162.6 cm (64.02\")   Wt 55 kg (121 lb 3 oz)   SpO2 98%   BMI 20.79 kg/m²         Physical Exam   General: The patient is nontoxic-appearing.    Chest: Port has been de-accessed.  There is a dime size tense blister and some superficial excoriated skin in the distribution of previous tape from around her port site.  There is no cellulitis  HEENT: Sclera anicteric  Neck: Supple  Respiratory: Effort even and unlabored  Lower extremities: Overall lower extremities are improved.  She still has edema distally and involving both feet but that has improved.  Erythema has improved as well.  She has tiny areas of blisters on the dorsal surface of her feet consistent with significant edema   psych: She is pleasant cooperative  Neuro: She is alert and oriented, speech is clear    Results Review:    I reviewed the patient's new clinical results.    Lab Results:  CBC:   Lab Results   Lab 19  1741   WBC 8.77   HEMOGLOBIN 9.5*   HEMATOCRIT 28.3*   PLATELETS 338         CMP:   Lab Results   Lab 19  1741 04/15/19  0726 19  0454   SODIUM 135 138 137   POTASSIUM 3.4* " 3.0* 3.6   CHLORIDE 97* 96* 99   CO2 25.0 31.0 28.0   BUN 14 14 17   CREATININE 0.62 0.68 0.67   CALCIUM 8.8 9.1 8.7   GLUCOSE 192* 97 91         Culture Results:    Blood Culture   Date Value Ref Range Status   04/08/2019 No growth at 5 days  Final   04/08/2019 No growth at 5 days  Final       Microbiology Results Abnormal     Procedure Component Value - Date/Time    Blood Culture - Blood, Blood, Central Line [[id number]] Collected:  04/08/19 2310    Lab Status:  Final result Specimen:  Blood, Central Line Updated:  04/13/19 2330     Blood Culture No growth at 5 days    Blood Culture - Blood, Arm, Right [[id number]] Collected:  04/08/19 2306    Lab Status:  Final result Specimen:  Blood from Arm, Right Updated:  04/13/19 2315     Blood Culture No growth at 5 days                Radiology:   Imaging Results (last 72 hours)     ** No results found for the last 72 hours. 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  She denies applying anything topically that would suggest a contact dermatitis.  The patient does have some blister formation which is consistent with the edema      RECOMMENDATION:   Reviewed with patient again that she needs to continue to keep her feet elevated above the level of her heart over the next several days, preferably at all times with the exception of getting up to go to the restroom.    If she does not have any complete resolution of the erythema, I think it would not be unreasonable to use a topical steroid however I agree that her erythema is likely all secondary to inflammation of  the skin from significant edema    Okay for discharge from infectious disease standpoint.  Follow-up as needed  Sasha Galvez MD  04/16/19  1:47 PM

## 2019-04-17 ENCOUNTER — READMISSION MANAGEMENT (OUTPATIENT)
Dept: CALL CENTER | Facility: HOSPITAL | Age: 69
End: 2019-04-17

## 2019-04-17 ENCOUNTER — NURSE TRIAGE (OUTPATIENT)
Dept: CALL CENTER | Facility: HOSPITAL | Age: 69
End: 2019-04-17

## 2019-04-17 NOTE — OUTREACH NOTE
Prep Survey      Responses   Facility patient discharged from?  [place]   Is patient eligible?  Yes   Discharge diagnosis  Erythema of lower extremity   Does the patient have one of the following disease processes/diagnoses(primary or secondary)?  Other   Does the patient have Home health ordered?  No   Is there a DME ordered?  Yes   What DME was ordered?  Ismael Aguilar   Medication alerts for this patient  [employer]    Prep survey completed?  Yes          Yanet Barone RN

## 2019-04-17 NOTE — TELEPHONE ENCOUNTER
"Caller discharged yesterday.  She is asking about home health, she hasn't heard from them and a walker was suppose to be delivered to her home and she has not heard about the walker.  I called Mamie at [facility] concerning the walker.  Mamie says they attempted to reach the patient earlier today and patient did not answer, they left a message.  I called the patient and told her of conversation with Mamie.  I gave the patient the phone number to [facility]  and also the phone number to [facility] .    Reason for Disposition  • General information question, no triage required and triager able to answer question    Additional Information  • Negative: [1] Caller is not with the adult (patient) AND [2] reporting urgent symptoms  • Negative: Lab result questions  • Negative: Medication questions  • Negative: Caller cannot be reached by phone  • Negative: Caller has already spoken to PCP or another triager  • Negative: RN needs further essential information from caller in order to complete triage  • Negative: Requesting regular office appointment  • Negative: [1] Caller requesting NON-URGENT health information AND [2] PCP's office is the best resource  • Negative: Health Information question, no triage required and triager able to answer question    Answer Assessment - Initial Assessment Questions  1. REASON FOR CALL or QUESTION: \"What is your reason for calling today?\" or \"How can I best help you?\" or \"What question do you have that I can help answer?\"    See note    Protocols used: INFORMATION ONLY CALL-ADULT-      "

## 2019-04-19 ENCOUNTER — READMISSION MANAGEMENT (OUTPATIENT)
Dept: CALL CENTER | Facility: HOSPITAL | Age: 69
End: 2019-04-19

## 2019-04-19 NOTE — OUTREACH NOTE
Medical Week 1 Survey      Responses   Facility patient discharged from?  [place]   Does the patient have one of the following disease processes/diagnoses(primary or secondary)?  Other   Is there a successful TCM telephone encounter documented?  No   Week 1 attempt successful?  No   Unsuccessful attempts  Attempt 1          Merari Marr RN

## 2019-04-22 ENCOUNTER — READMISSION MANAGEMENT (OUTPATIENT)
Dept: CALL CENTER | Facility: HOSPITAL | Age: 69
End: 2019-04-22

## 2019-04-23 ENCOUNTER — LAB REQUISITION (OUTPATIENT)
Dept: LAB | Facility: HOSPITAL | Age: 69
End: 2019-04-23

## 2019-04-23 DIAGNOSIS — Z00.00 ENCOUNTER FOR GENERAL ADULT MEDICAL EXAMINATION WITHOUT ABNORMAL FINDINGS: ICD-10-CM

## 2019-04-23 LAB
ANION GAP SERPL CALCULATED.3IONS-SCNC: 15 MMOL/L (ref 4–13)
BUN BLD-MCNC: 16 MG/DL (ref 5–21)
BUN/CREAT SERPL: 18.6 (ref 7–25)
CALCIUM SPEC-SCNC: 10.5 MG/DL (ref 8.4–10.4)
CHLORIDE SERPL-SCNC: 90 MMOL/L (ref 98–110)
CO2 SERPL-SCNC: 31 MMOL/L (ref 24–31)
CREAT BLD-MCNC: 0.86 MG/DL (ref 0.5–1.4)
GFR SERPL CREATININE-BSD FRML MDRD: 65 ML/MIN/1.73
GLUCOSE BLD-MCNC: 110 MG/DL (ref 70–100)
POTASSIUM BLD-SCNC: 2.6 MMOL/L (ref 3.5–5.3)
SODIUM BLD-SCNC: 136 MMOL/L (ref 135–145)

## 2019-04-23 PROCEDURE — 80048 BASIC METABOLIC PNL TOTAL CA: CPT | Performed by: FAMILY MEDICINE

## 2019-04-23 NOTE — OUTREACH NOTE
Medical Week 1 Survey      Responses   Facility patient discharged from?  [place]   Does the patient have one of the following disease processes/diagnoses(primary or secondary)?  Other   Is there a successful TCM telephone encounter documented?  No   Week 1 attempt successful?  No   Unsuccessful attempts  Attempt 2          Radha Neville RN

## 2019-04-24 ENCOUNTER — LAB REQUISITION (OUTPATIENT)
Dept: LAB | Facility: HOSPITAL | Age: 69
End: 2019-04-24

## 2019-04-24 ENCOUNTER — READMISSION MANAGEMENT (OUTPATIENT)
Dept: CALL CENTER | Facility: HOSPITAL | Age: 69
End: 2019-04-24

## 2019-04-24 DIAGNOSIS — Z00.00 ENCOUNTER FOR GENERAL ADULT MEDICAL EXAMINATION WITHOUT ABNORMAL FINDINGS: ICD-10-CM

## 2019-04-24 LAB
ANION GAP SERPL CALCULATED.3IONS-SCNC: 15 MMOL/L (ref 4–13)
BUN BLD-MCNC: 17 MG/DL (ref 5–21)
BUN/CREAT SERPL: 24.6 (ref 7–25)
CALCIUM SPEC-SCNC: 9.6 MG/DL (ref 8.4–10.4)
CHLORIDE SERPL-SCNC: 91 MMOL/L (ref 98–110)
CO2 SERPL-SCNC: 28 MMOL/L (ref 24–31)
CREAT BLD-MCNC: 0.69 MG/DL (ref 0.5–1.4)
GFR SERPL CREATININE-BSD FRML MDRD: 84 ML/MIN/1.73
GLUCOSE BLD-MCNC: 93 MG/DL (ref 70–100)
POTASSIUM BLD-SCNC: 3.4 MMOL/L (ref 3.5–5.3)
SODIUM BLD-SCNC: 134 MMOL/L (ref 135–145)

## 2019-04-24 PROCEDURE — 80048 BASIC METABOLIC PNL TOTAL CA: CPT | Performed by: FAMILY MEDICINE

## 2019-04-24 NOTE — OUTREACH NOTE
Medical Week 2 Survey      Responses   Facility patient discharged from?  [place]   Does the patient have one of the following disease processes/diagnoses(primary or secondary)?  Other   Week 2 attempt successful?  Yes   Call start time  1247   Rescheduled  Rescheduled-pt requested [patient does not feel, stated i am irritable]   Call end time  1249          Cindi Fitch RN

## 2019-04-26 ENCOUNTER — READMISSION MANAGEMENT (OUTPATIENT)
Dept: CALL CENTER | Facility: HOSPITAL | Age: 69
End: 2019-04-26

## 2019-04-26 NOTE — OUTREACH NOTE
Medical Week 2 Survey      Responses   Facility patient discharged from?  [place]   Does the patient have one of the following disease processes/diagnoses(primary or secondary)?  Other   Week 2 attempt successful?  No   Rescheduled  Revoked [Did not reach - Disenrolled. ]   Call end time  0813   Week 2 Call Completed?  Yes          Addie Tyson RN

## 2019-04-29 ENCOUNTER — LAB REQUISITION (OUTPATIENT)
Dept: LAB | Facility: HOSPITAL | Age: 69
End: 2019-04-29

## 2019-04-29 DIAGNOSIS — Z00.00 ENCOUNTER FOR GENERAL ADULT MEDICAL EXAMINATION WITHOUT ABNORMAL FINDINGS: ICD-10-CM

## 2019-04-29 LAB
ANION GAP SERPL CALCULATED.3IONS-SCNC: 14 MMOL/L (ref 4–13)
BUN BLD-MCNC: 15 MG/DL (ref 5–21)
BUN/CREAT SERPL: 20.3 (ref 7–25)
CALCIUM SPEC-SCNC: 9.5 MG/DL (ref 8.4–10.4)
CHLORIDE SERPL-SCNC: 95 MMOL/L (ref 98–110)
CO2 SERPL-SCNC: 28 MMOL/L (ref 24–31)
CREAT BLD-MCNC: 0.74 MG/DL (ref 0.5–1.4)
GFR SERPL CREATININE-BSD FRML MDRD: 78 ML/MIN/1.73
GLUCOSE BLD-MCNC: 79 MG/DL (ref 70–100)
POTASSIUM BLD-SCNC: 3.8 MMOL/L (ref 3.5–5.3)
SODIUM BLD-SCNC: 137 MMOL/L (ref 135–145)

## 2019-04-29 PROCEDURE — 80048 BASIC METABOLIC PNL TOTAL CA: CPT | Performed by: FAMILY MEDICINE

## 2019-05-06 ENCOUNTER — OFFICE VISIT (OUTPATIENT)
Dept: NEUROLOGY | Facility: CLINIC | Age: 69
End: 2019-05-06

## 2019-05-06 VITALS
DIASTOLIC BLOOD PRESSURE: 80 MMHG | SYSTOLIC BLOOD PRESSURE: 110 MMHG | HEART RATE: 88 BPM | RESPIRATION RATE: 18 BRPM | WEIGHT: 123 LBS | HEIGHT: 64 IN | BODY MASS INDEX: 21 KG/M2

## 2019-05-06 DIAGNOSIS — R06.02 SHORTNESS OF BREATH: ICD-10-CM

## 2019-05-06 DIAGNOSIS — I10 ESSENTIAL (PRIMARY) HYPERTENSION: ICD-10-CM

## 2019-05-06 DIAGNOSIS — R41.3 MEMORY DIFFICULTY: Primary | ICD-10-CM

## 2019-05-06 DIAGNOSIS — R26.89 IMBALANCE: ICD-10-CM

## 2019-05-06 PROCEDURE — 99204 OFFICE O/P NEW MOD 45 MIN: CPT | Performed by: PSYCHIATRY & NEUROLOGY

## 2019-05-06 RX ORDER — TIZANIDINE 4 MG/1
4 TABLET ORAL NIGHTLY PRN
Status: ON HOLD | COMMUNITY
End: 2019-06-21

## 2019-05-06 RX ORDER — DEXAMETHASONE 4 MG/1
4 TABLET ORAL TAKE AS DIRECTED
COMMUNITY
End: 2019-07-09

## 2019-05-06 NOTE — PATIENT INSTRUCTIONS
Patient did not be driving.  Patient to have safety precautions of no climbing and no work at heights and no work over hot fires or stove or water.  No work with sharp cutting tools.

## 2019-05-06 NOTE — PROGRESS NOTES
Subjective   Marcy Garcia, 1950, is a female who is being seen today for   Chief Complaint   Patient presents with   • Memory Loss       HISTORY OF PRESENT ILLNESS: Patient seen for memory difficulty.  Patient has had history of lung cancer since 2017 with part of her lung removed.  Patient had brain mets with right frontal craniotomy per Dr. Schmidt in 2018.  Patient apparently was on seizure medication for period of time after the surgery but not now.  Patient not had any seizures.  Patient has chemotherapy and has had previous radiation therapy.  Patient complains of weekly bifrontal headaches without nausea vomiting.  Patient has chronic blurring of vision with dry eyes followed by ophthalmology.  Patient has had head trauma several years ago with concussion and then  when she first started the chemo with some head trauma.  Patient has short-term memory difficulties forgetting what she is supposed to be doing.  Patient has MMSE today of 29 of 30.  Patient recently hospitalized for cellulitis and having circulation test for the lower extremity.  Recent MRI brain showed stable right frontal craniotomy with encephalomalacia.  Patient is accompanied by her caretaker.  Patient does not drive and safety precautions reviewed    REVIEW OF SYSTEMS:   GENERAL: Blood pressure today 110/72 left arm seated with left arm standing 110/80 pulse of 88.  PULMONARY: Patient has chronic shortness of breath  CVS: Patient has previous history of palpitations but stable recently  GASTROINTESTINAL: No acute GI distress  GENITOURINARY: No acute  distress  GYN: No acute GYN distress  MUSCULOSKELETAL: No acute musculoskeletal symptoms  HEENT: As above  ENDOCRINE: No acute endocrine symptoms  PSYCHIATRIC: No acute psychiatric symptoms  HEMATOLOGY: Patient is anemic  SKIN: As above  Family history reviewed and otherwise noncontributory but has mother with history of stroke  Social history: Patient denies smoking or drug or alcohol  use    PHYSICAL EXAMINATION:    GENERAL: No acute distress.  CRANIUM: Postcraniotomy with no specific tenderness over the cranium  HEENT: No acute fundic abnormalities.  Pupils equal round reactive to light.       EYES: EOMs intact without nystagmus and fields full to confrontation       EARS: Patient tympanic membranes partially obscured by wax but hears tuning fork bilaterally       THROAT: No oropharynx abnormalities       NECK: No bruit/no lymphadenopathy  CHEST: No acute cardiopulmonary abnormalities by auscultation  ABDOMEN: Nondistended  EXTREMITIES: Pulses symmetrical  NEURO: Patient alert and follows commands with minimal difficulty  SPEECH: Normal    CRANIAL NERVES: Motor / sensory about the face normal and symmetric    MOTOR STRENGTH: Motor strength upper and lower extremities normal  STATION AND GAIT: Gait slightly wide-based but no tendency to fall and Romberg negative  CEREBELLAR: Finger-nose and heel shin normal  SENSORY: Decreased pin and vibration distal to proximal in the lower extremities to the mid calf bilaterally in the upper extremities to the mid palm bilaterally.  REFLEXES: Reflexes are hyperactive at the knee jerk versus ankle jerk and present at the biceps bilaterally.  Toes equivocal and no clonus noted      ASSESSMENT AND PLAN: Patient with memory difficulties as above.  Patient also has evidence of polyneuropathy.  Patient is to get EEG and noninvasive carotids and formal memory testing done.  Patient to get further blood work.Patient's Body mass index is 21.11 kg/m². BMI is within normal parameters. No follow-up required..      Marcy was seen today for memory loss.    Diagnoses and all orders for this visit:    Memory difficulty  -     EEG; Future  -     Lipid Panel; Future  -     Magnesium; Future  -     Sedimentation Rate; Future  -     T4, Free; Future  -     Vitamin B12; Future  -     Folate; Future  -     Ambulatory Referral to Psychology    Imbalance  -      Carotid Bilateral;  Future    Shortness of breath  -     Overnight Sleep Oximetry Study; Future    Essential (primary) hypertension   -     Lipid Panel; Future        Dictated utilizing Dragon voice recognition software

## 2019-05-08 ENCOUNTER — LAB (OUTPATIENT)
Dept: LAB | Facility: HOSPITAL | Age: 69
End: 2019-05-08

## 2019-05-08 DIAGNOSIS — I10 ESSENTIAL (PRIMARY) HYPERTENSION: ICD-10-CM

## 2019-05-08 DIAGNOSIS — R41.3 MEMORY DIFFICULTY: ICD-10-CM

## 2019-05-08 LAB
ARTICHOKE IGE QN: 84 MG/DL (ref 0–99)
CHOLEST SERPL-MCNC: 202 MG/DL (ref 130–200)
ERYTHROCYTE [SEDIMENTATION RATE] IN BLOOD: 11 MM/HR (ref 0–20)
FOLATE SERPL-MCNC: >20 NG/ML (ref 4.78–24.2)
HDLC SERPL-MCNC: 88 MG/DL
LDLC/HDLC SERPL: 0.93 {RATIO}
MAGNESIUM SERPL-MCNC: 1.8 MG/DL (ref 1.4–2.2)
T4 FREE SERPL-MCNC: 1.04 NG/DL (ref 0.78–2.19)
TRIGL SERPL-MCNC: 159 MG/DL (ref 0–149)
VIT B12 BLD-MCNC: >1000 PG/ML (ref 239–931)

## 2019-05-08 PROCEDURE — 85651 RBC SED RATE NONAUTOMATED: CPT | Performed by: PSYCHIATRY & NEUROLOGY

## 2019-05-08 PROCEDURE — 83735 ASSAY OF MAGNESIUM: CPT | Performed by: PSYCHIATRY & NEUROLOGY

## 2019-05-08 PROCEDURE — 82607 VITAMIN B-12: CPT | Performed by: PSYCHIATRY & NEUROLOGY

## 2019-05-08 PROCEDURE — 84439 ASSAY OF FREE THYROXINE: CPT | Performed by: PSYCHIATRY & NEUROLOGY

## 2019-05-08 PROCEDURE — 82746 ASSAY OF FOLIC ACID SERUM: CPT | Performed by: PSYCHIATRY & NEUROLOGY

## 2019-05-08 PROCEDURE — 36415 COLL VENOUS BLD VENIPUNCTURE: CPT

## 2019-05-08 PROCEDURE — 80061 LIPID PANEL: CPT | Performed by: PSYCHIATRY & NEUROLOGY

## 2019-05-09 ENCOUNTER — HOSPITAL ENCOUNTER (OUTPATIENT)
Dept: NON INVASIVE DIAGNOSTICS | Age: 69
Discharge: HOME | End: 2019-05-09
Payer: MEDICARE

## 2019-05-09 PROCEDURE — 93923 UPR/LXTR ART STDY 3+ LVLS: CPT

## 2019-05-13 ENCOUNTER — HOSPITAL ENCOUNTER (OUTPATIENT)
Dept: NEUROLOGY | Facility: HOSPITAL | Age: 69
Discharge: HOME | End: 2019-05-13
Admitting: PSYCHIATRY & NEUROLOGY

## 2019-05-13 DIAGNOSIS — R41.3 MEMORY DIFFICULTY: ICD-10-CM

## 2019-05-13 DIAGNOSIS — R06.02 SHORTNESS OF BREATH: ICD-10-CM

## 2019-05-13 PROCEDURE — 95819 EEG AWAKE AND ASLEEP: CPT | Performed by: PSYCHIATRY & NEUROLOGY

## 2019-05-13 PROCEDURE — 95819 EEG AWAKE AND ASLEEP: CPT

## 2019-05-14 ENCOUNTER — TELEPHONE (OUTPATIENT)
Dept: VASCULAR SURGERY | Facility: CLINIC | Age: 69
End: 2019-05-14

## 2019-05-14 ENCOUNTER — LAB REQUISITION (OUTPATIENT)
Dept: LAB | Facility: HOSPITAL | Age: 69
End: 2019-05-14

## 2019-05-14 DIAGNOSIS — Z00.00 ENCOUNTER FOR GENERAL ADULT MEDICAL EXAMINATION WITHOUT ABNORMAL FINDINGS: ICD-10-CM

## 2019-05-14 LAB
ALBUMIN SERPL-MCNC: 3.4 G/DL (ref 3.5–5)
ALBUMIN/GLOB SERPL: 1.2 G/DL (ref 1.1–2.5)
ALP SERPL-CCNC: 121 U/L (ref 24–120)
ALT SERPL W P-5'-P-CCNC: 20 U/L (ref 0–54)
ANION GAP SERPL CALCULATED.3IONS-SCNC: 13 MMOL/L (ref 4–13)
AST SERPL-CCNC: 35 U/L (ref 7–45)
BILIRUB SERPL-MCNC: 1 MG/DL (ref 0.1–1)
BUN BLD-MCNC: 10 MG/DL (ref 5–21)
BUN/CREAT SERPL: 16.7 (ref 7–25)
CALCIUM SPEC-SCNC: 8.8 MG/DL (ref 8.4–10.4)
CHLORIDE SERPL-SCNC: 92 MMOL/L (ref 98–110)
CO2 SERPL-SCNC: 25 MMOL/L (ref 24–31)
CREAT BLD-MCNC: 0.6 MG/DL (ref 0.5–1.4)
GFR SERPL CREATININE-BSD FRML MDRD: 99 ML/MIN/1.73
GLOBULIN UR ELPH-MCNC: 2.8 GM/DL
GLUCOSE BLD-MCNC: 147 MG/DL (ref 70–100)
POTASSIUM BLD-SCNC: 3.1 MMOL/L (ref 3.5–5.3)
PROT SERPL-MCNC: 6.2 G/DL (ref 6.3–8.7)
SODIUM BLD-SCNC: 130 MMOL/L (ref 135–145)

## 2019-05-14 PROCEDURE — 80053 COMPREHEN METABOLIC PANEL: CPT | Performed by: FAMILY MEDICINE

## 2019-05-14 NOTE — TELEPHONE ENCOUNTER
I contacted the patient to remind her of her appt and noticed that she had carotid testing scheduled for Thursday.  I asked her if we could move our appt until Friday so that he could look over the carotid results.  She said that this would be fine.

## 2019-05-16 ENCOUNTER — HOSPITAL ENCOUNTER (OUTPATIENT)
Dept: ULTRASOUND IMAGING | Facility: HOSPITAL | Age: 69
Discharge: HOME | End: 2019-05-16
Admitting: PSYCHIATRY & NEUROLOGY

## 2019-05-16 DIAGNOSIS — R26.89 IMBALANCE: ICD-10-CM

## 2019-05-16 PROCEDURE — 93880 EXTRACRANIAL BILAT STUDY: CPT | Performed by: SURGERY

## 2019-05-16 PROCEDURE — 93880 EXTRACRANIAL BILAT STUDY: CPT

## 2019-05-17 ENCOUNTER — OFFICE VISIT (OUTPATIENT)
Dept: VASCULAR SURGERY | Facility: CLINIC | Age: 69
End: 2019-05-17

## 2019-05-17 VITALS
SYSTOLIC BLOOD PRESSURE: 104 MMHG | BODY MASS INDEX: 21 KG/M2 | HEART RATE: 97 BPM | WEIGHT: 123 LBS | DIASTOLIC BLOOD PRESSURE: 82 MMHG | HEIGHT: 64 IN | OXYGEN SATURATION: 98 %

## 2019-05-17 DIAGNOSIS — L03.115 CELLULITIS OF BOTH LOWER EXTREMITIES: ICD-10-CM

## 2019-05-17 DIAGNOSIS — L53.9 ERYTHEMA OF LOWER EXTREMITY: Primary | ICD-10-CM

## 2019-05-17 DIAGNOSIS — L03.116 CELLULITIS OF BOTH LOWER EXTREMITIES: ICD-10-CM

## 2019-05-17 PROCEDURE — 99214 OFFICE O/P EST MOD 30 MIN: CPT | Performed by: SURGERY

## 2019-05-17 NOTE — PROGRESS NOTES
05/17/2019      Mary Henning MD  [address] [place] [address] [address] [place] [address]        Marcy Garcia  1950    Chief Complaint   Patient presents with   • Follow-up     1 month hfu with testing.  Pt denies any stroke like symptoms.       Dear Mary Henning MD:    HPI     I had the pleasure of seeing your patient in the office today for follow up.  As you recall, the patient is a 68 y.o. female who we are currently following for lower extremity edema.  I had initially seen her in the hospital approximately 1 month ago when she presented with bilateral lower extremity edema and erythema.  She has a known history of lung cancer with mets to the brain status post prior craniotomy for metastasectomy and currently continues on maintenance chemotherapy.  When she presented she had an acute history of lower extremity edema with what appeared to be cellulitis.  Vascular was consulted at that time and venous duplex with venous insufficiency study was performed given her symptoms.  That study showed no evidence of DVT as well as no evidence of reflux in the bilateral greater saphenous veins.  She was treated with antibiotics and had improvement in her lower extremity erythema as well as pain.  She was subsequently discharged.  Today in the office she reports much improvement in her symptoms with virtually no lower extremity edema present.  She is continued to wear compression stockings as previously recommended and notes they are very effective.  She currently denies any pain to the lower extremities and the cellulitis that was previously there has now resolved.  Of note she was sent for further both venous and arterial testing after her discharge from here at the hospital which was done across [facility].  Was able to review reports of the studies and her ABIs bilaterally are normal however she does have some evidence of bilateral forefoot small vessel disease.  She also had a repeat venous duplex  "which was again negative for DVT.  It did show some superficial thrombophlebitis of the lesser saphenous vein in the left lower extremity which was shown also on her prior venous duplex done here.  She otherwise overall feels well today and is without complaint.      Review of Systems   Constitutional: Negative.  Negative for activity change, appetite change, chills, diaphoresis, fatigue and fever.   HENT: Negative.  Negative for congestion, sneezing, sore throat and trouble swallowing.    Eyes: Negative.  Negative for visual disturbance.   Respiratory: Negative.  Negative for chest tightness and shortness of breath.    Cardiovascular: Negative.  Negative for chest pain, palpitations and leg swelling.   Gastrointestinal: Negative.  Negative for abdominal distention, abdominal pain, nausea and vomiting.   Endocrine: Negative.    Genitourinary: Negative.    Musculoskeletal: Negative.    Skin: Negative.    Allergic/Immunologic: Negative.    Neurological: Negative.    Hematological: Negative.    Psychiatric/Behavioral: Negative.        /82   Pulse 97   Ht 162.6 cm (64\")   Wt 55.8 kg (123 lb)   SpO2 98%   BMI 21.11 kg/m²   Physical Exam   Constitutional: She is oriented to person, place, and time. She appears well-developed and well-nourished.   HENT:   Head: Normocephalic and atraumatic.   Eyes: EOM are normal. Pupils are equal, round, and reactive to light.   Neck: Normal range of motion. Neck supple. No JVD present.   Cardiovascular: Normal rate, regular rhythm, intact distal pulses and normal pulses.   Pulses:       Carotid pulses are 2+ on the right side, and 2+ on the left side.       Radial pulses are 2+ on the right side, and 2+ on the left side.        Femoral pulses are 2+ on the right side, and 2+ on the left side.       Popliteal pulses are 2+ on the right side, and 2+ on the left side.        Dorsalis pedis pulses are 2+ on the right side, and 2+ on the left side.        Posterior tibial pulses " are 2+ on the right side, and 2+ on the left side.   Pulmonary/Chest: Effort normal. No respiratory distress.   Abdominal: Soft. She exhibits no distension and no mass. There is no tenderness.   Musculoskeletal: Normal range of motion. She exhibits edema (She has very mild edema of the bilateral lower extremities the level of the knee.  This is significantly improved from the time she was seen in hospital). She exhibits no tenderness or deformity.   Neurological: She is oriented to person, place, and time.   Skin: Skin is warm and dry. Capillary refill takes 2 to 3 seconds.   She has some mild purplish discoloration of the bilateral distal forefeet and toes.   Psychiatric: She has a normal mood and affect. Her behavior is normal. Judgment and thought content normal.   Vitals reviewed.      DIAGNOSTIC DATA:    Us Carotid Bilateral    Result Date: 5/16/2019  Narrative: History: Carotid occlusive disease      Impression: Impression: 1. There is less than 50% stenosis of the right internal carotid artery. 2. There is less than 50% stenosis of the left internal carotid artery. 3. Antegrade flow is demonstrated in bilateral vertebral arteries.  Comments: Bilateral carotid vertebral arterial duplex scan was performed.  Grayscale imaging shows intimal thickening and calcified elements at the carotid bifurcation. The right internal carotid artery peak systolic velocity is 65.7 cm/sec. The end-diastolic velocity is 31.9 cm/sec. The right ICA/CCA ratio is approximately 1.09 . These findings correlate with less than 50% stenosis of the right internal carotid artery.  Grayscale imaging shows intimal thickening and calcified elements at the carotid bifurcation. The left internal carotid artery peak systolic velocity is 91.5 cm/sec. The end-diastolic velocity is 39.3 cm/sec. The left ICA/CCA ratio is approximately 1.5 . These findings correlate with less than 50% stenosis of the left internal carotid artery.  Antegrade flow is  demonstrated in bilateral vertebral arteries.  This report was finalized on 05/16/2019 16:25 by Dr. Dejon Barlow MD.      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 Erythema of lower extremity L53.9 695.9   2. Cellulitis of both lower extremities L03.115 682.6    L03.116        Lab Frequency Next Occurrence   Mammo diagnostic digital tomosynthesis right w CAD Once 02/28/2019   MRI Brain With & Without Contrast Once 07/28/2019       PLAN: After thoroughly evaluating Marcy Garcia, I believe the best course of action is to remain conservative from a vascular standpoint.  At this point she has no vascular issues with normal ABIs and palpable pedal pulses as well as no evidence of DVT or venous reflux.  I suspect that her lower extremity edema was due to cellulitis and was possibly linked to her ongoing chemotherapy.  As such I recommended that she continue to wear compression stockings moving forward to help reduce edema of the lower extremities and also to use leg elevation as possible which will also help to reduce edema.  Otherwise she is to continue following with her primary care physician as well as oncology for continued treatment of her known metastatic lung cancer.  I will see her in the office on an  as-needed basis moving forward.  The patient is to continue taking their medications as previously discussed.   I did discuss vascular risk factors as they pertain to the progression of vascular disease including controlling hyperlipidemia. Patient's Body mass index is 21.11 kg/m². BMI is within normal parameters. No follow-up required.   This was all discussed in full with complete understanding.  Thank you for allowing me to participate in the care of your patient.  Please do not hesitate to call with any questions or concerns.  We will keep you aware of any further encounters with Marcy Garcia.      Sincerely Yours,      Justin Camacho MD

## 2019-05-25 ENCOUNTER — NURSE TRIAGE (OUTPATIENT)
Dept: CALL CENTER | Facility: HOSPITAL | Age: 69
End: 2019-05-25

## 2019-05-25 NOTE — TELEPHONE ENCOUNTER
"    Reason for Disposition  • SEVERE leg swelling (e.g., swelling extends above knee, entire leg is swollen, weeping fluid)    Additional Information  • Negative: Severe difficulty breathing (e.g., struggling for each breath, speaks in single words)  • Negative: Looks like a broken bone or dislocated joint (e.g., crooked or deformed)  • Negative: Sounds like a life-threatening emergency to the triager  • Negative: Chest pain  • Negative: Followed a leg injury  • Negative: [1] Small area of swelling AND [2] followed an insect bite to the area  • Negative: Swelling of one ankle joint  • Negative: Swelling of knee is main symptom  • Negative: Pregnant  • Negative: Postpartum (< 1 month since delivery)  • Negative: Difficulty breathing at rest  • Negative: Entire foot is cool or blue in comparison to other side  • Negative: [1] Can't walk or can barely walk AND [2] new onset  • Negative: [1] Difficulty breathing with exertion (e.g., walking) AND [2] new onset or worsening  • Negative: [1] Red area or streak AND [2] fever  • Negative: [1] Swelling is painful to touch AND [2] fever  • Negative: [1] Cast on leg or ankle AND [2] now increased pain  • Negative: Patient sounds very sick or weak to the triager    Answer Assessment - Initial Assessment Questions  1. ONSET: \"When did the swelling start?\" (e.g., minutes, hours, days)      Few days ago, just getting worse   2. LOCATION: \"What part of the leg is swollen?\"  \"Are both legs swollen or just one leg?\"      Entire leg on both legs   3. SEVERITY: \"How bad is the swelling?\" (e.g., localized; mild, moderate, severe)   - Localized - small area of swelling localized to one leg   - MILD pedal edema - swelling limited to foot and ankle, pitting edema < 1/4 inch (6 mm) deep, rest and elevation eliminate most or all swelling   - MODERATE edema - swelling of lower leg to knee, pitting edema > 1/4 inch (6 mm) deep, rest and elevation only partially reduce swelling   - SEVERE edema " "- swelling extends above knee, facial or hand swelling present       Moderate   4. REDNESS: \"Does the swelling look red or infected?\"      They are bright red   5. PAIN: \"Is the swelling painful to touch?\" If so, ask: \"How painful is it?\"   (Scale 1-10; mild, moderate or severe)      Mild   6. FEVER: \"Do you have a fever?\" If so, ask: \"What is it, how was it measured, and when did it start?\"       No   7. CAUSE: \"What do you think is causing the leg swelling?\"      Unsure   8. MEDICAL HISTORY: \"Do you have a history of heart failure, kidney disease, liver failure, or cancer?\"      Was told vascular problem   9. RECURRENT SYMPTOM: \"Have you had leg swelling before?\" If so, ask: \"When was the last time?\" \"What happened that time?\"      Has been having cellulitis   10. OTHER SYMPTOMS: \"Do you have any other symptoms?\" (e.g., chest pain, difficulty breathing)        Leg color is changing   11. PREGNANCY: \"Is there any chance you are pregnant?\" \"When was your last menstrual period?\"        No    Protocols used: LEG SWELLING AND EDEMA-ADULT-AH      "

## 2019-05-26 ENCOUNTER — OFFICE VISIT (OUTPATIENT)
Dept: URGENT CARE | Age: 69
End: 2019-05-26
Payer: MEDICARE

## 2019-05-26 ENCOUNTER — APPOINTMENT (OUTPATIENT)
Dept: GENERAL RADIOLOGY | Age: 69
End: 2019-05-26
Payer: MEDICARE

## 2019-05-26 ENCOUNTER — HOSPITAL ENCOUNTER (EMERGENCY)
Age: 69
Discharge: HOME | End: 2019-05-26
Payer: MEDICARE

## 2019-05-26 VITALS
TEMPERATURE: 98.5 F | WEIGHT: 125 LBS | RESPIRATION RATE: 22 BRPM | DIASTOLIC BLOOD PRESSURE: 84 MMHG | BODY MASS INDEX: 21.46 KG/M2 | SYSTOLIC BLOOD PRESSURE: 125 MMHG | HEART RATE: 105 BPM | OXYGEN SATURATION: 98 %

## 2019-05-26 VITALS
OXYGEN SATURATION: 95 % | BODY MASS INDEX: 20.83 KG/M2 | HEIGHT: 64 IN | HEART RATE: 93 BPM | TEMPERATURE: 98.7 F | RESPIRATION RATE: 16 BRPM | WEIGHT: 122 LBS

## 2019-05-26 DIAGNOSIS — I99.9 VASCULAR ABNORMALITY: Primary | ICD-10-CM

## 2019-05-26 DIAGNOSIS — T49.95XD: Primary | ICD-10-CM

## 2019-05-26 LAB
ALBUMIN SERPL-MCNC: 3.6 G/DL (ref 3.5–5.2)
ALP BLD-CCNC: 163 U/L (ref 35–104)
ALT SERPL-CCNC: 21 U/L (ref 5–33)
ANION GAP SERPL CALCULATED.3IONS-SCNC: 14 MMOL/L (ref 7–19)
APTT: 38.3 SEC (ref 26–36.2)
AST SERPL-CCNC: 24 U/L (ref 5–32)
BASOPHILS ABSOLUTE: 0 K/UL (ref 0–0.2)
BASOPHILS RELATIVE PERCENT: 0.3 % (ref 0–1)
BILIRUB SERPL-MCNC: 0.3 MG/DL (ref 0.2–1.2)
BUN BLDV-MCNC: 17 MG/DL (ref 8–23)
CALCIUM SERPL-MCNC: 9.2 MG/DL (ref 8.8–10.2)
CHLORIDE BLD-SCNC: 98 MMOL/L (ref 98–111)
CO2: 27 MMOL/L (ref 22–29)
CREAT SERPL-MCNC: 0.8 MG/DL (ref 0.5–0.9)
EOSINOPHILS ABSOLUTE: 0.1 K/UL (ref 0–0.6)
EOSINOPHILS RELATIVE PERCENT: 1.7 % (ref 0–5)
GFR NON-AFRICAN AMERICAN: >60
GLUCOSE BLD-MCNC: 88 MG/DL (ref 74–109)
HCT VFR BLD CALC: 29.7 % (ref 37–47)
HEMOGLOBIN: 9.2 G/DL (ref 12–16)
INR BLD: 1.52 (ref 0.88–1.18)
LYMPHOCYTES ABSOLUTE: 1 K/UL (ref 1.1–4.5)
LYMPHOCYTES RELATIVE PERCENT: 14.3 % (ref 20–40)
MCH RBC QN AUTO: 26.8 PG (ref 27–31)
MCHC RBC AUTO-ENTMCNC: 31 G/DL (ref 33–37)
MCV RBC AUTO: 86.6 FL (ref 81–99)
MONOCYTES ABSOLUTE: 0.6 K/UL (ref 0–0.9)
MONOCYTES RELATIVE PERCENT: 8.9 % (ref 0–10)
NEUTROPHILS ABSOLUTE: 5.3 K/UL (ref 1.5–7.5)
NEUTROPHILS RELATIVE PERCENT: 74.1 % (ref 50–65)
PDW BLD-RTO: 16.9 % (ref 11.5–14.5)
PLATELET # BLD: 243 K/UL (ref 130–400)
PMV BLD AUTO: 9.4 FL (ref 9.4–12.3)
POTASSIUM SERPL-SCNC: 3.7 MMOL/L (ref 3.5–5)
PRO-BNP: 948 PG/ML (ref 0–900)
PROTHROMBIN TIME: 17.6 SEC (ref 12–14.6)
RBC # BLD: 3.43 M/UL (ref 4.2–5.4)
SODIUM BLD-SCNC: 139 MMOL/L (ref 136–145)
TOTAL PROTEIN: 6.7 G/DL (ref 6.6–8.7)
WBC # BLD: 7.2 K/UL (ref 4.8–10.8)

## 2019-05-26 PROCEDURE — 1123F ACP DISCUSS/DSCN MKR DOCD: CPT | Performed by: NURSE PRACTITIONER

## 2019-05-26 PROCEDURE — 1090F PRES/ABSN URINE INCON ASSESS: CPT | Performed by: NURSE PRACTITIONER

## 2019-05-26 PROCEDURE — 83880 ASSAY OF NATRIURETIC PEPTIDE: CPT

## 2019-05-26 PROCEDURE — G8399 PT W/DXA RESULTS DOCUMENT: HCPCS | Performed by: NURSE PRACTITIONER

## 2019-05-26 PROCEDURE — 36415 COLL VENOUS BLD VENIPUNCTURE: CPT

## 2019-05-26 PROCEDURE — 85025 COMPLETE CBC W/AUTO DIFF WBC: CPT

## 2019-05-26 PROCEDURE — 1036F TOBACCO NON-USER: CPT | Performed by: NURSE PRACTITIONER

## 2019-05-26 PROCEDURE — 85730 THROMBOPLASTIN TIME PARTIAL: CPT

## 2019-05-26 PROCEDURE — 6360000002 HC RX W HCPCS

## 2019-05-26 PROCEDURE — 80053 COMPREHEN METABOLIC PANEL: CPT

## 2019-05-26 PROCEDURE — 71046 X-RAY EXAM CHEST 2 VIEWS: CPT

## 2019-05-26 PROCEDURE — 85610 PROTHROMBIN TIME: CPT

## 2019-05-26 PROCEDURE — 3017F COLORECTAL CA SCREEN DOC REV: CPT | Performed by: NURSE PRACTITIONER

## 2019-05-26 PROCEDURE — 99283 EMERGENCY DEPT VISIT LOW MDM: CPT

## 2019-05-26 PROCEDURE — 99213 OFFICE O/P EST LOW 20 MIN: CPT | Performed by: NURSE PRACTITIONER

## 2019-05-26 PROCEDURE — 4040F PNEUMOC VAC/ADMIN/RCVD: CPT | Performed by: NURSE PRACTITIONER

## 2019-05-26 PROCEDURE — G8428 CUR MEDS NOT DOCUMENT: HCPCS | Performed by: NURSE PRACTITIONER

## 2019-05-26 PROCEDURE — G8420 CALC BMI NORM PARAMETERS: HCPCS | Performed by: NURSE PRACTITIONER

## 2019-05-26 RX ORDER — POTASSIUM CITRATE 10 MEQ/1
TABLET, EXTENDED RELEASE ORAL
Status: ON HOLD | COMMUNITY
End: 2019-08-13 | Stop reason: HOSPADM

## 2019-05-26 RX ORDER — DOXYCYCLINE HYCLATE 100 MG/1
100 CAPSULE ORAL 2 TIMES DAILY
Qty: 20 CAPSULE | Refills: 0 | Status: SHIPPED | OUTPATIENT
Start: 2019-05-26 | End: 2019-06-05

## 2019-05-26 RX ORDER — FUROSEMIDE 20 MG/1
20 TABLET ORAL 2 TIMES DAILY
Status: ON HOLD | COMMUNITY
End: 2019-07-15 | Stop reason: HOSPADM

## 2019-05-26 RX ORDER — HEPARIN SODIUM (PORCINE) LOCK FLUSH IV SOLN 100 UNIT/ML 100 UNIT/ML
300 SOLUTION INTRAVENOUS ONCE
Status: COMPLETED | OUTPATIENT
Start: 2019-05-26 | End: 2019-05-26

## 2019-05-26 RX ORDER — FOLIC ACID 1 MG/1
1 TABLET ORAL DAILY
COMMUNITY
End: 2019-09-09 | Stop reason: SDUPTHER

## 2019-05-26 RX ORDER — DEXTROAMPHETAMINE SACCHARATE, AMPHETAMINE ASPARTATE, DEXTROAMPHETAMINE SULFATE AND AMPHETAMINE SULFATE 2.5; 2.5; 2.5; 2.5 MG/1; MG/1; MG/1; MG/1
10 TABLET ORAL DAILY
Status: ON HOLD | COMMUNITY
End: 2019-07-15 | Stop reason: HOSPADM

## 2019-05-26 RX ADMIN — HEPARIN 300 UNITS: 100 SYRINGE at 17:01

## 2019-05-26 RX ADMIN — HEPARIN SODIUM (PORCINE) LOCK FLUSH IV SOLN 100 UNIT/ML 300 UNITS: 100 SOLUTION at 17:01

## 2019-05-26 NOTE — ED TRIAGE NOTES
Pt to ED with c/o bilat redness and swelling to feet x3-4 months Pt sen numerous times for this issue

## 2019-05-26 NOTE — PROGRESS NOTES
Patient presented today with bilateral foot edema and discoloration. Per Rashid PALACIO, patient is to go the ER for further evaluation.

## 2019-05-29 ASSESSMENT — ENCOUNTER SYMPTOMS
CHOKING: 0
BACK PAIN: 0
EYE PAIN: 0
RHINORRHEA: 0
ABDOMINAL PAIN: 0
WHEEZING: 0
STRIDOR: 0
PHOTOPHOBIA: 0
EYE DISCHARGE: 0
COLOR CHANGE: 1
NAUSEA: 0
SORE THROAT: 0
SHORTNESS OF BREATH: 0
ABDOMINAL DISTENTION: 0
COUGH: 0

## 2019-05-29 NOTE — ED PROVIDER NOTES
[facility] EMERGENCY DEPT  eMERGENCYdEPARTMENT eNCOUnter      Pt Name: Aileen Godinez  MRN: [id number]  [place] 1950  Date of evaluation: 5/26/2019  Provider:LOVELY Yepez    CHIEF COMPLAINT       Chief Complaint   Patient presents with    Other     redness and swelling to bilateral feel for 1-2 months. HISTORY OF PRESENT ILLNESS  (Location/Symptom, Timing/Onset, Context/Setting, Quality, Duration, Modifying Factors, Severity.)   Aileen Godinez is a 70 y.o. female who presents to the emergency department with complaints of redness and swelling to feet bilaterally. Denies history of clots or infection. Admits she has been on multiple cancer drugs. Asymptomatic otherwise. Admits to work up for cellulitis and blood clots. Admits this has been ongoing now for 2 months. Admits to cancer medicine for known breast mass. [facility]    Nursing Notes were reviewed and I agree. REVIEW OF SYSTEMS    (2-9 systems for level 4, 10 or more for level 5)     Review of Systems   Constitutional: Negative for activity change, appetite change, chills and fever. HENT: Negative for congestion, postnasal drip, rhinorrhea and sore throat. Eyes: Negative for photophobia, pain, discharge and visual disturbance. Respiratory: Negative for cough, choking, shortness of breath, wheezing and stridor. Cardiovascular: Negative for chest pain, palpitations and leg swelling. Gastrointestinal: Negative for abdominal distention, abdominal pain and nausea. Genitourinary: Negative for vaginal bleeding. Musculoskeletal: Negative for arthralgias, back pain, joint swelling, neck pain and neck stiffness. Skin: Positive for color change. Negative for rash. Neurological: Negative for dizziness, syncope, facial asymmetry and headaches. Hematological: Negative for adenopathy. Does not bruise/bleed easily. Psychiatric/Behavioral: Negative for agitation, behavioral problems and confusion.         Except as noted above the remainder of the review of systems was reviewed and negative. PAST MEDICAL HISTORY     Past Medical History:   Diagnosis Date    Anxiety     Arthritis     Bowel obstruction (HCC)     adhesions with colectomy/colostomy    Cancer ([facility] [address]     lung LLL ,  brain    Colostomy in place [facility])     Concussion with no loss of consciousness     COPD (chronic obstructive pulmonary disease) ([facility] [address]     Depression     Hernia     History of blood transfusion     History of broken collarbone     Hyperlipidemia     Seasonal allergies          SURGICAL HISTORY       Past Surgical History:   Procedure Laterality Date    ABDOMEN SURGERY      bowel blockage from scar tissue    COLONOSCOPY      COLOSTOMY      still has    ECTOPIC PREGNANCY SURGERY      ENDOSCOPY, COLON, DIAGNOSTIC      FRACTURE SURGERY      left arm and elbow and finger    HERNIA REPAIR      HYSTERECTOMY      LOBECTOMY Left 1/6/2017    LEFT THORACOTOMY WITH LOBECTOMY  performed by Dontrell Ross MD at [address] TUNNELED CTR VAD W/SUBQ PORT AGE 5 YR/> N/A 7/3/2018    SINGLE LUMEN PORT INSERTION performed by Gem Bedoya MD at [address]       Discharge Medication List as of 5/26/2019  4:30 PM      CONTINUE these medications which have NOT CHANGED    Details   potassium citrate (UROCIT-K) 10 MEQ (1080 MG) extended release tablet Take by mouth 3 times daily (with meals)Historical Med      amphetamine-dextroamphetamine (ADDERALL, 10MG,) 10 MG tablet Take 10 mg by mouth daily. Historical Med      folic acid (FOLVITE) 1 MG tablet Take 1 mg by mouth dailyHistorical Med      furosemide (LASIX) 20 MG tablet Take 20 mg by mouth 2 times dailyHistorical Med      Pembrolizumab (KEYTRUDA IV) Infuse intravenously every 21 daysHistorical Med      PEMEtrexed Disodium (ALIMTA IV) Infuse intravenously every 21 daysHistorical Med      ALPRAZolam (XANAX) 0.25 MG tablet Take 0.25 mg by mouth nightly as needed for Sleep. Mei Alcantara Historical Med      acetaminophen (TYLENOL) 325 MG tablet Take 650 mg by mouth every 6 hours as needed for PainHistorical Med      cetirizine (ZYRTEC) 10 MG tablet Take 1 tablet by mouth daily, Disp-1 tablet, R-0NO PRINT      atorvastatin (LIPITOR) 20 MG tablet Take 20 mg by mouth nightly Historical Med      montelukast (SINGULAIR) 10 MG tablet Take 10 mg by mouth nightly Indications: Seasonal Allergy       sertraline (ZOLOFT) 100 MG tablet Take 200 mg by mouth nightly Historical Med             ALLERGIES     Pineapple    FAMILY HISTORY       Family History   Problem Relation Age of Onset    High Blood Pressure Mother     Stroke Mother     Cancer Father           SOCIAL HISTORY       Social History     Socioeconomic History    Marital status:      Spouse name: None    Number of children: None    Years of education: None    Highest education level: None   Occupational History    None   Social Needs    Financial resource strain: None    Food insecurity:     Worry: None     Inability: None    Transportation needs:     Medical: None     Non-medical: None   Tobacco Use    Smoking status: Former Smoker     Last attempt to quit: 10/22/2016     Years since quittin.6    Smokeless tobacco: Never Used    Tobacco comment: smoked since 15, quit a few times   Substance and Sexual Activity    Alcohol use: Not Currently     Comment: 2-3 glasses of wine    Drug use: No    Sexual activity: None   Lifestyle    Physical activity:     Days per week: None     Minutes per session: None    Stress: None   Relationships    Social connections:     Talks on phone: None     Gets together: None     Attends Christianity service: None     Active member of club or organization: None     Attends meetings of clubs or organizations: None     Relationship status: None    Intimate partner violence:     Fear of current or ex partner: None     Emotionally abused: None     Physically abused: None Forced sexual activity: None   Other Topics Concern    None   Social History Narrative    None       SCREENINGS           PHYSICAL EXAM    (up to 7 forlevel 4, 8 or more for level 5)     ED Triage Vitals [05/26/19 1444]   BP Temp Temp Source Pulse Resp SpO2 Height Weight   125/84 98.5 °F (36.9 °C) Oral 105 22 98 % -- 125 lb (56.7 kg)       Physical Exam   Constitutional: She is oriented to person, place, and time. She appears well-developed and well-nourished. No distress. HENT:   Head: Normocephalic and atraumatic. Right Ear: External ear normal.   Left Ear: External ear normal.   Nose: Nose normal.   Mouth/Throat: Oropharynx is clear and moist. No oropharyngeal exudate. Eyes: Pupils are equal, round, and reactive to light. Conjunctivae and EOM are normal. Right eye exhibits no discharge. Left eye exhibits no discharge. Neck: Normal range of motion. Neck supple. No thyromegaly present. Cardiovascular: Normal rate, regular rhythm, normal heart sounds and intact distal pulses. Exam reveals no friction rub. No murmur heard. Pulmonary/Chest: Effort normal and breath sounds normal. No stridor. No respiratory distress. She has no wheezes. Abdominal: Soft. Bowel sounds are normal. She exhibits no distension. There is no tenderness. Musculoskeletal: Normal range of motion. She exhibits no edema. Neurological: She is alert and oriented to person, place, and time. She displays normal reflexes. No cranial nerve deficit or sensory deficit. She exhibits normal muscle tone. Coordination normal.   Skin: Skin is warm and dry. Capillary refill takes less than 2 seconds. No rash noted. She is not diaphoretic.   blanching   Psychiatric: She has a normal mood and affect. Her behavior is normal. Judgment and thought content normal.   Nursing note and vitals reviewed.         DIAGNOSTIC RESULTS     RADIOLOGY:   Non-plain film images such as CT, Ultrasound and MRI are read by the radiologist. Plain radiographic images are visualized and preliminarilyinterpreted by No att. providers found with the below findings:      Interpretation per the Radiologist below, if available at the time of this note:    XR CHEST STANDARD (2 VW)   Final Result   No active disease is seen. Signed by Dr Tammie Jonas on 5/26/2019 4:33 PM          LABS:  Labs Reviewed   CBC WITH AUTO DIFFERENTIAL - Abnormal; Notable for the following components:       Result Value    RBC 3.43 (*)     Hemoglobin 9.2 (*)     Hematocrit 29.7 (*)     MCH 26.8 (*)     MCHC 31.0 (*)     RDW 16.9 (*)     Neutrophils % 74.1 (*)     Lymphocytes % 14.3 (*)     Lymphocytes # 1.0 (*)     All other components within normal limits   COMPREHENSIVE METABOLIC PANEL - Abnormal; Notable for the following components:    Alkaline Phosphatase 163 (*)     All other components within normal limits   BRAIN NATRIURETIC PEPTIDE - Abnormal; Notable for the following components:    Pro- (*)     All other components within normal limits   PROTIME-INR - Abnormal; Notable for the following components:    Protime 17.6 (*)     INR 1.52 (*)     All other components within normal limits   APTT - Abnormal; Notable for the following components:    aPTT 38.3 (*)     All other components within normal limits       All other labs were within normal range or notreturned as of this dictation. RE-ASSESSMENT        EMERGENCY DEPARTMENT COURSE and DIFFERENTIAL DIAGNOSIS/MDM:   Vitals:    Vitals:    05/26/19 1444   BP: 125/84   Pulse: 105   Resp: 22   Temp: 98.5 °F (36.9 °C)   TempSrc: Oral   SpO2: 98%   Weight: 125 lb (56.7 kg)       MDM  Number of Diagnoses or Management Options  Adverse effect of drug that acts primarily on skin, subsequent encounter:   Diagnosis management comments: PE supports highly vascular bilateral feet. No erythema or streaking. It supports some fluid too. I believe this to rash induced by cancer medicines. Her labs do not support infection.  Plan to discuss alternatives with oncologist. She is to be discharged medically stable. May return to ED at anytime with new concerns questions etc.       PROCEDURES:    Procedures      FINAL IMPRESSION      1.  Adverse effect of drug that acts primarily on skin, subsequent encounter          DISPOSITION/PLAN   DISPOSITION Decision To Discharge 05/26/2019 05:10:19 PM      PATIENT REFERRED TO:  Anne Marie Urias MD  [facility]  [phone]      As needed    [facility] EMERGENCY DEPT  [facility]  [phone]    If symptoms worsen      DISCHARGE MEDICATIONS:  Discharge Medication List as of 5/26/2019  4:30 PM      START taking these medications    Details   doxycycline hyclate (VIBRAMYCIN) 100 MG capsule Take 1 capsule by mouth 2 times daily for 10 days, Disp-20 capsule, R-0Print             (Please note that portions of this note were completed with a voice recognition program.  Efforts were made to edit the dictations but occasionallywords are mis-transcribed.)    [place] [address] [place] [address]  05/29/19 5815

## 2019-05-30 ENCOUNTER — HOSPITAL ENCOUNTER (OUTPATIENT)
Dept: NUCLEAR MEDICINE | Age: 69
Discharge: HOME | End: 2019-06-01
Payer: MEDICARE

## 2019-05-30 DIAGNOSIS — C34.12 MALIGNANT NEOPLASM OF BRONCHUS OF LEFT UPPER LOBE (HCC): ICD-10-CM

## 2019-05-30 PROCEDURE — 3430000000 HC RX DIAGNOSTIC RADIOPHARMACEUTICAL: Performed by: INTERNAL MEDICINE

## 2019-05-30 PROCEDURE — A9561 TC99M OXIDRONATE: HCPCS | Performed by: INTERNAL MEDICINE

## 2019-05-30 PROCEDURE — 78306 BONE IMAGING WHOLE BODY: CPT

## 2019-05-30 RX ADMIN — TECHNETIUM TC 99M OXIDRONATE 20 MILLICURIE: 3.15 INJECTION, POWDER, LYOPHILIZED, FOR SOLUTION INTRAVENOUS at 16:02

## 2019-05-31 DIAGNOSIS — C34.90 NON-SMALL CELL LUNG CANCER, UNSPECIFIED LATERALITY (HCC): ICD-10-CM

## 2019-06-04 ENCOUNTER — HOSPITAL ENCOUNTER (OUTPATIENT)
Dept: NON INVASIVE DIAGNOSTICS | Age: 69
Discharge: HOME | End: 2019-06-04
Payer: MEDICARE

## 2019-06-04 LAB
LV EF: 50 %
LVEF MODALITY: NORMAL

## 2019-06-04 PROCEDURE — 93306 TTE W/DOPPLER COMPLETE: CPT

## 2019-06-11 ENCOUNTER — HOSPITAL ENCOUNTER (OUTPATIENT)
Dept: INFUSION THERAPY | Age: 69
Discharge: HOME | End: 2019-06-11
Payer: MEDICARE

## 2019-06-11 DIAGNOSIS — C34.12 SQUAMOUS CELL CARCINOMA OF BRONCHUS IN LEFT UPPER LOBE (HCC): Primary | ICD-10-CM

## 2019-06-11 DIAGNOSIS — C34.90 NON-SMALL CELL LUNG CANCER, UNSPECIFIED LATERALITY (HCC): ICD-10-CM

## 2019-06-11 PROCEDURE — 96372 THER/PROPH/DIAG INJ SC/IM: CPT

## 2019-06-11 PROCEDURE — 2580000003 HC RX 258: Performed by: INTERNAL MEDICINE

## 2019-06-11 PROCEDURE — 96375 TX/PRO/DX INJ NEW DRUG ADDON: CPT

## 2019-06-11 PROCEDURE — 85025 COMPLETE CBC W/AUTO DIFF WBC: CPT

## 2019-06-11 PROCEDURE — 96413 CHEMO IV INFUSION 1 HR: CPT

## 2019-06-11 PROCEDURE — 6360000002 HC RX W HCPCS: Performed by: INTERNAL MEDICINE

## 2019-06-11 RX ORDER — SODIUM CHLORIDE 9 MG/ML
20 INJECTION, SOLUTION INTRAVENOUS ONCE
Status: DISCONTINUED | OUTPATIENT
Start: 2019-06-11 | End: 2019-06-13 | Stop reason: HOSPADM

## 2019-06-11 RX ORDER — SODIUM CHLORIDE 0.9 % (FLUSH) 0.9 %
10 SYRINGE (ML) INJECTION PRN
Status: DISCONTINUED | OUTPATIENT
Start: 2019-06-11 | End: 2019-06-12 | Stop reason: HOSPADM

## 2019-06-11 RX ORDER — HEPARIN SODIUM (PORCINE) LOCK FLUSH IV SOLN 100 UNIT/ML 100 UNIT/ML
500 SOLUTION INTRAVENOUS PRN
Status: DISCONTINUED | OUTPATIENT
Start: 2019-06-11 | End: 2019-06-12 | Stop reason: HOSPADM

## 2019-06-11 RX ORDER — SODIUM CHLORIDE 0.9 % (FLUSH) 0.9 %
5 SYRINGE (ML) INJECTION PRN
Status: DISCONTINUED | OUTPATIENT
Start: 2019-06-11 | End: 2019-06-12 | Stop reason: HOSPADM

## 2019-06-11 RX ORDER — EPINEPHRINE 1 MG/ML
0.3 INJECTION, SOLUTION, CONCENTRATE INTRAVENOUS
Status: DISPENSED | OUTPATIENT
Start: 2019-06-11 | End: 2019-06-11

## 2019-06-11 RX ORDER — PALONOSETRON 0.05 MG/ML
0.25 INJECTION, SOLUTION INTRAVENOUS ONCE
Status: DISCONTINUED | OUTPATIENT
Start: 2019-06-11 | End: 2019-06-13 | Stop reason: HOSPADM

## 2019-06-11 RX ORDER — DEXAMETHASONE SODIUM PHOSPHATE 10 MG/ML
10 INJECTION, SOLUTION INTRAMUSCULAR; INTRAVENOUS ONCE
Status: DISCONTINUED | OUTPATIENT
Start: 2019-06-11 | End: 2019-06-13 | Stop reason: HOSPADM

## 2019-06-11 RX ORDER — DIPHENHYDRAMINE HYDROCHLORIDE 50 MG/ML
50 INJECTION INTRAMUSCULAR; INTRAVENOUS PRN
Status: DISCONTINUED | OUTPATIENT
Start: 2019-06-11 | End: 2019-06-13 | Stop reason: HOSPADM

## 2019-06-11 RX ORDER — METHYLPREDNISOLONE SODIUM SUCCINATE 125 MG/2ML
125 INJECTION, POWDER, LYOPHILIZED, FOR SOLUTION INTRAMUSCULAR; INTRAVENOUS PRN
Status: DISCONTINUED | OUTPATIENT
Start: 2019-06-11 | End: 2019-06-13 | Stop reason: HOSPADM

## 2019-06-11 RX ORDER — CYANOCOBALAMIN 1000 UG/ML
1000 INJECTION INTRAMUSCULAR; SUBCUTANEOUS ONCE
Status: DISCONTINUED | OUTPATIENT
Start: 2019-06-11 | End: 2019-06-13 | Stop reason: HOSPADM

## 2019-06-19 ENCOUNTER — HOSPITAL ENCOUNTER (INPATIENT)
Facility: HOSPITAL | Age: 69
LOS: 6 days | Discharge: HOME | End: 2019-06-25
Attending: FAMILY MEDICINE | Admitting: FAMILY MEDICINE

## 2019-06-19 ENCOUNTER — APPOINTMENT (OUTPATIENT)
Dept: CT IMAGING | Facility: HOSPITAL | Age: 69
End: 2019-06-19

## 2019-06-19 ENCOUNTER — APPOINTMENT (OUTPATIENT)
Dept: GENERAL RADIOLOGY | Facility: HOSPITAL | Age: 69
End: 2019-06-19

## 2019-06-19 DIAGNOSIS — R41.82 ALTERED MENTAL STATUS, UNSPECIFIED ALTERED MENTAL STATUS TYPE: ICD-10-CM

## 2019-06-19 DIAGNOSIS — Z85.9 HISTORY OF CANCER: ICD-10-CM

## 2019-06-19 DIAGNOSIS — D64.9 ANEMIA, UNSPECIFIED TYPE: ICD-10-CM

## 2019-06-19 DIAGNOSIS — Z74.09 IMPAIRED MOBILITY AND ADLS: ICD-10-CM

## 2019-06-19 DIAGNOSIS — Z74.09 IMPAIRED MOBILITY: ICD-10-CM

## 2019-06-19 DIAGNOSIS — R77.8 ELEVATED TROPONIN: ICD-10-CM

## 2019-06-19 DIAGNOSIS — Z78.9 IMPAIRED MOBILITY AND ADLS: ICD-10-CM

## 2019-06-19 DIAGNOSIS — D61.818 PANCYTOPENIA (HCC): Primary | ICD-10-CM

## 2019-06-19 LAB
ALBUMIN SERPL-MCNC: 3.2 G/DL (ref 3.5–5)
ALBUMIN/GLOB SERPL: 1.1 G/DL (ref 1.1–2.5)
ALP SERPL-CCNC: 154 U/L (ref 24–120)
ALT SERPL W P-5'-P-CCNC: 50 U/L (ref 0–54)
ANION GAP SERPL CALCULATED.3IONS-SCNC: 13 MMOL/L (ref 4–13)
ANISOCYTOSIS BLD QL: ABNORMAL
AST SERPL-CCNC: 64 U/L (ref 7–45)
BACTERIA UR QL AUTO: ABNORMAL /HPF
BILIRUB SERPL-MCNC: 0.8 MG/DL (ref 0.1–1)
BILIRUB UR QL STRIP: NEGATIVE
BUN BLD-MCNC: 9 MG/DL (ref 5–21)
BUN/CREAT SERPL: 12.9 (ref 7–25)
CALCIUM SPEC-SCNC: 8.7 MG/DL (ref 8.4–10.4)
CHLORIDE SERPL-SCNC: 93 MMOL/L (ref 98–110)
CK SERPL-CCNC: <20 U/L (ref 0–203)
CLARITY UR: CLEAR
CO2 SERPL-SCNC: 25 MMOL/L (ref 24–31)
COLOR UR: YELLOW
CREAT BLD-MCNC: 0.7 MG/DL (ref 0.5–1.4)
DEPRECATED RDW RBC AUTO: 45.2 FL (ref 40–54)
EOSINOPHIL # BLD MANUAL: 0.02 10*3/MM3 (ref 0–0.7)
EOSINOPHIL NFR BLD MANUAL: 1 % (ref 0–4)
ERYTHROCYTE [DISTWIDTH] IN BLOOD BY AUTOMATED COUNT: 16.2 % (ref 12–15)
GFR SERPL CREATININE-BSD FRML MDRD: 83 ML/MIN/1.73
GIANT PLATELETS: ABNORMAL
GLOBULIN UR ELPH-MCNC: 2.9 GM/DL
GLUCOSE BLD-MCNC: 92 MG/DL (ref 70–100)
GLUCOSE UR STRIP-MCNC: NEGATIVE MG/DL
HCT VFR BLD AUTO: 23.9 % (ref 37–47)
HGB BLD-MCNC: 8 G/DL (ref 12–16)
HGB UR QL STRIP.AUTO: NEGATIVE
HOLD SPECIMEN: NORMAL
HOLD SPECIMEN: NORMAL
HYALINE CASTS UR QL AUTO: ABNORMAL /LPF
HYPOCHROMIA BLD QL: ABNORMAL
KETONES UR QL STRIP: NEGATIVE
LEUKOCYTE ESTERASE UR QL STRIP.AUTO: ABNORMAL
LYMPHOCYTES # BLD MANUAL: 0.37 10*3/MM3 (ref 0.72–4.86)
LYMPHOCYTES NFR BLD MANUAL: 18.4 % (ref 4–12)
LYMPHOCYTES NFR BLD MANUAL: 23.5 % (ref 15–45)
MCH RBC QN AUTO: 25.8 PG (ref 28–32)
MCHC RBC AUTO-ENTMCNC: 33.5 G/DL (ref 33–36)
MCV RBC AUTO: 77.1 FL (ref 82–98)
MICROCYTES BLD QL: ABNORMAL
MONOCYTES # BLD AUTO: 0.29 10*3/MM3 (ref 0.19–1.3)
NEUTROPHILS # BLD AUTO: 0.91 10*3/MM3 (ref 1.87–8.4)
NEUTROPHILS NFR BLD MANUAL: 57.1 % (ref 39–78)
NITRITE UR QL STRIP: NEGATIVE
NRBC SPEC MANUAL: 1 /100 WBC (ref 0–0.2)
PH UR STRIP.AUTO: 7 [PH] (ref 5–8)
PLATELET # BLD AUTO: 93 10*3/MM3 (ref 130–400)
PMV BLD AUTO: 10.7 FL (ref 6–12)
POIKILOCYTOSIS BLD QL SMEAR: ABNORMAL
POLYCHROMASIA BLD QL SMEAR: ABNORMAL
POTASSIUM BLD-SCNC: 3.7 MMOL/L (ref 3.5–5.3)
PROT SERPL-MCNC: 6.1 G/DL (ref 6.3–8.7)
PROT UR QL STRIP: ABNORMAL
RBC # BLD AUTO: 3.1 10*6/MM3 (ref 4.2–5.4)
RBC # UR: ABNORMAL /HPF
REF LAB TEST METHOD: ABNORMAL
SMALL PLATELETS BLD QL SMEAR: ABNORMAL
SODIUM BLD-SCNC: 131 MMOL/L (ref 135–145)
SP GR UR STRIP: 1.01 (ref 1–1.03)
SQUAMOUS #/AREA URNS HPF: ABNORMAL /HPF
TROPONIN I SERPL-MCNC: 0.11 NG/ML (ref 0–0.03)
TROPONIN I SERPL-MCNC: 0.17 NG/ML (ref 0–0.03)
URINE MYOGLOBIN, QUALITATIVE: NEGATIVE
UROBILINOGEN UR QL STRIP: ABNORMAL
WBC MORPH BLD: NORMAL
WBC NRBC COR # BLD: 1.59 10*3/MM3 (ref 4.8–10.8)
WBC UR QL AUTO: ABNORMAL /HPF
WHOLE BLOOD HOLD SPECIMEN: NORMAL

## 2019-06-19 PROCEDURE — 63710000001 DIPHENHYDRAMINE PER 50 MG: Performed by: FAMILY MEDICINE

## 2019-06-19 PROCEDURE — 82746 ASSAY OF FOLIC ACID SERUM: CPT | Performed by: NURSE PRACTITIONER

## 2019-06-19 PROCEDURE — 83550 IRON BINDING TEST: CPT | Performed by: NURSE PRACTITIONER

## 2019-06-19 PROCEDURE — 0 IOPAMIDOL PER 1 ML: Performed by: NURSE PRACTITIONER

## 2019-06-19 PROCEDURE — 25010000002 DIPHENHYDRAMINE PER 50 MG: Performed by: NURSE PRACTITIONER

## 2019-06-19 PROCEDURE — 83615 LACTATE (LD) (LDH) ENZYME: CPT | Performed by: NURSE PRACTITIONER

## 2019-06-19 PROCEDURE — 85007 BL SMEAR W/DIFF WBC COUNT: CPT | Performed by: NURSE PRACTITIONER

## 2019-06-19 PROCEDURE — 82728 ASSAY OF FERRITIN: CPT | Performed by: NURSE PRACTITIONER

## 2019-06-19 PROCEDURE — 80053 COMPREHEN METABOLIC PANEL: CPT | Performed by: NURSE PRACTITIONER

## 2019-06-19 PROCEDURE — 71046 X-RAY EXAM CHEST 2 VIEWS: CPT

## 2019-06-19 PROCEDURE — 93010 ELECTROCARDIOGRAM REPORT: CPT | Performed by: INTERNAL MEDICINE

## 2019-06-19 PROCEDURE — 85045 AUTOMATED RETICULOCYTE COUNT: CPT | Performed by: NURSE PRACTITIONER

## 2019-06-19 PROCEDURE — 83540 ASSAY OF IRON: CPT | Performed by: NURSE PRACTITIONER

## 2019-06-19 PROCEDURE — 85060 BLOOD SMEAR INTERPRETATION: CPT | Performed by: NURSE PRACTITIONER

## 2019-06-19 PROCEDURE — 70450 CT HEAD/BRAIN W/O DYE: CPT

## 2019-06-19 PROCEDURE — 81001 URINALYSIS AUTO W/SCOPE: CPT | Performed by: NURSE PRACTITIONER

## 2019-06-19 PROCEDURE — 71275 CT ANGIOGRAPHY CHEST: CPT

## 2019-06-19 PROCEDURE — 85025 COMPLETE CBC W/AUTO DIFF WBC: CPT | Performed by: NURSE PRACTITIONER

## 2019-06-19 PROCEDURE — 93005 ELECTROCARDIOGRAM TRACING: CPT | Performed by: NURSE PRACTITIONER

## 2019-06-19 PROCEDURE — 82550 ASSAY OF CK (CPK): CPT | Performed by: NURSE PRACTITIONER

## 2019-06-19 PROCEDURE — 84484 ASSAY OF TROPONIN QUANT: CPT | Performed by: NURSE PRACTITIONER

## 2019-06-19 PROCEDURE — 83874 ASSAY OF MYOGLOBIN: CPT | Performed by: NURSE PRACTITIONER

## 2019-06-19 PROCEDURE — 99285 EMERGENCY DEPT VISIT HI MDM: CPT

## 2019-06-19 PROCEDURE — 82607 VITAMIN B-12: CPT | Performed by: NURSE PRACTITIONER

## 2019-06-19 RX ORDER — DIPHENHYDRAMINE HCL 25 MG
25 TABLET ORAL EVERY 6 HOURS PRN
COMMUNITY
End: 2019-06-25 | Stop reason: HOSPADM

## 2019-06-19 RX ORDER — HYDROCODONE BITARTRATE AND ACETAMINOPHEN 5; 325 MG/1; MG/1
1 TABLET ORAL EVERY 6 HOURS PRN
Status: DISCONTINUED | OUTPATIENT
Start: 2019-06-19 | End: 2019-06-24

## 2019-06-19 RX ORDER — POTASSIUM CHLORIDE 750 MG/1
40 TABLET, EXTENDED RELEASE ORAL 3 TIMES DAILY
COMMUNITY
End: 2019-06-25 | Stop reason: HOSPADM

## 2019-06-19 RX ORDER — ALPRAZOLAM 0.25 MG/1
0.25 TABLET ORAL 2 TIMES DAILY PRN
Status: DISCONTINUED | OUTPATIENT
Start: 2019-06-19 | End: 2019-06-25 | Stop reason: HOSPADM

## 2019-06-19 RX ORDER — DIPHENHYDRAMINE HCL 25 MG
25 CAPSULE ORAL EVERY 6 HOURS PRN
Status: DISCONTINUED | OUTPATIENT
Start: 2019-06-19 | End: 2019-06-25 | Stop reason: HOSPADM

## 2019-06-19 RX ORDER — TIZANIDINE 4 MG/1
4 TABLET ORAL NIGHTLY PRN
Status: DISCONTINUED | OUTPATIENT
Start: 2019-06-19 | End: 2019-06-25 | Stop reason: HOSPADM

## 2019-06-19 RX ORDER — AZELASTINE 1 MG/ML
1 SPRAY, METERED NASAL 2 TIMES DAILY
Status: DISCONTINUED | OUTPATIENT
Start: 2019-06-20 | End: 2019-06-23 | Stop reason: ALTCHOICE

## 2019-06-19 RX ORDER — DIPHENHYDRAMINE HYDROCHLORIDE 50 MG/ML
25 INJECTION INTRAMUSCULAR; INTRAVENOUS ONCE
Status: COMPLETED | OUTPATIENT
Start: 2019-06-19 | End: 2019-06-19

## 2019-06-19 RX ADMIN — HYDROCODONE BITARTRATE AND ACETAMINOPHEN 1 TABLET: 5; 325 TABLET ORAL at 23:52

## 2019-06-19 RX ADMIN — DIPHENHYDRAMINE HYDROCHLORIDE 25 MG: 50 INJECTION INTRAMUSCULAR; INTRAVENOUS at 20:18

## 2019-06-19 RX ADMIN — DIPHENHYDRAMINE HYDROCHLORIDE 25 MG: 25 CAPSULE ORAL at 23:52

## 2019-06-19 RX ADMIN — IOPAMIDOL 73 ML: 755 INJECTION, SOLUTION INTRAVENOUS at 19:17

## 2019-06-20 LAB
APTT PPP: 45.5 SECONDS (ref 24.1–35)
CYTOLOGIST CVX/VAG CYTO: NORMAL
FERRITIN SERPL-MCNC: 1850 NG/ML (ref 11.1–264)
FIBRINOGEN PPP-MCNC: 847 MG/DL (ref 240–460)
FOLATE SERPL-MCNC: >20 NG/ML (ref 4.78–24.2)
INR PPP: 1.12 (ref 0.91–1.09)
IRON 24H UR-MRATE: 24 MCG/DL (ref 42–180)
IRON SATN MFR SERPL: 11 % (ref 20–45)
LDH SERPL-CCNC: 674 U/L (ref 265–665)
PATH INTERP BLD-IMP: NORMAL
PROTHROMBIN TIME: 14.8 SECONDS (ref 11.9–14.6)
RETICS # AUTO: <0.01 10*6/MM3 (ref 0.02–0.13)
RETICS/RBC NFR AUTO: 0.27 % (ref 0.6–1.8)
TIBC SERPL-MCNC: 222 MCG/DL (ref 225–420)
VIT B12 BLD-MCNC: >1000 PG/ML (ref 239–931)

## 2019-06-20 PROCEDURE — 85384 FIBRINOGEN ACTIVITY: CPT | Performed by: NURSE PRACTITIONER

## 2019-06-20 PROCEDURE — 25010000002 FILGRASTIM PER 480 MCG: Performed by: NURSE PRACTITIONER

## 2019-06-20 PROCEDURE — 97161 PT EVAL LOW COMPLEX 20 MIN: CPT | Performed by: PHYSICAL THERAPIST

## 2019-06-20 PROCEDURE — 85730 THROMBOPLASTIN TIME PARTIAL: CPT | Performed by: NURSE PRACTITIONER

## 2019-06-20 PROCEDURE — 94760 N-INVAS EAR/PLS OXIMETRY 1: CPT

## 2019-06-20 PROCEDURE — 99222 1ST HOSP IP/OBS MODERATE 55: CPT | Performed by: INTERNAL MEDICINE

## 2019-06-20 PROCEDURE — 97165 OT EVAL LOW COMPLEX 30 MIN: CPT

## 2019-06-20 PROCEDURE — 94799 UNLISTED PULMONARY SVC/PX: CPT

## 2019-06-20 PROCEDURE — 63710000001 DIPHENHYDRAMINE PER 50 MG: Performed by: FAMILY MEDICINE

## 2019-06-20 PROCEDURE — 85610 PROTHROMBIN TIME: CPT | Performed by: NURSE PRACTITIONER

## 2019-06-20 PROCEDURE — 83010 ASSAY OF HAPTOGLOBIN QUANT: CPT | Performed by: NURSE PRACTITIONER

## 2019-06-20 RX ORDER — SERTRALINE HYDROCHLORIDE 100 MG/1
200 TABLET, FILM COATED ORAL DAILY
Status: DISCONTINUED | OUTPATIENT
Start: 2019-06-20 | End: 2019-06-25 | Stop reason: HOSPADM

## 2019-06-20 RX ORDER — ALBUTEROL SULFATE 2.5 MG/3ML
2.5 SOLUTION RESPIRATORY (INHALATION) EVERY 6 HOURS PRN
Status: DISCONTINUED | OUTPATIENT
Start: 2019-06-20 | End: 2019-06-25 | Stop reason: HOSPADM

## 2019-06-20 RX ORDER — ATORVASTATIN CALCIUM 10 MG/1
20 TABLET, FILM COATED ORAL NIGHTLY
Status: DISCONTINUED | OUTPATIENT
Start: 2019-06-20 | End: 2019-06-25 | Stop reason: HOSPADM

## 2019-06-20 RX ORDER — FUROSEMIDE 20 MG/1
20 TABLET ORAL
Status: DISCONTINUED | OUTPATIENT
Start: 2019-06-20 | End: 2019-06-25 | Stop reason: HOSPADM

## 2019-06-20 RX ORDER — CETIRIZINE HYDROCHLORIDE 10 MG/1
10 TABLET ORAL DAILY
Status: DISCONTINUED | OUTPATIENT
Start: 2019-06-20 | End: 2019-06-25 | Stop reason: HOSPADM

## 2019-06-20 RX ORDER — POTASSIUM CHLORIDE 750 MG/1
40 CAPSULE, EXTENDED RELEASE ORAL 2 TIMES DAILY WITH MEALS
Status: DISCONTINUED | OUTPATIENT
Start: 2019-06-20 | End: 2019-06-25 | Stop reason: HOSPADM

## 2019-06-20 RX ORDER — ALPRAZOLAM 0.25 MG/1
0.25 TABLET ORAL 2 TIMES DAILY PRN
Status: DISCONTINUED | OUTPATIENT
Start: 2019-06-20 | End: 2019-06-20 | Stop reason: SDUPTHER

## 2019-06-20 RX ADMIN — AZELASTINE HYDROCHLORIDE 1 SPRAY: 137 SPRAY, METERED NASAL at 20:41

## 2019-06-20 RX ADMIN — SERTRALINE 200 MG: 100 TABLET, FILM COATED ORAL at 17:28

## 2019-06-20 RX ADMIN — AZELASTINE HYDROCHLORIDE 1 SPRAY: 137 SPRAY, METERED NASAL at 10:46

## 2019-06-20 RX ADMIN — FUROSEMIDE 20 MG: 20 TABLET ORAL at 17:28

## 2019-06-20 RX ADMIN — POTASSIUM CHLORIDE 40 MEQ: 750 CAPSULE, EXTENDED RELEASE ORAL at 17:28

## 2019-06-20 RX ADMIN — CETIRIZINE HYDROCHLORIDE 10 MG: 10 TABLET, FILM COATED ORAL at 17:28

## 2019-06-20 RX ADMIN — HYDROCODONE BITARTRATE AND ACETAMINOPHEN 0.5 TABLET: 5; 325 TABLET ORAL at 14:31

## 2019-06-20 RX ADMIN — ATORVASTATIN CALCIUM 20 MG: 10 TABLET, FILM COATED ORAL at 20:41

## 2019-06-20 RX ADMIN — DIPHENHYDRAMINE HYDROCHLORIDE 25 MG: 25 CAPSULE ORAL at 14:29

## 2019-06-20 RX ADMIN — FILGRASTIM 480 MCG: 480 INJECTION, SOLUTION INTRAVENOUS; SUBCUTANEOUS at 10:46

## 2019-06-20 RX ADMIN — DIPHENHYDRAMINE HYDROCHLORIDE 25 MG: 25 CAPSULE ORAL at 05:41

## 2019-06-21 LAB
ANION GAP SERPL CALCULATED.3IONS-SCNC: 10 MMOL/L (ref 4–13)
ANISOCYTOSIS BLD QL: ABNORMAL
BUN BLD-MCNC: 7 MG/DL (ref 5–21)
BUN/CREAT SERPL: 9.2 (ref 7–25)
CALCIUM SPEC-SCNC: 8.8 MG/DL (ref 8.4–10.4)
CHLORIDE SERPL-SCNC: 96 MMOL/L (ref 98–110)
CO2 SERPL-SCNC: 27 MMOL/L (ref 24–31)
CREAT BLD-MCNC: 0.76 MG/DL (ref 0.5–1.4)
DEPRECATED RDW RBC AUTO: 45.4 FL (ref 40–54)
EOSINOPHIL # BLD MANUAL: 0.13 10*3/MM3 (ref 0–0.7)
EOSINOPHIL NFR BLD MANUAL: 2 % (ref 0–4)
ERYTHROCYTE [DISTWIDTH] IN BLOOD BY AUTOMATED COUNT: 16 % (ref 12–15)
GFR SERPL CREATININE-BSD FRML MDRD: 75 ML/MIN/1.73
GLUCOSE BLD-MCNC: 90 MG/DL (ref 70–100)
HAPTOGLOB SERPL-MCNC: 403 MG/DL (ref 34–200)
HCT VFR BLD AUTO: 23.8 % (ref 37–47)
HGB BLD-MCNC: 7.9 G/DL (ref 12–16)
LYMPHOCYTES # BLD MANUAL: 0.68 10*3/MM3 (ref 0.72–4.86)
LYMPHOCYTES NFR BLD MANUAL: 10.1 % (ref 15–45)
LYMPHOCYTES NFR BLD MANUAL: 5.1 % (ref 4–12)
MCH RBC QN AUTO: 26.2 PG (ref 28–32)
MCHC RBC AUTO-ENTMCNC: 33.2 G/DL (ref 33–36)
MCV RBC AUTO: 79.1 FL (ref 82–98)
MICROCYTES BLD QL: ABNORMAL
MONOCYTES # BLD AUTO: 0.34 10*3/MM3 (ref 0.19–1.3)
NEUTROPHILS # BLD AUTO: 5.56 10*3/MM3 (ref 1.87–8.4)
NEUTROPHILS NFR BLD MANUAL: 82.8 % (ref 39–78)
NRBC SPEC MANUAL: 1 /100 WBC (ref 0–0.2)
PLAT MORPH BLD: NORMAL
PLATELET # BLD AUTO: 118 10*3/MM3 (ref 130–400)
PMV BLD AUTO: 10.8 FL (ref 6–12)
POIKILOCYTOSIS BLD QL SMEAR: ABNORMAL
POTASSIUM BLD-SCNC: 3.2 MMOL/L (ref 3.5–5.3)
RBC # BLD AUTO: 3.01 10*6/MM3 (ref 4.2–5.4)
SODIUM BLD-SCNC: 133 MMOL/L (ref 135–145)
WBC MORPH BLD: NORMAL
WBC NRBC COR # BLD: 6.72 10*3/MM3 (ref 4.8–10.8)

## 2019-06-21 PROCEDURE — 85007 BL SMEAR W/DIFF WBC COUNT: CPT | Performed by: NURSE PRACTITIONER

## 2019-06-21 PROCEDURE — 97116 GAIT TRAINING THERAPY: CPT

## 2019-06-21 PROCEDURE — 94760 N-INVAS EAR/PLS OXIMETRY 1: CPT

## 2019-06-21 PROCEDURE — 85025 COMPLETE CBC W/AUTO DIFF WBC: CPT | Performed by: NURSE PRACTITIONER

## 2019-06-21 PROCEDURE — 25010000002 FILGRASTIM PER 480 MCG: Performed by: NURSE PRACTITIONER

## 2019-06-21 PROCEDURE — 94799 UNLISTED PULMONARY SVC/PX: CPT

## 2019-06-21 PROCEDURE — 63710000001 DIPHENHYDRAMINE PER 50 MG: Performed by: FAMILY MEDICINE

## 2019-06-21 PROCEDURE — 80048 BASIC METABOLIC PNL TOTAL CA: CPT | Performed by: FAMILY MEDICINE

## 2019-06-21 PROCEDURE — 97535 SELF CARE MNGMENT TRAINING: CPT

## 2019-06-21 RX ORDER — ONDANSETRON 4 MG/1
4 TABLET, FILM COATED ORAL EVERY 6 HOURS PRN
Status: DISCONTINUED | OUTPATIENT
Start: 2019-06-21 | End: 2019-06-25 | Stop reason: HOSPADM

## 2019-06-21 RX ORDER — MONTELUKAST SODIUM 10 MG/1
10 TABLET ORAL NIGHTLY
COMMUNITY

## 2019-06-21 RX ORDER — PANTOPRAZOLE SODIUM 40 MG/1
40 TABLET, DELAYED RELEASE ORAL DAILY
COMMUNITY

## 2019-06-21 RX ORDER — DEXTROAMPHETAMINE SACCHARATE, AMPHETAMINE ASPARTATE MONOHYDRATE, DEXTROAMPHETAMINE SULFATE AND AMPHETAMINE SULFATE 2.5; 2.5; 2.5; 2.5 MG/1; MG/1; MG/1; MG/1
10 CAPSULE, EXTENDED RELEASE ORAL DAILY PRN
COMMUNITY
End: 2020-01-06 | Stop reason: ALTCHOICE

## 2019-06-21 RX ORDER — POTASSIUM CHLORIDE 750 MG/1
40 CAPSULE, EXTENDED RELEASE ORAL ONCE
Status: COMPLETED | OUTPATIENT
Start: 2019-06-21 | End: 2019-06-21

## 2019-06-21 RX ORDER — FOLIC ACID 1 MG/1
1 TABLET ORAL DAILY
COMMUNITY

## 2019-06-21 RX ADMIN — FUROSEMIDE 20 MG: 20 TABLET ORAL at 18:22

## 2019-06-21 RX ADMIN — CETIRIZINE HYDROCHLORIDE 10 MG: 10 TABLET, FILM COATED ORAL at 09:32

## 2019-06-21 RX ADMIN — FUROSEMIDE 20 MG: 20 TABLET ORAL at 09:32

## 2019-06-21 RX ADMIN — SERTRALINE 200 MG: 100 TABLET, FILM COATED ORAL at 09:32

## 2019-06-21 RX ADMIN — DIPHENHYDRAMINE HYDROCHLORIDE 25 MG: 25 CAPSULE ORAL at 00:07

## 2019-06-21 RX ADMIN — POTASSIUM CHLORIDE 40 MEQ: 750 CAPSULE, EXTENDED RELEASE ORAL at 09:32

## 2019-06-21 RX ADMIN — AZELASTINE HYDROCHLORIDE 1 SPRAY: 137 SPRAY, METERED NASAL at 20:02

## 2019-06-21 RX ADMIN — ATORVASTATIN CALCIUM 20 MG: 10 TABLET, FILM COATED ORAL at 20:01

## 2019-06-21 RX ADMIN — ONDANSETRON 4 MG: 4 TABLET, FILM COATED ORAL at 20:39

## 2019-06-21 RX ADMIN — AZELASTINE HYDROCHLORIDE 1 SPRAY: 137 SPRAY, METERED NASAL at 09:31

## 2019-06-21 RX ADMIN — ALPRAZOLAM 0.25 MG: 0.25 TABLET ORAL at 15:26

## 2019-06-21 RX ADMIN — POTASSIUM CHLORIDE 40 MEQ: 750 CAPSULE, EXTENDED RELEASE ORAL at 18:22

## 2019-06-21 RX ADMIN — DIPHENHYDRAMINE HYDROCHLORIDE 25 MG: 25 CAPSULE ORAL at 10:10

## 2019-06-21 RX ADMIN — POTASSIUM CHLORIDE 40 MEQ: 750 CAPSULE, EXTENDED RELEASE ORAL at 15:22

## 2019-06-21 RX ADMIN — FILGRASTIM 480 MCG: 480 INJECTION, SOLUTION INTRAVENOUS; SUBCUTANEOUS at 09:31

## 2019-06-22 LAB
ANION GAP SERPL CALCULATED.3IONS-SCNC: 17 MMOL/L (ref 4–13)
BUN BLD-MCNC: 8 MG/DL (ref 5–21)
BUN/CREAT SERPL: 10.4 (ref 7–25)
CALCIUM SPEC-SCNC: 9.7 MG/DL (ref 8.4–10.4)
CHLORIDE SERPL-SCNC: 96 MMOL/L (ref 98–110)
CO2 SERPL-SCNC: 24 MMOL/L (ref 24–31)
CREAT BLD-MCNC: 0.77 MG/DL (ref 0.5–1.4)
DEPRECATED RDW RBC AUTO: 46.3 FL (ref 40–54)
ERYTHROCYTE [DISTWIDTH] IN BLOOD BY AUTOMATED COUNT: 16.4 % (ref 12–15)
GFR SERPL CREATININE-BSD FRML MDRD: 74 ML/MIN/1.73
GLUCOSE BLD-MCNC: 78 MG/DL (ref 70–100)
HCT VFR BLD AUTO: 28.9 % (ref 37–47)
HGB BLD-MCNC: 9.6 G/DL (ref 12–16)
HYPOCHROMIA BLD QL: ABNORMAL
LYMPHOCYTES # BLD MANUAL: 0 10*3/MM3 (ref 0.72–4.86)
LYMPHOCYTES NFR BLD MANUAL: 0 % (ref 15–45)
LYMPHOCYTES NFR BLD MANUAL: 6 % (ref 4–12)
MCH RBC QN AUTO: 26.1 PG (ref 28–32)
MCHC RBC AUTO-ENTMCNC: 33.2 G/DL (ref 33–36)
MCV RBC AUTO: 78.5 FL (ref 82–98)
MICROCYTES BLD QL: ABNORMAL
MONOCYTES # BLD AUTO: 1.73 10*3/MM3 (ref 0.19–1.3)
MYELOCYTES NFR BLD MANUAL: 1 % (ref 0–0)
NEUTROPHILS # BLD AUTO: 26.87 10*3/MM3 (ref 1.87–8.4)
NEUTROPHILS NFR BLD MANUAL: 91 % (ref 39–78)
NEUTS BAND NFR BLD MANUAL: 2 % (ref 0–10)
NEUTS HYPERSEG # BLD: PRESENT 10*3/UL
NRBC SPEC MANUAL: 1 /100 WBC (ref 0–0.2)
PLAT MORPH BLD: NORMAL
PLATELET # BLD AUTO: 227 10*3/MM3 (ref 130–400)
PMV BLD AUTO: 10.4 FL (ref 6–12)
POIKILOCYTOSIS BLD QL SMEAR: ABNORMAL
POTASSIUM BLD-SCNC: 3.7 MMOL/L (ref 3.5–5.3)
RBC # BLD AUTO: 3.68 10*6/MM3 (ref 4.2–5.4)
SODIUM BLD-SCNC: 137 MMOL/L (ref 135–145)
WBC NRBC COR # BLD: 28.89 10*3/MM3 (ref 4.8–10.8)

## 2019-06-22 PROCEDURE — 80048 BASIC METABOLIC PNL TOTAL CA: CPT | Performed by: FAMILY MEDICINE

## 2019-06-22 PROCEDURE — 85007 BL SMEAR W/DIFF WBC COUNT: CPT | Performed by: NURSE PRACTITIONER

## 2019-06-22 PROCEDURE — 85025 COMPLETE CBC W/AUTO DIFF WBC: CPT | Performed by: NURSE PRACTITIONER

## 2019-06-22 PROCEDURE — 25010000002 FILGRASTIM PER 480 MCG: Performed by: NURSE PRACTITIONER

## 2019-06-22 PROCEDURE — 94760 N-INVAS EAR/PLS OXIMETRY 1: CPT

## 2019-06-22 PROCEDURE — 94799 UNLISTED PULMONARY SVC/PX: CPT

## 2019-06-22 PROCEDURE — 97116 GAIT TRAINING THERAPY: CPT

## 2019-06-22 RX ADMIN — HYDROCODONE BITARTRATE AND ACETAMINOPHEN 1 TABLET: 5; 325 TABLET ORAL at 21:37

## 2019-06-22 RX ADMIN — FUROSEMIDE 20 MG: 20 TABLET ORAL at 17:13

## 2019-06-22 RX ADMIN — FILGRASTIM 480 MCG: 480 INJECTION, SOLUTION INTRAVENOUS; SUBCUTANEOUS at 09:27

## 2019-06-22 RX ADMIN — SERTRALINE 200 MG: 100 TABLET, FILM COATED ORAL at 09:27

## 2019-06-22 RX ADMIN — AZELASTINE HYDROCHLORIDE 1 SPRAY: 137 SPRAY, METERED NASAL at 21:37

## 2019-06-22 RX ADMIN — POTASSIUM CHLORIDE 40 MEQ: 750 CAPSULE, EXTENDED RELEASE ORAL at 09:26

## 2019-06-22 RX ADMIN — ATORVASTATIN CALCIUM 20 MG: 10 TABLET, FILM COATED ORAL at 21:38

## 2019-06-22 RX ADMIN — POTASSIUM CHLORIDE 40 MEQ: 750 CAPSULE, EXTENDED RELEASE ORAL at 17:13

## 2019-06-23 LAB
ANISOCYTOSIS BLD QL: ABNORMAL
DEPRECATED RDW RBC AUTO: 46.6 FL (ref 40–54)
ERYTHROCYTE [DISTWIDTH] IN BLOOD BY AUTOMATED COUNT: 16.5 % (ref 12–15)
HCT VFR BLD AUTO: 29.9 % (ref 37–47)
HGB BLD-MCNC: 9.7 G/DL (ref 12–16)
LYMPHOCYTES # BLD MANUAL: 2.27 10*3/MM3 (ref 0.72–4.86)
LYMPHOCYTES NFR BLD MANUAL: 2 % (ref 4–12)
LYMPHOCYTES NFR BLD MANUAL: 6 % (ref 15–45)
MCH RBC QN AUTO: 25.9 PG (ref 28–32)
MCHC RBC AUTO-ENTMCNC: 32.4 G/DL (ref 33–36)
MCV RBC AUTO: 79.9 FL (ref 82–98)
METAMYELOCYTES NFR BLD MANUAL: 1 % (ref 0–0)
MICROCYTES BLD QL: ABNORMAL
MONOCYTES # BLD AUTO: 0.76 10*3/MM3 (ref 0.19–1.3)
NEUTROPHILS # BLD AUTO: 34 10*3/MM3 (ref 1.87–8.4)
NEUTROPHILS NFR BLD MANUAL: 89 % (ref 39–78)
NEUTS BAND NFR BLD MANUAL: 1 % (ref 0–10)
NEUTS HYPERSEG # BLD: PRESENT 10*3/UL
NRBC SPEC MANUAL: 1 /100 WBC (ref 0–0.2)
PLAT MORPH BLD: NORMAL
PLATELET # BLD AUTO: 282 10*3/MM3 (ref 130–400)
PMV BLD AUTO: 10 FL (ref 6–12)
POIKILOCYTOSIS BLD QL SMEAR: ABNORMAL
RBC # BLD AUTO: 3.74 10*6/MM3 (ref 4.2–5.4)
VARIANT LYMPHS NFR BLD MANUAL: 1 % (ref 0–5)
WBC NRBC COR # BLD: 37.78 10*3/MM3 (ref 4.8–10.8)

## 2019-06-23 PROCEDURE — 97116 GAIT TRAINING THERAPY: CPT

## 2019-06-23 PROCEDURE — 97110 THERAPEUTIC EXERCISES: CPT

## 2019-06-23 PROCEDURE — 82668 ASSAY OF ERYTHROPOIETIN: CPT | Performed by: NURSE PRACTITIONER

## 2019-06-23 PROCEDURE — 85007 BL SMEAR W/DIFF WBC COUNT: CPT | Performed by: NURSE PRACTITIONER

## 2019-06-23 PROCEDURE — 85025 COMPLETE CBC W/AUTO DIFF WBC: CPT | Performed by: NURSE PRACTITIONER

## 2019-06-23 RX ORDER — AZELASTINE 1 MG/ML
2 SPRAY, METERED NASAL 2 TIMES DAILY
Status: DISCONTINUED | OUTPATIENT
Start: 2019-06-23 | End: 2019-06-25 | Stop reason: HOSPADM

## 2019-06-23 RX ORDER — FOLIC ACID 1 MG/1
1 TABLET ORAL DAILY
Status: DISCONTINUED | OUTPATIENT
Start: 2019-06-23 | End: 2019-06-25 | Stop reason: HOSPADM

## 2019-06-23 RX ORDER — PANTOPRAZOLE SODIUM 40 MG/1
40 TABLET, DELAYED RELEASE ORAL DAILY
Status: DISCONTINUED | OUTPATIENT
Start: 2019-06-23 | End: 2019-06-25 | Stop reason: HOSPADM

## 2019-06-23 RX ORDER — DEXTROAMPHETAMINE SACCHARATE, AMPHETAMINE ASPARTATE MONOHYDRATE, DEXTROAMPHETAMINE SULFATE AND AMPHETAMINE SULFATE 2.5; 2.5; 2.5; 2.5 MG/1; MG/1; MG/1; MG/1
10 CAPSULE, EXTENDED RELEASE ORAL DAILY PRN
Status: DISCONTINUED | OUTPATIENT
Start: 2019-06-23 | End: 2019-06-23 | Stop reason: RX

## 2019-06-23 RX ORDER — MONTELUKAST SODIUM 10 MG/1
10 TABLET ORAL NIGHTLY
Status: DISCONTINUED | OUTPATIENT
Start: 2019-06-23 | End: 2019-06-25 | Stop reason: HOSPADM

## 2019-06-23 RX ADMIN — AZELASTINE HYDROCHLORIDE 2 SPRAY: 137 SPRAY, METERED NASAL at 20:05

## 2019-06-23 RX ADMIN — PANTOPRAZOLE SODIUM 40 MG: 40 TABLET, DELAYED RELEASE ORAL at 12:24

## 2019-06-23 RX ADMIN — FUROSEMIDE 20 MG: 20 TABLET ORAL at 18:04

## 2019-06-23 RX ADMIN — CETIRIZINE HYDROCHLORIDE 10 MG: 10 TABLET, FILM COATED ORAL at 09:12

## 2019-06-23 RX ADMIN — POTASSIUM CHLORIDE 40 MEQ: 750 CAPSULE, EXTENDED RELEASE ORAL at 18:04

## 2019-06-23 RX ADMIN — HYDROCODONE BITARTRATE AND ACETAMINOPHEN 1 TABLET: 5; 325 TABLET ORAL at 18:36

## 2019-06-23 RX ADMIN — SERTRALINE 200 MG: 100 TABLET, FILM COATED ORAL at 09:12

## 2019-06-23 RX ADMIN — MONTELUKAST SODIUM 10 MG: 10 TABLET, FILM COATED ORAL at 21:00

## 2019-06-23 RX ADMIN — FUROSEMIDE 20 MG: 20 TABLET ORAL at 09:12

## 2019-06-23 RX ADMIN — AZELASTINE HYDROCHLORIDE 1 SPRAY: 137 SPRAY, METERED NASAL at 09:12

## 2019-06-23 RX ADMIN — POTASSIUM CHLORIDE 40 MEQ: 750 CAPSULE, EXTENDED RELEASE ORAL at 09:12

## 2019-06-23 RX ADMIN — FOLIC ACID 1 MG: 1 TABLET ORAL at 12:24

## 2019-06-23 RX ADMIN — ATORVASTATIN CALCIUM 20 MG: 10 TABLET, FILM COATED ORAL at 21:00

## 2019-06-24 LAB
ANISOCYTOSIS BLD QL: ABNORMAL
DEPRECATED RDW RBC AUTO: 45.6 FL (ref 40–54)
EOSINOPHIL # BLD MANUAL: 0.85 10*3/MM3 (ref 0–0.7)
EOSINOPHIL NFR BLD MANUAL: 5.1 % (ref 0–4)
ERYTHROCYTE [DISTWIDTH] IN BLOOD BY AUTOMATED COUNT: 16.5 % (ref 12–15)
HCT VFR BLD AUTO: 28.2 % (ref 37–47)
HGB BLD-MCNC: 9.3 G/DL (ref 12–16)
HYPOCHROMIA BLD QL: ABNORMAL
LYMPHOCYTES # BLD MANUAL: 1.18 10*3/MM3 (ref 0.72–4.86)
LYMPHOCYTES NFR BLD MANUAL: 5.1 % (ref 4–12)
LYMPHOCYTES NFR BLD MANUAL: 7.1 % (ref 15–45)
MCH RBC QN AUTO: 25.6 PG (ref 28–32)
MCHC RBC AUTO-ENTMCNC: 33 G/DL (ref 33–36)
MCV RBC AUTO: 77.7 FL (ref 82–98)
MICROCYTES BLD QL: ABNORMAL
MONOCYTES # BLD AUTO: 0.85 10*3/MM3 (ref 0.19–1.3)
NEUTROPHILS # BLD AUTO: 13.32 10*3/MM3 (ref 1.87–8.4)
NEUTROPHILS NFR BLD MANUAL: 78.8 % (ref 39–78)
NEUTS BAND NFR BLD MANUAL: 1 % (ref 0–10)
NEUTS HYPERSEG # BLD: PRESENT 10*3/UL
PLAT MORPH BLD: NORMAL
PLATELET # BLD AUTO: 225 10*3/MM3 (ref 130–400)
PMV BLD AUTO: 10 FL (ref 6–12)
POIKILOCYTOSIS BLD QL SMEAR: ABNORMAL
POLYCHROMASIA BLD QL SMEAR: ABNORMAL
PROMYELOCYTES NFR BLD MANUAL: 3 % (ref 0–0)
RBC # BLD AUTO: 3.63 10*6/MM3 (ref 4.2–5.4)
WBC NRBC COR # BLD: 16.69 10*3/MM3 (ref 4.8–10.8)

## 2019-06-24 PROCEDURE — 97116 GAIT TRAINING THERAPY: CPT

## 2019-06-24 PROCEDURE — 97110 THERAPEUTIC EXERCISES: CPT

## 2019-06-24 PROCEDURE — 85025 COMPLETE CBC W/AUTO DIFF WBC: CPT | Performed by: NURSE PRACTITIONER

## 2019-06-24 PROCEDURE — 85007 BL SMEAR W/DIFF WBC COUNT: CPT | Performed by: NURSE PRACTITIONER

## 2019-06-24 RX ORDER — ACETAMINOPHEN 325 MG/1
650 TABLET ORAL EVERY 6 HOURS PRN
Status: DISCONTINUED | OUTPATIENT
Start: 2019-06-24 | End: 2019-06-25 | Stop reason: HOSPADM

## 2019-06-24 RX ORDER — DOCUSATE SODIUM 100 MG/1
100 CAPSULE, LIQUID FILLED ORAL 2 TIMES DAILY
Status: DISCONTINUED | OUTPATIENT
Start: 2019-06-24 | End: 2019-06-25 | Stop reason: HOSPADM

## 2019-06-24 RX ORDER — BACITRACIN ZINC 500 [USP'U]/G
OINTMENT TOPICAL AS NEEDED
Status: DISCONTINUED | OUTPATIENT
Start: 2019-06-24 | End: 2019-06-25 | Stop reason: HOSPADM

## 2019-06-24 RX ORDER — BACITRACIN ZINC 500 [USP'U]/G
OINTMENT TOPICAL EVERY 8 HOURS SCHEDULED
Status: DISCONTINUED | OUTPATIENT
Start: 2019-06-24 | End: 2019-06-25 | Stop reason: HOSPADM

## 2019-06-24 RX ADMIN — BACITRACIN: 500 OINTMENT TOPICAL at 13:53

## 2019-06-24 RX ADMIN — MONTELUKAST SODIUM 10 MG: 10 TABLET, FILM COATED ORAL at 21:07

## 2019-06-24 RX ADMIN — FOLIC ACID 1 MG: 1 TABLET ORAL at 08:58

## 2019-06-24 RX ADMIN — HYDROCODONE BITARTRATE AND ACETAMINOPHEN 1 TABLET: 5; 325 TABLET ORAL at 10:59

## 2019-06-24 RX ADMIN — POTASSIUM CHLORIDE 40 MEQ: 750 CAPSULE, EXTENDED RELEASE ORAL at 08:59

## 2019-06-24 RX ADMIN — SERTRALINE 200 MG: 100 TABLET, FILM COATED ORAL at 08:58

## 2019-06-24 RX ADMIN — POTASSIUM CHLORIDE 40 MEQ: 750 CAPSULE, EXTENDED RELEASE ORAL at 17:32

## 2019-06-24 RX ADMIN — AZELASTINE HYDROCHLORIDE 2 SPRAY: 137 SPRAY, METERED NASAL at 21:07

## 2019-06-24 RX ADMIN — MILK OF MAGNESIA 10 ML: 2400 CONCENTRATE ORAL at 17:32

## 2019-06-24 RX ADMIN — ALPRAZOLAM 0.25 MG: 0.25 TABLET ORAL at 21:08

## 2019-06-24 RX ADMIN — NYSTATIN: 100000 CREAM TOPICAL at 13:53

## 2019-06-24 RX ADMIN — FUROSEMIDE 20 MG: 20 TABLET ORAL at 17:32

## 2019-06-24 RX ADMIN — NYSTATIN: 100000 CREAM TOPICAL at 21:08

## 2019-06-24 RX ADMIN — TIZANIDINE 4 MG: 4 TABLET ORAL at 21:08

## 2019-06-24 RX ADMIN — ACETAMINOPHEN 650 MG: 325 TABLET, FILM COATED ORAL at 21:08

## 2019-06-24 RX ADMIN — DOCUSATE SODIUM 100 MG: 100 CAPSULE ORAL at 21:08

## 2019-06-24 RX ADMIN — AZELASTINE HYDROCHLORIDE 2 SPRAY: 137 SPRAY, METERED NASAL at 08:58

## 2019-06-24 RX ADMIN — CETIRIZINE HYDROCHLORIDE 10 MG: 10 TABLET, FILM COATED ORAL at 08:58

## 2019-06-24 RX ADMIN — FUROSEMIDE 20 MG: 20 TABLET ORAL at 08:58

## 2019-06-24 RX ADMIN — ATORVASTATIN CALCIUM 20 MG: 10 TABLET, FILM COATED ORAL at 21:08

## 2019-06-24 RX ADMIN — ALPRAZOLAM 0.25 MG: 0.25 TABLET ORAL at 10:38

## 2019-06-24 RX ADMIN — PANTOPRAZOLE SODIUM 40 MG: 40 TABLET, DELAYED RELEASE ORAL at 08:59

## 2019-06-24 RX ADMIN — BACITRACIN: 500 OINTMENT TOPICAL at 21:11

## 2019-06-25 VITALS
HEART RATE: 94 BPM | RESPIRATION RATE: 18 BRPM | DIASTOLIC BLOOD PRESSURE: 81 MMHG | HEIGHT: 64 IN | WEIGHT: 125 LBS | BODY MASS INDEX: 21.34 KG/M2 | SYSTOLIC BLOOD PRESSURE: 137 MMHG | OXYGEN SATURATION: 100 % | TEMPERATURE: 97.5 F

## 2019-06-25 LAB
ANISOCYTOSIS BLD QL: ABNORMAL
DEPRECATED RDW RBC AUTO: 46.5 FL (ref 40–54)
EOSINOPHIL # BLD MANUAL: 0.23 10*3/MM3 (ref 0–0.7)
EOSINOPHIL NFR BLD MANUAL: 2 % (ref 0–4)
ERYTHROCYTE [DISTWIDTH] IN BLOOD BY AUTOMATED COUNT: 16.6 % (ref 12–15)
ETHNIC BACKGROUND STATED: 36.3 MIU/ML (ref 2.6–18.5)
HCT VFR BLD AUTO: 28.4 % (ref 37–47)
HGB BLD-MCNC: 9 G/DL (ref 12–16)
HYPOCHROMIA BLD QL: ABNORMAL
LYMPHOCYTES # BLD MANUAL: 1.04 10*3/MM3 (ref 0.72–4.86)
LYMPHOCYTES NFR BLD MANUAL: 3 % (ref 4–12)
LYMPHOCYTES NFR BLD MANUAL: 9.1 % (ref 15–45)
MCH RBC QN AUTO: 25.4 PG (ref 28–32)
MCHC RBC AUTO-ENTMCNC: 31.7 G/DL (ref 33–36)
MCV RBC AUTO: 80 FL (ref 82–98)
METAMYELOCYTES NFR BLD MANUAL: 3 % (ref 0–0)
MONOCYTES # BLD AUTO: 0.34 10*3/MM3 (ref 0.19–1.3)
NEUTROPHILS # BLD AUTO: 9.49 10*3/MM3 (ref 1.87–8.4)
NEUTROPHILS NFR BLD MANUAL: 79.8 % (ref 39–78)
NEUTS BAND NFR BLD MANUAL: 3 % (ref 0–10)
PLAT MORPH BLD: NORMAL
PLATELET # BLD AUTO: 210 10*3/MM3 (ref 130–400)
PMV BLD AUTO: 10.2 FL (ref 6–12)
POIKILOCYTOSIS BLD QL SMEAR: ABNORMAL
RBC # BLD AUTO: 3.55 10*6/MM3 (ref 4.2–5.4)
SCHISTOCYTES BLD QL SMEAR: ABNORMAL
WBC MORPH BLD: NORMAL
WBC NRBC COR # BLD: 11.46 10*3/MM3 (ref 4.8–10.8)

## 2019-06-25 PROCEDURE — 63710000001 DIPHENHYDRAMINE PER 50 MG: Performed by: FAMILY MEDICINE

## 2019-06-25 PROCEDURE — 97110 THERAPEUTIC EXERCISES: CPT

## 2019-06-25 PROCEDURE — 85007 BL SMEAR W/DIFF WBC COUNT: CPT | Performed by: NURSE PRACTITIONER

## 2019-06-25 PROCEDURE — 97116 GAIT TRAINING THERAPY: CPT

## 2019-06-25 PROCEDURE — 97535 SELF CARE MNGMENT TRAINING: CPT

## 2019-06-25 PROCEDURE — 85025 COMPLETE CBC W/AUTO DIFF WBC: CPT | Performed by: NURSE PRACTITIONER

## 2019-06-25 RX ORDER — GINSENG 100 MG
CAPSULE ORAL EVERY 8 HOURS SCHEDULED
Qty: 28.4 G | Refills: 0 | Status: SHIPPED | OUTPATIENT
Start: 2019-06-25 | End: 2020-01-06 | Stop reason: ALTCHOICE

## 2019-06-25 RX ORDER — POTASSIUM CHLORIDE 750 MG/1
40 CAPSULE, EXTENDED RELEASE ORAL 2 TIMES DAILY WITH MEALS
Qty: 60 CAPSULE | Refills: 0 | Status: SHIPPED | OUTPATIENT
Start: 2019-06-25 | End: 2020-01-06 | Stop reason: ALTCHOICE

## 2019-06-25 RX ORDER — PSEUDOEPHEDRINE HCL 30 MG
100 TABLET ORAL 2 TIMES DAILY
Qty: 60 EACH | Refills: 0 | Status: SHIPPED | OUTPATIENT
Start: 2019-06-25 | End: 2020-01-06 | Stop reason: ALTCHOICE

## 2019-06-25 RX ADMIN — FOLIC ACID 1 MG: 1 TABLET ORAL at 08:24

## 2019-06-25 RX ADMIN — PANTOPRAZOLE SODIUM 40 MG: 40 TABLET, DELAYED RELEASE ORAL at 08:24

## 2019-06-25 RX ADMIN — POTASSIUM CHLORIDE 40 MEQ: 750 CAPSULE, EXTENDED RELEASE ORAL at 08:23

## 2019-06-25 RX ADMIN — BACITRACIN: 500 OINTMENT TOPICAL at 05:27

## 2019-06-25 RX ADMIN — BACITRACIN: 500 OINTMENT TOPICAL at 16:15

## 2019-06-25 RX ADMIN — Medication 500 UNITS: at 16:18

## 2019-06-25 RX ADMIN — AZELASTINE HYDROCHLORIDE 2 SPRAY: 137 SPRAY, METERED NASAL at 08:24

## 2019-06-25 RX ADMIN — NYSTATIN: 100000 CREAM TOPICAL at 08:24

## 2019-06-25 RX ADMIN — CETIRIZINE HYDROCHLORIDE 10 MG: 10 TABLET, FILM COATED ORAL at 08:24

## 2019-06-25 RX ADMIN — SERTRALINE 200 MG: 100 TABLET, FILM COATED ORAL at 08:24

## 2019-06-25 RX ADMIN — DOCUSATE SODIUM 100 MG: 100 CAPSULE ORAL at 08:24

## 2019-06-25 RX ADMIN — DIPHENHYDRAMINE HYDROCHLORIDE 25 MG: 25 CAPSULE ORAL at 07:01

## 2019-06-25 RX ADMIN — FUROSEMIDE 20 MG: 20 TABLET ORAL at 08:24

## 2019-06-26 ENCOUNTER — READMISSION MANAGEMENT (OUTPATIENT)
Dept: CALL CENTER | Facility: HOSPITAL | Age: 69
End: 2019-06-26

## 2019-06-26 NOTE — OUTREACH NOTE
Prep Survey      Responses   Facility patient discharged from?  [facility]   Is patient eligible?  Yes   Discharge diagnosis    Pancytopenia,     Does the patient have one of the following disease processes/diagnoses(primary or secondary)?  Other   Does the patient have Home health ordered?  Yes   What is the Home health agency?   [facility]   Is there a DME ordered?  No   Prep survey completed?  Yes          Azul Neville RN

## 2019-06-27 ENCOUNTER — READMISSION MANAGEMENT (OUTPATIENT)
Dept: CALL CENTER | Facility: HOSPITAL | Age: 69
End: 2019-06-27

## 2019-06-27 ENCOUNTER — LAB REQUISITION (OUTPATIENT)
Dept: LAB | Facility: HOSPITAL | Age: 69
End: 2019-06-27

## 2019-06-27 DIAGNOSIS — Z00.00 ENCOUNTER FOR GENERAL ADULT MEDICAL EXAMINATION WITHOUT ABNORMAL FINDINGS: ICD-10-CM

## 2019-06-27 LAB
BACTERIA UR QL AUTO: ABNORMAL /HPF
BILIRUB UR QL STRIP: NEGATIVE
CLARITY UR: CLEAR
COLOR UR: YELLOW
GLUCOSE UR STRIP-MCNC: NEGATIVE MG/DL
HGB UR QL STRIP.AUTO: ABNORMAL
HYALINE CASTS UR QL AUTO: ABNORMAL /LPF
KETONES UR QL STRIP: NEGATIVE
LEUKOCYTE ESTERASE UR QL STRIP.AUTO: ABNORMAL
NITRITE UR QL STRIP: NEGATIVE
PH UR STRIP.AUTO: 7 [PH] (ref 5–8)
PROT UR QL STRIP: NEGATIVE
RBC # UR: ABNORMAL /HPF
REF LAB TEST METHOD: ABNORMAL
SP GR UR STRIP: 1.01 (ref 1–1.03)
SQUAMOUS #/AREA URNS HPF: ABNORMAL /HPF
UROBILINOGEN UR QL STRIP: ABNORMAL
WBC UR QL AUTO: ABNORMAL /HPF

## 2019-06-27 PROCEDURE — 81001 URINALYSIS AUTO W/SCOPE: CPT | Performed by: FAMILY MEDICINE

## 2019-06-27 PROCEDURE — 87086 URINE CULTURE/COLONY COUNT: CPT | Performed by: FAMILY MEDICINE

## 2019-06-27 NOTE — OUTREACH NOTE
Medical Week 1 Survey      Responses   Facility patient discharged from?  [facility]   Does the patient have one of the following disease processes/diagnoses(primary or secondary)?  Other   Is there a successful TCM telephone encounter documented?  No   Week 1 attempt successful?  Yes   Call start time  1125   Revoke  Decline to participate   Call end time  1127          Montserrat Macias RN

## 2019-06-29 LAB
BACTERIA SPEC AEROBE CULT: ABNORMAL
BACTERIA SPEC AEROBE CULT: ABNORMAL

## 2019-07-02 ENCOUNTER — HOSPITAL ENCOUNTER (OUTPATIENT)
Dept: INFUSION THERAPY | Age: 69
Discharge: HOME | End: 2019-07-02
Payer: MEDICARE

## 2019-07-02 DIAGNOSIS — C34.90 NON-SMALL CELL LUNG CANCER, UNSPECIFIED LATERALITY (HCC): ICD-10-CM

## 2019-07-02 DIAGNOSIS — C34.12 SQUAMOUS CELL CARCINOMA OF BRONCHUS IN LEFT UPPER LOBE (HCC): Primary | ICD-10-CM

## 2019-07-02 PROCEDURE — 85025 COMPLETE CBC W/AUTO DIFF WBC: CPT

## 2019-07-02 PROCEDURE — 96372 THER/PROPH/DIAG INJ SC/IM: CPT

## 2019-07-02 PROCEDURE — 6360000002 HC RX W HCPCS: Performed by: INTERNAL MEDICINE

## 2019-07-02 PROCEDURE — 80053 COMPREHEN METABOLIC PANEL: CPT

## 2019-07-02 PROCEDURE — 2580000003 HC RX 258: Performed by: INTERNAL MEDICINE

## 2019-07-02 PROCEDURE — 96375 TX/PRO/DX INJ NEW DRUG ADDON: CPT

## 2019-07-02 PROCEDURE — 96413 CHEMO IV INFUSION 1 HR: CPT

## 2019-07-02 RX ORDER — CYANOCOBALAMIN 1000 UG/ML
1000 INJECTION INTRAMUSCULAR; SUBCUTANEOUS ONCE
Status: DISCONTINUED | OUTPATIENT
Start: 2019-07-02 | End: 2019-07-04 | Stop reason: HOSPADM

## 2019-07-02 RX ORDER — SODIUM CHLORIDE 9 MG/ML
20 INJECTION, SOLUTION INTRAVENOUS ONCE
Status: DISCONTINUED | OUTPATIENT
Start: 2019-07-02 | End: 2019-07-04 | Stop reason: HOSPADM

## 2019-07-02 RX ORDER — SODIUM CHLORIDE 0.9 % (FLUSH) 0.9 %
10 SYRINGE (ML) INJECTION PRN
Status: DISCONTINUED | OUTPATIENT
Start: 2019-07-02 | End: 2019-07-03 | Stop reason: HOSPADM

## 2019-07-02 RX ORDER — PALONOSETRON 0.05 MG/ML
0.25 INJECTION, SOLUTION INTRAVENOUS ONCE
Status: DISCONTINUED | OUTPATIENT
Start: 2019-07-02 | End: 2019-07-04 | Stop reason: HOSPADM

## 2019-07-02 RX ORDER — DIPHENHYDRAMINE HYDROCHLORIDE 50 MG/ML
50 INJECTION INTRAMUSCULAR; INTRAVENOUS PRN
Status: DISCONTINUED | OUTPATIENT
Start: 2019-07-02 | End: 2019-07-04 | Stop reason: HOSPADM

## 2019-07-02 RX ORDER — HEPARIN SODIUM (PORCINE) LOCK FLUSH IV SOLN 100 UNIT/ML 100 UNIT/ML
500 SOLUTION INTRAVENOUS PRN
Status: DISCONTINUED | OUTPATIENT
Start: 2019-07-02 | End: 2019-07-03 | Stop reason: HOSPADM

## 2019-07-02 RX ORDER — METHYLPREDNISOLONE SODIUM SUCCINATE 125 MG/2ML
125 INJECTION, POWDER, LYOPHILIZED, FOR SOLUTION INTRAMUSCULAR; INTRAVENOUS PRN
Status: DISCONTINUED | OUTPATIENT
Start: 2019-07-02 | End: 2019-07-04 | Stop reason: HOSPADM

## 2019-07-02 RX ORDER — DEXAMETHASONE SODIUM PHOSPHATE 10 MG/ML
10 INJECTION, SOLUTION INTRAMUSCULAR; INTRAVENOUS ONCE
Status: DISCONTINUED | OUTPATIENT
Start: 2019-07-02 | End: 2019-07-04 | Stop reason: HOSPADM

## 2019-07-02 RX ORDER — SODIUM CHLORIDE 0.9 % (FLUSH) 0.9 %
5 SYRINGE (ML) INJECTION PRN
Status: DISCONTINUED | OUTPATIENT
Start: 2019-07-02 | End: 2019-07-03 | Stop reason: HOSPADM

## 2019-07-02 RX ORDER — EPINEPHRINE 1 MG/ML
0.3 INJECTION, SOLUTION, CONCENTRATE INTRAVENOUS
Status: DISPENSED | OUTPATIENT
Start: 2019-07-02 | End: 2019-07-02

## 2019-07-05 ENCOUNTER — HOSPITAL ENCOUNTER (OUTPATIENT)
Dept: GENERAL RADIOLOGY | Age: 69
Discharge: HOME | End: 2019-07-05
Payer: MEDICARE

## 2019-07-05 DIAGNOSIS — R10.30 INGUINAL PAIN, UNSPECIFIED LATERALITY: ICD-10-CM

## 2019-07-05 PROCEDURE — 74019 RADEX ABDOMEN 2 VIEWS: CPT

## 2019-07-09 PROCEDURE — 85007 BL SMEAR W/DIFF WBC COUNT: CPT | Performed by: NURSE PRACTITIONER

## 2019-07-09 PROCEDURE — 80053 COMPREHEN METABOLIC PANEL: CPT | Performed by: NURSE PRACTITIONER

## 2019-07-09 PROCEDURE — 85025 COMPLETE CBC W/AUTO DIFF WBC: CPT | Performed by: NURSE PRACTITIONER

## 2019-07-10 ENCOUNTER — HOSPITAL ENCOUNTER (INPATIENT)
Age: 69
LOS: 5 days | Discharge: HOME HEALTH | DRG: 809 | End: 2019-07-15
Attending: EMERGENCY MEDICINE | Admitting: FAMILY MEDICINE
Payer: MEDICARE

## 2019-07-10 ENCOUNTER — APPOINTMENT (OUTPATIENT)
Dept: GENERAL RADIOLOGY | Age: 69
DRG: 809 | End: 2019-07-10
Payer: MEDICARE

## 2019-07-10 ENCOUNTER — HOSPITAL ENCOUNTER (OUTPATIENT)
Dept: INFUSION THERAPY | Age: 69
Setting detail: INFUSION SERIES
Discharge: HOME | End: 2019-07-10
Payer: MEDICARE

## 2019-07-10 DIAGNOSIS — D61.818 PANCYTOPENIA (HCC): Primary | ICD-10-CM
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
## 2019-07-10 DIAGNOSIS — Z87.898 HISTORY OF FEVER: ICD-10-CM

## 2019-07-10 DIAGNOSIS — R53.1 GENERALIZED WEAKNESS: ICD-10-CM

## 2019-07-10 LAB
ABO/RH: NORMAL
ABO/RH: NORMAL
ALBUMIN SERPL-MCNC: 3.3 G/DL (ref 3.5–5.2)
ALP BLD-CCNC: 159 U/L (ref 35–104)
ALT SERPL-CCNC: 28 U/L (ref 5–33)
ANION GAP SERPL CALCULATED.3IONS-SCNC: 14 MMOL/L (ref 7–19)
ANTIBODY SCREEN: NORMAL
APTT: 153.8 SEC (ref 26–36.2)
AST SERPL-CCNC: 44 U/L (ref 5–32)
ATYPICAL LYMPHOCYTE RELATIVE PERCENT: 7 % (ref 0–8)
BACTERIA: NEGATIVE /HPF
BASOPHILS ABSOLUTE: 0 K/UL (ref 0–0.2)
BASOPHILS RELATIVE PERCENT: 0 % (ref 0–1)
BILIRUB SERPL-MCNC: 0.6 MG/DL (ref 0.2–1.2)
BILIRUBIN URINE: NEGATIVE
BLOOD BANK DISPENSE STATUS: NORMAL
BLOOD BANK PRODUCT CODE: NORMAL
BLOOD, URINE: NEGATIVE
BPU ID: NORMAL
BUN BLDV-MCNC: 13 MG/DL (ref 8–23)
CALCIUM SERPL-MCNC: 9 MG/DL (ref 8.8–10.2)
CHLORIDE BLD-SCNC: 93 MMOL/L (ref 98–111)
CLARITY: CLEAR
CO2: 23 MMOL/L (ref 22–29)
COLOR: YELLOW
CREAT SERPL-MCNC: 0.9 MG/DL (ref 0.5–0.9)
DESCRIPTION BLOOD BANK: NORMAL
EOSINOPHILS ABSOLUTE: 0.08 K/UL (ref 0–0.6)
EOSINOPHILS RELATIVE PERCENT: 5 % (ref 0–5)
EPITHELIAL CELLS, UA: 1 /HPF (ref 0–5)
FERRITIN: >2000 NG/ML (ref 13–150)
GFR NON-AFRICAN AMERICAN: >60
GLUCOSE BLD-MCNC: 108 MG/DL (ref 74–109)
GLUCOSE URINE: NEGATIVE MG/DL
HCT VFR BLD CALC: 21.4 % (ref 37–47)
HEMOGLOBIN: 6.8 G/DL (ref 12–16)
HYALINE CASTS: 1 /HPF (ref 0–8)
HYPOCHROMIA: ABNORMAL
INR BLD: 1.34 (ref 0.88–1.18)
IRON SATURATION: 15 % (ref 14–50)
IRON: 31 UG/DL (ref 37–145)
KETONES, URINE: NEGATIVE MG/DL
LACTIC ACID: 1.2 MMOL/L (ref 0.5–1.9)
LEUKOCYTE ESTERASE, URINE: NEGATIVE
LYMPHOCYTES ABSOLUTE: 0.4 K/UL (ref 1.1–4.5)
LYMPHOCYTES RELATIVE PERCENT: 18 % (ref 20–40)
MCH RBC QN AUTO: 26.7 PG (ref 27–31)
MCHC RBC AUTO-ENTMCNC: 31.8 G/DL (ref 33–37)
MCV RBC AUTO: 83.9 FL (ref 81–99)
MONOCYTES ABSOLUTE: 0.2 K/UL (ref 0–0.9)
MONOCYTES RELATIVE PERCENT: 13 % (ref 0–10)
NEUTROPHILS ABSOLUTE: 0.9 K/UL (ref 1.5–7.5)
NEUTROPHILS RELATIVE PERCENT: 57 % (ref 50–65)
NITRITE, URINE: NEGATIVE
OCCULT BLOOD QC: NORMAL
OCCULT BLOOD SCREENING: NORMAL
PDW BLD-RTO: 19.6 % (ref 11.5–14.5)
PH UA: 7 (ref 5–8)
PLATELET # BLD: 82 K/UL (ref 130–400)
PLATELET SLIDE REVIEW: ABNORMAL
PMV BLD AUTO: 10.9 FL (ref 9.4–12.3)
POTASSIUM SERPL-SCNC: 4.1 MMOL/L (ref 3.5–5)
PROTEIN UA: 30 MG/DL
PROTHROMBIN TIME: 15.9 SEC (ref 12–14.6)
RBC # BLD: 2.55 M/UL (ref 4.2–5.4)
RBC UA: 1 /HPF (ref 0–4)
SODIUM BLD-SCNC: 130 MMOL/L (ref 136–145)
SPECIFIC GRAVITY UA: 1.01 (ref 1–1.03)
TOTAL CK: 34 U/L (ref 26–192)
TOTAL IRON BINDING CAPACITY: 208 UG/DL (ref 250–400)
TOTAL PROTEIN: 6.6 G/DL (ref 6.6–8.7)
URINE REFLEX TO CULTURE: ABNORMAL
UROBILINOGEN, URINE: 1 E.U./DL
WBC # BLD: 1.6 K/UL (ref 4.8–10.8)
WBC UA: 1 /HPF (ref 0–5)

## 2019-07-10 PROCEDURE — 36415 COLL VENOUS BLD VENIPUNCTURE: CPT

## 2019-07-10 PROCEDURE — 86850 RBC ANTIBODY SCREEN: CPT

## 2019-07-10 PROCEDURE — 99221 1ST HOSP IP/OBS SF/LOW 40: CPT | Performed by: INTERNAL MEDICINE

## 2019-07-10 PROCEDURE — 85730 THROMBOPLASTIN TIME PARTIAL: CPT

## 2019-07-10 PROCEDURE — 99285 EMERGENCY DEPT VISIT HI MDM: CPT | Performed by: EMERGENCY MEDICINE

## 2019-07-10 PROCEDURE — 96374 THER/PROPH/DIAG INJ IV PUSH: CPT

## 2019-07-10 PROCEDURE — P9016 RBC LEUKOCYTES REDUCED: HCPCS

## 2019-07-10 PROCEDURE — 81001 URINALYSIS AUTO W/SCOPE: CPT

## 2019-07-10 PROCEDURE — 86901 BLOOD TYPING SEROLOGIC RH(D): CPT

## 2019-07-10 PROCEDURE — 83540 ASSAY OF IRON: CPT

## 2019-07-10 PROCEDURE — 71045 X-RAY EXAM CHEST 1 VIEW: CPT

## 2019-07-10 PROCEDURE — 86923 COMPATIBILITY TEST ELECTRIC: CPT

## 2019-07-10 PROCEDURE — 2580000003 HC RX 258: Performed by: EMERGENCY MEDICINE

## 2019-07-10 PROCEDURE — 6360000002 HC RX W HCPCS: Performed by: EMERGENCY MEDICINE

## 2019-07-10 PROCEDURE — 83605 ASSAY OF LACTIC ACID: CPT

## 2019-07-10 PROCEDURE — 85025 COMPLETE CBC W/AUTO DIFF WBC: CPT

## 2019-07-10 PROCEDURE — 83550 IRON BINDING TEST: CPT

## 2019-07-10 PROCEDURE — 93005 ELECTROCARDIOGRAM TRACING: CPT

## 2019-07-10 PROCEDURE — 1210000000 HC MED SURG R&B

## 2019-07-10 PROCEDURE — 6360000002 HC RX W HCPCS: Performed by: INTERNAL MEDICINE

## 2019-07-10 PROCEDURE — 2580000003 HC RX 258: Performed by: INTERNAL MEDICINE

## 2019-07-10 PROCEDURE — 86900 BLOOD TYPING SEROLOGIC ABO: CPT

## 2019-07-10 PROCEDURE — 80053 COMPREHEN METABOLIC PANEL: CPT

## 2019-07-10 PROCEDURE — 85610 PROTHROMBIN TIME: CPT

## 2019-07-10 PROCEDURE — 87040 BLOOD CULTURE FOR BACTERIA: CPT

## 2019-07-10 PROCEDURE — 99285 EMERGENCY DEPT VISIT HI MDM: CPT

## 2019-07-10 PROCEDURE — G0328 FECAL BLOOD SCRN IMMUNOASSAY: HCPCS

## 2019-07-10 PROCEDURE — 82550 ASSAY OF CK (CPK): CPT

## 2019-07-10 PROCEDURE — 36430 TRANSFUSION BLD/BLD COMPNT: CPT

## 2019-07-10 PROCEDURE — 82728 ASSAY OF FERRITIN: CPT

## 2019-07-10 PROCEDURE — 86922 COMPATIBILITY TEST ANTIGLOB: CPT

## 2019-07-10 RX ORDER — ACETAMINOPHEN 325 MG/1
650 TABLET ORAL EVERY 4 HOURS PRN
Status: DISCONTINUED | OUTPATIENT
Start: 2019-07-10 | End: 2019-07-13 | Stop reason: SDUPTHER

## 2019-07-10 RX ORDER — MONTELUKAST SODIUM 10 MG/1
10 TABLET ORAL NIGHTLY
Status: DISCONTINUED | OUTPATIENT
Start: 2019-07-10 | End: 2019-07-15 | Stop reason: HOSPADM

## 2019-07-10 RX ORDER — CETIRIZINE HYDROCHLORIDE 10 MG/1
10 TABLET ORAL DAILY
Status: DISCONTINUED | OUTPATIENT
Start: 2019-07-11 | End: 2019-07-15 | Stop reason: HOSPADM

## 2019-07-10 RX ORDER — SERTRALINE HYDROCHLORIDE 100 MG/1
200 TABLET, FILM COATED ORAL DAILY
Status: DISCONTINUED | OUTPATIENT
Start: 2019-07-11 | End: 2019-07-15 | Stop reason: HOSPADM

## 2019-07-10 RX ORDER — 0.9 % SODIUM CHLORIDE 0.9 %
250 INTRAVENOUS SOLUTION INTRAVENOUS ONCE
Status: COMPLETED | OUTPATIENT
Start: 2019-07-10 | End: 2019-07-11

## 2019-07-10 RX ORDER — CEFEPIME HYDROCHLORIDE 2 G/50ML
2 INJECTION, SOLUTION INTRAVENOUS EVERY 12 HOURS
Status: DISCONTINUED | OUTPATIENT
Start: 2019-07-10 | End: 2019-07-10 | Stop reason: SDUPTHER

## 2019-07-10 RX ORDER — POTASSIUM CITRATE 10 MEQ/1
10 TABLET, EXTENDED RELEASE ORAL 2 TIMES DAILY WITH MEALS
Status: DISCONTINUED | OUTPATIENT
Start: 2019-07-11 | End: 2019-07-15 | Stop reason: HOSPADM

## 2019-07-10 RX ORDER — SODIUM CHLORIDE 0.9 % (FLUSH) 0.9 %
10 SYRINGE (ML) INJECTION EVERY 12 HOURS SCHEDULED
Status: DISCONTINUED | OUTPATIENT
Start: 2019-07-10 | End: 2019-07-15 | Stop reason: HOSPADM

## 2019-07-10 RX ORDER — ACETAMINOPHEN 325 MG/1
650 TABLET ORAL EVERY 6 HOURS PRN
Status: DISCONTINUED | OUTPATIENT
Start: 2019-07-10 | End: 2019-07-15 | Stop reason: HOSPADM

## 2019-07-10 RX ORDER — 0.9 % SODIUM CHLORIDE 0.9 %
1000 INTRAVENOUS SOLUTION INTRAVENOUS ONCE
Status: DISCONTINUED | OUTPATIENT
Start: 2019-07-10 | End: 2019-07-10

## 2019-07-10 RX ORDER — FUROSEMIDE 20 MG/1
20 TABLET ORAL DAILY
Status: DISCONTINUED | OUTPATIENT
Start: 2019-07-11 | End: 2019-07-15 | Stop reason: HOSPADM

## 2019-07-10 RX ORDER — SODIUM CHLORIDE 0.9 % (FLUSH) 0.9 %
10 SYRINGE (ML) INJECTION PRN
Status: DISCONTINUED | OUTPATIENT
Start: 2019-07-10 | End: 2019-07-15 | Stop reason: HOSPADM

## 2019-07-10 RX ADMIN — TBO-FILGRASTIM 480 MCG: 480 INJECTION, SOLUTION SUBCUTANEOUS at 17:29

## 2019-07-10 RX ADMIN — SODIUM CHLORIDE 250 ML: 9 INJECTION, SOLUTION INTRAVENOUS at 16:34

## 2019-07-10 RX ADMIN — CEFEPIME 2 G: 2 INJECTION, POWDER, FOR SOLUTION INTRAVENOUS at 14:12

## 2019-07-10 ASSESSMENT — ENCOUNTER SYMPTOMS
VOMITING: 0
ABDOMINAL PAIN: 0
SHORTNESS OF BREATH: 0
RHINORRHEA: 0
NAUSEA: 0
DIARRHEA: 0
BACK PAIN: 0
SORE THROAT: 0

## 2019-07-10 NOTE — ED PROVIDER NOTES
[facility] EMERGENCY DEPT  eMERGENCY dEPARTMENT eNCOUnter      Pt Name: Sadaf Gutierrez  MRN: [id number]  [place] 1950  Date of evaluation: 7/10/2019  Provider: MD Jacques Martinez       Chief Complaint   Patient presents with    Fall     Multiple    Abnormal Lab     Needs blood transfusion         HISTORY OF PRESENT ILLNESS   (Location/Symptom, Timing/Onset,Context/Setting, Quality, Duration, Modifying Factors, Severity)  Note limiting factors. Sadaf Gutierrez is a 70 y.o. female who presents to the emergency department with a fall. Patient has fallen 3 times. She slipped to the floor from a chair. She sat on the floor all night because she could not get up again. She has not had any injury. She has not hit her head. She denies any pain. The patient had a fever 101 the day before yesterday. She was seen in urgent care yesterday and placed on antibiotic for sinusitis. Patient has had some bilateral lower extremity cellulitis which is improved at this time per her daughter. Patient has baseline confusion and is at her mental status baseline at this time. She was supposed to have a transfusion today. Patient has pancytopenia secondary to chemotherapy for lung cancer. Dr. Lay Michelle is her oncologist.    HPI    NursingNotes were reviewed. REVIEW OF SYSTEMS    (2-9 systems for level 4, 10 or more for level 5)     Review of Systems   Constitutional: Positive for activity change, appetite change, fatigue and fever. Negative for chills. HENT: Negative for rhinorrhea and sore throat. Respiratory: Negative for shortness of breath. Cardiovascular: Negative for chest pain and leg swelling. Gastrointestinal: Negative for abdominal pain, diarrhea, nausea and vomiting. Genitourinary: Negative for difficulty urinating. Musculoskeletal: Positive for neck pain. Negative for back pain. Skin: Negative for rash. Neurological: Positive for weakness. Negative for headaches. findings:        Interpretation per the Radiologist below, if available at the time of this note:    XR CHEST PORTABLE   Final Result   1. No radiographic evidence of acute cardiopulmonary process. Signed by Dr Kell Hernandez on 7/10/2019 12:45 PM            ED BEDSIDE ULTRASOUND:   Performed by ED Physician - none    LABS:  Labs Reviewed   CBC WITH AUTO DIFFERENTIAL - Abnormal; Notable for the following components:       Result Value    WBC 1.6 (*)     RBC 2.55 (*)     Hemoglobin 6.8 (*)     Hematocrit 21.4 (*)     MCH 26.7 (*)     MCHC 31.8 (*)     RDW 19.6 (*)     Platelets 82 (*)     Lymphocytes % 18.0 (*)     Monocytes % 13.0 (*)     Neutrophils # 0.9 (*)     Lymphocytes # 0.4 (*)     Hypochromia 1+ (*)     All other components within normal limits   COMPREHENSIVE METABOLIC PANEL - Abnormal; Notable for the following components:    Sodium 130 (*)     Chloride 93 (*)     Alb 3.3 (*)     Alkaline Phosphatase 159 (*)     AST 44 (*)     All other components within normal limits   PROTIME-INR - Abnormal; Notable for the following components:    Protime 15.9 (*)     INR 1.34 (*)     All other components within normal limits   APTT - Abnormal; Notable for the following components:    aPTT 153.8 (*)     All other components within normal limits    Narrative:     CALL  Guy  KLED tel. ,  Hematology results called to and read back by Leo Almaguer rn er, 07/10/2019 13:03,  by LIDIA   URINE RT REFLEX TO CULTURE - Abnormal; Notable for the following components:    Protein, UA 30 (*)     All other components within normal limits   CULTURE BLOOD #1   CULTURE BLOOD #2   CK   LACTIC ACID, PLASMA   MICROSCOPIC URINALYSIS   TYPE AND SCREEN   PREPARE RBC (CROSSMATCH)   ABORH TUBE       All other labs were within normal range or not returned as of this dictation.     EMERGENCY DEPARTMENT COURSE and DIFFERENTIALDIAGNOSIS/MDM:   Vitals:    Vitals:    07/10/19 1110 07/10/19 1200 07/10/19 1300 07/10/19 1400   BP: 117/67      Pulse: 110  108 Resp: 25 20 20 20   Temp: 99.1 °F (37.3 °C)      TempSrc: Oral      SpO2: 98%  96%    Weight:       Height:           MDM  Pt's daughter declines head CT. States she is at her mental baseline. Did not hit head. Has had 3 falls over the weekend but low impact with no injury. Pt denies any pain and no pain with palpation. Has chronic neck pain, unchanged from baseline, no midline pain. No obvious bruises or contusions. D/w Hodan Perez for Dr Anne Marie Rodriguez, she will d/w Dr Marley Garcia and call back. [facility] [place] Marley Garcia called back and can admit if needed  D/w [place] Junior for Dr Eleni Turner. Recommended admission with neupogen 480 sq daily and daily cbc with diff and start pt on cefepime. Admission orders placed. CONSULTS:  IP CONSULT TO ONCOLOGY    PROCEDURES:  Unless otherwise notedbelow, none     Procedures    FINAL IMPRESSION     1. Pancytopenia ([address]    2. History of fever    3. Fall, initial encounter    4.  Generalized weakness          DISPOSITION/PLAN   DISPOSITION        PATIENT REFERRED TO:  @FUP@    DISCHARGE MEDICATIONS:  New Prescriptions    No medications on file          (Please note that portions of this note were completed with a voice recognition program.  Efforts were made to edit the dictations butoccasionally words are mis-transcribed.)    Aline Vera MD (electronically signed)  AttendingEmergency Physician         Aline Vera MD  07/10/19   [address], MD  07/10/19 8267

## 2019-07-10 NOTE — CONSULTS
7.1.   A chest x-ray on 1/4/2017 revealed a 3.8 cm mass lesion in the LLL that previously measured approximately 4.1 cm. On 1/6/2017, a left thoracotomy with left lower lobectomy and mediastinal lymph node dissection was performed by Dr. Matheus Ambrose. Pathology revealed invasive moderately differentiated adenocarcinoma. The tumor measured 4.5 cm. Margins were clear of invasive carcinoma and  no lymphovascular space invasion was noted. 5 of 5 lymph nodes were negative for metastatic carcinoma. Bilateral renal ultrasounds on 2/28/2017 identified a complex cyst at the upper pole of the right kidney measuring up to 3 cm increased in size compared to a study on 12/28/2012 where it measured 2.1 cm in maximum diameter. Two small benign cysts were noted in the left kidney. A CT scan of the abdomen and pelvis on 3/8/2017 confirmed that this was a simple cyst in the upper pole of the right kidney. Adjuvant chemotherapy was discussed at her initial visit on 2/22/2017 as well as on followup visit 3/13/2017. She is agreeable and desires to proceed accordingly with adjuvant Cisplatinum and Alimta. Lorrie Woodard desires however to postpone initiation of adjuvant chemotherapy until 4/11/2017. Her wishes were respected and chemotherapy delivered as noted below. CT scan of chest on 7/27/2017 revealed postsurgical changes  and no acute process. Calcified lymph nodes are present in the subcorneal region and left hilum  CT scan of abdomen and pelvis on 7/27/2017 revealed no intra-abdominal or pelvic abnormalities. ----------------------------------------------------------------------------------------_______________________________________________________   RECURRENT LUNG CARCINOMA TO THE BRAIN 1/15/2018  She presented to [facility] on 12/18/2017 with progressive headache.    MRI of the brain with and without contrast at [facility] on 12/18/2017 revealed a 2 x 2.6 x 2.6 cm rim-enhancing irregular mass within the high right frontal vertex acute posttraumatic injury to the brain. CT scan of the abdomen and pelvis with contrast on 4/9/2019 documented no evidence of metastatic disease in the abdomen or pelvis. Paravertebral soft tissue masses at T10. Differential metastatic disease versus extra medullary hematopoiesis. Maintenance Alimta/Keytruda was initiated on 10/16/2018. Esther Chávez reported experiencing a significant skin rash after receiving Keytruda on 11/27/2018. August Deloris was held from 12/18/2018-1/29/2019 (x3 cycles) during which time Esther Chávez was only receiving Alimta. Imaging studies on 1/2/2019 suggested stable disease. Esther Chávez resumed maintenance Alimta/Keytruda 2/19/2019 - 5/7/1209. Due to skin reaction manifestations, further maintenance Keytruda was not given after the 5/7/2019 dose. Diane received course #11 of maintenance Alimta on 7/2/2019. Thereafter, maintenance Alimta was scheduled monthly. TREATMENT SUMMARY:   1. Left thoracotomy with left lower lobectomy and mediastinal lymph node dissection 01/06/2017 by Dr. Nadia Moran   2. Adjuvant cisplatinum and Alimta x4 cycles. 4/11/2017 -6/21/17. 3. A right craniotomy by Dr. Estuardo Mae on 1/15/2018 was performed with resection of the 2 x 2.6 x 2.6 cm high right frontal vertex mass   4. Esther Chávez underwent SRS with 1600 cGy in one single treatment fraction on 3/26/2018 to the right frontal CNS lobe by Dima Brenner and Estuardo Mae   5. Cycle #1 of chemotherapy with Alimta, carboplatin and August Deloris was delivered on 5/14/2018 and the final cycle #6 was delivered on 9/4/2018   6. Maintenance Alimta/Keytruda was initiated on 10/16/2018 with the final cycle #9 delivered on 5/7/2019.   Cycle #10 of single agent maintenance Alimta was delivered on 6/11/2019 with schedule adjusted to monthly on 7/2/2019    Past Medical History:    Past Medical History:   Diagnosis Date    Anxiety     Arthritis     Bowel obstruction (HCC)     adhesions with colectomy/colostomy    Cancer ([facility] [address]     lung LLL , Social Needs    Financial resource strain: Not on file    Food insecurity:     Worry: Not on file     Inability: Not on file    Transportation needs:     Medical: Not on file     Non-medical: Not on file   Tobacco Use    Smoking status: Former Smoker     Last attempt to quit: 10/22/2016     Years since quittin.7    Smokeless tobacco: Never Used    Tobacco comment: smoked since 15, quit a few times   Substance and Sexual Activity    Alcohol use: Not Currently     Comment: 2-3 glasses of wine    Drug use: No    Sexual activity: Not on file   Lifestyle    Physical activity:     Days per week: Not on file     Minutes per session: Not on file    Stress: Not on file   Relationships    Social connections:     Talks on phone: Not on file     Gets together: Not on file     Attends Orthodox service: Not on file     Active member of club or organization: Not on file     Attends meetings of clubs or organizations: Not on file     Relationship status: Not on file    Intimate partner violence:     Fear of current or ex partner: Not on file     Emotionally abused: Not on file     Physically abused: Not on file     Forced sexual activity: Not on file   Other Topics Concern    Not on file   Social History Narrative    Not on file       Family History:   Family History   Problem Relation Age of Onset    High Blood Pressure Mother     Stroke Mother     Cancer Father        REVIEW OF SYSTEMS  CONSTITUTIONAL: positive for fever 101,fatigue  HEENT: no blurring of vision, no double vision  LUNGS: no cough, no hemoptysis; positive for dyspnea  CARDIOVASCULAR: no palpitation, no chest pain  GI: no abdominal pain, no nausea, no vomiting, no diarrhea, no constipation; Positive marginal appetite  MARJORIE: no dysuria, no hematuria, no frequency or urgency, no nephrolithiasis;  MUSCULOSKELETAL: positive for generalized weakness  ENDOCRINE: no polyuria, no polydipsia, no cold or heat intolerance;  HEMATOLOGY: positive for

## 2019-07-10 NOTE — PROGRESS NOTES
4 Eyes Skin Assessment    Shabnam Lai is being assessed upon: Admission    I agree that Rony Mar, along with Diego Reza, RN (either 2 RN's or 1 LPN and 1 RN) have performed a thorough Head to Toe Skin Assessment on the patient. ALL assessment sites listed below have been assessed. Areas assessed by both nurses:     [x]   Head, Face, and Ears   [x]   Shoulders, Back, and Chest  [x]   Arms, Elbows, and Hands   [x]   Coccyx, Sacrum, and Ischium  [x]   Legs, Feet, and Heels    Does the Patient have Skin Breakdown? Pt has redness, small water type blisters, and swelling to both lower extremeties and feet. Small \"rugburn\" type abrasion of left knee, and scattered bruises noted.     Russell Prevention initiated: Yes  Wound Care Orders initiated: No    [facility] nurse consulted for Pressure Injury (Stage 3,4, Unstageable, DTI, NWPT, and Complex wounds) and New or Established Ostomies: Yes        Primary Nurse eSignature: Montserrat Ross RN on 7/10/2019 at 5:52 PM      Co-Signer eSignature: {Esignature:[id number]}

## 2019-07-10 NOTE — PROGRESS NOTES
Patient arrived this morning for transfusion. Family member stated she was found in floor this morning and had been there since 1130pm.  Patient reported seeing things and was very weak. Sent patient to ED for evaluation due to fall and patient condition.

## 2019-07-10 NOTE — PROGRESS NOTES
Hermelinda Rocha arrived to room # [id number]. Presented with: anemia  Mental Status: Patient is oriented, alert, coherent, logical, thought processes intact and able to concentrate and follow conversation. Vitals:    07/10/19 1633   BP: 118/73   Pulse: 106   Resp: 18   Temp: 96.3 °F (35.7 °C)   SpO2: 95%     Patient safety contract and falls prevention contract reviewed with patient Yes. Oriented Patient to room. Call light within reach. Yes.   Needs, issues or concerns expressed at this time: no.      Electronically signed by Junius Hodgkin, RN on 7/10/2019 at 5:54 PM

## 2019-07-11 LAB
ANISOCYTOSIS: ABNORMAL
ATYPICAL LYMPHOCYTE RELATIVE PERCENT: 2 % (ref 0–8)
BANDED NEUTROPHILS RELATIVE PERCENT: 4 % (ref 0–5)
BASOPHILS ABSOLUTE: 0 K/UL (ref 0–0.2)
BASOPHILS RELATIVE PERCENT: 0 % (ref 0–1)
EKG P AXIS: 16 DEGREES
EKG P-R INTERVAL: 166 MS
EKG Q-T INTERVAL: 342 MS
EKG QRS DURATION: 86 MS
EKG QTC CALCULATION (BAZETT): 438 MS
EKG T AXIS: 50 DEGREES
EOSINOPHILS ABSOLUTE: 0.04 K/UL (ref 0–0.6)
EOSINOPHILS RELATIVE PERCENT: 2 % (ref 0–5)
HCT VFR BLD CALC: 25.4 % (ref 37–47)
HEMOGLOBIN: 7.9 G/DL (ref 12–16)
HYPOCHROMIA: ABNORMAL
LYMPHOCYTES ABSOLUTE: 0.6 K/UL (ref 1.1–4.5)
LYMPHOCYTES RELATIVE PERCENT: 25 % (ref 20–40)
MCH RBC QN AUTO: 26.4 PG (ref 27–31)
MCHC RBC AUTO-ENTMCNC: 31.1 G/DL (ref 33–37)
MCV RBC AUTO: 84.9 FL (ref 81–99)
MONOCYTES ABSOLUTE: 0.1 K/UL (ref 0–0.9)
MONOCYTES RELATIVE PERCENT: 4 % (ref 0–10)
MYELOCYTE PERCENT: 1 %
NEUTROPHILS ABSOLUTE: 1.5 K/UL (ref 1.5–7.5)
NEUTROPHILS RELATIVE PERCENT: 62 % (ref 50–65)
NUCLEATED RED BLOOD CELLS: 2 /100 WBC
OVALOCYTES: ABNORMAL
PDW BLD-RTO: 18.6 % (ref 11.5–14.5)
PLATELET # BLD: 86 K/UL (ref 130–400)
PLATELET SLIDE REVIEW: ABNORMAL
PMV BLD AUTO: 10.6 FL (ref 9.4–12.3)
POLYCHROMASIA: ABNORMAL
RBC # BLD: 2.99 M/UL (ref 4.2–5.4)
WBC # BLD: 2.2 K/UL (ref 4.8–10.8)

## 2019-07-11 PROCEDURE — 2580000003 HC RX 258: Performed by: FAMILY MEDICINE

## 2019-07-11 PROCEDURE — 36415 COLL VENOUS BLD VENIPUNCTURE: CPT

## 2019-07-11 PROCEDURE — 6360000002 HC RX W HCPCS: Performed by: INTERNAL MEDICINE

## 2019-07-11 PROCEDURE — 85025 COMPLETE CBC W/AUTO DIFF WBC: CPT

## 2019-07-11 PROCEDURE — 99232 SBSQ HOSP IP/OBS MODERATE 35: CPT | Performed by: INTERNAL MEDICINE

## 2019-07-11 PROCEDURE — 87040 BLOOD CULTURE FOR BACTERIA: CPT

## 2019-07-11 PROCEDURE — 6370000000 HC RX 637 (ALT 250 FOR IP): Performed by: FAMILY MEDICINE

## 2019-07-11 PROCEDURE — 2580000003 HC RX 258: Performed by: INTERNAL MEDICINE

## 2019-07-11 PROCEDURE — 1210000000 HC MED SURG R&B

## 2019-07-11 PROCEDURE — 6360000002 HC RX W HCPCS: Performed by: FAMILY MEDICINE

## 2019-07-11 RX ORDER — DIPHENHYDRAMINE HCL 25 MG
25 TABLET ORAL EVERY 4 HOURS PRN
Status: DISCONTINUED | OUTPATIENT
Start: 2019-07-11 | End: 2019-07-15 | Stop reason: HOSPADM

## 2019-07-11 RX ORDER — ALPRAZOLAM 0.5 MG/1
0.5 TABLET ORAL 3 TIMES DAILY PRN
Status: DISCONTINUED | OUTPATIENT
Start: 2019-07-11 | End: 2019-07-14

## 2019-07-11 RX ADMIN — CETIRIZINE HYDROCHLORIDE 10 MG: 10 TABLET, FILM COATED ORAL at 08:54

## 2019-07-11 RX ADMIN — DIPHENHYDRAMINE HCL 25 MG: 25 TABLET ORAL at 16:45

## 2019-07-11 RX ADMIN — SERTRALINE HYDROCHLORIDE 200 MG: 100 TABLET ORAL at 08:54

## 2019-07-11 RX ADMIN — MONTELUKAST SODIUM 10 MG: 10 TABLET, FILM COATED ORAL at 21:13

## 2019-07-11 RX ADMIN — ALPRAZOLAM 0.5 MG: 0.5 TABLET ORAL at 16:45

## 2019-07-11 RX ADMIN — ALPRAZOLAM 0.5 MG: 0.5 TABLET ORAL at 11:03

## 2019-07-11 RX ADMIN — ALPRAZOLAM 0.5 MG: 0.5 TABLET ORAL at 21:13

## 2019-07-11 RX ADMIN — ACETAMINOPHEN 650 MG: 325 TABLET ORAL at 21:13

## 2019-07-11 RX ADMIN — TBO-FILGRASTIM 480 MCG: 480 INJECTION, SOLUTION SUBCUTANEOUS at 08:54

## 2019-07-11 RX ADMIN — DIPHENHYDRAMINE HCL 25 MG: 25 TABLET ORAL at 21:13

## 2019-07-11 RX ADMIN — Medication 10 ML: at 08:54

## 2019-07-11 RX ADMIN — VANCOMYCIN HYDROCHLORIDE 1000 MG: 10 INJECTION, POWDER, LYOPHILIZED, FOR SOLUTION INTRAVENOUS at 13:39

## 2019-07-11 RX ADMIN — CEFEPIME 2 G: 2 INJECTION, POWDER, FOR SOLUTION INTRAVENOUS at 00:17

## 2019-07-11 RX ADMIN — MONTELUKAST SODIUM 10 MG: 10 TABLET, FILM COATED ORAL at 00:17

## 2019-07-11 RX ADMIN — Medication 10 ML: at 21:16

## 2019-07-11 RX ADMIN — FUROSEMIDE 20 MG: 20 TABLET ORAL at 08:54

## 2019-07-11 RX ADMIN — DIPHENHYDRAMINE HCL 25 MG: 25 TABLET ORAL at 10:00

## 2019-07-11 RX ADMIN — CEFEPIME 2 G: 2 INJECTION, POWDER, FOR SOLUTION INTRAVENOUS at 13:39

## 2019-07-11 RX ADMIN — POTASSIUM CITRATE 10 MEQ: 10 TABLET ORAL at 08:54

## 2019-07-11 ASSESSMENT — PAIN DESCRIPTION - ORIENTATION: ORIENTATION: RIGHT

## 2019-07-11 ASSESSMENT — PAIN SCALES - GENERAL
PAINLEVEL_OUTOF10: 2
PAINLEVEL_OUTOF10: 7

## 2019-07-11 ASSESSMENT — PAIN DESCRIPTION - DESCRIPTORS: DESCRIPTORS: HEADACHE

## 2019-07-11 ASSESSMENT — PAIN DESCRIPTION - PAIN TYPE: TYPE: ACUTE PAIN

## 2019-07-11 ASSESSMENT — PAIN DESCRIPTION - LOCATION: LOCATION: HEAD

## 2019-07-11 NOTE — PROGRESS NOTES
with contrast on 4/9/2019 documented no evidence of metastatic disease in the abdomen or pelvis. Paravertebral soft tissue masses at T10. Differential metastatic disease versus extra medullary hematopoiesis. Maintenance Alimta/Keytruda was initiated on 10/16/2018. VARUN Quintanilla reported experiencing a significant skin rash after receiving Keytruda on 11/27/2018. Melinda Kyle was held from 12/18/2018-1/29/2019 (x3 cycles) during which time VARUN Quintanilla was only receiving Alimta. Imaging studies on 1/2/2019 suggested stable disease. VARUN Quintanilla resumed maintenance Alimta/Keytruda 2/19/2019 - 5/7/1209. Due to skin reaction manifestations, further maintenance Keytruda was not given after the 5/7/2019 dose. Diane received course #11 of maintenance Alimta on 7/2/2019. Thereafter, maintenance Alimta was scheduled monthly. TREATMENT SUMMARY:   1. Left thoracotomy with left lower lobectomy and mediastinal lymph node dissection 01/06/2017 by Dr. Merlyn Whitney   2. Adjuvant cisplatinum and Alimta x4 cycles. 4/11/2017 -6/21/17. 3. A right craniotomy by Dr. Rubia Major on 1/15/2018 was performed with resection of the 2 x 2.6 x 2.6 cm high right frontal vertex mass   4. VARUN Quintanilla underwent SRS with 1600 cGy in one single treatment fraction on 3/26/2018 to the right frontal CNS lobe by Dima Fernandez and Rubia Major   5. Cycle #1 of chemotherapy with Alimta, carboplatin and Melinda Kyle was delivered on 5/14/2018 and the final cycle #6 was delivered on 9/4/2018   6. Maintenance Alimta/Keytruda was initiated on 10/16/2018 with the final cycle #9 delivered on 5/7/2019.   Cycle #10 of single agent maintenance Alimta was delivered on 6/11/2019 with schedule adjusted to monthly on 7/2/2019    REVIEW OF SYSTEMS  CONSTITUTIONAL: positive for fever 101 preadmission, afebrile since admission  HEENT: no blurring of vision, no double vision, no hearing difficulty, no tinnitus, no ulceration, no dental caries, and no dysplasia, no epistaxis;  LUNGS: no cough, no - 208  · Fe sat - 15 %  · Ferritin > 2,000     3. Thrombocytopenia from chemotherapy     · PLT 86,000     3. Metastatic adenocarcinoma the lung to the brain.     · Maintenance Alimta cycle # 11 day # 10  · Complicated with febrile neutropenia related to chemotherapy.     4. Hyponatremia      · Na 130 - per PCP      If she remains afebrile, feel better, and WBC continues to improve hopefully she will be able to go home tomorrow.     Valerie See    07/11/19  6:15 AM

## 2019-07-12 LAB
ANISOCYTOSIS: ABNORMAL
BANDED NEUTROPHILS RELATIVE PERCENT: 9 % (ref 0–5)
BASOPHILS ABSOLUTE: 0 K/UL (ref 0–0.2)
BASOPHILS RELATIVE PERCENT: 0 % (ref 0–1)
BLOOD BANK DISPENSE STATUS: NORMAL
BLOOD BANK PRODUCT CODE: NORMAL
BPU ID: NORMAL
CULTURE, BLOOD 2: ABNORMAL
CULTURE, BLOOD 2: ABNORMAL
DESCRIPTION BLOOD BANK: NORMAL
EOSINOPHILS ABSOLUTE: 0.13 K/UL (ref 0–0.6)
EOSINOPHILS RELATIVE PERCENT: 3 % (ref 0–5)
HCT VFR BLD CALC: 24 % (ref 37–47)
HEMOGLOBIN: 7.6 G/DL (ref 12–16)
HYPOCHROMIA: ABNORMAL
LYMPHOCYTES ABSOLUTE: 1.1 K/UL (ref 1.1–4.5)
LYMPHOCYTES RELATIVE PERCENT: 24 % (ref 20–40)
MCH RBC QN AUTO: 27 PG (ref 27–31)
MCHC RBC AUTO-ENTMCNC: 31.7 G/DL (ref 33–37)
MCV RBC AUTO: 85.1 FL (ref 81–99)
MONOCYTES ABSOLUTE: 0.2 K/UL (ref 0–0.9)
MONOCYTES RELATIVE PERCENT: 5 % (ref 0–10)
NEUTROPHILS ABSOLUTE: 3 K/UL (ref 1.5–7.5)
NEUTROPHILS RELATIVE PERCENT: 59 % (ref 50–65)
ORGANISM: ABNORMAL
OVALOCYTES: ABNORMAL
PDW BLD-RTO: 18.6 % (ref 11.5–14.5)
PLATELET # BLD: 112 K/UL (ref 130–400)
PLATELET SLIDE REVIEW: ABNORMAL
PMV BLD AUTO: 11.4 FL (ref 9.4–12.3)
POLYCHROMASIA: ABNORMAL
RBC # BLD: 2.82 M/UL (ref 4.2–5.4)
WBC # BLD: 4.4 K/UL (ref 4.8–10.8)

## 2019-07-12 PROCEDURE — 6360000002 HC RX W HCPCS: Performed by: FAMILY MEDICINE

## 2019-07-12 PROCEDURE — 6360000002 HC RX W HCPCS: Performed by: PHYSICIAN ASSISTANT

## 2019-07-12 PROCEDURE — 99231 SBSQ HOSP IP/OBS SF/LOW 25: CPT | Performed by: INTERNAL MEDICINE

## 2019-07-12 PROCEDURE — 36430 TRANSFUSION BLD/BLD COMPNT: CPT

## 2019-07-12 PROCEDURE — 1210000000 HC MED SURG R&B

## 2019-07-12 PROCEDURE — 85025 COMPLETE CBC W/AUTO DIFF WBC: CPT

## 2019-07-12 PROCEDURE — 2580000003 HC RX 258: Performed by: FAMILY MEDICINE

## 2019-07-12 PROCEDURE — 86923 COMPATIBILITY TEST ELECTRIC: CPT

## 2019-07-12 PROCEDURE — 6370000000 HC RX 637 (ALT 250 FOR IP): Performed by: FAMILY MEDICINE

## 2019-07-12 PROCEDURE — 2580000003 HC RX 258: Performed by: INTERNAL MEDICINE

## 2019-07-12 PROCEDURE — 2580000003 HC RX 258: Performed by: PHYSICIAN ASSISTANT

## 2019-07-12 PROCEDURE — P9016 RBC LEUKOCYTES REDUCED: HCPCS

## 2019-07-12 PROCEDURE — 6360000002 HC RX W HCPCS: Performed by: INTERNAL MEDICINE

## 2019-07-12 RX ORDER — ACETAMINOPHEN 325 MG/1
650 TABLET ORAL SEE ADMIN INSTRUCTIONS
Status: DISCONTINUED | OUTPATIENT
Start: 2019-07-12 | End: 2019-07-14

## 2019-07-12 RX ORDER — 0.9 % SODIUM CHLORIDE 0.9 %
250 INTRAVENOUS SOLUTION INTRAVENOUS ONCE
Status: DISCONTINUED | OUTPATIENT
Start: 2019-07-12 | End: 2019-07-14

## 2019-07-12 RX ORDER — DIPHENHYDRAMINE HYDROCHLORIDE 50 MG/ML
25 INJECTION INTRAMUSCULAR; INTRAVENOUS SEE ADMIN INSTRUCTIONS
Status: COMPLETED | OUTPATIENT
Start: 2019-07-12 | End: 2019-07-12

## 2019-07-12 RX ORDER — FUROSEMIDE 10 MG/ML
20 INJECTION INTRAMUSCULAR; INTRAVENOUS SEE ADMIN INSTRUCTIONS
Status: DISCONTINUED | OUTPATIENT
Start: 2019-07-12 | End: 2019-07-14

## 2019-07-12 RX ADMIN — Medication 10 ML: at 19:43

## 2019-07-12 RX ADMIN — VANCOMYCIN HYDROCHLORIDE 1000 MG: 10 INJECTION, POWDER, LYOPHILIZED, FOR SOLUTION INTRAVENOUS at 12:58

## 2019-07-12 RX ADMIN — POTASSIUM CITRATE 10 MEQ: 10 TABLET ORAL at 09:16

## 2019-07-12 RX ADMIN — DEXTROSE MONOHYDRATE 125 MG: 50 INJECTION, SOLUTION INTRAVENOUS at 09:20

## 2019-07-12 RX ADMIN — MONTELUKAST SODIUM 10 MG: 10 TABLET, FILM COATED ORAL at 19:41

## 2019-07-12 RX ADMIN — CEFEPIME 2 G: 2 INJECTION, POWDER, FOR SOLUTION INTRAVENOUS at 03:34

## 2019-07-12 RX ADMIN — ACETAMINOPHEN 650 MG: 325 TABLET ORAL at 19:42

## 2019-07-12 RX ADMIN — DIPHENHYDRAMINE HCL 25 MG: 25 TABLET ORAL at 19:41

## 2019-07-12 RX ADMIN — SERTRALINE HYDROCHLORIDE 200 MG: 100 TABLET ORAL at 09:16

## 2019-07-12 RX ADMIN — DIPHENHYDRAMINE HYDROCHLORIDE 25 MG: 50 INJECTION, SOLUTION INTRAMUSCULAR; INTRAVENOUS at 09:16

## 2019-07-12 RX ADMIN — ALPRAZOLAM 0.5 MG: 0.5 TABLET ORAL at 19:41

## 2019-07-12 RX ADMIN — CEFEPIME 2 G: 2 INJECTION, POWDER, FOR SOLUTION INTRAVENOUS at 12:58

## 2019-07-12 RX ADMIN — TBO-FILGRASTIM 480 MCG: 480 INJECTION, SOLUTION SUBCUTANEOUS at 09:15

## 2019-07-12 RX ADMIN — Medication 10 ML: at 09:16

## 2019-07-12 RX ADMIN — CETIRIZINE HYDROCHLORIDE 10 MG: 10 TABLET, FILM COATED ORAL at 09:16

## 2019-07-12 RX ADMIN — FUROSEMIDE 20 MG: 10 INJECTION, SOLUTION INTRAMUSCULAR; INTRAVENOUS at 12:58

## 2019-07-12 ASSESSMENT — PAIN SCALES - GENERAL
PAINLEVEL_OUTOF10: 0
PAINLEVEL_OUTOF10: 0

## 2019-07-12 NOTE — H&P
History and Physical      CHIEF COMPLAINT:  Weakness, Fever    Reason for Admission:  Pancytopenia, Fever    History Obtained From:  patient    HISTORY OF PRESENT ILLNESS:      The patient is a 70 y.o. female who came to ER with multiple falls, and weakness. She is undergoing treatment for her metastatic lung cancer. She also reports fever up to 101. She has been treated recently with antibiotics for a sinusitis from Urgent Care. No SOA or CP. Her LE edema is stable. No abdominal pain or N/V. No diarrhea. Past Medical History:        Diagnosis Date    Anxiety     Arthritis     Bowel obstruction (HCC)     adhesions with colectomy/colostomy    Cancer (HCC)     lung LLL ,  brain    Colostomy in place [facility])     Concussion with no loss of consciousness     COPD (chronic obstructive pulmonary disease) (HCC)     Depression     Hernia     History of blood transfusion     History of broken collarbone     Hyperlipidemia     Seasonal allergies      Past Surgical History:        Procedure Laterality Date    ABDOMEN SURGERY      bowel blockage from scar tissue    COLONOSCOPY      COLOSTOMY      still has    ECTOPIC PREGNANCY SURGERY      ENDOSCOPY, COLON, DIAGNOSTIC      FRACTURE SURGERY      left arm and elbow and finger    HERNIA REPAIR      HYSTERECTOMY      LOBECTOMY Left 1/6/2017    LEFT THORACOTOMY WITH LOBECTOMY  performed by Shorty Lew MD at [facility]. TUNNELED CTR VAD W/SUBQ PORT AGE 5 YR/> N/A 7/3/2018    SINGLE LUMEN PORT INSERTION performed by Deisy Agarwal MD at [address] [place] [address]           Medications Prior to Admission:    Medications Prior to Admission: potassium citrate (UROCIT-K) 10 MEQ (1080 MG) extended release tablet, Take by mouth 3 times daily (with meals)  amphetamine-dextroamphetamine (ADDERALL, 10MG,) 10 MG tablet, Take 10 mg by mouth daily.   folic acid (FOLVITE) 1 MG tablet, Take 1 mg by mouth daily  furosemide (LASIX) 20

## 2019-07-12 NOTE — CONSULTS
INFECTIOUS DISEASES CONSULT NOTE    Patient:  Marcia Rutledge 70 y.o. female  ROOM # [unfilled]  YOB: 1950  MRN: [id number]  CSN:  [id number]  Admit date: 7/10/2019   Admitting Physician: Ej Gunter MD  Primary Care Physician: Ej Gunter MD  REFERRING PROVIDER: No ref. provider found    Reason for Consultation: Positive blood culture    History of Present Illness/Chief Complaint: Lyric 56-year-old woman. History is obtained from patient and chart review. She had presented to the emergency room. She had some weakness and falls. She has metastatic lung cancer. There is report of fever as high as 101. She is been afebrile since admission. She does not report cough or shortness of breath. She has had some headache. She does not report any dysuria or urinary frequency. She has an ostomy. She reports no abdominal pain or vomiting. She was eating is a spoke with her and examined her today. Seems to be tolerating oral intake. She has had one blood culture out of multiple grow coagulase-negative staph. Infectious disease asked to evaluate and offer opinion.     Current Scheduled Medications:    0.9 % sodium chloride  250 mL Intravenous Once    hydrocortisone (SOLU-CORTEF) IVPB  125 mg Intravenous See Admin Instructions    furosemide  20 mg Intravenous See Admin Instructions    diphenhydrAMINE  25 mg Intravenous See Admin Instructions    acetaminophen  650 mg Oral See Admin Instructions    vancomycin  1,000 mg Intravenous Q24H    vancomycin (VANCOCIN) intermittent dosing (placeholder)   Other RX Placeholder    sodium chloride flush  10 mL Intravenous 2 times per day    tbo-filgrastim  480 mcg Subcutaneous Daily    cefepime  2 g Intravenous Q12H    furosemide  20 mg Oral Daily    sertraline  200 mg Oral Daily    potassium citrate  10 mEq Oral BID WC    montelukast  10 mg Oral Nightly    cetirizine  10 mg Oral Daily     Current PRN Medications:  diphenhydrAMINE, ALPRAZolam, ventricular mass or thrombus noted.   Mild concentric left ventricular hypertrophy.   Cannot accurately assess diastolic dysfunction due to rhythm.   Septal \"bounce\" consistent with constrictive physiology.   Severe Diastolic Dysfunction is noted by mitral flow.      Signature      ----------------------------------------------------------------   Electronically signed by Gayle Grigsby MD(Interpreting   physician) on 06/05/2019 10:10 AM   ----------------------------------------------------------------  Impression:   1. Fever-resolved  2. Positive blood culture for coagulase-negative staph-contaminant  3. Metastatic lung cancer  4.   Improving leukopenia and thrombocytopenia following recent hemotherapy    Recommendations:    Do not feel she has infection at present  Feel antibiotic treatment can be discontinued  Do not feel further work-up necessary at present from infection standpoint unless recurrent fever or other new symptoms to suggest infection  Okay with me for release to the next level of care off antibiotic treatment when she is ready for release per others  Will sign off  Please call with questions  Follow-up with ID as needed    Cristiano Carlisle MD  07/12/19  7:21 AM

## 2019-07-12 NOTE — PROGRESS NOTES
Follow with ID and Hematology  3. Continue to monitor labs, blood cultures      Discharge planning:   her home     Reviewed treatment plans with the patient and/or family.              Electronically signed by Elisa Jung MD on 7/12/2019 at 6:39 PM

## 2019-07-13 LAB
ANION GAP SERPL CALCULATED.3IONS-SCNC: 15 MMOL/L (ref 7–19)
ANISOCYTOSIS: ABNORMAL
BANDED NEUTROPHILS RELATIVE PERCENT: 1 % (ref 0–5)
BASOPHILS ABSOLUTE: 0 K/UL (ref 0–0.2)
BASOPHILS RELATIVE PERCENT: 0 % (ref 0–1)
BUN BLDV-MCNC: 19 MG/DL (ref 8–23)
CALCIUM SERPL-MCNC: 8.9 MG/DL (ref 8.8–10.2)
CHLORIDE BLD-SCNC: 96 MMOL/L (ref 98–111)
CO2: 24 MMOL/L (ref 22–29)
CREAT SERPL-MCNC: 1.2 MG/DL (ref 0.5–0.9)
EOSINOPHILS ABSOLUTE: 0.38 K/UL (ref 0–0.6)
EOSINOPHILS RELATIVE PERCENT: 3 % (ref 0–5)
GFR NON-AFRICAN AMERICAN: 44
GLUCOSE BLD-MCNC: 104 MG/DL (ref 74–109)
HCT VFR BLD CALC: 27.6 % (ref 37–47)
HEMOGLOBIN: 8.9 G/DL (ref 12–16)
HYPOCHROMIA: ABNORMAL
LYMPHOCYTES ABSOLUTE: 1.4 K/UL (ref 1.1–4.5)
LYMPHOCYTES RELATIVE PERCENT: 11 % (ref 20–40)
MCH RBC QN AUTO: 27.6 PG (ref 27–31)
MCHC RBC AUTO-ENTMCNC: 32.2 G/DL (ref 33–37)
MCV RBC AUTO: 85.7 FL (ref 81–99)
MONOCYTES ABSOLUTE: 0.9 K/UL (ref 0–0.9)
MONOCYTES RELATIVE PERCENT: 7 % (ref 0–10)
MYELOCYTE PERCENT: 5 %
NEUTROPHILS ABSOLUTE: 9.9 K/UL (ref 1.5–7.5)
NEUTROPHILS RELATIVE PERCENT: 73 % (ref 50–65)
OVALOCYTES: ABNORMAL
PDW BLD-RTO: 18 % (ref 11.5–14.5)
PLATELET # BLD: 157 K/UL (ref 130–400)
PLATELET SLIDE REVIEW: ADEQUATE
PMV BLD AUTO: 11.7 FL (ref 9.4–12.3)
POTASSIUM SERPL-SCNC: 2.5 MMOL/L (ref 3.5–5)
RBC # BLD: 3.22 M/UL (ref 4.2–5.4)
SODIUM BLD-SCNC: 135 MMOL/L (ref 136–145)
WBC # BLD: 12.5 K/UL (ref 4.8–10.8)

## 2019-07-13 PROCEDURE — 2580000003 HC RX 258: Performed by: INTERNAL MEDICINE

## 2019-07-13 PROCEDURE — 6370000000 HC RX 637 (ALT 250 FOR IP): Performed by: FAMILY MEDICINE

## 2019-07-13 PROCEDURE — 1210000000 HC MED SURG R&B

## 2019-07-13 PROCEDURE — 6360000002 HC RX W HCPCS: Performed by: INTERNAL MEDICINE

## 2019-07-13 PROCEDURE — 85025 COMPLETE CBC W/AUTO DIFF WBC: CPT

## 2019-07-13 PROCEDURE — 6360000002 HC RX W HCPCS: Performed by: FAMILY MEDICINE

## 2019-07-13 PROCEDURE — 2580000003 HC RX 258: Performed by: FAMILY MEDICINE

## 2019-07-13 PROCEDURE — 99231 SBSQ HOSP IP/OBS SF/LOW 25: CPT | Performed by: INTERNAL MEDICINE

## 2019-07-13 PROCEDURE — 80048 BASIC METABOLIC PNL TOTAL CA: CPT

## 2019-07-13 RX ORDER — POTASSIUM CHLORIDE 20 MEQ/1
40 TABLET, EXTENDED RELEASE ORAL ONCE
Status: COMPLETED | OUTPATIENT
Start: 2019-07-13 | End: 2019-07-13

## 2019-07-13 RX ADMIN — POTASSIUM CITRATE 10 MEQ: 10 TABLET ORAL at 17:20

## 2019-07-13 RX ADMIN — SERTRALINE HYDROCHLORIDE 200 MG: 100 TABLET ORAL at 09:19

## 2019-07-13 RX ADMIN — ACETAMINOPHEN 650 MG: 325 TABLET ORAL at 19:30

## 2019-07-13 RX ADMIN — CETIRIZINE HYDROCHLORIDE 10 MG: 10 TABLET, FILM COATED ORAL at 09:20

## 2019-07-13 RX ADMIN — POTASSIUM CHLORIDE 40 MEQ: 20 TABLET, EXTENDED RELEASE ORAL at 09:19

## 2019-07-13 RX ADMIN — Medication 10 ML: at 19:30

## 2019-07-13 RX ADMIN — CEFEPIME 2 G: 2 INJECTION, POWDER, FOR SOLUTION INTRAVENOUS at 02:30

## 2019-07-13 RX ADMIN — ACETAMINOPHEN 650 MG: 325 TABLET ORAL at 09:22

## 2019-07-13 RX ADMIN — ALPRAZOLAM 0.5 MG: 0.5 TABLET ORAL at 19:29

## 2019-07-13 RX ADMIN — MONTELUKAST SODIUM 10 MG: 10 TABLET, FILM COATED ORAL at 19:30

## 2019-07-13 RX ADMIN — Medication 10 ML: at 09:24

## 2019-07-13 RX ADMIN — DIPHENHYDRAMINE HCL 25 MG: 25 TABLET ORAL at 10:49

## 2019-07-13 RX ADMIN — POTASSIUM CITRATE 10 MEQ: 10 TABLET ORAL at 09:19

## 2019-07-13 RX ADMIN — FUROSEMIDE 20 MG: 20 TABLET ORAL at 09:20

## 2019-07-13 ASSESSMENT — PAIN SCALES - GENERAL
PAINLEVEL_OUTOF10: 5
PAINLEVEL_OUTOF10: 0
PAINLEVEL_OUTOF10: 5
PAINLEVEL_OUTOF10: 1
PAINLEVEL_OUTOF10: 0

## 2019-07-13 NOTE — PROGRESS NOTES
stable compared to 7/27/2017 PET scan which were NOT metabolically active. I.e. no obvious metastatic lesions  CT abdomen and pelvis with contrast at [facility] 12/19/2017 was unrevealing for any obvious metastatic disease. Bone scan at [facility] 12/19/2017 revealed focal areas of increased activity in the left seventh and 10th costovertebral junctions-indeterminate. A right craniotomy by Dr. Angie Chang on 1/15/2018 was performed with resection of the 2 x 2.6 x 2.6 cm high right frontal vertex mass   Pathology was consistent with metastatic adenocarcinoma with papillary features consistent with lung origin. Molecular testing on the 1/15/2018 pathology specimen was reported from [facility] on 3/15/2018 as follows:   EGFR -negative for mutation  ALK -negative for rearrangement  ROS 1 -negative for rearrangement   BRAF -negative for the V600E mutation  PDL-1 - expression is 80% i.e. in Positive  MRI of the brain W & WO for [facility] treatment planning purposes was performed on 3/6/2018 by Dr. Amna Brizuela. Postsurgical changes to the previous resection site from the right frontal lobe mass area with residual enhancement was noted. A PET scan on 2/6/2018 did not reveal scintigraphic evidence of recurrent malignancy or metastatic disease. MRI of the brain W & WO for [facility] treatment planning purposes was performed on 3/6/2018 by Dr. Amna Brizuela. Postsurgical changes to the previous resection site from the right frontal lobe mass area with residual enhancement was noted. Luh Lucas underwent SRS with 1600 cGy in one single treatment fraction on 3/26/2018 to the right frontal CNS lobe by Dima Brizuela and Angie Chang. Delays in beginning systemic therapy included issues associated with her CNS treatment delivery and multiple dog bites on her arms and hands that required attention prior to starting systemic therapy. Diane received cycle #1 of chemotherapy with Alimta, carboplatin and Keytruda on 5/14/2018.    Diane completed cycle # 6 of 7/11/2019 no growth x48 hours  · pCXR 7/10/2019 - No acute process  · Lactic acid 1.2 - normal  · Maxipime 2 gram every 12 hrs  · Vancomycin 1 g IV added, pharmacy dosing  · Afebrile for >48 hours     2. Anemia secondary to chemotherapy.     · Hgb 8.9/MCV 85.7 - S/P 2 units pRBC 7/10/2019 and 1 unit 7/12/2019    · Serum Fe  - 31  · TIBC - 208  · Fe sat - 15 %  · Ferritin > 2,000     3. Thrombocytopenia from chemotherapy     · ,000 -improved on their own, bone marrow is recovering     3. Metastatic adenocarcinoma the lung to the brain.     · Maintenance Alimta cycle # 11 day # 12  · Complicated with febrile neutropenia related to chemotherapy.     4. Hyponatremia - normalized     · Na 135 - per PCP    Spoke with Dr. Wendy Reza related to blood culture and suspect is a contaminant. Discontinued Nuepogen. Siri Leahy from an Oncology standpoint to discharge when appropriate with others. Discussed plan of care with Yony Callejaskeeper.     Danni Yap    07/13/19  9:17 AM

## 2019-07-14 PROBLEM — R50.9 FEVER: Status: ACTIVE | Noted: 2019-07-14

## 2019-07-14 PROBLEM — D64.9 ANEMIA: Status: ACTIVE | Noted: 2019-07-14

## 2019-07-14 PROBLEM — E87.6 HYPOKALEMIA: Status: ACTIVE | Noted: 2019-07-14

## 2019-07-14 PROBLEM — R26.81 UNSTABLE GAIT: Status: ACTIVE | Noted: 2019-07-14

## 2019-07-14 LAB
ANION GAP SERPL CALCULATED.3IONS-SCNC: 15 MMOL/L (ref 7–19)
ANISOCYTOSIS: ABNORMAL
ATYPICAL LYMPHOCYTE RELATIVE PERCENT: 1 % (ref 0–8)
BANDED NEUTROPHILS RELATIVE PERCENT: 2 % (ref 0–5)
BASOPHILS ABSOLUTE: 0 K/UL (ref 0–0.2)
BASOPHILS RELATIVE PERCENT: 0 % (ref 0–1)
BUN BLDV-MCNC: 14 MG/DL (ref 8–23)
CALCIUM SERPL-MCNC: 9.4 MG/DL (ref 8.8–10.2)
CHLORIDE BLD-SCNC: 101 MMOL/L (ref 98–111)
CO2: 25 MMOL/L (ref 22–29)
CREAT SERPL-MCNC: 0.9 MG/DL (ref 0.5–0.9)
EOSINOPHILS ABSOLUTE: 0.27 K/UL (ref 0–0.6)
EOSINOPHILS RELATIVE PERCENT: 2 % (ref 0–5)
GFR NON-AFRICAN AMERICAN: >60
GLUCOSE BLD-MCNC: 84 MG/DL (ref 74–109)
HCT VFR BLD CALC: 31.8 % (ref 37–47)
HEMOGLOBIN: 10.1 G/DL (ref 12–16)
LYMPHOCYTES ABSOLUTE: 2.5 K/UL (ref 1.1–4.5)
LYMPHOCYTES RELATIVE PERCENT: 18 % (ref 20–40)
MAGNESIUM: 1.6 MG/DL (ref 1.6–2.4)
MCH RBC QN AUTO: 27.2 PG (ref 27–31)
MCHC RBC AUTO-ENTMCNC: 31.8 G/DL (ref 33–37)
MCV RBC AUTO: 85.5 FL (ref 81–99)
METAMYELOCYTES RELATIVE PERCENT: 1 %
MONOCYTES ABSOLUTE: 1.1 K/UL (ref 0–0.9)
MONOCYTES RELATIVE PERCENT: 8 % (ref 0–10)
MYELOCYTE PERCENT: 9 %
NEUTROPHILS ABSOLUTE: 9.5 K/UL (ref 1.5–7.5)
NEUTROPHILS RELATIVE PERCENT: 57 % (ref 50–65)
PDW BLD-RTO: 18.6 % (ref 11.5–14.5)
PLATELET # BLD: 143 K/UL (ref 130–400)
PLATELET SLIDE REVIEW: ADEQUATE
PMV BLD AUTO: 10.8 FL (ref 9.4–12.3)
POTASSIUM SERPL-SCNC: 3.2 MMOL/L (ref 3.5–5)
PROMYELOCYTES PERCENT: 2 %
RBC # BLD: 3.72 M/UL (ref 4.2–5.4)
SODIUM BLD-SCNC: 141 MMOL/L (ref 136–145)
WBC # BLD: 13.4 K/UL (ref 4.8–10.8)

## 2019-07-14 PROCEDURE — 85025 COMPLETE CBC W/AUTO DIFF WBC: CPT

## 2019-07-14 PROCEDURE — 83735 ASSAY OF MAGNESIUM: CPT

## 2019-07-14 PROCEDURE — 1210000000 HC MED SURG R&B

## 2019-07-14 PROCEDURE — 2580000003 HC RX 258: Performed by: FAMILY MEDICINE

## 2019-07-14 PROCEDURE — 6370000000 HC RX 637 (ALT 250 FOR IP): Performed by: PHYSICIAN ASSISTANT

## 2019-07-14 PROCEDURE — 6370000000 HC RX 637 (ALT 250 FOR IP): Performed by: FAMILY MEDICINE

## 2019-07-14 PROCEDURE — 80048 BASIC METABOLIC PNL TOTAL CA: CPT

## 2019-07-14 RX ORDER — POTASSIUM CHLORIDE 20 MEQ/1
40 TABLET, EXTENDED RELEASE ORAL ONCE
Status: COMPLETED | OUTPATIENT
Start: 2019-07-14 | End: 2019-07-14

## 2019-07-14 RX ADMIN — ACETAMINOPHEN 650 MG: 325 TABLET ORAL at 08:15

## 2019-07-14 RX ADMIN — POTASSIUM CITRATE 10 MEQ: 10 TABLET ORAL at 17:19

## 2019-07-14 RX ADMIN — MONTELUKAST SODIUM 10 MG: 10 TABLET, FILM COATED ORAL at 19:57

## 2019-07-14 RX ADMIN — CETIRIZINE HYDROCHLORIDE 10 MG: 10 TABLET, FILM COATED ORAL at 08:05

## 2019-07-14 RX ADMIN — ACETAMINOPHEN 650 MG: 325 TABLET ORAL at 17:18

## 2019-07-14 RX ADMIN — Medication 10 ML: at 08:06

## 2019-07-14 RX ADMIN — SERTRALINE HYDROCHLORIDE 200 MG: 100 TABLET ORAL at 08:05

## 2019-07-14 RX ADMIN — POTASSIUM CITRATE 10 MEQ: 10 TABLET ORAL at 08:05

## 2019-07-14 RX ADMIN — POTASSIUM CHLORIDE 40 MEQ: 20 TABLET, EXTENDED RELEASE ORAL at 08:08

## 2019-07-14 RX ADMIN — FUROSEMIDE 20 MG: 20 TABLET ORAL at 08:06

## 2019-07-14 ASSESSMENT — PAIN SCALES - GENERAL
PAINLEVEL_OUTOF10: 1
PAINLEVEL_OUTOF10: 4
PAINLEVEL_OUTOF10: 4

## 2019-07-14 NOTE — PROGRESS NOTES
file     Gets together: Not on file     Attends Confucianist service: Not on file     Active member of club or organization: Not on file     Attends meetings of clubs or organizations: Not on file     Relationship status: Not on file    Intimate partner violence:     Fear of current or ex partner: Not on file     Emotionally abused: Not on file     Physically abused: Not on file     Forced sexual activity: Not on file   Other Topics Concern    Not on file   Social History Narrative    Not on file       Labs:  Hematology:  Recent Labs     19  0335 19  0245 19  0507   WBC 4.4* 12.5* 13.4*   HGB 7.6* 8.9* 10.1*   HCT 24.0* 27.6* 31.8*   * 157 143     Chemistry:  Recent Labs     19  0245 19  0507   * 141   K 2.5* 3.2*   CL 96* 101   CO2 24 25   GLUCOSE 104 84   BUN 19 14   CREATININE 1.2* 0.9   MG  --  1.6   ANIONGAP 15 15   LABGLOM 44* >60   CALCIUM 8.9 9.4     No results for input(s): PROT, LABALBU, LABA1C, Y6TQNIX, W0LKACZ, FT4, TSH, AST, ALT, LDH, GGT, ALKPHOS, BILITOT, BILIDIR, AMMONIA, AMYLASE, LIPASE, LACTATE, CHOL, HDL, LDLCHOLESTEROL, CHOLHDLRATIO, TRIG, VLDL, PHENYTOIN, PHENYF in the last 72 hours. Objective:     Vitals: BP (!) 145/80   Pulse 90   Temp 96.2 °F (35.7 °C) (Oral)   Resp 20   Ht 5' 4\" (1.626 m)   Wt 125 lb (56.7 kg)   SpO2 96%   BMI 21.46 kg/m²      Intake/Output Summary (Last 24 hours) at 2019 0838  Last data filed at 2019 0823  Gross per 24 hour   Intake 820 ml   Output --   Net 820 ml    Temp (24hrs), Av °F (36.1 °C), Min:96.2 °F (35.7 °C), Max:97.9 °F (36.6 °C)    Glucose:  No results for input(s): POCGLU in the last 72 hours.   Physical Examination:   General appearance -alert no acute distress   Mouth - mucous membranes moist, pharynx normal without lesions  Neck - supple, no significant adenopathy  Lymphatics - no palpable lymphadenopathy, no hepatosplenomegaly  Chest - clear to auscultation, no wheezes, rales or rhonchi, assessment and plan as outlined by the ARNP/PA. Please refer to my separate note for complete documentation.      Electronically signed by Layne Bolton MD on 7/14/2019 at 9:05 AM

## 2019-07-15 VITALS
HEART RATE: 93 BPM | DIASTOLIC BLOOD PRESSURE: 82 MMHG | RESPIRATION RATE: 16 BRPM | TEMPERATURE: 98.3 F | SYSTOLIC BLOOD PRESSURE: 119 MMHG | WEIGHT: 125 LBS | OXYGEN SATURATION: 97 % | HEIGHT: 64 IN | BODY MASS INDEX: 21.34 KG/M2

## 2019-07-15 LAB
ANISOCYTOSIS: ABNORMAL
BANDED NEUTROPHILS RELATIVE PERCENT: 28 % (ref 0–5)
BASOPHILS ABSOLUTE: 0 K/UL (ref 0–0.2)
BASOPHILS RELATIVE PERCENT: 0 % (ref 0–1)
BLOOD CULTURE, ROUTINE: NORMAL
EOSINOPHILS ABSOLUTE: 0.56 K/UL (ref 0–0.6)
EOSINOPHILS RELATIVE PERCENT: 3 % (ref 0–5)
HCT VFR BLD CALC: 34.5 % (ref 37–47)
HEMOGLOBIN: 10.9 G/DL (ref 12–16)
HYPOCHROMIA: ABNORMAL
LYMPHOCYTES ABSOLUTE: 1.1 K/UL (ref 1.1–4.5)
LYMPHOCYTES RELATIVE PERCENT: 6 % (ref 20–40)
MCH RBC QN AUTO: 27.4 PG (ref 27–31)
MCHC RBC AUTO-ENTMCNC: 31.6 G/DL (ref 33–37)
MCV RBC AUTO: 86.7 FL (ref 81–99)
METAMYELOCYTES RELATIVE PERCENT: 11 %
MONOCYTES ABSOLUTE: 1.1 K/UL (ref 0–0.9)
MONOCYTES RELATIVE PERCENT: 6 % (ref 0–10)
MYELOCYTE PERCENT: 2 %
NEUTROPHILS ABSOLUTE: 15.9 K/UL (ref 1.5–7.5)
NEUTROPHILS RELATIVE PERCENT: 42 % (ref 50–65)
PDW BLD-RTO: 18.9 % (ref 11.5–14.5)
PLATELET # BLD: 182 K/UL (ref 130–400)
PLATELET SLIDE REVIEW: ADEQUATE
PMV BLD AUTO: 10.7 FL (ref 9.4–12.3)
PROMYELOCYTES PERCENT: 2 %
RBC # BLD: 3.98 M/UL (ref 4.2–5.4)
WBC # BLD: 18.7 K/UL (ref 4.8–10.8)

## 2019-07-15 PROCEDURE — 85025 COMPLETE CBC W/AUTO DIFF WBC: CPT

## 2019-07-15 PROCEDURE — 6360000002 HC RX W HCPCS: Performed by: FAMILY MEDICINE

## 2019-07-15 PROCEDURE — 99231 SBSQ HOSP IP/OBS SF/LOW 25: CPT | Performed by: INTERNAL MEDICINE

## 2019-07-15 PROCEDURE — 6370000000 HC RX 637 (ALT 250 FOR IP): Performed by: FAMILY MEDICINE

## 2019-07-15 PROCEDURE — 2580000003 HC RX 258: Performed by: FAMILY MEDICINE

## 2019-07-15 PROCEDURE — 97750 PHYSICAL PERFORMANCE TEST: CPT

## 2019-07-15 PROCEDURE — 97161 PT EVAL LOW COMPLEX 20 MIN: CPT

## 2019-07-15 RX ORDER — FUROSEMIDE 20 MG/1
20 TABLET ORAL DAILY
Qty: 60 TABLET | Refills: 3 | Status: ON HOLD | OUTPATIENT
Start: 2019-07-15 | End: 2019-08-13 | Stop reason: HOSPADM

## 2019-07-15 RX ORDER — HEPARIN SODIUM (PORCINE) LOCK FLUSH IV SOLN 100 UNIT/ML 100 UNIT/ML
300 SOLUTION INTRAVENOUS PRN
Status: DISCONTINUED | OUTPATIENT
Start: 2019-07-15 | End: 2019-07-15 | Stop reason: HOSPADM

## 2019-07-15 RX ADMIN — POTASSIUM CITRATE 10 MEQ: 10 TABLET ORAL at 08:06

## 2019-07-15 RX ADMIN — CETIRIZINE HYDROCHLORIDE 10 MG: 10 TABLET, FILM COATED ORAL at 08:06

## 2019-07-15 RX ADMIN — DIPHENHYDRAMINE HCL 25 MG: 25 TABLET ORAL at 08:06

## 2019-07-15 RX ADMIN — Medication 10 ML: at 08:06

## 2019-07-15 RX ADMIN — SERTRALINE HYDROCHLORIDE 200 MG: 100 TABLET ORAL at 08:06

## 2019-07-15 RX ADMIN — HEPARIN 300 UNITS: 100 SYRINGE at 15:54

## 2019-07-15 RX ADMIN — FUROSEMIDE 20 MG: 20 TABLET ORAL at 08:06

## 2019-07-15 ASSESSMENT — PAIN SCALES - GENERAL: PAINLEVEL_OUTOF10: 3

## 2019-07-15 NOTE — PLAN OF CARE
Problem: Falls - Risk of:  Goal: Will remain free from falls  Description  Will remain free from falls  7/15/2019 0228 by Alexandra Xie RN  Outcome: Ongoing  7/14/2019 1513 by Yasemin Bahena RN  Outcome: Met This Shift  Goal: Absence of physical injury  Description  Absence of physical injury  7/15/2019 0228 by Alexandra Xie RN  Outcome: Ongoing  7/14/2019 1513 by Yasemin Bahena RN  Outcome: Met This Shift     Problem: Infection:  Goal: Will remain free from infection  Description  Will remain free from infection  7/15/2019 0228 by Alexandra Xie RN  Outcome: Ongoing  7/14/2019 1513 by Yasemin Bahena RN  Outcome: Met This Shift     Problem: Safety:  Goal: Free from accidental physical injury  Description  Free from accidental physical injury  7/15/2019 0228 by Alexandra Xie RN  Outcome: Ongoing  7/14/2019 1513 by Yasemin Bahena RN  Outcome: Met This Shift  Goal: Free from intentional harm  Description  Free from intentional harm  7/15/2019 0228 by Alexandra Xie RN  Outcome: Ongoing  7/14/2019 1513 by Yasemin Bahena RN  Outcome: Met This Shift     Problem: Daily Care:  Goal: Daily care needs are met  Description  Daily care needs are met  7/15/2019 0228 by Alexandra Xie RN  Outcome: Ongoing  7/14/2019 1513 by Yasemin Bahena RN  Outcome: Met This Shift     Problem: Skin Integrity:  Goal: Skin integrity will stabilize  Description  Skin integrity will stabilize  7/15/2019 0228 by Alexandra Xie RN  Outcome: Ongoing  7/14/2019 1513 by Yasemin Bahena RN  Outcome: Met This Shift     Problem: Discharge Planning:  Goal: Patients continuum of care needs are met  Description  Patients continuum of care needs are met  7/15/2019 0228 by Alexandra Xie RN  Outcome: Ongoing  7/14/2019 1513 by Yasemin Bahena RN  Outcome: Met This Shift
Ongoing

## 2019-07-15 NOTE — PROGRESS NOTES
These are stable compared to 7/27/2017 PET scan which were NOT metabolically active. I.e. no obvious metastatic lesions  CT abdomen and pelvis with contrast at [facility] 12/19/2017 was unrevealing for any obvious metastatic disease. Bone scan at [facility] 12/19/2017 revealed focal areas of increased activity in the left seventh and 10th costovertebral junctions-indeterminate. A right craniotomy by Dr. Drew Peterson on 1/15/2018 was performed with resection of the 2 x 2.6 x 2.6 cm high right frontal vertex mass   Pathology was consistent with metastatic adenocarcinoma with papillary features consistent with lung origin. Molecular testing on the 1/15/2018 pathology specimen was reported from [facility] on 3/15/2018 as follows:   EGFR -negative for mutation  ALK -negative for rearrangement  ROS 1 -negative for rearrangement   BRAF -negative for the V600E mutation  PDL-1 - expression is 80% i.e. in Positive  MRI of the brain [facility] & WO for [facility] treatment planning purposes was performed on 3/6/2018 by Dr. Jess Mei. Postsurgical changes to the previous resection site from the right frontal lobe mass area with residual enhancement was noted. A PET scan on 2/6/2018 did not reveal scintigraphic evidence of recurrent malignancy or metastatic disease. MRI of the brain [facility] was performed on 3/6/2018 by Dr. Jess Mei. Postsurgical changes to the previous resection site from the right frontal lobe mass area with residual enhancement was noted. Kim Ventura underwent SRS with 1600 cGy in one single treatment fraction on 3/26/2018 to the right frontal CNS lobe by Dima Mei and Drew Peterson. Delays in beginning systemic therapy included issues associated with her CNS treatment delivery and multiple dog bites on her arms and hands that required attention prior to starting systemic therapy. Diane received cycle #1 of chemotherapy with Alimta, carboplatin and Keytruda on 5/14/2018.    Diane completed cycle # 6 of carboplatin, Alimta and Keytruda on 9/4/2018. MRI of the brain with and without on 9/12/2018 documented a stable 1.6 x 1.1 cm nodular enhancement along the medial aspect of the operative cavity. Ross Hummel was seen by Dr. Earle Stanford on 9/12/2018, who felt the MRI of the brain with stable without evidence of recurrence/progression. A CT scan of the chest on 9/24/2018 did not reveal evidence of new or suspicious for urinary nodules nor intrathoracic lymphadenopathy to suggest recurrent disease in the chest.  She completed 6 cycles of Carboplatin, Alimta and Keytruda on 9/4/2018 and then went on to receive maintenance treatment with Alimta/Keytruda. Angus Arciniega reported experiencing a significant skin rash after receiving Keytruda on 11/27/2018. Eual Nares was held from 12/18/2018-1/29/2019 (x3 cycles) during which time Angus Arciniega was only receiving Alimta. Mrs. Nica Pantoja had repeat CT scans of the chest, abdomen and pelvis on 1/2/2019 that suggested stable disease. On 2/19/2019, Mrs. Vic Mahoney resumed the combination of maintenance treatment with Alimta/Keytruda without rash or problems. Angus Arciniega was admitted to [facility] on 4/9/2019 with bilateral lower extremity cellulitis. She was discharged on 4/15/2019. CT of the abdomen and pelvis with contrast at [facility] on 4/9/2019 was compared to a CT of the abdomen and pelvis from February 2013. It revealed a left-sided 3.2 x 2.2 cm paravertebral T10 soft tissue mass. 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 No evidence of acute posttraumatic injury to the brain. CT scan of the abdomen and pelvis with contrast on 4/9/2019 documented no evidence of metastatic disease in the abdomen or pelvis. LABALBU 3.3 (L) 07/10/2019    BILITOT 0.6 07/10/2019    ALKPHOS 159 (H) 07/10/2019    AST 44 (H) 07/10/2019    ALT 28 07/10/2019    LABGLOM >60 07/14/2019    GLOB 2.3 01/04/2017       Lab Results   Component Value Date    INR 1.34 (H) 07/10/2019    INR 1.52 (H) 05/26/2019    INR 1.04 11/22/2016    PROTIME 15.9 (H) 07/10/2019    PROTIME 17.6 (H) 05/26/2019    PROTIME 13.6 11/22/2016       30 Day lookback of cultures:    Blood Culture Recent:   Recent Labs     07/11/19  1214   BC No Growth to date. Any change in status will be called. Gram Stain Recent: No results for input(s): LABGRAM in the last 720 hours. Resp Culture Recent: No results for input(s): CULTRESP in the last 720 hours. Body Fluid Recent : No results for input(s): BFCX in the last 720 hours. MRSA Recent : No results for input(s): [address] in the last 720 hours. Urine Culture Recent : No results for input(s): LABURIN in the last 720 hours. Organism Recent :   Recent Labs     07/10/19  1310   ORG Staphylococcus coagulase-negative*        ASSESSMENT/PLAN:  XR CHEST PORTABLE 7/10/2019 11:30 AM  HISTORY: Fever  COMPARISON: May 26, 2019. FINDINGS:   The lungs are clear. Cardiac silhouette is normal. Left subclavian  Port-A-Cath is present. .   The osseous structures and surrounding soft tissues demonstrate no  acute abnormality.     IMPRESSION:  1. No radiographic evidence of acute cardiopulmonary process. Signed by Dr Nikolai Randle on 7/10/2019 12:45 PM     ASSESSMENT/PLAN:    1. Neutropenia with fever.   Neutropenia from chemotherapy -resolved     · WBC 18.7 with ANC 15.9 - elevated post Neupogen  · Neupogen 480 subcutaneous daily 7/10/2019 - 7/13/2019  · Daily CBC with differential  · 1 of 2  blood cultures from 7/10/2019 positive for gram-positive cocci resembling staph (the one from the port was negative, the positive culture is peripherally drawn, right antecubital region is suspected to be contaminant), repeat cultures 7/11/2019 no growth  · pCXR 7/10/2019 - No acute process  · Lactic acid 1.2 - normal  · Off antibiotics  · Continues to be afebrile    2. Anemia secondary to chemotherapy.     · Hgb 10.9, MCV 86.7 - (S/P 2 units pRBC 7/10/2019 and 1 unit 7/12/2019)    · Serum Fe  - 31  · TIBC - 208  · Fe sat - 15 %  · Ferritin > 2,000     3. Thrombocytopenia from chemotherapy -resolved     · ,000     3. Metastatic adenocarcinoma the lung to the brain.     · Maintenance Alimta cycle # 11 day # 14  · Complicated with febrile neutropenia related to chemotherapy.     4. Hyponatremia -resolved     · Na 141 - per PCP    5. Hypokalemia, improved  · K+ 3.2 on 7/14/19, up from 2.5 on 7/13/19  · Per attending      Off antibiotics, afebrile  Ok from an Oncology standpoint to discharge when appropriate with others. Will arrange outpatient follow-up. Discussed plan of care with Jordan Nash.         Leonor Moy    07/15/19  6:51 AM

## 2019-07-16 ENCOUNTER — CARE COORDINATION (OUTPATIENT)
Dept: CASE MANAGEMENT | Age: 69
End: 2019-07-16

## 2019-07-16 DIAGNOSIS — D49.6 BRAIN TUMOR (HCC): Primary | ICD-10-CM

## 2019-07-16 LAB
BLOOD CULTURE, ROUTINE: NORMAL
CULTURE, BLOOD 2: NORMAL

## 2019-07-16 PROCEDURE — 1111F DSCHRG MED/CURRENT MED MERGE: CPT | Performed by: FAMILY MEDICINE

## 2019-07-16 NOTE — CARE COORDINATION
[facility] Transitions Initial Follow Up Call    Call within 2 business days of discharge: Yes    Patient: Hermelinda Rocha Patient : 1950   MRN: [id number]  Reason for Admission: pancytopenia  Discharge Date: 7/15/19 RARS: Readmission Risk Score: 18      Last Discharge [facility]       Complaint Diagnosis Description Type Department Provider    7/10/19 Fall; Abnormal Lab Pancytopenia ([facility] [address] . .. ED to Hosp-Admission (Discharged) (ADMITTED) Rita Fitzgerald MD; Arpita Rubalcava. .. Spoke with: [address] [place] [address] Avenue: [place] [id number]      Non-face-to-face services provided:  Reviewed encounter information for continuity of care prior to follow up phone call - chart notes, consults, progress notes, test results, med list, appointments, AVS, other information. Care Transitions 24 Hour Call    Do you have any ongoing symptoms?:  No  Do you have a copy of your discharge instructions?:  Yes  Do you have all of your prescriptions and are they filled?:  Yes  Have you been contacted by a [address]?:  No  Have you scheduled your follow up appointment?:  Yes  How are you going to get to your appointment?:  Car - family or friend to transport  Were you discharged with any Home Care or Post Acute Services:  Yes  Post Acute Services:  Home Health  Patient DME:  Phyllis Dykes Wheelchair  Do you have support at home?:  Partner/Spouse/SO, Other Caregiver  Are you an active caregiver in your home?:  No  Care Transitions Interventions         Follow Up : Spoke with patient today for follow up phone call after discharge. She says she is doing fair. She has a good appetite, but is weak and tired. She has her follow up appointment next Monday with her PCP, and following week for chemo. She says she has [facility] with [facility] and PT coming to see her today working on her gait and steadiness. She does have a rescue alert.  She obtained her discharge medication and reconciled

## 2019-07-19 ENCOUNTER — CARE COORDINATION (OUTPATIENT)
Dept: CASE MANAGEMENT | Age: 69
End: 2019-07-19

## 2019-07-24 ENCOUNTER — CARE COORDINATION (OUTPATIENT)
Dept: CASE MANAGEMENT | Age: 69
End: 2019-07-24

## 2019-07-25 ENCOUNTER — LAB REQUISITION (OUTPATIENT)
Dept: LAB | Facility: HOSPITAL | Age: 69
End: 2019-07-25

## 2019-07-25 DIAGNOSIS — Z00.00 ENCOUNTER FOR GENERAL ADULT MEDICAL EXAMINATION WITHOUT ABNORMAL FINDINGS: ICD-10-CM

## 2019-07-25 LAB
ANION GAP SERPL CALCULATED.3IONS-SCNC: 9 MMOL/L (ref 4–13)
BUN BLD-MCNC: 12 MG/DL (ref 5–21)
BUN/CREAT SERPL: 17.1 (ref 7–25)
CALCIUM SPEC-SCNC: 9.8 MG/DL (ref 8.4–10.4)
CHLORIDE SERPL-SCNC: 103 MMOL/L (ref 98–110)
CO2 SERPL-SCNC: 25 MMOL/L (ref 24–31)
CREAT BLD-MCNC: 0.7 MG/DL (ref 0.5–1.4)
GFR SERPL CREATININE-BSD FRML MDRD: 83 ML/MIN/1.73
GLUCOSE BLD-MCNC: 96 MG/DL (ref 70–100)
POTASSIUM BLD-SCNC: 4.2 MMOL/L (ref 3.5–5.3)
SODIUM BLD-SCNC: 137 MMOL/L (ref 135–145)

## 2019-07-25 PROCEDURE — 80048 BASIC METABOLIC PNL TOTAL CA: CPT | Performed by: FAMILY MEDICINE

## 2019-07-29 ENCOUNTER — HOSPITAL ENCOUNTER (OUTPATIENT)
Dept: INFUSION THERAPY | Age: 69
Discharge: HOME | End: 2019-07-29
Payer: MEDICARE

## 2019-07-29 DIAGNOSIS — C34.12 SQUAMOUS CELL CARCINOMA OF BRONCHUS IN LEFT UPPER LOBE (HCC): Primary | ICD-10-CM

## 2019-07-29 DIAGNOSIS — C34.90 NON-SMALL CELL LUNG CANCER, UNSPECIFIED LATERALITY (HCC): ICD-10-CM

## 2019-07-29 PROCEDURE — 84100 ASSAY OF PHOSPHORUS: CPT

## 2019-07-29 PROCEDURE — 96372 THER/PROPH/DIAG INJ SC/IM: CPT

## 2019-07-29 PROCEDURE — 6360000002 HC RX W HCPCS: Performed by: INTERNAL MEDICINE

## 2019-07-29 PROCEDURE — 2580000003 HC RX 258: Performed by: INTERNAL MEDICINE

## 2019-07-29 PROCEDURE — 96413 CHEMO IV INFUSION 1 HR: CPT

## 2019-07-29 PROCEDURE — 80053 COMPREHEN METABOLIC PANEL: CPT

## 2019-07-29 PROCEDURE — 96375 TX/PRO/DX INJ NEW DRUG ADDON: CPT

## 2019-07-29 PROCEDURE — 85025 COMPLETE CBC W/AUTO DIFF WBC: CPT

## 2019-07-29 PROCEDURE — 83735 ASSAY OF MAGNESIUM: CPT

## 2019-07-29 RX ORDER — CYANOCOBALAMIN 1000 UG/ML
1000 INJECTION INTRAMUSCULAR; SUBCUTANEOUS ONCE
Status: DISCONTINUED | OUTPATIENT
Start: 2019-07-29 | End: 2019-07-31 | Stop reason: HOSPADM

## 2019-07-29 RX ORDER — DEXAMETHASONE SODIUM PHOSPHATE 10 MG/ML
10 INJECTION, SOLUTION INTRAMUSCULAR; INTRAVENOUS ONCE
Status: DISCONTINUED | OUTPATIENT
Start: 2019-07-29 | End: 2019-07-31 | Stop reason: HOSPADM

## 2019-07-29 RX ORDER — DIPHENHYDRAMINE HYDROCHLORIDE 50 MG/ML
50 INJECTION INTRAMUSCULAR; INTRAVENOUS PRN
Status: DISCONTINUED | OUTPATIENT
Start: 2019-07-29 | End: 2019-07-31 | Stop reason: HOSPADM

## 2019-07-29 RX ORDER — SODIUM CHLORIDE 0.9 % (FLUSH) 0.9 %
5 SYRINGE (ML) INJECTION PRN
Status: DISCONTINUED | OUTPATIENT
Start: 2019-07-29 | End: 2019-07-30 | Stop reason: HOSPADM

## 2019-07-29 RX ORDER — HEPARIN SODIUM (PORCINE) LOCK FLUSH IV SOLN 100 UNIT/ML 100 UNIT/ML
500 SOLUTION INTRAVENOUS PRN
Status: DISCONTINUED | OUTPATIENT
Start: 2019-07-29 | End: 2019-07-30 | Stop reason: HOSPADM

## 2019-07-29 RX ORDER — SODIUM CHLORIDE 0.9 % (FLUSH) 0.9 %
10 SYRINGE (ML) INJECTION PRN
Status: DISCONTINUED | OUTPATIENT
Start: 2019-07-29 | End: 2019-07-30 | Stop reason: HOSPADM

## 2019-07-29 RX ORDER — METHYLPREDNISOLONE SODIUM SUCCINATE 125 MG/2ML
125 INJECTION, POWDER, LYOPHILIZED, FOR SOLUTION INTRAMUSCULAR; INTRAVENOUS PRN
Status: DISCONTINUED | OUTPATIENT
Start: 2019-07-29 | End: 2019-07-31 | Stop reason: HOSPADM

## 2019-07-29 RX ORDER — PALONOSETRON 0.05 MG/ML
0.25 INJECTION, SOLUTION INTRAVENOUS ONCE
Status: DISCONTINUED | OUTPATIENT
Start: 2019-07-29 | End: 2019-07-31 | Stop reason: HOSPADM

## 2019-07-29 RX ORDER — EPINEPHRINE 1 MG/ML
0.3 INJECTION, SOLUTION, CONCENTRATE INTRAVENOUS PRN
Status: DISCONTINUED | OUTPATIENT
Start: 2019-07-29 | End: 2019-07-31 | Stop reason: HOSPADM

## 2019-07-29 RX ORDER — SODIUM CHLORIDE 9 MG/ML
20 INJECTION, SOLUTION INTRAVENOUS ONCE
Status: DISCONTINUED | OUTPATIENT
Start: 2019-07-29 | End: 2019-07-31 | Stop reason: HOSPADM

## 2019-07-30 ENCOUNTER — HOSPITAL ENCOUNTER (OUTPATIENT)
Dept: MRI IMAGING | Facility: HOSPITAL | Age: 69
Discharge: HOME | End: 2019-07-30
Admitting: NEUROLOGICAL SURGERY

## 2019-07-30 ENCOUNTER — CARE COORDINATION (OUTPATIENT)
Dept: CASE MANAGEMENT | Age: 69
End: 2019-07-30

## 2019-07-30 ENCOUNTER — OFFICE VISIT (OUTPATIENT)
Dept: NEUROSURGERY | Facility: CLINIC | Age: 69
End: 2019-07-30

## 2019-07-30 VITALS
DIASTOLIC BLOOD PRESSURE: 68 MMHG | HEIGHT: 64 IN | WEIGHT: 125 LBS | SYSTOLIC BLOOD PRESSURE: 104 MMHG | BODY MASS INDEX: 21.34 KG/M2

## 2019-07-30 DIAGNOSIS — C34.92 ADENOCARCINOMA OF LUNG, STAGE 4, LEFT (HCC): ICD-10-CM

## 2019-07-30 DIAGNOSIS — C79.31 SECONDARY MALIGNANT NEOPLASM OF BRAIN AND SPINAL CORD (HCC): Primary | Chronic | ICD-10-CM

## 2019-07-30 DIAGNOSIS — Z87.891 FORMER SMOKER: ICD-10-CM

## 2019-07-30 DIAGNOSIS — C79.49 SECONDARY MALIGNANT NEOPLASM OF BRAIN AND SPINAL CORD (HCC): Primary | Chronic | ICD-10-CM

## 2019-07-30 DIAGNOSIS — C79.31 SECONDARY MALIGNANT NEOPLASM OF BRAIN AND SPINAL CORD (HCC): Chronic | ICD-10-CM

## 2019-07-30 DIAGNOSIS — C79.49 SECONDARY MALIGNANT NEOPLASM OF BRAIN AND SPINAL CORD (HCC): Chronic | ICD-10-CM

## 2019-07-30 PROCEDURE — 99213 OFFICE O/P EST LOW 20 MIN: CPT | Performed by: NEUROLOGICAL SURGERY

## 2019-07-30 PROCEDURE — 70553 MRI BRAIN STEM W/O & W/DYE: CPT

## 2019-07-30 PROCEDURE — 0 GADOBENATE DIMEGLUMINE 529 MG/ML SOLUTION: Performed by: NEUROLOGICAL SURGERY

## 2019-07-30 PROCEDURE — A9577 INJ MULTIHANCE: HCPCS | Performed by: NEUROLOGICAL SURGERY

## 2019-07-30 RX ORDER — POLYETHYLENE GLYCOL 3350 17 G/17G
POWDER, FOR SOLUTION ORAL
Refills: 1 | COMMUNITY
Start: 2019-07-09 | End: 2020-01-06 | Stop reason: ALTCHOICE

## 2019-07-30 RX ORDER — DEXAMETHASONE 4 MG/1
TABLET ORAL
Refills: 2 | COMMUNITY
Start: 2019-07-25 | End: 2019-11-01

## 2019-07-30 RX ADMIN — GADOBENATE DIMEGLUMINE 13 ML: 529 INJECTION, SOLUTION INTRAVENOUS at 13:37

## 2019-07-30 NOTE — PROGRESS NOTES
SUBJECTIVE:  Patient ID: Marcy Garcia is a 68 y.o. female is here today for follow-up.    Chief Complaint: Metastatic cancer  Chief Complaint   Patient presents with   • Brain Tumor     patient is here for her 4 month follow up with MRI today @ [facility]  68-year-old female with a history of lung cancer who underwent craniotomy in January 2018 and then stereotactic radiosurgery.  She has no new issues or complaints.  She is on maintenance chemotherapy.        The following portions of the patient's history were reviewed and updated as appropriate: allergies, current medications, past family history, past medical history, past social history, past surgical history and problem list.    OBJECTIVE:    Review of Systems   All other systems reviewed and are negative.         Physical Exam   Constitutional: She is oriented to person, place, and time. She appears well-developed and well-nourished.   HENT:   Head: Normocephalic and atraumatic.   Right Ear: Hearing normal.   Left Ear: Hearing normal.   Eyes: EOM are normal. Pupils are equal, round, and reactive to light.   Neck: Normal range of motion.   Neurological: She is alert and oriented to person, place, and time. She has normal strength and normal reflexes. No cranial nerve deficit or sensory deficit. She displays a negative Romberg sign. GCS eye subscore is 4. GCS verbal subscore is 5. GCS motor subscore is 6. She displays no Babinski's sign on the right side. She displays no Babinski's sign on the left side.   Psychiatric: Her speech is normal. Judgment normal. Cognition and memory are normal.       Neurologic Exam     Mental Status   Oriented to person, place, and time.   Speech: speech is normal     Cranial Nerves     CN III, IV, VI   Pupils are equal, round, and reactive to light.  Extraocular motions are normal.     Motor Exam     Strength   Strength 5/5 throughout.       Independent Review of Radiographic Studies:   MRI of the brain with and without  contrast shows no new areas of enhancement.  No areas of recurrence    ASSESSMENT/PLAN:  The patient is doing very well after surgery and radiation.  Her MRI shows no new areas of concern.  We are going to see her in follow-up in about 4 months.      1. 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 Adenocarcinoma of lung, stage 4, left (CMS/HCC)    3. Former smoker            No Follow-up on file.      Constantine Schmidt MD

## 2019-08-03 VITALS
SYSTOLIC BLOOD PRESSURE: 144 MMHG | HEIGHT: 64 IN | HEART RATE: 95 BPM | BODY MASS INDEX: 21.68 KG/M2 | TEMPERATURE: 99 F | DIASTOLIC BLOOD PRESSURE: 82 MMHG | WEIGHT: 127 LBS

## 2019-08-03 DIAGNOSIS — N28.1 CYST OF KIDNEY, ACQUIRED: ICD-10-CM

## 2019-08-03 DIAGNOSIS — C79.31 SECONDARY MALIGNANT NEOPLASM OF BRAIN (HCC): ICD-10-CM

## 2019-08-03 DIAGNOSIS — R53.83 FATIGUE, UNSPECIFIED TYPE: ICD-10-CM

## 2019-08-03 DIAGNOSIS — N63.10 UNSPECIFIED LUMP IN THE RIGHT BREAST, UNSPECIFIED QUADRANT: ICD-10-CM

## 2019-08-03 RX ORDER — DEXAMETHASONE 4 MG/1
4 TABLET ORAL 2 TIMES DAILY
COMMUNITY
End: 2019-12-31 | Stop reason: ALTCHOICE

## 2019-08-04 ENCOUNTER — APPOINTMENT (OUTPATIENT)
Dept: CT IMAGING | Age: 69
DRG: 809 | End: 2019-08-04
Payer: MEDICARE

## 2019-08-04 ENCOUNTER — HOSPITAL ENCOUNTER (INPATIENT)
Age: 69
LOS: 8 days | Discharge: NURSING, REHAB OR LONG-TERM CARE | DRG: 809 | End: 2019-08-13
Attending: EMERGENCY MEDICINE | Admitting: FAMILY MEDICINE
Payer: MEDICARE

## 2019-08-04 DIAGNOSIS — N30.00 ACUTE CYSTITIS WITHOUT HEMATURIA: ICD-10-CM

## 2019-08-04 DIAGNOSIS — T45.1X5A CHEMOTHERAPY-INDUCED NEUTROPENIA (HCC): ICD-10-CM

## 2019-08-04 DIAGNOSIS — G93.40 ACUTE ENCEPHALOPATHY: Primary | ICD-10-CM

## 2019-08-04 DIAGNOSIS — D70.1 CHEMOTHERAPY-INDUCED NEUTROPENIA (HCC): ICD-10-CM

## 2019-08-04 LAB
ACETAMINOPHEN LEVEL: <15 UG/ML
ALBUMIN SERPL-MCNC: 3.5 G/DL (ref 3.5–5.2)
ALP BLD-CCNC: 136 U/L (ref 35–104)
ALT SERPL-CCNC: 20 U/L (ref 5–33)
ANION GAP SERPL CALCULATED.3IONS-SCNC: 13 MMOL/L (ref 7–19)
ANISOCYTOSIS: ABNORMAL
AST SERPL-CCNC: 27 U/L (ref 5–32)
ATYPICAL LYMPHOCYTE RELATIVE PERCENT: 1 % (ref 0–8)
BACTERIA: NEGATIVE /HPF
BASOPHILS ABSOLUTE: 0 K/UL (ref 0–0.2)
BASOPHILS MANUAL: 0 %
BASOPHILS RELATIVE PERCENT: 0 % (ref 0–1)
BILIRUB SERPL-MCNC: 0.9 MG/DL (ref 0.2–1.2)
BILIRUBIN URINE: NEGATIVE
BLOOD, URINE: NEGATIVE
BUN BLDV-MCNC: 15 MG/DL (ref 8–23)
C-REACTIVE PROTEIN: 33.26 MG/DL (ref 0–0.5)
CALCIUM SERPL-MCNC: 9.2 MG/DL (ref 8.8–10.2)
CHLORIDE BLD-SCNC: 93 MMOL/L (ref 98–111)
CLARITY: ABNORMAL
CO2: 26 MMOL/L (ref 22–29)
COLOR: YELLOW
CREAT SERPL-MCNC: 0.7 MG/DL (ref 0.5–0.9)
EOSINOPHILS ABSOLUTE: 0.02 K/UL (ref 0–0.6)
EOSINOPHILS MANUAL: 1 %
EOSINOPHILS RELATIVE PERCENT: 1 % (ref 0–5)
EPITHELIAL CELLS, UA: 6 /HPF (ref 0–5)
GFR NON-AFRICAN AMERICAN: >60
GLUCOSE BLD-MCNC: 104 MG/DL (ref 74–109)
GLUCOSE URINE: NEGATIVE MG/DL
HCT VFR BLD CALC: 27.4 % (ref 37–47)
HEMOGLOBIN: 8.6 G/DL (ref 12–16)
HYALINE CASTS: 2 /HPF (ref 0–8)
KETONES, URINE: NEGATIVE MG/DL
LACTIC ACID: 2 MMOL/L (ref 0.5–1.9)
LEUKOCYTE ESTERASE, URINE: ABNORMAL
LYMPHOCYTES ABSOLUTE: 0.2 K/UL (ref 1.1–4.5)
LYMPHOCYTES MANUAL: 10 % (ref 20–40)
LYMPHOCYTES RELATIVE PERCENT: 10 % (ref 20–40)
MCH RBC QN AUTO: 27.1 PG (ref 27–31)
MCHC RBC AUTO-ENTMCNC: 31.4 G/DL (ref 33–37)
MCV RBC AUTO: 86.4 FL (ref 81–99)
MICROCYTES: ABNORMAL
MONOCYTES ABSOLUTE: 0.1 K/UL (ref 0–0.9)
MONOCYTES MANUAL: 5 % (ref 0–10)
MONOCYTES RELATIVE PERCENT: 5 % (ref 0–10)
NEUTROPHILS ABSOLUTE: 1.6 K/UL (ref 1.5–7.5)
NEUTROPHILS MANUAL: 83 %
NEUTROPHILS RELATIVE PERCENT: 83 % (ref 50–65)
NITRITE, URINE: NEGATIVE
PDW BLD-RTO: 19.3 % (ref 11.5–14.5)
PH UA: 7.5 (ref 5–8)
PLATELET # BLD: 111 K/UL (ref 130–400)
PLATELET SLIDE REVIEW: ABNORMAL
PMV BLD AUTO: 10.2 FL (ref 9.4–12.3)
POTASSIUM SERPL-SCNC: 4.1 MMOL/L (ref 3.5–5)
PROTEIN UA: 30 MG/DL
RBC # BLD: 3.17 M/UL (ref 4.2–5.4)
RBC UA: 2 /HPF (ref 0–4)
SALICYLATE, SERUM: <3 MG/DL (ref 3–10)
SEDIMENTATION RATE, ERYTHROCYTE: 114 MM/HR (ref 0–25)
SODIUM BLD-SCNC: 132 MMOL/L (ref 136–145)
SPECIFIC GRAVITY UA: 1.01 (ref 1–1.03)
TEAR DROP CELLS: ABNORMAL
TOTAL PROTEIN: 6.6 G/DL (ref 6.6–8.7)
URINE REFLEX TO CULTURE: YES
UROBILINOGEN, URINE: 1 E.U./DL
WBC # BLD: 1.9 K/UL (ref 4.8–10.8)
WBC UA: 72 /HPF (ref 0–5)

## 2019-08-04 PROCEDURE — 86140 C-REACTIVE PROTEIN: CPT

## 2019-08-04 PROCEDURE — G0480 DRUG TEST DEF 1-7 CLASSES: HCPCS

## 2019-08-04 PROCEDURE — 85025 COMPLETE CBC W/AUTO DIFF WBC: CPT

## 2019-08-04 PROCEDURE — 83605 ASSAY OF LACTIC ACID: CPT

## 2019-08-04 PROCEDURE — 2580000003 HC RX 258: Performed by: EMERGENCY MEDICINE

## 2019-08-04 PROCEDURE — 70450 CT HEAD/BRAIN W/O DYE: CPT

## 2019-08-04 PROCEDURE — 80053 COMPREHEN METABOLIC PANEL: CPT

## 2019-08-04 PROCEDURE — 96374 THER/PROPH/DIAG INJ IV PUSH: CPT

## 2019-08-04 PROCEDURE — 99285 EMERGENCY DEPT VISIT HI MDM: CPT

## 2019-08-04 PROCEDURE — 87040 BLOOD CULTURE FOR BACTERIA: CPT

## 2019-08-04 PROCEDURE — 6360000002 HC RX W HCPCS: Performed by: EMERGENCY MEDICINE

## 2019-08-04 PROCEDURE — 87086 URINE CULTURE/COLONY COUNT: CPT

## 2019-08-04 PROCEDURE — 81001 URINALYSIS AUTO W/SCOPE: CPT

## 2019-08-04 PROCEDURE — 85652 RBC SED RATE AUTOMATED: CPT

## 2019-08-04 PROCEDURE — 36415 COLL VENOUS BLD VENIPUNCTURE: CPT

## 2019-08-04 RX ORDER — 0.9 % SODIUM CHLORIDE 0.9 %
500 INTRAVENOUS SOLUTION INTRAVENOUS ONCE
Status: COMPLETED | OUTPATIENT
Start: 2019-08-04 | End: 2019-08-04

## 2019-08-04 RX ORDER — ALBUTEROL SULFATE 90 UG/1
2 AEROSOL, METERED RESPIRATORY (INHALATION) EVERY 6 HOURS PRN
Status: ON HOLD | COMMUNITY
End: 2020-07-24 | Stop reason: HOSPADM

## 2019-08-04 RX ORDER — FLUCONAZOLE 100 MG/1
100 TABLET ORAL DAILY
Status: ON HOLD | COMMUNITY
End: 2019-08-13 | Stop reason: HOSPADM

## 2019-08-04 RX ORDER — GENTAMICIN SULFATE 80 MG/100ML
80 INJECTION, SOLUTION INTRAVENOUS EVERY 8 HOURS
Status: ON HOLD | COMMUNITY
End: 2019-08-13 | Stop reason: HOSPADM

## 2019-08-04 RX ADMIN — CEFTRIAXONE 1 G: 1 INJECTION, POWDER, FOR SOLUTION INTRAMUSCULAR; INTRAVENOUS at 22:10

## 2019-08-04 RX ADMIN — SODIUM CHLORIDE 500 ML: 9 INJECTION, SOLUTION INTRAVENOUS at 19:49

## 2019-08-04 ASSESSMENT — PAIN DESCRIPTION - PAIN TYPE: TYPE: ACUTE PAIN;CHRONIC PAIN

## 2019-08-04 ASSESSMENT — PAIN DESCRIPTION - LOCATION: LOCATION: GENERALIZED

## 2019-08-04 ASSESSMENT — PAIN SCALES - GENERAL: PAINLEVEL_OUTOF10: 5

## 2019-08-05 PROBLEM — N39.0 UTI (URINARY TRACT INFECTION): Status: ACTIVE | Noted: 2019-08-05

## 2019-08-05 PROCEDURE — 2580000003 HC RX 258: Performed by: FAMILY MEDICINE

## 2019-08-05 PROCEDURE — 1210000000 HC MED SURG R&B

## 2019-08-05 PROCEDURE — 6360000002 HC RX W HCPCS: Performed by: FAMILY MEDICINE

## 2019-08-05 PROCEDURE — 6370000000 HC RX 637 (ALT 250 FOR IP): Performed by: FAMILY MEDICINE

## 2019-08-05 PROCEDURE — 99221 1ST HOSP IP/OBS SF/LOW 40: CPT | Performed by: INTERNAL MEDICINE

## 2019-08-05 PROCEDURE — 6360000002 HC RX W HCPCS: Performed by: EMERGENCY MEDICINE

## 2019-08-05 PROCEDURE — 99285 EMERGENCY DEPT VISIT HI MDM: CPT | Performed by: EMERGENCY MEDICINE

## 2019-08-05 RX ORDER — SODIUM CHLORIDE 0.9 % (FLUSH) 0.9 %
10 SYRINGE (ML) INJECTION PRN
Status: DISCONTINUED | OUTPATIENT
Start: 2019-08-05 | End: 2019-08-13 | Stop reason: HOSPADM

## 2019-08-05 RX ORDER — SERTRALINE HYDROCHLORIDE 100 MG/1
100 TABLET, FILM COATED ORAL 2 TIMES DAILY
Status: DISCONTINUED | OUTPATIENT
Start: 2019-08-05 | End: 2019-08-13 | Stop reason: HOSPADM

## 2019-08-05 RX ORDER — CETIRIZINE HYDROCHLORIDE 10 MG/1
10 TABLET ORAL DAILY
Status: DISCONTINUED | OUTPATIENT
Start: 2019-08-05 | End: 2019-08-13 | Stop reason: HOSPADM

## 2019-08-05 RX ORDER — PANTOPRAZOLE SODIUM 40 MG/1
40 TABLET, DELAYED RELEASE ORAL
Status: DISCONTINUED | OUTPATIENT
Start: 2019-08-06 | End: 2019-08-13 | Stop reason: HOSPADM

## 2019-08-05 RX ORDER — TIZANIDINE 2 MG/1
2 TABLET ORAL DAILY
Status: ON HOLD | COMMUNITY
End: 2020-07-24 | Stop reason: HOSPADM

## 2019-08-05 RX ORDER — HYDROCHLOROTHIAZIDE 25 MG/1
50 TABLET ORAL DAILY
Status: ON HOLD | COMMUNITY
End: 2019-08-13 | Stop reason: HOSPADM

## 2019-08-05 RX ORDER — MONTELUKAST SODIUM 10 MG/1
10 TABLET ORAL NIGHTLY
Status: DISCONTINUED | OUTPATIENT
Start: 2019-08-05 | End: 2019-08-13 | Stop reason: HOSPADM

## 2019-08-05 RX ORDER — FOLIC ACID 1 MG/1
1 TABLET ORAL DAILY
Status: DISCONTINUED | OUTPATIENT
Start: 2019-08-05 | End: 2019-08-13 | Stop reason: HOSPADM

## 2019-08-05 RX ORDER — ATORVASTATIN CALCIUM 10 MG/1
10 TABLET, FILM COATED ORAL NIGHTLY
Status: DISCONTINUED | OUTPATIENT
Start: 2019-08-05 | End: 2019-08-13 | Stop reason: HOSPADM

## 2019-08-05 RX ORDER — FLUTICASONE PROPIONATE 50 MCG
2 SPRAY, SUSPENSION (ML) NASAL DAILY
Status: DISCONTINUED | OUTPATIENT
Start: 2019-08-05 | End: 2019-08-11

## 2019-08-05 RX ORDER — SODIUM CHLORIDE 0.9 % (FLUSH) 0.9 %
10 SYRINGE (ML) INJECTION EVERY 12 HOURS SCHEDULED
Status: DISCONTINUED | OUTPATIENT
Start: 2019-08-05 | End: 2019-08-13 | Stop reason: HOSPADM

## 2019-08-05 RX ORDER — ACETAMINOPHEN 325 MG/1
650 TABLET ORAL EVERY 4 HOURS PRN
Status: DISCONTINUED | OUTPATIENT
Start: 2019-08-05 | End: 2019-08-13 | Stop reason: HOSPADM

## 2019-08-05 RX ORDER — ACETAMINOPHEN 325 MG/1
650 TABLET ORAL EVERY 6 HOURS PRN
Status: DISCONTINUED | OUTPATIENT
Start: 2019-08-05 | End: 2019-08-13 | Stop reason: HOSPADM

## 2019-08-05 RX ORDER — SODIUM CHLORIDE AND POTASSIUM CHLORIDE .9; .15 G/100ML; G/100ML
SOLUTION INTRAVENOUS CONTINUOUS
Status: DISCONTINUED | OUTPATIENT
Start: 2019-08-05 | End: 2019-08-13

## 2019-08-05 RX ORDER — ONDANSETRON HYDROCHLORIDE 8 MG/1
8 TABLET, FILM COATED ORAL EVERY 8 HOURS PRN
COMMUNITY
End: 2020-01-07 | Stop reason: CLARIF

## 2019-08-05 RX ORDER — FUROSEMIDE 20 MG/1
20 TABLET ORAL DAILY
Status: DISCONTINUED | OUTPATIENT
Start: 2019-08-05 | End: 2019-08-09

## 2019-08-05 RX ORDER — ALBUTEROL SULFATE 90 UG/1
2 AEROSOL, METERED RESPIRATORY (INHALATION) EVERY 6 HOURS PRN
Status: DISCONTINUED | OUTPATIENT
Start: 2019-08-05 | End: 2019-08-13 | Stop reason: HOSPADM

## 2019-08-05 RX ORDER — GENTAMICIN SULFATE 3 MG/ML
1 SOLUTION/ DROPS OPHTHALMIC 3 TIMES DAILY
Status: ON HOLD | COMMUNITY
End: 2019-09-14 | Stop reason: ALTCHOICE

## 2019-08-05 RX ADMIN — SERTRALINE HYDROCHLORIDE 100 MG: 100 TABLET ORAL at 21:32

## 2019-08-05 RX ADMIN — MONTELUKAST SODIUM 10 MG: 10 TABLET ORAL at 21:44

## 2019-08-05 RX ADMIN — FUROSEMIDE 20 MG: 20 TABLET ORAL at 11:26

## 2019-08-05 RX ADMIN — Medication 1 G: at 21:32

## 2019-08-05 RX ADMIN — ACETAMINOPHEN 650 MG: 325 TABLET ORAL at 02:28

## 2019-08-05 RX ADMIN — FLUTICASONE PROPIONATE 2 SPRAY: 50 SPRAY, METERED NASAL at 18:38

## 2019-08-05 RX ADMIN — CETIRIZINE HYDROCHLORIDE 10 MG: 10 TABLET, FILM COATED ORAL at 11:26

## 2019-08-05 RX ADMIN — Medication 10 ML: at 21:44

## 2019-08-05 RX ADMIN — ATORVASTATIN CALCIUM 10 MG: 10 TABLET, FILM COATED ORAL at 21:32

## 2019-08-05 RX ADMIN — FOLIC ACID 1 MG: 1 TABLET ORAL at 11:26

## 2019-08-05 RX ADMIN — POTASSIUM CHLORIDE AND SODIUM CHLORIDE: 900; 150 INJECTION, SOLUTION INTRAVENOUS at 02:27

## 2019-08-05 RX ADMIN — SERTRALINE HYDROCHLORIDE 100 MG: 100 TABLET ORAL at 11:26

## 2019-08-05 ASSESSMENT — ENCOUNTER SYMPTOMS
ABDOMINAL DISTENTION: 0
SHORTNESS OF BREATH: 0
ABDOMINAL PAIN: 0
NAUSEA: 0
DIARRHEA: 0
VOMITING: 0

## 2019-08-05 ASSESSMENT — PAIN SCALES - GENERAL: PAINLEVEL_OUTOF10: 8

## 2019-08-05 ASSESSMENT — PAIN DESCRIPTION - PAIN TYPE: TYPE: ACUTE PAIN

## 2019-08-05 ASSESSMENT — PAIN DESCRIPTION - LOCATION: LOCATION: HEAD

## 2019-08-05 NOTE — H&P
Take 2 mg by mouth daily  albuterol sulfate  (90 Base) MCG/ACT inhaler, Inhale 2 puffs into the lungs every 6 hours as needed for Wheezing  fluconazole (DIFLUCAN) 100 MG tablet, Take 100 mg by mouth daily  gentamicin (GARAMYCIN) 0.8-0.9 MG/ML-%, Infuse 80 mg intravenously every 8 hours  dexamethasone (DECADRON) 4 MG tablet, Take 4 mg by mouth 2 times daily Start the day before treatment and continue for 3 days after. furosemide (LASIX) 20 MG tablet, Take 1 tablet by mouth daily (Patient taking differently: Take 40 mg by mouth 2 times daily )  potassium citrate (UROCIT-K) 10 MEQ (1080 MG) extended release tablet, Take by mouth 3 times daily (with meals)  folic acid (FOLVITE) 1 MG tablet, Take 1 mg by mouth daily  PEMEtrexed Disodium (ALIMTA IV), Infuse intravenously every 21 days  acetaminophen (TYLENOL) 325 MG tablet, Take 650 mg by mouth every 6 hours as needed for Pain  cetirizine (ZYRTEC) 10 MG tablet, Take 1 tablet by mouth daily  atorvastatin (LIPITOR) 20 MG tablet, Take 10 mg by mouth nightly   montelukast (SINGULAIR) 10 MG tablet, Take 10 mg by mouth nightly Indications: Seasonal Allergy   sertraline (ZOLOFT) 100 MG tablet, Take 100 mg by mouth 2 times daily     Allergies:  Kiwi extract; Hydromorphone hcl; Keytruda [pembrolizumab]; and Pineapple    Social History:   TOBACCO:   reports that she quit smoking about 2 years ago. She has never used smokeless tobacco.  ETOH:   reports that she drank alcohol. DRUGS:   reports that she does not use drugs.   MARITAL STATUS:    OCCUPATION:  Not working  Patient currently lives with family       Family History:       Problem Relation Age of Onset    High Blood Pressure Mother     Stroke Mother     Cancer Father         Lung Cancer     REVIEW OF SYSTEMS:  Constitutional: weakness  CV: neg  Pulmonary: neg  GI: neg  : neg  Psych: neg  Neuro: neg  Skin: neg  MusculoSkeletal: neg  HEENT: neg  Joints: neg  Vitals:  /69   Pulse 102   Temp 97.9 °F

## 2019-08-05 NOTE — CONSULTS
revealed some paravertebral soft tissue nodules present at T8, T9, T10 with largest being 2.5 cm. These are stable compared to 7/27/2017 PET scan which were NOT metabolically active. I.e. no obvious metastatic lesions  CT abdomen and pelvis with contrast at [address] 12/19/2017 was unrevealing for any obvious metastatic disease. Bone scan at [address] 12/19/2017 revealed focal areas of increased activity in the left seventh and 10th costovertebral junctions-indeterminate. A right craniotomy by Dr. Roseanne Castellano on 1/15/2018 was performed with resection of the 2 x 2.6 x 2.6 cm high right frontal vertex mass   Pathology was consistent with metastatic adenocarcinoma with papillary features consistent with lung origin. Molecular testing on the 1/15/2018 pathology specimen was reported from [facility] on 3/15/2018 as follows:   EGFR -negative for mutation  ALK -negative for rearrangement  ROS 1 -negative for rearrangement   BRAF -negative for the V600E mutation  PDL-1 - expression is 80% i.e. in Positive  MRI of the brain [facility] & WO for [facility] treatment planning purposes was performed on 3/6/2018 by Dr. Ruiz Becerra. Postsurgical changes to the previous resection site from the right frontal lobe mass area with residual enhancement was noted. A PET scan on 2/6/2018 did not reveal scintigraphic evidence of recurrent malignancy or metastatic disease. MRI of the brain W & WO for [facility] treatment planning purposes was performed on 3/6/2018 by Dr. Ruiz Becerra. Postsurgical changes to the previous resection site from the right frontal lobe mass area with residual enhancement was noted. Jennifer Navarro underwent SRS with 1600 cGy in one single treatment fraction on 3/26/2018 to the right frontal CNS lobe by Dima Becerra and Roseanne Castellano. Delays in beginning systemic therapy included issues associated with her CNS treatment delivery and multiple dog bites on her arms and hands that required attention prior to starting systemic therapy.    Diane received with contrast on 4/9/2019 documented no evidence of metastatic disease in the abdomen or pelvis. Paravertebral soft tissue masses at T10. Differential metastatic disease versus extra medullary hematopoiesis. Maintenance Alimta/Keytruda was initiated on 10/16/2018. Angeline Hernández reported experiencing a significant skin rash after receiving Keytruda on 11/27/2018. Basil Cooler was held from 12/18/2018-1/29/2019 (x3 cycles) during which time Angeline Hernández was only receiving Alimta. Imaging studies on 1/2/2019 suggested stable disease. Angeline Hernández resumed maintenance Alimta/Keytruda 2/19/2019 - 5/7/1209. Due to skin reaction manifestations, further maintenance Keytruda was not given after the 5/7/2019 dose. Diane received course #11 of maintenance Alimta on 7/2/2019. Thereafter, maintenance Alimta was scheduled monthly. TREATMENT SUMMARY:   1. Left thoracotomy with left lower lobectomy and mediastinal lymph node dissection 01/06/2017 by Dr. Myrna Pierre   2. Adjuvant cisplatinum and Alimta x4 cycles. 4/11/2017 -6/21/17. 3. A right craniotomy by Dr. Toma Damian on 1/15/2018 was performed with resection of the 2 x 2.6 x 2.6 cm high right frontal vertex mass   4. Angeline Hernández underwent SRS with 1600 cGy in one single treatment fraction on 3/26/2018 to the right frontal CNS lobe by Dima Monsivais and Toma Damian   5. Cycle #1 of chemotherapy with Alimta, carboplatin and Basil Cooler was delivered on 5/14/2018 and the final cycle #6 was delivered on 9/4/2018   6. Maintenance Alimta/Keytruda was initiated on 10/16/2018 with the final cycle #9 delivered on 5/7/2019.   Cycle #10 of single agent maintenance Alimta was delivered on 6/11/2019 with schedule adjusted to monthly on 7/2/2019       Past Medical History:    Past Medical History:   Diagnosis Date    Anxiety     Arthritis     Bowel obstruction (HCC)     adhesions with colectomy/colostomy    Cancer ([facility] [address]     lung LLL ,  brain    Colostomy in place [facility])     Concussion with no loss of

## 2019-08-05 NOTE — ED PROVIDER NOTES
[facility] EMERGENCY DEPT  eMERGENCY dEPARTMENT eNCOUnter      Pt Name: Matias Miranda  MRN: [id number]  [place] 1950  Date of evaluation: 8/4/2019  Provider: Yanique Montaño MD    [address] [place] [address]       Chief Complaint   Patient presents with    Fatigue    Headache    Failure To Thrive    Generalized Body Aches         HISTORY OF PRESENT ILLNESS   (Location/Symptom, Timing/Onset,Context/Setting, Quality, Duration, Modifying Factors, Severity)  Note limiting factors. Matias Miranda is a 70 y.o. female who presents to the emergency department for evaluation of episodes of confusion, generalized weakness and body aches. Patient reports that she has a history of stage IV non-small cell lung carcinoma. She had previously underwent craniotomy for metastatic disease resection. Her most recent chemotherapy was about 1 week previously. Family stated for the past couple of days she has had increased episodes of confusion, generalized weakness and is just not feeling like her usual self. She has not noted any vomiting or diarrhea. States that she has not had fevers, chills. Patient denies any headache or acute visual changes. HPI    NursingNotes were reviewed. REVIEW OF SYSTEMS    (2-9 systems for level 4, 10 or more for level 5)     Review of Systems   Constitutional: Positive for activity change and appetite change. Negative for chills and fever. Respiratory: Negative for shortness of breath. Cardiovascular: Negative for chest pain. Gastrointestinal: Negative for abdominal distention, abdominal pain, diarrhea, nausea and vomiting. Neurological: Positive for weakness. All other systems reviewed and are negative.            PAST MEDICALHISTORY     Past Medical History:   Diagnosis Date    Anxiety     Arthritis     Bowel obstruction (HCC)     adhesions with colectomy/colostomy    Cancer ([facility] [address]     lung LLL ,  brain    Colostomy in place [facility])     Concussion with no loss of consciousness     COPD 8.6 (*)     Hematocrit 27.4 (*)     MCHC 31.4 (*)     RDW 19.3 (*)     Platelets 893 (*)     Neutrophils % 83.0 (*)     Lymphocytes % 10.0 (*)     Lymphocytes # 0.2 (*)     Lymphocytes Manual 10 (*)     Anisocytosis 1+ (*)     Microcytes 1+ (*)     Tear Drop Cells Occasional (*)     All other components within normal limits   URINE RT REFLEX TO CULTURE - Abnormal; Notable for the following components:    Clarity, UA CLOUDY (*)     Protein, UA 30 (*)     Leukocyte Esterase, Urine MODERATE (*)     All other components within normal limits   LACTIC ACID, PLASMA - Abnormal; Notable for the following components:    Lactic Acid 2.0 (*)     All other components within normal limits    Narrative:     CALL  Guy  KLED tel. ,  Chemistry results called to and read back by Volodymyr Benitez RN in ED, 08/04/2019 20:14,  by [facility] - Abnormal; Notable for the following components:    CRP 33.26 (*)     All other components within normal limits   SEDIMENTATION RATE - Abnormal; Notable for the following components:    Sed Rate 114 (*)     All other components within normal limits   MICROSCOPIC URINALYSIS - Abnormal; Notable for the following components:    WBC, UA 72 (*)     All other components within normal limits   CULTURE BLOOD #2   CULTURE BLOOD #1   URINE CULTURE   SALICYLATE LEVEL   ACETAMINOPHEN LEVEL       All other labs were within normal range or not returned as of this dictation. EMERGENCY DEPARTMENT COURSE and DIFFERENTIAL DIAGNOSIS/MDM:   Vitals:    Vitals:    08/04/19 2105 08/04/19 2202 08/04/19 2232 08/04/19 2302   BP: 125/83 (!) 102/52 102/65 111/65   Pulse: 98      Resp: 18      Temp:       TempSrc:       SpO2: 97%   99%   Weight:       Height:           MDM    Reassessment    Laboratory studies consistent with urinary tract infection. CT brain unremarkable white blood cell count is decreased to 1.8. Received IV Rocephin here in the ED.       CONSULTS:    Case was discussed with Dr. Caprice Boyer regarding inpatient admission with oncology consult in a.m. PROCEDURES:  Unless otherwise noted below, none     Procedures    FINAL IMPRESSION      1. Acute encephalopathy    2. Acute cystitis without hematuria    3.  Chemotherapy-induced neutropenia [facility])          DISPOSITION/PLAN   DISPOSITION Decision To Admit 08/05/2019 12:03:01 AM      (Please note that portions of this note were completed with a voice recognition program.  Efforts were made to edit thedictations but occasionally words are mis-transcribed.)    Ghazal Gavin MD (electronically signed)  Attending Emergency Physician         Ghazal Gavin MD  08/05/19 [id number]

## 2019-08-05 NOTE — CARE COORDINATION
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 (HCC)    Pancytopenia (HCC)    Hypokalemia    Anemia    Fever    Unstable gait    Fatigue    Unspecified lump in the right breast, unspecified quadrant    Cyst of kidney, acquired    Secondary malignant neoplasm of brain ([facility] [address]    UTI (urinary tract infection)       Inpatient Assessment  Care Transitions Summary    Care Transitions Inpatient Review  Medication Review  Do you have all of your prescriptions and are they filled?:  Yes   Are you able to afford your medications?:  Yes  How often do you have difficulty taking your medications?:  I always take them as prescribed. Housing Review  Who do you live with?:  Partner/Spouse/SO, Other Caregiver  Are you an active caregiver in your home?:  No  Social Support  Do you have a ?:  No  Do you have a [address] [place] [address]?:  Yes  Durable Medical Equipment  Patient DME:  Abdi Ying  Functional Review  Ability to seek help/take action for Emergent/Urgent situations i.e. fire, crime, inclement weather or health crisis. :  Independent  Ability handle personal hygiene needs (bathing/dressing/grooming):   Independent  Ability to manage medications:  Needs Assistance  Ability to prepare food:  Needs Assistance  Ability to maintain home (clean home, laundry):  Dependent  Ability to drive and/or has transportation:  Dependent  Ability to do shopping:  Dependent  Ability to manage finances:  Dependent  Is patient

## 2019-08-06 LAB
ANION GAP SERPL CALCULATED.3IONS-SCNC: 14 MMOL/L (ref 7–19)
BUN BLDV-MCNC: 10 MG/DL (ref 8–23)
CALCIUM SERPL-MCNC: 8.5 MG/DL (ref 8.8–10.2)
CHLORIDE BLD-SCNC: 98 MMOL/L (ref 98–111)
CO2: 22 MMOL/L (ref 22–29)
CREAT SERPL-MCNC: 0.7 MG/DL (ref 0.5–0.9)
GFR NON-AFRICAN AMERICAN: >60
GLUCOSE BLD-MCNC: 114 MG/DL (ref 74–109)
HCT VFR BLD CALC: 24.3 % (ref 37–47)
HCT VFR BLD CALC: 24.3 % (ref 37–47)
HEMOGLOBIN: 7.8 G/DL (ref 12–16)
MCH RBC QN AUTO: 27.7 PG (ref 27–31)
MCHC RBC AUTO-ENTMCNC: 32.1 G/DL (ref 33–37)
MCV RBC AUTO: 86.2 FL (ref 81–99)
PDW BLD-RTO: 18.8 % (ref 11.5–14.5)
PLATELET # BLD: 82 K/UL (ref 130–400)
PMV BLD AUTO: 10.4 FL (ref 9.4–12.3)
POTASSIUM SERPL-SCNC: 3.1 MMOL/L (ref 3.5–5)
RBC # BLD: 2.82 M/UL (ref 4.2–5.4)
RETICULOCYTE ABSOLUTE COUNT: 0 M/UL (ref 0.03–0.12)
RETICULOCYTE COUNT PCT: 0.15 % (ref 0.5–1.5)
SODIUM BLD-SCNC: 134 MMOL/L (ref 136–145)
URINE CULTURE, ROUTINE: NORMAL
WBC # BLD: 0.8 K/UL (ref 4.8–10.8)

## 2019-08-06 PROCEDURE — 97162 PT EVAL MOD COMPLEX 30 MIN: CPT

## 2019-08-06 PROCEDURE — 36415 COLL VENOUS BLD VENIPUNCTURE: CPT

## 2019-08-06 PROCEDURE — 6360000002 HC RX W HCPCS: Performed by: FAMILY MEDICINE

## 2019-08-06 PROCEDURE — 2580000003 HC RX 258: Performed by: FAMILY MEDICINE

## 2019-08-06 PROCEDURE — 85027 COMPLETE CBC AUTOMATED: CPT

## 2019-08-06 PROCEDURE — 6370000000 HC RX 637 (ALT 250 FOR IP): Performed by: FAMILY MEDICINE

## 2019-08-06 PROCEDURE — 6360000002 HC RX W HCPCS: Performed by: NURSE PRACTITIONER

## 2019-08-06 PROCEDURE — 6360000002 HC RX W HCPCS: Performed by: EMERGENCY MEDICINE

## 2019-08-06 PROCEDURE — 97116 GAIT TRAINING THERAPY: CPT

## 2019-08-06 PROCEDURE — 85045 AUTOMATED RETICULOCYTE COUNT: CPT

## 2019-08-06 PROCEDURE — 80048 BASIC METABOLIC PNL TOTAL CA: CPT

## 2019-08-06 PROCEDURE — 1210000000 HC MED SURG R&B

## 2019-08-06 RX ORDER — POTASSIUM CITRATE 10 MEQ/1
10 TABLET, EXTENDED RELEASE ORAL
Status: DISCONTINUED | OUTPATIENT
Start: 2019-08-06 | End: 2019-08-13 | Stop reason: HOSPADM

## 2019-08-06 RX ADMIN — CETIRIZINE HYDROCHLORIDE 10 MG: 10 TABLET, FILM COATED ORAL at 09:22

## 2019-08-06 RX ADMIN — ACETAMINOPHEN 650 MG: 325 TABLET ORAL at 09:21

## 2019-08-06 RX ADMIN — Medication 10 ML: at 09:22

## 2019-08-06 RX ADMIN — Medication 10 ML: at 19:39

## 2019-08-06 RX ADMIN — POTASSIUM CHLORIDE AND SODIUM CHLORIDE: 900; 150 INJECTION, SOLUTION INTRAVENOUS at 20:05

## 2019-08-06 RX ADMIN — POTASSIUM CHLORIDE AND SODIUM CHLORIDE: 900; 150 INJECTION, SOLUTION INTRAVENOUS at 13:12

## 2019-08-06 RX ADMIN — ACETAMINOPHEN 650 MG: 325 TABLET ORAL at 19:50

## 2019-08-06 RX ADMIN — FOLIC ACID 1 MG: 1 TABLET ORAL at 09:22

## 2019-08-06 RX ADMIN — SERTRALINE HYDROCHLORIDE 100 MG: 100 TABLET ORAL at 19:39

## 2019-08-06 RX ADMIN — ATORVASTATIN CALCIUM 10 MG: 10 TABLET, FILM COATED ORAL at 19:39

## 2019-08-06 RX ADMIN — POTASSIUM CITRATE 10 MEQ: 10 TABLET ORAL at 15:51

## 2019-08-06 RX ADMIN — Medication 1 G: at 20:05

## 2019-08-06 RX ADMIN — TBO-FILGRASTIM 300 MCG: 480 INJECTION, SOLUTION SUBCUTANEOUS at 09:21

## 2019-08-06 RX ADMIN — MONTELUKAST SODIUM 10 MG: 10 TABLET ORAL at 19:39

## 2019-08-06 RX ADMIN — FUROSEMIDE 20 MG: 20 TABLET ORAL at 09:22

## 2019-08-06 RX ADMIN — SERTRALINE HYDROCHLORIDE 100 MG: 100 TABLET ORAL at 09:22

## 2019-08-06 RX ADMIN — PANTOPRAZOLE SODIUM 40 MG: 40 TABLET, DELAYED RELEASE ORAL at 07:06

## 2019-08-06 RX ADMIN — ACETAMINOPHEN 650 MG: 325 TABLET ORAL at 01:40

## 2019-08-06 RX ADMIN — ACETAMINOPHEN 650 MG: 325 TABLET ORAL at 15:50

## 2019-08-06 ASSESSMENT — ENCOUNTER SYMPTOMS
ABDOMINAL DISTENTION: 0
EYE ITCHING: 0
SHORTNESS OF BREATH: 0
VOMITING: 0
SINUS PAIN: 0
COUGH: 0
EYE DISCHARGE: 0
EYE REDNESS: 0
COLOR CHANGE: 0
NAUSEA: 0

## 2019-08-06 ASSESSMENT — PAIN SCALES - GENERAL
PAINLEVEL_OUTOF10: 5
PAINLEVEL_OUTOF10: 0
PAINLEVEL_OUTOF10: 8
PAINLEVEL_OUTOF10: 0
PAINLEVEL_OUTOF10: 4
PAINLEVEL_OUTOF10: 9

## 2019-08-06 NOTE — PROGRESS NOTES
Alimta/Keytruda was initiated on 10/16/2018. Aleja Broderick reported experiencing a significant skin rash after receiving Keytruda on 11/27/2018. Mary Root was held from 12/18/2018-1/29/2019 (x3 cycles) during which time Aleja Broderick was only receiving Alimta. Imaging studies on 1/2/2019 suggested stable disease. Aleja Broderick resumed maintenance Alimta/Keytruda 2/19/2019 - 5/7/1209. Due to skin reaction manifestations, further maintenance Keytruda was not given after the 5/7/2019 dose. Diane received course #11 of maintenance Alimta on 7/2/2019. Thereafter, maintenance Alimta was scheduled monthly. TREATMENT SUMMARY:   1. Left thoracotomy with left lower lobectomy and mediastinal lymph node dissection 01/06/2017 by Dr. Yuniel Scott   2. Adjuvant cisplatinum and Alimta x4 cycles. 4/11/2017 -6/21/17. 3. A right craniotomy by Dr. Delfino Horta on 1/15/2018 was performed with resection of the 2 x 2.6 x 2.6 cm high right frontal vertex mass   4. Diane underwent SRS with 1600 cGy in one single treatment fraction on 3/26/2018 to the right frontal CNS lobe by Dima Weeks and Delfino Horta   5. Cycle #1 of chemotherapy with Alimta, carboplatin and Mary Root was delivered on 5/14/2018 and the final cycle #6 was delivered on 9/4/2018   6. Maintenance Alimta/Keytruda was initiated on 10/16/2018 with the final cycle #9 delivered on 5/7/2019. Cycle #10 of single agent maintenance Alimta was delivered on 6/11/2019 with schedule adjusted to monthly on 7/2/2019       Subjective   REVIEW OF SYSTEMS:   Review of Systems   Constitutional: Positive for fatigue and fever. Negative for chills. HENT: Negative for ear pain, mouth sores, nosebleeds and sinus pain. Eyes: Negative for discharge, redness and itching. Respiratory: Negative for cough and shortness of breath. Cardiovascular: Negative for chest pain, palpitations and leg swelling. Gastrointestinal: Negative for abdominal distention, nausea and vomiting.    Endocrine: Negative for 82,000    #Hyponatremia- Mild, improved  - Na+ 134. Calcium ok.     #Hypokalemia  - K+3.1, per attending    #Normocytic Anemia of chronic disease/chemotherapy  - Hgb 7.8, MCV 86.2  - B12 level --active  - reticulocyte count --active  - CBC daily    #Deconditioning- 2/2 cancer and treatement  - PT/OT  - Case management consultation to help with discharge planning      Discussed with Gino Arndt, start daily Granix for Neutropenia  Monitor platelets, hold Lovenox if platelets < 53,583  Monitor CBC daily  Sylvie Aldrich RN will inquire re: B12 level/retic count ordered 8/5/19 (status-active)  Monitor platelets, hold Lovenox if platelets < 19,487      Balwinder Cronin, APRN  08/06/19  6:50 AM

## 2019-08-06 NOTE — PROGRESS NOTES
Orientation  Orientation  Overall Orientation Status: Within Normal Limits(SLIGHTLY CONFUSED ABOUT TIME TO DAY)  Social/Functional History  Social/Functional History  Lives With: Spouse  Type of Home: House  Home Layout: One level  Home Access: Level entry  Bathroom Shower/Tub: Walk-in shower  Bathroom Toilet: Handicap height  Bathroom Equipment: Built-in shower seat  Home Equipment: Rolling walker, Cane(PRN)  ADL Assistance: Independent  Homemaking Assistance: Independent  Homemaking Responsibilities: No  Ambulation Assistance: Independent  Transfer Assistance: Independent  Additional Comments: HISTORY OF FALLS  Cognition   Cognition  Overall Cognitive Status: WFL    Objective          PROM RLE (degrees)  RLE PROM: WNL  PROM LLE (degrees)  LLE PROM: WNL  Strength RLE  Comment: GROSSLY +4/5  Strength LLE  Comment: GROSSLY +4/5        Bed mobility  Supine to Sit: Stand by assistance  Sit to Supine: Stand by assistance  Transfers  Sit to Stand: Contact guard assistance  Bed to Chair: Contact guard assistance  Ambulation 1  Device: Rolling Walker  Assistance: Contact guard assistance  Quality of Gait: SLIGHTLY UNSTEADY OVERALL  Gait Deviations: Slow Radhika;Decreased step length;Decreased step height  Distance: 10' X 2  Comments: VC'S FOR SAFETY     Balance  Sitting - Dynamic: Good  Standing - Dynamic: +;Fair        Plan   Plan  Times per week: AT LEAST 5-7  Current Treatment Recommendations: Strengthening, Balance Training, Functional Mobility Training, Transfer Training, Gait Training, Patient/Caregiver Education & Training, Safety Education & Training  Safety Devices  Type of devices: Call light within reach, Bed alarm in place    G-Code       OutComes Score                                                  AM-PAC Score             Goals  Short term goals  Time Frame for Short term goals: 14 DAYS  Short term goal 1: BED MOB MOD IND  Short term goal 2: TRANSFERS SUP-IND  Short term goal 3: ' RW SUP-IND Therapy Time   Individual Concurrent Group Co-treatment   Time In           Time Out           Minutes                   Frankie King, PT

## 2019-08-07 LAB
ABO/RH: NORMAL
ANION GAP SERPL CALCULATED.3IONS-SCNC: 14 MMOL/L (ref 7–19)
ANISOCYTOSIS: ABNORMAL
ANTIBODY SCREEN: NORMAL
ATYPICAL LYMPHOCYTE RELATIVE PERCENT: 1 % (ref 0–8)
BANDED NEUTROPHILS RELATIVE PERCENT: 3 % (ref 0–5)
BASOPHILS ABSOLUTE: 0 K/UL (ref 0–0.2)
BASOPHILS MANUAL: 0 %
BASOPHILS RELATIVE PERCENT: 0 % (ref 0–1)
BLOOD BANK DISPENSE STATUS: NORMAL
BLOOD BANK PRODUCT CODE: NORMAL
BPU ID: NORMAL
BUN BLDV-MCNC: 9 MG/DL (ref 8–23)
CALCIUM SERPL-MCNC: 8.5 MG/DL (ref 8.8–10.2)
CHLORIDE BLD-SCNC: 103 MMOL/L (ref 98–111)
CO2: 22 MMOL/L (ref 22–29)
CREAT SERPL-MCNC: 0.8 MG/DL (ref 0.5–0.9)
DESCRIPTION BLOOD BANK: NORMAL
EOSINOPHILS ABSOLUTE: 0.12 K/UL (ref 0–0.6)
EOSINOPHILS MANUAL: 7 %
EOSINOPHILS RELATIVE PERCENT: 7 % (ref 0–5)
GFR NON-AFRICAN AMERICAN: >60
GLUCOSE BLD-MCNC: 101 MG/DL (ref 74–109)
HCT VFR BLD CALC: 22.9 % (ref 37–47)
HEMOGLOBIN: 7.4 G/DL (ref 12–16)
LYMPHOCYTES ABSOLUTE: 0.6 K/UL (ref 1.1–4.5)
LYMPHOCYTES MANUAL: 33 % (ref 20–40)
LYMPHOCYTES RELATIVE PERCENT: 33 % (ref 20–40)
MCH RBC QN AUTO: 28.1 PG (ref 27–31)
MCHC RBC AUTO-ENTMCNC: 32.3 G/DL (ref 33–37)
MCV RBC AUTO: 87.1 FL (ref 81–99)
METAMYELOCYTES RELATIVE PERCENT: 1 %
MONOCYTES ABSOLUTE: 0.1 K/UL (ref 0–0.9)
MONOCYTES MANUAL: 6 % (ref 0–10)
MONOCYTES RELATIVE PERCENT: 6 % (ref 0–10)
NEUTROPHILS ABSOLUTE: 0.9 K/UL (ref 1.5–7.5)
NEUTROPHILS MANUAL: 49 %
NEUTROPHILS RELATIVE PERCENT: 49 % (ref 50–65)
PDW BLD-RTO: 19 % (ref 11.5–14.5)
PLATELET # BLD: 73 K/UL (ref 130–400)
PLATELET SLIDE REVIEW: ABNORMAL
PMV BLD AUTO: 11.2 FL (ref 9.4–12.3)
POTASSIUM SERPL-SCNC: 3.1 MMOL/L (ref 3.5–5)
RBC # BLD: 2.63 M/UL (ref 4.2–5.4)
SODIUM BLD-SCNC: 139 MMOL/L (ref 136–145)
VITAMIN B-12: >2000 PG/ML (ref 211–946)
WBC # BLD: 1.7 K/UL (ref 4.8–10.8)

## 2019-08-07 PROCEDURE — 6360000002 HC RX W HCPCS: Performed by: NURSE PRACTITIONER

## 2019-08-07 PROCEDURE — 86901 BLOOD TYPING SEROLOGIC RH(D): CPT

## 2019-08-07 PROCEDURE — 6370000000 HC RX 637 (ALT 250 FOR IP): Performed by: FAMILY MEDICINE

## 2019-08-07 PROCEDURE — 2580000003 HC RX 258: Performed by: NURSE PRACTITIONER

## 2019-08-07 PROCEDURE — 85025 COMPLETE CBC W/AUTO DIFF WBC: CPT

## 2019-08-07 PROCEDURE — 6360000002 HC RX W HCPCS: Performed by: EMERGENCY MEDICINE

## 2019-08-07 PROCEDURE — 2580000003 HC RX 258: Performed by: FAMILY MEDICINE

## 2019-08-07 PROCEDURE — 82607 VITAMIN B-12: CPT

## 2019-08-07 PROCEDURE — 80048 BASIC METABOLIC PNL TOTAL CA: CPT

## 2019-08-07 PROCEDURE — 99231 SBSQ HOSP IP/OBS SF/LOW 25: CPT | Performed by: INTERNAL MEDICINE

## 2019-08-07 PROCEDURE — 86900 BLOOD TYPING SEROLOGIC ABO: CPT

## 2019-08-07 PROCEDURE — 1210000000 HC MED SURG R&B

## 2019-08-07 PROCEDURE — 6360000002 HC RX W HCPCS: Performed by: FAMILY MEDICINE

## 2019-08-07 PROCEDURE — 36430 TRANSFUSION BLD/BLD COMPNT: CPT

## 2019-08-07 PROCEDURE — P9016 RBC LEUKOCYTES REDUCED: HCPCS

## 2019-08-07 PROCEDURE — 97116 GAIT TRAINING THERAPY: CPT

## 2019-08-07 PROCEDURE — 86923 COMPATIBILITY TEST ELECTRIC: CPT

## 2019-08-07 PROCEDURE — 36591 DRAW BLOOD OFF VENOUS DEVICE: CPT

## 2019-08-07 PROCEDURE — 97530 THERAPEUTIC ACTIVITIES: CPT

## 2019-08-07 PROCEDURE — 86850 RBC ANTIBODY SCREEN: CPT

## 2019-08-07 RX ORDER — 0.9 % SODIUM CHLORIDE 0.9 %
250 INTRAVENOUS SOLUTION INTRAVENOUS ONCE
Status: COMPLETED | OUTPATIENT
Start: 2019-08-07 | End: 2019-08-07

## 2019-08-07 RX ADMIN — POTASSIUM CITRATE 10 MEQ: 10 TABLET ORAL at 09:28

## 2019-08-07 RX ADMIN — ENOXAPARIN SODIUM 40 MG: 40 INJECTION SUBCUTANEOUS at 07:44

## 2019-08-07 RX ADMIN — POTASSIUM CITRATE 10 MEQ: 10 TABLET ORAL at 13:09

## 2019-08-07 RX ADMIN — FLUTICASONE PROPIONATE 2 SPRAY: 50 SPRAY, METERED NASAL at 08:09

## 2019-08-07 RX ADMIN — POTASSIUM CITRATE 10 MEQ: 10 TABLET ORAL at 17:36

## 2019-08-07 RX ADMIN — SERTRALINE HYDROCHLORIDE 100 MG: 100 TABLET ORAL at 22:19

## 2019-08-07 RX ADMIN — MONTELUKAST SODIUM 10 MG: 10 TABLET ORAL at 22:19

## 2019-08-07 RX ADMIN — TBO-FILGRASTIM 300 MCG: 480 INJECTION, SOLUTION SUBCUTANEOUS at 17:36

## 2019-08-07 RX ADMIN — FOLIC ACID 1 MG: 1 TABLET ORAL at 08:06

## 2019-08-07 RX ADMIN — CETIRIZINE HYDROCHLORIDE 10 MG: 10 TABLET, FILM COATED ORAL at 08:06

## 2019-08-07 RX ADMIN — FUROSEMIDE 20 MG: 20 TABLET ORAL at 08:06

## 2019-08-07 RX ADMIN — POTASSIUM CHLORIDE AND SODIUM CHLORIDE: 900; 150 INJECTION, SOLUTION INTRAVENOUS at 06:53

## 2019-08-07 RX ADMIN — SERTRALINE HYDROCHLORIDE 100 MG: 100 TABLET ORAL at 08:06

## 2019-08-07 RX ADMIN — ATORVASTATIN CALCIUM 10 MG: 10 TABLET, FILM COATED ORAL at 22:19

## 2019-08-07 RX ADMIN — SODIUM CHLORIDE 250 ML: 9 INJECTION, SOLUTION INTRAVENOUS at 08:06

## 2019-08-07 RX ADMIN — PANTOPRAZOLE SODIUM 40 MG: 40 TABLET, DELAYED RELEASE ORAL at 06:53

## 2019-08-07 RX ADMIN — Medication 10 ML: at 22:19

## 2019-08-07 ASSESSMENT — ENCOUNTER SYMPTOMS
SINUS PAIN: 0
COUGH: 0
COLOR CHANGE: 0
VOMITING: 0
EYE REDNESS: 0
EYE DISCHARGE: 0
SHORTNESS OF BREATH: 0
NAUSEA: 0
ABDOMINAL DISTENTION: 0
EYE ITCHING: 0

## 2019-08-07 ASSESSMENT — PAIN SCALES - GENERAL
PAINLEVEL_OUTOF10: 0

## 2019-08-07 NOTE — PROGRESS NOTES
Physical Therapy  Twan Ortiz  [id number]     08/07/19 0915   Subjective   Subjective Pt states she needs to use the restroom and then up to chair to eat breakfast   Bed Mobility   Supine to Sit Independent   Transfers   Sit to Stand Contact guard assistance   Stand to sit Stand by assistance   Bed to Chair Contact guard assistance   Ambulation   Ambulation? Yes   Ambulation 1   Device Rolling Walker   Assistance Contact guard assistance   Gait Deviations Slow Radhika   Distance 10', 20'   Comments assisted pt into restroom where she cared for herself and stood to wash hands   Short term goals   Time Frame for Short term goals 14 DAYS   Short term goal 1 BED MOB MOD IND   Short term goal 2 TRANSFERS SUP-IND   Short term goal 3 ' RW SUP-IND   Conditions Requiring Skilled Therapeutic Intervention   Body structures, Functions, Activity limitations Decreased functional mobility ; Decreased strength;Decreased balance;Decreased endurance   Assessment Pt weak but able to walk and care for her own needs. Activity Tolerance   Activity Tolerance Patient Tolerated treatment well;Patient limited by endurance   Safety Devices   Type of devices Call light within reach; Left in chair   Electronically signed by Jolie Martines PTA on 8/7/2019 at 9:18 AM

## 2019-08-07 NOTE — PROGRESS NOTES
Progress Note    Patient name: My Dyson  Patient : 1950  MR #[id number]  Room: 308    Subjective: C/O mild SOA and fatigue. Afebrile. 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    She is currently on systemic therapy with maintenance Alimta, last chemo about a week ago. She presented to the hospital with progressive weakness, decreased PO intake, confusion, generalized body aches x 1 week duration.      Cancer History:     TARGET TUMOR SITE:  1. Resected LLL of lung 2017   2. Resected 2 x 2.6 x 2.6 cm mass from the high right frontal vertex of the brain 1/15/2018     TUMOR HISTORY: Resected, Stage IB moderately differentiated adenocarcinoma of left lung, pT2a, N0,M0. 2017. Ms. Bruno Anderson was seen in initial Oncology consultation on 2017 f referred by Dr. Abby Lagunas with a diagnosis of a resected moderately differentiated adenocarcinoma of the left lung. In 2016 Hema Glasgow presented to Dr. Alejandro Metcalf with complaints of hemoptysis for 2 months. She has a significant history of nicotine abuse but quit smoking in 2016. CT scan of chest on 10/31/2016 revealed a partially necrotic, noncalcified mass measuring 4.1 x 3.2 x 3.7 cm in the posterior segment of the LLL with associated pleural effusions. There was no evidence of mediastinal or hilar mass nor lymphadenopathy. A 2.2 cm nodule was noted in the upper pole of the right kidney. A transthorasic needle biopsy on 2016 by Dr. Heydi Wilkins revealed evidence of adenocarcinoma. PET scan on 2016 revealed a mass in the LLL with hypermetabolic activity with a maximum SUV of 7.1.   A chest x-ray on 2017 revealed a 3.8 cm mass lesion in the LLL that previously measured approximately 4.1 cm.    On 2017, a left thoracotomy with

## 2019-08-08 LAB
ANISOCYTOSIS: ABNORMAL
BANDED NEUTROPHILS RELATIVE PERCENT: 14 % (ref 0–5)
BASOPHILS ABSOLUTE: 0 K/UL (ref 0–0.2)
BASOPHILS MANUAL: 0 %
BASOPHILS RELATIVE PERCENT: 0 % (ref 0–1)
EOSINOPHILS ABSOLUTE: 0 K/UL (ref 0–0.6)
EOSINOPHILS MANUAL: 0 %
EOSINOPHILS RELATIVE PERCENT: 0 % (ref 0–5)
HCT VFR BLD CALC: 25.3 % (ref 37–47)
HEMOGLOBIN: 8.2 G/DL (ref 12–16)
HYPOCHROMIA: ABNORMAL
LYMPHOCYTES ABSOLUTE: 1.3 K/UL (ref 1.1–4.5)
LYMPHOCYTES MANUAL: 19 % (ref 20–40)
LYMPHOCYTES RELATIVE PERCENT: 19 % (ref 20–40)
MCH RBC QN AUTO: 28 PG (ref 27–31)
MCHC RBC AUTO-ENTMCNC: 32.4 G/DL (ref 33–37)
MCV RBC AUTO: 86.3 FL (ref 81–99)
MONOCYTES ABSOLUTE: 0 K/UL (ref 0–0.9)
MONOCYTES MANUAL: 0 % (ref 0–10)
MONOCYTES RELATIVE PERCENT: 0 % (ref 0–10)
NEUTROPHILS ABSOLUTE: 5.3 K/UL (ref 1.5–7.5)
NEUTROPHILS MANUAL: 67 %
NEUTROPHILS RELATIVE PERCENT: 67 % (ref 50–65)
OVALOCYTES: ABNORMAL
PDW BLD-RTO: 18.2 % (ref 11.5–14.5)
PLATELET # BLD: 93 K/UL (ref 130–400)
PLATELET SLIDE REVIEW: ABNORMAL
PMV BLD AUTO: 11 FL (ref 9.4–12.3)
POLYCHROMASIA: ABNORMAL
RBC # BLD: 2.93 M/UL (ref 4.2–5.4)
TEAR DROP CELLS: ABNORMAL
WBC # BLD: 6.6 K/UL (ref 4.8–10.8)

## 2019-08-08 PROCEDURE — 99231 SBSQ HOSP IP/OBS SF/LOW 25: CPT | Performed by: INTERNAL MEDICINE

## 2019-08-08 PROCEDURE — 2580000003 HC RX 258: Performed by: FAMILY MEDICINE

## 2019-08-08 PROCEDURE — 97530 THERAPEUTIC ACTIVITIES: CPT

## 2019-08-08 PROCEDURE — 85025 COMPLETE CBC W/AUTO DIFF WBC: CPT

## 2019-08-08 PROCEDURE — 6370000000 HC RX 637 (ALT 250 FOR IP): Performed by: FAMILY MEDICINE

## 2019-08-08 PROCEDURE — 6360000002 HC RX W HCPCS: Performed by: EMERGENCY MEDICINE

## 2019-08-08 PROCEDURE — 1210000000 HC MED SURG R&B

## 2019-08-08 PROCEDURE — 97116 GAIT TRAINING THERAPY: CPT

## 2019-08-08 PROCEDURE — 36591 DRAW BLOOD OFF VENOUS DEVICE: CPT

## 2019-08-08 PROCEDURE — 6360000002 HC RX W HCPCS: Performed by: FAMILY MEDICINE

## 2019-08-08 RX ADMIN — ACETAMINOPHEN 650 MG: 325 TABLET ORAL at 13:50

## 2019-08-08 RX ADMIN — POTASSIUM CITRATE 10 MEQ: 10 TABLET ORAL at 13:47

## 2019-08-08 RX ADMIN — POTASSIUM CITRATE 10 MEQ: 10 TABLET ORAL at 17:07

## 2019-08-08 RX ADMIN — Medication 10 ML: at 23:05

## 2019-08-08 RX ADMIN — POTASSIUM CHLORIDE AND SODIUM CHLORIDE: 900; 150 INJECTION, SOLUTION INTRAVENOUS at 04:30

## 2019-08-08 RX ADMIN — MONTELUKAST SODIUM 10 MG: 10 TABLET ORAL at 23:04

## 2019-08-08 RX ADMIN — SERTRALINE HYDROCHLORIDE 100 MG: 100 TABLET ORAL at 23:04

## 2019-08-08 RX ADMIN — POTASSIUM CITRATE 10 MEQ: 10 TABLET ORAL at 08:41

## 2019-08-08 RX ADMIN — FLUTICASONE PROPIONATE 2 SPRAY: 50 SPRAY, METERED NASAL at 13:47

## 2019-08-08 RX ADMIN — ACETAMINOPHEN 650 MG: 325 TABLET ORAL at 04:30

## 2019-08-08 RX ADMIN — ENOXAPARIN SODIUM 40 MG: 40 INJECTION SUBCUTANEOUS at 08:41

## 2019-08-08 RX ADMIN — Medication 10 ML: at 08:42

## 2019-08-08 RX ADMIN — FUROSEMIDE 20 MG: 20 TABLET ORAL at 08:41

## 2019-08-08 RX ADMIN — ATORVASTATIN CALCIUM 10 MG: 10 TABLET, FILM COATED ORAL at 23:04

## 2019-08-08 RX ADMIN — POTASSIUM CHLORIDE AND SODIUM CHLORIDE 100 ML/HR: 900; 150 INJECTION, SOLUTION INTRAVENOUS at 13:47

## 2019-08-08 RX ADMIN — SERTRALINE HYDROCHLORIDE 100 MG: 100 TABLET ORAL at 08:42

## 2019-08-08 RX ADMIN — FOLIC ACID 1 MG: 1 TABLET ORAL at 08:41

## 2019-08-08 RX ADMIN — ACETAMINOPHEN 650 MG: 325 TABLET ORAL at 23:04

## 2019-08-08 RX ADMIN — CETIRIZINE HYDROCHLORIDE 10 MG: 10 TABLET, FILM COATED ORAL at 08:42

## 2019-08-08 RX ADMIN — PANTOPRAZOLE SODIUM 40 MG: 40 TABLET, DELAYED RELEASE ORAL at 04:30

## 2019-08-08 ASSESSMENT — PAIN SCALES - GENERAL
PAINLEVEL_OUTOF10: 0
PAINLEVEL_OUTOF10: 6
PAINLEVEL_OUTOF10: 2
PAINLEVEL_OUTOF10: 0
PAINLEVEL_OUTOF10: 0
PAINLEVEL_OUTOF10: 2

## 2019-08-08 ASSESSMENT — ENCOUNTER SYMPTOMS
SHORTNESS OF BREATH: 0
EYE ITCHING: 0
COLOR CHANGE: 0
EYE DISCHARGE: 0
VOMITING: 0
COUGH: 0
ABDOMINAL DISTENTION: 0
EYE REDNESS: 0
SINUS PAIN: 0
NAUSEA: 0

## 2019-08-08 ASSESSMENT — PAIN DESCRIPTION - FREQUENCY
FREQUENCY: CONTINUOUS
FREQUENCY: CONTINUOUS

## 2019-08-08 ASSESSMENT — PAIN DESCRIPTION - ONSET
ONSET: SUDDEN
ONSET: SUDDEN

## 2019-08-08 ASSESSMENT — PAIN DESCRIPTION - ORIENTATION
ORIENTATION: RIGHT
ORIENTATION: RIGHT

## 2019-08-08 ASSESSMENT — PAIN DESCRIPTION - PROGRESSION
CLINICAL_PROGRESSION: GRADUALLY WORSENING
CLINICAL_PROGRESSION: GRADUALLY WORSENING

## 2019-08-08 ASSESSMENT — PAIN - FUNCTIONAL ASSESSMENT
PAIN_FUNCTIONAL_ASSESSMENT: PREVENTS OR INTERFERES SOME ACTIVE ACTIVITIES AND ADLS
PAIN_FUNCTIONAL_ASSESSMENT: PREVENTS OR INTERFERES SOME ACTIVE ACTIVITIES AND ADLS

## 2019-08-08 ASSESSMENT — PAIN DESCRIPTION - LOCATION
LOCATION: HEAD
LOCATION: HEAD

## 2019-08-08 ASSESSMENT — PAIN DESCRIPTION - PAIN TYPE
TYPE: ACUTE PAIN

## 2019-08-08 ASSESSMENT — PAIN DESCRIPTION - DESCRIPTORS
DESCRIPTORS: HEADACHE
DESCRIPTORS: HEADACHE

## 2019-08-08 NOTE — PROGRESS NOTES
9/12/2018 documented a stable 1.6 x 1.1 cm nodular enhancement along the medial aspect of the operative cavity. Kenaitze Height was seen by Dr. Jaja Wagner on 9/12/2018, who felt the MRI of the brain with stable without evidence of recurrence/progression. A CT scan of the chest on 9/24/2018 did not reveal evidence of new or suspicious for urinary nodules nor intrathoracic lymphadenopathy to suggest recurrent disease in the chest.  She completed 6 cycles of Carboplatin, Alimta and Keytruda on 9/4/2018 and then went on to receive maintenance treatment with Alimta/Keytruda. Ally Fierro reported experiencing a significant skin rash after receiving Keytruda on 11/27/2018. Arch Jones was held from 12/18/2018-1/29/2019 (x3 cycles) during which time Ally Fierro was only receiving Alimta. Mrs. Mima Farmer had repeat CT scans of the chest, abdomen and pelvis on 1/2/2019 that suggested stable disease. On 2/19/2019, Mrs. Shen Wong resumed the combination of maintenance treatment with Alimta/Keytruda without rash or problems. Ally Fierro was admitted to [facility] on 4/9/2019 with bilateral lower extremity cellulitis. She was discharged on 4/15/2019. CT of the abdomen and pelvis with contrast at [facility] on 4/9/2019 was compared to a CT of the abdomen and pelvis from February 2013. It revealed a left-sided 3.2 x 2.2 cm paravertebral T10 soft tissue mass. 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 No evidence of acute posttraumatic injury to the brain. CT scan of the abdomen and pelvis with contrast on 4/9/2019 documented no evidence of metastatic disease in the abdomen or pelvis. Paravertebral soft tissue masses at T10.  Differential metastatic disease versus extra medullary

## 2019-08-08 NOTE — PROGRESS NOTES
Progress Note  Pawel Cost  8/8/2019 6:08 PM  Subjective:   Admit Date:   8/4/2019      CC/ADMIT DX:       Interval History:   Reviewed overnight events and nursing notes. She has had some confusion and continued weakness. I have reviewed all labs/diagnostics from the last 24hrs. ROS:   I have done a 10 point ROS and all are negative, except what is mentioned in the HPI. DIET GENERAL;    Medications:      0.9% NaCl with KCl 20 mEq 100 mL/hr (08/08/19 1347)      potassium citrate  10 mEq Oral TID WC    enoxaparin  40 mg Subcutaneous Daily    sodium chloride flush  10 mL Intravenous 2 times per day    atorvastatin  10 mg Oral Nightly    cetirizine  10 mg Oral Daily    folic acid  1 mg Oral Daily    furosemide  20 mg Oral Daily    montelukast  10 mg Oral Nightly    sertraline  100 mg Oral BID    fluticasone  2 spray Each Nostril Daily    pantoprazole  40 mg Oral QAM AC           Objective:   Vitals: /68   Pulse 97   Temp 98.1 °F (36.7 °C) (Temporal)   Resp 18   Ht 5' 4\" (1.626 m)   Wt 130 lb (59 kg)   SpO2 98%   BMI 22.31 kg/m²      Intake/Output Summary (Last 24 hours) at 8/8/2019 1808  Last data filed at 8/8/2019 1414  Gross per 24 hour   Intake 2208.67 ml   Output 800 ml   Net 1408.67 ml     General appearance: alert and cooperative with exam, in bed  Lungs: clear to auscultation bilaterally  Heart: RRR  Abdomen: soft, non-tender; bowel sounds normal; no masses,  no organomegaly  Extremities: extremities normal, atraumatic, no cyanosis or edema  Neurologic:  No obvious focal neurologic deficits. Assessment and Plan:   AMS  Pyuria  Pancytopenia  Generalized Weakness  Metastatic Lung CA  Plan:  1. Continue present medication(s)   2. Recheck labs in am  3. PT   4. Follow with Oncology  5. D/C planning, ? [facility] for Rehab      Discharge planning:   her home     Reviewed treatment plans with the patient and/or family.              Electronically signed by Hoa Wilkes

## 2019-08-08 NOTE — PLAN OF CARE
Problem: Falls - Risk of:  Goal: Will remain free from falls  Description  Will remain free from falls  Outcome: Ongoing  Goal: Absence of physical injury  Description  Absence of physical injury  Outcome: Ongoing     Problem: Pain:  Goal: Pain level will decrease  Description  Pain level will decrease  Outcome: Ongoing  Goal: Control of acute pain  Description  Control of acute pain  Outcome: Ongoing  Goal: Control of chronic pain  Description  Control of chronic pain  Outcome: Ongoing  Goal: Patient's pain/discomfort is manageable  Description  Patient's pain/discomfort is manageable  Outcome: Ongoing     Problem: Skin Integrity:  Goal: Will show no infection signs and symptoms  Description  Will show no infection signs and symptoms  Outcome: Ongoing  Goal: Absence of new skin breakdown  Description  Absence of new skin breakdown  Outcome: Ongoing     Problem: Confusion - Acute:  Goal: Absence of continued neurological deterioration signs and symptoms  Description  Absence of continued neurological deterioration signs and symptoms  Outcome: Ongoing  Goal: Mental status will be restored to baseline  Description  Mental status will be restored to baseline  Outcome: Ongoing     Problem: Discharge Planning:  Goal: Ability to perform activities of daily living will improve  Description  Ability to perform activities of daily living will improve  Outcome: Ongoing  Goal: Participates in care planning  Description  Participates in care planning  Outcome: Ongoing     Problem: Injury - Risk of, Physical Injury:  Goal: Will remain free from falls  Description  Will remain free from falls  Outcome: Ongoing  Goal: Absence of physical injury  Description  Absence of physical injury  Outcome: Ongoing     Problem: Mood - Altered:  Goal: Mood stable  Description  Mood stable  Outcome: Ongoing  Goal: Absence of abusive behavior  Description  Absence of abusive behavior  Outcome: Ongoing  Goal: Verbalizations of feeling

## 2019-08-09 LAB
ANISOCYTOSIS: ABNORMAL
ATYPICAL LYMPHOCYTE RELATIVE PERCENT: 1 % (ref 0–8)
BANDED NEUTROPHILS RELATIVE PERCENT: 5 % (ref 0–5)
BASOPHILS ABSOLUTE: 0.1 K/UL (ref 0–0.2)
BASOPHILS MANUAL: 1 %
BASOPHILS RELATIVE PERCENT: 1 % (ref 0–1)
BLOOD BANK DISPENSE STATUS: NORMAL
BLOOD BANK PRODUCT CODE: NORMAL
BLOOD CULTURE, ROUTINE: NORMAL
BPU ID: NORMAL
CULTURE, BLOOD 2: NORMAL
DESCRIPTION BLOOD BANK: NORMAL
EOSINOPHILS ABSOLUTE: 0 K/UL (ref 0–0.6)
EOSINOPHILS MANUAL: 0 %
EOSINOPHILS RELATIVE PERCENT: 0 % (ref 0–5)
HCT VFR BLD CALC: 24.2 % (ref 37–47)
HEMOGLOBIN: 8 G/DL (ref 12–16)
LYMPHOCYTES ABSOLUTE: 1 K/UL (ref 1.1–4.5)
LYMPHOCYTES MANUAL: 12 % (ref 20–40)
LYMPHOCYTES RELATIVE PERCENT: 12 % (ref 20–40)
MCH RBC QN AUTO: 28.7 PG (ref 27–31)
MCHC RBC AUTO-ENTMCNC: 33.1 G/DL (ref 33–37)
MCV RBC AUTO: 86.7 FL (ref 81–99)
MONOCYTES ABSOLUTE: 0.1 K/UL (ref 0–0.9)
MONOCYTES MANUAL: 1 % (ref 0–10)
MONOCYTES RELATIVE PERCENT: 1 % (ref 0–10)
NEUTROPHILS ABSOLUTE: 6.5 K/UL (ref 1.5–7.5)
NEUTROPHILS MANUAL: 80 %
NEUTROPHILS RELATIVE PERCENT: 80 % (ref 50–65)
PDW BLD-RTO: 18.6 % (ref 11.5–14.5)
PLATELET # BLD: 99 K/UL (ref 130–400)
PLATELET SLIDE REVIEW: ABNORMAL
PMV BLD AUTO: 11.4 FL (ref 9.4–12.3)
PRO-BNP: 3422 PG/ML (ref 0–900)
RBC # BLD: 2.79 M/UL (ref 4.2–5.4)
WBC # BLD: 7.7 K/UL (ref 4.8–10.8)

## 2019-08-09 PROCEDURE — 36430 TRANSFUSION BLD/BLD COMPNT: CPT

## 2019-08-09 PROCEDURE — 85025 COMPLETE CBC W/AUTO DIFF WBC: CPT

## 2019-08-09 PROCEDURE — 6370000000 HC RX 637 (ALT 250 FOR IP): Performed by: FAMILY MEDICINE

## 2019-08-09 PROCEDURE — 1210000000 HC MED SURG R&B

## 2019-08-09 PROCEDURE — 99231 SBSQ HOSP IP/OBS SF/LOW 25: CPT | Performed by: INTERNAL MEDICINE

## 2019-08-09 PROCEDURE — 2580000003 HC RX 258: Performed by: FAMILY MEDICINE

## 2019-08-09 PROCEDURE — 83880 ASSAY OF NATRIURETIC PEPTIDE: CPT

## 2019-08-09 PROCEDURE — 97116 GAIT TRAINING THERAPY: CPT

## 2019-08-09 PROCEDURE — P9016 RBC LEUKOCYTES REDUCED: HCPCS

## 2019-08-09 PROCEDURE — 6360000002 HC RX W HCPCS: Performed by: FAMILY MEDICINE

## 2019-08-09 PROCEDURE — 86923 COMPATIBILITY TEST ELECTRIC: CPT

## 2019-08-09 PROCEDURE — 36591 DRAW BLOOD OFF VENOUS DEVICE: CPT

## 2019-08-09 PROCEDURE — 2580000003 HC RX 258: Performed by: NURSE PRACTITIONER

## 2019-08-09 RX ORDER — 0.9 % SODIUM CHLORIDE 0.9 %
250 INTRAVENOUS SOLUTION INTRAVENOUS ONCE
Status: COMPLETED | OUTPATIENT
Start: 2019-08-09 | End: 2019-08-10

## 2019-08-09 RX ORDER — FUROSEMIDE 20 MG/1
20 TABLET ORAL 2 TIMES DAILY
Status: DISCONTINUED | OUTPATIENT
Start: 2019-08-09 | End: 2019-08-13 | Stop reason: HOSPADM

## 2019-08-09 RX ADMIN — ENOXAPARIN SODIUM 40 MG: 40 INJECTION SUBCUTANEOUS at 08:36

## 2019-08-09 RX ADMIN — PANTOPRAZOLE SODIUM 40 MG: 40 TABLET, DELAYED RELEASE ORAL at 07:01

## 2019-08-09 RX ADMIN — FUROSEMIDE 20 MG: 20 TABLET ORAL at 08:36

## 2019-08-09 RX ADMIN — ACETAMINOPHEN 650 MG: 325 TABLET ORAL at 05:12

## 2019-08-09 RX ADMIN — ACETAMINOPHEN 650 MG: 325 TABLET ORAL at 14:45

## 2019-08-09 RX ADMIN — FUROSEMIDE 20 MG: 20 TABLET ORAL at 17:58

## 2019-08-09 RX ADMIN — POTASSIUM CITRATE 10 MEQ: 10 TABLET ORAL at 17:58

## 2019-08-09 RX ADMIN — FOLIC ACID 1 MG: 1 TABLET ORAL at 08:36

## 2019-08-09 RX ADMIN — SODIUM CHLORIDE 250 ML: 9 INJECTION, SOLUTION INTRAVENOUS at 11:30

## 2019-08-09 RX ADMIN — Medication 10 ML: at 21:37

## 2019-08-09 RX ADMIN — POTASSIUM CITRATE 10 MEQ: 10 TABLET ORAL at 14:44

## 2019-08-09 RX ADMIN — ATORVASTATIN CALCIUM 10 MG: 10 TABLET, FILM COATED ORAL at 21:37

## 2019-08-09 RX ADMIN — MONTELUKAST SODIUM 10 MG: 10 TABLET ORAL at 21:37

## 2019-08-09 RX ADMIN — SERTRALINE HYDROCHLORIDE 100 MG: 100 TABLET ORAL at 21:37

## 2019-08-09 RX ADMIN — FLUTICASONE PROPIONATE 2 SPRAY: 50 SPRAY, METERED NASAL at 08:36

## 2019-08-09 RX ADMIN — ACETAMINOPHEN 650 MG: 325 TABLET ORAL at 21:47

## 2019-08-09 RX ADMIN — POTASSIUM CITRATE 10 MEQ: 10 TABLET ORAL at 08:36

## 2019-08-09 RX ADMIN — CETIRIZINE HYDROCHLORIDE 10 MG: 10 TABLET, FILM COATED ORAL at 08:36

## 2019-08-09 RX ADMIN — SERTRALINE HYDROCHLORIDE 100 MG: 100 TABLET ORAL at 08:36

## 2019-08-09 RX ADMIN — Medication 10 ML: at 08:37

## 2019-08-09 ASSESSMENT — PAIN SCALES - GENERAL
PAINLEVEL_OUTOF10: 0
PAINLEVEL_OUTOF10: 4
PAINLEVEL_OUTOF10: 5
PAINLEVEL_OUTOF10: 5

## 2019-08-09 ASSESSMENT — PAIN DESCRIPTION - DESCRIPTORS
DESCRIPTORS: HEADACHE
DESCRIPTORS: HEADACHE

## 2019-08-09 ASSESSMENT — ENCOUNTER SYMPTOMS
EYE DISCHARGE: 0
ABDOMINAL DISTENTION: 0
VOMITING: 0
EYE REDNESS: 0
SHORTNESS OF BREATH: 0
SINUS PAIN: 0
COLOR CHANGE: 0
COUGH: 0
EYE ITCHING: 0
NAUSEA: 0

## 2019-08-09 ASSESSMENT — PAIN DESCRIPTION - PROGRESSION
CLINICAL_PROGRESSION: GRADUALLY WORSENING
CLINICAL_PROGRESSION: NOT CHANGED

## 2019-08-09 ASSESSMENT — PAIN DESCRIPTION - LOCATION
LOCATION: HEAD
LOCATION: HEAD

## 2019-08-09 ASSESSMENT — PAIN DESCRIPTION - ORIENTATION: ORIENTATION: RIGHT

## 2019-08-09 ASSESSMENT — PAIN DESCRIPTION - FREQUENCY
FREQUENCY: CONTINUOUS
FREQUENCY: INTERMITTENT

## 2019-08-09 ASSESSMENT — PAIN DESCRIPTION - ONSET: ONSET: SUDDEN

## 2019-08-09 ASSESSMENT — PAIN DESCRIPTION - PAIN TYPE: TYPE: ACUTE PAIN

## 2019-08-09 NOTE — PROGRESS NOTES
(Temporal)   Resp 16   Ht 5' 4\" (1.626 m)   Wt 130 lb (59 kg)   SpO2 97%   BMI 22.31 kg/m²     Intake/Output Summary (Last 24 hours) at 8/9/2019 0717  Last data filed at 8/9/2019 0549  Gross per 24 hour   Intake 3490 ml   Output 120 ml   Net 3370 ml       Labs:  CBC:   Recent Labs     08/07/19  0402 08/08/19  0302 08/09/19  0327   WBC 1.7* 6.6 7.7   HGB 7.4* 8.2* 8.0*   PLT 73* 93* 99*     CMP:   Recent Labs     08/07/19  0402   GLUCOSE 101   BUN 9   CREATININE 0.8   CO2 22   CALCIUM 8.5*     Hepatic:   No results for input(s): AST, ALT, ALB, BILITOT, ALKPHOS in the last 72 hours. Troponin: No results for input(s): TROPONINI in the last 72 hours. BNP: No results for input(s): BNP in the last 72 hours. INR: No results for input(s): INR in the last 72 hours. ABG: No results for input(s): PHART, RHU6TMD, PO2ART, QKV0MNX, O7OHNJNY, BEART in the last 72 hours. 30 Day lookback of cultures:    Blood Culture Recent:   Recent Labs     08/04/19  1937   BC No Growth to date. Any change in status will be called. Recent Labs     08/04/19 2011   LABURIN <10,000 CFU/ml mixed skin/urogenital yehuda. No further workup     Organism Recent :   Recent Labs     07/10/19  1310   ORG Staphylococcus coagulase-negative*        ASSESSMENT/PLAN:  #NSCLC (metastatic)- last chemo (maintenance Alimta) on 7/29/19     #Resected Brain metastasis- January 2018. No signs of recurrence. She was seen by Dr. Florecita Dunlap and had an MRI performed at [facility] on 7/30/2019 reviewed. No interval changes compared to MRI brain from 3/28/19 with similar post-treatment changes in the right frontal lobe. #Confusion- Improved   - DD includes infection, cancer, metabolic    #UTI-   - UA Increased WBC/LA+, elevated lactate; UC no growth  - On ceftriaxone    #Pancytopenia- secondary to chemotherapy.    - WBC 7.7, ANC 6.5  - Granix 300 mcg daily initiated on 8/6/2019 - 8/8/19  - Afebrile  - Hgb 8.0, MCV 86.7  (s/p 1 unit pRBC 8/7/19)  - Plts 93,000, no

## 2019-08-09 NOTE — CARE COORDINATION
Called and spoke to [employer] re: snf referals. She stated that she would like referals sent to [facility] and [address] [place] [address]. [facility] has denied .   Will follow  Electronically signed by Hetal Ingram RN on 8/9/2019 at 3:52 PM

## 2019-08-10 LAB
ANION GAP SERPL CALCULATED.3IONS-SCNC: 13 MMOL/L (ref 7–19)
BASOPHILS ABSOLUTE: 0 K/UL (ref 0–0.2)
BASOPHILS RELATIVE PERCENT: 0.5 % (ref 0–1)
BUN BLDV-MCNC: 7 MG/DL (ref 8–23)
CALCIUM SERPL-MCNC: 8.6 MG/DL (ref 8.8–10.2)
CHLORIDE BLD-SCNC: 102 MMOL/L (ref 98–111)
CO2: 27 MMOL/L (ref 22–29)
CREAT SERPL-MCNC: 0.7 MG/DL (ref 0.5–0.9)
EOSINOPHILS ABSOLUTE: 0.1 K/UL (ref 0–0.6)
EOSINOPHILS RELATIVE PERCENT: 0.9 % (ref 0–5)
GFR NON-AFRICAN AMERICAN: >60
GLUCOSE BLD-MCNC: 91 MG/DL (ref 74–109)
HCT VFR BLD CALC: 27.7 % (ref 37–47)
HEMOGLOBIN: 9.2 G/DL (ref 12–16)
LYMPHOCYTES ABSOLUTE: 0.8 K/UL (ref 1.1–4.5)
LYMPHOCYTES RELATIVE PERCENT: 11.7 % (ref 20–40)
MCH RBC QN AUTO: 29.1 PG (ref 27–31)
MCHC RBC AUTO-ENTMCNC: 33.2 G/DL (ref 33–37)
MCV RBC AUTO: 87.7 FL (ref 81–99)
MONOCYTES ABSOLUTE: 0.7 K/UL (ref 0–0.9)
MONOCYTES RELATIVE PERCENT: 11.2 % (ref 0–10)
NEUTROPHILS ABSOLUTE: 4.6 K/UL (ref 1.5–7.5)
NEUTROPHILS RELATIVE PERCENT: 70.6 % (ref 50–65)
PDW BLD-RTO: 18.2 % (ref 11.5–14.5)
PLATELET # BLD: 89 K/UL (ref 130–400)
PMV BLD AUTO: 10.8 FL (ref 9.4–12.3)
POTASSIUM SERPL-SCNC: 2.6 MMOL/L (ref 3.5–5)
RBC # BLD: 3.16 M/UL (ref 4.2–5.4)
SODIUM BLD-SCNC: 142 MMOL/L (ref 136–145)
WBC # BLD: 6.5 K/UL (ref 4.8–10.8)

## 2019-08-10 PROCEDURE — 6360000002 HC RX W HCPCS: Performed by: FAMILY MEDICINE

## 2019-08-10 PROCEDURE — 36591 DRAW BLOOD OFF VENOUS DEVICE: CPT

## 2019-08-10 PROCEDURE — 6370000000 HC RX 637 (ALT 250 FOR IP): Performed by: FAMILY MEDICINE

## 2019-08-10 PROCEDURE — 99232 SBSQ HOSP IP/OBS MODERATE 35: CPT | Performed by: INTERNAL MEDICINE

## 2019-08-10 PROCEDURE — 6370000000 HC RX 637 (ALT 250 FOR IP): Performed by: INTERNAL MEDICINE

## 2019-08-10 PROCEDURE — 80048 BASIC METABOLIC PNL TOTAL CA: CPT

## 2019-08-10 PROCEDURE — 1210000000 HC MED SURG R&B

## 2019-08-10 PROCEDURE — 85025 COMPLETE CBC W/AUTO DIFF WBC: CPT

## 2019-08-10 PROCEDURE — 2580000003 HC RX 258: Performed by: FAMILY MEDICINE

## 2019-08-10 RX ORDER — POTASSIUM CHLORIDE 20 MEQ/1
40 TABLET, EXTENDED RELEASE ORAL 3 TIMES DAILY
Status: COMPLETED | OUTPATIENT
Start: 2019-08-10 | End: 2019-08-10

## 2019-08-10 RX ADMIN — MONTELUKAST SODIUM 10 MG: 10 TABLET ORAL at 20:11

## 2019-08-10 RX ADMIN — PANTOPRAZOLE SODIUM 40 MG: 40 TABLET, DELAYED RELEASE ORAL at 05:25

## 2019-08-10 RX ADMIN — POTASSIUM CHLORIDE 40 MEQ: 20 TABLET, EXTENDED RELEASE ORAL at 12:19

## 2019-08-10 RX ADMIN — POTASSIUM CHLORIDE 40 MEQ: 20 TABLET, EXTENDED RELEASE ORAL at 20:12

## 2019-08-10 RX ADMIN — FLUTICASONE PROPIONATE 2 SPRAY: 50 SPRAY, METERED NASAL at 08:41

## 2019-08-10 RX ADMIN — SERTRALINE HYDROCHLORIDE 100 MG: 100 TABLET ORAL at 08:40

## 2019-08-10 RX ADMIN — ACETAMINOPHEN 650 MG: 325 TABLET ORAL at 05:25

## 2019-08-10 RX ADMIN — ENOXAPARIN SODIUM 40 MG: 40 INJECTION SUBCUTANEOUS at 08:40

## 2019-08-10 RX ADMIN — POTASSIUM CITRATE 10 MEQ: 10 TABLET ORAL at 08:41

## 2019-08-10 RX ADMIN — FOLIC ACID 1 MG: 1 TABLET ORAL at 08:40

## 2019-08-10 RX ADMIN — ATORVASTATIN CALCIUM 10 MG: 10 TABLET, FILM COATED ORAL at 20:12

## 2019-08-10 RX ADMIN — POTASSIUM CHLORIDE 40 MEQ: 20 TABLET, EXTENDED RELEASE ORAL at 08:40

## 2019-08-10 RX ADMIN — CETIRIZINE HYDROCHLORIDE 10 MG: 10 TABLET, FILM COATED ORAL at 08:40

## 2019-08-10 RX ADMIN — POTASSIUM CITRATE 10 MEQ: 10 TABLET ORAL at 12:19

## 2019-08-10 RX ADMIN — ACETAMINOPHEN 650 MG: 325 TABLET ORAL at 20:11

## 2019-08-10 RX ADMIN — POTASSIUM CITRATE 10 MEQ: 10 TABLET ORAL at 17:22

## 2019-08-10 RX ADMIN — Medication 10 ML: at 08:40

## 2019-08-10 ASSESSMENT — PAIN DESCRIPTION - FREQUENCY: FREQUENCY: INTERMITTENT

## 2019-08-10 ASSESSMENT — ENCOUNTER SYMPTOMS
SHORTNESS OF BREATH: 0
COLOR CHANGE: 0
SINUS PAIN: 0
VOMITING: 0
EYE ITCHING: 0
COUGH: 0
EYE DISCHARGE: 0
NAUSEA: 0
ABDOMINAL DISTENTION: 0
EYE REDNESS: 0

## 2019-08-10 ASSESSMENT — PAIN - FUNCTIONAL ASSESSMENT: PAIN_FUNCTIONAL_ASSESSMENT: PREVENTS OR INTERFERES SOME ACTIVE ACTIVITIES AND ADLS

## 2019-08-10 ASSESSMENT — PAIN SCALES - GENERAL
PAINLEVEL_OUTOF10: 0
PAINLEVEL_OUTOF10: 5
PAINLEVEL_OUTOF10: 0
PAINLEVEL_OUTOF10: 6

## 2019-08-10 ASSESSMENT — PAIN DESCRIPTION - LOCATION: LOCATION: HEAD

## 2019-08-10 ASSESSMENT — PAIN DESCRIPTION - DESCRIPTORS: DESCRIPTORS: HEADACHE

## 2019-08-10 ASSESSMENT — PAIN DESCRIPTION - PROGRESSION: CLINICAL_PROGRESSION: GRADUALLY IMPROVING

## 2019-08-10 NOTE — CARE COORDINATION
PT and family states they remove their option for [facility], [facility] or [facility].  Electronically signed by Blu Horner on 8/10/2019 at 3:10 PM

## 2019-08-10 NOTE — PROGRESS NOTES
were NOT metabolically active. I.e. no obvious metastatic lesions  CT abdomen and pelvis with contrast at [facility] 12/19/2017 was unrevealing for any obvious metastatic disease. Bone scan at [facility] 12/19/2017 revealed focal areas of increased activity in the left seventh and 10th costovertebral junctions-indeterminate. A right craniotomy by Dr. Karin Alarcon on 1/15/2018 was performed with resection of the 2 x 2.6 x 2.6 cm high right frontal vertex mass   Pathology was consistent with metastatic adenocarcinoma with papillary features consistent with lung origin. Molecular testing on the 1/15/2018 pathology specimen was reported from [facility] on 3/15/2018 as follows:   EGFR -negative for mutation  ALK -negative for rearrangement  ROS 1 -negative for rearrangement   BRAF -negative for the V600E mutation  PDL-1 - expression is 80% i.e. in Positive  [facility] of the [facility] was performed on 3/6/2018 by Dr. Lisha Rivera. Postsurgical changes to the previous resection site from the right frontal lobe mass area with residual enhancement was noted. A PET scan on 2/6/2018 did not reveal scintigraphic evidence of recurrent malignancy or metastatic disease. MRI of the brain W & WO for [address] treatment planning purposes was performed on 3/6/2018 by Dr. Lisha Rivera. Postsurgical changes to the previous resection site from the right frontal lobe mass area with residual enhancement was noted. Diane underwent SRS with 1600 cGy in one single treatment fraction on 3/26/2018 to the right frontal CNS lobe by Dima Rivera and Karin Alarcon. Delays in beginning systemic therapy included issues associated with her CNS treatment delivery and multiple dog bites on her arms and hands that required attention prior to starting systemic therapy. Diane received cycle #1 of chemotherapy with Alimta, carboplatin and Keytruda on 5/14/2018.    Diane completed cycle # 6 of carboplatin, Alimta and Keytruda on Ht 5' 4\" (1.626 m)   Wt 130 lb (59 kg)   SpO2 99%   BMI 22.31 kg/m²     Intake/Output Summary (Last 24 hours) at 8/10/2019 0843  Last data filed at 8/9/2019 2020  Gross per 24 hour   Intake 600 ml   Output --   Net 600 ml       Labs:  CBC:   Recent Labs     08/08/19  0302 08/09/19  0327 08/10/19  0400   WBC 6.6 7.7 6.5   HGB 8.2* 8.0* 9.2*   PLT 93* 99* 89*     CMP:   Recent Labs     08/10/19  0400   GLUCOSE 91   BUN 7*   CREATININE 0.7   CO2 27   CALCIUM 8.6*     Hepatic:   No results for input(s): AST, ALT, ALB, BILITOT, ALKPHOS in the last 72 hours. Troponin: No results for input(s): TROPONINI in the last 72 hours. BNP: No results for input(s): BNP in the last 72 hours. INR: No results for input(s): INR in the last 72 hours. ABG: No results for input(s): PHART, SZS7CLT, PO2ART, HPF3NLE, D7OPLGSF, BEART in the last 72 hours. 30 Day lookback of cultures:    Blood Culture Recent:   Recent Labs     08/04/19  1937   BC No growth after 5 days of incubation. Recent Labs     08/04/19 2011   LABURIN <10,000 CFU/ml mixed skin/urogenital yehuda. No further workup     Organism Recent :   No results for input(s): ORG in the last 720 hours. ASSESSMENT/PLAN:    #NSCLC (metastatic)  - last chemo (maintenance Alimta) on 7/29/19     #Resected Brain metastasis- January 2018. No signs of recurrence. She was seen by Dr. Dana Flores and had an MRI performed at [facility] on 7/30/2019 reviewed. No interval changes compared to MRI brain from 3/28/19 with similar post-treatment changes in the right frontal lobe. #Confusion- Improved   - DD includes infection, cancer, metabolic    #UTI-   - UA Increased WBC/LA+, elevated lactate; UC no growth  - s/p ceftriaxone    #Pancytopenia- secondary to chemotherapy  - WBC 6.5, ANC 4.6  (s/p Granix 300 mcg daily 8/6/2019 - 8/8/19)  - Afebrile  - Hgb 9.2, MCV 87.7  (s/p 1 unit pRBC 8/7/19 and 8/9/19)  - Plts 89,000, no bleeding    #Hyponatremia, resolved   - Na+ 142.  Calcium ok on 8/7/19    #Hypokalemia  - K+ 2.6 on 8/10/19  - per attending    #Normocytic Anemia of chronic disease/chemotherapy  - Hgb 9.2, MCV 87.7 (s/p 1 unit pRBC 8/7/19 and 8/9/19)  - B12 level >2,000  - Reticulocyte count - 0.0042  - CBC daily    #Palpitations   - worse with exertion  - BNP 3,422 on 8/9/19  - EKG 7/10/19 sinus tachycardia  - Echo 6/5/19 EF 50%, could not accurately assess diastolic dysfunction     #Deconditioning secondary to cancer and treatement  - PT/OT  - Case management assisting with discharge planning, probable NH    Diarrhea x1 this am.  Continue current treatment. K+ replacement per attending. Continue PT. Await NHP. [facility]  08/10/19  8:43 AM    I personally saw and examined this patient, performing a face-to-face diagnostic evaluation with plan of care reviewed and developed with Bryce PALACIO  and nursing staff. I have addended and/or modified the above history of present illness, physical examination, and assessment and plan to reflect my findings and impressions. Essential elements of the care plan were discussed with Bryce PALACIO . Agree with findings and assessment/plan as documented above. Questions were encouraged, asked and answered to their understanding and satisfaction.     Electronically signed by Kate Dominguez MD on 8/10/19 at 11:12 AM

## 2019-08-11 LAB
ANION GAP SERPL CALCULATED.3IONS-SCNC: 11 MMOL/L (ref 7–19)
BASOPHILS ABSOLUTE: 0 K/UL (ref 0–0.2)
BASOPHILS RELATIVE PERCENT: 0.5 % (ref 0–1)
BUN BLDV-MCNC: 8 MG/DL (ref 8–23)
CALCIUM SERPL-MCNC: 8.5 MG/DL (ref 8.8–10.2)
CHLORIDE BLD-SCNC: 107 MMOL/L (ref 98–111)
CO2: 24 MMOL/L (ref 22–29)
CREAT SERPL-MCNC: 0.7 MG/DL (ref 0.5–0.9)
EOSINOPHILS ABSOLUTE: 0.1 K/UL (ref 0–0.6)
EOSINOPHILS RELATIVE PERCENT: 1.4 % (ref 0–5)
GFR NON-AFRICAN AMERICAN: >60
GLUCOSE BLD-MCNC: 85 MG/DL (ref 74–109)
HCT VFR BLD CALC: 28.1 % (ref 37–47)
HEMOGLOBIN: 9.1 G/DL (ref 12–16)
LYMPHOCYTES ABSOLUTE: 0.7 K/UL (ref 1.1–4.5)
LYMPHOCYTES RELATIVE PERCENT: 12.5 % (ref 20–40)
MAGNESIUM: 1.2 MG/DL (ref 1.6–2.4)
MCH RBC QN AUTO: 29 PG (ref 27–31)
MCHC RBC AUTO-ENTMCNC: 32.4 G/DL (ref 33–37)
MCV RBC AUTO: 89.5 FL (ref 81–99)
MONOCYTES ABSOLUTE: 0.7 K/UL (ref 0–0.9)
MONOCYTES RELATIVE PERCENT: 11.8 % (ref 0–10)
NEUTROPHILS ABSOLUTE: 4 K/UL (ref 1.5–7.5)
NEUTROPHILS RELATIVE PERCENT: 67.2 % (ref 50–65)
PDW BLD-RTO: 18.7 % (ref 11.5–14.5)
PLATELET # BLD: 99 K/UL (ref 130–400)
PMV BLD AUTO: 11.1 FL (ref 9.4–12.3)
POTASSIUM SERPL-SCNC: 4.2 MMOL/L (ref 3.5–5)
RBC # BLD: 3.14 M/UL (ref 4.2–5.4)
SODIUM BLD-SCNC: 142 MMOL/L (ref 136–145)
WBC # BLD: 5.9 K/UL (ref 4.8–10.8)

## 2019-08-11 PROCEDURE — 85025 COMPLETE CBC W/AUTO DIFF WBC: CPT

## 2019-08-11 PROCEDURE — 94640 AIRWAY INHALATION TREATMENT: CPT

## 2019-08-11 PROCEDURE — 6360000002 HC RX W HCPCS: Performed by: INTERNAL MEDICINE

## 2019-08-11 PROCEDURE — 6360000002 HC RX W HCPCS: Performed by: EMERGENCY MEDICINE

## 2019-08-11 PROCEDURE — 2580000003 HC RX 258: Performed by: FAMILY MEDICINE

## 2019-08-11 PROCEDURE — 97116 GAIT TRAINING THERAPY: CPT

## 2019-08-11 PROCEDURE — 6370000000 HC RX 637 (ALT 250 FOR IP): Performed by: FAMILY MEDICINE

## 2019-08-11 PROCEDURE — 83735 ASSAY OF MAGNESIUM: CPT

## 2019-08-11 PROCEDURE — 1210000000 HC MED SURG R&B

## 2019-08-11 PROCEDURE — 97530 THERAPEUTIC ACTIVITIES: CPT

## 2019-08-11 PROCEDURE — 80048 BASIC METABOLIC PNL TOTAL CA: CPT

## 2019-08-11 RX ORDER — FLUTICASONE PROPIONATE 50 MCG
1 SPRAY, SUSPENSION (ML) NASAL DAILY
Status: DISCONTINUED | OUTPATIENT
Start: 2019-08-11 | End: 2019-08-13 | Stop reason: HOSPADM

## 2019-08-11 RX ORDER — ALBUTEROL SULFATE 2.5 MG/3ML
2.5 SOLUTION RESPIRATORY (INHALATION) EVERY 4 HOURS PRN
Status: DISCONTINUED | OUTPATIENT
Start: 2019-08-11 | End: 2019-08-13 | Stop reason: HOSPADM

## 2019-08-11 RX ORDER — FUROSEMIDE 10 MG/ML
20 INJECTION INTRAMUSCULAR; INTRAVENOUS ONCE
Status: COMPLETED | OUTPATIENT
Start: 2019-08-11 | End: 2019-08-11

## 2019-08-11 RX ADMIN — POTASSIUM CHLORIDE AND SODIUM CHLORIDE: 900; 150 INJECTION, SOLUTION INTRAVENOUS at 03:53

## 2019-08-11 RX ADMIN — CETIRIZINE HYDROCHLORIDE 10 MG: 10 TABLET, FILM COATED ORAL at 08:23

## 2019-08-11 RX ADMIN — FLUTICASONE PROPIONATE 2 SPRAY: 50 SPRAY, METERED NASAL at 08:26

## 2019-08-11 RX ADMIN — POTASSIUM CITRATE 10 MEQ: 10 TABLET ORAL at 15:32

## 2019-08-11 RX ADMIN — ACETAMINOPHEN 650 MG: 325 TABLET ORAL at 08:26

## 2019-08-11 RX ADMIN — POTASSIUM CITRATE 10 MEQ: 10 TABLET ORAL at 08:23

## 2019-08-11 RX ADMIN — ACETAMINOPHEN 650 MG: 325 TABLET ORAL at 19:55

## 2019-08-11 RX ADMIN — FUROSEMIDE 20 MG: 10 INJECTION, SOLUTION INTRAMUSCULAR; INTRAVENOUS at 15:32

## 2019-08-11 RX ADMIN — SERTRALINE HYDROCHLORIDE 100 MG: 100 TABLET ORAL at 08:23

## 2019-08-11 RX ADMIN — FOLIC ACID 1 MG: 1 TABLET ORAL at 08:23

## 2019-08-11 RX ADMIN — ALBUTEROL SULFATE 2.5 MG: 2.5 SOLUTION RESPIRATORY (INHALATION) at 15:57

## 2019-08-11 RX ADMIN — ATORVASTATIN CALCIUM 10 MG: 10 TABLET, FILM COATED ORAL at 19:54

## 2019-08-11 RX ADMIN — PANTOPRAZOLE SODIUM 40 MG: 40 TABLET, DELAYED RELEASE ORAL at 06:16

## 2019-08-11 RX ADMIN — MONTELUKAST SODIUM 10 MG: 10 TABLET ORAL at 19:54

## 2019-08-11 RX ADMIN — ALBUTEROL SULFATE 2 PUFF: 90 AEROSOL, METERED RESPIRATORY (INHALATION) at 06:22

## 2019-08-11 RX ADMIN — Medication 10 ML: at 19:54

## 2019-08-11 ASSESSMENT — PAIN SCALES - GENERAL
PAINLEVEL_OUTOF10: 0
PAINLEVEL_OUTOF10: 5
PAINLEVEL_OUTOF10: 2
PAINLEVEL_OUTOF10: 0

## 2019-08-12 LAB
ANION GAP SERPL CALCULATED.3IONS-SCNC: 14 MMOL/L (ref 7–19)
BASOPHILS ABSOLUTE: 0 K/UL (ref 0–0.2)
BASOPHILS RELATIVE PERCENT: 0.2 % (ref 0–1)
BUN BLDV-MCNC: 8 MG/DL (ref 8–23)
CALCIUM SERPL-MCNC: 8.7 MG/DL (ref 8.8–10.2)
CHLORIDE BLD-SCNC: 108 MMOL/L (ref 98–111)
CO2: 22 MMOL/L (ref 22–29)
CREAT SERPL-MCNC: 0.7 MG/DL (ref 0.5–0.9)
EOSINOPHILS ABSOLUTE: 0.1 K/UL (ref 0–0.6)
EOSINOPHILS RELATIVE PERCENT: 1.3 % (ref 0–5)
GFR NON-AFRICAN AMERICAN: >60
GLUCOSE BLD-MCNC: 103 MG/DL (ref 74–109)
HCT VFR BLD CALC: 29 % (ref 37–47)
HEMOGLOBIN: 9.1 G/DL (ref 12–16)
LYMPHOCYTES ABSOLUTE: 0.7 K/UL (ref 1.1–4.5)
LYMPHOCYTES RELATIVE PERCENT: 12.2 % (ref 20–40)
MAGNESIUM: 1.2 MG/DL (ref 1.6–2.4)
MCH RBC QN AUTO: 28.9 PG (ref 27–31)
MCHC RBC AUTO-ENTMCNC: 31.4 G/DL (ref 33–37)
MCV RBC AUTO: 92.1 FL (ref 81–99)
MONOCYTES ABSOLUTE: 0.7 K/UL (ref 0–0.9)
MONOCYTES RELATIVE PERCENT: 11.9 % (ref 0–10)
NEUTROPHILS ABSOLUTE: 3.7 K/UL (ref 1.5–7.5)
NEUTROPHILS RELATIVE PERCENT: 67.6 % (ref 50–65)
PDW BLD-RTO: 19.1 % (ref 11.5–14.5)
PLATELET # BLD: 94 K/UL (ref 130–400)
PLATELET SLIDE REVIEW: ABNORMAL
PMV BLD AUTO: 10.4 FL (ref 9.4–12.3)
POTASSIUM SERPL-SCNC: 3.7 MMOL/L (ref 3.5–5)
RBC # BLD: 3.15 M/UL (ref 4.2–5.4)
SODIUM BLD-SCNC: 144 MMOL/L (ref 136–145)
WBC # BLD: 5.5 K/UL (ref 4.8–10.8)

## 2019-08-12 PROCEDURE — 2580000003 HC RX 258: Performed by: FAMILY MEDICINE

## 2019-08-12 PROCEDURE — 83735 ASSAY OF MAGNESIUM: CPT

## 2019-08-12 PROCEDURE — 1210000000 HC MED SURG R&B

## 2019-08-12 PROCEDURE — 6370000000 HC RX 637 (ALT 250 FOR IP): Performed by: FAMILY MEDICINE

## 2019-08-12 PROCEDURE — 6370000000 HC RX 637 (ALT 250 FOR IP): Performed by: INTERNAL MEDICINE

## 2019-08-12 PROCEDURE — 97116 GAIT TRAINING THERAPY: CPT

## 2019-08-12 PROCEDURE — 94640 AIRWAY INHALATION TREATMENT: CPT

## 2019-08-12 PROCEDURE — 97530 THERAPEUTIC ACTIVITIES: CPT

## 2019-08-12 PROCEDURE — 36591 DRAW BLOOD OFF VENOUS DEVICE: CPT

## 2019-08-12 PROCEDURE — 85025 COMPLETE CBC W/AUTO DIFF WBC: CPT

## 2019-08-12 PROCEDURE — 6360000002 HC RX W HCPCS: Performed by: FAMILY MEDICINE

## 2019-08-12 PROCEDURE — 6360000002 HC RX W HCPCS: Performed by: EMERGENCY MEDICINE

## 2019-08-12 PROCEDURE — 6360000002 HC RX W HCPCS: Performed by: INTERNAL MEDICINE

## 2019-08-12 PROCEDURE — 99224 PR SBSQ OBSERVATION CARE/DAY 15 MINUTES: CPT | Performed by: INTERNAL MEDICINE

## 2019-08-12 PROCEDURE — 80048 BASIC METABOLIC PNL TOTAL CA: CPT

## 2019-08-12 RX ADMIN — CETIRIZINE HYDROCHLORIDE 10 MG: 10 TABLET, FILM COATED ORAL at 09:31

## 2019-08-12 RX ADMIN — ATORVASTATIN CALCIUM 10 MG: 10 TABLET, FILM COATED ORAL at 22:12

## 2019-08-12 RX ADMIN — MONTELUKAST SODIUM 10 MG: 10 TABLET ORAL at 22:12

## 2019-08-12 RX ADMIN — ACETAMINOPHEN 650 MG: 325 TABLET ORAL at 16:46

## 2019-08-12 RX ADMIN — Medication 10 ML: at 22:13

## 2019-08-12 RX ADMIN — FLUTICASONE PROPIONATE 1 SPRAY: 50 SPRAY, METERED NASAL at 09:34

## 2019-08-12 RX ADMIN — PANTOPRAZOLE SODIUM 40 MG: 40 TABLET, DELAYED RELEASE ORAL at 06:19

## 2019-08-12 RX ADMIN — POTASSIUM CITRATE 10 MEQ: 10 TABLET ORAL at 11:43

## 2019-08-12 RX ADMIN — SERTRALINE HYDROCHLORIDE 100 MG: 100 TABLET ORAL at 09:31

## 2019-08-12 RX ADMIN — ALBUTEROL SULFATE 2.5 MG: 2.5 SOLUTION RESPIRATORY (INHALATION) at 02:47

## 2019-08-12 RX ADMIN — POTASSIUM CHLORIDE AND SODIUM CHLORIDE: 900; 150 INJECTION, SOLUTION INTRAVENOUS at 16:46

## 2019-08-12 RX ADMIN — ENOXAPARIN SODIUM 40 MG: 40 INJECTION SUBCUTANEOUS at 09:31

## 2019-08-12 RX ADMIN — Medication 10 ML: at 09:32

## 2019-08-12 RX ADMIN — ACETAMINOPHEN 650 MG: 325 TABLET ORAL at 09:37

## 2019-08-12 RX ADMIN — POTASSIUM CITRATE 10 MEQ: 10 TABLET ORAL at 09:37

## 2019-08-12 RX ADMIN — FOLIC ACID 1 MG: 1 TABLET ORAL at 09:31

## 2019-08-12 RX ADMIN — FUROSEMIDE 20 MG: 20 TABLET ORAL at 11:43

## 2019-08-12 RX ADMIN — ALBUTEROL SULFATE 2.5 MG: 2.5 SOLUTION RESPIRATORY (INHALATION) at 17:16

## 2019-08-12 RX ADMIN — POTASSIUM CITRATE 10 MEQ: 10 TABLET ORAL at 16:46

## 2019-08-12 RX ADMIN — POTASSIUM CHLORIDE AND SODIUM CHLORIDE: 900; 150 INJECTION, SOLUTION INTRAVENOUS at 03:06

## 2019-08-12 RX ADMIN — FUROSEMIDE 20 MG: 20 TABLET ORAL at 16:46

## 2019-08-12 RX ADMIN — ACETAMINOPHEN 650 MG: 325 TABLET ORAL at 22:12

## 2019-08-12 ASSESSMENT — ENCOUNTER SYMPTOMS
EYE REDNESS: 0
COUGH: 0
ABDOMINAL DISTENTION: 0
VOMITING: 0
NAUSEA: 0
EYE ITCHING: 0
SINUS PAIN: 0
EYE DISCHARGE: 0
DIARRHEA: 1
SHORTNESS OF BREATH: 0
COLOR CHANGE: 0

## 2019-08-12 ASSESSMENT — PAIN SCALES - GENERAL
PAINLEVEL_OUTOF10: 0
PAINLEVEL_OUTOF10: 4
PAINLEVEL_OUTOF10: 4
PAINLEVEL_OUTOF10: 3
PAINLEVEL_OUTOF10: 2
PAINLEVEL_OUTOF10: 4
PAINLEVEL_OUTOF10: 0

## 2019-08-12 ASSESSMENT — PAIN DESCRIPTION - LOCATION: LOCATION: HEAD

## 2019-08-12 ASSESSMENT — PAIN DESCRIPTION - ORIENTATION: ORIENTATION: RIGHT

## 2019-08-12 ASSESSMENT — PAIN DESCRIPTION - PAIN TYPE: TYPE: ACUTE PAIN

## 2019-08-12 ASSESSMENT — PAIN DESCRIPTION - DESCRIPTORS: DESCRIPTORS: HEADACHE

## 2019-08-12 ASSESSMENT — PAIN DESCRIPTION - PROGRESSION
CLINICAL_PROGRESSION: GRADUALLY WORSENING
CLINICAL_PROGRESSION: GRADUALLY IMPROVING

## 2019-08-12 ASSESSMENT — PAIN DESCRIPTION - ONSET: ONSET: SUDDEN

## 2019-08-12 ASSESSMENT — PAIN DESCRIPTION - FREQUENCY: FREQUENCY: CONTINUOUS

## 2019-08-12 ASSESSMENT — PAIN - FUNCTIONAL ASSESSMENT: PAIN_FUNCTIONAL_ASSESSMENT: PREVENTS OR INTERFERES SOME ACTIVE ACTIVITIES AND ADLS

## 2019-08-13 VITALS
HEIGHT: 64 IN | HEART RATE: 90 BPM | OXYGEN SATURATION: 95 % | SYSTOLIC BLOOD PRESSURE: 146 MMHG | RESPIRATION RATE: 18 BRPM | BODY MASS INDEX: 22.2 KG/M2 | DIASTOLIC BLOOD PRESSURE: 95 MMHG | TEMPERATURE: 97.2 F | WEIGHT: 130 LBS

## 2019-08-13 LAB
BASOPHILS ABSOLUTE: 0 K/UL (ref 0–0.2)
BASOPHILS RELATIVE PERCENT: 0.2 % (ref 0–1)
EOSINOPHILS ABSOLUTE: 0.1 K/UL (ref 0–0.6)
EOSINOPHILS RELATIVE PERCENT: 1.5 % (ref 0–5)
HCT VFR BLD CALC: 28.2 % (ref 37–47)
HEMOGLOBIN: 9 G/DL (ref 12–16)
LYMPHOCYTES ABSOLUTE: 0.8 K/UL (ref 1.1–4.5)
LYMPHOCYTES RELATIVE PERCENT: 14.7 % (ref 20–40)
MCH RBC QN AUTO: 29.4 PG (ref 27–31)
MCHC RBC AUTO-ENTMCNC: 31.9 G/DL (ref 33–37)
MCV RBC AUTO: 92.2 FL (ref 81–99)
MONOCYTES ABSOLUTE: 0.6 K/UL (ref 0–0.9)
MONOCYTES RELATIVE PERCENT: 11.5 % (ref 0–10)
NEUTROPHILS ABSOLUTE: 3.6 K/UL (ref 1.5–7.5)
NEUTROPHILS RELATIVE PERCENT: 66.3 % (ref 50–65)
PDW BLD-RTO: 19.3 % (ref 11.5–14.5)
PLATELET # BLD: 132 K/UL (ref 130–400)
PMV BLD AUTO: 10.3 FL (ref 9.4–12.3)
RBC # BLD: 3.06 M/UL (ref 4.2–5.4)
WBC # BLD: 5.4 K/UL (ref 4.8–10.8)

## 2019-08-13 PROCEDURE — 6370000000 HC RX 637 (ALT 250 FOR IP): Performed by: FAMILY MEDICINE

## 2019-08-13 PROCEDURE — 6360000002 HC RX W HCPCS: Performed by: FAMILY MEDICINE

## 2019-08-13 PROCEDURE — 36591 DRAW BLOOD OFF VENOUS DEVICE: CPT

## 2019-08-13 PROCEDURE — 85025 COMPLETE CBC W/AUTO DIFF WBC: CPT

## 2019-08-13 PROCEDURE — 6360000002 HC RX W HCPCS: Performed by: INTERNAL MEDICINE

## 2019-08-13 PROCEDURE — 94640 AIRWAY INHALATION TREATMENT: CPT

## 2019-08-13 PROCEDURE — 2580000003 HC RX 258: Performed by: FAMILY MEDICINE

## 2019-08-13 PROCEDURE — 6360000002 HC RX W HCPCS: Performed by: EMERGENCY MEDICINE

## 2019-08-13 RX ORDER — PANTOPRAZOLE SODIUM 40 MG/1
40 TABLET, DELAYED RELEASE ORAL
Qty: 30 TABLET | Refills: 3 | Status: SHIPPED | OUTPATIENT
Start: 2019-08-14

## 2019-08-13 RX ORDER — FUROSEMIDE 20 MG/1
20 TABLET ORAL 2 TIMES DAILY
Qty: 60 TABLET | Refills: 3 | Status: ON HOLD | OUTPATIENT
Start: 2019-08-13 | End: 2020-07-21 | Stop reason: ALTCHOICE

## 2019-08-13 RX ORDER — POTASSIUM CITRATE 10 MEQ/1
10 TABLET, EXTENDED RELEASE ORAL
Qty: 90 TABLET | Refills: 0 | Status: ON HOLD | OUTPATIENT
Start: 2019-08-13 | End: 2019-09-19 | Stop reason: HOSPADM

## 2019-08-13 RX ORDER — FLUTICASONE PROPIONATE 50 MCG
1 SPRAY, SUSPENSION (ML) NASAL DAILY
Qty: 1 BOTTLE | Refills: 3 | Status: SHIPPED | OUTPATIENT
Start: 2019-08-14

## 2019-08-13 RX ADMIN — SERTRALINE HYDROCHLORIDE 100 MG: 100 TABLET ORAL at 08:55

## 2019-08-13 RX ADMIN — POTASSIUM CITRATE 10 MEQ: 10 TABLET ORAL at 13:52

## 2019-08-13 RX ADMIN — Medication 10 ML: at 08:55

## 2019-08-13 RX ADMIN — ALBUTEROL SULFATE 2.5 MG: 2.5 SOLUTION RESPIRATORY (INHALATION) at 05:10

## 2019-08-13 RX ADMIN — FOLIC ACID 1 MG: 1 TABLET ORAL at 08:55

## 2019-08-13 RX ADMIN — ENOXAPARIN SODIUM 40 MG: 40 INJECTION SUBCUTANEOUS at 08:55

## 2019-08-13 RX ADMIN — HEPARIN 300 UNITS: 100 SYRINGE at 13:54

## 2019-08-13 RX ADMIN — FUROSEMIDE 20 MG: 20 TABLET ORAL at 08:55

## 2019-08-13 RX ADMIN — CETIRIZINE HYDROCHLORIDE 10 MG: 10 TABLET, FILM COATED ORAL at 08:55

## 2019-08-13 RX ADMIN — ACETAMINOPHEN 650 MG: 325 TABLET ORAL at 14:04

## 2019-08-13 RX ADMIN — POTASSIUM CHLORIDE AND SODIUM CHLORIDE: 900; 150 INJECTION, SOLUTION INTRAVENOUS at 03:00

## 2019-08-13 RX ADMIN — POTASSIUM CITRATE 10 MEQ: 10 TABLET ORAL at 08:55

## 2019-08-13 RX ADMIN — FLUTICASONE PROPIONATE 1 SPRAY: 50 SPRAY, METERED NASAL at 08:56

## 2019-08-13 RX ADMIN — PANTOPRAZOLE SODIUM 40 MG: 40 TABLET, DELAYED RELEASE ORAL at 06:18

## 2019-08-13 ASSESSMENT — PAIN SCALES - GENERAL
PAINLEVEL_OUTOF10: 0
PAINLEVEL_OUTOF10: 6

## 2019-08-13 NOTE — PROGRESS NOTES
Report called to [address].  Electronically signed by Murali Eubanks RN on 8/13/19 at 1:49 PM

## 2019-08-13 NOTE — PLAN OF CARE
Problem: Falls - Risk of:  Goal: Will remain free from falls  Description  Will remain free from falls  Outcome: Ongoing  Goal: Absence of physical injury  Description  Absence of physical injury  Outcome: Ongoing     Problem: Pain:  Goal: Pain level will decrease  Description  Pain level will decrease  Outcome: Ongoing  Goal: Control of acute pain  Description  Control of acute pain  Outcome: Ongoing  Goal: Control of chronic pain  Description  Control of chronic pain  Outcome: Ongoing  Goal: Patient's pain/discomfort is manageable  Description  Patient's pain/discomfort is manageable  Outcome: Ongoing     Problem: Skin Integrity:  Goal: Will show no infection signs and symptoms  Description  Will show no infection signs and symptoms  Outcome: Ongoing  Goal: Absence of new skin breakdown  Description  Absence of new skin breakdown  Outcome: Ongoing     Problem: Confusion - Acute:  Goal: Absence of continued neurological deterioration signs and symptoms  Description  Absence of continued neurological deterioration signs and symptoms  Outcome: Ongoing  Goal: Mental status will be restored to baseline  Description  Mental status will be restored to baseline  Outcome: Ongoing     Problem: Discharge Planning:  Goal: Ability to perform activities of daily living will improve  Description  Ability to perform activities of daily living will improve  Outcome: Ongoing  Goal: Participates in care planning  Description  Participates in care planning  Outcome: Ongoing     Problem: Injury - Risk of, Physical Injury:  Goal: Will remain free from falls  Description  Will remain free from falls  Outcome: Ongoing  Goal: Absence of physical injury  Description  Absence of physical injury  Outcome: Ongoing     Problem: Mood - Altered:  Goal: Mood stable  Description  Mood stable  Outcome: Ongoing  Goal: Absence of abusive behavior  Description  Absence of abusive behavior  Outcome: Ongoing  Goal: Verbalizations of feeling will stabilize  Description  Skin integrity will stabilize  Outcome: Ongoing     Problem: Discharge Planning:  Goal: Patients continuum of care needs are met  Description  Patients continuum of care needs are met  Outcome: Ongoing     Problem: ABCDS Injury Assessment  Goal: Absence of physical injury  Outcome: Ongoing     Problem: Physical Regulation:  Goal: Will remain free from infection  Description  Will remain free from infection  Outcome: Ongoing  Goal: Complications related to the disease process, condition or treatment will be avoided or minimized  Description  Complications related to the disease process, condition or treatment will be avoided or minimized  Outcome: Ongoing

## 2019-08-14 ENCOUNTER — LAB REQUISITION (OUTPATIENT)
Dept: LAB | Facility: HOSPITAL | Age: 69
End: 2019-08-14

## 2019-08-14 DIAGNOSIS — Z00.00 ENCOUNTER FOR GENERAL ADULT MEDICAL EXAMINATION WITHOUT ABNORMAL FINDINGS: ICD-10-CM

## 2019-08-14 LAB
ALBUMIN SERPL-MCNC: 3 G/DL (ref 3.5–5)
ALP SERPL-CCNC: 154 U/L (ref 24–120)
ALT SERPL W P-5'-P-CCNC: 40 U/L (ref 0–54)
ANION GAP SERPL CALCULATED.3IONS-SCNC: 10 MMOL/L (ref 4–13)
ANISOCYTOSIS BLD QL: ABNORMAL
ARTICHOKE IGE QN: 64 MG/DL (ref 0–99)
AST SERPL-CCNC: 40 U/L (ref 7–45)
BILIRUB CONJ SERPL-MCNC: 0 MG/DL (ref 0–0.3)
BILIRUB INDIRECT SERPL-MCNC: 0.1 MG/DL (ref 0–1.1)
BILIRUB SERPL-MCNC: 0.5 MG/DL (ref 0.1–1)
BUN BLD-MCNC: 9 MG/DL (ref 5–21)
BUN/CREAT SERPL: 10.7 (ref 7–25)
CALCIUM SPEC-SCNC: 8.8 MG/DL (ref 8.4–10.4)
CHLORIDE SERPL-SCNC: 105 MMOL/L (ref 98–110)
CHOLEST SERPL-MCNC: 124 MG/DL (ref 130–200)
CO2 SERPL-SCNC: 27 MMOL/L (ref 24–31)
CREAT BLD-MCNC: 0.84 MG/DL (ref 0.5–1.4)
DEPRECATED RDW RBC AUTO: 62 FL (ref 37–54)
EOSINOPHIL # BLD MANUAL: 0.08 10*3/MM3 (ref 0–0.4)
EOSINOPHIL NFR BLD MANUAL: 1 % (ref 0.3–6.2)
ERYTHROCYTE [DISTWIDTH] IN BLOOD BY AUTOMATED COUNT: 19.3 % (ref 12.3–15.4)
GFR SERPL CREATININE-BSD FRML MDRD: 67 ML/MIN/1.73
GLUCOSE BLD-MCNC: 73 MG/DL (ref 70–100)
HBA1C MFR BLD: 5.8 % (ref 4.8–5.9)
HCT VFR BLD AUTO: 32.5 % (ref 34–46.6)
HDLC SERPL-MCNC: 48 MG/DL
HGB BLD-MCNC: 10.6 G/DL (ref 12–15.9)
LDLC/HDLC SERPL: 0.91 {RATIO}
LYMPHOCYTES # BLD MANUAL: 0.69 10*3/MM3 (ref 0.7–3.1)
LYMPHOCYTES NFR BLD MANUAL: 6 % (ref 5–12)
LYMPHOCYTES NFR BLD MANUAL: 9 % (ref 19.6–45.3)
MCH RBC QN AUTO: 29.1 PG (ref 26.6–33)
MCHC RBC AUTO-ENTMCNC: 32.6 G/DL (ref 31.5–35.7)
MCV RBC AUTO: 89.3 FL (ref 79–97)
MICROCYTES BLD QL: ABNORMAL
MONOCYTES # BLD AUTO: 0.46 10*3/MM3 (ref 0.1–0.9)
NEUTROPHILS # BLD AUTO: 6.44 10*3/MM3 (ref 1.7–7)
NEUTROPHILS NFR BLD MANUAL: 84 % (ref 42.7–76)
PLAT MORPH BLD: NORMAL
PLATELET # BLD AUTO: 242 10*3/MM3 (ref 140–450)
PMV BLD AUTO: 10.1 FL (ref 6–12)
POIKILOCYTOSIS BLD QL SMEAR: ABNORMAL
POTASSIUM BLD-SCNC: 4 MMOL/L (ref 3.5–5.3)
PREALB SERPL-MCNC: 12.5 MG/DL (ref 18–36)
PROT SERPL-MCNC: 6 G/DL (ref 6.3–8.7)
RBC # BLD AUTO: 3.64 10*6/MM3 (ref 3.77–5.28)
SODIUM BLD-SCNC: 142 MMOL/L (ref 135–145)
TRIGL SERPL-MCNC: 161 MG/DL (ref 0–149)
TSH SERPL DL<=0.05 MIU/L-ACNC: 4.38 MIU/ML (ref 0.47–4.68)
VIT B12 BLD-MCNC: >1000 PG/ML (ref 239–931)
WBC MORPH BLD: NORMAL
WBC NRBC COR # BLD: 7.67 10*3/MM3 (ref 3.4–10.8)

## 2019-08-14 PROCEDURE — 83036 HEMOGLOBIN GLYCOSYLATED A1C: CPT | Performed by: NURSE PRACTITIONER

## 2019-08-14 PROCEDURE — 80076 HEPATIC FUNCTION PANEL: CPT | Performed by: NURSE PRACTITIONER

## 2019-08-14 PROCEDURE — 80048 BASIC METABOLIC PNL TOTAL CA: CPT | Performed by: NURSE PRACTITIONER

## 2019-08-14 PROCEDURE — 85025 COMPLETE CBC W/AUTO DIFF WBC: CPT | Performed by: NURSE PRACTITIONER

## 2019-08-14 PROCEDURE — 84134 ASSAY OF PREALBUMIN: CPT | Performed by: NURSE PRACTITIONER

## 2019-08-14 PROCEDURE — 84443 ASSAY THYROID STIM HORMONE: CPT | Performed by: NURSE PRACTITIONER

## 2019-08-14 PROCEDURE — 36415 COLL VENOUS BLD VENIPUNCTURE: CPT | Performed by: NURSE PRACTITIONER

## 2019-08-14 PROCEDURE — 80061 LIPID PANEL: CPT | Performed by: NURSE PRACTITIONER

## 2019-08-14 PROCEDURE — 82607 VITAMIN B-12: CPT | Performed by: NURSE PRACTITIONER

## 2019-08-21 DIAGNOSIS — F32.A ANXIETY AND DEPRESSION: ICD-10-CM

## 2019-08-21 DIAGNOSIS — F41.9 ANXIETY AND DEPRESSION: ICD-10-CM

## 2019-08-21 RX ORDER — SERTRALINE HYDROCHLORIDE 100 MG/1
200 TABLET, FILM COATED ORAL DAILY
Qty: 60 TABLET | Refills: 5 | OUTPATIENT
Start: 2019-08-21

## 2019-08-23 ENCOUNTER — LAB REQUISITION (OUTPATIENT)
Dept: LAB | Facility: HOSPITAL | Age: 69
End: 2019-08-23

## 2019-08-23 ENCOUNTER — OFFICE VISIT (OUTPATIENT)
Dept: VASCULAR SURGERY | Facility: CLINIC | Age: 69
End: 2019-08-23

## 2019-08-23 VITALS
SYSTOLIC BLOOD PRESSURE: 102 MMHG | HEIGHT: 64 IN | DIASTOLIC BLOOD PRESSURE: 68 MMHG | BODY MASS INDEX: 21.34 KG/M2 | WEIGHT: 125 LBS

## 2019-08-23 DIAGNOSIS — R60.0 EDEMA OF BOTH LOWER EXTREMITIES: Primary | ICD-10-CM

## 2019-08-23 DIAGNOSIS — I87.2 STASIS DERMATITIS OF BOTH LEGS: ICD-10-CM

## 2019-08-23 DIAGNOSIS — Z00.00 ENCOUNTER FOR GENERAL ADULT MEDICAL EXAMINATION WITHOUT ABNORMAL FINDINGS: ICD-10-CM

## 2019-08-23 LAB
ANION GAP SERPL CALCULATED.3IONS-SCNC: 7 MMOL/L (ref 4–13)
BUN BLD-MCNC: 14 MG/DL (ref 5–21)
BUN/CREAT SERPL: 13.5 (ref 7–25)
CALCIUM SPEC-SCNC: 10.1 MG/DL (ref 8.4–10.4)
CHLORIDE SERPL-SCNC: 102 MMOL/L (ref 98–110)
CO2 SERPL-SCNC: 30 MMOL/L (ref 24–31)
CREAT BLD-MCNC: 1.04 MG/DL (ref 0.5–1.4)
GFR SERPL CREATININE-BSD FRML MDRD: 53 ML/MIN/1.73
GLUCOSE BLD-MCNC: 111 MG/DL (ref 70–100)
POTASSIUM BLD-SCNC: 4.4 MMOL/L (ref 3.5–5.3)
SODIUM BLD-SCNC: 139 MMOL/L (ref 135–145)

## 2019-08-23 PROCEDURE — 80048 BASIC METABOLIC PNL TOTAL CA: CPT | Performed by: FAMILY MEDICINE

## 2019-08-23 PROCEDURE — 99214 OFFICE O/P EST MOD 30 MIN: CPT | Performed by: SURGERY

## 2019-08-23 RX ORDER — TIZANIDINE 2 MG/1
2 TABLET ORAL
COMMUNITY

## 2019-08-23 RX ORDER — ONDANSETRON HYDROCHLORIDE 8 MG/1
8 TABLET, FILM COATED ORAL
COMMUNITY
End: 2019-12-03 | Stop reason: DRUGHIGH

## 2019-08-23 NOTE — PROGRESS NOTES
08/23/2019      Mary Henning MD  [address] [place] [address] [address] [place] [address]        Marcy Garcia  1950    Chief Complaint   Patient presents with   • Follow-up     BLE edema.  Pt was last seen in the office 5/17/19 and it was noted that she would follow up prn at that time.         Dear Mary Henning MD:    HPI     I had the pleasure of seeing your patient in the office today for follow up.  As you recall, the patient is a 68 y.o. female who we are currently following for lower extremity edema. She has a history of lung cancer with mets to the brain, and is currently on maintenance chemo. She was seen initially in the hospital with lower extremity edema and erythema. Ultimately venous duplex with VVI was done showing no DVT and no evidence of reflux. She returns today with questions regarding mild lower extremity edema as well as discoloration to the skin of the bilateral lower legs from the distal lower leg to the foot.  This seems to be somewhat chronic in nature.  She had seen a physician at an outside facility who told her it may have to do with arterial insufficiency however she was unsure.  She has had recent ADAMA/PVR done both this past May as well as back in December 2018 which both showed normal ABIs with no evidence of arterial insufficiency at that time.  The one in May did show some evidence of small vessel disease in the bilateral distal forefeet.  She denies any history of claudication or arterial rest pain.  She otherwise is without complaint.      Review of Systems   Constitutional: Negative.  Negative for activity change, appetite change, chills, diaphoresis, fatigue and fever.   HENT: Negative.  Negative for congestion, sneezing, sore throat and trouble swallowing.    Eyes: Negative.  Negative for visual disturbance.   Respiratory: Negative.  Negative for chest tightness and shortness of breath.    Cardiovascular: Negative.  Negative for chest pain, palpitations and leg  "swelling.   Gastrointestinal: Negative.  Negative for abdominal distention, abdominal pain, nausea and vomiting.   Endocrine: Negative.    Genitourinary: Negative.    Musculoskeletal: Negative.    Skin: Negative.    Allergic/Immunologic: Negative.    Neurological: Negative.    Hematological: Negative.    Psychiatric/Behavioral: Negative.        /68   Ht 162.6 cm (64\")   Wt 56.7 kg (125 lb)   BMI 21.46 kg/m²   Physical Exam   Constitutional: She is oriented to person, place, and time. She appears well-developed and well-nourished.   HENT:   Head: Normocephalic and atraumatic.   Eyes: EOM are normal. Pupils are equal, round, and reactive to light.   Neck: Normal range of motion. Neck supple. No JVD present.   Cardiovascular: Normal rate, regular rhythm, intact distal pulses and normal pulses.   Pulses:       Carotid pulses are 2+ on the right side, and 2+ on the left side.       Radial pulses are 2+ on the right side, and 2+ on the left side.        Femoral pulses are 2+ on the right side, and 2+ on the left side.       Popliteal pulses are 2+ on the right side, and 2+ on the left side.        Dorsalis pedis pulses are 2+ on the right side, and 2+ on the left side.        Posterior tibial pulses are 2+ on the right side, and 2+ on the left side.   Pulmonary/Chest: Effort normal. No respiratory distress.   Abdominal: Soft. She exhibits no distension and no mass. There is no tenderness.   Musculoskeletal: Normal range of motion. She exhibits edema (She has very mild edema of the bilateral lower extremities the level of the knee.  This is significantly improved from the time she was seen in hospital). She exhibits no tenderness or deformity.   Neurological: She is oriented to person, place, and time.   Skin: Skin is warm and dry. Capillary refill takes 2 to 3 seconds.   She has some mild purplish discoloration of the bilateral distal forefeet and toes.   Psychiatric: She has a normal mood and affect. Her " behavior is normal. Judgment and thought content normal.   Vitals reviewed.      DIAGNOSTIC DATA:    Mri Brain With & Without Contrast    Result Date: 7/30/2019  Narrative: EXAM: MR BRAIN WITHOUT AND WITH IV CONTRAST 07/30/2019  COMPARISON: MRI brain dated 03/28/2019.  HISTORY: 68 years-old Female. Neoplasm: head, metastatic, rx monitor or f/u; C79.31-Secondary malignant neoplasm of brain; C79.49-Secondary malignant neoplasm of other parts of nervous system  TECHNIQUE: Routine pulse sequences were obtained of the brain before and after the administration of IV contrast.  REPORT:  Since most recent MRI of the brain dated 03/28/2019, there has been no significant interval changes. Specifically, there is redemonstration of the postsurgical changes in the right frontal lobe with similar enhancement of scar tissue as well as gliosis in the right frontal lobe. Craniotomy defects, blooming susceptibility artifact, enhancement of the dura and extradural fluid are all expected findings. There is no midline shift. No acute hydrocephalus. The ventricles are similar in size. No new abnormal enhancement identified.  No acute intracranial hemorrhage. The basal cisterns are preserved. The intraorbital structures demonstrate no acute finding.  The flow-voids of [place] are maintained centrally. Dural sinuses are patent.  Mild obstructive paranasal sinus mucosal disease. The mastoid air cells are clear. Effusion at the left TMJ reflecting degenerative changes.       Impression: 1.  No interval changes when compared to MRI brain dated 03/28/2019. 2.  Similar posttreatment changes in the right frontal lobe. This report was finalized on 07/30/2019 13:50 by Dr. Ingrid Ayers MD.      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)   • S/P craniotomy   • Sensorineural hearing loss (SNHL) of both  ears   • Allergic rhinitis   • Adenocarcinoma of lung, stage 4, left (CMS/HCC)   • COPD (chronic obstructive pulmonary disease) (CMS/HCC)   • Anxiety and depression   • Mixed hyperlipidemia   • Metastatic adenocarcinoma to brain (CMS/HCC)   • Essential hypertension   • Altered bowel elimination due to intestinal ostomy (CMS/HCC)   • BMI 22.0-22.9, adult   • Neck pain   • Chronic pain of both shoulders   • Cellulitis of both lower extremities   • Erythema of lower extremity   • Pancytopenia (CMS/HCC)   • BMI 21.0-21.9, adult         ICD-10-CM ICD-9-CM   1. Edema of both lower extremities R60.0 782.3   2. Stasis dermatitis of both legs I87.2 454.1       Lab Frequency Next Occurrence   Mammo diagnostic digital tomosynthesis right w CAD Once 02/28/2019   MRI Brain With & Without Contrast Once 11/30/2019       PLAN: After thoroughly evaluating Marcy Garcia, I believe the best course of action is to remain conservative from a vascular standpoint.  Clinically her arterial supply seems adequate with palpable pedal pulses and good capillary refill.  She does have some discoloration of the bilateral distal lower legs and feet which is chronic.  This may be secondary to her ongoing chemotherapy treatment however it is improved from when I saw her in the hospital.  She also likely does have some small vein venous insufficiency in the distal lower legs however her venous duplex study that was done at her time in the hospital showed no evidence of DVT or significant reflux in the greater saphenous veins.  I have advised that she continue to wear compression stockings on a daily basis to help decrease her lower extremity edema and to continue with leg elevation as possible which will also aid in edema reduction.  The patient is to continue taking their medications as previously discussed.   I did discuss vascular risk factors as they pertain to the progression of vascular disease including controlling hyperlipidemia. These  factors remain stable. Patient's Body mass index is 21.46 kg/m². BMI is within normal parameters. No follow-up required.  I will plan to see her back in the office in 6 months for continued surveillance.   This was all discussed in full with complete understanding.  Thank you for allowing me to participate in the care of your patient.  Please do not hesitate to call with any questions or concerns.  We will keep you aware of any further encounters with Marcy Garcia.      Sincerely Yours,      Justin Camacho MD

## 2019-08-27 ENCOUNTER — CARE COORDINATION (OUTPATIENT)
Dept: CASE MANAGEMENT | Age: 69
End: 2019-08-27

## 2019-08-27 ENCOUNTER — HOSPITAL ENCOUNTER (OUTPATIENT)
Dept: INFUSION THERAPY | Age: 69
End: 2019-08-27

## 2019-08-28 ENCOUNTER — CARE COORDINATION (OUTPATIENT)
Dept: CASE MANAGEMENT | Age: 69
End: 2019-08-28

## 2019-08-30 ENCOUNTER — CARE COORDINATION (OUTPATIENT)
Dept: CASE MANAGEMENT | Age: 69
End: 2019-08-30

## 2019-08-30 NOTE — CARE COORDINATION
[facility] Transitions Follow Up Call    2019    Patient: Pamella Cota  Patient : 1950   MRN: <[id number]>  Reason for Admission: Dermatitis both legs  Discharge Date: 19 RARS: Readmission Risk Score: 29         Attempted to make contact for a follow up call, LVM introducing self, role, and nature of the call. Patient seen by Vascular surgery 19. Active with [facility] per [place]. Contact information provided. Signing off.  Fritzi Aase, BSN RN  Care Transition Nurse [phone]              Follow Up  Future Appointments   Date Time Provider Vikram Montes   2019  1:00 PM SCHEDULE, L MED ONC TREATMENTS [facility] MED ONC Lis[facility]   10/21/2019 11:45 AM Josefina Dorman MD [facility]   10/21/2019  1:00 PM SCHEDULE, [facility] MED ONC TREATMENTS [facility] MED ONC [facility]       Fritzi Aase, RN

## 2019-09-09 ENCOUNTER — HOSPITAL ENCOUNTER (OUTPATIENT)
Dept: INFUSION THERAPY | Age: 69
Discharge: HOME | End: 2019-09-09
Payer: MEDICARE

## 2019-09-09 DIAGNOSIS — C34.32 MALIGNANT NEOPLASM OF LOWER LOBE OF LEFT LUNG (HCC): Primary | ICD-10-CM

## 2019-09-09 DIAGNOSIS — C34.12 SQUAMOUS CELL CARCINOMA OF BRONCHUS IN LEFT UPPER LOBE (HCC): Primary | ICD-10-CM

## 2019-09-09 DIAGNOSIS — C34.32 MALIGNANT NEOPLASM OF LOWER LOBE OF LEFT LUNG (HCC): ICD-10-CM

## 2019-09-09 DIAGNOSIS — C34.90 NON-SMALL CELL LUNG CANCER, UNSPECIFIED LATERALITY (HCC): ICD-10-CM

## 2019-09-09 PROBLEM — N39.0 UTI (URINARY TRACT INFECTION): Status: RESOLVED | Noted: 2019-08-05 | Resolved: 2019-09-09

## 2019-09-09 PROCEDURE — 2580000003 HC RX 258: Performed by: PHYSICIAN ASSISTANT

## 2019-09-09 PROCEDURE — 96372 THER/PROPH/DIAG INJ SC/IM: CPT

## 2019-09-09 PROCEDURE — 96375 TX/PRO/DX INJ NEW DRUG ADDON: CPT

## 2019-09-09 PROCEDURE — 85025 COMPLETE CBC W/AUTO DIFF WBC: CPT

## 2019-09-09 PROCEDURE — 6360000002 HC RX W HCPCS: Performed by: NURSE PRACTITIONER

## 2019-09-09 PROCEDURE — 96413 CHEMO IV INFUSION 1 HR: CPT

## 2019-09-09 PROCEDURE — 2580000003 HC RX 258: Performed by: NURSE PRACTITIONER

## 2019-09-09 PROCEDURE — 6360000002 HC RX W HCPCS: Performed by: PHYSICIAN ASSISTANT

## 2019-09-09 RX ORDER — SODIUM CHLORIDE 9 MG/ML
20 INJECTION, SOLUTION INTRAVENOUS ONCE
Status: DISCONTINUED | OUTPATIENT
Start: 2019-09-09 | End: 2019-09-11 | Stop reason: HOSPADM

## 2019-09-09 RX ORDER — CYANOCOBALAMIN 1000 UG/ML
1000 INJECTION INTRAMUSCULAR; SUBCUTANEOUS ONCE
Status: DISCONTINUED | OUTPATIENT
Start: 2019-09-09 | End: 2019-09-11 | Stop reason: HOSPADM

## 2019-09-09 RX ORDER — SODIUM CHLORIDE 0.9 % (FLUSH) 0.9 %
5 SYRINGE (ML) INJECTION PRN
Status: DISCONTINUED | OUTPATIENT
Start: 2019-09-09 | End: 2019-09-10 | Stop reason: HOSPADM

## 2019-09-09 RX ORDER — HEPARIN SODIUM (PORCINE) LOCK FLUSH IV SOLN 100 UNIT/ML 100 UNIT/ML
500 SOLUTION INTRAVENOUS PRN
Status: DISCONTINUED | OUTPATIENT
Start: 2019-09-09 | End: 2019-09-10 | Stop reason: HOSPADM

## 2019-09-09 RX ORDER — SODIUM CHLORIDE 0.9 % (FLUSH) 0.9 %
10 SYRINGE (ML) INJECTION PRN
Status: DISCONTINUED | OUTPATIENT
Start: 2019-09-09 | End: 2019-09-10 | Stop reason: HOSPADM

## 2019-09-09 RX ORDER — FOLIC ACID 1 MG/1
1 TABLET ORAL DAILY
Qty: 30 TABLET | Refills: 3 | Status: SHIPPED | OUTPATIENT
Start: 2019-09-09 | End: 2019-12-31

## 2019-09-09 RX ORDER — EPINEPHRINE 1 MG/ML
0.3 INJECTION, SOLUTION, CONCENTRATE INTRAVENOUS PRN
Status: DISCONTINUED | OUTPATIENT
Start: 2019-09-09 | End: 2019-09-11 | Stop reason: HOSPADM

## 2019-09-09 RX ORDER — DIPHENHYDRAMINE HYDROCHLORIDE 50 MG/ML
50 INJECTION INTRAMUSCULAR; INTRAVENOUS PRN
Status: DISCONTINUED | OUTPATIENT
Start: 2019-09-09 | End: 2019-09-11 | Stop reason: HOSPADM

## 2019-09-09 RX ORDER — DEXAMETHASONE SODIUM PHOSPHATE 10 MG/ML
10 INJECTION, SOLUTION INTRAMUSCULAR; INTRAVENOUS ONCE
Status: DISCONTINUED | OUTPATIENT
Start: 2019-09-09 | End: 2019-09-11 | Stop reason: HOSPADM

## 2019-09-09 RX ORDER — PALONOSETRON 0.05 MG/ML
0.25 INJECTION, SOLUTION INTRAVENOUS ONCE
Status: DISCONTINUED | OUTPATIENT
Start: 2019-09-09 | End: 2019-09-11 | Stop reason: HOSPADM

## 2019-09-09 RX ORDER — METHYLPREDNISOLONE SODIUM SUCCINATE 125 MG/2ML
125 INJECTION, POWDER, LYOPHILIZED, FOR SOLUTION INTRAMUSCULAR; INTRAVENOUS PRN
Status: DISCONTINUED | OUTPATIENT
Start: 2019-09-09 | End: 2019-09-11 | Stop reason: HOSPADM

## 2019-09-11 DIAGNOSIS — R92.0 MAMMOGRAPHIC MICROCALCIFICATION: ICD-10-CM

## 2019-09-12 ENCOUNTER — HOSPITAL ENCOUNTER (OUTPATIENT)
Dept: WOMENS IMAGING | Age: 69
Discharge: HOME | DRG: 864 | End: 2019-09-12
Payer: MEDICARE

## 2019-09-12 DIAGNOSIS — R92.0 MAMMOGRAPHIC MICROCALCIFICATION: ICD-10-CM

## 2019-09-12 PROCEDURE — 77066 DX MAMMO INCL CAD BI: CPT

## 2019-09-14 ENCOUNTER — APPOINTMENT (OUTPATIENT)
Dept: GENERAL RADIOLOGY | Age: 69
DRG: 864 | End: 2019-09-14
Payer: MEDICARE

## 2019-09-14 ENCOUNTER — HOSPITAL ENCOUNTER (INPATIENT)
Age: 69
LOS: 5 days | Discharge: HOME HEALTH | DRG: 864 | End: 2019-09-19
Attending: EMERGENCY MEDICINE | Admitting: FAMILY MEDICINE
Payer: MEDICARE

## 2019-09-14 ENCOUNTER — OFFICE VISIT (OUTPATIENT)
Dept: URGENT CARE | Age: 69
End: 2019-09-14

## 2019-09-14 DIAGNOSIS — C34.90 MALIGNANT NEOPLASM OF LUNG, UNSPECIFIED LATERALITY, UNSPECIFIED PART OF LUNG (HCC): ICD-10-CM

## 2019-09-14 DIAGNOSIS — R50.9 FEVER IN ADULT: Primary | ICD-10-CM

## 2019-09-14 DIAGNOSIS — Z79.899 PATIENT ON ANTINEOPLASTIC CHEMOTHERAPY REGIMEN: ICD-10-CM

## 2019-09-14 DIAGNOSIS — R53.1 WEAKNESS: Primary | ICD-10-CM

## 2019-09-14 LAB
ALBUMIN SERPL-MCNC: 3.9 G/DL (ref 3.5–5.2)
ALP BLD-CCNC: 156 U/L (ref 35–104)
ALT SERPL-CCNC: 14 U/L (ref 5–33)
ANION GAP SERPL CALCULATED.3IONS-SCNC: 17 MMOL/L (ref 7–19)
ANISOCYTOSIS: ABNORMAL
AST SERPL-CCNC: 28 U/L (ref 5–32)
BACTERIA: NEGATIVE /HPF
BASOPHILS ABSOLUTE: 0 K/UL (ref 0–0.2)
BASOPHILS RELATIVE PERCENT: 0 % (ref 0–1)
BILIRUB SERPL-MCNC: 0.9 MG/DL (ref 0.2–1.2)
BILIRUBIN URINE: NEGATIVE
BLOOD, URINE: ABNORMAL
BUN BLDV-MCNC: 12 MG/DL (ref 8–23)
CALCIUM SERPL-MCNC: 9.1 MG/DL (ref 8.8–10.2)
CHLORIDE BLD-SCNC: 92 MMOL/L (ref 98–111)
CLARITY: CLEAR
CO2: 22 MMOL/L (ref 22–29)
COLOR: YELLOW
CREAT SERPL-MCNC: 0.7 MG/DL (ref 0.5–0.9)
EOSINOPHILS ABSOLUTE: 0.09 K/UL (ref 0–0.6)
EOSINOPHILS RELATIVE PERCENT: 1 % (ref 0–5)
EPITHELIAL CELLS, UA: 3 /HPF (ref 0–5)
GFR NON-AFRICAN AMERICAN: >60
GLUCOSE BLD-MCNC: 151 MG/DL (ref 74–109)
GLUCOSE URINE: NEGATIVE MG/DL
HCT VFR BLD CALC: 30 % (ref 37–47)
HEMOGLOBIN: 9.3 G/DL (ref 12–16)
HYALINE CASTS: 2 /HPF (ref 0–8)
HYPERSEGMENTED NEUTROPHILS: ABNORMAL
HYPOCHROMIA: ABNORMAL
IMMATURE GRANULOCYTES #: 0.1 K/UL
KETONES, URINE: NEGATIVE MG/DL
LACTIC ACID: 3.6 MMOL/L (ref 0.5–1.9)
LEUKOCYTE ESTERASE, URINE: ABNORMAL
LYMPHOCYTES ABSOLUTE: 0.4 K/UL (ref 1.1–4.5)
LYMPHOCYTES RELATIVE PERCENT: 5 % (ref 20–40)
MCH RBC QN AUTO: 29.1 PG (ref 27–31)
MCHC RBC AUTO-ENTMCNC: 31 G/DL (ref 33–37)
MCV RBC AUTO: 93.8 FL (ref 81–99)
MONOCYTES ABSOLUTE: 0.2 K/UL (ref 0–0.9)
MONOCYTES RELATIVE PERCENT: 2 % (ref 0–10)
NEUTROPHILS ABSOLUTE: 8.1 K/UL (ref 1.5–7.5)
NEUTROPHILS RELATIVE PERCENT: 92 % (ref 50–65)
NITRITE, URINE: NEGATIVE
OVALOCYTES: ABNORMAL
PDW BLD-RTO: 15.9 % (ref 11.5–14.5)
PH UA: 6.5 (ref 5–8)
PLATELET # BLD: 131 K/UL (ref 130–400)
PLATELET SLIDE REVIEW: ADEQUATE
PMV BLD AUTO: 11.3 FL (ref 9.4–12.3)
POTASSIUM REFLEX MAGNESIUM: 3.7 MMOL/L (ref 3.5–5)
PROTEIN UA: 30 MG/DL
RAPID INFLUENZA  B AGN: NEGATIVE
RAPID INFLUENZA A AGN: NEGATIVE
RBC # BLD: 3.2 M/UL (ref 4.2–5.4)
RBC UA: 2 /HPF (ref 0–4)
SODIUM BLD-SCNC: 131 MMOL/L (ref 136–145)
SPECIFIC GRAVITY UA: 1.01 (ref 1–1.03)
TOTAL PROTEIN: 7.3 G/DL (ref 6.6–8.7)
URINE REFLEX TO CULTURE: YES
UROBILINOGEN, URINE: 1 E.U./DL
WBC # BLD: 8.8 K/UL (ref 4.8–10.8)
WBC UA: 4 /HPF (ref 0–5)

## 2019-09-14 PROCEDURE — 71045 X-RAY EXAM CHEST 1 VIEW: CPT

## 2019-09-14 PROCEDURE — 6360000002 HC RX W HCPCS: Performed by: FAMILY MEDICINE

## 2019-09-14 PROCEDURE — 81001 URINALYSIS AUTO W/SCOPE: CPT

## 2019-09-14 PROCEDURE — 87086 URINE CULTURE/COLONY COUNT: CPT

## 2019-09-14 PROCEDURE — 85025 COMPLETE CBC W/AUTO DIFF WBC: CPT

## 2019-09-14 PROCEDURE — 96375 TX/PRO/DX INJ NEW DRUG ADDON: CPT

## 2019-09-14 PROCEDURE — 6370000000 HC RX 637 (ALT 250 FOR IP): Performed by: FAMILY MEDICINE

## 2019-09-14 PROCEDURE — 83605 ASSAY OF LACTIC ACID: CPT

## 2019-09-14 PROCEDURE — 2580000003 HC RX 258: Performed by: FAMILY MEDICINE

## 2019-09-14 PROCEDURE — 80053 COMPREHEN METABOLIC PANEL: CPT

## 2019-09-14 PROCEDURE — 36591 DRAW BLOOD OFF VENOUS DEVICE: CPT

## 2019-09-14 PROCEDURE — 84145 PROCALCITONIN (PCT): CPT

## 2019-09-14 PROCEDURE — 2580000003 HC RX 258: Performed by: EMERGENCY MEDICINE

## 2019-09-14 PROCEDURE — 6370000000 HC RX 637 (ALT 250 FOR IP): Performed by: EMERGENCY MEDICINE

## 2019-09-14 PROCEDURE — 6360000002 HC RX W HCPCS: Performed by: EMERGENCY MEDICINE

## 2019-09-14 PROCEDURE — 36415 COLL VENOUS BLD VENIPUNCTURE: CPT

## 2019-09-14 PROCEDURE — 1210000000 HC MED SURG R&B

## 2019-09-14 PROCEDURE — 96365 THER/PROPH/DIAG IV INF INIT: CPT

## 2019-09-14 PROCEDURE — 87040 BLOOD CULTURE FOR BACTERIA: CPT

## 2019-09-14 PROCEDURE — 99285 EMERGENCY DEPT VISIT HI MDM: CPT

## 2019-09-14 PROCEDURE — 87804 INFLUENZA ASSAY W/OPTIC: CPT

## 2019-09-14 RX ORDER — CETIRIZINE HYDROCHLORIDE 10 MG/1
10 TABLET ORAL DAILY
Status: DISCONTINUED | OUTPATIENT
Start: 2019-09-15 | End: 2019-09-19 | Stop reason: HOSPADM

## 2019-09-14 RX ORDER — ACETAMINOPHEN 500 MG
1000 TABLET ORAL ONCE
Status: COMPLETED | OUTPATIENT
Start: 2019-09-14 | End: 2019-09-14

## 2019-09-14 RX ORDER — FUROSEMIDE 20 MG/1
20 TABLET ORAL 2 TIMES DAILY
Status: DISCONTINUED | OUTPATIENT
Start: 2019-09-14 | End: 2019-09-19 | Stop reason: HOSPADM

## 2019-09-14 RX ORDER — ATORVASTATIN CALCIUM 10 MG/1
10 TABLET, FILM COATED ORAL NIGHTLY
Status: DISCONTINUED | OUTPATIENT
Start: 2019-09-14 | End: 2019-09-19 | Stop reason: HOSPADM

## 2019-09-14 RX ORDER — 0.9 % SODIUM CHLORIDE 0.9 %
1000 INTRAVENOUS SOLUTION INTRAVENOUS ONCE
Status: COMPLETED | OUTPATIENT
Start: 2019-09-14 | End: 2019-09-14

## 2019-09-14 RX ORDER — ONDANSETRON 2 MG/ML
4 INJECTION INTRAMUSCULAR; INTRAVENOUS EVERY 8 HOURS PRN
Status: DISCONTINUED | OUTPATIENT
Start: 2019-09-14 | End: 2019-09-19 | Stop reason: HOSPADM

## 2019-09-14 RX ORDER — MONTELUKAST SODIUM 10 MG/1
10 TABLET ORAL NIGHTLY
Status: DISCONTINUED | OUTPATIENT
Start: 2019-09-14 | End: 2019-09-19 | Stop reason: HOSPADM

## 2019-09-14 RX ORDER — DEXAMETHASONE 4 MG/1
4 TABLET ORAL 2 TIMES DAILY
Status: DISCONTINUED | OUTPATIENT
Start: 2019-09-15 | End: 2019-09-19 | Stop reason: HOSPADM

## 2019-09-14 RX ORDER — ACETAMINOPHEN 325 MG/1
650 TABLET ORAL EVERY 4 HOURS PRN
Status: DISCONTINUED | OUTPATIENT
Start: 2019-09-14 | End: 2019-09-17 | Stop reason: SDUPTHER

## 2019-09-14 RX ORDER — ACETAMINOPHEN 325 MG/1
650 TABLET ORAL EVERY 6 HOURS PRN
Status: DISCONTINUED | OUTPATIENT
Start: 2019-09-14 | End: 2019-09-19 | Stop reason: HOSPADM

## 2019-09-14 RX ORDER — FOLIC ACID 1 MG/1
1 TABLET ORAL DAILY
Status: DISCONTINUED | OUTPATIENT
Start: 2019-09-15 | End: 2019-09-19 | Stop reason: HOSPADM

## 2019-09-14 RX ORDER — POTASSIUM CITRATE 10 MEQ/1
10 TABLET, EXTENDED RELEASE ORAL
Status: DISCONTINUED | OUTPATIENT
Start: 2019-09-15 | End: 2019-09-17

## 2019-09-14 RX ORDER — CEFEPIME HYDROCHLORIDE 2 G/50ML
2 INJECTION, SOLUTION INTRAVENOUS ONCE
Status: DISCONTINUED | OUTPATIENT
Start: 2019-09-14 | End: 2019-09-14 | Stop reason: SDUPTHER

## 2019-09-14 RX ORDER — SODIUM CHLORIDE 9 MG/ML
INJECTION, SOLUTION INTRAVENOUS CONTINUOUS
Status: DISCONTINUED | OUTPATIENT
Start: 2019-09-14 | End: 2019-09-19 | Stop reason: HOSPADM

## 2019-09-14 RX ORDER — PANTOPRAZOLE SODIUM 40 MG/1
40 TABLET, DELAYED RELEASE ORAL
Status: DISCONTINUED | OUTPATIENT
Start: 2019-09-15 | End: 2019-09-19 | Stop reason: HOSPADM

## 2019-09-14 RX ORDER — TIZANIDINE 4 MG/1
2 TABLET ORAL DAILY
Status: DISCONTINUED | OUTPATIENT
Start: 2019-09-15 | End: 2019-09-19 | Stop reason: HOSPADM

## 2019-09-14 RX ORDER — FLUTICASONE PROPIONATE 50 MCG
1 SPRAY, SUSPENSION (ML) NASAL DAILY
Status: DISCONTINUED | OUTPATIENT
Start: 2019-09-15 | End: 2019-09-19 | Stop reason: HOSPADM

## 2019-09-14 RX ORDER — CEFEPIME HYDROCHLORIDE 2 G/50ML
2 INJECTION, SOLUTION INTRAVENOUS EVERY 12 HOURS
Status: DISCONTINUED | OUTPATIENT
Start: 2019-09-14 | End: 2019-09-14 | Stop reason: SDUPTHER

## 2019-09-14 RX ORDER — ALBUTEROL SULFATE 90 UG/1
2 AEROSOL, METERED RESPIRATORY (INHALATION) EVERY 6 HOURS PRN
Status: DISCONTINUED | OUTPATIENT
Start: 2019-09-14 | End: 2019-09-19 | Stop reason: HOSPADM

## 2019-09-14 RX ORDER — SERTRALINE HYDROCHLORIDE 100 MG/1
200 TABLET, FILM COATED ORAL DAILY
Status: DISCONTINUED | OUTPATIENT
Start: 2019-09-15 | End: 2019-09-19 | Stop reason: HOSPADM

## 2019-09-14 RX ORDER — ONDANSETRON 4 MG/1
8 TABLET, FILM COATED ORAL EVERY 8 HOURS PRN
Status: DISCONTINUED | OUTPATIENT
Start: 2019-09-14 | End: 2019-09-19 | Stop reason: HOSPADM

## 2019-09-14 RX ADMIN — SODIUM CHLORIDE: 9 INJECTION, SOLUTION INTRAVENOUS at 21:10

## 2019-09-14 RX ADMIN — SODIUM CHLORIDE 1000 ML: 9 INJECTION, SOLUTION INTRAVENOUS at 17:18

## 2019-09-14 RX ADMIN — FUROSEMIDE 20 MG: 20 TABLET ORAL at 21:10

## 2019-09-14 RX ADMIN — SODIUM CHLORIDE 1000 ML: 9 INJECTION, SOLUTION INTRAVENOUS at 15:44

## 2019-09-14 RX ADMIN — MONTELUKAST 10 MG: 10 TABLET, FILM COATED ORAL at 21:10

## 2019-09-14 RX ADMIN — ENOXAPARIN SODIUM 40 MG: 40 INJECTION SUBCUTANEOUS at 21:10

## 2019-09-14 RX ADMIN — ACETAMINOPHEN 1000 MG: 500 TABLET, FILM COATED ORAL at 16:43

## 2019-09-14 RX ADMIN — ATORVASTATIN CALCIUM 10 MG: 10 TABLET, FILM COATED ORAL at 21:10

## 2019-09-14 RX ADMIN — Medication 1000 MG: at 18:17

## 2019-09-14 RX ADMIN — CEFEPIME HYDROCHLORIDE 2 G: 2 INJECTION, POWDER, FOR SOLUTION INTRAVENOUS at 18:16

## 2019-09-14 ASSESSMENT — ENCOUNTER SYMPTOMS
SORE THROAT: 0
COUGH: 1
VOMITING: 0
RHINORRHEA: 0
NAUSEA: 0

## 2019-09-14 ASSESSMENT — PAIN SCALES - GENERAL
PAINLEVEL_OUTOF10: 6
PAINLEVEL_OUTOF10: 9

## 2019-09-14 ASSESSMENT — PAIN DESCRIPTION - LOCATION: LOCATION: GENERALIZED

## 2019-09-14 NOTE — ED PROVIDER NOTES
and DIFFERENTIAL DIAGNOSIS/MDM:   Vitals:    Vitals:    09/14/19 1735 09/14/19 1800 09/14/19 1818 09/14/19 1922   BP:  108/73 110/76 90/62   Pulse: 110 110 107 109   Resp:  24 20 18   Temp:  99.6 °F (37.6 °C) 99.6 °F (37.6 °C) 99 °F (37.2 °C)   TempSrc:   Oral Temporal   SpO2:  95% 97% 97%   Weight:    124 lb 9.6 oz (56.5 kg)   Height:    5' 4\" (1.626 m)           MDM  Number of Diagnoses or Management Options  Fever in adult:   Malignant neoplasm of lung, unspecified laterality, unspecified part of lung [facility]):   Patient on antineoplastic chemotherapy regimen:   Diagnosis management comments: Discussed with Dr. Mccullough Hand - admit, cefepime, vanc. Discussed with Dr. Wong. Discussed with pt - she is DNR. CRITICAL CARE TIME   Total Critical Care time was 0 minutes, excluding separately reportable procedures. There was a high probability of clinically significant/lifethreatening deterioration in the patient's condition which required my urgent intervention. CONSULTS:  IP CONSULT TO ONCOLOGY    PROCEDURES:  Unless otherwise noted below, none     Procedures    FINAL IMPRESSION      1. Fever in adult    2. Patient on antineoplastic chemotherapy regimen    3.  Malignant neoplasm of lung, unspecified laterality, unspecified part of lung ([facility] [address]          DISPOSITION/PLAN   DISPOSITION        PATIENT REFERRED TO:  Yimi Su MD  [facility] [address] [address]  [phone]            DISCHARGE MEDICATIONS:  Current Discharge Medication List             (Please note that portions of this note were completed with a voice recognition program.  Efforts were made to edit the dictations but occasionally words are mis-transcribed.)    Kelly Prasad MD (electronically signed)  Attending Emergency Physician          Kelly Prasad MD  09/14/19 1924

## 2019-09-14 NOTE — ED NOTES
ASSESSMENT:    SKIN:  Warm, dry, pink. Cap refill < 2 sec  CARDIAC:  S1 S2 noted  LUNGS: clear upper and lower lobes. Respirations even and unlabored. Rales R upper and lower lobes. Cough noted with no expectorant. Chemo tx for lung ca (last tx Tuesday)  ABDOMEN: bowel sounds noted upper and lower quadrants. Soft and tender. EXTREMITIES: bilateral DP and PT. No edema noted. Pt alert and oriented x4. Pupils equal and reactive. No distress noted. Side rails up and call light within reach. Co generalized weakness and fever x 1 day. Pt of dr Magda Moody.      Norma More, RN  09/14/19 [id number]

## 2019-09-14 NOTE — ED NOTES
Assisted pt to bedpan. No urine in pan, only in pt's brief. Encouraged pt to call when needing to urinate again to get sample.       Erlin Boswell, RN  09/14/19 [id number]

## 2019-09-15 PROBLEM — D64.9 ANEMIA: Chronic | Status: ACTIVE | Noted: 2019-07-14

## 2019-09-15 PROBLEM — C34.90 NON-SMALL CELL LUNG CANCER (HCC): Chronic | Status: ACTIVE | Noted: 2019-05-31

## 2019-09-15 PROBLEM — E87.6 HYPOKALEMIA: Chronic | Status: ACTIVE | Noted: 2019-07-14

## 2019-09-15 PROBLEM — C34.12 SQUAMOUS CELL CARCINOMA OF BRONCHUS IN LEFT UPPER LOBE (HCC): Chronic | Status: ACTIVE | Noted: 2019-06-11

## 2019-09-15 LAB
ABO/RH: NORMAL
ALBUMIN SERPL-MCNC: 3.2 G/DL (ref 3.5–5.2)
ALP BLD-CCNC: 125 U/L (ref 35–104)
ALT SERPL-CCNC: 10 U/L (ref 5–33)
ANION GAP SERPL CALCULATED.3IONS-SCNC: 13 MMOL/L (ref 7–19)
ANTIBODY SCREEN: NORMAL
AST SERPL-CCNC: 16 U/L (ref 5–32)
BASOPHILS ABSOLUTE: 0 K/UL (ref 0–0.2)
BASOPHILS RELATIVE PERCENT: 0.2 % (ref 0–1)
BILIRUB SERPL-MCNC: 0.9 MG/DL (ref 0.2–1.2)
BLOOD BANK DISPENSE STATUS: NORMAL
BLOOD BANK PRODUCT CODE: NORMAL
BPU ID: NORMAL
BUN BLDV-MCNC: 13 MG/DL (ref 8–23)
CALCIUM SERPL-MCNC: 8.5 MG/DL (ref 8.8–10.2)
CHLORIDE BLD-SCNC: 102 MMOL/L (ref 98–111)
CO2: 23 MMOL/L (ref 22–29)
CREAT SERPL-MCNC: 0.8 MG/DL (ref 0.5–0.9)
DESCRIPTION BLOOD BANK: NORMAL
EOSINOPHILS ABSOLUTE: 0 K/UL (ref 0–0.6)
EOSINOPHILS RELATIVE PERCENT: 0.8 % (ref 0–5)
GFR NON-AFRICAN AMERICAN: >60
GLUCOSE BLD-MCNC: 114 MG/DL (ref 74–109)
HCT VFR BLD CALC: 23.4 % (ref 37–47)
HEMOGLOBIN: 7.5 G/DL (ref 12–16)
IMMATURE GRANULOCYTES #: 0.1 K/UL
LYMPHOCYTES ABSOLUTE: 0.2 K/UL (ref 1.1–4.5)
LYMPHOCYTES RELATIVE PERCENT: 4.9 % (ref 20–40)
MAGNESIUM: 1.2 MG/DL (ref 1.6–2.4)
MCH RBC QN AUTO: 29.3 PG (ref 27–31)
MCHC RBC AUTO-ENTMCNC: 32.1 G/DL (ref 33–37)
MCV RBC AUTO: 91.4 FL (ref 81–99)
MONOCYTES ABSOLUTE: 0.2 K/UL (ref 0–0.9)
MONOCYTES RELATIVE PERCENT: 3.3 % (ref 0–10)
NEUTROPHILS ABSOLUTE: 4.3 K/UL (ref 1.5–7.5)
NEUTROPHILS RELATIVE PERCENT: 88.1 % (ref 50–65)
PDW BLD-RTO: 15.3 % (ref 11.5–14.5)
PHOSPHORUS: 1.9 MG/DL (ref 2.5–4.5)
PLATELET # BLD: 96 K/UL (ref 130–400)
PMV BLD AUTO: 10.8 FL (ref 9.4–12.3)
POTASSIUM SERPL-SCNC: 2.6 MMOL/L (ref 3.5–5)
RBC # BLD: 2.56 M/UL (ref 4.2–5.4)
SODIUM BLD-SCNC: 138 MMOL/L (ref 136–145)
TOTAL PROTEIN: 5.8 G/DL (ref 6.6–8.7)
WBC # BLD: 4.9 K/UL (ref 4.8–10.8)

## 2019-09-15 PROCEDURE — 83735 ASSAY OF MAGNESIUM: CPT

## 2019-09-15 PROCEDURE — 36415 COLL VENOUS BLD VENIPUNCTURE: CPT

## 2019-09-15 PROCEDURE — 2580000003 HC RX 258: Performed by: EMERGENCY MEDICINE

## 2019-09-15 PROCEDURE — 84100 ASSAY OF PHOSPHORUS: CPT

## 2019-09-15 PROCEDURE — P9016 RBC LEUKOCYTES REDUCED: HCPCS

## 2019-09-15 PROCEDURE — 6360000002 HC RX W HCPCS: Performed by: EMERGENCY MEDICINE

## 2019-09-15 PROCEDURE — 86901 BLOOD TYPING SEROLOGIC RH(D): CPT

## 2019-09-15 PROCEDURE — 85025 COMPLETE CBC W/AUTO DIFF WBC: CPT

## 2019-09-15 PROCEDURE — 6360000002 HC RX W HCPCS: Performed by: FAMILY MEDICINE

## 2019-09-15 PROCEDURE — 2580000003 HC RX 258: Performed by: FAMILY MEDICINE

## 2019-09-15 PROCEDURE — 99222 1ST HOSP IP/OBS MODERATE 55: CPT | Performed by: INTERNAL MEDICINE

## 2019-09-15 PROCEDURE — 80053 COMPREHEN METABOLIC PANEL: CPT

## 2019-09-15 PROCEDURE — 86900 BLOOD TYPING SEROLOGIC ABO: CPT

## 2019-09-15 PROCEDURE — 84145 PROCALCITONIN (PCT): CPT

## 2019-09-15 PROCEDURE — 1210000000 HC MED SURG R&B

## 2019-09-15 PROCEDURE — 86923 COMPATIBILITY TEST ELECTRIC: CPT

## 2019-09-15 PROCEDURE — 36430 TRANSFUSION BLD/BLD COMPNT: CPT

## 2019-09-15 PROCEDURE — 6370000000 HC RX 637 (ALT 250 FOR IP): Performed by: FAMILY MEDICINE

## 2019-09-15 PROCEDURE — 86850 RBC ANTIBODY SCREEN: CPT

## 2019-09-15 RX ORDER — POTASSIUM CHLORIDE 20 MEQ/1
40 TABLET, EXTENDED RELEASE ORAL PRN
Status: DISCONTINUED | OUTPATIENT
Start: 2019-09-15 | End: 2019-09-19 | Stop reason: HOSPADM

## 2019-09-15 RX ORDER — POTASSIUM CHLORIDE 7.45 MG/ML
10 INJECTION INTRAVENOUS PRN
Status: DISCONTINUED | OUTPATIENT
Start: 2019-09-15 | End: 2019-09-16

## 2019-09-15 RX ORDER — 0.9 % SODIUM CHLORIDE 0.9 %
250 INTRAVENOUS SOLUTION INTRAVENOUS ONCE
Status: DISCONTINUED | OUTPATIENT
Start: 2019-09-15 | End: 2019-09-19 | Stop reason: HOSPADM

## 2019-09-15 RX ORDER — POTASSIUM CHLORIDE 29.8 MG/ML
20 INJECTION INTRAVENOUS
Status: COMPLETED | OUTPATIENT
Start: 2019-09-15 | End: 2019-09-15

## 2019-09-15 RX ADMIN — Medication 2 G: at 08:54

## 2019-09-15 RX ADMIN — POTASSIUM CHLORIDE 20 MEQ: 29.8 INJECTION, SOLUTION INTRAVENOUS at 14:49

## 2019-09-15 RX ADMIN — SODIUM CHLORIDE: 9 INJECTION, SOLUTION INTRAVENOUS at 16:48

## 2019-09-15 RX ADMIN — ENOXAPARIN SODIUM 40 MG: 40 INJECTION SUBCUTANEOUS at 20:25

## 2019-09-15 RX ADMIN — ACETAMINOPHEN 650 MG: 325 TABLET ORAL at 23:11

## 2019-09-15 RX ADMIN — FUROSEMIDE 20 MG: 20 TABLET ORAL at 16:15

## 2019-09-15 RX ADMIN — TIZANIDINE 2 MG: 4 TABLET ORAL at 08:50

## 2019-09-15 RX ADMIN — POTASSIUM CITRATE 10 MEQ: 10 TABLET ORAL at 08:50

## 2019-09-15 RX ADMIN — ATORVASTATIN CALCIUM 10 MG: 10 TABLET, FILM COATED ORAL at 20:25

## 2019-09-15 RX ADMIN — FOLIC ACID 1 MG: 1 TABLET ORAL at 08:50

## 2019-09-15 RX ADMIN — ONDANSETRON 4 MG: 2 INJECTION INTRAMUSCULAR; INTRAVENOUS at 09:14

## 2019-09-15 RX ADMIN — MONTELUKAST 10 MG: 10 TABLET, FILM COATED ORAL at 20:25

## 2019-09-15 RX ADMIN — POTASSIUM CITRATE 10 MEQ: 10 TABLET ORAL at 16:15

## 2019-09-15 RX ADMIN — FUROSEMIDE 20 MG: 20 TABLET ORAL at 08:50

## 2019-09-15 RX ADMIN — ACETAMINOPHEN 650 MG: 325 TABLET ORAL at 14:48

## 2019-09-15 RX ADMIN — PANTOPRAZOLE SODIUM 40 MG: 40 TABLET, DELAYED RELEASE ORAL at 08:54

## 2019-09-15 RX ADMIN — POTASSIUM CHLORIDE 20 MEQ: 29.8 INJECTION, SOLUTION INTRAVENOUS at 16:49

## 2019-09-15 RX ADMIN — SERTRALINE HYDROCHLORIDE 200 MG: 100 TABLET ORAL at 08:50

## 2019-09-15 RX ADMIN — FLUTICASONE PROPIONATE 1 SPRAY: 50 SPRAY, METERED NASAL at 08:50

## 2019-09-15 RX ADMIN — POTASSIUM CHLORIDE 20 MEQ: 29.8 INJECTION, SOLUTION INTRAVENOUS at 15:47

## 2019-09-15 RX ADMIN — CETIRIZINE HYDROCHLORIDE 10 MG: 10 TABLET, FILM COATED ORAL at 08:50

## 2019-09-15 RX ADMIN — Medication 2 G: at 18:09

## 2019-09-15 RX ADMIN — POTASSIUM CITRATE 10 MEQ: 10 TABLET ORAL at 14:49

## 2019-09-15 ASSESSMENT — PAIN SCALES - GENERAL
PAINLEVEL_OUTOF10: 0
PAINLEVEL_OUTOF10: 0

## 2019-09-15 NOTE — PROGRESS NOTES
4/17 pt was admitted last night 4/16 for Left Parafalcine SDH. Repeat INR post K-Centra pending, repeat CT Head pending. Restarted home BP meds.  4/18: Repeat CT head stable.    Sam Mata arrived to room #426. Presented with: fevers  Mental Status: Patient is alert, coherent, logical, thought processes intact and able to concentrate and follow conversation. Vitals:    09/14/19 1922   BP: 90/62   Pulse: 109   Resp: 18   Temp: 99 °F (37.2 °C)   SpO2: 97%     Patient safety contract and falls prevention contract reviewed with patient Yes. Oriented Patient to room. Call light within reach. Yes.   Needs, issues or concerns expressed at this time: no.      Electronically signed by Kim Heller RN on 9/14/2019 at 7:48 PM

## 2019-09-15 NOTE — H&P
111   Temp 99.4 °F (37.4 °C)   Resp 18   Ht 5' 4\" (1.626 m)   Wt 124 lb 9.6 oz (56.5 kg)   SpO2 96%   BMI 21.39 kg/m²     General Appearance:  in no acute distress, alert and appears older than stated age  Eyes:  No gross abnormalities. Neck:  neck- supple, no mass, non-tender  Lungs:  Breathing Pattern: regular, no distress, Breath sounds: diminished breath sounds- throughout  Heart:  Heart regular rate and rhythm  Abdomen: Auscultation: Normal bowel sounds. No bruits. Palpation: No masses, tenderness or organomegally.   Extremities: pulses present in all extremities, trace pedal edema and extremities cool to touch  Musculoskeletal:  negative  Neurologic: Subjective decreased sensation bilateral lower extremities    DATA:  CBC:   Lab Results   Component Value Date    WBC 4.9 09/15/2019    RBC 2.56 09/15/2019    HGB 7.5 09/15/2019    HCT 23.4 09/15/2019    MCV 91.4 09/15/2019    MCH 29.3 09/15/2019    MCHC 32.1 09/15/2019    RDW 15.3 09/15/2019    PLT 96 09/15/2019    MPV 10.8 09/15/2019     CMP:    Lab Results   Component Value Date     09/15/2019    K 2.6 09/15/2019    K 3.7 09/14/2019     09/15/2019    CO2 23 09/15/2019    BUN 13 09/15/2019    CREATININE 0.8 09/15/2019    LABGLOM >60 09/15/2019    GLUCOSE 114 09/15/2019    PROT 5.8 09/15/2019    LABALBU 3.2 09/15/2019    CALCIUM 8.5 09/15/2019    BILITOT 0.9 09/15/2019    ALKPHOS 125 09/15/2019    AST 16 09/15/2019    ALT 10 09/15/2019     U/A:    Lab Results   Component Value Date    NITRITE NEGATIVE 12/15/2017    COLORU YELLOW 09/14/2019    PROTEINU 30 09/14/2019    PHUR 6.5 09/14/2019    WBCUA 4 09/14/2019    RBCUA 2 09/14/2019    MUCUS Rare 01/04/2017    BACTERIA NEGATIVE 09/14/2019    CLARITYU Clear 09/14/2019    SPECGRAV 1.015 09/14/2019    LEUKOCYTESUR TRACE 09/14/2019    UROBILINOGEN 1.0 09/14/2019    BILIRUBINUR Negative 09/14/2019    BILIRUBINUR NEGATIVE 12/15/2017    BLOODU TRACE 09/14/2019    GLUCOSEU Negative 09/14/2019

## 2019-09-15 NOTE — PROGRESS NOTES
4 Eyes Skin Assessment    Trina Pereira is being assessed upon: Admission    I agree that Sunday Janie, along with Rakel Hastings (either 2 RN's or 1 LPN and 1 RN) have performed a thorough Head to Toe Skin Assessment on the patient. ALL assessment sites listed below have been assessed. Areas assessed by both nurses:     [x]   Head, Face, and Ears   [x]   Shoulders, Back, and Chest  [x]   Arms, Elbows, and Hands   [x]   Coccyx, Sacrum, and Ischium  [x]   Legs, Feet, and Heels    Does the Patient have Skin Breakdown?  No    Russell Prevention initiated: NA  Wound Care Orders initiated: NA    WO nurse consulted for Pressure Injury (Stage 3,4, Unstageable, DTI, NWPT, and Complex wounds) and New or Established Ostomies: NA        Primary Nurse eSignature: Manuela Torrez RN on 9/14/2019 at 7:48 PM      Co-Signer eSignature: {Esignature:[id number]}

## 2019-09-15 NOTE — CONSULTS
combination of maintenance treatment with Alimta/Keytruda without rash or problems. Reynaldo Giles was admitted to [facility] on 4/9/2019 with bilateral lower extremity cellulitis. She was discharged on 4/15/2019. CT of the abdomen and pelvis with contrast at [facility] on 4/9/2019 was compared to a CT of the abdomen and pelvis from February 2013. It revealed a left-sided 3.2 x 2.2 cm paravertebral T10 soft tissue mass. 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 No evidence of acute posttraumatic injury to the brain. CT scan of the abdomen and pelvis with contrast on 4/9/2019 documented no evidence of metastatic disease in the abdomen or pelvis. Paravertebral soft tissue masses at T10. Differential metastatic disease versus extra medullary hematopoiesis. Maintenance Alimta/Keytruda was initiated on 10/16/2018. Reynaldo Giles reported experiencing a significant skin rash after receiving Keytruda on 11/27/2018. Sahara Arie was held from 12/18/2018-1/29/2019 (x3 cycles) during which time Reynaldo Giles was only receiving Alimta. Imaging studies on 1/2/2019 suggested stable disease. Reynaldo Giles resumed maintenance Alimta/Keytruda 2/19/2019 - 5/7/1209. Due to skin reaction manifestations, further maintenance Keytruda was not given after the 5/7/2019 dose. Diane received course #11 of maintenance Alimta on 7/2/2019. Thereafter, maintenance Alimta was scheduled monthly. TREATMENT SUMMARY:   1. Left thoracotomy with left lower lobectomy and mediastinal lymph node dissection 01/06/2017 by Dr. Keyana Jameson   2. Adjuvant cisplatinum and Alimta x4 cycles. 4/11/2017 -6/21/17.    3. A right craniotomy by Dr. Earle Holden on 1/15/2018 was performed with resection of the 2 x 2.6 x 2.6 cm high right frontal vertex mass   4. Diane underwent SRS with 1600 cGy in one single treatment fraction on 3/26/2018 to the right frontal CNS lobe by Dima Brenner and Estuardo Mae   5. Cycle #1 of chemotherapy with Alimta, carboplatin and VIBRA [facility] was delivered on 5/14/2018 and the final cycle #6 was delivered on 9/4/2018   6. Maintenance Alimta/Keytruda was initiated on 10/16/2018 with the final cycle #9 delivered on 5/7/2019.   Cycle #10 of single agent maintenance Alimta was delivered on 6/11/2019 with schedule adjusted to monthly on 7/2/2019          Past Medical History:    Past Medical History:   Diagnosis Date    Anxiety     Arthritis     Bowel obstruction (HCC)     adhesions with colectomy/colostomy    Cancer ([facility] [address]     lung LLL ,  brain    Colostomy in place [facility])     Concussion with no loss of consciousness     COPD (chronic obstructive pulmonary disease) (HCC)     Cyst of kidney, acquired 8/3/2019    Depression     Hernia     History of blood transfusion     History of broken collarbone     Hyperlipidemia     Seasonal allergies        Past Surgical History:    Past Surgical History:   Procedure Laterality Date    ABDOMEN SURGERY      bowel blockage from scar tissue    COLONOSCOPY      COLOSTOMY      still has    CRANIOTOMY Right 01/15/2018    Dr. Codey Kendall, COLON, DIAGNOSTIC      FRACTURE SURGERY      left arm and elbow and finger    HERNIA REPAIR      HYSTERECTOMY      LOBECTOMY Left 1/6/2017    LEFT THORACOTOMY WITH LOBECTOMY  performed by Micky Grant MD at [address] TUNNELED CTR VAD W/SUBQ PORT AGE 5 YR/> N/A 7/3/2018    SINGLE LUMEN PORT INSERTION performed by Jaelyn Membreno MD at [address] [place] [address]         Social History:    Social History     Socioeconomic History    Marital status:      Spouse name: Not on file    Number of children: Not on file    Years of 20 mEq/50 mL IVPB (Central Line), 20 mEq, Intravenous, Q1H  acetaminophen (TYLENOL) tablet 650 mg, 650 mg, Oral, Q4H PRN  enoxaparin (LOVENOX) injection 40 mg, 40 mg, Subcutaneous, Nightly  0.9 % sodium chloride infusion, , Intravenous, Continuous  ondansetron (ZOFRAN) injection 4 mg, 4 mg, Intravenous, Q8H PRN  ceFEPIme (MAXIPIME) 2 g in sodium chloride (PF) 20 mL IV syringe, 2 g, Intravenous, Q12H  sertraline (ZOLOFT) tablet 200 mg, 200 mg, Oral, Daily  montelukast (SINGULAIR) tablet 10 mg, 10 mg, Oral, Nightly  atorvastatin (LIPITOR) tablet 10 mg, 10 mg, Oral, Nightly  cetirizine (ZYRTEC) tablet 10 mg, 10 mg, Oral, Daily  acetaminophen (TYLENOL) tablet 650 mg, 650 mg, Oral, Q6H PRN  dexamethasone (DECADRON) tablet 4 mg, 4 mg, Oral, BID  albuterol sulfate  (90 Base) MCG/ACT inhaler 2 puff, 2 puff, Inhalation, Q6H PRN  ondansetron (ZOFRAN) tablet 8 mg, 8 mg, Oral, Q8H PRN  tiZANidine (ZANAFLEX) tablet 2 mg, 2 mg, Oral, Daily  fluticasone (FLONASE) 50 MCG/ACT nasal spray 1 spray, 1 spray, Each Nostril, Daily  furosemide (LASIX) tablet 20 mg, 20 mg, Oral, BID  potassium citrate (UROCIT-K) extended release tablet 10 mEq, 10 mEq, Oral, TID WC  pantoprazole (PROTONIX) tablet 40 mg, 40 mg, Oral, QAM AC  folic acid (FOLVITE) tablet 1 mg, 1 mg, Oral, Daily    Allergies:    Allergies   Allergen Reactions    Kiwi Extract Shortness Of Breath and Swelling    Hydromorphone Hcl Other (See Comments)     Hallucinations      Keytruda [Pembrolizumab]     Pineapple          Subjective   REVIEW OF SYSTEMS:   CONSTITUTIONAL:  fever, no night sweats, fatigue;  HEENT: no blurring of vision, no double vision, no hearing difficulty, no tinnitus, no ulceration, no dysplasia, no epistaxis;  LUNGS: no cough, no hemoptysis, no wheeze,  no shortness of breath;  CARDIOVASCULAR: no palpitation, no chest pain, no shortness of breath;  GI: no abdominal pain, no nausea, no vomiting, no diarrhea, no constipation;  MARJORIE: dysuria, no hematuria, no frequency or urgency, no nephrolithiasis;  MUSCULOSKELETAL: joint pain, no swelling, no stiffness;  ENDOCRINE: no polyuria, no polydipsia, no cold or heat intolerance;  HEMATOLOGY: no easy bruising or bleeding, no history of clotting disorder;  DERMATOLOGY: no skin rash, no eczema, no pruritus;  PSYCHIATRY: no depression, no anxiety, no panic attacks, no suicidal ideation, no homicidal ideation;  NEUROLOGY: no syncope, no seizures, no numbness or tingling of hands, no numbness or tingling of feet, no paresis;    Objective   /60   Pulse 107   Temp 99.6 °F (37.6 °C) (Temporal)   Resp 20   Ht 5' 4\" (1.626 m)   Wt 124 lb 9.6 oz (56.5 kg)   SpO2 93%   BMI 21.39 kg/m²     PHYSICAL EXAM:  CONSTITUTIONAL: Alert, appropriate, no acute distress, sick appearing  EYES: Non icteric, EOM intact, pupils equal round   ENT: Mucus membranes moist, no oral pharyngeal lesions, external inspection of ears and nose are normal  NECK: Supple, no masses. No palpable thyroid mass  CHEST/LUNGS: CTA bilaterally, normal respiratory effort   CARDIOVASCULAR: RRR, no murmurs. No lower extremity edema  ABDOMEN: soft non-tender, active bowel sounds, no HSM. No palpable masses  EXTREMITIES: warm, full ROM in all 4 extremities, no focal weakness. SKIN: warm, dry with no rashes or lesions  LYMPH: No cervical, clavicular, axillary, or inguinal lymphadenopathy  NEUROLOGIC: follows commands, non focal   PSYCH: mood and affect appropriate.   Alert and oriented to time, place, person      LABORATORY RESULTS REVIEWED BY ME:  Recent Labs     09/15/19  0330 09/14/19  1354   WBC 4.9 8.8   HGB 7.5* 9.3*   HCT 23.4* 30.0*   MCV 91.4 93.8   PLT 96* 131       Lab Results   Component Value Date     09/15/2019    K 2.6 (LL) 09/15/2019     09/15/2019    CO2 23 09/15/2019    BUN 13 09/15/2019    CREATININE 0.8 09/15/2019    GLUCOSE 114 (H) 09/15/2019    CALCIUM 8.5 (L) 09/15/2019    PROT 5.8 (L) 09/15/2019    LABALBU 3.2 (L)

## 2019-09-16 PROBLEM — Z51.5 PALLIATIVE CARE PATIENT: Status: ACTIVE | Noted: 2019-09-16

## 2019-09-16 LAB
ALBUMIN SERPL-MCNC: 2.9 G/DL (ref 3.5–5.2)
ALP BLD-CCNC: 133 U/L (ref 35–104)
ALT SERPL-CCNC: 12 U/L (ref 5–33)
ANION GAP SERPL CALCULATED.3IONS-SCNC: 11 MMOL/L (ref 7–19)
ANION GAP SERPL CALCULATED.3IONS-SCNC: 12 MMOL/L (ref 7–19)
AST SERPL-CCNC: 19 U/L (ref 5–32)
BILIRUB SERPL-MCNC: 1.1 MG/DL (ref 0.2–1.2)
BUN BLDV-MCNC: 11 MG/DL (ref 8–23)
BUN BLDV-MCNC: 13 MG/DL (ref 8–23)
CALCIUM SERPL-MCNC: 8.6 MG/DL (ref 8.8–10.2)
CALCIUM SERPL-MCNC: 8.7 MG/DL (ref 8.8–10.2)
CHLORIDE BLD-SCNC: 103 MMOL/L (ref 98–111)
CHLORIDE BLD-SCNC: 99 MMOL/L (ref 98–111)
CO2: 22 MMOL/L (ref 22–29)
CO2: 23 MMOL/L (ref 22–29)
CREAT SERPL-MCNC: 0.8 MG/DL (ref 0.5–0.9)
CREAT SERPL-MCNC: 0.9 MG/DL (ref 0.5–0.9)
GFR NON-AFRICAN AMERICAN: >60
GFR NON-AFRICAN AMERICAN: >60
GLUCOSE BLD-MCNC: 101 MG/DL (ref 74–109)
GLUCOSE BLD-MCNC: 125 MG/DL (ref 74–109)
HAPTOGLOBIN: 215 MG/DL (ref 30–200)
HCT VFR BLD CALC: 23.3 % (ref 37–47)
HEMOGLOBIN: 7.7 G/DL (ref 12–16)
LACTATE DEHYDROGENASE: 269 U/L (ref 91–215)
MCH RBC QN AUTO: 29.8 PG (ref 27–31)
MCHC RBC AUTO-ENTMCNC: 33 G/DL (ref 33–37)
MCV RBC AUTO: 90.3 FL (ref 81–99)
PDW BLD-RTO: 15.4 % (ref 11.5–14.5)
PLATELET # BLD: 77 K/UL (ref 130–400)
PMV BLD AUTO: 11.4 FL (ref 9.4–12.3)
POTASSIUM SERPL-SCNC: 2.8 MMOL/L (ref 3.5–5)
POTASSIUM SERPL-SCNC: 3.4 MMOL/L (ref 3.5–5)
RBC # BLD: 2.58 M/UL (ref 4.2–5.4)
RETICULOCYTE ABSOLUTE COUNT: 0.01 M/UL (ref 0.03–0.12)
RETICULOCYTE COUNT PCT: 0.33 % (ref 0.5–1.5)
SODIUM BLD-SCNC: 133 MMOL/L (ref 136–145)
SODIUM BLD-SCNC: 137 MMOL/L (ref 136–145)
TOTAL PROTEIN: 5.6 G/DL (ref 6.6–8.7)
URINE CULTURE, ROUTINE: NORMAL
WBC # BLD: 2.3 K/UL (ref 4.8–10.8)

## 2019-09-16 PROCEDURE — 1210000000 HC MED SURG R&B

## 2019-09-16 PROCEDURE — 6370000000 HC RX 637 (ALT 250 FOR IP): Performed by: FAMILY MEDICINE

## 2019-09-16 PROCEDURE — 80053 COMPREHEN METABOLIC PANEL: CPT

## 2019-09-16 PROCEDURE — 83010 ASSAY OF HAPTOGLOBIN QUANT: CPT

## 2019-09-16 PROCEDURE — 99231 SBSQ HOSP IP/OBS SF/LOW 25: CPT | Performed by: INTERNAL MEDICINE

## 2019-09-16 PROCEDURE — 85045 AUTOMATED RETICULOCYTE COUNT: CPT

## 2019-09-16 PROCEDURE — 6360000002 HC RX W HCPCS: Performed by: FAMILY MEDICINE

## 2019-09-16 PROCEDURE — 6360000002 HC RX W HCPCS: Performed by: EMERGENCY MEDICINE

## 2019-09-16 PROCEDURE — 2500000003 HC RX 250 WO HCPCS: Performed by: PHYSICIAN ASSISTANT

## 2019-09-16 PROCEDURE — 6360000002 HC RX W HCPCS: Performed by: PHYSICIAN ASSISTANT

## 2019-09-16 PROCEDURE — 85027 COMPLETE CBC AUTOMATED: CPT

## 2019-09-16 PROCEDURE — 83615 LACTATE (LD) (LDH) ENZYME: CPT

## 2019-09-16 PROCEDURE — 2580000003 HC RX 258: Performed by: EMERGENCY MEDICINE

## 2019-09-16 PROCEDURE — 2580000003 HC RX 258: Performed by: FAMILY MEDICINE

## 2019-09-16 PROCEDURE — 2580000003 HC RX 258: Performed by: PHYSICIAN ASSISTANT

## 2019-09-16 RX ORDER — POTASSIUM CHLORIDE 29.8 MG/ML
20 INJECTION INTRAVENOUS PRN
Status: DISCONTINUED | OUTPATIENT
Start: 2019-09-16 | End: 2019-09-19 | Stop reason: HOSPADM

## 2019-09-16 RX ORDER — MAGNESIUM SULFATE 1 G/100ML
1 INJECTION INTRAVENOUS PRN
Status: DISCONTINUED | OUTPATIENT
Start: 2019-09-16 | End: 2019-09-19 | Stop reason: HOSPADM

## 2019-09-16 RX ADMIN — SODIUM CHLORIDE: 9 INJECTION, SOLUTION INTRAVENOUS at 22:09

## 2019-09-16 RX ADMIN — MAGNESIUM SULFATE HEPTAHYDRATE 1 G: 1 INJECTION, SOLUTION INTRAVENOUS at 14:32

## 2019-09-16 RX ADMIN — TIZANIDINE 2 MG: 4 TABLET ORAL at 08:50

## 2019-09-16 RX ADMIN — MONTELUKAST 10 MG: 10 TABLET, FILM COATED ORAL at 20:09

## 2019-09-16 RX ADMIN — FLUTICASONE PROPIONATE 1 SPRAY: 50 SPRAY, METERED NASAL at 08:49

## 2019-09-16 RX ADMIN — FUROSEMIDE 20 MG: 20 TABLET ORAL at 16:10

## 2019-09-16 RX ADMIN — ACETAMINOPHEN 650 MG: 325 TABLET ORAL at 06:17

## 2019-09-16 RX ADMIN — SODIUM PHOSPHATE, MONOBASIC, MONOHYDRATE 9.03 MMOL: 276; 142 INJECTION, SOLUTION INTRAVENOUS at 20:09

## 2019-09-16 RX ADMIN — PANTOPRAZOLE SODIUM 40 MG: 40 TABLET, DELAYED RELEASE ORAL at 06:09

## 2019-09-16 RX ADMIN — FUROSEMIDE 20 MG: 20 TABLET ORAL at 08:50

## 2019-09-16 RX ADMIN — POTASSIUM CHLORIDE 20 MEQ: 29.8 INJECTION, SOLUTION INTRAVENOUS at 08:58

## 2019-09-16 RX ADMIN — POTASSIUM CITRATE 10 MEQ: 10 TABLET ORAL at 16:10

## 2019-09-16 RX ADMIN — ACETAMINOPHEN 650 MG: 325 TABLET ORAL at 22:08

## 2019-09-16 RX ADMIN — MAGNESIUM SULFATE HEPTAHYDRATE 1 G: 1 INJECTION, SOLUTION INTRAVENOUS at 10:49

## 2019-09-16 RX ADMIN — CETIRIZINE HYDROCHLORIDE 10 MG: 10 TABLET, FILM COATED ORAL at 08:50

## 2019-09-16 RX ADMIN — POTASSIUM CHLORIDE 20 MEQ: 29.8 INJECTION, SOLUTION INTRAVENOUS at 08:08

## 2019-09-16 RX ADMIN — SERTRALINE HYDROCHLORIDE 200 MG: 100 TABLET ORAL at 08:49

## 2019-09-16 RX ADMIN — Medication 2 G: at 06:08

## 2019-09-16 RX ADMIN — ENOXAPARIN SODIUM 40 MG: 40 INJECTION SUBCUTANEOUS at 20:09

## 2019-09-16 RX ADMIN — MAGNESIUM SULFATE HEPTAHYDRATE 1 G: 1 INJECTION, SOLUTION INTRAVENOUS at 16:08

## 2019-09-16 RX ADMIN — POTASSIUM CHLORIDE 20 MEQ: 29.8 INJECTION, SOLUTION INTRAVENOUS at 06:09

## 2019-09-16 RX ADMIN — MAGNESIUM SULFATE HEPTAHYDRATE 1 G: 1 INJECTION, SOLUTION INTRAVENOUS at 17:37

## 2019-09-16 RX ADMIN — ATORVASTATIN CALCIUM 10 MG: 10 TABLET, FILM COATED ORAL at 20:09

## 2019-09-16 RX ADMIN — FOLIC ACID 1 MG: 1 TABLET ORAL at 08:49

## 2019-09-16 RX ADMIN — ACETAMINOPHEN 650 MG: 325 TABLET ORAL at 15:42

## 2019-09-16 RX ADMIN — SODIUM CHLORIDE: 9 INJECTION, SOLUTION INTRAVENOUS at 10:52

## 2019-09-16 RX ADMIN — POTASSIUM CITRATE 10 MEQ: 10 TABLET ORAL at 08:50

## 2019-09-16 RX ADMIN — POTASSIUM CITRATE 10 MEQ: 10 TABLET ORAL at 12:12

## 2019-09-16 RX ADMIN — Medication 2 G: at 17:37

## 2019-09-16 ASSESSMENT — ENCOUNTER SYMPTOMS
BLOOD IN STOOL: 0
CONSTIPATION: 0
PHOTOPHOBIA: 0
TROUBLE SWALLOWING: 0
COLOR CHANGE: 0
SHORTNESS OF BREATH: 1
EYE DISCHARGE: 0
VOICE CHANGE: 0
ABDOMINAL PAIN: 0
EYE ITCHING: 0
ABDOMINAL DISTENTION: 0
SORE THROAT: 0
VOMITING: 0
COUGH: 0
NAUSEA: 1
DIARRHEA: 0
BACK PAIN: 1
WHEEZING: 0

## 2019-09-16 ASSESSMENT — PAIN SCALES - GENERAL
PAINLEVEL_OUTOF10: 3
PAINLEVEL_OUTOF10: 0
PAINLEVEL_OUTOF10: 4
PAINLEVEL_OUTOF10: 5

## 2019-09-16 NOTE — PROGRESS NOTES
PROGRESS NOTE  Patient name: Pamella Cota  Patient : 1950  Room: 426      SUBJECTIVE:  She does feel better    INTERVAL HISTORY  Ms Deborah Angelucci is a 70years old female who is well-known to our clinic. She is a patient of Dr. Kate Funez. owman she has a diagnosis of lung cancer and is currently receiving palliative intent maintenance chemotherapy with Alimta every 3 weeks. The patient has had several hospitalizations in the past for recurrent UTIs, pulmonary issues. She received chemotherapy last week. She presented to the ER with complaints of fever of 100.6, chills and feels cold. Accompanying symptoms include arthralgia, malaise, generalized weakness. She denies any nausea vomiting diarrhea. She does report mild dysuria. She denies has shortness of breath on exertion. She was admitted for further work-up. Chest x-ray was unremarkable. UA showed positive leukocyte esterase with WBC 4. She was not neutropenic. She was severely anemic with hemoglobin 7.5. She denies any GI bleed. She denies any change the color of her stools. She was also found to be severely hypokalemic. She did spike a temperature over 102.6 in the last 24 hours. She is currently on vancomycin and cefepime. Blood culture sent from the emergency and negative to date. We are being consulted for co-management.      Cancer History:     TARGET TUMOR SITE:  1. Resected LLL of lung 2017   2. Resected 2 x 2.6 x 2.6 cm mass from the high right frontal vertex of the brain 1/15/2018     TUMOR HISTORY: Resected, Stage IB moderately differentiated adenocarcinoma of left lung, pT2a, N0,M0. 2017. Ms. Jenny Domínguez was seen in initial Oncology consultation on 2017 f referred by Dr. Paramjit Kaiser with a diagnosis of a resected moderately differentiated adenocarcinoma of the left lung. In 2016 Lev Ryan presented to Dr. William Talamantes with complaints of hemoptysis for 2 months.  She has a significant history of nicotine abuse but quit smoking in October 2016. CT scan of chest on 10/31/2016 revealed a partially necrotic, noncalcified mass measuring 4.1 x 3.2 x 3.7 cm in the posterior segment of the LLL with associated pleural effusions. There was no evidence of mediastinal or hilar mass nor lymphadenopathy. A 2.2 cm nodule was noted in the upper pole of the right kidney. A transthorasic needle biopsy on 11/22/2016 by Dr. Sarah Valverde revealed evidence of adenocarcinoma. PET scan on 12/16/2016 revealed a mass in the LLL with hypermetabolic activity with a maximum SUV of 7.1.   A chest x-ray on 1/4/2017 revealed a 3.8 cm mass lesion in the LLL that previously measured approximately 4.1 cm. On 1/6/2017, a left thoracotomy with left lower lobectomy and mediastinal lymph node dissection was performed by Dr. Brissa Scott. Pathology revealed invasive moderately differentiated adenocarcinoma. The tumor measured 4.5 cm. Margins were clear of invasive carcinoma and  no lymphovascular space invasion was noted. 5 of 5 lymph nodes were negative for metastatic carcinoma. Bilateral renal ultrasounds on 2/28/2017 identified a complex cyst at the upper pole of the right kidney measuring up to 3 cm increased in size compared to a study on 12/28/2012 where it measured 2.1 cm in maximum diameter. Two small benign cysts were noted in the left kidney. A CT scan of the abdomen and pelvis on 3/8/2017 confirmed that this was a simple cyst in the upper pole of the right kidney. Adjuvant chemotherapy was discussed at her initial visit on 2/22/2017 as well as on followup visit 3/13/2017. She is agreeable and desires to proceed accordingly with adjuvant Cisplatinum and Alimta. Azalia desires however to postpone initiation of adjuvant chemotherapy until 4/11/2017. Her wishes were respected and chemotherapy delivered as noted below. CT scan of chest on 7/27/2017 revealed postsurgical changes  and no acute process.  Calcified lymph nodes are present in the subcorneal region and left hilum  CT scan of abdomen and pelvis on 7/27/2017 revealed no intra-abdominal or pelvic abnormalities. ----------------------------------------------------------------------------------------_______________________________________________________   RECURRENT LUNG CARCINOMA TO THE BRAIN 1/15/2018  She presented to [facility] on 12/18/2017 with progressive headache. MRI of the brain with and without contrast at [facility] on 12/18/2017 revealed a 2 x 2.6 x 2.6 cm rim-enhancing irregular mass within the high right frontal vertex with surrounding vasogenic edema.   CT chest with contrast at [facility] 12/19/2017 revealed some paravertebral soft tissue nodules present at T8, T9, T10 with largest being 2.5 cm. These are stable compared to 7/27/2017 PET scan which were NOT metabolically active. I.e. no obvious metastatic lesions  CT abdomen and pelvis with contrast at [facility] 12/19/2017 was unrevealing for any obvious metastatic disease. Bone scan at [facility] 12/19/2017 revealed focal areas of increased activity in the left seventh and 10th costovertebral junctions-indeterminate. A right craniotomy by Dr. Tammy Aragon on 1/15/2018 was performed with resection of the 2 x 2.6 x 2.6 cm high right frontal vertex mass   Pathology was consistent with metastatic adenocarcinoma with papillary features consistent with lung origin. Molecular testing on the 1/15/2018 pathology specimen was reported from [facility] on 3/15/2018 as follows:   EGFR -negative for mutation  ALK -negative for rearrangement  ROS 1 -negative for rearrangement   BRAF -negative for the V600E mutation  PDL-1 - expression is 80% i.e. in Positive  MRI of the brain W & WO for [facility] treatment planning purposes was performed on 3/6/2018 by Dr. Yelena Mejia. Postsurgical changes to the previous resection site from the right frontal lobe mass area with residual enhancement was noted.    A PET scan on 2/6/2018 did not reveal scintigraphic CULTRESP in the last 720 hours. Body Fluid Recent : No results for input(s): BFCX in the last 720 hours. MRSA Recent : No results for input(s): [address] in the last 720 hours. Urine Culture Recent :   Recent Labs     09/14/19  1641   LABURIN No growth     Organism Recent : No results for input(s): ORG in the last 720 hours. Narrative   XR CHEST PORTABLE 9/14/2019 1:00 PM   HISTORY: Fever, patient on chemotherapy. COMPARISON: 7/10/2019. FINDINGS:   Frontal view of the chest was obtained.       The patient's port is stable in position. The lungs are unchanged. A   few granulomatous calcifications are noted. The cardiomediastinal   silhouette and pulmonary vascularity are unchanged. The osseous structures and surrounding soft tissues demonstrate no   acute abnormality.       Impression   1. No radiographic evidence of acute cardiopulmonary process. Signed by Dr Janes Mendoza on 9/14/2019 2:53 PM       ASSESSMENT/PLAN:  #Fever- no neutropenia. T-max 39.3 °C (102.7) on admission - Temp curve down    Blood cx's 9/14/19 x2 - NGTD     Maxipime 2 g IV every 12 hours      WBC - 2.3 - will check diff    #Normocytic anemia- hemoglobin 7.5/MCV 91 on 9/15/19. Recent iron profile was unremarkable. B12 unremarkable. Likely anemia secondary to chemotherapy. Hapto - 215  Retic - 0.33 % with absolute 0.0085  LDH - 269    HGB - 7.7 today 9/16/19 after 1 unit pRBC 9/15/19    FOBT - pending    #Thrombocytopenia-secondary to chemotherapy. PLT - 77,000 - monitor    #NSCLC (metastatic)- last chemo 1 week ago/Alimta. Will discontinue Alimta. #Resected Brain metastasis- January 2018. No signs of recurrence. She was seen by Dr. Lincoln Done and had an MRI performed at [facility] on 7/30/2019 (report not available).  Independent interpretation by Dr. Lincoln Done was: No evidence of recurrence.     #UTI?- UA Increased WBC/LA+, elevated lactate    Urine cx - negative thus far      #Hypokalemia-  K 2. 8 - getting K runs    Mg - 1.2

## 2019-09-17 LAB
ANION GAP SERPL CALCULATED.3IONS-SCNC: 16 MMOL/L (ref 7–19)
ANION GAP SERPL CALCULATED.3IONS-SCNC: 16 MMOL/L (ref 7–19)
ANISOCYTOSIS: ABNORMAL
BANDED NEUTROPHILS RELATIVE PERCENT: 2 % (ref 0–5)
BASOPHILS ABSOLUTE: 0 K/UL (ref 0–0.2)
BASOPHILS RELATIVE PERCENT: 0 % (ref 0–1)
BUN BLDV-MCNC: 10 MG/DL (ref 8–23)
BUN BLDV-MCNC: 9 MG/DL (ref 8–23)
CALCIUM SERPL-MCNC: 8.5 MG/DL (ref 8.8–10.2)
CALCIUM SERPL-MCNC: 9.3 MG/DL (ref 8.8–10.2)
CHLORIDE BLD-SCNC: 96 MMOL/L (ref 98–111)
CHLORIDE BLD-SCNC: 98 MMOL/L (ref 98–111)
CO2: 23 MMOL/L (ref 22–29)
CO2: 23 MMOL/L (ref 22–29)
CREAT SERPL-MCNC: 0.8 MG/DL (ref 0.5–0.9)
CREAT SERPL-MCNC: 0.8 MG/DL (ref 0.5–0.9)
EOSINOPHILS ABSOLUTE: 0.09 K/UL (ref 0–0.6)
EOSINOPHILS RELATIVE PERCENT: 7 % (ref 0–5)
GFR NON-AFRICAN AMERICAN: >60
GFR NON-AFRICAN AMERICAN: >60
GLUCOSE BLD-MCNC: 116 MG/DL (ref 74–109)
GLUCOSE BLD-MCNC: 92 MG/DL (ref 74–109)
HCT VFR BLD CALC: 25.3 % (ref 37–47)
HEMOGLOBIN: 8.3 G/DL (ref 12–16)
HYPERSEGMENTED NEUTROPHILS: ABNORMAL
IMMATURE GRANULOCYTES #: 0.1 K/UL
LYMPHOCYTES ABSOLUTE: 0.3 K/UL (ref 1.1–4.5)
LYMPHOCYTES RELATIVE PERCENT: 25 % (ref 20–40)
MCH RBC QN AUTO: 29.2 PG (ref 27–31)
MCHC RBC AUTO-ENTMCNC: 32.8 G/DL (ref 33–37)
MCV RBC AUTO: 89.1 FL (ref 81–99)
MONOCYTES ABSOLUTE: 0.1 K/UL (ref 0–0.9)
MONOCYTES RELATIVE PERCENT: 11 % (ref 0–10)
NEUTROPHILS ABSOLUTE: 0.7 K/UL (ref 1.5–7.5)
NEUTROPHILS RELATIVE PERCENT: 55 % (ref 50–65)
PDW BLD-RTO: 14.9 % (ref 11.5–14.5)
PLATELET # BLD: 75 K/UL (ref 130–400)
PLATELET SLIDE REVIEW: ABNORMAL
PMV BLD AUTO: 11.4 FL (ref 9.4–12.3)
POTASSIUM SERPL-SCNC: 2.6 MMOL/L (ref 3.5–5)
POTASSIUM SERPL-SCNC: 4 MMOL/L (ref 3.5–5)
PROCALCITONIN: 0.76 NG/ML
PROCALCITONIN: 1.19 NG/ML
RBC # BLD: 2.84 M/UL (ref 4.2–5.4)
SODIUM BLD-SCNC: 135 MMOL/L (ref 136–145)
SODIUM BLD-SCNC: 137 MMOL/L (ref 136–145)
WBC # BLD: 1.3 K/UL (ref 4.8–10.8)

## 2019-09-17 PROCEDURE — 1210000000 HC MED SURG R&B

## 2019-09-17 PROCEDURE — 6360000002 HC RX W HCPCS: Performed by: EMERGENCY MEDICINE

## 2019-09-17 PROCEDURE — 2580000003 HC RX 258: Performed by: EMERGENCY MEDICINE

## 2019-09-17 PROCEDURE — 6370000000 HC RX 637 (ALT 250 FOR IP): Performed by: FAMILY MEDICINE

## 2019-09-17 PROCEDURE — 85025 COMPLETE CBC W/AUTO DIFF WBC: CPT

## 2019-09-17 PROCEDURE — 6370000000 HC RX 637 (ALT 250 FOR IP): Performed by: NURSE PRACTITIONER

## 2019-09-17 PROCEDURE — 6360000002 HC RX W HCPCS: Performed by: INTERNAL MEDICINE

## 2019-09-17 PROCEDURE — 80048 BASIC METABOLIC PNL TOTAL CA: CPT

## 2019-09-17 PROCEDURE — 6360000002 HC RX W HCPCS: Performed by: FAMILY MEDICINE

## 2019-09-17 PROCEDURE — 36591 DRAW BLOOD OFF VENOUS DEVICE: CPT

## 2019-09-17 PROCEDURE — 99231 SBSQ HOSP IP/OBS SF/LOW 25: CPT | Performed by: INTERNAL MEDICINE

## 2019-09-17 RX ORDER — POTASSIUM CITRATE 10 MEQ/1
20 TABLET, EXTENDED RELEASE ORAL
Status: DISCONTINUED | OUTPATIENT
Start: 2019-09-17 | End: 2019-09-19 | Stop reason: HOSPADM

## 2019-09-17 RX ADMIN — ACETAMINOPHEN 650 MG: 325 TABLET ORAL at 09:56

## 2019-09-17 RX ADMIN — POTASSIUM CITRATE 10 MEQ: 10 TABLET ORAL at 12:42

## 2019-09-17 RX ADMIN — POTASSIUM BICARBONATE 40 MEQ: 782 TABLET, EFFERVESCENT ORAL at 09:58

## 2019-09-17 RX ADMIN — TIZANIDINE 2 MG: 4 TABLET ORAL at 09:56

## 2019-09-17 RX ADMIN — Medication 2 G: at 06:22

## 2019-09-17 RX ADMIN — POTASSIUM CITRATE 10 MEQ: 10 TABLET ORAL at 10:04

## 2019-09-17 RX ADMIN — SERTRALINE HYDROCHLORIDE 200 MG: 100 TABLET ORAL at 09:58

## 2019-09-17 RX ADMIN — ATORVASTATIN CALCIUM 10 MG: 10 TABLET, FILM COATED ORAL at 22:24

## 2019-09-17 RX ADMIN — FLUTICASONE PROPIONATE 1 SPRAY: 50 SPRAY, METERED NASAL at 10:00

## 2019-09-17 RX ADMIN — POTASSIUM CITRATE 20 MEQ: 10 TABLET ORAL at 16:51

## 2019-09-17 RX ADMIN — PANTOPRAZOLE SODIUM 40 MG: 40 TABLET, DELAYED RELEASE ORAL at 06:21

## 2019-09-17 RX ADMIN — CETIRIZINE HYDROCHLORIDE 10 MG: 10 TABLET, FILM COATED ORAL at 09:59

## 2019-09-17 RX ADMIN — FUROSEMIDE 20 MG: 20 TABLET ORAL at 09:59

## 2019-09-17 RX ADMIN — FUROSEMIDE 20 MG: 20 TABLET ORAL at 16:51

## 2019-09-17 RX ADMIN — DEXAMETHASONE 4 MG: 4 TABLET ORAL at 09:58

## 2019-09-17 RX ADMIN — ENOXAPARIN SODIUM 40 MG: 40 INJECTION SUBCUTANEOUS at 22:24

## 2019-09-17 RX ADMIN — POTASSIUM BICARBONATE 40 MEQ: 782 TABLET, EFFERVESCENT ORAL at 06:22

## 2019-09-17 RX ADMIN — FOLIC ACID 1 MG: 1 TABLET ORAL at 09:58

## 2019-09-17 RX ADMIN — Medication 2 G: at 17:31

## 2019-09-17 RX ADMIN — DEXAMETHASONE 4 MG: 4 TABLET ORAL at 16:51

## 2019-09-17 RX ADMIN — MONTELUKAST 10 MG: 10 TABLET, FILM COATED ORAL at 22:24

## 2019-09-17 RX ADMIN — TBO-FILGRASTIM 300 MCG: 480 INJECTION, SOLUTION SUBCUTANEOUS at 09:58

## 2019-09-17 ASSESSMENT — ENCOUNTER SYMPTOMS
WHEEZING: 0
PHOTOPHOBIA: 0
TROUBLE SWALLOWING: 0
SORE THROAT: 0
NAUSEA: 1
BLOOD IN STOOL: 0
COUGH: 1
SHORTNESS OF BREATH: 1
BACK PAIN: 1
ABDOMINAL PAIN: 0
CONSTIPATION: 0
COLOR CHANGE: 0
DIARRHEA: 0
ABDOMINAL DISTENTION: 0
EYE ITCHING: 0
VOICE CHANGE: 0
EYE DISCHARGE: 0
VOMITING: 0

## 2019-09-17 ASSESSMENT — PAIN DESCRIPTION - LOCATION: LOCATION: HEAD

## 2019-09-17 ASSESSMENT — PAIN SCALES - GENERAL: PAINLEVEL_OUTOF10: 8

## 2019-09-17 NOTE — PROGRESS NOTES
Problems:    * No resolved hospital problems. *      Plan:  1. Continue present medication(s)   2. Follow with cultures  3. Replace K+, and recheck lab  4. PT consult      Discharge planning:   her home     Reviewed treatment plans with the patient and/or family.              Electronically signed by Lelo Emery MD on 9/17/2019 at 4:11 PM

## 2019-09-17 NOTE — PROGRESS NOTES
hemoptysis for 2 months. She has a significant history of nicotine abuse but quit smoking in October 2016. CT scan of chest on 10/31/2016 revealed a partially necrotic, noncalcified mass measuring 4.1 x 3.2 x 3.7 cm in the posterior segment of the LLL with associated pleural effusions. There was no evidence of mediastinal or hilar mass nor lymphadenopathy. A 2.2 cm nodule was noted in the upper pole of the right kidney. A transthorasic needle biopsy on 11/22/2016 by Dr. Otilia Guerrero revealed evidence of adenocarcinoma. PET scan on 12/16/2016 revealed a mass in the LLL with hypermetabolic activity with a maximum SUV of 7.1.   A chest x-ray on 1/4/2017 revealed a 3.8 cm mass lesion in the LLL that previously measured approximately 4.1 cm. On 1/6/2017, a left thoracotomy with left lower lobectomy and mediastinal lymph node dissection was performed by Dr. Keyana Jameson. Pathology revealed invasive moderately differentiated adenocarcinoma. The tumor measured 4.5 cm. Margins were clear of invasive carcinoma and  no lymphovascular space invasion was noted. 5 of 5 lymph nodes were negative for metastatic carcinoma. Bilateral renal ultrasounds on 2/28/2017 identified a complex cyst at the upper pole of the right kidney measuring up to 3 cm increased in size compared to a study on 12/28/2012 where it measured 2.1 cm in maximum diameter. Two small benign cysts were noted in the left kidney. A CT scan of the abdomen and pelvis on 3/8/2017 confirmed that this was a simple cyst in the upper pole of the right kidney. Adjuvant chemotherapy was discussed at her initial visit on 2/22/2017 as well as on followup visit 3/13/2017. She is agreeable and desires to proceed accordingly with adjuvant Cisplatinum and Alimta. Reynaldo Giles desires however to postpone initiation of adjuvant chemotherapy until 4/11/2017. Her wishes were respected and chemotherapy delivered as noted below.   CT scan of chest on 7/27/2017 revealed scan on 2/6/2018 did not reveal scintigraphic evidence of recurrent malignancy or metastatic disease. MRI of the brain W & WO for [facility] treatment planning purposes was performed on 3/6/2018 by Dr. Ju Narayan. Postsurgical changes to the previous resection site from the right frontal lobe mass area with residual enhancement was noted. Diane underwent SRS with 1600 cGy in one single treatment fraction on 3/26/2018 to the right frontal CNS lobe by Dima Narayan and Roseann Prado. Delays in beginning systemic therapy included issues associated with her CNS treatment delivery and multiple dog bites on her arms and hands that required attention prior to starting systemic therapy. Diane received cycle #1 of chemotherapy with Alimta, carboplatin and Keytruda on 5/14/2018. Diane completed cycle # 6 of carboplatin, Alimta and Keytruda on 9/4/2018. MRI of the brain with and without on 9/12/2018 documented a stable 1.6 x 1.1 cm nodular enhancement along the medial aspect of the operative cavity. Andrew Ashraf was seen by Dr. Roseann Prado on 9/12/2018, who felt the MRI of the brain with stable without evidence of recurrence/progression. A CT scan of the chest on 9/24/2018 did not reveal evidence of new or suspicious for urinary nodules nor intrathoracic lymphadenopathy to suggest recurrent disease in the chest.  She completed 6 cycles of Carboplatin, Alimta and Keytruda on 9/4/2018 and then went on to receive maintenance treatment with Alimta/Keytruda. Rocío Gonzalez reported experiencing a significant skin rash after receiving Keytruda on 11/27/2018. Hortensiatwila Lopez was held from 12/18/2018-1/29/2019 (x3 cycles) during which time Rocío Gonzalez was only receiving Alimta. Mrs. Maria A Saldana had repeat CT scans of the chest, abdomen and pelvis on 1/2/2019 that suggested stable disease. On 2/19/2019, Mrs. Virginia Conner resumed the combination of maintenance treatment with Alimta/Keytruda without rash or problems.   Rocío Gonzalez was admitted to [facility] on 4/9/2019 with Per attending    #Deconditioning- 2/2 cancer and treatement. PT/OT         Discussed with patient      Angélica Marcano, APRN    09/17/19  8:45 AM  Physician's attestation/substantial contribution:  I, Dr Bogdan Rosenbaum, independently performed an evaluation on Sumeet Yusuf. I have reviewed relevant medical information/data to include but not limited to medication list, relevant appropriate labs and imaging when applicable. I reviewed other physician's notes, ancillary services and nurses assessment. I have reviewed the above documentation completed by the Nurse Practitioner or Physician Assistant. Please see my additional contributions to the history of present illness, physical examination, and assessment/medical decision-making that reflect my findings and impressions. I discussed essential elements of the care plan with the NP or PA and the patient as well. I answered all the questions to the patient's satisfaction. I concur with above stated. Subjective-very fatigued. Cough. Objective- diffuse rhonchi bilaterally. Chronic ill-appearing. Vital signs reviewed. Assessment/plan:  Chemotherapy toxicity-Alimta was discontinued. Lung cancer-further treatment deferred to clinic. Anemia-improved after PRBC transfusion on Sunday. Neutropenia-continue cefepime. Will add G-CSF.     Bogdan Rosenbaum MD

## 2019-09-18 LAB
ANION GAP SERPL CALCULATED.3IONS-SCNC: 12 MMOL/L (ref 7–19)
BANDED NEUTROPHILS RELATIVE PERCENT: 2 % (ref 0–5)
BASOPHILS ABSOLUTE: 0 K/UL (ref 0–0.2)
BASOPHILS RELATIVE PERCENT: 0 % (ref 0–1)
BUN BLDV-MCNC: 12 MG/DL (ref 8–23)
CALCIUM SERPL-MCNC: 7.9 MG/DL (ref 8.8–10.2)
CHLORIDE BLD-SCNC: 102 MMOL/L (ref 98–111)
CO2: 23 MMOL/L (ref 22–29)
CREAT SERPL-MCNC: 0.6 MG/DL (ref 0.5–0.9)
EOSINOPHILS ABSOLUTE: 0 K/UL (ref 0–0.6)
EOSINOPHILS RELATIVE PERCENT: 0 % (ref 0–5)
GFR NON-AFRICAN AMERICAN: >60
GLUCOSE BLD-MCNC: 109 MG/DL (ref 74–109)
HCT VFR BLD CALC: 26 % (ref 37–47)
HEMOGLOBIN: 8.4 G/DL (ref 12–16)
IMMATURE GRANULOCYTES #: 0 K/UL
LYMPHOCYTES ABSOLUTE: 0.5 K/UL (ref 1.1–4.5)
LYMPHOCYTES RELATIVE PERCENT: 18 % (ref 20–40)
MAGNESIUM: 1.5 MG/DL (ref 1.6–2.4)
MCH RBC QN AUTO: 28.8 PG (ref 27–31)
MCHC RBC AUTO-ENTMCNC: 32.3 G/DL (ref 33–37)
MCV RBC AUTO: 89 FL (ref 81–99)
MONOCYTES ABSOLUTE: 0.3 K/UL (ref 0–0.9)
MONOCYTES RELATIVE PERCENT: 12 % (ref 0–10)
NEUTROPHILS ABSOLUTE: 1.6 K/UL (ref 1.5–7.5)
NEUTROPHILS RELATIVE PERCENT: 66 % (ref 50–65)
PDW BLD-RTO: 14.6 % (ref 11.5–14.5)
PLATELET # BLD: 92 K/UL (ref 130–400)
PLATELET SLIDE REVIEW: ABNORMAL
PMV BLD AUTO: 11.6 FL (ref 9.4–12.3)
POTASSIUM SERPL-SCNC: 3 MMOL/L (ref 3.5–5)
POTASSIUM SERPL-SCNC: 3.9 MMOL/L (ref 3.5–5)
RBC # BLD: 2.92 M/UL (ref 4.2–5.4)
RBC # BLD: NORMAL 10*6/UL
SODIUM BLD-SCNC: 137 MMOL/L (ref 136–145)
WBC # BLD: 2.4 K/UL (ref 4.8–10.8)

## 2019-09-18 PROCEDURE — 1210000000 HC MED SURG R&B

## 2019-09-18 PROCEDURE — 6360000002 HC RX W HCPCS: Performed by: EMERGENCY MEDICINE

## 2019-09-18 PROCEDURE — 2580000003 HC RX 258: Performed by: EMERGENCY MEDICINE

## 2019-09-18 PROCEDURE — 6360000002 HC RX W HCPCS: Performed by: FAMILY MEDICINE

## 2019-09-18 PROCEDURE — 6370000000 HC RX 637 (ALT 250 FOR IP): Performed by: FAMILY MEDICINE

## 2019-09-18 PROCEDURE — 84132 ASSAY OF SERUM POTASSIUM: CPT

## 2019-09-18 PROCEDURE — 6360000002 HC RX W HCPCS: Performed by: PHYSICIAN ASSISTANT

## 2019-09-18 PROCEDURE — 85025 COMPLETE CBC W/AUTO DIFF WBC: CPT

## 2019-09-18 PROCEDURE — 99231 SBSQ HOSP IP/OBS SF/LOW 25: CPT | Performed by: INTERNAL MEDICINE

## 2019-09-18 PROCEDURE — 80048 BASIC METABOLIC PNL TOTAL CA: CPT

## 2019-09-18 PROCEDURE — 6370000000 HC RX 637 (ALT 250 FOR IP): Performed by: NURSE PRACTITIONER

## 2019-09-18 PROCEDURE — 83735 ASSAY OF MAGNESIUM: CPT

## 2019-09-18 PROCEDURE — 6360000002 HC RX W HCPCS: Performed by: INTERNAL MEDICINE

## 2019-09-18 RX ADMIN — MONTELUKAST 10 MG: 10 TABLET, FILM COATED ORAL at 21:28

## 2019-09-18 RX ADMIN — SERTRALINE HYDROCHLORIDE 200 MG: 100 TABLET ORAL at 07:32

## 2019-09-18 RX ADMIN — DEXAMETHASONE 4 MG: 4 TABLET ORAL at 16:16

## 2019-09-18 RX ADMIN — POTASSIUM CHLORIDE 40 MEQ: 1500 TABLET, EXTENDED RELEASE ORAL at 07:31

## 2019-09-18 RX ADMIN — ACETAMINOPHEN 650 MG: 325 TABLET ORAL at 21:49

## 2019-09-18 RX ADMIN — MAGNESIUM SULFATE HEPTAHYDRATE 1 G: 1 INJECTION, SOLUTION INTRAVENOUS at 07:53

## 2019-09-18 RX ADMIN — POTASSIUM CITRATE 20 MEQ: 10 TABLET ORAL at 12:31

## 2019-09-18 RX ADMIN — DEXAMETHASONE 4 MG: 4 TABLET ORAL at 07:31

## 2019-09-18 RX ADMIN — PANTOPRAZOLE SODIUM 40 MG: 40 TABLET, DELAYED RELEASE ORAL at 06:27

## 2019-09-18 RX ADMIN — Medication 2 G: at 16:15

## 2019-09-18 RX ADMIN — FLUTICASONE PROPIONATE 1 SPRAY: 50 SPRAY, METERED NASAL at 07:32

## 2019-09-18 RX ADMIN — FUROSEMIDE 20 MG: 20 TABLET ORAL at 16:16

## 2019-09-18 RX ADMIN — ATORVASTATIN CALCIUM 10 MG: 10 TABLET, FILM COATED ORAL at 21:29

## 2019-09-18 RX ADMIN — FUROSEMIDE 20 MG: 20 TABLET ORAL at 07:31

## 2019-09-18 RX ADMIN — CETIRIZINE HYDROCHLORIDE 10 MG: 10 TABLET, FILM COATED ORAL at 07:31

## 2019-09-18 RX ADMIN — TBO-FILGRASTIM 300 MCG: 480 INJECTION, SOLUTION SUBCUTANEOUS at 07:32

## 2019-09-18 RX ADMIN — TIZANIDINE 2 MG: 4 TABLET ORAL at 07:32

## 2019-09-18 RX ADMIN — POTASSIUM CITRATE 20 MEQ: 10 TABLET ORAL at 07:31

## 2019-09-18 RX ADMIN — MAGNESIUM SULFATE HEPTAHYDRATE 1 G: 1 INJECTION, SOLUTION INTRAVENOUS at 10:12

## 2019-09-18 RX ADMIN — ENOXAPARIN SODIUM 40 MG: 40 INJECTION SUBCUTANEOUS at 21:29

## 2019-09-18 RX ADMIN — FOLIC ACID 1 MG: 1 TABLET ORAL at 07:32

## 2019-09-18 RX ADMIN — Medication 2 G: at 06:27

## 2019-09-18 RX ADMIN — POTASSIUM CITRATE 20 MEQ: 10 TABLET ORAL at 16:16

## 2019-09-18 ASSESSMENT — ENCOUNTER SYMPTOMS
PHOTOPHOBIA: 0
BLOOD IN STOOL: 0
TROUBLE SWALLOWING: 0
WHEEZING: 0
VOICE CHANGE: 0
DIARRHEA: 0
COLOR CHANGE: 0
BACK PAIN: 1
ABDOMINAL DISTENTION: 0
CONSTIPATION: 0
EYE DISCHARGE: 0
ABDOMINAL PAIN: 0
COUGH: 1
VOMITING: 0
NAUSEA: 0
SORE THROAT: 0
SHORTNESS OF BREATH: 1
EYE ITCHING: 0

## 2019-09-18 ASSESSMENT — PAIN SCALES - GENERAL: PAINLEVEL_OUTOF10: 3

## 2019-09-18 NOTE — PROGRESS NOTES
PROGRESS NOTE  Patient name: Suzie Jameson  Patient : 1950  Room: 426    SUBJECTIVE: Looks and feels much better this morning. less cough. INTERVAL HISTORY  Ms Riley Centeno is a 70years old female who is well-known to our clinic. She is a patient of Dr. Anmol Gorman. naif she has a diagnosis of lung cancer and is currently receiving palliative intent maintenance chemotherapy with Alimta every 3 weeks. The patient has had several hospitalizations in the past for recurrent UTIs, pulmonary issues. She received chemotherapy last week. She presented to the ER with complaints of fever of 100.6, chills and feels cold. Accompanying symptoms include arthralgia, malaise, generalized weakness. She denies any nausea vomiting diarrhea. She does report mild dysuria. She denies has shortness of breath on exertion. She was admitted for further work-up. Chest x-ray was unremarkable. UA showed positive leukocyte esterase with WBC 4. She was not neutropenic. She was severely anemic with hemoglobin 7.5. She denies any GI bleed. She denies any change the color of her stools. She was also found to be severely hypokalemic. She did spike a temperature over 102.6 in the last 24 hours. She is currently on vancomycin and cefepime. Blood culture sent from the emergency and negative to date. We are being consulted for co-management.      Cancer History:     TARGET TUMOR SITE:  1. Resected LLL of lung 2017   2. Resected 2 x 2.6 x 2.6 cm mass from the high right frontal vertex of the brain 1/15/2018     TUMOR HISTORY: Resected, Stage IB moderately differentiated adenocarcinoma of left lung, pT2a, N0,M0. 2017. Ms. Cris West was seen in initial Oncology consultation on 2017 f referred by Dr. Dangelo Carrillo with a diagnosis of a resected moderately differentiated adenocarcinoma of the left lung.    In 2016 Елена Story presented to Dr. Estella Conteh with complaints of hemoptysis for 2 changes  and no acute process. Calcified lymph nodes are present in the subcorneal region and left hilum  CT scan of abdomen and pelvis on 7/27/2017 revealed no intra-abdominal or pelvic abnormalities. ----------------------------------------------------------------------------------------_______________________________________________________   RECURRENT LUNG CARCINOMA TO THE BRAIN 1/15/2018  She presented to [facility] on 12/18/2017 with progressive headache. MRI of the brain with and without contrast at [address] on 12/18/2017 revealed a 2 x 2.6 x 2.6 cm rim-enhancing irregular mass within the high right frontal vertex with surrounding vasogenic edema.   CT chest with contrast at [address] 12/19/2017 revealed some paravertebral soft tissue nodules present at T8, T9, T10 with largest being 2.5 cm. These are stable compared to 7/27/2017 PET scan which were NOT metabolically active. I.e. no obvious metastatic lesions  CT abdomen and pelvis with contrast at [address] 12/19/2017 was unrevealing for any obvious metastatic disease. Bone scan at [address] 12/19/2017 revealed focal areas of increased activity in the left seventh and 10th costovertebral junctions-indeterminate. A right craniotomy by Dr. Roseann Prdao on 1/15/2018 was performed with resection of the 2 x 2.6 x 2.6 cm high right frontal vertex mass   Pathology was consistent with metastatic adenocarcinoma with papillary features consistent with lung origin. Molecular testing on the 1/15/2018 pathology specimen was reported from [facility] [employer] [facility] on 3/15/2018 as follows:   EGFR -negative for mutation  ALK -negative for rearrangement  ROS 1 -negative for rearrangement   BRAF -negative for the V600E mutation  PDL-1 - expression is 80% i.e. in Positive  MRI of the brain W & WO for [facility] treatment planning purposes was performed on 3/6/2018 by Dr. Ju Narayan. Postsurgical changes to the previous resection site from the right frontal lobe mass area with residual enhancement was noted.    A PET scan on 2/6/2018 did not reveal scintigraphic evidence of recurrent malignancy or metastatic disease. MRI of the brain W & WO for [facility] treatment planning purposes was performed on 3/6/2018 by Dr. Yelena Mejia. Postsurgical changes to the previous resection site from the right frontal lobe mass area with residual enhancement was noted. Diane underwent SRS with 1600 cGy in one single treatment fraction on 3/26/2018 to the right frontal CNS lobe by Dima Mejia and Tammy Aragon. Delays in beginning systemic therapy included issues associated with her CNS treatment delivery and multiple dog bites on her arms and hands that required attention prior to starting systemic therapy. Diane received cycle #1 of chemotherapy with Alimta, carboplatin and Keytruda on 5/14/2018. Diane completed cycle # 6 of carboplatin, Alimta and Keytruda on 9/4/2018. MRI of the brain with and without on 9/12/2018 documented a stable 1.6 x 1.1 cm nodular enhancement along the medial aspect of the operative cavity. Ankur Perez was seen by Dr. Tammy Aragon on 9/12/2018, who felt the MRI of the brain with stable without evidence of recurrence/progression. A CT scan of the chest on 9/24/2018 did not reveal evidence of new or suspicious for urinary nodules nor intrathoracic lymphadenopathy to suggest recurrent disease in the chest.  She completed 6 cycles of Carboplatin, Alimta and Keytruda on 9/4/2018 and then went on to receive maintenance treatment with Alimta/Keytruda. Azucena Babinski reported experiencing a significant skin rash after receiving Keytruda on 11/27/2018. Pamla Lat was held from 12/18/2018-1/29/2019 (x3 cycles) during which time Azucena Babinski was only receiving Alimta. Mrs. Sabine Louis had repeat CT scans of the chest, abdomen and pelvis on 1/2/2019 that suggested stable disease. On 2/19/2019, Mrs. Marylen Noun resumed the combination of maintenance treatment with Alimta/Keytruda without rash or problems.   Azucena Babinski was admitted to [facility] on 4/9/2019 with bilateral lower PHYSICAL EXAM:  Physical Exam   Constitutional: She is oriented to person, place, and time. She appears well-developed. No distress. HENT:   Head: Normocephalic and atraumatic. Right Ear: External ear normal.   Left Ear: External ear normal.   Mouth/Throat: Oropharynx is clear and moist.   Eyes: Conjunctivae and EOM are normal. Right eye exhibits no discharge. Left eye exhibits no discharge. No scleral icterus. Neck: Normal range of motion. Neck supple. No tracheal deviation present. Cardiovascular: Regular rhythm and normal heart sounds. No murmur heard.    Pulmonary/Chest: Effort normal and breath sounds normal. No respiratory distress. Abdominal: Soft. Bowel sounds are normal. She exhibits no distension. There is tenderness (mild, improved). Musculoskeletal: She exhibits no edema or tenderness. Full ROM in all 4 extremities  Generalized weakness     Neurological: She is alert and oriented to person, place, and time. Coordination normal.   Skin: Skin is warm and dry. No rash noted. She is not diaphoretic. Left CW port -no sign of infection     Psychiatric: She has a normal mood and affect. Her behavior is normal. Thought content normal.   Vitals reviewed.     Recent Labs     09/18/19  0517 09/17/19  0300 09/16/19  0315   WBC 2.4* 1.3* 2.3*   HGB 8.4* 8.3* 7.7*   HCT 26.0* 25.3* 23.3*   MCV 89.0 89.1 90.3   PLT 92* 75* 77*       Lab Results   Component Value Date     09/18/2019    K 3.0 (L) 09/18/2019     09/18/2019    CO2 23 09/18/2019    BUN 12 09/18/2019    CREATININE 0.6 09/18/2019    GLUCOSE 109 09/18/2019    CALCIUM 7.9 (L) 09/18/2019    PROT 5.6 (L) 09/16/2019    LABALBU 2.9 (L) 09/16/2019    BILITOT 1.1 09/16/2019    ALKPHOS 133 (H) 09/16/2019    AST 19 09/16/2019    ALT 12 09/16/2019    LABGLOM >60 09/18/2019    GLOB 2.3 01/04/2017       Lab Results   Component Value Date    INR 1.34 (H) 07/10/2019    INR 1.52 (H) 05/26/2019    INR 1.04 11/22/2016    PROTIME 15.9 (H) NGTD   Maxipime continues    #Hypokalemia  K 3.0 - Per attending    #Deconditioning- 2/2 cancer and treatement. PT/OT     Improved. Continue current treatment. Hypokalemia per attending. Hopefully home soon. Will arrange outpatient follow-up. Defer treatment decisions to clinic. Discussed with patient. [facility], APRN    09/18/19  6:56 AM  Physician's attestation/substantial contribution:  I, Dr Zander Salcedo, independently performed an evaluation on [place]. I have reviewed relevant medical information/data to include but not limited to medication list, relevant appropriate labs and imaging when applicable. I reviewed other physician's notes, ancillary services and nurses assessment. I have reviewed the above documentation completed by the Nurse Practitioner or Physician Assistant. Please see my additional contributions to the history of present illness, physical examination, and assessment/medical decision-making that reflect my findings and impressions. I discussed essential elements of the care plan with the NP or PA and the patient as well. I answered all the questions to the patient's satisfaction. I concur with above stated. Subjective-feeling better today. Afebrile. Still having productive cough. Objective- alert oriented x3. Lungs are clear. Mildly tachycardic. Assessment/plan:  Pancytopenia-improving. She is receiving G-CSF. Lung cancer- stop Alimta.      Zander Salcedo MD

## 2019-09-19 VITALS
HEART RATE: 85 BPM | RESPIRATION RATE: 20 BRPM | WEIGHT: 124.6 LBS | BODY MASS INDEX: 21.27 KG/M2 | SYSTOLIC BLOOD PRESSURE: 126 MMHG | DIASTOLIC BLOOD PRESSURE: 73 MMHG | TEMPERATURE: 98 F | HEIGHT: 64 IN | OXYGEN SATURATION: 97 %

## 2019-09-19 LAB
ANION GAP SERPL CALCULATED.3IONS-SCNC: 12 MMOL/L (ref 7–19)
ANISOCYTOSIS: ABNORMAL
BANDED NEUTROPHILS RELATIVE PERCENT: 24 % (ref 0–5)
BASOPHILS ABSOLUTE: 0 K/UL (ref 0–0.2)
BASOPHILS RELATIVE PERCENT: 0 % (ref 0–1)
BLOOD CULTURE, ROUTINE: NORMAL
BUN BLDV-MCNC: 19 MG/DL (ref 8–23)
CALCIUM SERPL-MCNC: 9.3 MG/DL (ref 8.8–10.2)
CHLORIDE BLD-SCNC: 102 MMOL/L (ref 98–111)
CO2: 26 MMOL/L (ref 22–29)
CREAT SERPL-MCNC: 0.9 MG/DL (ref 0.5–0.9)
CULTURE, BLOOD 2: NORMAL
DOHLE BODIES: ABNORMAL
EOSINOPHILS ABSOLUTE: 0.12 K/UL (ref 0–0.6)
EOSINOPHILS RELATIVE PERCENT: 1 % (ref 0–5)
GFR NON-AFRICAN AMERICAN: >60
GLUCOSE BLD-MCNC: 100 MG/DL (ref 74–109)
HCT VFR BLD CALC: 28.9 % (ref 37–47)
HEMOGLOBIN: 9.1 G/DL (ref 12–16)
HYPOCHROMIA: ABNORMAL
IMMATURE GRANULOCYTES #: 0.5 K/UL
LYMPHOCYTES ABSOLUTE: 1.4 K/UL (ref 1.1–4.5)
LYMPHOCYTES RELATIVE PERCENT: 12 % (ref 20–40)
MAGNESIUM: 2 MG/DL (ref 1.6–2.4)
MCH RBC QN AUTO: 28.8 PG (ref 27–31)
MCHC RBC AUTO-ENTMCNC: 31.5 G/DL (ref 33–37)
MCV RBC AUTO: 91.5 FL (ref 81–99)
MONOCYTES ABSOLUTE: 1.1 K/UL (ref 0–0.9)
MONOCYTES RELATIVE PERCENT: 9 % (ref 0–10)
MYELOCYTE PERCENT: 1 %
NEUTROPHILS ABSOLUTE: 9.4 K/UL (ref 1.5–7.5)
NEUTROPHILS RELATIVE PERCENT: 53 % (ref 50–65)
OVALOCYTES: ABNORMAL
PDW BLD-RTO: 15 % (ref 11.5–14.5)
PLATELET # BLD: 145 K/UL (ref 130–400)
PLATELET SLIDE REVIEW: ADEQUATE
PMV BLD AUTO: 11.3 FL (ref 9.4–12.3)
POTASSIUM SERPL-SCNC: 4.2 MMOL/L (ref 3.5–5)
RBC # BLD: 3.16 M/UL (ref 4.2–5.4)
SODIUM BLD-SCNC: 140 MMOL/L (ref 136–145)
WBC # BLD: 12 K/UL (ref 4.8–10.8)

## 2019-09-19 PROCEDURE — 6360000002 HC RX W HCPCS: Performed by: FAMILY MEDICINE

## 2019-09-19 PROCEDURE — 6360000002 HC RX W HCPCS: Performed by: EMERGENCY MEDICINE

## 2019-09-19 PROCEDURE — 2580000003 HC RX 258: Performed by: EMERGENCY MEDICINE

## 2019-09-19 PROCEDURE — 83735 ASSAY OF MAGNESIUM: CPT

## 2019-09-19 PROCEDURE — 6360000002 HC RX W HCPCS: Performed by: INTERNAL MEDICINE

## 2019-09-19 PROCEDURE — 80048 BASIC METABOLIC PNL TOTAL CA: CPT

## 2019-09-19 PROCEDURE — 99231 SBSQ HOSP IP/OBS SF/LOW 25: CPT | Performed by: INTERNAL MEDICINE

## 2019-09-19 PROCEDURE — 6370000000 HC RX 637 (ALT 250 FOR IP): Performed by: FAMILY MEDICINE

## 2019-09-19 PROCEDURE — 85025 COMPLETE CBC W/AUTO DIFF WBC: CPT

## 2019-09-19 RX ORDER — POTASSIUM CITRATE 10 MEQ/1
20 TABLET, EXTENDED RELEASE ORAL
Qty: 90 TABLET | Refills: 0 | Status: SHIPPED | OUTPATIENT
Start: 2019-09-19 | End: 2019-12-31 | Stop reason: ALTCHOICE

## 2019-09-19 RX ADMIN — POTASSIUM CITRATE 20 MEQ: 10 TABLET ORAL at 13:20

## 2019-09-19 RX ADMIN — Medication 2 G: at 06:09

## 2019-09-19 RX ADMIN — CETIRIZINE HYDROCHLORIDE 10 MG: 10 TABLET, FILM COATED ORAL at 08:25

## 2019-09-19 RX ADMIN — FUROSEMIDE 20 MG: 20 TABLET ORAL at 08:25

## 2019-09-19 RX ADMIN — TIZANIDINE 2 MG: 4 TABLET ORAL at 08:25

## 2019-09-19 RX ADMIN — DEXAMETHASONE 4 MG: 4 TABLET ORAL at 08:25

## 2019-09-19 RX ADMIN — PANTOPRAZOLE SODIUM 40 MG: 40 TABLET, DELAYED RELEASE ORAL at 06:10

## 2019-09-19 RX ADMIN — TBO-FILGRASTIM 300 MCG: 480 INJECTION, SOLUTION SUBCUTANEOUS at 08:25

## 2019-09-19 RX ADMIN — SERTRALINE HYDROCHLORIDE 200 MG: 100 TABLET ORAL at 08:25

## 2019-09-19 RX ADMIN — FLUTICASONE PROPIONATE 1 SPRAY: 50 SPRAY, METERED NASAL at 08:25

## 2019-09-19 RX ADMIN — HEPARIN 300 UNITS: 100 SYRINGE at 14:30

## 2019-09-19 RX ADMIN — POTASSIUM CITRATE 20 MEQ: 10 TABLET ORAL at 08:25

## 2019-09-19 RX ADMIN — FOLIC ACID 1 MG: 1 TABLET ORAL at 08:25

## 2019-09-19 ASSESSMENT — ENCOUNTER SYMPTOMS
NAUSEA: 0
VOICE CHANGE: 0
PHOTOPHOBIA: 0
EYE ITCHING: 0
SORE THROAT: 0
COLOR CHANGE: 0
COUGH: 1
CONSTIPATION: 0
ABDOMINAL PAIN: 0
DIARRHEA: 0
VOMITING: 0
ABDOMINAL DISTENTION: 0
BLOOD IN STOOL: 0
TROUBLE SWALLOWING: 0
BACK PAIN: 1
WHEEZING: 0
EYE DISCHARGE: 0
SHORTNESS OF BREATH: 1

## 2019-09-19 NOTE — PROGRESS NOTES
scintigraphic evidence of recurrent malignancy or metastatic disease. MRI of the brain W & WO for [facility] treatment planning purposes was performed on 3/6/2018 by Dr. Hailey Don. Postsurgical changes to the previous resection site from the right frontal lobe mass area with residual enhancement was noted. Diane underwent SRS with 1600 cGy in one single treatment fraction on 3/26/2018 to the right frontal CNS lobe by Dima Don and Esmer Kan. Delays in beginning systemic therapy included issues associated with her CNS treatment delivery and multiple dog bites on her arms and hands that required attention prior to starting systemic therapy. Diane received cycle #1 of chemotherapy with Alimta, carboplatin and Keytruda on 5/14/2018. Diane completed cycle # 6 of carboplatin, Alimta and Keytruda on 9/4/2018. MRI of the brain with and without on 9/12/2018 documented a stable 1.6 x 1.1 cm nodular enhancement along the medial aspect of the operative cavity. Crescencio Alicia was seen by Dr. Esmer Kan on 9/12/2018, who felt the MRI of the brain with stable without evidence of recurrence/progression. A CT scan of the chest on 9/24/2018 did not reveal evidence of new or suspicious for urinary nodules nor intrathoracic lymphadenopathy to suggest recurrent disease in the chest.  She completed 6 cycles of Carboplatin, Alimta and Keytruda on 9/4/2018 and then went on to receive maintenance treatment with Alimta/Keytruda. Brina Remy reported experiencing a significant skin rash after receiving Keytruda on 11/27/2018. Miguelito Roque was held from 12/18/2018-1/29/2019 (x3 cycles) during which time Brina Remy was only receiving Alimta. Mrs. Murray Graham had repeat CT scans of the chest, abdomen and pelvis on 1/2/2019 that suggested stable disease. On 2/19/2019, Mrs. Chelita Coreas resumed the combination of maintenance treatment with Alimta/Keytruda without rash or problems. Brina Remy was admitted to [facility] on 4/9/2019 with bilateral lower extremity cellulitis. Exam   Constitutional: She is oriented to person, place, and time. She appears well-developed. No distress. HENT:   Head: Normocephalic and atraumatic. Right Ear: External ear normal.   Left Ear: External ear normal.   Mouth/Throat: Oropharynx is clear and moist.   Eyes: Conjunctivae and EOM are normal. Right eye exhibits no discharge. Left eye exhibits no discharge. No scleral icterus. Neck: Normal range of motion. Neck supple. No tracheal deviation present. Cardiovascular: Regular rhythm and normal heart sounds. No murmur heard.    Pulmonary/Chest: Effort normal and breath sounds normal. No respiratory distress. Abdominal: Soft. Bowel sounds are normal. She exhibits no distension. There is tenderness (mild, improved). Musculoskeletal: She exhibits no edema or tenderness. Full ROM in all 4 extremities  Generalized weakness     Neurological: She is alert and oriented to person, place, and time. Coordination normal.   Skin: Skin is warm and dry. No rash noted. She is not diaphoretic. Left CW port -no sign of infection     Psychiatric: She has a normal mood and affect. Her behavior is normal. Thought content normal.   Vitals reviewed.     Recent Labs     09/18/19  0517 09/17/19  0300 09/16/19  0315   WBC 2.4* 1.3* 2.3*   HGB 8.4* 8.3* 7.7*   HCT 26.0* 25.3* 23.3*   MCV 89.0 89.1 90.3   PLT 92* 75* 77*       Lab Results   Component Value Date     09/18/2019    K 3.9 09/18/2019     09/18/2019    CO2 23 09/18/2019    BUN 12 09/18/2019    CREATININE 0.6 09/18/2019    GLUCOSE 109 09/18/2019    CALCIUM 7.9 (L) 09/18/2019    PROT 5.6 (L) 09/16/2019    LABALBU 2.9 (L) 09/16/2019    BILITOT 1.1 09/16/2019    ALKPHOS 133 (H) 09/16/2019    AST 19 09/16/2019    ALT 12 09/16/2019    LABGLOM >60 09/18/2019    GLOB 2.3 01/04/2017       Lab Results   Component Value Date    INR 1.34 (H) 07/10/2019    INR 1.52 (H) 05/26/2019    INR 1.04 11/22/2016    PROTIME 15.9 (H) 07/10/2019    PROTIME 17.6 (H)

## 2019-09-20 ENCOUNTER — CARE COORDINATION (OUTPATIENT)
Dept: CASE MANAGEMENT | Age: 69
End: 2019-09-20

## 2019-09-20 ENCOUNTER — APPOINTMENT (OUTPATIENT)
Dept: CT IMAGING | Age: 69
End: 2019-09-20
Payer: MEDICARE

## 2019-09-20 ENCOUNTER — HOSPITAL ENCOUNTER (EMERGENCY)
Age: 69
Discharge: HOME | End: 2019-09-20
Attending: EMERGENCY MEDICINE
Payer: MEDICARE

## 2019-09-20 VITALS
SYSTOLIC BLOOD PRESSURE: 158 MMHG | DIASTOLIC BLOOD PRESSURE: 82 MMHG | TEMPERATURE: 97.6 F | HEART RATE: 88 BPM | RESPIRATION RATE: 16 BRPM | OXYGEN SATURATION: 93 % | BODY MASS INDEX: 21.17 KG/M2 | HEIGHT: 64 IN | WEIGHT: 124 LBS

## 2019-09-20 DIAGNOSIS — K59.00 CONSTIPATION, UNSPECIFIED CONSTIPATION TYPE: ICD-10-CM

## 2019-09-20 DIAGNOSIS — R50.9 FEVER, UNSPECIFIED FEVER CAUSE: Primary | ICD-10-CM

## 2019-09-20 DIAGNOSIS — R10.84 GENERALIZED ABDOMINAL PAIN: Primary | ICD-10-CM

## 2019-09-20 LAB
ALBUMIN SERPL-MCNC: 3.4 G/DL (ref 3.5–5.2)
ALP BLD-CCNC: 213 U/L (ref 35–104)
ALT SERPL-CCNC: 25 U/L (ref 5–33)
ANION GAP SERPL CALCULATED.3IONS-SCNC: 14 MMOL/L (ref 7–19)
ANISOCYTOSIS: ABNORMAL
AST SERPL-CCNC: 27 U/L (ref 5–32)
BANDED NEUTROPHILS RELATIVE PERCENT: 2 % (ref 0–5)
BASOPHILS ABSOLUTE: 0 K/UL (ref 0–0.2)
BASOPHILS RELATIVE PERCENT: 0 % (ref 0–1)
BILIRUB SERPL-MCNC: 0.5 MG/DL (ref 0.2–1.2)
BUN BLDV-MCNC: 13 MG/DL (ref 8–23)
CALCIUM SERPL-MCNC: 9.6 MG/DL (ref 8.8–10.2)
CHLORIDE BLD-SCNC: 97 MMOL/L (ref 98–111)
CO2: 29 MMOL/L (ref 22–29)
CREAT SERPL-MCNC: 0.8 MG/DL (ref 0.5–0.9)
EOSINOPHILS ABSOLUTE: 0 K/UL (ref 0–0.6)
EOSINOPHILS RELATIVE PERCENT: 0 % (ref 0–5)
GFR NON-AFRICAN AMERICAN: >60
GLUCOSE BLD-MCNC: 96 MG/DL (ref 74–109)
HCT VFR BLD CALC: 29.4 % (ref 37–47)
HEMOGLOBIN: 9.5 G/DL (ref 12–16)
IMMATURE GRANULOCYTES #: 1.8 K/UL
LACTIC ACID: 0.9 MMOL/L (ref 0.5–1.9)
LIPASE: 12 U/L (ref 13–60)
LYMPHOCYTES ABSOLUTE: 1.9 K/UL (ref 1.1–4.5)
LYMPHOCYTES RELATIVE PERCENT: 12 % (ref 20–40)
MCH RBC QN AUTO: 29.1 PG (ref 27–31)
MCHC RBC AUTO-ENTMCNC: 32.3 G/DL (ref 33–37)
MCV RBC AUTO: 90.2 FL (ref 81–99)
METAMYELOCYTES RELATIVE PERCENT: 1 %
MONOCYTES ABSOLUTE: 0.8 K/UL (ref 0–0.9)
MONOCYTES RELATIVE PERCENT: 5 % (ref 0–10)
MYELOCYTE PERCENT: 1 %
NEUTROPHILS ABSOLUTE: 13.4 K/UL (ref 1.5–7.5)
NEUTROPHILS RELATIVE PERCENT: 79 % (ref 50–65)
PDW BLD-RTO: 14.8 % (ref 11.5–14.5)
PLATELET # BLD: 131 K/UL (ref 130–400)
PLATELET SLIDE REVIEW: ADEQUATE
PMV BLD AUTO: 10 FL (ref 9.4–12.3)
POTASSIUM SERPL-SCNC: 3.4 MMOL/L (ref 3.5–5)
RBC # BLD: 3.26 M/UL (ref 4.2–5.4)
RBC # BLD: NORMAL 10*6/UL
SODIUM BLD-SCNC: 140 MMOL/L (ref 136–145)
TOTAL PROTEIN: 6.2 G/DL (ref 6.6–8.7)
WBC # BLD: 16.2 K/UL (ref 4.8–10.8)

## 2019-09-20 PROCEDURE — 74177 CT ABD & PELVIS W/CONTRAST: CPT

## 2019-09-20 PROCEDURE — 83690 ASSAY OF LIPASE: CPT

## 2019-09-20 PROCEDURE — 83605 ASSAY OF LACTIC ACID: CPT

## 2019-09-20 PROCEDURE — 80053 COMPREHEN METABOLIC PANEL: CPT

## 2019-09-20 PROCEDURE — 1111F DSCHRG MED/CURRENT MED MERGE: CPT | Performed by: FAMILY MEDICINE

## 2019-09-20 PROCEDURE — 99284 EMERGENCY DEPT VISIT MOD MDM: CPT

## 2019-09-20 PROCEDURE — 36415 COLL VENOUS BLD VENIPUNCTURE: CPT

## 2019-09-20 PROCEDURE — 6360000004 HC RX CONTRAST MEDICATION: Performed by: EMERGENCY MEDICINE

## 2019-09-20 PROCEDURE — 85025 COMPLETE CBC W/AUTO DIFF WBC: CPT

## 2019-09-20 RX ORDER — ONDANSETRON 4 MG/1
4 TABLET, ORALLY DISINTEGRATING ORAL EVERY 8 HOURS PRN
Qty: 15 TABLET | Refills: 0 | Status: SHIPPED | OUTPATIENT
Start: 2019-09-20

## 2019-09-20 RX ORDER — POTASSIUM BICARBONATE 25 MEQ/1
25 TABLET, EFFERVESCENT ORAL 2 TIMES DAILY
Qty: 60 TABLET | Refills: 3 | Status: ON HOLD | OUTPATIENT
Start: 2019-09-20 | End: 2020-07-21

## 2019-09-20 RX ADMIN — IOPAMIDOL 70 ML: 755 INJECTION, SOLUTION INTRAVENOUS at 19:47

## 2019-09-20 ASSESSMENT — ENCOUNTER SYMPTOMS
ABDOMINAL DISTENTION: 1
VOMITING: 0
SHORTNESS OF BREATH: 0
NAUSEA: 0
ABDOMINAL PAIN: 1

## 2019-09-20 ASSESSMENT — PAIN SCALES - GENERAL: PAINLEVEL_OUTOF10: 1

## 2019-09-20 NOTE — CARE COORDINATION
she is also a little constipated, but feels that will resolve on its own now that she is back at home. Reported that she is lying around, not doing much today. Denied any pain. Stated that she has not been eating much, but is drinking to avoid dehydration. Discussed meds. Discussed hospital follow up appointment for next week. Sleeping a lot to get caught up on rest.  No other issues reported. Will follow up in a few days.      Plan for next call - assess overall status, CP status, GI status, pain, other abnormal symptoms, activity level and tolerance, sleep patterns, med effects, etc.        Future Appointments   Date Time Provider Vikram Montes   10/2/2019  2:00 PM Alyse Mcmullen MD [id number]   10/2/2019  3:00 PM SCHEDULE, [facility] Lisbeth BAUM   10/21/2019 11:45 AM Alyse Mcmullen MD [id number]   10/21/2019  1:00 PM SCHEDULE, [facility] MED ONC TREATMENTS [facility] MED ONC Lisbeth Douglass RN

## 2019-09-21 NOTE — ED PROVIDER NOTES
[facility]  eMERGENCY dEPARTMENT eNCOUnter      Pt Name: Miles Ace  MRN: [id number]  [place] 1950  Date of evaluation: 9/20/2019  Provider: Indio Ceballos MD    CHIEF COMPLAINT       Chief Complaint   Patient presents with    Constipation     Dr Rhodia Spatz feels pt my have blockage         HISTORY OF PRESENT ILLNESS   (Location/Symptom, Timing/Onset,Context/Setting, Quality, Duration, Modifying Factors, Severity)  Note limiting factors. Miles Ace is a 70 y.o. female who presents to the emergency department for evaluation of abdominal pain along with decreased output from her ostomy. Patient has a prior history of metastatic lung cancer with recent inpatient hospitalization secondary to fever. States that she was discontinued off her chemotherapy at this time. Reports that she has not had any output from her ostomy site since yesterday and feels increased abdominal distention and bloating. She denies any vomiting. Reports that she does not have any prior history of significant bowel obstruction. She denies fevers or chills. There have really been no relieving factors for the pain. Is described as moderate in severity and without radiation. HPI    NursingNotes were reviewed. REVIEW OF SYSTEMS    (2-9 systems for level 4, 10 or more for level 5)     Review of Systems   Constitutional: Negative for chills and fever. Respiratory: Negative for shortness of breath. Cardiovascular: Negative for chest pain and palpitations. Gastrointestinal: Positive for abdominal distention and abdominal pain. Negative for nausea and vomiting. Neurological: Negative for syncope. All other systems reviewed and are negative.            PAST MEDICALHISTORY     Past Medical History:   Diagnosis Date    Anxiety     Arthritis     Bowel obstruction ([address]     adhesions with colectomy/colostomy    Cancer ([address]     lung LLL ,  brain    Colostomy in place [facility])     Concussion with no loss of consciousness     COPD (chronic obstructive pulmonary disease) (HCC)     Cyst of kidney, acquired 8/3/2019    Depression     Hernia     History of blood transfusion     History of broken collarbone     Hyperlipidemia     Palliative care patient 09/16/2019    Seasonal allergies          SURGICAL HISTORY       Past Surgical History:   Procedure Laterality Date    ABDOMEN SURGERY      bowel blockage from scar tissue    COLONOSCOPY      COLOSTOMY      still has    CRANIOTOMY Right 01/15/2018    Dr. Germania Suero, COLON, DIAGNOSTIC      FRACTURE SURGERY      left arm and elbow and finger    HERNIA REPAIR      HYSTERECTOMY      LOBECTOMY Left 1/6/2017    LEFT THORACOTOMY WITH LOBECTOMY  performed by Gwynda Fleischer, MD at [address] [facility] INS TUNNELED CTR VAD W/SUBQ PORT AGE 5 YR/> N/A 7/3/2018    SINGLE LUMEN PORT INSERTION performed by Severiano Wen MD at [address]     Previous Medications    ACETAMINOPHEN (TYLENOL) 325 MG TABLET    Take 650 mg by mouth every 6 hours as needed for Pain    ALBUTEROL SULFATE  (90 BASE) MCG/ACT INHALER    Inhale 2 puffs into the lungs every 6 hours as needed for Wheezing    ATORVASTATIN (LIPITOR) 20 MG TABLET    Take 10 mg by mouth nightly     CETIRIZINE (ZYRTEC) 10 MG TABLET    Take 1 tablet by mouth daily    DEXAMETHASONE (DECADRON) 4 MG TABLET    Take 4 mg by mouth 2 times daily Start the day before treatment and continue for 3 days after.     FLUTICASONE (FLONASE) 50 MCG/ACT NASAL SPRAY    1 spray by Each Nostril route daily    FOLIC ACID (FOLVITE) 1 MG TABLET    Take 1 tablet by mouth daily    FUROSEMIDE (LASIX) 20 MG TABLET    Take 1 tablet by mouth 2 times daily    MONTELUKAST (SINGULAIR) 10 MG TABLET    Take 10 mg by mouth nightly Indications: Seasonal Allergy     ONDANSETRON (ZOFRAN) 8 MG TABLET    Take 8 mg by mouth every 8 hours as needed for Protein 6.2 (*)     Alb 3.4 (*)     Alkaline Phosphatase 213 (*)     All other components within normal limits   CBC WITH AUTO DIFFERENTIAL - Abnormal; Notable for the following components:    WBC 16.2 (*)     RBC 3.26 (*)     Hemoglobin 9.5 (*)     Hematocrit 29.4 (*)     MCHC 32.3 (*)     RDW 14.8 (*)     All other components within normal limits   LIPASE - Abnormal; Notable for the following components:    Lipase 12 (*)     All other components within normal limits   LACTIC ACID, PLASMA       All other labs were within normal range or not returned as of this dictation. EMERGENCY DEPARTMENT COURSE and DIFFERENTIAL DIAGNOSIS/MDM:   Vitals:    Vitals:    09/20/19 1738   BP: 126/81   Pulse: 93   Resp: 18   Temp: 97.6 °F (36.4 °C)   TempSrc: Temporal   SpO2: 92%   Weight: 124 lb (56.2 kg)   Height: 5' 4\" (1.626 m)       MDM    Reassessment    Patient's laboratory studies demonstrate elevated WBC count. Awaiting CT scan at this time for further evaluation of her symptoms. 2000: ED care transferred to Dr. Keenan Orantes. He will follow-up on patient's CT scan. PROCEDURES:  Unless otherwise noted below, none     Procedures    FINAL IMPRESSION      1.  Generalized abdominal pain          DISPOSITION/PLAN   DISPOSITION  Pending      (Please note that portions of this note were completed with a voice recognition program.  Efforts were made to edit thedictations but occasionally words are mis-transcribed.)    Marlen Kerr MD (electronically signed)  Attending Emergency Physician         Marlen Kerr MD  09/20/19 [id number]

## 2019-09-21 NOTE — ED PROVIDER NOTES
[facility]  eMERGENCYdEPARTMENT eNCOUnter      Pt Name: Prisca Clark  MRN: [id number]  [place] 1950  Date of evaluation: 9/20/2019  Sindhu Wasserman MD    Emergency Department care of this patient was assumed at 2010 from Dr. Matias Watkins. We have discussed the case and the plan of care. I have seen and evaluated patient and reviewed ED course. ? Bowel obstruction. No ostomy OP. Abd pain and distention. CT pending can d/c if fine. CHIEF COMPLAINT       Chief Complaint   Patient presents with    Constipation     Dr Marley Garcia feels pt my have blockage         PHYSICAL EXAM    (up to 7 for level 4, 8 or more for level 5)     ED Triage Vitals [09/20/19 1738]   BP Temp Temp Source Pulse Resp SpO2 Height Weight   126/81 97.6 °F (36.4 °C) Temporal 93 18 92 % 5' 4\" (1.626 m) 124 lb (56.2 kg)       Physical Exam  Please see Dr. Chanda Tobar note for full details. DIAGNOSTIC RESULTS     EKG: All EKG's are interpreted by the Emergency Department Physician who either signs or Co-signs this chart inthe absence of a cardiologist.        RADIOLOGY:   Non-plain film imagessuch as CT, Ultrasound and MRI are read by the radiologist. Plain radiographic images are visualized and preliminarily interpreted by the emergency physician with the below findings:    CT ABDOMEN PELVIS W IV CONTRAST Additional Contrast? None   Final Result   1. Findings consistent with constipation. No bowel obstruction is   identified. 2. Interval enlargement of the spleen without focal mass. 3. New right adrenal gland nodule measuring 1.3 cm. This is concerning   for metastatic disease. 4. Stable paraspinal masses at T10. These may represent metastatic   disease or schwannomas.    Signed by Dr Jaja Billy on 9/20/2019 8:08 PM              LABS:  Labs Reviewed   COMPREHENSIVE METABOLIC PANEL - Abnormal; Notable for the following components:       Result Value    Potassium 3.4 (*)     Chloride 97 (*)     Total Protein 6.2 (*)

## 2019-09-23 ENCOUNTER — CARE COORDINATION (OUTPATIENT)
Dept: CASE MANAGEMENT | Age: 69
End: 2019-09-23

## 2019-09-30 ENCOUNTER — LAB REQUISITION (OUTPATIENT)
Dept: LAB | Facility: HOSPITAL | Age: 69
End: 2019-09-30

## 2019-09-30 DIAGNOSIS — Z00.00 ENCOUNTER FOR GENERAL ADULT MEDICAL EXAMINATION WITHOUT ABNORMAL FINDINGS: ICD-10-CM

## 2019-09-30 LAB
ANION GAP SERPL CALCULATED.3IONS-SCNC: 15 MMOL/L (ref 5–15)
BASOPHILS # BLD AUTO: 0.03 10*3/MM3 (ref 0–0.2)
BASOPHILS NFR BLD AUTO: 0.3 % (ref 0–1.5)
BUN BLD-MCNC: 12 MG/DL (ref 8–23)
BUN/CREAT SERPL: 15.6 (ref 7–25)
CALCIUM SPEC-SCNC: 9.6 MG/DL (ref 8.6–10.5)
CHLORIDE SERPL-SCNC: 93 MMOL/L (ref 98–107)
CO2 SERPL-SCNC: 30 MMOL/L (ref 22–29)
CREAT BLD-MCNC: 0.77 MG/DL (ref 0.57–1)
DEPRECATED RDW RBC AUTO: 51.1 FL (ref 37–54)
EOSINOPHIL # BLD AUTO: 0.1 10*3/MM3 (ref 0–0.4)
EOSINOPHIL NFR BLD AUTO: 1.1 % (ref 0.3–6.2)
ERYTHROCYTE [DISTWIDTH] IN BLOOD BY AUTOMATED COUNT: 16 % (ref 12.3–15.4)
GFR SERPL CREATININE-BSD FRML MDRD: 74 ML/MIN/1.73
GLUCOSE BLD-MCNC: 89 MG/DL (ref 65–99)
HCT VFR BLD AUTO: 32.3 % (ref 34–46.6)
HGB BLD-MCNC: 10.3 G/DL (ref 12–15.9)
IMM GRANULOCYTES # BLD AUTO: 0.06 10*3/MM3 (ref 0–0.05)
IMM GRANULOCYTES NFR BLD AUTO: 0.7 % (ref 0–0.5)
LYMPHOCYTES # BLD AUTO: 0.93 10*3/MM3 (ref 0.7–3.1)
LYMPHOCYTES NFR BLD AUTO: 10.5 % (ref 19.6–45.3)
MCH RBC QN AUTO: 28.7 PG (ref 26.6–33)
MCHC RBC AUTO-ENTMCNC: 31.9 G/DL (ref 31.5–35.7)
MCV RBC AUTO: 90 FL (ref 79–97)
MONOCYTES # BLD AUTO: 0.79 10*3/MM3 (ref 0.1–0.9)
MONOCYTES NFR BLD AUTO: 8.9 % (ref 5–12)
NEUTROPHILS # BLD AUTO: 6.97 10*3/MM3 (ref 1.7–7)
NEUTROPHILS NFR BLD AUTO: 78.5 % (ref 42.7–76)
NRBC BLD AUTO-RTO: 0 /100 WBC (ref 0–0.2)
PLATELET # BLD AUTO: 230 10*3/MM3 (ref 140–450)
PMV BLD AUTO: 9.8 FL (ref 6–12)
POTASSIUM BLD-SCNC: 4.3 MMOL/L (ref 3.5–5.2)
RBC # BLD AUTO: 3.59 10*6/MM3 (ref 3.77–5.28)
SODIUM BLD-SCNC: 138 MMOL/L (ref 136–145)
WBC NRBC COR # BLD: 8.88 10*3/MM3 (ref 3.4–10.8)

## 2019-09-30 PROCEDURE — 80048 BASIC METABOLIC PNL TOTAL CA: CPT | Performed by: FAMILY MEDICINE

## 2019-09-30 PROCEDURE — 85025 COMPLETE CBC W/AUTO DIFF WBC: CPT | Performed by: FAMILY MEDICINE

## 2019-10-02 ENCOUNTER — HOSPITAL ENCOUNTER (OUTPATIENT)
Dept: INFUSION THERAPY | Age: 69
Discharge: HOME | End: 2019-10-02
Payer: MEDICARE

## 2019-10-02 ENCOUNTER — OFFICE VISIT (OUTPATIENT)
Dept: HEMATOLOGY | Age: 69
End: 2019-10-02
Payer: MEDICARE

## 2019-10-02 VITALS
BODY MASS INDEX: 20.83 KG/M2 | OXYGEN SATURATION: 97 % | SYSTOLIC BLOOD PRESSURE: 120 MMHG | HEART RATE: 64 BPM | HEIGHT: 64 IN | DIASTOLIC BLOOD PRESSURE: 60 MMHG | WEIGHT: 122 LBS

## 2019-10-02 DIAGNOSIS — C34.90 NON-SMALL CELL LUNG CANCER, UNSPECIFIED LATERALITY (HCC): Primary | Chronic | ICD-10-CM

## 2019-10-02 DIAGNOSIS — E27.8 ADRENAL NODULE (HCC): ICD-10-CM

## 2019-10-02 PROCEDURE — 99214 OFFICE O/P EST MOD 30 MIN: CPT | Performed by: INTERNAL MEDICINE

## 2019-10-02 PROCEDURE — 1111F DSCHRG MED/CURRENT MED MERGE: CPT | Performed by: INTERNAL MEDICINE

## 2019-10-02 PROCEDURE — G8427 DOCREV CUR MEDS BY ELIG CLIN: HCPCS | Performed by: INTERNAL MEDICINE

## 2019-10-02 PROCEDURE — 1036F TOBACCO NON-USER: CPT | Performed by: INTERNAL MEDICINE

## 2019-10-02 PROCEDURE — 3017F COLORECTAL CA SCREEN DOC REV: CPT | Performed by: INTERNAL MEDICINE

## 2019-10-02 PROCEDURE — 4040F PNEUMOC VAC/ADMIN/RCVD: CPT | Performed by: INTERNAL MEDICINE

## 2019-10-02 PROCEDURE — G8399 PT W/DXA RESULTS DOCUMENT: HCPCS | Performed by: INTERNAL MEDICINE

## 2019-10-02 PROCEDURE — G8484 FLU IMMUNIZE NO ADMIN: HCPCS | Performed by: INTERNAL MEDICINE

## 2019-10-02 PROCEDURE — G8420 CALC BMI NORM PARAMETERS: HCPCS | Performed by: INTERNAL MEDICINE

## 2019-10-02 PROCEDURE — 85025 COMPLETE CBC W/AUTO DIFF WBC: CPT

## 2019-10-02 PROCEDURE — 1123F ACP DISCUSS/DSCN MKR DOCD: CPT | Performed by: INTERNAL MEDICINE

## 2019-10-02 PROCEDURE — 1090F PRES/ABSN URINE INCON ASSESS: CPT | Performed by: INTERNAL MEDICINE

## 2019-10-20 ENCOUNTER — HOSPITAL ENCOUNTER (EMERGENCY)
Age: 69
Discharge: HOME | End: 2019-10-20
Attending: EMERGENCY MEDICINE
Payer: MEDICARE

## 2019-10-20 ENCOUNTER — APPOINTMENT (OUTPATIENT)
Dept: CT IMAGING | Age: 69
End: 2019-10-20
Payer: MEDICARE

## 2019-10-20 ENCOUNTER — APPOINTMENT (OUTPATIENT)
Dept: GENERAL RADIOLOGY | Age: 69
End: 2019-10-20
Payer: MEDICARE

## 2019-10-20 VITALS
HEART RATE: 102 BPM | HEIGHT: 64 IN | WEIGHT: 122 LBS | OXYGEN SATURATION: 93 % | DIASTOLIC BLOOD PRESSURE: 86 MMHG | SYSTOLIC BLOOD PRESSURE: 130 MMHG | BODY MASS INDEX: 20.83 KG/M2 | RESPIRATION RATE: 19 BRPM | TEMPERATURE: 98.8 F

## 2019-10-20 DIAGNOSIS — R16.1 SPLENOMEGALY: ICD-10-CM

## 2019-10-20 DIAGNOSIS — R07.89 RIGHT-SIDED CHEST WALL PAIN: Primary | ICD-10-CM

## 2019-10-20 DIAGNOSIS — J18.9 PNEUMONIA DUE TO ORGANISM: ICD-10-CM

## 2019-10-20 LAB
ALBUMIN SERPL-MCNC: 3.5 G/DL (ref 3.5–5.2)
ALP BLD-CCNC: 190 U/L (ref 35–104)
ALT SERPL-CCNC: 15 U/L (ref 5–33)
ANION GAP SERPL CALCULATED.3IONS-SCNC: 16 MMOL/L (ref 7–19)
AST SERPL-CCNC: 19 U/L (ref 5–32)
BASOPHILS ABSOLUTE: 0 K/UL (ref 0–0.2)
BASOPHILS RELATIVE PERCENT: 0.3 % (ref 0–1)
BILIRUB SERPL-MCNC: 0.3 MG/DL (ref 0.2–1.2)
BILIRUBIN URINE: NEGATIVE
BLOOD, URINE: NEGATIVE
BUN BLDV-MCNC: 14 MG/DL (ref 8–23)
CALCIUM SERPL-MCNC: 9 MG/DL (ref 8.8–10.2)
CHLORIDE BLD-SCNC: 98 MMOL/L (ref 98–111)
CLARITY: CLEAR
CO2: 24 MMOL/L (ref 22–29)
COLOR: YELLOW
CREAT SERPL-MCNC: 0.9 MG/DL (ref 0.5–0.9)
EOSINOPHILS ABSOLUTE: 0 K/UL (ref 0–0.6)
EOSINOPHILS RELATIVE PERCENT: 0.6 % (ref 0–5)
GFR NON-AFRICAN AMERICAN: >60
GLUCOSE BLD-MCNC: 117 MG/DL (ref 74–109)
GLUCOSE URINE: NEGATIVE MG/DL
HCT VFR BLD CALC: 29.5 % (ref 37–47)
HEMOGLOBIN: 9.3 G/DL (ref 12–16)
IMMATURE GRANULOCYTES #: 0.1 K/UL
INR BLD: 1.26 (ref 0.88–1.18)
KETONES, URINE: NEGATIVE MG/DL
LACTIC ACID: 1.7 MMOL/L (ref 0.5–1.9)
LEUKOCYTE ESTERASE, URINE: NEGATIVE
LIPASE: 17 U/L (ref 13–60)
LYMPHOCYTES ABSOLUTE: 0.9 K/UL (ref 1.1–4.5)
LYMPHOCYTES RELATIVE PERCENT: 12.7 % (ref 20–40)
MCH RBC QN AUTO: 27.8 PG (ref 27–31)
MCHC RBC AUTO-ENTMCNC: 31.5 G/DL (ref 33–37)
MCV RBC AUTO: 88.3 FL (ref 81–99)
MONOCYTES ABSOLUTE: 0.7 K/UL (ref 0–0.9)
MONOCYTES RELATIVE PERCENT: 9.4 % (ref 0–10)
NEUTROPHILS ABSOLUTE: 5.5 K/UL (ref 1.5–7.5)
NEUTROPHILS RELATIVE PERCENT: 76 % (ref 50–65)
NITRITE, URINE: NEGATIVE
PDW BLD-RTO: 15.4 % (ref 11.5–14.5)
PH UA: 7.5 (ref 5–8)
PLATELET # BLD: 181 K/UL (ref 130–400)
PMV BLD AUTO: 10.4 FL (ref 9.4–12.3)
POTASSIUM SERPL-SCNC: 3.2 MMOL/L (ref 3.5–5)
PRO-BNP: 1612 PG/ML (ref 0–900)
PROTEIN UA: NEGATIVE MG/DL
PROTHROMBIN TIME: 15.2 SEC (ref 12–14.6)
RBC # BLD: 3.34 M/UL (ref 4.2–5.4)
SODIUM BLD-SCNC: 138 MMOL/L (ref 136–145)
SPECIFIC GRAVITY UA: 1.03 (ref 1–1.03)
TOTAL PROTEIN: 6.7 G/DL (ref 6.6–8.7)
TROPONIN: <0.01 NG/ML (ref 0–0.03)
URINE REFLEX TO CULTURE: NORMAL
UROBILINOGEN, URINE: 0.2 E.U./DL
WBC # BLD: 7.3 K/UL (ref 4.8–10.8)

## 2019-10-20 PROCEDURE — 83690 ASSAY OF LIPASE: CPT

## 2019-10-20 PROCEDURE — 96365 THER/PROPH/DIAG IV INF INIT: CPT

## 2019-10-20 PROCEDURE — 71275 CT ANGIOGRAPHY CHEST: CPT

## 2019-10-20 PROCEDURE — 85610 PROTHROMBIN TIME: CPT

## 2019-10-20 PROCEDURE — 99285 EMERGENCY DEPT VISIT HI MDM: CPT

## 2019-10-20 PROCEDURE — 93005 ELECTROCARDIOGRAM TRACING: CPT | Performed by: EMERGENCY MEDICINE

## 2019-10-20 PROCEDURE — 83880 ASSAY OF NATRIURETIC PEPTIDE: CPT

## 2019-10-20 PROCEDURE — 80053 COMPREHEN METABOLIC PANEL: CPT

## 2019-10-20 PROCEDURE — 2580000003 HC RX 258: Performed by: EMERGENCY MEDICINE

## 2019-10-20 PROCEDURE — 71046 X-RAY EXAM CHEST 2 VIEWS: CPT

## 2019-10-20 PROCEDURE — 6360000004 HC RX CONTRAST MEDICATION: Performed by: EMERGENCY MEDICINE

## 2019-10-20 PROCEDURE — 84484 ASSAY OF TROPONIN QUANT: CPT

## 2019-10-20 PROCEDURE — 6360000002 HC RX W HCPCS: Performed by: EMERGENCY MEDICINE

## 2019-10-20 PROCEDURE — 85025 COMPLETE CBC W/AUTO DIFF WBC: CPT

## 2019-10-20 PROCEDURE — 87040 BLOOD CULTURE FOR BACTERIA: CPT

## 2019-10-20 PROCEDURE — 81003 URINALYSIS AUTO W/O SCOPE: CPT

## 2019-10-20 PROCEDURE — 36415 COLL VENOUS BLD VENIPUNCTURE: CPT

## 2019-10-20 PROCEDURE — 83605 ASSAY OF LACTIC ACID: CPT

## 2019-10-20 PROCEDURE — 96375 TX/PRO/DX INJ NEW DRUG ADDON: CPT

## 2019-10-20 RX ORDER — CEFDINIR 300 MG/1
300 CAPSULE ORAL 2 TIMES DAILY
Qty: 14 CAPSULE | Refills: 0 | Status: SHIPPED | OUTPATIENT
Start: 2019-10-20 | End: 2019-10-27

## 2019-10-20 RX ORDER — KETOROLAC TROMETHAMINE 30 MG/ML
15 INJECTION, SOLUTION INTRAMUSCULAR; INTRAVENOUS ONCE
Status: COMPLETED | OUTPATIENT
Start: 2019-10-20 | End: 2019-10-20

## 2019-10-20 RX ADMIN — IOPAMIDOL 90 ML: 755 INJECTION, SOLUTION INTRAVENOUS at 16:22

## 2019-10-20 RX ADMIN — KETOROLAC TROMETHAMINE 15 MG: 30 INJECTION, SOLUTION INTRAMUSCULAR at 17:47

## 2019-10-20 RX ADMIN — CEFTRIAXONE 1 G: 1 INJECTION, POWDER, FOR SOLUTION INTRAMUSCULAR; INTRAVENOUS at 17:47

## 2019-10-20 ASSESSMENT — ENCOUNTER SYMPTOMS
BACK PAIN: 0
COUGH: 1
SHORTNESS OF BREATH: 1
VOMITING: 0
ABDOMINAL PAIN: 0

## 2019-10-20 ASSESSMENT — PAIN DESCRIPTION - DESCRIPTORS: DESCRIPTORS: SHARP

## 2019-10-20 ASSESSMENT — PAIN DESCRIPTION - LOCATION: LOCATION: CHEST

## 2019-10-20 ASSESSMENT — PAIN SCALES - GENERAL
PAINLEVEL_OUTOF10: 10
PAINLEVEL_OUTOF10: 7

## 2019-10-21 LAB
EKG P AXIS: 15 DEGREES
EKG P-R INTERVAL: 158 MS
EKG Q-T INTERVAL: 362 MS
EKG QRS DURATION: 90 MS
EKG QTC CALCULATION (BAZETT): 447 MS
EKG T AXIS: 6 DEGREES

## 2019-10-21 PROCEDURE — 93010 ELECTROCARDIOGRAM REPORT: CPT | Performed by: INTERNAL MEDICINE

## 2019-10-22 RX ORDER — AZELASTINE 1 MG/ML
SPRAY, METERED NASAL
Refills: 3 | OUTPATIENT
Start: 2019-10-22

## 2019-10-25 LAB
BLOOD CULTURE, ROUTINE: NORMAL
CULTURE, BLOOD 2: NORMAL

## 2019-10-30 ENCOUNTER — HOSPITAL ENCOUNTER (OUTPATIENT)
Dept: GENERAL RADIOLOGY | Age: 69
Discharge: HOME | End: 2019-10-30
Payer: MEDICARE

## 2019-10-30 DIAGNOSIS — R05.9 COUGH: ICD-10-CM

## 2019-10-30 PROCEDURE — 71046 X-RAY EXAM CHEST 2 VIEWS: CPT

## 2019-11-01 ENCOUNTER — OFFICE VISIT (OUTPATIENT)
Dept: NEUROLOGY | Facility: CLINIC | Age: 69
End: 2019-11-01

## 2019-11-01 VITALS
SYSTOLIC BLOOD PRESSURE: 106 MMHG | RESPIRATION RATE: 18 BRPM | DIASTOLIC BLOOD PRESSURE: 70 MMHG | HEART RATE: 88 BPM | WEIGHT: 127 LBS | HEIGHT: 64 IN | BODY MASS INDEX: 21.68 KG/M2

## 2019-11-01 DIAGNOSIS — R41.3 MEMORY DIFFICULTY: Primary | ICD-10-CM

## 2019-11-01 PROCEDURE — 99215 OFFICE O/P EST HI 40 MIN: CPT | Performed by: PSYCHIATRY & NEUROLOGY

## 2019-11-01 NOTE — PATIENT INSTRUCTIONS
Patient not to be driving.  Patient had fall precautions.  Patient not to be climbing or working over hot fire/stove/water or with sharp cutting tools or at heights

## 2019-11-01 NOTE — PROGRESS NOTES
Subjective   Marcy Garcia, 1950, is a female who is being seen today for   Chief Complaint   Patient presents with   • Memory Loss       HISTORY OF PRESENT ILLNESS: Extended follow-up.  Patient seen for memory difficulty.  Patient has had a history of metastatic lung cancer to brain removed right frontal lobe tumor area and followed by Dr. Schmidt with regular MRIs with and without contrast.  These MRIs appear stable last one being in July 2019.  Patient having memory difficulty and had memory evaluation in July showing some delayed recall difficulties.  Patient does not drive.  Patient has been in the hospital multiple times with chemotherapy related problems including confusion episodes around the time of her chemo.  Patient is on antibiotic for UTI at this point.  Patient has variable headaches sometimes right frontal which she calls sinus headaches and occasionally takes Tylenol for that.  Patient had an EEG done in May of this year showing no significant seizure tendencies or other abnormalities.    REVIEW OF SYSTEMS:   GENERAL: Blood pressure today 104/68 left arm seated with 106/70 left arm standing.  Pulse of 88 and regular  PULMONARY: Patient has shortness of breath and dyspnea on exertion  CVS: No acute chest pain/palpitation  GASTROINTESTINAL: No acute GI distress  GENITOURINARY: As above  GYN: No acute GYN distress  MUSCULOSKELETAL: Patient has had lower extremity swelling with discoloration  HEENT: Patient says she is supposed to have cataract surgery  ENDOCRINE: No acute endocrine symptoms  PSYCHIATRIC: No acute psychiatric symptoms  HEMATOLOGY: Patient is anemic and hypokalemic and is to follow with PCP regarding that.  SKIN: As above    Family history of stroke  Social history: Patient denies smoking or alcohol or drug use at this time  PHYSICAL EXAMINATION:    GENERAL: No acute distress  CRANIUM: Post craniotomy and no specific tenderness noted  HEENT:       EYES: EOMs intact without  nystagmus and fields full to confrontation.  Pupils equal round reactive to light and no acute fundic abnormalities.       EARS: Tympanic membranes normal and hears tuning fork bilaterally       THROAT: No oropharynx abnormalities       NECK: No bruit/no lymphadenopathy  CHEST: No acute cardiopulmonary abnormalities by auscultation  ABDOMEN: Nondistended  EXTREMITIES: Pulses symmetrical dorsalis pedis  NEURO: Patient alert and follows commands with minimal difficulty.  MMSE is 25 out of 30  SPEECH: Normal    CRANIAL NERVES: Motor on the face shows slight left lower facial droop.  Symmetric pin sensation    MOTOR STRENGTH: Motor strength upper and lower extremities normal  STATION AND GAIT: Gait slightly wide-based but no tendency to fall though has some difficulty with turns.  Romberg negative  CEREBELLAR: Finger-nose and heel-to-shin normal  SENSORY: Patient has significant decrease in pin and vibration distal proximal lower extremities ankles bilaterally but normal pin and vibration upper extremities  REFLEXES: Reflexes are absent at the ankle jerk but present and symmetric at the biceps and knee jerks bilaterally.  Toes equivocal and no clonus noted  OTHER: Patient has discoloration and peripheral edema distally in the lower extremities    ASSESSMENT AND PLAN: Patient with a history of memory difficulties with previous right frontal lobe resection.  Patient is to get formal memory testing and EEG and further blood work.  Patient is to get overnight continuous oximetry on room air.  I spent 40 minutes with this patient with 30 minutes counseling.Patient's Body mass index is 21.8 kg/m². BMI is within normal parameters. No follow-up required.  Fall precautions and safety precautions reviewed.  Patient does not drive.      Marcy was seen today for memory loss.    Diagnoses and all orders for this visit:    Memory difficulty  -     Ambulatory Referral to Speech Therapy  -     EEG; Future  -     Folate; Future  -      Sedimentation Rate; Future  -     Overnight Sleep Oximetry Study; Future  -     T4, Free; Future        Dictated utilizing Dragon voice recognition software

## 2019-11-08 ENCOUNTER — APPOINTMENT (OUTPATIENT)
Dept: NEUROLOGY | Facility: HOSPITAL | Age: 69
End: 2019-11-08

## 2019-11-12 ENCOUNTER — LAB REQUISITION (OUTPATIENT)
Dept: LAB | Facility: HOSPITAL | Age: 69
End: 2019-11-12

## 2019-11-12 DIAGNOSIS — Z00.00 ENCOUNTER FOR GENERAL ADULT MEDICAL EXAMINATION WITHOUT ABNORMAL FINDINGS: ICD-10-CM

## 2019-11-12 LAB
ALBUMIN SERPL-MCNC: 3.8 G/DL (ref 3.5–5.2)
ALBUMIN/GLOB SERPL: 1.4 G/DL
ALP SERPL-CCNC: 174 U/L (ref 39–117)
ALT SERPL W P-5'-P-CCNC: 11 U/L (ref 1–33)
ANION GAP SERPL CALCULATED.3IONS-SCNC: 10 MMOL/L (ref 5–15)
AST SERPL-CCNC: 17 U/L (ref 1–32)
BASOPHILS # BLD AUTO: 0.02 10*3/MM3 (ref 0–0.2)
BASOPHILS NFR BLD AUTO: 0.4 % (ref 0–1.5)
BILIRUB SERPL-MCNC: 0.3 MG/DL (ref 0.2–1.2)
BUN BLD-MCNC: 12 MG/DL (ref 8–23)
BUN/CREAT SERPL: 15.4 (ref 7–25)
CALCIUM SPEC-SCNC: 9.4 MG/DL (ref 8.6–10.5)
CHLORIDE SERPL-SCNC: 101 MMOL/L (ref 98–107)
CO2 SERPL-SCNC: 28 MMOL/L (ref 22–29)
CREAT BLD-MCNC: 0.78 MG/DL (ref 0.57–1)
DEPRECATED RDW RBC AUTO: 45.5 FL (ref 37–54)
EOSINOPHIL # BLD AUTO: 0.13 10*3/MM3 (ref 0–0.4)
EOSINOPHIL NFR BLD AUTO: 2.8 % (ref 0.3–6.2)
ERYTHROCYTE [DISTWIDTH] IN BLOOD BY AUTOMATED COUNT: 15.5 % (ref 12.3–15.4)
GFR SERPL CREATININE-BSD FRML MDRD: 73 ML/MIN/1.73
GLOBULIN UR ELPH-MCNC: 2.8 GM/DL
GLUCOSE BLD-MCNC: 93 MG/DL (ref 65–99)
HCT VFR BLD AUTO: 30 % (ref 34–46.6)
HGB BLD-MCNC: 9.5 G/DL (ref 12–15.9)
IMM GRANULOCYTES # BLD AUTO: 0.01 10*3/MM3 (ref 0–0.05)
IMM GRANULOCYTES NFR BLD AUTO: 0.2 % (ref 0–0.5)
LYMPHOCYTES # BLD AUTO: 0.79 10*3/MM3 (ref 0.7–3.1)
LYMPHOCYTES NFR BLD AUTO: 16.9 % (ref 19.6–45.3)
MCH RBC QN AUTO: 26 PG (ref 26.6–33)
MCHC RBC AUTO-ENTMCNC: 31.7 G/DL (ref 31.5–35.7)
MCV RBC AUTO: 82 FL (ref 79–97)
MONOCYTES # BLD AUTO: 0.42 10*3/MM3 (ref 0.1–0.9)
MONOCYTES NFR BLD AUTO: 9 % (ref 5–12)
NEUTROPHILS # BLD AUTO: 3.3 10*3/MM3 (ref 1.7–7)
NEUTROPHILS NFR BLD AUTO: 70.7 % (ref 42.7–76)
NRBC BLD AUTO-RTO: 0 /100 WBC (ref 0–0.2)
PLATELET # BLD AUTO: 196 10*3/MM3 (ref 140–450)
PMV BLD AUTO: 10.4 FL (ref 6–12)
POTASSIUM BLD-SCNC: 3.5 MMOL/L (ref 3.5–5.2)
PROT SERPL-MCNC: 6.6 G/DL (ref 6–8.5)
RBC # BLD AUTO: 3.66 10*6/MM3 (ref 3.77–5.28)
SODIUM BLD-SCNC: 139 MMOL/L (ref 136–145)
WBC NRBC COR # BLD: 4.67 10*3/MM3 (ref 3.4–10.8)

## 2019-11-12 PROCEDURE — 85025 COMPLETE CBC W/AUTO DIFF WBC: CPT | Performed by: FAMILY MEDICINE

## 2019-11-12 PROCEDURE — 80053 COMPREHEN METABOLIC PANEL: CPT | Performed by: FAMILY MEDICINE

## 2019-11-13 ENCOUNTER — HOSPITAL ENCOUNTER (OUTPATIENT)
Dept: NEUROLOGY | Facility: HOSPITAL | Age: 69
Discharge: HOME | End: 2019-11-13
Admitting: PSYCHIATRY & NEUROLOGY

## 2019-11-13 ENCOUNTER — TRANSCRIBE ORDERS (OUTPATIENT)
Dept: ADMINISTRATIVE | Facility: HOSPITAL | Age: 69
End: 2019-11-13

## 2019-11-13 DIAGNOSIS — R41.3 MEMORY LOSS: Primary | ICD-10-CM

## 2019-11-13 PROCEDURE — 95816 EEG AWAKE AND DROWSY: CPT

## 2019-11-13 PROCEDURE — 95813 EEG EXTND MNTR 61-119 MIN: CPT | Performed by: PSYCHIATRY & NEUROLOGY

## 2019-11-14 ENCOUNTER — TELEPHONE (OUTPATIENT)
Dept: NEUROLOGY | Facility: CLINIC | Age: 69
End: 2019-11-14

## 2019-11-14 DIAGNOSIS — R41.3 MEMORY DIFFICULTY: Primary | ICD-10-CM

## 2019-11-14 DIAGNOSIS — Z98.890 S/P CRANIOTOMY: ICD-10-CM

## 2019-11-14 NOTE — TELEPHONE ENCOUNTER
I did talk with the patient at  request in regards to her abnormal EEG.  Patient is in agreement to have a 24 hour EEG.  I did let her know this will be done through a company, [employer].  I did let her know some one will be in contact with her to set this up.

## 2019-11-19 ENCOUNTER — LAB REQUISITION (OUTPATIENT)
Dept: LAB | Facility: HOSPITAL | Age: 69
End: 2019-11-19

## 2019-11-19 DIAGNOSIS — Z00.00 ENCOUNTER FOR GENERAL ADULT MEDICAL EXAMINATION WITHOUT ABNORMAL FINDINGS: ICD-10-CM

## 2019-11-19 LAB
ERYTHROCYTE [SEDIMENTATION RATE] IN BLOOD: 26 MM/HR (ref 0–20)
T4 FREE SERPL-MCNC: 1.17 NG/DL (ref 0.93–1.7)

## 2019-11-19 PROCEDURE — 82746 ASSAY OF FOLIC ACID SERUM: CPT | Performed by: PSYCHIATRY & NEUROLOGY

## 2019-11-19 PROCEDURE — 85651 RBC SED RATE NONAUTOMATED: CPT | Performed by: PSYCHIATRY & NEUROLOGY

## 2019-11-19 PROCEDURE — 84439 ASSAY OF FREE THYROXINE: CPT | Performed by: PSYCHIATRY & NEUROLOGY

## 2019-11-20 LAB — FOLATE SERPL-MCNC: >20 NG/ML (ref 4.78–24.2)

## 2019-11-22 ENCOUNTER — HOSPITAL ENCOUNTER (OUTPATIENT)
Dept: GENERAL RADIOLOGY | Age: 69
Discharge: HOME | End: 2019-11-22
Payer: MEDICARE

## 2019-11-22 DIAGNOSIS — R05.9 COUGH: ICD-10-CM

## 2019-11-22 PROCEDURE — 71046 X-RAY EXAM CHEST 2 VIEWS: CPT

## 2019-11-25 ENCOUNTER — LAB REQUISITION (OUTPATIENT)
Dept: LAB | Facility: HOSPITAL | Age: 69
End: 2019-11-25

## 2019-11-25 DIAGNOSIS — Z00.00 ENCOUNTER FOR GENERAL ADULT MEDICAL EXAMINATION WITHOUT ABNORMAL FINDINGS: ICD-10-CM

## 2019-11-25 LAB
ALBUMIN SERPL-MCNC: 3.8 G/DL (ref 3.5–5.2)
ALBUMIN SERPL-MCNC: 3.8 G/DL (ref 3.5–5.2)
ALBUMIN/GLOB SERPL: 1.4 G/DL
ALP SERPL-CCNC: 178 U/L (ref 39–117)
ALT SERPL W P-5'-P-CCNC: 11 U/L (ref 1–33)
ANION GAP SERPL CALCULATED.3IONS-SCNC: 12 MMOL/L (ref 5–15)
ANION GAP SERPL CALCULATED.3IONS-SCNC: 12 MMOL/L (ref 5–15)
AST SERPL-CCNC: 22 U/L (ref 1–32)
BASOPHILS # BLD AUTO: 0.01 10*3/MM3 (ref 0–0.2)
BASOPHILS NFR BLD AUTO: 0.2 % (ref 0–1.5)
BILIRUB SERPL-MCNC: 0.3 MG/DL (ref 0.2–1.2)
BUN BLD-MCNC: 11 MG/DL (ref 8–23)
BUN BLD-MCNC: 11 MG/DL (ref 8–23)
BUN/CREAT SERPL: 13.6 (ref 7–25)
BUN/CREAT SERPL: 13.6 (ref 7–25)
CALCIUM SPEC-SCNC: 9 MG/DL (ref 8.6–10.5)
CALCIUM SPEC-SCNC: 9 MG/DL (ref 8.6–10.5)
CHLORIDE SERPL-SCNC: 98 MMOL/L (ref 98–107)
CHLORIDE SERPL-SCNC: 98 MMOL/L (ref 98–107)
CO2 SERPL-SCNC: 28 MMOL/L (ref 22–29)
CO2 SERPL-SCNC: 28 MMOL/L (ref 22–29)
CREAT BLD-MCNC: 0.81 MG/DL (ref 0.57–1)
CREAT BLD-MCNC: 0.81 MG/DL (ref 0.57–1)
DEPRECATED RDW RBC AUTO: 44.5 FL (ref 37–54)
EOSINOPHIL # BLD AUTO: 0.12 10*3/MM3 (ref 0–0.4)
EOSINOPHIL NFR BLD AUTO: 2.4 % (ref 0.3–6.2)
ERYTHROCYTE [DISTWIDTH] IN BLOOD BY AUTOMATED COUNT: 15.5 % (ref 12.3–15.4)
GFR SERPL CREATININE-BSD FRML MDRD: 70 ML/MIN/1.73
GFR SERPL CREATININE-BSD FRML MDRD: 70 ML/MIN/1.73
GLOBULIN UR ELPH-MCNC: 2.8 GM/DL
GLUCOSE BLD-MCNC: 93 MG/DL (ref 65–99)
GLUCOSE BLD-MCNC: 93 MG/DL (ref 65–99)
HCT VFR BLD AUTO: 29.7 % (ref 34–46.6)
HGB BLD-MCNC: 9.4 G/DL (ref 12–15.9)
IMM GRANULOCYTES # BLD AUTO: 0.01 10*3/MM3 (ref 0–0.05)
IMM GRANULOCYTES NFR BLD AUTO: 0.2 % (ref 0–0.5)
LYMPHOCYTES # BLD AUTO: 0.8 10*3/MM3 (ref 0.7–3.1)
LYMPHOCYTES NFR BLD AUTO: 16.2 % (ref 19.6–45.3)
MCH RBC QN AUTO: 25.5 PG (ref 26.6–33)
MCHC RBC AUTO-ENTMCNC: 31.6 G/DL (ref 31.5–35.7)
MCV RBC AUTO: 80.5 FL (ref 79–97)
MONOCYTES # BLD AUTO: 0.44 10*3/MM3 (ref 0.1–0.9)
MONOCYTES NFR BLD AUTO: 8.9 % (ref 5–12)
NEUTROPHILS # BLD AUTO: 3.56 10*3/MM3 (ref 1.7–7)
NEUTROPHILS NFR BLD AUTO: 72.1 % (ref 42.7–76)
NRBC BLD AUTO-RTO: 0 /100 WBC (ref 0–0.2)
PHOSPHATE SERPL-MCNC: 4 MG/DL (ref 2.5–4.5)
PHOSPHATE SERPL-MCNC: 4 MG/DL (ref 2.5–4.5)
PLATELET # BLD AUTO: 192 10*3/MM3 (ref 140–450)
PMV BLD AUTO: 10.5 FL (ref 6–12)
POTASSIUM BLD-SCNC: 3.6 MMOL/L (ref 3.5–5.2)
POTASSIUM BLD-SCNC: 3.6 MMOL/L (ref 3.5–5.2)
PROT SERPL-MCNC: 6.6 G/DL (ref 6–8.5)
RBC # BLD AUTO: 3.69 10*6/MM3 (ref 3.77–5.28)
SODIUM BLD-SCNC: 138 MMOL/L (ref 136–145)
SODIUM BLD-SCNC: 138 MMOL/L (ref 136–145)
WBC NRBC COR # BLD: 4.94 10*3/MM3 (ref 3.4–10.8)

## 2019-11-25 PROCEDURE — 80053 COMPREHEN METABOLIC PANEL: CPT | Performed by: FAMILY MEDICINE

## 2019-11-25 PROCEDURE — 85025 COMPLETE CBC W/AUTO DIFF WBC: CPT | Performed by: FAMILY MEDICINE

## 2019-11-25 PROCEDURE — 84100 ASSAY OF PHOSPHORUS: CPT | Performed by: FAMILY MEDICINE

## 2019-11-27 DIAGNOSIS — R41.3 MEMORY DIFFICULTY: ICD-10-CM

## 2019-12-02 ENCOUNTER — TELEPHONE (OUTPATIENT)
Dept: NEUROSURGERY | Facility: CLINIC | Age: 69
End: 2019-12-02

## 2019-12-02 ENCOUNTER — LAB REQUISITION (OUTPATIENT)
Dept: LAB | Facility: HOSPITAL | Age: 69
End: 2019-12-02

## 2019-12-02 DIAGNOSIS — Z00.00 ENCOUNTER FOR GENERAL ADULT MEDICAL EXAMINATION WITHOUT ABNORMAL FINDINGS: ICD-10-CM

## 2019-12-02 LAB
ANION GAP SERPL CALCULATED.3IONS-SCNC: 11 MMOL/L (ref 5–15)
BUN BLD-MCNC: 12 MG/DL (ref 8–23)
BUN/CREAT SERPL: 14 (ref 7–25)
CALCIUM SPEC-SCNC: 9.2 MG/DL (ref 8.6–10.5)
CHLORIDE SERPL-SCNC: 99 MMOL/L (ref 98–107)
CO2 SERPL-SCNC: 28 MMOL/L (ref 22–29)
CREAT BLD-MCNC: 0.86 MG/DL (ref 0.57–1)
GFR SERPL CREATININE-BSD FRML MDRD: 65 ML/MIN/1.73
GLUCOSE BLD-MCNC: 98 MG/DL (ref 65–99)
POTASSIUM BLD-SCNC: 3.8 MMOL/L (ref 3.5–5.2)
SODIUM BLD-SCNC: 138 MMOL/L (ref 136–145)

## 2019-12-02 PROCEDURE — 80048 BASIC METABOLIC PNL TOTAL CA: CPT | Performed by: FAMILY MEDICINE

## 2019-12-02 NOTE — TELEPHONE ENCOUNTER
Left voicemail asking patient to call me back to confirm her appt with Dr Schmidt tomorrow    mi baker CMA

## 2019-12-03 ENCOUNTER — HOSPITAL ENCOUNTER (OUTPATIENT)
Dept: MRI IMAGING | Facility: HOSPITAL | Age: 69
Discharge: HOME | End: 2019-12-03
Admitting: NEUROLOGICAL SURGERY

## 2019-12-03 ENCOUNTER — OFFICE VISIT (OUTPATIENT)
Dept: NEUROSURGERY | Facility: CLINIC | Age: 69
End: 2019-12-03

## 2019-12-03 VITALS
HEIGHT: 64 IN | BODY MASS INDEX: 21.68 KG/M2 | SYSTOLIC BLOOD PRESSURE: 116 MMHG | WEIGHT: 127 LBS | DIASTOLIC BLOOD PRESSURE: 64 MMHG

## 2019-12-03 DIAGNOSIS — C79.49 SECONDARY MALIGNANT NEOPLASM OF BRAIN AND SPINAL CORD (HCC): Chronic | ICD-10-CM

## 2019-12-03 DIAGNOSIS — C79.31 METASTATIC ADENOCARCINOMA TO BRAIN (HCC): Primary | ICD-10-CM

## 2019-12-03 DIAGNOSIS — Z87.891 FORMER SMOKER: ICD-10-CM

## 2019-12-03 DIAGNOSIS — C79.31 SECONDARY MALIGNANT NEOPLASM OF BRAIN AND SPINAL CORD (HCC): Chronic | ICD-10-CM

## 2019-12-03 DIAGNOSIS — C34.92 ADENOCARCINOMA OF LUNG, STAGE 4, LEFT (HCC): ICD-10-CM

## 2019-12-03 PROCEDURE — 99213 OFFICE O/P EST LOW 20 MIN: CPT | Performed by: NEUROLOGICAL SURGERY

## 2019-12-03 PROCEDURE — 0 GADOBENATE DIMEGLUMINE 529 MG/ML SOLUTION: Performed by: NEUROLOGICAL SURGERY

## 2019-12-03 PROCEDURE — 70553 MRI BRAIN STEM W/O & W/DYE: CPT

## 2019-12-03 PROCEDURE — A9577 INJ MULTIHANCE: HCPCS | Performed by: NEUROLOGICAL SURGERY

## 2019-12-03 RX ORDER — AZELASTINE HCL 205.5 UG/1
1 SPRAY NASAL DAILY
Refills: 3 | COMMUNITY
Start: 2019-10-24 | End: 2020-01-06 | Stop reason: ALTCHOICE

## 2019-12-03 RX ORDER — POTASSIUM BICARBONATE 977.5 MG/1
TABLET, EFFERVESCENT ORAL
Refills: 3 | COMMUNITY
Start: 2019-11-27

## 2019-12-03 RX ORDER — FLUTICASONE PROPIONATE 50 MCG
1 SPRAY, SUSPENSION (ML) NASAL DAILY
Refills: 3 | COMMUNITY
Start: 2019-11-25

## 2019-12-03 RX ORDER — ONDANSETRON 4 MG/1
TABLET, ORALLY DISINTEGRATING ORAL
Refills: 0 | COMMUNITY
Start: 2019-09-20

## 2019-12-03 RX ORDER — ALBUTEROL SULFATE 90 UG/1
AEROSOL, METERED RESPIRATORY (INHALATION) SEE ADMIN INSTRUCTIONS
Refills: 2 | COMMUNITY
Start: 2019-11-30

## 2019-12-03 RX ADMIN — GADOBENATE DIMEGLUMINE 10 ML: 529 INJECTION, SOLUTION INTRAVENOUS at 14:28

## 2019-12-03 NOTE — PROGRESS NOTES
SUBJECTIVE:  Patient ID: Marcy Garcia is a 69 y.o. female is here today for follow-up.    Chief Complaint: Metastatic cancer  Chief Complaint   Patient presents with   • Brain Tumor     patient is here for a 4 month follow up with MRI today @ [facility]       HPI  69-year-old female with a history of lung cancer who underwent craniotomy in January 2018 and then stereotactic radiosurgery.     The following portions of the patient's history were reviewed and updated as appropriate: allergies, current medications, past family history, past medical history, past social history, past surgical history and problem list.    OBJECTIVE:    Review of Systems   All other systems reviewed and are negative.         Physical Exam   Constitutional: She is oriented to person, place, and time. She appears well-developed and well-nourished.   HENT:   Head: Normocephalic and atraumatic.   Right Ear: Hearing normal.   Left Ear: Hearing normal.   Eyes: EOM are normal. Pupils are equal, round, and reactive to light.   Neck: Normal range of motion.   Neurological: She is alert and oriented to person, place, and time. She has normal strength and normal reflexes. No cranial nerve deficit or sensory deficit. She displays a negative Romberg sign. GCS eye subscore is 4. GCS verbal subscore is 5. GCS motor subscore is 6. She displays no Babinski's sign on the right side. She displays no Babinski's sign on the left side.   Psychiatric: Her speech is normal. Judgment normal. Cognition and memory are normal.       Neurologic Exam     Mental Status   Oriented to person, place, and time.   Speech: speech is normal     Cranial Nerves     CN III, IV, VI   Pupils are equal, round, and reactive to light.  Extraocular motions are normal.     Motor Exam     Strength   Strength 5/5 throughout.       Independent Review of Radiographic Studies:   MRI of the brain with and without contrast shows no new areas of enhancement.    ASSESSMENT/PLAN:  Marcy is doing fine.   Her MRI is stable.  We will see her in follow-up in about 4 months.      1. 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 Former smoker    3. BMI 21.0-21.9, adult            No Follow-up on file.      Constantine Schmidt MD

## 2019-12-16 ENCOUNTER — LAB REQUISITION (OUTPATIENT)
Dept: LAB | Facility: HOSPITAL | Age: 69
End: 2019-12-16

## 2019-12-16 DIAGNOSIS — Z00.00 ENCOUNTER FOR GENERAL ADULT MEDICAL EXAMINATION WITHOUT ABNORMAL FINDINGS: ICD-10-CM

## 2019-12-16 LAB
ANION GAP SERPL CALCULATED.3IONS-SCNC: 12 MMOL/L (ref 5–15)
BASOPHILS # BLD AUTO: 0.02 10*3/MM3 (ref 0–0.2)
BASOPHILS NFR BLD AUTO: 0.4 % (ref 0–1.5)
BUN BLD-MCNC: 14 MG/DL (ref 8–23)
BUN/CREAT SERPL: 15.7 (ref 7–25)
CALCIUM SPEC-SCNC: 9.5 MG/DL (ref 8.6–10.5)
CHLORIDE SERPL-SCNC: 98 MMOL/L (ref 98–107)
CO2 SERPL-SCNC: 27 MMOL/L (ref 22–29)
CREAT BLD-MCNC: 0.89 MG/DL (ref 0.57–1)
DEPRECATED RDW RBC AUTO: 44.8 FL (ref 37–54)
EOSINOPHIL # BLD AUTO: 0.21 10*3/MM3 (ref 0–0.4)
EOSINOPHIL NFR BLD AUTO: 3.8 % (ref 0.3–6.2)
ERYTHROCYTE [DISTWIDTH] IN BLOOD BY AUTOMATED COUNT: 15.8 % (ref 12.3–15.4)
GFR SERPL CREATININE-BSD FRML MDRD: 63 ML/MIN/1.73
GLUCOSE BLD-MCNC: 112 MG/DL (ref 65–99)
HCT VFR BLD AUTO: 33.5 % (ref 34–46.6)
HGB BLD-MCNC: 10.4 G/DL (ref 12–15.9)
IMM GRANULOCYTES # BLD AUTO: 0.03 10*3/MM3 (ref 0–0.05)
IMM GRANULOCYTES NFR BLD AUTO: 0.5 % (ref 0–0.5)
LYMPHOCYTES # BLD AUTO: 0.95 10*3/MM3 (ref 0.7–3.1)
LYMPHOCYTES NFR BLD AUTO: 17 % (ref 19.6–45.3)
MCH RBC QN AUTO: 24.5 PG (ref 26.6–33)
MCHC RBC AUTO-ENTMCNC: 31 G/DL (ref 31.5–35.7)
MCV RBC AUTO: 79 FL (ref 79–97)
MONOCYTES # BLD AUTO: 0.48 10*3/MM3 (ref 0.1–0.9)
MONOCYTES NFR BLD AUTO: 8.6 % (ref 5–12)
NEUTROPHILS # BLD AUTO: 3.89 10*3/MM3 (ref 1.7–7)
NEUTROPHILS NFR BLD AUTO: 69.7 % (ref 42.7–76)
NRBC BLD AUTO-RTO: 0 /100 WBC (ref 0–0.2)
PLATELET # BLD AUTO: 220 10*3/MM3 (ref 140–450)
PMV BLD AUTO: 10.7 FL (ref 6–12)
POTASSIUM BLD-SCNC: 3.4 MMOL/L (ref 3.5–5.2)
RBC # BLD AUTO: 4.24 10*6/MM3 (ref 3.77–5.28)
SODIUM BLD-SCNC: 137 MMOL/L (ref 136–145)
WBC NRBC COR # BLD: 5.58 10*3/MM3 (ref 3.4–10.8)

## 2019-12-16 PROCEDURE — 85025 COMPLETE CBC W/AUTO DIFF WBC: CPT | Performed by: FAMILY MEDICINE

## 2019-12-16 PROCEDURE — 80048 BASIC METABOLIC PNL TOTAL CA: CPT | Performed by: FAMILY MEDICINE

## 2019-12-17 ENCOUNTER — OFFICE VISIT (OUTPATIENT)
Dept: PHYSICAL THERAPY | Facility: CLINIC | Age: 69
End: 2019-12-17

## 2019-12-17 DIAGNOSIS — R41.89 OTHER SYMPTOMS AND SIGNS INVOLVING COGNITIVE FUNCTIONS AND AWARENESS: Primary | ICD-10-CM

## 2019-12-17 PROCEDURE — 96125 COGNITIVE TEST BY HC PRO: CPT | Performed by: SPEECH-LANGUAGE PATHOLOGIST

## 2019-12-17 NOTE — PROGRESS NOTES
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)   • S/P craniotomy   • Sensorineural hearing loss (SNHL) of both ears   • Allergic rhinitis   • Adenocarcinoma of lung, stage 4, left (CMS/HCC)   • COPD (chronic obstructive pulmonary disease) (CMS/HCC)   • Anxiety and depression   • Mixed hyperlipidemia   • Metastatic adenocarcinoma to brain (CMS/HCC)   • Essential hypertension   • Altered bowel elimination due to intestinal ostomy (CMS/HCC)   • BMI 22.0-22.9, adult   • Neck pain   • Chronic pain of both shoulders   • Cellulitis of both lower extremities   • Erythema of lower extremity   • Pancytopenia (CMS/HCC)   • BMI 21.0-21.9, adult        Past Medical History:   Diagnosis Date   • Adenocarcinoma, lung, left (CMS/HCC)    • Allergic rhinitis 2018   • Anemia    • Anxiety    • Arthritis    • Ataxia    • Brain tumor (CMS/HCC)    • Cancer (CMS/HCC)     lung cancer - pt had chemo and was told she was cancer free, but recently a brain tumor was found   • Colostomy in place (CMS/HCC)    • Confusion    • COPD (chronic obstructive pulmonary disease) (CMS/HCC)    • Depression    • Dizziness    • GERD (gastroesophageal reflux disease)    • Headache    • Memory loss    • Panic attacks    • Seasonal allergies    • Sensorineural hearing loss (SNHL) of both ears 2018   • Urgency of urination    • Weakness         Past Surgical History:   Procedure Laterality Date   • COLON SURGERY      BLOCKED BOWEL - COLOSTOMY   • CRANIOTOMY FOR TUMOR Right 1/15/2018    Procedure: CRANIOTOMY FOR TUMOR STERIOTACTIC WITH BRAIN LAB right frontal craniotomy for brain tumor with neuromonitoring  "and brain lab;  Surgeon: Constantine Schmidt MD;  Location: [facility] OR;  Service:    • ECTOPIC PREGNANCY SURGERY  1983   • HERNIA REPAIR     • HYSTERECTOMY  1987   • LUNG CANCER SURGERY Left     Dr Marlow - Lower Lobectomy   • ORIF FINGER FRACTURE      left pinky   • SHOULDER SURGERY Left     BROKEN COLLAR BONE & SHOULDER SURGERY & ELBOW   • TONSILLECTOMY AND ADENOIDECTOMY  1954         Visit Dx:    ICD-10-CM ICD-9-CM   1. Other symptoms and signs involving cognitive functions and awareness R41.89 799.59     Patient was seen for memory evaluation. She states that her \"brain is gone\" that \"chemo made me deaf\" and that her vision is impaired because of dry eyes and cataracts. She stated that she could hear me and that she could see the testing materials. She was accompanied by a friend today. She lives with her , who she states is \"more disabled than me\". She does not drive or do cooking. Her friends look in on her. Patient was given the RBANS to assess for memory today.    RBANS: The Repeatable Battery for the Assessment of Neuropsychological Status (RBANS) assesses patient function in the areas of Immediate and Delayed memory, visuospatial/constructional skills, language and attention. It is used to detect and track neurocognitive deficits.   Index score Percentile Qualitative Description   Immediate Memory 90 25 Average   Visuospatial 56 0.2 Extremely Low   Language 96 39 Average   Attention 68 2 Extremely Low   Delayed Memory 98 45 Average   Total Scale 77 6 Borderline    Comments: Immediate and delayed memory scores WFL. Deficit in attention and visuospatial. Cannot rule out effect of vision impairment on attention score or visuospatial skills. No therapy warranted at this time.           SLP SLC Evaluation - 12/17/19 1400        Communication Assessment/Intervention    Document Type  evaluation   -KG    Subjective Information  no complaints   -KG    Patient Observations  alert;cooperative   -KG    " Patient/Family Observations  accompanied by her friend, Sangeeta Mendoza, whom she asked to be in the evaluation with her.    -KG    Patient Effort  good   -KG    Symptoms Noted During/After Treatment  none   -KG       General Information    Patient Profile Reviewed  yes   -KG    Precautions/Limitations, Vision  vision impairment, bilaterally;other (see comments)    Cataracts, cortical vision impairment  -KG    Precautions/Limitations, Hearing  hearing impairment, bilaterally   -KG    Patient Level of Education  College   -KG    Prior Level of Function-Communication  WFL   -KG    Plans/Goals Discussed with  patient   -KG    Barriers to Rehab  medically complex   -KG    Patient's Goals for Discharge  take care of myself at home   -KG    Standardized Assessment Used  RBANS   -KG       Cognitive Assessment Intervention- SLP    Orientation Status (Cognition)  awareness of basic personal information   -KG    Memory (Cognitive)  WFL   -KG    Attention (Cognitive)  moderate impairment   -KG    Right Hemisphere Function  visuo-spatial;visuo-perceptual;moderate impairment   -KG    Cognition, Comment  Immediate and delayed memory WFL. Deficit in visuospatial and attention. Cannot rule out effect of visual impairment.    -KG       Standardized Tests    Cognitive/Memory Tests  RBANS: Repeatable Battery for the Assessment of Neuropsychological Status   -KG       RBANS- Repeatable Battery for the Assessment of Neuropsychological Status    RBANS Comments  See report for scores   -KG       SLP Clinical Impressions    SLP Diagnosis  Memory WFL. Deficit in attention and visuospatial   -KG    SLC Criteria for Skilled Therapy Interventions Met  no problems identified which require skilled intervention;baseline status   -KG    Functional Impact  functional impact in ADLs   -KG       Recommendations    Therapy Frequency (SLP SLC)  evaluation only   -KG    Anticipated Dischage Disposition  home with assist   -KG       SLP Discharge Summary     Discharge Destination  home w/ assist   -KG    Discharge Diagnostic Statement  Follow up with MD IGLESIAS    Progress Toward Achieving Short/long Term Goals  discharge on same date as initial evaluation   -KG    Reason for Discharge  all goals and outcomes met, no further needs identified   -KG      User Key  (r) = Recorded By, (t) = Taken By, (c) = Cosigned By    Initials Name Provider Type    Mercedes Kim CCC-SLP Speech and Language Pathologist                        OP SLP Education     Row Name 12/17/19 1400       Education    Barriers to Learning  Hearing deficit;Visual deficit  -KG    Action Taken to Address Barriers  SLP used repeat and recast as necessary, visual materials repositioned as warranted. Patient stated she could hear SLP and see the materials.   -KG    Education Provided  Described results of evaluation;Patient expressed understanding of evaluation;Family/caregivers expressed understanding of evaluation  -KG    Assessed  Learning needs;Learning motivation;Learning preferences;Learning readiness  -KG    Learning Motivation  Strong  -KG    Learning Method  Explanation;Demonstration  -KG    Teaching Response  Verbalized understanding  -KG      User Key  (r) = Recorded By, (t) = Taken By, (c) = Cosigned By    Initials Name Effective Dates    Mercedes Kim CCC-SLP 02/05/19 -               OP SLP Assessment/Plan - 12/17/19 1500        SLP Assessment    Functional Problems  Speech Language- Adult/Cognition   -KG    Impact on Function: Adult Speech Language/Cognition  Poor attention to task   -KG    Clinical Impression: Speech Language-Adult/Congnition  Cognitive Communication WFL   -KG    Clinical Impression Comments  Difficulty with visuospatial and attention, cannot rule out effect of cortical vision impairment.    -KG    Please refer to items scanned into chart for additional diagnostic informaiton and handouts as provided by clinician  Yes   -KG    SLP Diagnosis  Memory WFL   -KG     Patient would benefit from skilled therapy intervention  No   -KG       SLP Plan    Plan Comments  Follow up with MD IGLESIAS      User Key  (r) = Recorded By, (t) = Taken By, (c) = Cosigned By    Initials Name Provider Type    Mercedes Kim CCC-SLP Speech and Language Pathologist          OP SLP Discharge Summary  Date of Discharge: 12/17/19  Reason for Discharge: all goals and outcomes met, no further needs identified  Progress Toward Achieving Short/long Term Goals: discharge on same date as initial evaluation  Discharge Destination: home w/ assist  Discharge Instructions: Follow up with MD    Thank you for this referral.   Mercedes Agee CCC-SLP  12/17/2019

## 2019-12-27 DIAGNOSIS — C34.90 NON-SMALL CELL LUNG CANCER, UNSPECIFIED LATERALITY (HCC): Primary | ICD-10-CM

## 2019-12-30 ENCOUNTER — TRANSCRIBE ORDERS (OUTPATIENT)
Dept: PHYSICAL THERAPY | Facility: CLINIC | Age: 69
End: 2019-12-30

## 2019-12-30 ENCOUNTER — HOSPITAL ENCOUNTER (OUTPATIENT)
Dept: INFUSION THERAPY | Age: 69
Discharge: HOME | End: 2019-12-30
Payer: MEDICARE

## 2019-12-30 ENCOUNTER — HOSPITAL ENCOUNTER (OUTPATIENT)
Dept: CT IMAGING | Age: 69
Discharge: HOME | End: 2019-12-30
Payer: MEDICARE

## 2019-12-30 DIAGNOSIS — C34.90 NON-SMALL CELL LUNG CANCER, UNSPECIFIED LATERALITY (HCC): Chronic | ICD-10-CM

## 2019-12-30 DIAGNOSIS — E27.8 ADRENAL NODULE (HCC): ICD-10-CM

## 2019-12-30 DIAGNOSIS — I89.0 LYMPHEDEMA: Primary | ICD-10-CM

## 2019-12-30 PROCEDURE — 6360000004 HC RX CONTRAST MEDICATION: Performed by: INTERNAL MEDICINE

## 2019-12-30 PROCEDURE — 74177 CT ABD & PELVIS W/CONTRAST: CPT

## 2019-12-30 PROCEDURE — 36415 COLL VENOUS BLD VENIPUNCTURE: CPT

## 2019-12-30 PROCEDURE — 84100 ASSAY OF PHOSPHORUS: CPT

## 2019-12-30 PROCEDURE — 80053 COMPREHEN METABOLIC PANEL: CPT

## 2019-12-30 PROCEDURE — 83735 ASSAY OF MAGNESIUM: CPT

## 2019-12-30 RX ADMIN — IOPAMIDOL 75 ML: 755 INJECTION, SOLUTION INTRAVENOUS at 11:19

## 2019-12-31 ENCOUNTER — OFFICE VISIT (OUTPATIENT)
Dept: URGENT CARE | Age: 69
End: 2019-12-31
Payer: MEDICARE

## 2019-12-31 VITALS
BODY MASS INDEX: 23.69 KG/M2 | WEIGHT: 138 LBS | TEMPERATURE: 98.5 F | HEART RATE: 99 BPM | OXYGEN SATURATION: 95 % | RESPIRATION RATE: 16 BRPM

## 2019-12-31 DIAGNOSIS — J02.9 SORE THROAT: ICD-10-CM

## 2019-12-31 DIAGNOSIS — J02.9 PHARYNGITIS, UNSPECIFIED ETIOLOGY: Primary | ICD-10-CM

## 2019-12-31 DIAGNOSIS — R52 GENERALIZED BODY ACHES: ICD-10-CM

## 2019-12-31 DIAGNOSIS — C34.32 MALIGNANT NEOPLASM OF LOWER LOBE OF LEFT LUNG (HCC): ICD-10-CM

## 2019-12-31 LAB
INFLUENZA A ANTIBODY: NEGATIVE
INFLUENZA B ANTIBODY: NEGATIVE
S PYO AG THROAT QL: NORMAL

## 2019-12-31 PROCEDURE — G8399 PT W/DXA RESULTS DOCUMENT: HCPCS | Performed by: NURSE PRACTITIONER

## 2019-12-31 PROCEDURE — 1123F ACP DISCUSS/DSCN MKR DOCD: CPT | Performed by: NURSE PRACTITIONER

## 2019-12-31 PROCEDURE — 87880 STREP A ASSAY W/OPTIC: CPT | Performed by: NURSE PRACTITIONER

## 2019-12-31 PROCEDURE — 3017F COLORECTAL CA SCREEN DOC REV: CPT | Performed by: NURSE PRACTITIONER

## 2019-12-31 PROCEDURE — G8484 FLU IMMUNIZE NO ADMIN: HCPCS | Performed by: NURSE PRACTITIONER

## 2019-12-31 PROCEDURE — G8427 DOCREV CUR MEDS BY ELIG CLIN: HCPCS | Performed by: NURSE PRACTITIONER

## 2019-12-31 PROCEDURE — 1090F PRES/ABSN URINE INCON ASSESS: CPT | Performed by: NURSE PRACTITIONER

## 2019-12-31 PROCEDURE — G8420 CALC BMI NORM PARAMETERS: HCPCS | Performed by: NURSE PRACTITIONER

## 2019-12-31 PROCEDURE — 99213 OFFICE O/P EST LOW 20 MIN: CPT | Performed by: NURSE PRACTITIONER

## 2019-12-31 PROCEDURE — 87804 INFLUENZA ASSAY W/OPTIC: CPT | Performed by: NURSE PRACTITIONER

## 2019-12-31 PROCEDURE — 1036F TOBACCO NON-USER: CPT | Performed by: NURSE PRACTITIONER

## 2019-12-31 PROCEDURE — 4040F PNEUMOC VAC/ADMIN/RCVD: CPT | Performed by: NURSE PRACTITIONER

## 2019-12-31 RX ORDER — MODAFINIL 100 MG/1
TABLET ORAL
COMMUNITY
Start: 2019-12-27 | End: 2020-07-30 | Stop reason: SDUPTHER

## 2019-12-31 RX ORDER — FOLIC ACID 1 MG/1
TABLET ORAL
Qty: 30 TABLET | Refills: 0 | Status: SHIPPED | OUTPATIENT
Start: 2019-12-31 | End: 2020-02-03

## 2019-12-31 RX ORDER — AMOXICILLIN 500 MG/1
500 CAPSULE ORAL 2 TIMES DAILY
Qty: 20 CAPSULE | Refills: 0 | Status: SHIPPED | OUTPATIENT
Start: 2019-12-31 | End: 2020-01-10

## 2020-01-01 ENCOUNTER — TELEPHONE (OUTPATIENT)
Dept: NEUROSURGERY | Facility: CLINIC | Age: 70
End: 2020-01-01

## 2020-01-01 ENCOUNTER — HOSPITAL ENCOUNTER (OUTPATIENT)
Dept: MRI IMAGING | Facility: HOSPITAL | Age: 70
Discharge: HOME | End: 2020-05-05
Admitting: NEUROLOGICAL SURGERY

## 2020-01-01 ENCOUNTER — APPOINTMENT (OUTPATIENT)
Dept: MRI IMAGING | Facility: HOSPITAL | Age: 70
End: 2020-01-01

## 2020-01-01 ENCOUNTER — OFFICE VISIT (OUTPATIENT)
Dept: NEUROSURGERY | Facility: CLINIC | Age: 70
End: 2020-01-01

## 2020-01-01 ENCOUNTER — TELEPHONE (OUTPATIENT)
Dept: PULMONOLOGY | Facility: CLINIC | Age: 70
End: 2020-01-01

## 2020-01-01 ENCOUNTER — OUTSIDE FACILITY SERVICE (OUTPATIENT)
Dept: PULMONOLOGY | Facility: CLINIC | Age: 70
End: 2020-01-01

## 2020-01-01 ENCOUNTER — LAB REQUISITION (OUTPATIENT)
Dept: LAB | Facility: HOSPITAL | Age: 70
End: 2020-01-01

## 2020-01-01 ENCOUNTER — HOSPITAL ENCOUNTER (OUTPATIENT)
Dept: MRI IMAGING | Facility: HOSPITAL | Age: 70
Discharge: HOME | End: 2020-04-14

## 2020-01-01 ENCOUNTER — DOCUMENTATION (OUTPATIENT)
Dept: PHYSICAL THERAPY | Facility: CLINIC | Age: 70
End: 2020-01-01

## 2020-01-01 VITALS
HEIGHT: 63 IN | SYSTOLIC BLOOD PRESSURE: 120 MMHG | BODY MASS INDEX: 23.74 KG/M2 | DIASTOLIC BLOOD PRESSURE: 66 MMHG | WEIGHT: 134 LBS

## 2020-01-01 DIAGNOSIS — Z87.891 FORMER SMOKER: ICD-10-CM

## 2020-01-01 DIAGNOSIS — C79.31 METASTATIC ADENOCARCINOMA TO BRAIN (HCC): ICD-10-CM

## 2020-01-01 DIAGNOSIS — Z00.00 ENCOUNTER FOR GENERAL ADULT MEDICAL EXAMINATION WITHOUT ABNORMAL FINDINGS: ICD-10-CM

## 2020-01-01 DIAGNOSIS — C79.31 SECONDARY MALIGNANT NEOPLASM OF BRAIN AND SPINAL CORD (HCC): Primary | Chronic | ICD-10-CM

## 2020-01-01 DIAGNOSIS — C79.49 SECONDARY MALIGNANT NEOPLASM OF BRAIN AND SPINAL CORD (HCC): Primary | Chronic | ICD-10-CM

## 2020-01-01 LAB
ANION GAP SERPL CALCULATED.3IONS-SCNC: 12 MMOL/L (ref 5–15)
ANION GAP SERPL CALCULATED.3IONS-SCNC: 13 MMOL/L (ref 5–15)
ANION GAP SERPL CALCULATED.3IONS-SCNC: 14 MMOL/L (ref 5–15)
BASOPHILS # BLD AUTO: 0.02 10*3/MM3 (ref 0–0.2)
BASOPHILS # BLD AUTO: 0.03 10*3/MM3 (ref 0–0.2)
BASOPHILS # BLD AUTO: 0.03 10*3/MM3 (ref 0–0.2)
BASOPHILS NFR BLD AUTO: 0.3 % (ref 0–1.5)
BASOPHILS NFR BLD AUTO: 0.4 % (ref 0–1.5)
BASOPHILS NFR BLD AUTO: 0.4 % (ref 0–1.5)
BUN SERPL-MCNC: 12 MG/DL (ref 8–23)
BUN SERPL-MCNC: 12 MG/DL (ref 8–23)
BUN SERPL-MCNC: 13 MG/DL (ref 8–23)
BUN/CREAT SERPL: 16.4 (ref 7–25)
BUN/CREAT SERPL: 16.5 (ref 7–25)
BUN/CREAT SERPL: 19.4 (ref 7–25)
CALCIUM SPEC-SCNC: 9.2 MG/DL (ref 8.6–10.5)
CALCIUM SPEC-SCNC: 9.2 MG/DL (ref 8.6–10.5)
CALCIUM SPEC-SCNC: 9.3 MG/DL (ref 8.6–10.5)
CHLORIDE SERPL-SCNC: 100 MMOL/L (ref 98–107)
CHLORIDE SERPL-SCNC: 100 MMOL/L (ref 98–107)
CHLORIDE SERPL-SCNC: 98 MMOL/L (ref 98–107)
CO2 SERPL-SCNC: 22 MMOL/L (ref 22–29)
CO2 SERPL-SCNC: 24 MMOL/L (ref 22–29)
CO2 SERPL-SCNC: 24 MMOL/L (ref 22–29)
CREAT BLDA-MCNC: 0.9 MG/DL (ref 0.6–1.3)
CREAT SERPL-MCNC: 0.62 MG/DL (ref 0.57–1)
CREAT SERPL-MCNC: 0.73 MG/DL (ref 0.57–1)
CREAT SERPL-MCNC: 0.79 MG/DL (ref 0.57–1)
DEPRECATED RDW RBC AUTO: 48.8 FL (ref 37–54)
DEPRECATED RDW RBC AUTO: 49.3 FL (ref 37–54)
DEPRECATED RDW RBC AUTO: 49.5 FL (ref 37–54)
EOSINOPHIL # BLD AUTO: 0.11 10*3/MM3 (ref 0–0.4)
EOSINOPHIL # BLD AUTO: 0.13 10*3/MM3 (ref 0–0.4)
EOSINOPHIL # BLD AUTO: 0.18 10*3/MM3 (ref 0–0.4)
EOSINOPHIL NFR BLD AUTO: 1.5 % (ref 0.3–6.2)
EOSINOPHIL NFR BLD AUTO: 1.6 % (ref 0.3–6.2)
EOSINOPHIL NFR BLD AUTO: 2.3 % (ref 0.3–6.2)
ERYTHROCYTE [DISTWIDTH] IN BLOOD BY AUTOMATED COUNT: 17.2 % (ref 12.3–15.4)
ERYTHROCYTE [DISTWIDTH] IN BLOOD BY AUTOMATED COUNT: 17.4 % (ref 12.3–15.4)
ERYTHROCYTE [DISTWIDTH] IN BLOOD BY AUTOMATED COUNT: 17.5 % (ref 12.3–15.4)
GFR SERPL CREATININE-BSD FRML MDRD: 72 ML/MIN/1.73
GFR SERPL CREATININE-BSD FRML MDRD: 79 ML/MIN/1.73
GFR SERPL CREATININE-BSD FRML MDRD: 95 ML/MIN/1.73
GLUCOSE SERPL-MCNC: 101 MG/DL (ref 65–99)
GLUCOSE SERPL-MCNC: 104 MG/DL (ref 65–99)
GLUCOSE SERPL-MCNC: 105 MG/DL (ref 65–99)
HCT VFR BLD AUTO: 28.2 % (ref 34–46.6)
HCT VFR BLD AUTO: 28.5 % (ref 34–46.6)
HCT VFR BLD AUTO: 29.1 % (ref 34–46.6)
HGB BLD-MCNC: 8.6 G/DL (ref 12–15.9)
HGB BLD-MCNC: 8.8 G/DL (ref 12–15.9)
HGB BLD-MCNC: 8.8 G/DL (ref 12–15.9)
IMM GRANULOCYTES # BLD AUTO: 0.03 10*3/MM3 (ref 0–0.05)
IMM GRANULOCYTES # BLD AUTO: 0.04 10*3/MM3 (ref 0–0.05)
IMM GRANULOCYTES # BLD AUTO: 0.05 10*3/MM3 (ref 0–0.05)
IMM GRANULOCYTES NFR BLD AUTO: 0.4 % (ref 0–0.5)
IMM GRANULOCYTES NFR BLD AUTO: 0.5 % (ref 0–0.5)
IMM GRANULOCYTES NFR BLD AUTO: 0.7 % (ref 0–0.5)
LYMPHOCYTES # BLD AUTO: 0.61 10*3/MM3 (ref 0.7–3.1)
LYMPHOCYTES # BLD AUTO: 0.68 10*3/MM3 (ref 0.7–3.1)
LYMPHOCYTES # BLD AUTO: 0.8 10*3/MM3 (ref 0.7–3.1)
LYMPHOCYTES NFR BLD AUTO: 10.2 % (ref 19.6–45.3)
LYMPHOCYTES NFR BLD AUTO: 8.2 % (ref 19.6–45.3)
LYMPHOCYTES NFR BLD AUTO: 8.3 % (ref 19.6–45.3)
MCH RBC QN AUTO: 23.4 PG (ref 26.6–33)
MCH RBC QN AUTO: 23.6 PG (ref 26.6–33)
MCH RBC QN AUTO: 23.8 PG (ref 26.6–33)
MCHC RBC AUTO-ENTMCNC: 30.2 G/DL (ref 31.5–35.7)
MCHC RBC AUTO-ENTMCNC: 30.5 G/DL (ref 31.5–35.7)
MCHC RBC AUTO-ENTMCNC: 30.9 G/DL (ref 31.5–35.7)
MCV RBC AUTO: 76.4 FL (ref 79–97)
MCV RBC AUTO: 77.4 FL (ref 79–97)
MCV RBC AUTO: 77.9 FL (ref 79–97)
MONOCYTES # BLD AUTO: 0.57 10*3/MM3 (ref 0.1–0.9)
MONOCYTES # BLD AUTO: 0.62 10*3/MM3 (ref 0.1–0.9)
MONOCYTES # BLD AUTO: 0.63 10*3/MM3 (ref 0.1–0.9)
MONOCYTES NFR BLD AUTO: 7.2 % (ref 5–12)
MONOCYTES NFR BLD AUTO: 7.6 % (ref 5–12)
MONOCYTES NFR BLD AUTO: 8.5 % (ref 5–12)
NEUTROPHILS NFR BLD AUTO: 5.92 10*3/MM3 (ref 1.7–7)
NEUTROPHILS NFR BLD AUTO: 6.25 10*3/MM3 (ref 1.7–7)
NEUTROPHILS NFR BLD AUTO: 6.75 10*3/MM3 (ref 1.7–7)
NEUTROPHILS NFR BLD AUTO: 79.4 % (ref 42.7–76)
NEUTROPHILS NFR BLD AUTO: 80.7 % (ref 42.7–76)
NEUTROPHILS NFR BLD AUTO: 81.8 % (ref 42.7–76)
NRBC BLD AUTO-RTO: 0 /100 WBC (ref 0–0.2)
PLATELET # BLD AUTO: 268 10*3/MM3 (ref 140–450)
PLATELET # BLD AUTO: 278 10*3/MM3 (ref 140–450)
PLATELET # BLD AUTO: 285 10*3/MM3 (ref 140–450)
PMV BLD AUTO: 10 FL (ref 6–12)
PMV BLD AUTO: 10 FL (ref 6–12)
PMV BLD AUTO: 9.6 FL (ref 6–12)
POTASSIUM SERPL-SCNC: 3.8 MMOL/L (ref 3.5–5.2)
POTASSIUM SERPL-SCNC: 3.9 MMOL/L (ref 3.5–5.2)
POTASSIUM SERPL-SCNC: 4.2 MMOL/L (ref 3.5–5.2)
RBC # BLD AUTO: 3.62 10*6/MM3 (ref 3.77–5.28)
RBC # BLD AUTO: 3.73 10*6/MM3 (ref 3.77–5.28)
RBC # BLD AUTO: 3.76 10*6/MM3 (ref 3.77–5.28)
SODIUM SERPL-SCNC: 135 MMOL/L (ref 136–145)
SODIUM SERPL-SCNC: 136 MMOL/L (ref 136–145)
SODIUM SERPL-SCNC: 136 MMOL/L (ref 136–145)
WBC # BLD AUTO: 7.33 10*3/MM3 (ref 3.4–10.8)
WBC # BLD AUTO: 7.87 10*3/MM3 (ref 3.4–10.8)
WBC # BLD AUTO: 8.25 10*3/MM3 (ref 3.4–10.8)

## 2020-01-01 PROCEDURE — 0 GADOBENATE DIMEGLUMINE 529 MG/ML SOLUTION: Performed by: NEUROLOGICAL SURGERY

## 2020-01-01 PROCEDURE — 85025 COMPLETE CBC W/AUTO DIFF WBC: CPT | Performed by: NURSE PRACTITIONER

## 2020-01-01 PROCEDURE — 36415 COLL VENOUS BLD VENIPUNCTURE: CPT | Performed by: NURSE PRACTITIONER

## 2020-01-01 PROCEDURE — 99232 SBSQ HOSP IP/OBS MODERATE 35: CPT | Performed by: INTERNAL MEDICINE

## 2020-01-01 PROCEDURE — 82565 ASSAY OF CREATININE: CPT

## 2020-01-01 PROCEDURE — 80048 BASIC METABOLIC PNL TOTAL CA: CPT | Performed by: NURSE PRACTITIONER

## 2020-01-01 PROCEDURE — A9577 INJ MULTIHANCE: HCPCS | Performed by: NEUROLOGICAL SURGERY

## 2020-01-01 PROCEDURE — 36415 COLL VENOUS BLD VENIPUNCTURE: CPT | Performed by: FAMILY MEDICINE

## 2020-01-01 PROCEDURE — 99222 1ST HOSP IP/OBS MODERATE 55: CPT | Performed by: INTERNAL MEDICINE

## 2020-01-01 PROCEDURE — 99213 OFFICE O/P EST LOW 20 MIN: CPT | Performed by: NEUROLOGICAL SURGERY

## 2020-01-01 PROCEDURE — 70553 MRI BRAIN STEM W/O & W/DYE: CPT

## 2020-01-01 PROCEDURE — 80048 BASIC METABOLIC PNL TOTAL CA: CPT | Performed by: FAMILY MEDICINE

## 2020-01-01 PROCEDURE — 85025 COMPLETE CBC W/AUTO DIFF WBC: CPT | Performed by: FAMILY MEDICINE

## 2020-01-01 RX ORDER — TIOTROPIUM BROMIDE AND OLODATEROL 3.124; 2.736 UG/1; UG/1
SPRAY, METERED RESPIRATORY (INHALATION)
COMMUNITY
Start: 2020-01-01

## 2020-01-01 RX ORDER — GLUCOSAMINE/MSM/CHONDROITIN A 500-83-400
TABLET ORAL
COMMUNITY

## 2020-01-01 RX ORDER — AMOXICILLIN 250 MG
CAPSULE ORAL
COMMUNITY

## 2020-01-01 RX ADMIN — GADOBENATE DIMEGLUMINE 12 ML: 529 INJECTION, SOLUTION INTRAVENOUS at 14:30

## 2020-01-02 DIAGNOSIS — G89.29 CHRONIC BILATERAL LOW BACK PAIN WITH BILATERAL SCIATICA: ICD-10-CM

## 2020-01-02 DIAGNOSIS — M54.42 CHRONIC BILATERAL LOW BACK PAIN WITH BILATERAL SCIATICA: ICD-10-CM

## 2020-01-02 DIAGNOSIS — G89.29 CHRONIC PAIN OF BOTH SHOULDERS: ICD-10-CM

## 2020-01-02 DIAGNOSIS — C79.49 SECONDARY MALIGNANT NEOPLASM OF BRAIN AND SPINAL CORD (HCC): Chronic | ICD-10-CM

## 2020-01-02 DIAGNOSIS — M25.511 CHRONIC PAIN OF BOTH SHOULDERS: ICD-10-CM

## 2020-01-02 DIAGNOSIS — M54.41 CHRONIC BILATERAL LOW BACK PAIN WITH BILATERAL SCIATICA: ICD-10-CM

## 2020-01-02 DIAGNOSIS — M54.2 NECK PAIN: Primary | ICD-10-CM

## 2020-01-02 DIAGNOSIS — C79.31 SECONDARY MALIGNANT NEOPLASM OF BRAIN AND SPINAL CORD (HCC): Chronic | ICD-10-CM

## 2020-01-02 DIAGNOSIS — M25.512 CHRONIC PAIN OF BOTH SHOULDERS: ICD-10-CM

## 2020-01-03 NOTE — PROGRESS NOTES
Patient:  Andrew Montalvo  YOB: 1950  Date of Service: 1/7/2020  MRN: [id number]    Primary Care Physician: Bonilla Vera MD    Chief Complaint   Patient presents with    Lung Cancer       Patient Seen, Chart, Consults notes, Labs, Radiology studies reviewed. Subjective:  Alicia Muor is a 62-year-old  female managed in this office with the following primary and secondary diagnoses as follows:  1. Resected, stage IB adenocarcinoma of the left lung on 1/06/2017.   2. Adjuvant cisplatinum/Alimta x 4 completed on 6/21/17. 3. A recurrent 2 x 2.6 x 2.6 cm high right frontal vertex mass was resected 1/15/2018- adjuvant SRS to the right frontal CNS was delivered  4. Adjuvant Carboplatin, Alimta and Keytruda x 6 completed on 9/4/2018.  5. 1.3 cm right adrenal nodule  6. 1.9 cm right kidney nodule and bilateral renal cysts? Maintenance Alimta/Keytruda was initiated on 10/16/2018. Kvng sawant went on and off Diana Oar that was eventually discontinued on 5/7/2019. She went on and off maintenance Alimta, and eventually discontinued single agent maintenance Alimta course #13 on 9/9/2019 due to poor tolerability, deconditioning and multiple hospital admissions. She comes today again in hospital followup for continued monitoring evaluation and review of systems and further recommendations. TARGET TUMOR SITE:  1. Resected LLL of lung 01/06/2017   2. Resected 2 x 2.6 x 2.6 cm mass from the high right frontal vertex of the brain 1/15/2018     TUMOR HISTORY: Resected, Stage IB moderately differentiated adenocarcinoma of left lung, pT2a, N0,M0. 01/06/2017. Ms. Alicia Muro was seen in initial Oncology consultation on 2/22/2017 f referred by Dr. Sabi Parks with a diagnosis of a resected moderately differentiated adenocarcinoma of the left lung. In October of 2016 Avelino Smallwood presented to Dr. Amber Patel with complaints of hemoptysis for 2 months.  She has a significant history of nicotine abuse but evidence of recurrent malignancy or metastatic disease. MRI of the brain W & WO for [facility] treatment planning purposes was performed on 3/6/2018 by Dr. Malcom Alfred. Postsurgical changes to the previous resection site from the right frontal lobe mass area with residual enhancement was noted. Diane underwent SRS with 1600 cGy in one single treatment fraction on 3/26/2018 to the right frontal CNS lobe by Dima Alfred and Ayana Campos. Delays in beginning systemic therapy included issues associated with her CNS treatment delivery and multiple dog bites on her arms and hands that required attention prior to starting systemic therapy. Dinae received cycle #1 of chemotherapy with Alimta, carboplatin and Keytruda on 5/14/2018. Diane completed cycle # 6 of carboplatin, Alimta and Keytruda on 9/4/2018. MRI of the brain with and without on 9/12/2018 documented a stable 1.6 x 1.1 cm nodular enhancement along the medial aspect of the operative cavity. Kelsie Haddad was seen by Dr. Ayana Campos on 9/12/2018, who felt the MRI of the brain with stable without evidence of recurrence/progression. A CT scan of the chest on 9/24/2018 did not reveal evidence of new or suspicious for urinary nodules nor intrathoracic lymphadenopathy to suggest recurrent disease in the chest.    She completed 6 cycles of Carboplatin, Alimta and Keytruda on 9/4/2018 and then went on to receive maintenance treatment with Alimta/Keytruda. Trevor Jones reported experiencing a significant skin rash after receiving Keytruda on 11/27/2018. Sigifredo Olmedo was held from 12/18/2018-1/29/2019 (x3 cycles) during which time Trevor Jones was only receiving Alimta. Mrs. Sean Kyle had repeat CT scans of the chest, abdomen and pelvis on 1/2/2019 that suggested stable disease. On 2/19/2019, Mrs. Cristiano Prescott resumed the combination of maintenance treatment with Alimta/Keytruda without rash or problems. Trevor Jones was [facility] to [facility] on 4/9/2019 with bilateral lower extremity cellulitis. 2.6 cm high right frontal vertex mass   4. Diane underwent SRS with 1600 cGy in one single treatment fraction on 3/26/2018 to the right frontal CNS lobe by Dima Hendrix and Sonny Mar   5. Cycle #1 of chemotherapy with Alimta, carboplatin and Evan Amour was delivered on 5/14/2018 and the final cycle #6 was delivered on 9/4/2018   6. Maintenance Alimta/Keytruda was initiated on 10/16/2018 with the final cycle #9 delivered on 5/7/2019. Cycle #10 of single agent maintenance Alimta was delivered on 6/11/2019 with schedule adjusted to monthly on 7/2/2019. Last dose of Alimta, #13, delivered on 9/9/2019       Allergies:  Kiwi extract;  Hydromorphone hcl; Keytruda [pembrolizumab]; and Pineapple    Medicines:  Current Outpatient Medications   Medication Sig Dispense Refill    folic acid (FOLVITE) 1 MG tablet TAKE 1 TABLET BY MOUTH ONCE DAILY 30 tablet 0    modafinil (PROVIGIL) 100 MG tablet TAKE 1 TABLET BY MOUTH ONCE DAILY IN THE MORNING      amoxicillin (AMOXIL) 500 MG capsule Take 1 capsule by mouth 2 times daily for 10 days 20 capsule 0    potassium bicarbonate (EFFER-K) 25 MEQ disintegrating tablet Take 1 tablet by mouth 2 times daily 60 tablet 3    ondansetron (ZOFRAN ODT) 4 MG disintegrating tablet Take 1 tablet by mouth every 8 hours as needed for Nausea or Vomiting 15 tablet 0    fluticasone (FLONASE) 50 MCG/ACT nasal spray 1 spray by Each Nostril route daily 1 Bottle 3    furosemide (LASIX) 20 MG tablet Take 1 tablet by mouth 2 times daily 60 tablet 3    pantoprazole (PROTONIX) 40 MG tablet Take 1 tablet by mouth every morning (before breakfast) 30 tablet 3    tiZANidine (ZANAFLEX) 2 MG tablet Take 2 mg by mouth daily      albuterol sulfate  (90 Base) MCG/ACT inhaler Inhale 2 puffs into the lungs every 6 hours as needed for Wheezing      acetaminophen (TYLENOL) 325 MG tablet Take 650 mg by mouth every 6 hours as needed for Pain      cetirizine (ZYRTEC) 10 MG tablet Take 1 tablet by mouth use: Not Currently     Comment: 2-3 glasses of wine    Drug use: No          Review of Systems:  Constitutional: Negative for chills, fatigue, fever or significant weight loss. HENT: Negative for congestion, hearing loss, nosebleeds or sore throat. Eyes: Negative for photophobia, pain, discharge, redness and visual disturbance. Respiratory: Negative for cough, shortness of breath, or wheezing. Cardiovascular: Negative for chest pain, palpitations or leg swelling. Gastrointestinal: Negative for abdominal pain, blood in stool, constipation, diarrhea, nausea or vomiting. Genitourinary: Negative for dysuria, flank pain, frequency, hematuria or urgency. Musculoskeletal: Negative for back pain, joint swelling, myalgias or neck pain. Skin: Negative for rash or petechiae. Neurological: Negative for tremors, seizures, syncope, weakness or headaches. Hematological: No active bruising or bleeding. Psychiatric/Behavioral: Negative for hallucinations. Objective:  Vital Signs: Blood pressure 120/78, pulse 99, height 5' 4\" (1.626 m), weight 142 lb 1.6 oz (64.5 kg), SpO2 95 %. Physical Exam   Constitutional: Oriented to person, place, and time. No acute distress. Head: Normocephalic and atraumatic. Nose: Nose normal.   Mouth/Throat: Oropharynx is clear and moist. No oropharyngeal exudate. Eyes: Pupils are equal and round. Conjunctivae and EOM are normal. No scleral icterus. Neck: Normal range of motion. Neck supple. No JVD. No appreciable thyromegaly. Cardiovascular: Normal rate, regular rhythm, normal heart sounds and intact distal pulses. Exam reveals no gallop, murmurs or friction rub. Pulmonary/Chest: Effort normal and breath sounds normal. No respiratory distress. No wheezes. Abdominal: Soft. Bowel sounds are normal. No organomegally or masses. No tenderness. There is no rebound and no guarding. Musculoskeletal: Normal range of motion. No edema or tenderness. achievable, Automated Exposure Control was utilized for adjustment of the mA and/or KV according to patient size. TECHNIQUE: Following the intravenous administration of contrast, helical CT tomographic images of the abdomen and pelvis were acquired. Multiplanar reformatted images were provided for review. FINDINGS: LOWER CHEST: Pleural thickening is present bilaterally. LIVER: No focal liver lesion. The hepatic vasculature is patent. BILIARY SYSTEM: The gallbladder is unremarkable. No intrahepatic or extrahepatic ductal dilatation. PANCREAS: No focal pancreatic lesion. SPLEEN: Marked enlargement of the spleen is present. There is a linear region of hypodensity in the posterior spleen that was present on the previous study and likely represents a cleft. The spleen now measures 12.4 x 10.2 cm in the axial plane (previously 12.3 x 7.9 cm). KIDNEYS AND URETERS: Bilateral kidneys are unremarkable. The ureters are decompressed and normal in appearance. ADRENALS: There is a 1.3 cm nodule in the right adrenal gland that is concerning for metastatic disease. RETROPERITONEUM: No mass, lymphadenopathy or hemorrhage. GI TRACT: Moderate to marked stool accumulation is seen in the colon. There is no evidence of obstruction. A moderate-sized parastomal hernia is present in the left lower quadrant. OTHER: Paraspinal masses are seen at T10. These have not changed since the previous study. The abdominopelvic vasculature is patent. The osseous structures and soft tissues demonstrate no worrisome lesions. Degenerative changes are present in the spine. There is an old insufficiency fracture in the left sacral ala. PELVIS: No mass lesion, fluid collection or significant lymphadenopathy is seen in the pelvis. The urinary bladder is normal in appearance. 1. Findings consistent with constipation. No bowel obstruction is identified. 2. Interval enlargement of the spleen without focal mass.  3. New right adrenal gland nodule measuring 1.3 sharply marginated low-density renal nodules, largest in the upper pole of right kidney 1.9 cm, further follow-up is recommended  · Large amount of stool in colon. No evidence of obstruction  · Small amount of fluid around the liver and gallbladder fossa, may be ascites? · Left lower abdominal colostomy with peristomal small bowel herniation. No obstruction  · Left para hilar fullness which is incompletely visualized and evaluated. Further imaging with CT chest  may be obtained      Arrangements are being made to get an ultrasound of her right adrenal gland and kidney prior to her return in 4 months months and I will see her thereafter with specific attention to the right adrenal gland. Rosa Elena Carrillo am scribing for Marquis Walton MD. Electronically signed by Kadi Kong LPN on 3/8/9053 at 0:68 PM       Lorrie Rudolph was seen today for lung cancer. Diagnoses and all orders for this visit:    Nodule of kidney  -     US Renal Complete; Future         Orders Placed This Encounter   Procedures    US Renal Complete     Standing Status:   Future     Standing Expiration Date:   1/7/2021     Order Specific Question:   Reason for exam:     Answer:   BILATERAL RENAL NODULES         Return in about 4 months (around 5/7/2020) for follow-up w/ Dr. Cross Has, CT scan(s) prior to. I, Dr. Jelly Jauregui, personally performed the services described in this documentation as scribed by Kadi Kong LPN in my presence, and it is both accurate and complete.

## 2020-01-06 ENCOUNTER — OFFICE VISIT (OUTPATIENT)
Dept: PULMONOLOGY | Facility: CLINIC | Age: 70
End: 2020-01-06

## 2020-01-06 VITALS
WEIGHT: 140 LBS | DIASTOLIC BLOOD PRESSURE: 68 MMHG | HEIGHT: 63 IN | SYSTOLIC BLOOD PRESSURE: 118 MMHG | HEART RATE: 97 BPM | OXYGEN SATURATION: 94 % | BODY MASS INDEX: 24.8 KG/M2

## 2020-01-06 DIAGNOSIS — M54.41 CHRONIC BILATERAL LOW BACK PAIN WITH BILATERAL SCIATICA: ICD-10-CM

## 2020-01-06 DIAGNOSIS — C34.92 ADENOCARCINOMA OF LUNG, STAGE 4, LEFT (HCC): ICD-10-CM

## 2020-01-06 DIAGNOSIS — M54.42 CHRONIC BILATERAL LOW BACK PAIN WITH BILATERAL SCIATICA: ICD-10-CM

## 2020-01-06 DIAGNOSIS — J44.9 CHRONIC OBSTRUCTIVE PULMONARY DISEASE, UNSPECIFIED COPD TYPE (HCC): Primary | ICD-10-CM

## 2020-01-06 DIAGNOSIS — C79.31 METASTATIC ADENOCARCINOMA TO BRAIN (HCC): ICD-10-CM

## 2020-01-06 DIAGNOSIS — G89.29 CHRONIC BILATERAL LOW BACK PAIN WITH BILATERAL SCIATICA: ICD-10-CM

## 2020-01-06 PROBLEM — R60.0 LOCALIZED EDEMA: Status: ACTIVE | Noted: 2020-01-06

## 2020-01-06 PROCEDURE — 94729 DIFFUSING CAPACITY: CPT | Performed by: INTERNAL MEDICINE

## 2020-01-06 PROCEDURE — 94727 GAS DIL/WSHOT DETER LNG VOL: CPT | Performed by: INTERNAL MEDICINE

## 2020-01-06 PROCEDURE — 99204 OFFICE O/P NEW MOD 45 MIN: CPT | Performed by: INTERNAL MEDICINE

## 2020-01-06 PROCEDURE — 94010 BREATHING CAPACITY TEST: CPT | Performed by: INTERNAL MEDICINE

## 2020-01-06 PROCEDURE — 94664 DEMO&/EVAL PT USE INHALER: CPT | Performed by: INTERNAL MEDICINE

## 2020-01-06 RX ORDER — AMOXICILLIN 500 MG/1
CAPSULE ORAL
COMMUNITY
Start: 2019-12-31 | End: 2020-02-24

## 2020-01-06 RX ORDER — MODAFINIL 100 MG/1
TABLET ORAL
COMMUNITY
Start: 2019-12-27

## 2020-01-06 NOTE — PROCEDURES
Pulmonary Function Test  Performed by: Mio Bean MD  Authorized by: Mio Bean MD      Pre Drug    FVC: 65%   FEV1: 58%   FEF 25-75%: 40%   FEV1/FVC: 69.67%   T%   RV: 111%   DLCO: 50%   D/VAsb: 63%

## 2020-01-06 NOTE — PROGRESS NOTES
"Subjective   Marcy Garcia is a 69 y.o. female.     Background: Pt with hx dyspnea.    Chief Complaint   Patient presents with   • COPD        History of Present Illness   She reports shortness of breath for several weeks associated with weakness in context of chemotherapy and recent hospitalization at [facility] for post chemotherapy neutropenia and fever.  Pt reports adenocarcinoma and lung resection 1/6/2017.  She has had brain mets and got radiation therapy.  She says she has been coughing \"like crazy\" but not coughing blood.  3 months ago she did have some hemoptysis.  She reports leg edema in the left leg, noted when in the hospital. I had seen her in the past around the time of her original cancer diagnosis.    Medical/Family/Social History   has a past medical history of Adenocarcinoma, lung, left (CMS/HCC), Allergic rhinitis (4/12/2018), Anemia, Anxiety, Arthritis, Ataxia, Brain tumor (CMS/HCC), Cancer (CMS/HCC), Colostomy in place (CMS/HCC), Confusion, COPD (chronic obstructive pulmonary disease) (CMS/HCC), Depression, Dizziness, GERD (gastroesophageal reflux disease), Headache, Memory loss, Panic attacks, Seasonal allergies, Sensorineural hearing loss (SNHL) of both ears (4/12/2018), Urgency of urination, and Weakness.   has a past surgical history that includes Lung cancer surgery (Left); tonsillectomy and adenoidectomy (1954); Shoulder surgery (Left); Ectopic pregnancy surgery (1983); Hysterectomy (1987); ORIF finger fracture; Hernia repair; Colon surgery (2013); and Craniotomy for Tumor (Right, 1/15/2018).  family history includes Cancer in her father and maternal grandmother; Heart disease in her maternal grandfather; Osteoporosis in her mother; Stroke in her mother.   reports that she quit smoking about 3 years ago. Her smoking use included cigarettes. She quit after 51.00 years of use. She has never used smokeless tobacco. She reports that she does not drink alcohol or use drugs.  Allergies   Allergen " Reactions   • Kiwi Extract Shortness Of Breath and Swelling   • Pineapple Shortness Of Breath and Swelling   • Dilaudid [Hydromorphone Hcl] Delirium and Hallucinations     Medications    Current Outpatient Medications:   •  Acetaminophen (TYLENOL) 325 MG capsule, Take 650 mg by mouth., Disp: , Rfl:   •  albuterol sulfate  (90 Base) MCG/ACT inhaler, Inhale See Admin Instructions. Inhale 2 puffs by mouth every 4 to 6 hours as needed, Disp: , Rfl: 2  •  amoxicillin (AMOXIL) 500 MG capsule, , Disp: , Rfl:   •  atorvastatin (LIPITOR) 20 MG tablet, Take 20 mg by mouth Every Night., Disp: , Rfl:   •  cetirizine (zyrTEC) 10 MG tablet, Take 10 mg by mouth Daily., Disp: , Rfl:   •  EFFER-K 25 MEQ disintegrating tablet, DISSOLVE 1 TABLET IN LIQUID AND TAKE BY MOUTH TWICE DAILY, Disp: , Rfl: 3  •  fluticasone (FLONASE) 50 MCG/ACT nasal spray, 1 spray by Each Nare route Daily., Disp: , Rfl: 3  •  folic acid (FOLVITE) 1 MG tablet, Take 1 mg by mouth Daily., Disp: , Rfl:   •  furosemide (LASIX) 20 MG tablet, Take 1 tablet by mouth 2 (Two) Times a Day., Disp: 60 tablet, Rfl: 1  •  modafinil (PROVIGIL) 100 MG tablet, TAKE 1 TABLET BY MOUTH ONCE DAILY IN THE MORNING, Disp: , Rfl:   •  montelukast (SINGULAIR) 10 MG tablet, Take 10 mg by mouth Every Night., Disp: , Rfl:   •  ondansetron ODT (ZOFRAN-ODT) 4 MG disintegrating tablet, DIS ONE T PO  Q 8 H PRN NV, Disp: , Rfl: 0  •  pantoprazole (PROTONIX) 40 MG EC tablet, Take 40 mg by mouth Daily., Disp: , Rfl:   •  sertraline (ZOLOFT) 100 MG tablet, Take 2 tablets by mouth Daily., Disp: 60 tablet, Rfl: 5  •  tiZANidine (ZANAFLEX) 2 MG tablet, Take 2 mg by mouth., Disp: , Rfl:   •  tiotropium bromide-olodaterol (STIOLTO RESPIMAT) 2.5-2.5 MCG/ACT aerosol solution inhaler, Inhale 2 puffs Daily for 30 days., Disp: 2 inhaler, Rfl: 0    Review of Systems   Constitutional: Negative for chills and fever.   HENT: Negative for trouble swallowing and voice change.    Eyes: Negative for  "photophobia and visual disturbance.   Respiratory: Negative for shortness of breath.    Cardiovascular: Negative for chest pain.   Gastrointestinal: Negative for abdominal pain, nausea, vomiting and GERD.   Genitourinary: Negative for difficulty urinating and hematuria.   Musculoskeletal: Positive for back pain. Negative for gait problem and joint swelling.   Skin: Negative for pallor and skin lesions.   Neurological: Positive for memory problem. Negative for tremors, speech difficulty, weakness and confusion.   Hematological: Negative for adenopathy. Does not bruise/bleed easily.   Psychiatric/Behavioral: The patient is not nervous/anxious.        Objective   /68   Pulse 97   Ht 158.8 cm (62.5\")   Wt 63.5 kg (140 lb)   SpO2 94% Comment: RA  Breastfeeding No   BMI 25.20 kg/m²   Physical Exam   Constitutional: She appears well-developed. She does not appear ill. No distress.   HENT:   Head: Atraumatic.   Nose: Nose normal.   Eyes: Conjunctivae and EOM are normal. No scleral icterus.   Neck: Neck supple.   Cardiovascular: Normal rate, regular rhythm, S1 normal and S2 normal.   Pulmonary/Chest: Effort normal and breath sounds normal. No stridor. No respiratory distress. She has no wheezes.   Abdominal: Soft. She exhibits no distension. There is no tenderness.   Musculoskeletal: She exhibits no deformity.   Lymphadenopathy:     She has no cervical adenopathy.   Neurological: She is alert. Coordination normal.   Skin: Skin is warm. No rash noted. No pallor.   Psychiatric: She has a normal mood and affect.        -----------------------------------------------------------------------------------------------  Technique:  Following the administration of intravenous contrast, helical  acquisition was obtained from the thoracic inlet through the diaphragm  during the arterial phase. Multiplanar reconstructed images including 3D  MIP were obtained.     For this CT exam, one or more of the following dose reduction " techniques  was employed:  -automated exposure control  -mA and/or kVp adjustment for patient size  -iterative reconstruction      mGy-cm     Findings:     Vascular findings:  No pulmonary arterial tree filling defect to suggest embolus. Dilated  main pulmonary artery suggests chronic pulmonary artery hypertension.  Mildly dilated ascending thoracic aorta, 3.8 cm. No true aneurysm or  dissection. Mild plaque.      Nonvascular findings:  Small right thyroid nodule. The lungs are clear. No nodules. No  consolidation. No pleural mass or pleural fluid. No endobronchial mass.  Left Port-A-Cath, tip in upper right atrium.     Imaging through the upper abdomen shows markedly enlarged spleen with  heterogeneous enhancement, likely due to arterial phase of enhancement.  There are lower thoracic spine paravertebral soft tissue masses,  probably extra medullary hematopoiesis. No acute fractures. Small cyst  upper right renal pole.        IMPRESSION:     1. No acute findings. No pulmonary embolus.  2. Marked splenomegaly, probably unchanged from April.   3. Mid and lower thoracic spine paravertebral soft tissue masses. This  is most likely extra medullary hematopoiesis.     This report was finalized on 06/19/2019 20:23 by Dr Gio Hays, .    EXAMINATION: XR CHEST (2 VW) 11/22/2019 3:20 PM  HISTORY: Cough, history of lung cancer with brain metastases  COMPARISON: 10/30/2019  FINDINGS:  Heart appears normal in size. Surgical clips are noted in the left  hilum. Atherosclerotic ossifications are seen in the aorta. A left  approach Port-A-Cath is in place with tip projecting over the mid SVC.  There is chronic blunting of the right costophrenic angle. There is  flattening of the hemidiaphragm. There is prominence the right hilum  which is similar to the prior exam and is felt to be related to the  underlying pulmonary vasculature. There is no appreciable  pneumothorax. There has been prior granulomatous  exposure.  IMPRESSION:  Atelectasis and pleural thickening at the right lung base.  Chronic changes with associated hyperinflation. Postsurgical changes  in the left hilar region.  Signed by Dr Bernardo Payan on 11/22/2019 3:29 PM        CT ABDOMEN PELVIS W IV CONTRAST 9/20/2019 5:30 PM  HISTORY: Abdominal pain and fever. History of lung cancer.  COMPARISON: 1/2/2019.   DLP: 681 mGy cm. In order to have a CT radiation dose as low as  reasonably achievable, Automated Exposure Control was utilized for  adjustment of the mA and/or KV according to patient size.  TECHNIQUE: Following the intravenous administration of contrast,  helical CT tomographic images of the abdomen and pelvis were acquired.  Multiplanar reformatted images were provided for review.   FINDINGS:   LOWER CHEST: Pleural thickening is present bilaterally.   LIVER: No focal liver lesion. The hepatic vasculature is patent.   BILIARY SYSTEM: The gallbladder is unremarkable. No intrahepatic or  extrahepatic ductal dilatation.   PANCREAS: No focal pancreatic lesion.   SPLEEN: Marked enlargement of the spleen is present. There is a linear  region of hypodensity in the posterior spleen that was present on the  previous study and likely represents a cleft. The spleen now measures  12.4 x 10.2 cm in the axial plane (previously 12.3 x 7.9 cm).   KIDNEYS AND URETERS: Bilateral kidneys are unremarkable. The ureters  are decompressed and normal in appearance.  ADRENALS: There is a 1.3 cm nodule in the right adrenal gland that is  concerning for metastatic disease.  RETROPERITONEUM: No mass, lymphadenopathy or hemorrhage.   GI TRACT: Moderate to marked stool accumulation is seen in the colon.  There is no evidence of obstruction. A moderate-sized parastomal  hernia is present in the left lower quadrant.   OTHER: Paraspinal masses are seen at T10. These have not changed since  the previous study. The abdominopelvic vasculature is patent. The  osseous structures and  soft tissues demonstrate no worrisome lesions.  Degenerative changes are present in the spine. There is an old  insufficiency fracture in the left sacral ala.   PELVIS: No mass lesion, fluid collection or significant  lymphadenopathy is seen in the pelvis. The urinary bladder is normal  in appearance.  IMPRESSION:  1. Findings consistent with constipation. No bowel obstruction is  identified.  2. Interval enlargement of the spleen without focal mass.  3. New right adrenal gland nodule measuring 1.3 cm. This is concerning  for metastatic disease.  4. Stable paraspinal masses at T10. These may represent metastatic  disease or schwannomas.  Signed by Dr Rikki Guerrero on 9/20/2019 8:08 PM    History: Venous insufficiency       Comments: Venous valvular insufficiency testing was performed in the  bilateral lower extremities using duplex ultrasound with compression  techniques.  The common femoral vein, superficial femoral, popliteal  vein, posterior tibial vein, peroneal vein, greater saphenous vein, and  lesser saphenous veins were interrogated.      On the right, the greater saphenous vein at the junction measured 4.5mm.  In the mid thigh measured 3mm. Above the knee measured 3mm. In the mid  calf measured 2mm. At the ankle measured 1.1mm. There is no evidence of  venous insufficiency in the right lower extremity greater saphenous  vein. The lesser saphenous vein is not visualized due to swelling. There  is no evidence of DVT.     On the left, the greater saphenous vein at the junction measured 4.8mm.  In the mid thigh measured 2.7mm. Above the knee measured 2.8mm. In the  mid calf not visualized. At the ankle measured 1.4mm. There is no  evidence of venous insufficiency in the right lower extremity greater  saphenous vein.The lesser saphenous vein is not visualized due to  swelling. There is no evidence of DVT.     IMPRESSION:  Impression: There is no evidence of venous insufficiency in either lower  extremity greater  saphenous vein.        This report was finalized on 04/10/2019 14:49 by Dr. Dejon Barlow,    CTA PULMONARY W CONTRAST 10/20/2019 3:00 PM  HISTORY: Right flank pain, chest pain, history of cancer  COMPARISON: CT scan dated 1/2/2019.   DLP: 403 mGy cm  TECHNIQUE: Helical tomographic images of the chest were obtained after  the administration of intravenous contrast following angiogram  protocol. Additionally, 3D MIP reconstructions in the coronal and  sagittal planes were provided.     FINDINGS:    Pulmonary arteries: There is adequate enhancement of the pulmonary  arteries to evaluate for central and segmental pulmonary emboli. There  are no filling defects within the main, lobar, segmental or visualized  subsegmental pulmonary arteries. The pulmonary vessels are within  normal limits for size.   Aorta and great vessels: Thoracic aorta is normal in caliber. No  flow-limiting stenosis at the great vessel origins. Moderate coronary  atheromatous calcification.  Neck base: The imaged portion of the base of the neck appears  unremarkable.   Lungs: Endobronchial debris in the right lower lobe basal segmental  airways with associated peribronchial thickening. No consolidation. No  pleural effusion. The airways are otherwise clear.   Heart: The heart is normal in size. There is no pericardial effusion.   Lymph nodes: No pathologically enlarged mediastinal, hilar, or  axillary lymph nodes are present. Remote granulomatous calcification.  Bones and soft tissues: Left chest wall [id number]. Reidentified a  posterior mediastinal masses which are stable or perhaps slightly  decreased from the prior exam.  Upper abdomen: The imaged portion of the upper abdomen demonstrates no  acute process. Increasing splenomegaly, measuring 13 mm craniocaudal.  IMPRESSION:  1. No pulmonary embolus.  2. Minimal debris in the right lower lobe basal segmental airways with  associated peribronchial thickening, either mucus or  minimal  aspiration.  3. Stable or perhaps mildly decreased posterior mediastinal masses  along the paraspinal chain. These are slightly decreased from the  prior exam, perhaps a manifestation of extramedullary hematopoiesis.  This would not be a typical appearance for metastatic disease.  4. Increasing splenomegaly.  Signed by Dr Emmett Combs on 10/20/2019 4:48 PM  -----------------------------------------------------------------------------------------------  PFT Values        Some values may be hidden. Unless noted otherwise, only the newest values recorded on each date are displayed.         Old Values PFT Results 20   No data to display.      Pre Drug PFT Results 20   FVC 65   FEV1 58   FEF 25-75% 40   FEV1/FVC 69.67      Post Drug PFT Results 20   No data to display.      Other Tests PFT Results 20   TLC 86      DLCO 50   D/VAsb 63         [address] [id number]    PULMONARY FUNCTION TEST    PATIENT NAME: VIDAL MORALES : 1950  MED REC NO: [id number] ROOM: [facility]   ACCOUNT NO: [id number] ADMISSION DATE: 2016  PHYSICIAN: Mio Bean MD       DATE OF STUDY: 2016    For Dr. Marlow.    PULMONARY FUNCTION TEST:  1. Spirometry shows moderate airflow obstruction pre-bronchodilator.  2. Following bronchodilator, there is moderate airflow obstruction but a  significant 16% improvement in FEV1 such that post-bronchodilator FEV1 is 1.76  L.  3. Mid flows are severely reduced but markedly improved following  bronchodilator.  4. Maximum ventilation is reduced in the setting of obstruction and improved  following bronchodilator.  5. Lung volume measurements show elevated values for total lung capacity and  residual volume, indicating hyperinflation and gas trapping.  6. Diffusion capacity is normal.  7. Arterial blood gas shows mild metabolic alkalosis with normal oxygenation.  Mio Bean MD  D: 2016 16:14:38 T: 2016 18:17:56  KITTYK/nts  Job#: [id number] Doc#: [id number]        Pulmonary Function Test  Performed by: Mio Bean MD  Authorized by: Mio Bean MD      Pre Drug    FVC: 65%   FEV1: 58%   FEF 25-75%: 40%   FEV1/FVC: 69.67%   T%   RV: 111%   DLCO: 50%   D/VAsb: 63%        My interpretation of the PFT: moderate airflow obstruction, normal lung volumes and reduced dlco    -----------------------------------------------------------------------------------------------  Assessment/Plan   Problem List Items Addressed This Visit        Pulmonary Problems    COPD (chronic obstructive pulmonary disease) (CMS/[facility]) - Primary (Chronic)    Relevant Medications    tiotropium bromide-olodaterol (STIOLTO RESPIMAT) 2.5-2.5 MCG/ACT aerosol solution inhaler    Other Relevant Orders    Pulmonary Function Test (Completed)    Adenocarcinoma of lung, stage 4, left (CMS/[facility])    Relevant Medications    tiotropium bromide-olodaterol (STIOLTO RESPIMAT) 2.5-2.5 MCG/ACT aerosol solution inhaler       Other    Metastatic adenocarcinoma to brain (CMS/[facility])    Chronic bilateral low back pain with bilateral sciatica        Patient's Body mass index is 25.2 kg/m². BMI is within normal parameters. No follow-up required..    She has moderate copd.  She would probably show hyperinflation had she not had a lobectomy previously  She remains off chemo and does not plan to resume  Vascular studies appear to have been negative and she has had vascular evaluation. May be lymphedema; I encouraged follow up with her vascular/other treating doctors regarding this problem  Will add trial of stiolto  Continue albuterol as needed.  She notes tremors with this.  Watch for tremors with stiolto  I demonstrated proper technique with the patient for the respimat device       Electronically signed by Mio Bean MD, 2020, 11:27 AM

## 2020-01-06 NOTE — PATIENT INSTRUCTIONS
Tiotropium; Olodaterol respiratory inhalation spray  What is this medicine?  TIOTROPIUM; OLODATEROL (amy oh TRO pee um; OH stefanie DA ter ol) inhalation is a combination of two medicines that help to open up the airways of your lungs. It is for chronic obstructive pulmonary disease (COPD), including chronic bronchitis or emphysema. Do NOT use for asthma or an acute asthma attack. Do NOT use for a COPD attack.  This medicine may be used for other purposes; ask your health care provider or pharmacist if you have questions.  COMMON BRAND NAME(S): STIOLTO RESPIMAT  What should I tell my health care provider before I take this medicine?  They need to know if you have any of these conditions:  -asthma  -bladder problems or difficulty passing urine  -diabetes  -glaucoma  -heart disease or irregular heartbeat  -high blood pressure  -kidney disease  -pheochromocytoma  -prostate disease  -seizures  -thyroid disease  -an unusual or allergic reaction to tiotropium, olodaterol, ipratropium, atropine, other medicines, foods, dyes, or preservatives  -pregnant or trying to get pregnant  -breast-feeding  How should I use this medicine?  This medicine is inhaled through the mouth. It is used once per day. Follow the directions on the prescription label. Take your medicine at regular intervals. Do not take your medicine more often than directed. Do not stop taking except on your doctor's advice. Make sure that you are using your inhaler correctly. Ask you doctor or health care provider if you have any questions.  Talk to your pediatrician regarding the use of this medicine in children. Special care may be needed.  Overdosage: If you think you have taken too much of this medicine contact a poison control center or emergency room at once.  NOTE: This medicine is only for you. Do not share this medicine with others.  What if I miss a dose?  If you miss a dose, use it as soon as you can. If it is almost time for your next dose, use only that  dose and continue with your regular schedule. Do not use double or extra doses.  What may interact with this medicine?  Do not take this medicine with any of the following medications:  -MAOIs like Carbex, Eldepryl, Marplan, Nardil, and Parnate  This medicine may also interact with the following medications:  -atropine  -certain medicines for allergy, cough and cold  -certain medicines for bladder problems like oxybutynin, tolterodine  -certain medicines for stomach problems like dicyclomine, hyoscyamine  -certain medicines for travel sickness like scopolamine  -certain medicines for Parkinson's disease like benztropine, trihexyphenidyl  -diuretics  -ipratropium  -medicines for depression, anxiety, or psychotic disturbances  -medicines for fungal infections like ketoconazole  -medicines for weight loss including some herbal products  -other medicines that prolong the QT interval (cause an abnormal heart rhythm)  -procarbazine  -ritonavir  -some antibiotics like clarithromycin, erythromycin, levofloxacin, linezolid, and telithromycin  -some heart medicines  -steroid medicines like prednisone or cortisone  -stimulant medicines for attention disorders, weight loss, or to stay awake  -theophylline  -thyroid hormones  This list may not describe all possible interactions. Give your health care provider a list of all the medicines, herbs, non-prescription drugs, or dietary supplements you use. Also tell them if you smoke, drink alcohol, or use illegal drugs. Some items may interact with your medicine.  What should I watch for while using this medicine?  Visit your doctor or health care professional for regular checkups. Tell your doctor or health care professional if your symptoms do not get better.  If your symptoms get worse or if you need your short-acting inhalers more often, call your doctor right away. Do not use this medicine more than once every 24 hours.  Do not get the this medicine in your eyes. It can cause  irritation, pain, or blurred vision.  You may get dizzy. Do not drive, use machinery, or do anything that needs mental alertness until you know how this medicine affects you. Do not stand or sit up quickly, especially if you are an older patient. This reduces the risk of dizzy or fainting spells.  Clean the inhaler as directed in the patient information sheet that comes with this medicine.  What side effects may I notice from receiving this medicine?  Side effects that you should report to your doctor or health care professional as soon as possible:  -allergic reactions like skin rash or hives, swelling of the face, lips, or tongue  -breathing problems right after inhaling your medicine  -changes in vision  -chest pain  -difficulty breathing or wheezing that increases or does not go away  -fast, irregular heartbeat  -feeling faint or lightheaded, falls  -general ill feeling or flu-like symptoms  -stomach pain  -trouble passing urine or change in the amount of urine  -unusually weak or tired  Side effects that usually do not require medical attention (report to your doctor or health care professional if they continue or are bothersome):  -back pain  -constipation  -cough  -dry mouth  -nervousness  -runny nose  -sore throat  -tremor  This list may not describe all possible side effects. Call your doctor for medical advice about side effects. You may report side effects to FDA at 2-312-FDA-8185.  Where should I keep my medicine?  Keep out of the reach of children.  Store at room temperature between 15 and 30 degrees C (59 and 86 degrees F). Do not freeze. The inhaler should be discarded after 3 months or when the inhaler becomes locked, whichever comes first. Throw away any unopened packages after the expiration date.  NOTE: This sheet is a summary. It may not cover all possible information. If you have questions about this medicine, talk to your doctor, pharmacist, or health care provider.  © 2019 [employer] (2019-06-03 12:36:40)

## 2020-01-07 ENCOUNTER — HOSPITAL ENCOUNTER (OUTPATIENT)
Dept: INFUSION THERAPY | Age: 70
Discharge: HOME | End: 2020-01-07
Payer: MEDICARE

## 2020-01-07 ENCOUNTER — OFFICE VISIT (OUTPATIENT)
Dept: HEMATOLOGY | Age: 70
End: 2020-01-07
Payer: MEDICARE

## 2020-01-07 VITALS
SYSTOLIC BLOOD PRESSURE: 120 MMHG | DIASTOLIC BLOOD PRESSURE: 78 MMHG | BODY MASS INDEX: 24.26 KG/M2 | HEART RATE: 99 BPM | OXYGEN SATURATION: 95 % | HEIGHT: 64 IN | WEIGHT: 142.1 LBS

## 2020-01-07 DIAGNOSIS — C34.90 NON-SMALL CELL LUNG CANCER, UNSPECIFIED LATERALITY (HCC): ICD-10-CM

## 2020-01-07 DIAGNOSIS — Z98.890 S/P CRANIOTOMY: ICD-10-CM

## 2020-01-07 DIAGNOSIS — R41.3 MEMORY DIFFICULTY: ICD-10-CM

## 2020-01-07 PROCEDURE — 4040F PNEUMOC VAC/ADMIN/RCVD: CPT | Performed by: INTERNAL MEDICINE

## 2020-01-07 PROCEDURE — 1090F PRES/ABSN URINE INCON ASSESS: CPT | Performed by: INTERNAL MEDICINE

## 2020-01-07 PROCEDURE — 99211 OFF/OP EST MAY X REQ PHY/QHP: CPT

## 2020-01-07 PROCEDURE — 85025 COMPLETE CBC W/AUTO DIFF WBC: CPT

## 2020-01-07 PROCEDURE — G8420 CALC BMI NORM PARAMETERS: HCPCS | Performed by: INTERNAL MEDICINE

## 2020-01-07 PROCEDURE — 3017F COLORECTAL CA SCREEN DOC REV: CPT | Performed by: INTERNAL MEDICINE

## 2020-01-07 PROCEDURE — 99214 OFFICE O/P EST MOD 30 MIN: CPT | Performed by: INTERNAL MEDICINE

## 2020-01-07 PROCEDURE — G8399 PT W/DXA RESULTS DOCUMENT: HCPCS | Performed by: INTERNAL MEDICINE

## 2020-01-07 PROCEDURE — 1036F TOBACCO NON-USER: CPT | Performed by: INTERNAL MEDICINE

## 2020-01-07 PROCEDURE — G8427 DOCREV CUR MEDS BY ELIG CLIN: HCPCS | Performed by: INTERNAL MEDICINE

## 2020-01-07 PROCEDURE — 1123F ACP DISCUSS/DSCN MKR DOCD: CPT | Performed by: INTERNAL MEDICINE

## 2020-01-07 PROCEDURE — G8484 FLU IMMUNIZE NO ADMIN: HCPCS | Performed by: INTERNAL MEDICINE

## 2020-01-13 ENCOUNTER — OFFICE VISIT (OUTPATIENT)
Dept: NEUROLOGY | Facility: CLINIC | Age: 70
End: 2020-01-13

## 2020-01-13 VITALS
WEIGHT: 137 LBS | DIASTOLIC BLOOD PRESSURE: 62 MMHG | BODY MASS INDEX: 24.27 KG/M2 | HEIGHT: 63 IN | SYSTOLIC BLOOD PRESSURE: 100 MMHG | HEART RATE: 78 BPM | RESPIRATION RATE: 18 BRPM

## 2020-01-13 DIAGNOSIS — R94.01 ABNORMAL EEG: ICD-10-CM

## 2020-01-13 DIAGNOSIS — G62.9 POLYNEUROPATHY: ICD-10-CM

## 2020-01-13 DIAGNOSIS — R41.3 MEMORY DIFFICULTY: Primary | ICD-10-CM

## 2020-01-13 PROCEDURE — 99215 OFFICE O/P EST HI 40 MIN: CPT | Performed by: PSYCHIATRY & NEUROLOGY

## 2020-01-13 NOTE — PROGRESS NOTES
Subjective   Marcy Garcia, 1950, is a female who is being seen today for   Chief Complaint   Patient presents with   • Memory Loss       HISTORY OF PRESENT ILLNESS: Extended follow-up.  Patient's memory evaluation was borderline showing some low visuospatial and attention skills but average immediate memory and delayed memory and average language.  Patient had an EEG that showed right frontal slowing and questionable sharp activity but extended monitoring showed no definite seizure genic tendencies.  There were frequent runs of irregular delta activity arising the right central frontal region uncertain significance.  The  record is only 9 hours and 27 minutes long.  Patient does not drive.  Patient had B12 level in August that was greater than thousand and takes folic acid as well as multiple other medications several of which are unknown and unlisted.  Patient not sure if she takes choline or not from Dr. Price.  I have reviewed possibility of using Namenda with the patient and her friend and she is to review this with Dr. Price.  I did discuss with the patient at length potential side effects of medications such as Namenda.  The overnight continuous oximetry did not show qualification for oxygen.  Patient's ERICK somewhat elevated at 23 and patient may have some sleep apnea    REVIEW OF SYSTEMS:   GENERAL: Blood pressure today 160/90 left arm seated in same standing with pulse 74  PULMONARY: As previously noted.  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 which is an ophthalmology problems or glaucoma  "causing vision loss and secondary to her generally some retinal \"filling in\" of visual sensations and hallucinations  ENDOCRINE: No acute endocrine symptoms  PSYCHIATRIC: No acute psychiatric symptoms  HEMATOLOGY: Patient is anemic  SKIN: Skin changes from the lower extremity edema  Family history reviewed and otherwise noncontributory  Social history: Patient denies smoking or drug or alcohol use at this time    PHYSICAL EXAMINATION:    GENERAL: No acute distress  CRANIUM: Status post craniotomy  HEENT:        EYES: No acute fundic abnormalities.  Pupils equal round reactive to light.  EOMs intact without nystagmus and fields full to confrontation       EARS: Tympanic membranes normal and hears tuning fork bilaterally       THROAT: No oropharynx abnormalities       NECK: No bruits/no lymphadenopathy  CHEST: No acute cardiopulmonary abnormalities by auscultation  ABDOMEN: Nondistended  EXTREMITIES: Dorsalis pedis pulses symmetrical  NEURO: MMSE is 30 of 30  SPEECH: Normal  CRANIAL NERVES: Motor/sensory about the face normal symmetric  EXTREMITIES: Lower extremity skin changes as noted with peripheral edema  MOTOR STRENGTH: Motor strength upper and lower extremities grossly intact  STATION AND GAIT:  Gait is wide-based and some difficulty with turns but no tendency to fall and Romberg negative  CEREBELLAR: Finger-nose and heel shin normal  SENSORY: Patient has decreased pin and vibration distal proximal lower extremities ankles bilaterally but otherwise normal pin and vibration throughout  REFLEXES: Reflexes absent at the ankle jerks were present at the knee jerk and biceps with toes equivocal on no clonus noted      ASSESSMENT AND PLAN: Patient with history of memory difficulty as above.  Patient had and I discussed options for treatment with possible low-dose Namenda with potential side effects reviewed.  Patient states that she is on a number of over-the-counter medications including questionably choline from her " oncologist.  She is to get a list of those and we will review that as to potential options.  Patient is not to be driving safety precautions reviewed.  I spent 40 minutes with this patient with 30 minutes counseling.Patient's Body mass index is 24.27 kg/m². BMI is within normal parameters. No follow-up required..      Marcy was seen today for memory loss.    Diagnoses and all orders for this visit:    Memory difficulty    Polyneuropathy    Abnormal EEG        Dictated utilizing Dragon voice recognition software

## 2020-01-14 ENCOUNTER — TREATMENT (OUTPATIENT)
Dept: PHYSICAL THERAPY | Facility: CLINIC | Age: 70
End: 2020-01-14

## 2020-01-14 DIAGNOSIS — M54.50 CHRONIC BILATERAL LOW BACK PAIN, UNSPECIFIED WHETHER SCIATICA PRESENT: Primary | ICD-10-CM

## 2020-01-14 DIAGNOSIS — R26.89 BALANCE DISORDER: ICD-10-CM

## 2020-01-14 DIAGNOSIS — M54.2 PAIN, NECK: ICD-10-CM

## 2020-01-14 DIAGNOSIS — G89.29 CHRONIC BILATERAL LOW BACK PAIN, UNSPECIFIED WHETHER SCIATICA PRESENT: Primary | ICD-10-CM

## 2020-01-14 DIAGNOSIS — R26.9 GAIT DISTURBANCE: ICD-10-CM

## 2020-01-14 PROCEDURE — 97163 PT EVAL HIGH COMPLEX 45 MIN: CPT | Performed by: PHYSICAL THERAPIST

## 2020-01-14 NOTE — PROGRESS NOTES
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)   • S/P craniotomy   • Sensorineural hearing loss (SNHL) of both ears   • Allergic rhinitis   • Adenocarcinoma of lung, stage 4, left (CMS/HCC)   • COPD (chronic obstructive pulmonary disease) (CMS/HCC)   • Anxiety and depression   • Mixed hyperlipidemia   • Metastatic adenocarcinoma to brain (CMS/HCC)   • Essential hypertension   • Altered bowel elimination due to intestinal ostomy (CMS/HCC)   • BMI 22.0-22.9, adult   • Neck pain   • Chronic pain of both shoulders   • Cellulitis of both lower extremities   • Erythema of lower extremity   • Pancytopenia (CMS/HCC)   • BMI 21.0-21.9, adult   • Chronic bilateral low back pain with bilateral sciatica   • Localized edema        Past Medical History:   Diagnosis Date   • Adenocarcinoma, lung, left (CMS/HCC)    • Allergic rhinitis 2018   • Anemia    • Anxiety    • Arthritis    • Ataxia    • Brain tumor (CMS/HCC)    • Cancer (CMS/HCC)     lung cancer - pt had chemo and was told she was cancer free, but recently a brain tumor was found   • Colostomy in place (CMS/HCC)    • Confusion    • COPD (chronic obstructive pulmonary disease) (CMS/HCC)    • Depression    • Dizziness    • GERD (gastroesophageal reflux disease)    • Headache    • Memory loss    • Panic attacks    • Seasonal allergies    • Sensorineural hearing loss (SNHL) of both ears 2018   • Urgency of urination    • Weakness         Past Surgical History:   Procedure Laterality Date   • COLON SURGERY      BLOCKED BOWEL - COLOSTOMY   • CRANIOTOMY FOR TUMOR Right 1/15/2018    Procedure: CRANIOTOMY FOR TUMOR STERIOTACTIC WITH BRAIN LAB right frontal  craniotomy for brain tumor with neuromonitoring and brain lab;  Surgeon: Constantine Schmidt MD;  Location: [facility] OR;  Service:    • ECTOPIC PREGNANCY SURGERY  1983   • HERNIA REPAIR     • HYSTERECTOMY  1987   • LUNG CANCER SURGERY Left     Dr Marlow - Lower Lobectomy   • ORIF FINGER FRACTURE      left pinky   • SHOULDER SURGERY Left     BROKEN COLLAR BONE & SHOULDER SURGERY & ELBOW   • TONSILLECTOMY AND ADENOIDECTOMY  1954       Visit Dx:     ICD-10-CM ICD-9-CM   1. Chronic bilateral low back pain, unspecified whether sciatica present M54.5 724.2    G89.29 338.29   2. Pain, neck M54.2 723.1   3. Gait disturbance R26.9 781.2   4. Balance disorder R26.89 781.99         Patient History     Row Name 01/14/20 1400             History    Chief Complaint  Balance Problems;Difficulty Walking;Difficulty with daily activities;Falls/history of falls;Fatigue/poor endurance;Muscle weakness;Numbness;Pain;Swelling;Problems breathing/shortness of breath  -KR      Type of Pain  Back pain;Hip pain;Knee pain;Neck pain;Shoulder pain  -KR      Brief Description of Current Complaint  Patient has chronic neck and back pain. She has metastatic cancer, with mets in her brain and lungs. She underwent chemo and radiation, but curently not recieveing these. She presents today with a caretaker. She has history of falls, balance and gait issues. She does report occassional using a walker when she is really weak. She has multiple hospital admits, including at least one rehab stay prior to return home. She at times is very poor historian and depends on her caretaker for answers. She also has L>R swelling, which she has been told it is lypmedema.   -KR      What clinical tests have you had for this problem?  CT scan  -KR      Results of Clinical Tests  Performed in 2018: Prominent disc space narrowing and endplate spurring from C3 through [id number] facet joint spurring; per patient reports L4/5 fused  -KR         Daily Activities    Pt  Participated in POC and Goals  Yes  -KR        User Key  (r) = Recorded By, (t) = Taken By, (c) = Cosigned By    Initials Name Provider Type    Wandy Lucas PT DPT Physical Therapist          PT Ortho     Row Name 01/14/20 1400       Posture/Observations    Posture/Observations Comments  tends to sit slumped and forward head position  -KR       Quarter Clearing    Quarter Clearing  Upper Quarter Clearing;Lower Quarter Clearing  -KR       Sensory Screen for Light Touch- Upper Quarter Clearing    C4 (posterior shoulder)  Bilateral:;Intact  -KR    C5 (lateral upper arm)  Bilateral:;Intact  -KR    C6 (tip of thumb)  Bilateral:;Intact  -KR    C7 (tip of 3rd finger)  Bilateral:;Intact  -KR    C8 (tip of 5th finger)  Bilateral:;Intact  -KR    T1 (medial lower arm)  Bilateral:;Intact  -KR       Myotomal Screen- Upper Quarter Clearing    Shoulder flexion (C5)  Bilateral:;4- (Good -)  -KR    Elbow flexion/wrist extension (C6)  Bilateral:;4 (Good)  -KR    Elbow extension/wrist flexion (C7)  Bilateral:;4 (Good)  -KR    Finger flexion/ (C8)  Left:;4- (Good -);Right:;4 (Good)  -KR       Cervical/Shoulder ROM Screen    Cervical flexion  Impaired limited 50%  -KR    Cervical extension  Impaired limited 50%; hinges lower cervical   -KR    Cervical rotation  Impaired limited 75%  -KR       Pathological Reflexes- Lower Quarter Clearing    Clonus  Bilateral:;Negative  -KR       Sensory Screen for Light Touch- Lower Quarter Clearing    L1 (inguinal area)  Bilateral:;Intact  -KR    L2 (anterior mid thigh)  Bilateral:;Intact  -KR    L3 (distal anterior thigh)  Bilateral:;Intact  -KR    L4 (medial lower leg/foot)  Bilateral:;Diminished  -KR    L5 (lateral lower leg/great toe)  Bilateral:;Diminished  -KR    S1 (bottom of foot)  Bilateral:;Diminished  -KR       Myotomal Screen- Lower Quarter Clearing    Hip flexion (L2)  Bilateral:;4- (Good -)  -KR    Knee extension (L3)  Bilateral:;4+ (Good +)  -KR    Ankle DF (L4)   Bilateral:;4- (Good -)  -KR    Great toe extension (L5)  Bilateral:;4- (Good -)  -KR    Ankle PF (S1)  Bilateral:;4 (Good)  -KR    Knee flexion (S2)  Bilateral:;4- (Good -)  -KR       Pathomechanics    Lower Extremity Pathomechanics  Trendelenburg with midstance  -KR    Spine Pathomechanics  Hinges into extension in lower cervical  -KR       Balance Skills Training    Sharpened Rhomberg  < 5 seconds  -KR       Transfers    Comment (Transfers)  increased use of UEs/forward flexion  -KR       Gait/Stairs Assessment/Training    Bilateral Gait Deviations  heel strike decreased  -KR    Comment (Gait/Stairs)  she is wearing slip on shoes and tends to have only slight shuffled gait, but does clear feet, decreased heel strike and step length.   -KR      User Key  (r) = Recorded By, (t) = Taken By, (c) = Cosigned By    Initials Name Provider Type    Wandy Lucas, PT DPT Physical Therapist                      Therapy Education  Education Details: HEP, proper shoe wear  Given: HEP, Symptoms/condition management, Posture/body mechanics  Program: New  How Provided: Verbal, Demonstration  Provided to: Patient, Caregiver  Level of Understanding: Verbalized, Demonstrated     PT OP Goals     Row Name 01/14/20 1400          PT Short Term Goals    STG Date to Achieve  02/13/20  -KR     STG 1  Pt will score17 or greater on the Tinetti.   -KR        Long Term Goals    LTG Date to Achieve  03/14/20  -KR     LTG 1  Pt will score 24 or greater on the Tinetti.   -KR     LTG 1 Progress  New  -KR     LTG 2  Pt will average 14 seconds or less on the TUG.   -KR     LTG 2 Progress  New  -KR     LTG 3  Pt will be indpendent with HEP to include improving tolerance to activity, postural strengthening and balance.   -KR     LTG 3 Progress  New  -KR     LTG 4  Pt will demonstrate tandem stance for 30 seconds or greater.   -KR     LTG 4 Progress  New  -KR        Time Calculation    PT Goal Re-Cert Due Date  02/13/20  -KR       User Key  (r)  = Recorded By, (t) = Taken By, (c) = Cosigned By    Initials Name Provider Type    Wandy Lucas, PT DPT Physical Therapist          PT Assessment/Plan     Row Name 01/14/20 1400          PT Assessment    Functional Limitations  Impaired gait;Limitation in home management;Limitations in community activities;Performance in leisure activities;Performance in self-care ADL  -KR     Impairments  Balance;Gait;Endurance;Pain;Muscle strength;Joint mobility;Posture;Range of motion  -KR     Assessment Comments  Mrs. Garcia presents today with her caretaker present, who has to answer some questions for her secondary to memory deficits. She has compliants of chronic neck, back, shoulder, hip and knee pain as well as L>R LE swelling and shortness of breath. She is known to have metastic cancer and is currently under the care of many specilaists. She is shown to have a high fall risk according to her Tinetti and TUG. She has poor posture and movements patterns increasing strain on her neck and low back. She also has history of falls. Karen Quintero, PT, DPT, SIRIA-KYLIE discussed lymphedema with the patient and at this point recommended increased activity to help with swelling vs intense therapy secondary to patient compliance. I have asked her to start back with her home program provided by home health and to work on becoming more active around her house, even if she has to use her walker. Skilled PT needed to address tolerance to activity, balance gait and postural deficits. She is going to try attend therapy twice a week, but stated this may be too much and have to decrease frequency. I did instructe her she would have to be very compliant with her HEP. Her O2 stayed above 95% throughout treatment.   -KR     Please refer to paper survey for additional self-reported information  Yes  -KR     Rehab Potential  Good  -KR     Patient/caregiver participated in establishment of treatment plan and goals  Yes  -KR     Patient  "would benefit from skilled therapy intervention  Yes  -KR        PT Plan    Predicted Duration of Therapy Intervention (Therapy Eval)  12-16 visits  -KR     Planned CPT's?  PT RE-EVAL: 72059;PT EVAL HIGH COMPLEXITY: 70200;PT THER PROC EA 15 MIN: 46172;PT THER ACT EA 15 MIN: 21167;PT MANUAL THERAPY EA 15 MIN: 79292;PT NEUROMUSC RE-EDUCATION EA 15 MIN: 05069;PT GAIT TRAINING EA 15 MIN: 87301  -KR     PT Plan Comments  We will initially work on improving postural and core strength and providing a comprehensive HEP. We will then work on balance and gait mechanics and decreasing risk for falls. We will monitor O2 and HR secondary to COPD and metastatic cancer status.   -KR       User Key  (r) = Recorded By, (t) = Taken By, (c) = Cosigned By    Initials Name Provider Type    Wandy Lucas, PT DPT Physical Therapist            OP Exercises     Row Name 01/14/20 1400             Precautions    Existing Precautions/Restrictions  other (see comments) no Modalities/cautious with joint mobs; met cancer  -KR        User Key  (r) = Recorded By, (t) = Taken By, (c) = Cosigned By    Initials Name Provider Type    Wandy Lucas, PT DPT Physical Therapist                        Outcome Measure Options: Timed Up and Go (TUG), Tinetti  Tinetti Assessment  Tinetti Assessment: yes  Sitting Balance: Steady,safe  Arises: Able, uses arms  Attempts to Rise: Able, requires > 1 attempt  Immediate Standing Balance (first 5 sec): Unsteady (sway/stagger/feet move)  Standing Balance: Steady, stance > 4 inch FEDERICO & requires support  Sternal Nudge (feet close together): Stagger, grabs, catches self  Eyes Closed (feet close together): Steady  Turning 360 Degrees- Steps: Discontinuous steps  Turning 360 Degrees- Steadiness: Steady  Sitting Down: Uses arms or not a smooth motion  Tinetti Balance Score: 8  Gait Initiation (immediate after told \"go\"): Any hesitancy, multiple attempts to start  Step Length- Right Swing: Right swing foot " does not pass Left stance leg  Step Length- Left Swing: Left swing foot does not pass Right  Foot Clearance- Right Foot: Right foot completely clears floor  Foot Clearance- Left Foot: Left foot completely clears floor  Step Symmetry: Right and Left step length equal  Step Continuity: Stop/discontinuity between steps  Path (excursion): Mild/moderate deviation or use of aid  Trunk: Marked sway or uses device  Base of Support: Heels apart  Gait Score: 4  Tinetti Total Score: 12  Timed Up and Go (TUG)  TUG Test 1: 16.54 seconds  TUG Test 2: 15.73 seconds  Timed Up and Go Comments: 16.135 average      Time Calculation:               PT G-Codes  Outcome Measure Options: Timed Up and Go (TUG), Tinetti  Tinetti Total Score: 12  TUG Test 1: 16.54 seconds  TUG Test 2: 15.73 seconds         Wandy Headley, PT DPT  1/14/2020

## 2020-01-22 DIAGNOSIS — J44.9 CHRONIC OBSTRUCTIVE PULMONARY DISEASE, UNSPECIFIED COPD TYPE (HCC): ICD-10-CM

## 2020-02-03 RX ORDER — FOLIC ACID 1 MG/1
TABLET ORAL
Qty: 30 TABLET | Refills: 0 | Status: SHIPPED | OUTPATIENT
Start: 2020-02-03 | End: 2020-03-04

## 2020-02-21 ENCOUNTER — TELEPHONE (OUTPATIENT)
Dept: VASCULAR SURGERY | Facility: CLINIC | Age: 70
End: 2020-02-21

## 2020-02-24 ENCOUNTER — OFFICE VISIT (OUTPATIENT)
Dept: VASCULAR SURGERY | Facility: CLINIC | Age: 70
End: 2020-02-24

## 2020-02-24 VITALS
WEIGHT: 134 LBS | DIASTOLIC BLOOD PRESSURE: 64 MMHG | BODY MASS INDEX: 23.74 KG/M2 | HEIGHT: 63 IN | SYSTOLIC BLOOD PRESSURE: 108 MMHG

## 2020-02-24 DIAGNOSIS — E78.2 MIXED HYPERLIPIDEMIA: ICD-10-CM

## 2020-02-24 DIAGNOSIS — I10 ESSENTIAL HYPERTENSION: ICD-10-CM

## 2020-02-24 DIAGNOSIS — R60.0 EDEMA OF BOTH LOWER EXTREMITIES: Primary | ICD-10-CM

## 2020-02-24 DIAGNOSIS — C34.92 ADENOCARCINOMA OF LUNG, STAGE 4, LEFT (HCC): ICD-10-CM

## 2020-02-24 PROCEDURE — 99213 OFFICE O/P EST LOW 20 MIN: CPT | Performed by: SURGERY

## 2020-02-24 NOTE — PROGRESS NOTES
02/24/2020      Mary Henning MD  [address] [place] [address] [address] [place] [address]        Marcy Garcia  1950    Chief Complaint   Patient presents with   • Follow-up     6 month f/u for edema of BLE.  No new changes       Dear Mary Henning MD:    HPI     I had the pleasure of seeing your patient in the office today for follow up.  As you recall, the patient is a 69 y.o. female who we are currently following for lower extremity edema.  She returns today for continued surveillance.  She initially had been seen in the hospital after developing significant edema while on maintenance chemotherapy and also had some erythema to the lower extremities.  However venous duplex at that time showed no evidence of DVT or any significant reflux in her saphenous veins.  Since her last visit she reports she has stopped her maintenance chemotherapy.  She reports she continues to have some mild edema of the bilateral lower extremities as well as some mild erythema to the bilateral lower legs distal to the knee however this is significantly improved.  She continues to wear compression stockings on a daily basis and this manages her edema quite well.  She otherwise denies any pain.  She has no arterial symptoms with no history of claudication or ischemic rest pain.  She otherwise is without any acute complaints today.      Review of Systems   Constitutional: Negative.  Negative for activity change, appetite change, chills, diaphoresis, fatigue and fever.   HENT: Negative.  Negative for congestion, sneezing, sore throat and trouble swallowing.    Eyes: Negative.  Negative for visual disturbance.   Respiratory: Negative.  Negative for chest tightness and shortness of breath.    Cardiovascular: Positive for leg swelling (Minimal bilateral lower extremity edema). Negative for chest pain and palpitations.   Gastrointestinal: Negative.  Negative for abdominal distention, abdominal pain, nausea and vomiting.  "  Endocrine: Negative.    Genitourinary: Negative.    Musculoskeletal: Negative.    Skin: Negative.    Allergic/Immunologic: Negative.    Neurological: Negative.    Hematological: Negative.    Psychiatric/Behavioral: Negative.        /64   Ht 160 cm (63\")   Wt 60.8 kg (134 lb)   BMI 23.74 kg/m²   Physical Exam   Constitutional: She is oriented to person, place, and time. She appears well-developed and well-nourished.   HENT:   Head: Normocephalic and atraumatic.   Eyes: Pupils are equal, round, and reactive to light. EOM are normal.   Neck: Normal range of motion. Neck supple. No JVD present.   Cardiovascular: Normal rate, regular rhythm, intact distal pulses and normal pulses.   Pulses:       Carotid pulses are 2+ on the right side, and 2+ on the left side.       Radial pulses are 2+ on the right side, and 2+ on the left side.        Femoral pulses are 2+ on the right side, and 2+ on the left side.       Popliteal pulses are 2+ on the right side, and 2+ on the left side.        Dorsalis pedis pulses are 2+ on the right side, and 2+ on the left side.        Posterior tibial pulses are 2+ on the right side, and 2+ on the left side.   Pulmonary/Chest: Effort normal. No respiratory distress.   Abdominal: Soft. She exhibits no distension and no mass. There is no tenderness.   Musculoskeletal: Normal range of motion. She exhibits edema (She has very mild edema of the bilateral lower extremities the level of the knee.  This is significantly improved from the time she was seen in hospital). She exhibits no tenderness or deformity.   Neurological: She is oriented to person, place, and time.   Skin: Skin is warm and dry. Capillary refill takes 2 to 3 seconds.   She has some mild purplish discoloration of the bilateral distal forefeet and toes.   Psychiatric: She has a normal mood and affect. Her behavior is normal. Judgment and thought content normal.   Vitals reviewed.      DIAGNOSTIC DATA:    No results " found.    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)   • S/P craniotomy   • Sensorineural hearing loss (SNHL) of both ears   • Allergic rhinitis   • Adenocarcinoma of lung, stage 4, left (CMS/HCC)   • COPD (chronic obstructive pulmonary disease) (CMS/HCC)   • Anxiety and depression   • Mixed hyperlipidemia   • Metastatic adenocarcinoma to brain (CMS/HCC)   • Essential hypertension   • Altered bowel elimination due to intestinal ostomy (CMS/HCC)   • BMI 22.0-22.9, adult   • Neck pain   • Chronic pain of both shoulders   • Cellulitis of both lower extremities   • Erythema of lower extremity   • Pancytopenia (CMS/HCC)   • BMI 21.0-21.9, adult   • Chronic bilateral low back pain with bilateral sciatica   • Localized edema         ICD-10-CM ICD-9-CM   1. Edema of both lower extremities R60.0 782.3   2. Essential hypertension I10 401.9   3. Mixed hyperlipidemia E78.2 272.2   4. Adenocarcinoma of lung, stage 4, left (CMS/HCC) C34.92 162.9       Lab Frequency Next Occurrence   EEG Once 11/06/2019   Folate Once 01/15/2020   Sedimentation Rate Once 01/15/2020   T4, Free Once 01/15/2020   MRI Brain With & Without Contrast Once 04/03/2020       PLAN: After thoroughly evaluating Marcy Garcia, I believe the best course of action is to remain conservative from a vascular standpoint.  She continues to have mild edema of the bilateral lower extremities but this continues to be well-controlled with her use of compression and leg elevation.  The erythema to her skin is also significantly improved and is only minimal at this point.  At this point she should continue with compression on a daily basis and continue with leg elevation whenever possible to help aid in edema reduction.  I will plan to see her back in the office in 1 year for continued surveillance.  The patient is to continue taking their  medications as previously discussed.   I did discuss vascular risk factors as they pertain to the progression of vascular disease including controlling hyperlipidemia and hypertension. These factors remain stable. Patient's Body mass index is 23.74 kg/m². BMI is within normal parameters. No follow-up required. This was all discussed in full with complete understanding.  Thank you for allowing me to participate in the care of your patient.  Please do not hesitate to call with any questions or concerns.  We will keep you aware of any further encounters with Marcy Garcia.      Sincerely Yours,      Justin Camacho MD

## 2020-03-04 RX ORDER — FOLIC ACID 1 MG/1
TABLET ORAL
Qty: 30 TABLET | Refills: 0 | Status: SHIPPED | OUTPATIENT
Start: 2020-03-04 | End: 2020-03-30

## 2020-03-04 NOTE — PROGRESS NOTES
Activity    Alcohol use: Not Currently     Comment: 2-3 glasses of wine    Drug use: No    Sexual activity: Not on file   Lifestyle    Physical activity:     Days per week: Not on file     Minutes per session: Not on file    Stress: Not on file   Relationships    Social connections:     Talks on phone: Not on file     Gets together: Not on file     Attends Je[employer]'s witness service: Not on file     Active member of club or organization: Not on file     Attends meetings of clubs or organizations: Not on file     Relationship status: Not on file    Intimate partner violence:     Fear of current or ex partner: Not on file     Emotionally abused: Not on file     Physically abused: Not on file     Forced sexual activity: Not on file   Other Topics Concern    Not on file   Social History Narrative    Not on file       Labs:  Hematology:  Recent Labs     07/10/19  1210 19  0336 19  0335 19  0245   WBC 1.6* 2.2* 4.4* 12.5*   HGB 6.8* 7.9* 7.6* 8.9*   HCT 21.4* 25.4* 24.0* 27.6*   PLT 82* 86* 112* 157   INR 1.34*  --   --   --      Chemistry:  Recent Labs     07/10/19  1210 19  0245   * 135*   K 4.1 2.5*   CL 93* 96*   CO2 23 24   GLUCOSE 108 104   BUN 13 19   CREATININE 0.9 1.2*   ANIONGAP 14 15   LABGLOM >60 44*   CALCIUM 9.0 8.9   CKTOTAL 34  --      Recent Labs     07/10/19  1210   PROT 6.6   LABALBU 3.3*   AST 44*   ALT 28   ALKPHOS 159*   BILITOT 0.6       Objective:     Vitals: BP (!) 140/87   Pulse 92   Temp 97.5 °F (36.4 °C) (Temporal)   Resp 17   Ht 5' 4\" (1.626 m)   Wt 125 lb (56.7 kg)   SpO2 94%   BMI 21.46 kg/m²      Intake/Output Summary (Last 24 hours) at 2019  Last data filed at 2019  Gross per 24 hour   Intake 300 ml   Output 75 ml   Net 225 ml    Temp (24hrs), Av.7 °F (36.5 °C), Min:95.8 °F (35.4 °C), Max:98.4 °F (36.9 °C)    Glucose:  No results for input(s): POCGLU in the last 72 hours.   Physical Examination:   General appearance -alert no [FreeTextEntry1] : \par PREVIOUS OFFICE VISIT 2/14/2020:\par \par Patient presents today with drainage from top of RIGHT frontal surgical incision. He also reports RIGHT sided swelling. Denies fever, chills. He states incisional drainage started on 2/12/2020. \par \par He comes to the visit today with his wife. He currently receives chemotherapy for multiple myeloma every 3 weeks under the care of Dr. Coleman Chopra.\par \par HPI:\par 72 year old man with history of multiple myeloma, atrial fibrillation (on Eliquis), CHF, BPH, HTN who presented to [facility] ED with generalized weakness and gait instability for the past several weeks and was found to have a large RIGHT cerebral convexity subdural hematoma. \par He underwent craniotomy for evacuation of SDH on 12/9/19 with Dr. Mcgill.\par \par His hospitalization was complicated by Afib with RVR. He was subsequently discharged to acute rehab. \par  of labs and x-rays that are available. Updated overnight status with nursing staff. Reviewed the case with Kamla Dash PA-C. All questions were answered to the patient's/family's understanding. Patient is medically stable, please see orders for further details. I have discussed the care of Shaheen Childers, including pertinent history and exam findings with the ARNP/PA. I have seen and examined the patient and the key elements of all parts of the encounter have been performed by me. I agree with the assessment and plan as outlined by the ARNP/PA. Please refer to my separate note for complete documentation.      Electronically signed by Paddy Navarrete MD on 7/13/2019 at 10:09 AM

## 2020-03-09 ENCOUNTER — PROCEDURE VISIT (OUTPATIENT)
Dept: OTOLARYNGOLOGY | Facility: CLINIC | Age: 70
End: 2020-03-09

## 2020-03-09 ENCOUNTER — OFFICE VISIT (OUTPATIENT)
Dept: OTOLARYNGOLOGY | Facility: CLINIC | Age: 70
End: 2020-03-09

## 2020-03-09 VITALS — DIASTOLIC BLOOD PRESSURE: 54 MMHG | SYSTOLIC BLOOD PRESSURE: 88 MMHG | TEMPERATURE: 98.4 F | HEART RATE: 105 BPM

## 2020-03-09 DIAGNOSIS — H90.3 SENSORINEURAL HEARING LOSS (SNHL) OF BOTH EARS: Primary | ICD-10-CM

## 2020-03-09 DIAGNOSIS — H93.13 TINNITUS OF BOTH EARS: ICD-10-CM

## 2020-03-09 DIAGNOSIS — H90.A32 MIXED CONDUCTIVE AND SENSORINEURAL HEARING LOSS OF LEFT EAR WITH RESTRICTED HEARING OF RIGHT EAR: Primary | ICD-10-CM

## 2020-03-09 PROCEDURE — 99214 OFFICE O/P EST MOD 30 MIN: CPT | Performed by: PHYSICIAN ASSISTANT

## 2020-03-09 NOTE — PATIENT INSTRUCTIONS
Continue treatment as directed, if symptoms develop call for sooner appointment, otherwise follow-up as directed.    Recommend hearing aid evaluation.    The patient understands and agrees with assessment and plan.

## 2020-03-09 NOTE — PROGRESS NOTES
TIFFANY Alfaro     Chief Complaint   Patient presents with   • Follow-up        HISTORY OF PRESENT ILLNESS:     Marcy Garcia is a  69 y.o.  female who complains of hearing loss, decreased hearing and tinnitus. The symptoms are localized to both ears. The patient has had moderate to severe symptoms. The symptoms have been increasing in amount for the last several months. The symptoms are aggravated by  chemotherapy medications. The symptoms are improved by no identifiable factors.              Review of Systems   Constitutional: Negative for activity change, appetite change, chills, diaphoresis, fatigue, fever and unexpected weight change.   HENT: Positive for hearing loss and tinnitus. Negative for congestion, dental problem, drooling, ear discharge, ear pain, facial swelling, mouth sores, nosebleeds, postnasal drip, rhinorrhea, sinus pressure, sneezing, sore throat, trouble swallowing and voice change.    Eyes: Negative.    Respiratory: Negative.    Cardiovascular: Negative.    Gastrointestinal: Negative.    Endocrine: Negative.    Skin: Negative.    Allergic/Immunologic: Negative for environmental allergies, food allergies and immunocompromised state.   Neurological: Negative.    Hematological: Negative.    Psychiatric/Behavioral: Negative.    :    Past History:  Past Medical History:   Diagnosis Date   • Adenocarcinoma, lung, left (CMS/HCC)    • Allergic rhinitis 4/12/2018   • Anemia    • Anxiety    • Arthritis    • Ataxia    • Brain tumor (CMS/HCC)    • Cancer (CMS/HCC)     lung cancer - pt had chemo and was told she was cancer free, but recently a brain tumor was found   • Colostomy in place (CMS/HCC)    • Confusion    • COPD (chronic obstructive pulmonary disease) (CMS/HCC)    • Depression    • Dizziness    • GERD (gastroesophageal reflux disease)    • Headache    • Memory loss    • Panic attacks    • Seasonal allergies    • Sensorineural hearing loss (SNHL) of both ears 4/12/2018   • Urgency of  urination    • Weakness      Past Surgical History:   Procedure Laterality Date   • COLON SURGERY  2013    BLOCKED BOWEL - COLOSTOMY   • CRANIOTOMY FOR TUMOR Right 1/15/2018    Procedure: CRANIOTOMY FOR TUMOR STERIOTACTIC WITH BRAIN LAB right frontal craniotomy for brain tumor with neuromonitoring and brain lab;  Surgeon: Constantine Schmidt MD;  Location: [facility] OR;  Service:    • ECTOPIC PREGNANCY SURGERY  1983   • HERNIA REPAIR     • HYSTERECTOMY  1987   • LUNG CANCER SURGERY Left     Dr Marlow - Lower Lobectomy   • ORIF FINGER FRACTURE      left pinky   • SHOULDER SURGERY Left     BROKEN COLLAR BONE & SHOULDER SURGERY & ELBOW   • TONSILLECTOMY AND ADENOIDECTOMY  1954     Family History   Problem Relation Age of Onset   • Stroke Mother    • Osteoporosis Mother    • Cancer Father    • Cancer Maternal Grandmother    • Heart disease Maternal Grandfather      Social History     Tobacco Use   • Smoking status: Former Smoker     Years: 51.00     Types: Cigarettes     Last attempt to quit: 10/2016     Years since quitting: 3.4   • Smokeless tobacco: Never Used   • Tobacco comment: when pt smoked she smoked 2 packs a day on average   Substance Use Topics   • Alcohol use: No   • Drug use: No     No outpatient medications have been marked as taking for the 3/9/20 encounter (Office Visit) with Russell Diego PA.     Allergies:  Kiwi extract; Pineapple; and Dilaudid [hydromorphone hcl]          Vital Signs:   Vitals:    03/09/20 1151   BP: (!) 88/54   Pulse: 105   Temp: 98.4 °F (36.9 °C)         EXAMINATION:   CONSTITUTIONAL: well nourished, alert, oriented, in no acute distress     COMMUNICATION AND VOICE: able to communicate normally, normal voice quality    HEAD: normocephalic, no lesions, atraumatic, no tenderness, no masses     FACE: appearance normal, no lesions, no tenderness, no deformities, facial motion symmetric    SALIVARY GLANDS: parotid glands with no tenderness, no swelling, no masses, submandibular  glands with normal size, nontender    EYES: ocular motility normal, eyelids normal, orbits normal, no proptosis, conjunctiva normal , pupils equal, round     EARS:  Hearing: response to conversational voice decreased bilaterally   External Ears: auricles without lesions  Otoscopic: tympanic membrane appearance normal, no lesions, no perforation, normal mobility, no fluid    NOSE:  External Nose: structure normal, no tenderness on palpation, no nasal discharge, no lesions, no evidence of trauma, nostrils patent   Intranasal Exam: nasal mucosa normal, vestibule within normal limits, inferior turbinate normal, nasal septum midline     ORAL:  Lips: upper and lower lips without lesion   Teeth: dentition within normal limits for age   Gums: gingivae healthy   Oral Mucosa: oral mucosa normal, no mucosal lesions   Floor of Mouth: Warthin’s duct patent, mucosa normal  Tongue: lingual mucosa normal without lesions, normal tongue mobility   Palate: soft and hard palates with normal mucosa and structure  Oropharynx: oropharyngeal mucosa normal    NECK: neck appearance normal, no mass,  noted without erythema or tenderness    THYROID: no overt thyromegaly, no tenderness, nodules or mass present on palpation, position midline     LYMPH NODES: no lymphadenopathy    CHEST/RESPIRATORY: respiratory effort normal, normal breath sounds     CARDIOVASCULAR: rate and rhythm normal, extremities without cyanosis or edema      NEUROLOGIC/PSYCHIATRIC: oriented to time, place and person, mood normal, affect appropriate, CN II-XII intact grossly    RESULTS REVIEW:    I have reviewed the patients old records in the chart.  Audiologic testing reviewed.       Assessment    Diagnosis Plan   1. Sensorineural hearing loss (SNHL) of both ears     2. Tinnitus of both ears         Plan    Patient Instructions   Continue treatment as directed, if symptoms develop call for sooner appointment, otherwise follow-up as directed.    Recommend hearing aid  evaluation.    The patient understands and agrees with assessment and plan.                 Return in about 1 year (around 3/9/2021) for Recheck ears with audiogram.    TIFFANY Alfaro  03/11/20  18:51

## 2020-03-11 PROBLEM — H93.13 TINNITUS OF BOTH EARS: Status: ACTIVE | Noted: 2020-03-11

## 2020-03-23 ENCOUNTER — TELEPHONE (OUTPATIENT)
Dept: NEUROLOGY | Facility: CLINIC | Age: 70
End: 2020-03-23

## 2020-03-23 NOTE — TELEPHONE ENCOUNTER
Per the [facility] updated COVID-19 policy, it is in the best interest of this patient to postpone their routine appointment at this time. I spoke with the patient and they voiced understanding. I have not rescheduled the appointment, but the patient has been added to a reschedule work list. I offered to submit prescription refill requests for the patient while they wait to be rescheduled.   Coronavirus Outbreak Screening Questionnaire:    Do you currently have the following symptoms? None    In the last 14 days before you began feeling sick, have you traveled to any of the following countries? None    In the last 14 days have you had contact with anyone known to have the 2019 Novel Coronavirus or anyone being tested for the 2019 Novel Coronavirus? No  I spoke with patient in regards to her appointment with .  She states she has not talked with  about the [facility].  She states she will call him today and ask if that is okay for her to take.  She states she did not send us a list of her over the counter medications but will do that as well.  She states she does not need to see us tomorrow but will call if she does need anything.  She has an appointment to see Dr. Schmidt in 2 weeks.

## 2020-03-30 RX ORDER — FOLIC ACID 1 MG/1
TABLET ORAL
Qty: 30 TABLET | Refills: 0 | Status: SHIPPED | OUTPATIENT
Start: 2020-03-30 | End: 2020-05-14

## 2020-04-16 NOTE — TELEPHONE ENCOUNTER
Patient was scheduled for a follow up MRI & appt with Dr Schmidt on Tuesday 4/14/20 which I confirmed with her.  She did not show.  I have contacted her today but had to leave her a message asking her to call me back and reschedule.    mi baker CMA

## 2020-04-17 NOTE — TELEPHONE ENCOUNTER
Patient called me back and stated she doesn't remember talking to me about the appointment.  New appt given and she was transferred to scheduling to set up the MRI.      mi baker CMA

## 2020-04-21 PROBLEM — J44.9 STAGE 2 MODERATE COPD BY GOLD CLASSIFICATION (HCC): Status: ACTIVE | Noted: 2018-04-21

## 2020-04-21 NOTE — TELEPHONE ENCOUNTER
Patient forgot about her MRI & appt again today.  I called her and she said she wrote it down but her caregiver, Shasta, must have forgotten or maybe didn't know about it.  I called and spoke w/Shasta and we have rescheduled her MRI & her office appointment.    mi baker CMA

## 2020-05-05 NOTE — PROGRESS NOTES
SUBJECTIVE:  Patient ID: Marcy Garcia is a 69 y.o. female is here today for follow-up.    Chief Complaint: Metastatic cancer  Chief Complaint   Patient presents with   • Brain Tumor     patient here for follow up with MRI today @ [facility]; patient complains of bilateral lower extremity swelling (Dr Henning treating this with Lasix); patient complains of increasing forgetfulness.       HPI  69-year-old female with a history of lung cancer who underwent craniotomy in January 2018 and then stereotactic radiosurgery.  She has not had chemo in about 6 months because she was not tolerating it well.  She is having trouble with walking and balance at home and is fallen a couple times.  She denies any headaches no nausea no vomiting.    The following portions of the patient's history were reviewed and updated as appropriate: allergies, current medications, past family history, past medical history, past social history, past surgical history and problem list.    OBJECTIVE:    Review of Systems   Respiratory: Positive for shortness of breath.    Cardiovascular: Positive for leg swelling.   Musculoskeletal: Positive for gait problem.   Neurological: Positive for weakness.   Psychiatric/Behavioral: Positive for decreased concentration.   All other systems reviewed and are negative.         Physical Exam   Constitutional: She is oriented to person, place, and time.   Pulmonary/Chest:   Short of breath and wheezing in the exam room   Neurological: She is oriented to person, place, and time. She has normal strength. She has a normal Finger-Nose-Finger Test.   Psychiatric: Her speech is normal.       Neurologic Exam     Mental Status   Oriented to person, place, and time.   Attention: normal.   Speech: speech is normal   Level of consciousness: alert  Knowledge: good.     Cranial Nerves   Cranial nerves II through XII intact.     Motor Exam   Muscle bulk: normal  Overall muscle tone: normal  Right arm pronator drift: absent  Left arm  pronator drift: absent    Strength   Strength 5/5 throughout. Generalized weakness     Sensory Exam   Light touch normal.   Pinprick normal.     Gait, Coordination, and Reflexes     Gait  Gait: (Walks with moderate assist.  Unsteady at times.  Likes to lean backwards.)    Coordination   Finger to nose coordination: normal    Tremor   Resting tremor: absent  Intention tremor: absent  Action tremor: absent    Reflexes   Reflexes 2+ except as noted.       Independent Review of Radiographic Studies:   MRI of the brain without contrast shows right frontal postsurgical changes that are stable compared to December 2019.    ASSESSMENT/PLAN:  The patient's MRI is unchanged.  There is no new areas of enhancement or concern.  We will reimage her in about 6 months.      1. 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 Former smoker    3. BMI 23.0-23.9, adult            No follow-ups on file.      Constantine Schmidt MD

## 2020-05-07 ENCOUNTER — HOSPITAL ENCOUNTER (OUTPATIENT)
Dept: ULTRASOUND IMAGING | Age: 70
Discharge: HOME | End: 2020-05-07
Payer: MEDICARE

## 2020-05-07 PROCEDURE — 76770 US EXAM ABDO BACK WALL COMP: CPT

## 2020-05-14 ENCOUNTER — HOSPITAL ENCOUNTER (OUTPATIENT)
Dept: INFUSION THERAPY | Age: 70
Discharge: HOME | End: 2020-05-14
Payer: MEDICARE

## 2020-05-14 ENCOUNTER — OFFICE VISIT (OUTPATIENT)
Dept: HEMATOLOGY | Age: 70
End: 2020-05-14
Payer: MEDICARE

## 2020-05-14 ENCOUNTER — HOSPITAL ENCOUNTER (OUTPATIENT)
Dept: GENERAL RADIOLOGY | Age: 70
Discharge: HOME | End: 2020-05-14
Payer: MEDICARE

## 2020-05-14 VITALS
OXYGEN SATURATION: 94 % | BODY MASS INDEX: 21.92 KG/M2 | DIASTOLIC BLOOD PRESSURE: 70 MMHG | HEIGHT: 64 IN | WEIGHT: 128.4 LBS | SYSTOLIC BLOOD PRESSURE: 104 MMHG | TEMPERATURE: 98.5 F | HEART RATE: 89 BPM

## 2020-05-14 DIAGNOSIS — C34.90 NON-SMALL CELL LUNG CANCER, UNSPECIFIED LATERALITY (HCC): Primary | Chronic | ICD-10-CM

## 2020-05-14 DIAGNOSIS — C34.32 MALIGNANT NEOPLASM OF LOWER LOBE OF LEFT LUNG (HCC): ICD-10-CM

## 2020-05-14 LAB
ALBUMIN SERPL-MCNC: 4 G/DL (ref 3.5–5.2)
ALP BLD-CCNC: 195 U/L (ref 35–104)
ALT SERPL-CCNC: 14 U/L (ref 9–52)
ANION GAP SERPL CALCULATED.3IONS-SCNC: 16 MMOL/L (ref 7–19)
AST SERPL-CCNC: 25 U/L (ref 14–36)
BASOPHILS ABSOLUTE: 0.03 K/UL (ref 0.01–0.08)
BASOPHILS RELATIVE PERCENT: 0.3 % (ref 0.1–1.2)
BILIRUB SERPL-MCNC: 0.7 MG/DL (ref 0.2–1.3)
BUN BLDV-MCNC: 17 MG/DL (ref 7–17)
CALCIUM SERPL-MCNC: 8.9 MG/DL (ref 8.4–10.2)
CHLORIDE BLD-SCNC: 95 MMOL/L (ref 98–111)
CO2: 27 MMOL/L (ref 22–29)
CREAT SERPL-MCNC: 0.8 MG/DL (ref 0.5–1)
EOSINOPHILS ABSOLUTE: 0.24 K/UL (ref 0.04–0.54)
EOSINOPHILS RELATIVE PERCENT: 2.4 % (ref 0.7–7)
FERRITIN: 359 NG/ML (ref 11.1–264)
FOLATE: 20 NG/ML (ref 2.7–20)
GFR NON-AFRICAN AMERICAN: >60
GLUCOSE BLD-MCNC: 113 MG/DL (ref 74–106)
HCT VFR BLD CALC: 33.5 % (ref 34.1–44.9)
HEMOGLOBIN: 9.8 G/DL (ref 11.2–15.7)
LACTATE DEHYDROGENASE: 408 U/L (ref 313–618)
LYMPHOCYTES ABSOLUTE: 1 K/UL (ref 1.18–3.74)
LYMPHOCYTES RELATIVE PERCENT: 10.1 % (ref 19.3–53.1)
MCH RBC QN AUTO: 24 PG (ref 25.6–32.2)
MCHC RBC AUTO-ENTMCNC: 29.3 G/DL (ref 32.3–35.5)
MCV RBC AUTO: 81.9 FL (ref 79.4–94.8)
MONOCYTES ABSOLUTE: 1.2 K/UL (ref 0.24–0.82)
MONOCYTES RELATIVE PERCENT: 12.1 % (ref 4.7–12.5)
NEUTROPHILS ABSOLUTE: 7.47 K/UL (ref 1.56–6.13)
NEUTROPHILS RELATIVE PERCENT: 75.1 % (ref 34–71.1)
PDW BLD-RTO: 17.7 % (ref 11.7–14.4)
PLATELET # BLD: 210 K/UL (ref 182–369)
PMV BLD AUTO: 10.3 FL (ref 7.4–10.4)
POTASSIUM SERPL-SCNC: 2.8 MMOL/L (ref 3.5–5.1)
RBC # BLD: 4.09 M/UL (ref 3.93–5.22)
SODIUM BLD-SCNC: 138 MMOL/L (ref 137–145)
TOTAL PROTEIN: 6.8 G/DL (ref 6.3–8.2)
TSH SERPL DL<=0.05 MIU/L-ACNC: 2.52 UIU/ML (ref 0.47–4.68)
VITAMIN B-12: 849 PG/ML (ref 239–931)
WBC # BLD: 9.94 K/UL (ref 3.98–10.04)

## 2020-05-14 PROCEDURE — 82728 ASSAY OF FERRITIN: CPT

## 2020-05-14 PROCEDURE — 83540 ASSAY OF IRON: CPT

## 2020-05-14 PROCEDURE — 84443 ASSAY THYROID STIM HORMONE: CPT

## 2020-05-14 PROCEDURE — 1036F TOBACCO NON-USER: CPT | Performed by: INTERNAL MEDICINE

## 2020-05-14 PROCEDURE — 85025 COMPLETE CBC W/AUTO DIFF WBC: CPT

## 2020-05-14 PROCEDURE — 82746 ASSAY OF FOLIC ACID SERUM: CPT

## 2020-05-14 PROCEDURE — 36415 COLL VENOUS BLD VENIPUNCTURE: CPT

## 2020-05-14 PROCEDURE — 1090F PRES/ABSN URINE INCON ASSESS: CPT | Performed by: INTERNAL MEDICINE

## 2020-05-14 PROCEDURE — 96523 IRRIG DRUG DELIVERY DEVICE: CPT

## 2020-05-14 PROCEDURE — 1123F ACP DISCUSS/DSCN MKR DOCD: CPT | Performed by: INTERNAL MEDICINE

## 2020-05-14 PROCEDURE — G8399 PT W/DXA RESULTS DOCUMENT: HCPCS | Performed by: INTERNAL MEDICINE

## 2020-05-14 PROCEDURE — 80053 COMPREHEN METABOLIC PANEL: CPT

## 2020-05-14 PROCEDURE — 71046 X-RAY EXAM CHEST 2 VIEWS: CPT

## 2020-05-14 PROCEDURE — 3017F COLORECTAL CA SCREEN DOC REV: CPT | Performed by: INTERNAL MEDICINE

## 2020-05-14 PROCEDURE — G8420 CALC BMI NORM PARAMETERS: HCPCS | Performed by: INTERNAL MEDICINE

## 2020-05-14 PROCEDURE — 6360000002 HC RX W HCPCS: Performed by: INTERNAL MEDICINE

## 2020-05-14 PROCEDURE — 83550 IRON BINDING TEST: CPT

## 2020-05-14 PROCEDURE — 99214 OFFICE O/P EST MOD 30 MIN: CPT | Performed by: INTERNAL MEDICINE

## 2020-05-14 PROCEDURE — 2580000003 HC RX 258: Performed by: INTERNAL MEDICINE

## 2020-05-14 PROCEDURE — 4040F PNEUMOC VAC/ADMIN/RCVD: CPT | Performed by: INTERNAL MEDICINE

## 2020-05-14 PROCEDURE — 82607 VITAMIN B-12: CPT

## 2020-05-14 PROCEDURE — G8427 DOCREV CUR MEDS BY ELIG CLIN: HCPCS | Performed by: INTERNAL MEDICINE

## 2020-05-14 PROCEDURE — 36415 COLL VENOUS BLD VENIPUNCTURE: CPT | Performed by: INTERNAL MEDICINE

## 2020-05-14 PROCEDURE — 83615 LACTATE (LD) (LDH) ENZYME: CPT

## 2020-05-14 RX ORDER — HEPARIN SODIUM (PORCINE) LOCK FLUSH IV SOLN 100 UNIT/ML 100 UNIT/ML
500 SOLUTION INTRAVENOUS PRN
Status: DISCONTINUED | OUTPATIENT
Start: 2020-05-14 | End: 2020-05-15 | Stop reason: HOSPADM

## 2020-05-14 RX ORDER — SODIUM CHLORIDE 0.9 % (FLUSH) 0.9 %
20 SYRINGE (ML) INJECTION PRN
Status: DISCONTINUED | OUTPATIENT
Start: 2020-05-14 | End: 2020-05-15 | Stop reason: HOSPADM

## 2020-05-14 RX ORDER — SODIUM CHLORIDE 0.9 % (FLUSH) 0.9 %
20 SYRINGE (ML) INJECTION PRN
Status: CANCELLED | OUTPATIENT
Start: 2020-05-14

## 2020-05-14 RX ORDER — SODIUM CHLORIDE 0.9 % (FLUSH) 0.9 %
10 SYRINGE (ML) INJECTION PRN
Status: CANCELLED | OUTPATIENT
Start: 2020-05-14

## 2020-05-14 RX ORDER — HEPARIN SODIUM (PORCINE) LOCK FLUSH IV SOLN 100 UNIT/ML 100 UNIT/ML
500 SOLUTION INTRAVENOUS PRN
Status: CANCELLED | OUTPATIENT
Start: 2020-05-14

## 2020-05-14 RX ORDER — FOLIC ACID 1 MG/1
TABLET ORAL
Qty: 30 TABLET | Refills: 0 | Status: SHIPPED | OUTPATIENT
Start: 2020-05-14 | End: 2020-06-11

## 2020-05-14 RX ADMIN — Medication 20 ML: at 14:52

## 2020-05-14 RX ADMIN — HEPARIN 500 UNITS: 100 SYRINGE at 14:52

## 2020-05-14 NOTE — PROGRESS NOTES
changes  and no acute process. Calcified lymph nodes are present in the subcorneal region and left hilum    CT scan of abdomen and pelvis on 7/27/2017 revealed no intra-abdominal or pelvic abnormalities. ----------------------------------------------------------------------------------------__________________________________________      RECURRENT LUNG CARCINOMA TO THE BRAIN 1/15/2018  She presented to [facility] on 12/18/2017 with progressive headache. MRI of the brain with and without contrast at [facility] on 12/18/2017 revealed a 2 x 2.6 x 2.6 cm rim-enhancing irregular mass within the high right frontal vertex with surrounding vasogenic edema.     CT chest with contrast at [facility] 12/19/2017 revealed some paravertebral soft tissue nodules present at T8, T9, T10 with largest being 2.5 cm. These are stable compared to 7/27/2017 PET scan which were NOT metabolically active. I.e. no obvious metastatic lesions    CT abdomen and pelvis with contrast at [facility] 12/19/2017 was unrevealing for any obvious metastatic disease. Bone scan at [facility] 12/19/2017 revealed focal areas of increased activity in the left seventh and 10th costovertebral junctions-indeterminate. A right craniotomy by Dr. Janessa Perales on 1/15/2018 was performed with resection of the 2 x 2.6 x 2.6 cm high right frontal vertex mass   Pathology was consistent with metastatic adenocarcinoma with papillary features consistent with lung origin. Molecular testing on the 1/15/2018 pathology specimen was reported from [facility] on 3/15/2018 as follows:   EGFR -negative for mutation  ALK -negative for rearrangement  ROS 1 -negative for rearrangement   BRAF -negative for the V600E mutation  PDL-1 - expression is 80% i.e. in Positive    MRI of the brain W & WO for [facility] treatment planning purposes was performed on 3/6/2018 by Dr. Kushal Robert. Postsurgical changes to the previous resection site from the right frontal lobe mass area with residual enhancement was noted.      A PET 4/9/2019 with bilateral lower extremity cellulitis. She was discharged on 4/15/2019. CT of the abdomen and pelvis with contrast at [facility] on 4/9/2019 was compared to a CT of the abdomen and pelvis from February 2013. It revealed a left-sided 3.2 x 2.2 cm paravertebral T10 soft tissue mass. (Her prior scans were all done at 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 No evidence of acute posttraumatic injury to the brain. CT scan of the abdomen and pelvis with contrast on 4/9/2019 documented no evidence of metastatic disease in the abdomen or pelvis. Paravertebral soft tissue masses at T10. Differential metastatic disease versus extra medullary hematopoiesis. Maintenance Alimta/Keytruda was initiated on 10/16/2018. Rasheed Dent reported experiencing a significant skin rash after receiving Keytruda on 11/27/2018. Amol Bowie was held from 12/18/2018-1/29/2019 (x3 cycles) during which time Rasheed Dent was only receiving Alimta. Imaging studies on 1/2/2019 suggested stable disease. Rasheed Dent resumed maintenance Alimta/Keytruda 2/19/2019 - 5/7/1209. Due to skin reaction manifestations, further maintenance Keytruda was not given after the 5/7/2019 dose. Diane received course #11 of maintenance Alimta on 7/2/2019. Thereafter, maintenance Alimta was scheduled monthly. The last dose of maintenance Alimta, #13, was delivered on 9/9/2019 due to poor tolerability, deconditioning, multiple hospital admissions. TREATMENT SUMMARY:   1. Left thoracotomy with left lower lobectomy and mediastinal lymph node dissection 01/06/2017 by Dr. Giuseppe Campbell   2. Adjuvant cisplatinum and Alimta x4 cycles. 4/11/2017 -6/21/17.    3. A right craniotomy by Dr. David De La Fuente on Mother     Cancer Father         Lung Cancer        Social History  Social History     Tobacco Use    Smoking status: Former Smoker     Last attempt to quit: 10/22/2016     Years since quitting: 3.5    Smokeless tobacco: Never Used    Tobacco comment: smoked since 15, quit a few times   Substance Use Topics    Alcohol use: Not Currently     Comment: 2-3 glasses of wine    Drug use: No          Review of Systems:  Constitutional: Negative for chills, fatigue, fever or significant weight loss. HENT: Negative for congestion, hearing loss, nosebleeds or sore throat. Eyes: Negative for photophobia, pain, discharge, redness and visual disturbance. Respiratory: Negative for cough, shortness of breath, or wheezing. Cardiovascular: Negative for chest pain, palpitations or leg swelling. Gastrointestinal: Negative for abdominal pain, blood in stool, constipation, diarrhea, nausea or vomiting. Genitourinary: Negative for dysuria, flank pain, frequency, hematuria or urgency. Musculoskeletal: Negative for back pain, joint swelling, myalgias or neck pain. Skin: Negative for rash or petechiae. Neurological: Negative for tremors, seizures, syncope, weakness or headaches. Hematological: No active bruising or bleeding. Psychiatric/Behavioral: Negative for hallucinations. Objective:  Vital Signs: Blood pressure 104/70, pulse 89, temperature 98.5 °F (36.9 °C), height 5' 4\" (1.626 m), weight 128 lb 6.4 oz (58.2 kg), SpO2 94 %. Physical Exam   Constitutional: Oriented to person, place, and time. No acute distress. Head: Normocephalic and atraumatic. Nose: Nose normal.   Mouth/Throat: Oropharynx is clear and moist. No oropharyngeal exudate. Eyes: Pupils are equal and round. Conjunctivae and EOM are normal. No scleral icterus. Neck: Normal range of motion. Neck supple. No JVD. No appreciable thyromegaly.    Cardiovascular: Normal rate, regular rhythm, normal heart sounds and intact distal pulses. Exam reveals no gallop, murmurs or friction rub. Pulmonary/Chest: Effort normal and breath sounds normal. No respiratory distress. No wheezes. Abdominal: Soft. Bowel sounds are normal. No organomegally or masses. No tenderness. There is no rebound and no guarding. Musculoskeletal: Normal range of motion. No edema or tenderness. Lymphadenopathy: No cervical, axillary or inguinal lymphadenopathy. Neurological: Alert and oriented to person, place, and time. Cranial nerves are intact. Neurological exam is nonfocal  Skin: Skin is warm and dry. No rash noted. No erythema. No pallor. Psychiatric: Judgment normal.     Labs:  BMP: No results for input(s): NA, K, CL, CO2, PHOS, BUN, CREATININE, CALCIUM in the last 72 hours. CBC:   Recent Labs     05/14/20  1403   WBC 9.94   HGB 9.8*   HCT 33.5*   MCV 81.9        LIVER PROFILE: No results for input(s): AST, ALT, LIPASE, BILIDIR, BILITOT, ALKPHOS in the last 72 hours. Invalid input(s): AMYLASE,  ALB  PT/INR: No results for input(s): PROTIME, INR in the last 72 hours. APTT: No results for input(s): APTT in the last 72 hours. BNP:  No results for input(s): BNP in the last 72 hours. Ionized Calcium:No results for input(s): IONCA in the last 72 hours. Magnesium:No results for input(s): MG in the last 72 hours. Phosphorus:No results for input(s): PHOS in the last 72 hours. HgbA1C: No results for input(s): LABA1C in the last 72 hours. Hepatic: No results for input(s): ALKPHOS, ALT, AST, PROT, BILITOT, BILIDIR, LABALBU in the last 72 hours. Lactic Acid: No results for input(s): LACTA in the last 72 hours. Troponin: No results for input(s): CKTOTAL, CKMB, TROPONINT in the last 72 hours. ABGs: No results for input(s): PH, PCO2, PO2, HCO3, O2SAT in the last 72 hours. CRP:  No results for input(s): CRP in the last 72 hours. Sed Rate:  No results for input(s): SEDRATE in the last 72 hours.     Cultures:   No results for input(s): CULTURE in the [facility] tomorrow. Orlinda Gaucher am scribing for Emanuel Cortez MD. Electronically signed by Lawrence Coreas LPN on 6/42/2227 at 9:46 PM        Rosio Higginbotham was seen today for follow-up. Diagnoses and all orders for this visit:    Anemia, unspecified type  -     Comprehensive Metabolic Panel; Future  -     Vitamin B12; Future  -     Lactate Dehydrogenase; Future  -     Folate; Future  -     Iron and TIBC; Future  -     Ferritin; Future  -     TSH without Reflex; Future  -     T3, Uptake; Future  -     T4; Future  -     T4, Free; Future  -     Blood Occult Stool Screen #1; Future  -     Blood Occult Stool Screen #1  -     T4, Free  -     T4  -     T3, Uptake  -     TSH without Reflex  -     Ferritin  -     Iron and TIBC  -     Folate  -     Lactate Dehydrogenase  -     Vitamin B12  -     Comprehensive Metabolic Panel         Orders Placed This Encounter   Procedures    Comprehensive Metabolic Panel     Standing Status:   Future     Number of Occurrences:   1     Standing Expiration Date:   5/14/2021    Vitamin B12     Standing Status:   Future     Number of Occurrences:   1     Standing Expiration Date:   5/14/2021    Lactate Dehydrogenase     Standing Status:   Future     Number of Occurrences:   1     Standing Expiration Date:   5/14/2021    Folate     Standing Status:   Future     Number of Occurrences:   1     Standing Expiration Date:   5/14/2021    Iron and TIBC     Standing Status:   Future     Number of Occurrences:   1     Standing Expiration Date:   5/14/2021     Order Specific Question:   Is Patient Fasting? Answer:   no     Order Specific Question:   No of Hours?      Answer:   0    Ferritin     Standing Status:   Future     Number of Occurrences:   1     Standing Expiration Date:   5/14/2021    TSH without Reflex     Standing Status:   Future     Number of Occurrences:   1     Standing Expiration Date:   5/14/2021    T3, Uptake     Standing Status:   Future     Number of

## 2020-05-15 LAB
IRON SATURATION: 13 % (ref 14–50)
IRON: 36 UG/DL (ref 37–170)
TOTAL IRON BINDING CAPACITY: 284 UG/DL (ref 265–497)

## 2020-05-18 RX ORDER — FERROUS SULFATE 325(65) MG
325 TABLET ORAL 2 TIMES DAILY
Qty: 60 TABLET | Refills: 3 | Status: SHIPPED | OUTPATIENT
Start: 2020-05-18

## 2020-05-18 RX ORDER — MAGNESIUM OXIDE 400 MG/1
400 TABLET ORAL DAILY
Qty: 30 TABLET | Refills: 3 | Status: SHIPPED | OUTPATIENT
Start: 2020-05-18

## 2020-05-21 LAB
ALBUMIN SERPL-MCNC: 3.6 G/DL (ref 3.5–5.2)
ALP BLD-CCNC: 159 U/L (ref 35–104)
ALT SERPL-CCNC: 14 U/L (ref 5–33)
ANION GAP SERPL CALCULATED.3IONS-SCNC: 15 MMOL/L (ref 7–19)
AST SERPL-CCNC: 20 U/L (ref 5–32)
BASOPHILS ABSOLUTE: 0 K/UL (ref 0–0.2)
BASOPHILS RELATIVE PERCENT: 0.3 % (ref 0–1)
BILIRUB SERPL-MCNC: 0.6 MG/DL (ref 0.2–1.2)
BUN BLDV-MCNC: 11 MG/DL (ref 8–23)
CALCIUM SERPL-MCNC: 8.5 MG/DL (ref 8.8–10.2)
CHLORIDE BLD-SCNC: 95 MMOL/L (ref 98–111)
CO2: 25 MMOL/L (ref 22–29)
CREAT SERPL-MCNC: 0.7 MG/DL (ref 0.5–0.9)
EOSINOPHILS ABSOLUTE: 0.2 K/UL (ref 0–0.6)
EOSINOPHILS RELATIVE PERCENT: 2.5 % (ref 0–5)
GFR NON-AFRICAN AMERICAN: >60
GLUCOSE BLD-MCNC: 104 MG/DL (ref 74–109)
HCT VFR BLD CALC: 27.7 % (ref 37–47)
HEMOGLOBIN: 8.4 G/DL (ref 12–16)
IMMATURE GRANULOCYTES #: 0 K/UL
LYMPHOCYTES ABSOLUTE: 0.9 K/UL (ref 1.1–4.5)
LYMPHOCYTES RELATIVE PERCENT: 12.1 % (ref 20–40)
MAGNESIUM: 1.7 MG/DL (ref 1.6–2.4)
MCH RBC QN AUTO: 23.2 PG (ref 27–31)
MCHC RBC AUTO-ENTMCNC: 30.3 G/DL (ref 33–37)
MCV RBC AUTO: 76.5 FL (ref 81–99)
MONOCYTES ABSOLUTE: 0.7 K/UL (ref 0–0.9)
MONOCYTES RELATIVE PERCENT: 9.1 % (ref 0–10)
NEUTROPHILS ABSOLUTE: 5.8 K/UL (ref 1.5–7.5)
NEUTROPHILS RELATIVE PERCENT: 75.6 % (ref 50–65)
PDW BLD-RTO: 18 % (ref 11.5–14.5)
PLATELET # BLD: 245 K/UL (ref 130–400)
PMV BLD AUTO: 10.6 FL (ref 9.4–12.3)
POTASSIUM SERPL-SCNC: 3.3 MMOL/L (ref 3.5–5)
RBC # BLD: 3.62 M/UL (ref 4.2–5.4)
SODIUM BLD-SCNC: 135 MMOL/L (ref 136–145)
TOTAL PROTEIN: 6.6 G/DL (ref 6.6–8.7)
WBC # BLD: 7.7 K/UL (ref 4.8–10.8)

## 2020-06-11 RX ORDER — FOLIC ACID 1 MG/1
TABLET ORAL
Qty: 30 TABLET | Refills: 0 | Status: SHIPPED | OUTPATIENT
Start: 2020-06-11 | End: 2020-07-14

## 2020-06-18 LAB
ANION GAP SERPL CALCULATED.3IONS-SCNC: 17 MMOL/L (ref 7–19)
BASOPHILS ABSOLUTE: 0 K/UL (ref 0–0.2)
BASOPHILS RELATIVE PERCENT: 0.3 % (ref 0–1)
BUN BLDV-MCNC: 11 MG/DL (ref 8–23)
CALCIUM SERPL-MCNC: 8.8 MG/DL (ref 8.8–10.2)
CHLORIDE BLD-SCNC: 94 MMOL/L (ref 98–111)
CO2: 24 MMOL/L (ref 22–29)
CREAT SERPL-MCNC: 1.1 MG/DL (ref 0.5–0.9)
EOSINOPHILS ABSOLUTE: 0.2 K/UL (ref 0–0.6)
EOSINOPHILS RELATIVE PERCENT: 3.5 % (ref 0–5)
GFR NON-AFRICAN AMERICAN: 49
GLUCOSE BLD-MCNC: 117 MG/DL (ref 74–109)
HCT VFR BLD CALC: 28.9 % (ref 37–47)
HEMOGLOBIN: 8.8 G/DL (ref 12–16)
IMMATURE GRANULOCYTES #: 0 K/UL
IRON: 21 UG/DL (ref 37–145)
LYMPHOCYTES ABSOLUTE: 0.7 K/UL (ref 1.1–4.5)
LYMPHOCYTES RELATIVE PERCENT: 11 % (ref 20–40)
MAGNESIUM: 1.8 MG/DL (ref 1.6–2.4)
MCH RBC QN AUTO: 24.2 PG (ref 27–31)
MCHC RBC AUTO-ENTMCNC: 30.4 G/DL (ref 33–37)
MCV RBC AUTO: 79.4 FL (ref 81–99)
MONOCYTES ABSOLUTE: 0.6 K/UL (ref 0–0.9)
MONOCYTES RELATIVE PERCENT: 8.7 % (ref 0–10)
NEUTROPHILS ABSOLUTE: 5 K/UL (ref 1.5–7.5)
NEUTROPHILS RELATIVE PERCENT: 76 % (ref 50–65)
PDW BLD-RTO: 18.7 % (ref 11.5–14.5)
PLATELET # BLD: 240 K/UL (ref 130–400)
PMV BLD AUTO: 10.4 FL (ref 9.4–12.3)
POTASSIUM SERPL-SCNC: 3.6 MMOL/L (ref 3.5–5)
RBC # BLD: 3.64 M/UL (ref 4.2–5.4)
SODIUM BLD-SCNC: 135 MMOL/L (ref 136–145)
WBC # BLD: 6.6 K/UL (ref 4.8–10.8)

## 2020-06-29 NOTE — PROGRESS NOTES
Outpatient Physical Therapy Discharge Summary         Patient Name: Marcy Garcia  : 1950  MRN: [id number]    Today's Date: 2020    Visit Dx:  No diagnosis found.    PT OP Goals     Row Name 20 1210          PT Short Term Goals    STG Date to Achieve  20  -KR     STG 1  Pt will score17 or greater on the Tinetti.   -KR     STG 1 Progress  Not Met  -KR        Long Term Goals    LTG Date to Achieve  20  -KR     LTG 1  Pt will score 24 or greater on the Tinetti.   -KR     LTG 1 Progress  Not Met  -KR     LTG 2  Pt will average 14 seconds or less on the TUG.   -KR     LTG 2 Progress  Not Met  -KR     LTG 3  Pt will be indpendent with HEP to include improving tolerance to activity, postural strengthening and balance.   -KR     LTG 3 Progress  Not Met  -KR     LTG 4  Pt will demonstrate tandem stance for 30 seconds or greater.   -KR     LTG 4 Progress  Not Met  -KR       User Key  (r) = Recorded By, (t) = Taken By, (c) = Cosigned By    Initials Name Provider Type    Wandy Lucas, PT DPT Physical Therapist          OP PT Discharge Summary  Date of Discharge: 20  Reason for Discharge: Unable to participate, Patient/Caregiver request  Outcomes Achieved: Unable to make functional progress toward goals at this time  Discharge Destination: Home with home program  Discharge Instructions/Additional Comments: Pt called and cancelled all scheduled follow up appointments, we were unable to make progress towards goals. She only attend initial evaluation.       Time Calculation:                    Wandy Headley, PT DPT  2020

## 2020-07-20 ENCOUNTER — HOSPITAL ENCOUNTER (INPATIENT)
Age: 70
LOS: 4 days | Discharge: NURSING, REHAB OR LONG-TERM CARE | DRG: 193 | End: 2020-07-24
Attending: EMERGENCY MEDICINE | Admitting: FAMILY MEDICINE
Payer: MEDICARE

## 2020-07-20 ENCOUNTER — APPOINTMENT (OUTPATIENT)
Dept: GENERAL RADIOLOGY | Age: 70
DRG: 193 | End: 2020-07-20
Payer: MEDICARE

## 2020-07-20 PROBLEM — J18.9 PNEUMONIA: Status: ACTIVE | Noted: 2020-07-20

## 2020-07-20 LAB
ALBUMIN SERPL-MCNC: 3.8 G/DL (ref 3.5–5.2)
ALP BLD-CCNC: 186 U/L (ref 35–104)
ALT SERPL-CCNC: 15 U/L (ref 5–33)
ANION GAP SERPL CALCULATED.3IONS-SCNC: 18 MMOL/L (ref 7–19)
AST SERPL-CCNC: 36 U/L (ref 5–32)
BASOPHILS ABSOLUTE: 0 K/UL (ref 0–0.2)
BASOPHILS RELATIVE PERCENT: 0.2 % (ref 0–1)
BILIRUB SERPL-MCNC: 1 MG/DL (ref 0.2–1.2)
BUN BLDV-MCNC: 15 MG/DL (ref 8–23)
CALCIUM SERPL-MCNC: 9.3 MG/DL (ref 8.8–10.2)
CHLORIDE BLD-SCNC: 93 MMOL/L (ref 98–111)
CO2: 21 MMOL/L (ref 22–29)
CREAT SERPL-MCNC: 0.8 MG/DL (ref 0.5–0.9)
EOSINOPHILS ABSOLUTE: 0.1 K/UL (ref 0–0.6)
EOSINOPHILS RELATIVE PERCENT: 1.4 % (ref 0–5)
GFR AFRICAN AMERICAN: >59
GFR NON-AFRICAN AMERICAN: >60
GLUCOSE BLD-MCNC: 77 MG/DL (ref 74–109)
HCT VFR BLD CALC: 29.3 % (ref 37–47)
HEMOGLOBIN: 9.1 G/DL (ref 12–16)
IMMATURE GRANULOCYTES #: 0.1 K/UL
LACTIC ACID: 0.2 MMOL/L (ref 0.5–1.9)
LYMPHOCYTES ABSOLUTE: 0.8 K/UL (ref 1.1–4.5)
LYMPHOCYTES RELATIVE PERCENT: 7.9 % (ref 20–40)
MAGNESIUM: 1.8 MG/DL (ref 1.6–2.4)
MCH RBC QN AUTO: 24 PG (ref 27–31)
MCHC RBC AUTO-ENTMCNC: 31.1 G/DL (ref 33–37)
MCV RBC AUTO: 77.3 FL (ref 81–99)
MONOCYTES ABSOLUTE: 0.8 K/UL (ref 0–0.9)
MONOCYTES RELATIVE PERCENT: 7.9 % (ref 0–10)
NEUTROPHILS ABSOLUTE: 8.2 K/UL (ref 1.5–7.5)
NEUTROPHILS RELATIVE PERCENT: 82.1 % (ref 50–65)
PDW BLD-RTO: 17.3 % (ref 11.5–14.5)
PLATELET # BLD: 282 K/UL (ref 130–400)
PMV BLD AUTO: 10.3 FL (ref 9.4–12.3)
POTASSIUM REFLEX MAGNESIUM: 3.4 MMOL/L (ref 3.5–5)
PRO-BNP: 8859 PG/ML (ref 0–900)
PROCALCITONIN: 0.48 NG/ML (ref 0–0.09)
RBC # BLD: 3.79 M/UL (ref 4.2–5.4)
SARS-COV-2, NAAT: NOT DETECTED
SODIUM BLD-SCNC: 132 MMOL/L (ref 136–145)
TOTAL PROTEIN: 6.7 G/DL (ref 6.6–8.7)
TROPONIN: 0.01 NG/ML (ref 0–0.03)
WBC # BLD: 9.9 K/UL (ref 4.8–10.8)

## 2020-07-20 PROCEDURE — 99285 EMERGENCY DEPT VISIT HI MDM: CPT

## 2020-07-20 PROCEDURE — 85025 COMPLETE CBC W/AUTO DIFF WBC: CPT

## 2020-07-20 PROCEDURE — 83735 ASSAY OF MAGNESIUM: CPT

## 2020-07-20 PROCEDURE — 94640 AIRWAY INHALATION TREATMENT: CPT

## 2020-07-20 PROCEDURE — 87040 BLOOD CULTURE FOR BACTERIA: CPT

## 2020-07-20 PROCEDURE — 6370000000 HC RX 637 (ALT 250 FOR IP): Performed by: FAMILY MEDICINE

## 2020-07-20 PROCEDURE — 96365 THER/PROPH/DIAG IV INF INIT: CPT

## 2020-07-20 PROCEDURE — 71045 X-RAY EXAM CHEST 1 VIEW: CPT

## 2020-07-20 PROCEDURE — 36415 COLL VENOUS BLD VENIPUNCTURE: CPT

## 2020-07-20 PROCEDURE — 1210000000 HC MED SURG R&B

## 2020-07-20 PROCEDURE — 83605 ASSAY OF LACTIC ACID: CPT

## 2020-07-20 PROCEDURE — 6360000002 HC RX W HCPCS: Performed by: EMERGENCY MEDICINE

## 2020-07-20 PROCEDURE — U0002 COVID-19 LAB TEST NON-CDC: HCPCS

## 2020-07-20 PROCEDURE — 84145 PROCALCITONIN (PCT): CPT

## 2020-07-20 PROCEDURE — 80053 COMPREHEN METABOLIC PANEL: CPT

## 2020-07-20 PROCEDURE — 6370000000 HC RX 637 (ALT 250 FOR IP): Performed by: EMERGENCY MEDICINE

## 2020-07-20 PROCEDURE — 2580000003 HC RX 258: Performed by: EMERGENCY MEDICINE

## 2020-07-20 PROCEDURE — 83880 ASSAY OF NATRIURETIC PEPTIDE: CPT

## 2020-07-20 PROCEDURE — 84484 ASSAY OF TROPONIN QUANT: CPT

## 2020-07-20 RX ORDER — MODAFINIL 100 MG/1
100 TABLET ORAL DAILY
Status: DISCONTINUED | OUTPATIENT
Start: 2020-07-21 | End: 2020-07-24 | Stop reason: HOSPADM

## 2020-07-20 RX ORDER — PANTOPRAZOLE SODIUM 40 MG/1
40 TABLET, DELAYED RELEASE ORAL
Status: DISCONTINUED | OUTPATIENT
Start: 2020-07-21 | End: 2020-07-24 | Stop reason: HOSPADM

## 2020-07-20 RX ORDER — SERTRALINE HYDROCHLORIDE 100 MG/1
200 TABLET, FILM COATED ORAL DAILY
Status: DISCONTINUED | OUTPATIENT
Start: 2020-07-21 | End: 2020-07-24 | Stop reason: HOSPADM

## 2020-07-20 RX ORDER — MONTELUKAST SODIUM 10 MG/1
10 TABLET ORAL NIGHTLY
Status: DISCONTINUED | OUTPATIENT
Start: 2020-07-20 | End: 2020-07-24 | Stop reason: HOSPADM

## 2020-07-20 RX ORDER — FOLIC ACID 1 MG/1
1 TABLET ORAL DAILY
Status: DISCONTINUED | OUTPATIENT
Start: 2020-07-21 | End: 2020-07-24 | Stop reason: HOSPADM

## 2020-07-20 RX ORDER — IPRATROPIUM BROMIDE AND ALBUTEROL SULFATE 2.5; .5 MG/3ML; MG/3ML
1 SOLUTION RESPIRATORY (INHALATION)
Status: DISCONTINUED | OUTPATIENT
Start: 2020-07-21 | End: 2020-07-24 | Stop reason: HOSPADM

## 2020-07-20 RX ORDER — FERROUS SULFATE 325(65) MG
325 TABLET ORAL 2 TIMES DAILY
Status: DISCONTINUED | OUTPATIENT
Start: 2020-07-20 | End: 2020-07-24 | Stop reason: HOSPADM

## 2020-07-20 RX ORDER — 0.9 % SODIUM CHLORIDE 0.9 %
500 INTRAVENOUS SOLUTION INTRAVENOUS ONCE
Status: COMPLETED | OUTPATIENT
Start: 2020-07-20 | End: 2020-07-20

## 2020-07-20 RX ORDER — ACETAMINOPHEN 325 MG/1
650 TABLET ORAL EVERY 4 HOURS PRN
Status: DISCONTINUED | OUTPATIENT
Start: 2020-07-20 | End: 2020-07-23 | Stop reason: SDUPTHER

## 2020-07-20 RX ORDER — LANOLIN ALCOHOL/MO/W.PET/CERES
400 CREAM (GRAM) TOPICAL DAILY
Status: DISCONTINUED | OUTPATIENT
Start: 2020-07-21 | End: 2020-07-24 | Stop reason: HOSPADM

## 2020-07-20 RX ORDER — ACETAMINOPHEN 325 MG/1
650 TABLET ORAL EVERY 6 HOURS PRN
Status: DISCONTINUED | OUTPATIENT
Start: 2020-07-20 | End: 2020-07-24 | Stop reason: HOSPADM

## 2020-07-20 RX ORDER — FLUTICASONE PROPIONATE 50 MCG
1 SPRAY, SUSPENSION (ML) NASAL DAILY
Status: DISCONTINUED | OUTPATIENT
Start: 2020-07-21 | End: 2020-07-24 | Stop reason: HOSPADM

## 2020-07-20 RX ORDER — FUROSEMIDE 10 MG/ML
20 INJECTION INTRAMUSCULAR; INTRAVENOUS 2 TIMES DAILY
Status: DISCONTINUED | OUTPATIENT
Start: 2020-07-21 | End: 2020-07-21

## 2020-07-20 RX ORDER — ONDANSETRON 4 MG/1
4 TABLET, ORALLY DISINTEGRATING ORAL EVERY 8 HOURS PRN
Status: DISCONTINUED | OUTPATIENT
Start: 2020-07-20 | End: 2020-07-24 | Stop reason: HOSPADM

## 2020-07-20 RX ORDER — IPRATROPIUM BROMIDE AND ALBUTEROL SULFATE 2.5; .5 MG/3ML; MG/3ML
1 SOLUTION RESPIRATORY (INHALATION) ONCE
Status: COMPLETED | OUTPATIENT
Start: 2020-07-20 | End: 2020-07-20

## 2020-07-20 RX ORDER — SODIUM CHLORIDE 0.9 % (FLUSH) 0.9 %
10 SYRINGE (ML) INJECTION PRN
Status: DISCONTINUED | OUTPATIENT
Start: 2020-07-20 | End: 2020-07-24 | Stop reason: HOSPADM

## 2020-07-20 RX ORDER — SODIUM CHLORIDE 0.9 % (FLUSH) 0.9 %
10 SYRINGE (ML) INJECTION EVERY 12 HOURS SCHEDULED
Status: DISCONTINUED | OUTPATIENT
Start: 2020-07-20 | End: 2020-07-24 | Stop reason: HOSPADM

## 2020-07-20 RX ADMIN — IPRATROPIUM BROMIDE AND ALBUTEROL SULFATE 1 AMPULE: .5; 3 SOLUTION RESPIRATORY (INHALATION) at 18:36

## 2020-07-20 RX ADMIN — SODIUM CHLORIDE 500 ML: 9 INJECTION, SOLUTION INTRAVENOUS at 18:59

## 2020-07-20 RX ADMIN — CEFTRIAXONE 1 G: 1 INJECTION, POWDER, FOR SOLUTION INTRAMUSCULAR; INTRAVENOUS at 17:23

## 2020-07-20 RX ADMIN — ACETAMINOPHEN 650 MG: 325 TABLET, FILM COATED ORAL at 22:03

## 2020-07-20 ASSESSMENT — ENCOUNTER SYMPTOMS
SORE THROAT: 0
SHORTNESS OF BREATH: 1
CONSTIPATION: 0
NAUSEA: 0
SINUS PRESSURE: 0
FACIAL SWELLING: 0
VOICE CHANGE: 0
CHOKING: 0
ABDOMINAL PAIN: 0
EYE DISCHARGE: 0
DIARRHEA: 0
APNEA: 0
BLOOD IN STOOL: 0

## 2020-07-20 ASSESSMENT — PAIN SCALES - GENERAL
PAINLEVEL_OUTOF10: 0
PAINLEVEL_OUTOF10: 7

## 2020-07-20 NOTE — ED PROVIDER NOTES
[facility]  eMERGENCY dEPARTMENT eNCOUnter      Pt Name: Mai Islas  MRN: [id number]  Armsvaibhavgfurt 1950  Date of evaluation: 7/20/2020  Provider: Pamela Perea MD    [address] [place] [address]       Chief Complaint   Patient presents with    Fatigue     arrived via EMS with ongoing weakness, FTT         HISTORY OF PRESENT ILLNESS   (Location/Symptom, Timing/Onset,Context/Setting, Quality, Duration, Modifying Factors, Severity)  Note limiting factors. Mai Islas is a 78 y.o. female who presents to the emergency department fall weakness low sat at home. 78-year-old female with a history of metastatic lung cancer. Recently started a second new course of different chemo treatment. She is progressively losing weight and becoming weaker. She is denying any injury from her mild fall today. She tells me that her sat was 85 when EMS arrived on room air here its normal.  She denies fever and chills or new cough. She denies COVID exposure. But she does endorse gross generalized weakness. Earlier this year she underwent resection of a metastatic brain tumor. The history is provided by the patient and medical records. NursingNotes were reviewed. REVIEW OF SYSTEMS    (2-9 systems for level 4, 10 or more for level 5)     Review of Systems   Constitutional: Positive for fatigue and unexpected weight change. Negative for chills and fever. HENT: Negative for congestion, drooling, facial swelling, nosebleeds, sinus pressure, sore throat and voice change. Eyes: Negative for discharge. Respiratory: Positive for shortness of breath. Negative for apnea and choking. Cardiovascular: Negative for chest pain and leg swelling. Gastrointestinal: Negative for abdominal pain, blood in stool, constipation, diarrhea and nausea. Genitourinary: Negative for dysuria and enuresis. Musculoskeletal: Negative for joint swelling. Skin: Negative for rash and wound.    Neurological: Positive for weakness (Generalized). Negative for seizures and syncope. Psychiatric/Behavioral: Negative for behavioral problems, hallucinations and suicidal ideas. All other systems reviewed and are negative. A complete review of systems was performed and is negative except as noted above in the HPI.        PAST MEDICAL HISTORY     Past Medical History:   Diagnosis Date    Anxiety     Arthritis     Bowel obstruction (HCC)     adhesions with colectomy/colostomy    Cancer ([facility] [address]     lung LLL ,  brain    Colostomy in place [facility])     Concussion with no loss of consciousness     COPD (chronic obstructive pulmonary disease) (HCC)     Cyst of kidney, acquired 8/3/2019    Depression     Hernia     History of blood transfusion     History of broken collarbone     Hyperlipidemia     Palliative care patient 09/16/2019    Seasonal allergies          SURGICAL HISTORY       Past Surgical History:   Procedure Laterality Date    ABDOMEN SURGERY      bowel blockage from scar tissue    COLONOSCOPY      COLOSTOMY      still has    CRANIOTOMY Right 01/15/2018    Dr. Myles Fountain, COLON, DIAGNOSTIC      FRACTURE SURGERY      left arm and elbow and finger    HERNIA REPAIR      HYSTERECTOMY      LOBECTOMY Left 1/6/2017    LEFT THORACOTOMY WITH LOBECTOMY  performed by Milad Kyle MD at [address] TUNNELED CTR VAD W/SUBQ PORT AGE 5 YR/> N/A 7/3/2018    SINGLE LUMEN PORT INSERTION performed by Tana Moody MD at [address]       Current Discharge Medication List      CONTINUE these medications which have NOT CHANGED    Details   folic acid (FOLVITE) 1 MG tablet Take 1 tablet by mouth once daily  Qty: 30 tablet, Refills: 5    Associated Diagnoses: Malignant neoplasm of lower lobe of left lung (HCC)      ferrous sulfate (IRON 325) 325 (65 Fe) MG tablet Take 1 tablet by mouth 2 times daily  Qty: 60 tablet, Refills: 3      magnesium oxide (MAG-OX) 400 MG tablet Take 1 tablet by mouth daily  Qty: 30 tablet, Refills: 3      modafinil (PROVIGIL) 100 MG tablet TAKE 1 TABLET BY MOUTH ONCE DAILY IN THE MORNING      potassium bicarbonate (EFFER-K) 25 MEQ disintegrating tablet Take 1 tablet by mouth 2 times daily  Qty: 60 tablet, Refills: 3      ondansetron (ZOFRAN ODT) 4 MG disintegrating tablet Take 1 tablet by mouth every 8 hours as needed for Nausea or Vomiting  Qty: 15 tablet, Refills: 0      fluticasone (FLONASE) 50 MCG/ACT nasal spray 1 spray by Each Nostril route daily  Qty: 1 Bottle, Refills: 3      furosemide (LASIX) 20 MG tablet Take 1 tablet by mouth 2 times daily  Qty: 60 tablet, Refills: 3      pantoprazole (PROTONIX) 40 MG tablet Take 1 tablet by mouth every morning (before breakfast)  Qty: 30 tablet, Refills: 3      tiZANidine (ZANAFLEX) 2 MG tablet Take 2 mg by mouth daily      albuterol sulfate  (90 Base) MCG/ACT inhaler Inhale 2 puffs into the lungs every 6 hours as needed for Wheezing      acetaminophen (TYLENOL) 325 MG tablet Take 650 mg by mouth every 6 hours as needed for Pain      cetirizine (ZYRTEC) 10 MG tablet Take 1 tablet by mouth daily  Qty: 1 tablet, Refills: 0      atorvastatin (LIPITOR) 20 MG tablet Take 10 mg by mouth nightly       montelukast (SINGULAIR) 10 MG tablet Take 10 mg by mouth nightly Indications: Seasonal Allergy       sertraline (ZOLOFT) 100 MG tablet Take 200 mg by mouth daily              ALLERGIES     Kiwi extract;  Hydromorphone hcl; Keytruda [pembrolizumab]; and Pineapple    FAMILY HISTORY       Family History   Problem Relation Age of Onset    High Blood Pressure Mother     Stroke Mother     Cancer Father         Lung Cancer          SOCIAL HISTORY       Social History     Socioeconomic History    Marital status:      Spouse name: Not on file    Number of children: Not on file    Years of education: Not on file    Highest education level: Not on file Occupational History    Not on file   Social Needs    Financial resource strain: Not on file    Food insecurity     Worry: Not on file     Inability: Not on file    Transportation needs     Medical: Not on file     Non-medical: Not on file   Tobacco Use    Smoking status: Former Smoker     Last attempt to quit: 10/22/2016     Years since quitting: 3.7    Smokeless tobacco: Never Used    Tobacco comment: smoked since 15, quit a few times   Substance and Sexual Activity    Alcohol use: Not Currently     Comment: 2-3 glasses of wine    Drug use: No    Sexual activity: Not on file   Lifestyle    Physical activity     Days per week: Not on file     Minutes per session: Not on file    Stress: Not on file   Relationships    Social connections     Talks on phone: Not on file     Gets together: Not on file     Attends [employer] service: Not on file     Active member of club or organization: Not on file     Attends meetings of clubs or organizations: Not on file     Relationship status: Not on file    Intimate partner violence     Fear of current or ex partner: Not on file     Emotionally abused: Not on file     Physically abused: Not on file     Forced sexual activity: Not on file   Other Topics Concern    Not on file   Social History Narrative    Not on file       SCREENINGS             PHYSICAL EXAM    (up to 7 for level 4, 8 or more for level 5)     ED Triage Vitals [07/20/20 1250]   BP Temp Temp src Pulse Resp SpO2 Height Weight   122/68 -- -- 82 14 97 % -- --       Physical Exam  Vitals signs and nursing note reviewed. Constitutional:       Appearance: She is well-developed and underweight. HENT:      Head: Normocephalic and atraumatic. Right Ear: External ear normal.      Left Ear: External ear normal.      Nose: Nose normal.      Mouth/Throat:      Pharynx: Oropharynx is clear. Eyes:      General: No scleral icterus.      Conjunctiva/sclera: Conjunctivae normal.      Pupils: Pupils are Reviewed   CBC WITH AUTO DIFFERENTIAL - Abnormal; Notable for the following components:       Result Value    RBC 3.79 (*)     Hemoglobin 9.1 (*)     Hematocrit 29.3 (*)     MCV 77.3 (*)     MCH 24.0 (*)     MCHC 31.1 (*)     RDW 17.3 (*)     Neutrophils % 82.1 (*)     Lymphocytes % 7.9 (*)     Neutrophils Absolute 8.2 (*)     Lymphocytes Absolute 0.8 (*)     All other components within normal limits   COMPREHENSIVE METABOLIC PANEL W/ REFLEX TO MG FOR LOW K - Abnormal; Notable for the following components:    Sodium 132 (*)     Potassium reflex Magnesium 3.4 (*)     Chloride 93 (*)     CO2 21 (*)     Alkaline Phosphatase 186 (*)     AST 36 (*)     All other components within normal limits   BRAIN NATRIURETIC PEPTIDE - Abnormal; Notable for the following components:    Pro-BNP 8,859 (*)     All other components within normal limits   PROCALCITONIN - Abnormal; Notable for the following components:    Procalcitonin 0.48 (*)     All other components within normal limits   LACTIC ACID, PLASMA - Abnormal; Notable for the following components:    Lactic Acid 0.2 (*)     All other components within normal limits   CULTURE, BLOOD 1   CULTURE, BLOOD 2   COVID-19   TROPONIN   MAGNESIUM   URINE RT REFLEX TO CULTURE       All other labs were within normal range or not returned as of this dictation. EMERGENCY DEPARTMENT COURSE and DIFFERENTIALDIAGNOSIS/MDM:   Vitals:    Vitals:    07/20/20 1812 07/20/20 1830 07/20/20 1836 07/20/20 2000   BP:  94/65  99/73   Pulse:       Resp:       Temp: 98.8 °F (37.1 °C)      SpO2:  100% 100% 96%       MDM  Number of Diagnoses or Management Options  Anemia, unspecified type:   Hypoxia:   Pneumonia due to organism:   Primary malignant neoplasm of lung metastatic to other site, unspecified laterality [facility]):   Diagnosis management comments: Looks like an infiltrate on her chest x-ray.   On arrival her sats were good but they steadily dropped and she responded to nasal cannula and neb treatment states she feels better breathing. She is COVID negative I initiated cultures and antibiotics and spoke with Dr. Tato Michael her primary care provider. Patient tells me she is a DNR. CONSULTS:  IP CONSULT TO FAMILY MEDICINE    PROCEDURES:  Unless otherwise notedbelow, none     Procedures    FINAL IMPRESSION     1. Pneumonia due to organism    2. Hypoxia    3. Primary malignant neoplasm of lung metastatic to other site, unspecified laterality ([facility] [address]    4.  Anemia, unspecified type          DISPOSITION/PLAN   DISPOSITION        PATIENT REFERRED TO:  @FUP@    DISCHARGE MEDICATIONS:  Current Discharge Medication List             (Please note that portions of this note were completed with a voice recognition program.  Efforts were made to edit the dictations butoccasionally words are mis-transcribed.)    Franklyn Prader, MD (electronically signed)  AttendingEmergency Physician          Patito King MD  07/20/20 4430

## 2020-07-21 ENCOUNTER — APPOINTMENT (OUTPATIENT)
Dept: CT IMAGING | Age: 70
DRG: 193 | End: 2020-07-21
Payer: MEDICARE

## 2020-07-21 PROBLEM — E43 SEVERE MALNUTRITION (HCC): Status: ACTIVE | Noted: 2020-07-21

## 2020-07-21 LAB
ANION GAP SERPL CALCULATED.3IONS-SCNC: 18 MMOL/L (ref 7–19)
BUN BLDV-MCNC: 14 MG/DL (ref 8–23)
CALCIUM SERPL-MCNC: 9 MG/DL (ref 8.8–10.2)
CHLORIDE BLD-SCNC: 95 MMOL/L (ref 98–111)
CO2: 20 MMOL/L (ref 22–29)
CREAT SERPL-MCNC: 0.9 MG/DL (ref 0.5–0.9)
GFR AFRICAN AMERICAN: >59
GFR NON-AFRICAN AMERICAN: >60
GLUCOSE BLD-MCNC: 81 MG/DL (ref 74–109)
POTASSIUM SERPL-SCNC: 3.3 MMOL/L (ref 3.5–5)
SODIUM BLD-SCNC: 133 MMOL/L (ref 136–145)

## 2020-07-21 PROCEDURE — 71250 CT THORAX DX C-: CPT

## 2020-07-21 PROCEDURE — 94640 AIRWAY INHALATION TREATMENT: CPT

## 2020-07-21 PROCEDURE — 1210000000 HC MED SURG R&B

## 2020-07-21 PROCEDURE — 87070 CULTURE OTHR SPECIMN AEROBIC: CPT

## 2020-07-21 PROCEDURE — 6370000000 HC RX 637 (ALT 250 FOR IP): Performed by: NURSE PRACTITIONER

## 2020-07-21 PROCEDURE — 2580000003 HC RX 258: Performed by: FAMILY MEDICINE

## 2020-07-21 PROCEDURE — 87205 SMEAR GRAM STAIN: CPT

## 2020-07-21 PROCEDURE — 6370000000 HC RX 637 (ALT 250 FOR IP): Performed by: FAMILY MEDICINE

## 2020-07-21 PROCEDURE — 99223 1ST HOSP IP/OBS HIGH 75: CPT | Performed by: INTERNAL MEDICINE

## 2020-07-21 PROCEDURE — 6360000002 HC RX W HCPCS: Performed by: FAMILY MEDICINE

## 2020-07-21 PROCEDURE — 36415 COLL VENOUS BLD VENIPUNCTURE: CPT

## 2020-07-21 PROCEDURE — 80048 BASIC METABOLIC PNL TOTAL CA: CPT

## 2020-07-21 PROCEDURE — 2700000000 HC OXYGEN THERAPY PER DAY

## 2020-07-21 RX ORDER — SPIRONOLACTONE 25 MG/1
25 TABLET ORAL 2 TIMES DAILY
Status: DISCONTINUED | OUTPATIENT
Start: 2020-07-21 | End: 2020-07-24 | Stop reason: HOSPADM

## 2020-07-21 RX ORDER — GUAIFENESIN 600 MG/1
1200 TABLET, EXTENDED RELEASE ORAL 2 TIMES DAILY
Status: DISCONTINUED | OUTPATIENT
Start: 2020-07-21 | End: 2020-07-24 | Stop reason: HOSPADM

## 2020-07-21 RX ADMIN — AZITHROMYCIN 500 MG: 500 INJECTION, POWDER, LYOPHILIZED, FOR SOLUTION INTRAVENOUS at 01:36

## 2020-07-21 RX ADMIN — SODIUM CHLORIDE, PRESERVATIVE FREE 10 ML: 5 INJECTION INTRAVENOUS at 09:50

## 2020-07-21 RX ADMIN — FOLIC ACID 1 MG: 1 TABLET ORAL at 21:31

## 2020-07-21 RX ADMIN — GUAIFENESIN 1200 MG: 600 TABLET, EXTENDED RELEASE ORAL at 21:23

## 2020-07-21 RX ADMIN — IPRATROPIUM BROMIDE AND ALBUTEROL SULFATE 1 AMPULE: .5; 3 SOLUTION RESPIRATORY (INHALATION) at 06:42

## 2020-07-21 RX ADMIN — IPRATROPIUM BROMIDE AND ALBUTEROL SULFATE 1 AMPULE: .5; 3 SOLUTION RESPIRATORY (INHALATION) at 15:00

## 2020-07-21 RX ADMIN — MONTELUKAST SODIUM 10 MG: 10 TABLET, FILM COATED ORAL at 21:23

## 2020-07-21 RX ADMIN — POTASSIUM BICARBONATE 20 MEQ: 782 TABLET, EFFERVESCENT ORAL at 21:23

## 2020-07-21 RX ADMIN — POTASSIUM BICARBONATE 30 MEQ: 782 TABLET, EFFERVESCENT ORAL at 09:44

## 2020-07-21 RX ADMIN — GUAIFENESIN 1200 MG: 600 TABLET, EXTENDED RELEASE ORAL at 09:44

## 2020-07-21 RX ADMIN — POTASSIUM BICARBONATE 30 MEQ: 782 TABLET, EFFERVESCENT ORAL at 01:35

## 2020-07-21 RX ADMIN — FERROUS SULFATE TAB 325 MG (65 MG ELEMENTAL FE) 325 MG: 325 (65 FE) TAB at 09:44

## 2020-07-21 RX ADMIN — CEFTRIAXONE 1 G: 1 INJECTION, POWDER, FOR SOLUTION INTRAMUSCULAR; INTRAVENOUS at 16:52

## 2020-07-21 RX ADMIN — SPIRONOLACTONE 25 MG: 25 TABLET ORAL at 17:03

## 2020-07-21 RX ADMIN — IPRATROPIUM BROMIDE AND ALBUTEROL SULFATE 1 AMPULE: .5; 3 SOLUTION RESPIRATORY (INHALATION) at 10:55

## 2020-07-21 RX ADMIN — MONTELUKAST SODIUM 10 MG: 10 TABLET, FILM COATED ORAL at 01:35

## 2020-07-21 RX ADMIN — MODAFINIL 100 MG: 100 TABLET ORAL at 09:44

## 2020-07-21 RX ADMIN — Medication 400 MG: at 09:45

## 2020-07-21 RX ADMIN — ACETAMINOPHEN 650 MG: 325 TABLET, FILM COATED ORAL at 16:52

## 2020-07-21 RX ADMIN — AZITHROMYCIN 500 MG: 500 INJECTION, POWDER, LYOPHILIZED, FOR SOLUTION INTRAVENOUS at 21:31

## 2020-07-21 RX ADMIN — ENOXAPARIN SODIUM 40 MG: 40 INJECTION SUBCUTANEOUS at 01:35

## 2020-07-21 RX ADMIN — FLUTICASONE PROPIONATE 1 SPRAY: 50 SPRAY, METERED NASAL at 21:31

## 2020-07-21 RX ADMIN — SODIUM CHLORIDE, PRESERVATIVE FREE 10 ML: 5 INJECTION INTRAVENOUS at 01:42

## 2020-07-21 RX ADMIN — PANTOPRAZOLE SODIUM 40 MG: 40 TABLET, DELAYED RELEASE ORAL at 07:35

## 2020-07-21 RX ADMIN — FERROUS SULFATE TAB 325 MG (65 MG ELEMENTAL FE) 325 MG: 325 (65 FE) TAB at 21:23

## 2020-07-21 RX ADMIN — IPRATROPIUM BROMIDE AND ALBUTEROL SULFATE 1 AMPULE: .5; 3 SOLUTION RESPIRATORY (INHALATION) at 19:55

## 2020-07-21 RX ADMIN — FERROUS SULFATE TAB 325 MG (65 MG ELEMENTAL FE) 325 MG: 325 (65 FE) TAB at 01:35

## 2020-07-21 RX ADMIN — SERTRALINE HYDROCHLORIDE 200 MG: 100 TABLET ORAL at 09:44

## 2020-07-21 RX ADMIN — ENOXAPARIN SODIUM 40 MG: 40 INJECTION SUBCUTANEOUS at 21:31

## 2020-07-21 RX ADMIN — SODIUM CHLORIDE, PRESERVATIVE FREE 10 ML: 5 INJECTION INTRAVENOUS at 21:32

## 2020-07-21 RX ADMIN — SODIUM CHLORIDE, PRESERVATIVE FREE 10 ML: 5 INJECTION INTRAVENOUS at 21:23

## 2020-07-21 ASSESSMENT — ENCOUNTER SYMPTOMS
SHORTNESS OF BREATH: 1
COUGH: 1
EYES NEGATIVE: 1
GASTROINTESTINAL NEGATIVE: 1

## 2020-07-21 ASSESSMENT — PAIN SCALES - GENERAL
PAINLEVEL_OUTOF10: 0
PAINLEVEL_OUTOF10: 5
PAINLEVEL_OUTOF10: 0

## 2020-07-21 NOTE — CONSULTS
adenocarcinoma. PET scan on 12/16/2016 revealed a mass in the LLL with hypermetabolic activity with a maximum SUV of 7.1.   A chest x-ray on 1/4/2017 revealed a 3.8 cm mass lesion in the LLL that previously measured approximately 4.1 cm. On 1/6/2017, a left thoracotomy with left lower lobectomy and mediastinal lymph node dissection was performed by Dr. Lary Faye. Pathology revealed invasive moderately differentiated adenocarcinoma. The tumor measured 4.5 cm. Margins were clear of invasive carcinoma and  no lymphovascular space invasion was noted. 5 of 5 lymph nodes were negative for metastatic carcinoma. Bilateral renal ultrasounds on 2/28/2017 identified a complex cyst at the upper pole of the right kidney measuring up to 3 cm increased in size compared to a study on 12/28/2012 where it measured 2.1 cm in maximum diameter. Two small benign cysts were noted in the left kidney. A CT scan of the abdomen and pelvis on 3/8/2017 confirmed that this was a simple cyst in the upper pole of the right kidney. Adjuvant chemotherapy was discussed at her initial visit on 2/22/2017 as well as on followup visit 3/13/2017. She is agreeable and desires to proceed accordingly with adjuvant Cisplatinum and Alimta. Zakiya Allen desires however to postpone initiation of adjuvant chemotherapy until 4/11/2017. Her wishes were respected and chemotherapy delivered as noted below. CT scan of chest on 7/27/2017 revealed postsurgical changes  and no acute process. Calcified lymph nodes are present in the subcorneal region and left hilum  CT scan of abdomen and pelvis on 7/27/2017 revealed no intra-abdominal or pelvic abnormalities. ----------------------------------------------------------------------------------------_______________________________________________________   RECURRENT LUNG CARCINOMA TO THE BRAIN 1/15/2018  She presented to [facility] on 12/18/2017 with progressive headache.    MRI of the brain with and without contrast at [facility] on 12/18/2017 revealed a 2 x 2.6 x 2.6 cm rim-enhancing irregular mass within the high right frontal vertex with surrounding vasogenic edema.   CT chest with contrast at [facility] 12/19/2017 revealed some paravertebral soft tissue nodules present at T8, T9, T10 with largest being 2.5 cm. These are stable compared to 7/27/2017 PET scan which were NOT metabolically active. I.e. no obvious metastatic lesions  CT abdomen and pelvis with contrast at [facility] 12/19/2017 was unrevealing for any obvious metastatic disease. Bone scan at [facility] 12/19/2017 revealed focal areas of increased activity in the left seventh and 10th costovertebral junctions-indeterminate. A right craniotomy by Dr. Sudeep Stewart on 1/15/2018 was performed with resection of the 2 x 2.6 x 2.6 cm high right frontal vertex mass   Pathology was consistent with metastatic adenocarcinoma with papillary features consistent with lung origin. Molecular testing on the 1/15/2018 pathology specimen was reported from [facility] ([place], [place]) on 3/15/2018 as follows:   EGFR -negative for mutation  ALK -negative for rearrangement  ROS 1 -negative for rearrangement   BRAF -negative for the V600E mutation  PDL-1 - expression is 80% i.e. in Positive  MRI of the brain W & WO for [facility] treatment planning purposes was performed on 3/6/2018 by Dr. Rosa Elena Farooq. Postsurgical changes to the previous resection site from the right frontal lobe mass area with residual enhancement was noted. A PET scan on 2/6/2018 did not reveal scintigraphic evidence of recurrent malignancy or metastatic disease. MRI of the brain W & WO for [facility] treatment planning purposes was performed on 3/6/2018 by Dr. Rosa Elena Farooq. Postsurgical changes to the previous resection site from the right frontal lobe mass area with residual enhancement was noted. Diane underwent SRS with 1600 cGy in one single treatment fraction on 3/26/2018 to the right frontal CNS lobe by Dima Farooq and Sudeep Stewart.   Delays in beginning hematopoiesis  CT scan of the head without contrast on 4/10/2019 documented   encephalomalacia within the surgical bed. No evidence of acute posttraumatic injury to the brain. CT scan of the abdomen and pelvis with contrast on 4/9/2019 documented no evidence of metastatic disease in the abdomen or pelvis. Paravertebral soft tissue masses at T10. Differential metastatic disease versus extra medullary hematopoiesis. Maintenance Alimta/Keytruda was initiated on 10/16/2018. Joyce Sargent reported experiencing a significant skin rash after receiving Keytruda on 11/27/2018. Roro Magaly was held from 12/18/2018-1/29/2019 (x3 cycles) during which time Joyce Sargent was only receiving Alimta. Imaging studies on 1/2/2019 suggested stable disease. Joyce Sargent resumed maintenance Alimta/Keytruda 2/19/2019 - 5/7/1209. Due to skin reaction manifestations, further maintenance Keytruda was not given after the 5/7/2019 dose. Diane received course #11 of maintenance Alimta on 7/2/2019. Thereafter, maintenance Alimta was scheduled monthly. TREATMENT SUMMARY:   1. Left thoracotomy with left lower lobectomy and mediastinal lymph node dissection 01/06/2017 by Dr. Ilene Munoz   2. Adjuvant cisplatinum and Alimta x4 cycles. 4/11/2017 -6/21/17. 3. A right craniotomy by Dr. Nikia Kramer on 1/15/2018 was performed with resection of the 2 x 2.6 x 2.6 cm high right frontal vertex mass   4. Diane underwent SRS with 1600 cGy in one single treatment fraction on 3/26/2018 to the right frontal CNS lobe by Dima Blanton and Nikia Kramer   5. Cycle #1 of chemotherapy with Alimta, carboplatin and Roro Magaly was delivered on 5/14/2018 and the final cycle #6 was delivered on 9/4/2018   6. Maintenance Alimta/Keytruda was initiated on 10/16/2018 with the final cycle #9 delivered on 5/7/2019.   Cycle #10 of single agent maintenance Alimta was delivered on 6/11/2019 with schedule adjusted to monthly on 7/2/2019            Past Medical History:    Past Medical History: Diagnosis Date    Anxiety     Arthritis     Bowel obstruction (HCC)     adhesions with colectomy/colostomy    Cancer (HCC)     lung LLL ,  brain    Col[facility] in place [facility])     Concussion with no loss of consciousness     COPD (chronic obstructive pulmonary disease) (HCC)     Cyst of kidney, acquired 8/3/2019    Depression     Hernia     History of blood transfusion     History of broken collarbone     Hyperlipidemia     Palliative care patient 09/16/2019    Seasonal allergies        Past Surgical History:    Past Surgical History:   Procedure Laterality Date    ABDOMEN SURGERY      bowel blockage from scar tissue    COLONOSCOPY      COLOSTOMY      still has    CRANIOTOMY Right 01/15/2018    Dr. Blake Haddad, COLON, DIAGNOSTIC      FRACTURE SURGERY      left arm and elbow and finger    HERNIA REPAIR      HYSTERECTOMY      LOBECTOMY Left 1/6/2017    LEFT THORACOTOMY WITH LOBECTOMY  performed by Archie Hall MD at [place] [address]      [place] INSJ TUNNELED CTR VAD W/SUBQ PORT AGE 5 YR/> N/A 7/3/2018    SINGLE LUMEN PORT INSERTION performed by Pati Waller MD at [address] [place] [address]         Social History:    Social History     Socioeconomic History    Marital status:      Spouse name: Not on file    Number of children: Not on file    Years of education: Not on file    Highest education level: Not on file   Occupational History    Not on file   Social Needs    Financial resource strain: Not on file    Food insecurity     Worry: Not on file     Inability: Not on file    Transportation needs     Medical: Not on file     Non-medical: Not on file   Tobacco Use    Smoking status: Former Smoker     Last attempt to quit: 10/22/2016     Years since quitting: 3.7    Smokeless tobacco: Never Used    Tobacco comment: smoked since 15, quit a few times   Substance and Sexual Activity    Alcohol use: Not Currently Comment: 2-3 glasses of wine    Drug use: No    Sexual activity: Not on file   Lifestyle    Physical activity     Days per week: Not on file     Minutes per session: Not on file    Stress: Not on file   Relationships    Social connections     Talks on phone: Not on file     Gets together: Not on file     Attends [employer] service: Not on file     Active member of club or organization: Not on file     Attends meetings of clubs or organizations: Not on file     Relationship status: Not on file    Intimate partner violence     Fear of current or ex partner: Not on file     Emotionally abused: Not on file     Physically abused: Not on file     Forced sexual activity: Not on file   Other Topics Concern    Not on file   Social History Narrative    Not on file       Family History:   Family History   Problem Relation Age of Onset    High Blood Pressure Mother     Stroke Mother     Cancer Father         Lung Cancer       Current Hospital Medications:    Current Facility-Administered Medications: guaiFENesin (MUCINEX) extended release tablet 1,200 mg, 1,200 mg, Oral, BID  spironolactone (ALDACTONE) tablet 25 mg, 25 mg, Oral, BID  potassium bicarb-citric acid (EFFER-K) effervescent tablet 20 mEq, 20 mEq, Oral, BID  sodium chloride flush 0.9 % injection 10 mL, 10 mL, Intravenous, 2 times per day  sodium chloride flush 0.9 % injection 10 mL, 10 mL, Intravenous, PRN  acetaminophen (TYLENOL) tablet 650 mg, 650 mg, Oral, Q4H PRN  enoxaparin (LOVENOX) injection 40 mg, 40 mg, Subcutaneous, Q24H  ipratropium-albuterol (DUONEB) nebulizer solution 1 ampule, 1 ampule, Inhalation, Q4H WA  cefTRIAXone (ROCEPHIN) 1 g in sterile water 10 mL IV syringe, 1 g, Intravenous, Q24H  azithromycin (ZITHROMAX) 500 mg in D5W 250ml Vial Mate, 500 mg, Intravenous, Q24H  acetaminophen (TYLENOL) tablet 650 mg, 650 mg, Oral, Q6H PRN  ferrous sulfate (IRON 325) tablet 325 mg, 325 mg, Oral, BID  fluticasone (FLONASE) 50 MCG/ACT nasal spray 1 spray, 1 spray, Each Nostril, Daily  folic acid (FOLVITE) tablet 1 mg, 1 mg, Oral, Daily  magnesium oxide (MAG-OX) tablet 400 mg, 400 mg, Oral, Daily  modafinil (PROVIGIL) tablet 100 mg, 100 mg, Oral, Daily  montelukast (SINGULAIR) tablet 10 mg, 10 mg, Oral, Nightly  pantoprazole (PROTONIX) tablet 40 mg, 40 mg, Oral, QAM AC  ondansetron (ZOFRAN-ODT) disintegrating tablet 4 mg, 4 mg, Oral, Q8H PRN  sertraline (ZOLOFT) tablet 200 mg, 200 mg, Oral, Daily    Allergies:    Allergies   Allergen Reactions    Kiwi Extract Shortness Of Breath and Swelling    Hydromorphone Hcl Other (See Comments)     Hallucinations      Keytruda [Pembrolizumab]     Pineapple          Subjective   REVIEW OF SYSTEMS:   CONSTITUTIONAL: no fever, no night sweats,  fatigue;  HEENT: no blurring of vision, no double vision, no hearing difficulty, no tinnitus, no ulceration, no dysplasia, no epistaxis;  LUNGS: no cough, no hemoptysis, no wheeze,   shortness of breath;  CARDIOVASCULAR: no palpitation, no chest pain,  shortness of breath;  GI: no abdominal pain, no nausea, no vomiting, no diarrhea, no constipation;  MARJORIE: no dysuria, no hematuria, no frequency or urgency, no nephrolithiasis;  MUSCULOSKELETAL: no joint pain, no swelling, no stiffness;  ENDOCRINE: no polyuria, no polydipsia, no cold or heat intolerance;  HEMATOLOGY: no easy bruising or bleeding, no history of clotting disorder;  DERMATOLOGY: no skin rash, no eczema, no pruritus;  PSYCHIATRY: no depression, no anxiety, no panic attacks, no suicidal ideation, no homicidal ideation;  NEUROLOGY: no syncope, no seizures, no numbness or tingling of hands, no numbness or tingling of feet, no paresis;    Objective   BP 95/60   Pulse 90   Temp 98.1 °F (36.7 °C)   Resp 20   Ht 5' 4\" (1.626 m)   Wt 119 lb (54 kg)   SpO2 98%   BMI 20.43 kg/m²     PHYSICAL EXAM:  CONSTITUTIONAL: Alert, appropriate, no acute distress, sick appearing  EYES: Non icteric, EOM intact, pupils equal round   ENT: Mucus membranes moist, no oral pharyngeal lesions, external inspection of ears and nose are normal  NECK: Supple, no masses. No palpable thyroid mass  CHEST/LUNGS: CTA bilaterally, normal respiratory effort   CARDIOVASCULAR: RRR, no murmurs. No lower extremity edema  ABDOMEN: soft non-tender, active bowel sounds, no HSM. No palpable masses  EXTREMITIES: warm, full ROM in all 4 extremities, no focal weakness. SKIN: warm, dry with no rashes or lesions  LYMPH: No cervical, clavicular, axillary, or inguinal lymphadenopathy  NEUROLOGIC: follows commands, non focal   PSYCH: mood and affect appropriate. Alert and oriented to time, place, person        LABORATORY RESULTS REVIEWED BY ME:  Recent Labs     07/20/20  1603   WBC 9.9   HGB 9.1*   HCT 29.3*   MCV 77.3*          Lab Results   Component Value Date     (L) 07/21/2020    K 3.3 (L) 07/21/2020    CL 95 (L) 07/21/2020    CO2 20 (L) 07/21/2020    BUN 14 07/21/2020    CREATININE 0.9 07/21/2020    GLUCOSE 81 07/21/2020    CALCIUM 9.0 07/21/2020    PROT 6.7 07/20/2020    LABALBU 3.8 07/20/2020    BILITOT 1.0 07/20/2020    ALKPHOS 186 (H) 07/20/2020    AST 36 (H) 07/20/2020    ALT 15 07/20/2020    LABGLOM >60 07/21/2020    GFRAA >59 07/21/2020    GLOB 2.3 01/04/2017       Lab Results   Component Value Date    INR 1.26 (H) 10/20/2019    INR 1.34 (H) 07/10/2019    INR 1.52 (H) 05/26/2019    PROTIME 15.2 (H) 10/20/2019    PROTIME 15.9 (H) 07/10/2019    PROTIME 17.6 (H) 05/26/2019       RADIOLOGY STUDIES REPORT/REVIEWED AND INTERPRETED BY ME:  Xr Chest Portable    Result Date: 7/20/2020  XR CHEST PORTABLE 7/20/2020 12:15 PM HISTORY: Failure to thrive, history lung cancer COMPARISON: 5/14/2020 CXR: A single frontal view of the chest is performed. Findings: There are new patchy right lower lobe opacities with left basilar densities. There is a new small left pleural effusion with a stable right pleural fluid collection. There is mild cardiomegaly.  Central pulmonary vessels prominent. Left subclavian port stable in position. Calcified left hilar lymph nodes. No pneumothorax. Degenerative change of the shoulders and spine. 1. New patchy right lower lobe opacities are suspicious for pneumonia with left basilar atelectasis versus multifocal pneumonia. New small left pleural effusion with stable right pleural fluid collection. Follow up imaging recommended to document improvement. 2. Mild cardiomegaly with pulmonary venous congestion. . Signed by Dr Ailyn Grimes on 7/20/2020 1:41 PM          My interpretation:New patch right lower lobe concern for pneumonia. ASSESSMENT:  #Lung cancer  Off treatment at this time. She may need a repeat CT scan of the chest to assess disease status. Brain metastasis- s/p ressection. MRI brain May 2020  1. Postoperative and post therapy changes in the right frontal lobe. Linear enhancement along the inferior aspect of the resection cavity may  be increased slightly. There is not appear to be any new mass effect. The enhancement is likely postoperative. There are no new metastatic  lesions identified. 2. No evidence of acute infarct. 3. Atrophy and chronic white matter changes. 4. Small chronic right cerebellar infarct. #Healthcare associated pneumonia  She is currently on a azithromycin/ceftriaxone. She is being followed by pulmonary. #Hyponatremia- Sodium 133    #Hypokalemia -K 3.3    #Microcytic anemia   Anemia work-up 5/14/2020  Ferritin 259  Iron 36  Iron saturation 13%  TIBC 284  Folate 20  B12 849    PLAN:  Continue current supportive care  Continue azithromycin/ceftriaxone  CT chest w/o contrast.     I have seen, examined and reviewed this patient medication list, appropriate labs and imaging studies. I reviewed relevant medical records and others physicians notes. I discussed the plans of care with the patient. I answered all the questions to the patients satisfaction.  I have also reviewed the chief complaint (CC) and part of the history (History of Present Illness (HPI), Past Family Social History ([facility]), or Review of Systems (ROS) and made changes when appropriated.      (Please note that portions of this note were completed with a voice recognition program. Efforts were made to edit the dictations but occasionally words are mis-transcribed.)    Jose Pa MD    07/21/20  6:26 PM

## 2020-07-21 NOTE — CARE COORDINATION
Per Leonidas Reza, patient has been accepted at [facility]. She will need a 3 night stay or a Medicare Waiver note.   Bert  [phone] P  [phone] F  Electronically signed by Mari Kaba RN on 7/21/2020 at 3:00 PM

## 2020-07-21 NOTE — CARE COORDINATION
Met with patient to assess discharge needs. Current Readmission Risk score is 21%. Patient lives at home with her spouse. She has a walker and cane. If discharged home, she would like a wheelchair and Home Health services. She has a Care Giver come each day for a half day to assist.  She reports that she is able to afford her medications at this time. At discharge, her Care giver will provide transportation. At the end of our conversation, she said she would like to go a SNF short-term for Rehab. Provided Choice List.  Patient requested a referral to [facility]. Referral faxed 7/21/2020. Awaiting response.   Electronically signed by Krystal Peck RN on 7/21/2020 at 1:45 PM

## 2020-07-21 NOTE — CONSULTS
Palliative Care:   Pt sitting up in bed. Alert and oriented x 3. Very pleasant 78 yr old. She talks with me about her recent falls and need to come to ED. Past Medical History:        Past Medical History:   Diagnosis Date    Anxiety     Arthritis     Bowel obstruction (HCC)     adhesions with colectomy/colostomy    Cancer (HCC)     lung LLL ,  brain    Col[facility] in place [facility])     Concussion with no loss of consciousness     COPD (chronic obstructive pulmonary disease) (HCC)     Cyst of kidney, acquired 8/3/2019    Depression     Hernia     History of blood transfusion     History of broken collarbone     Hyperlipidemia     Palliative care patient 09/16/2019    Seasonal allergies        Advance Directives:         Pain/Other Symptoms:   States \"My bottom hurts\". Pt tells me she fell x 4 on her bottom at home. Activity:   As flo          Psychological/Spiritual:    Good family and spiritual support. Plan:   Med management, PT eval, Abx tx. Patient/family discussion r/t goals: Pt has a hx of metastatic lung ca. She has not rec'd any chemo x 1 yr. She states she has felt weak, appetite poor, losing weight. Pt uses walker at home for ambulation and states she would like to have a w/c. Pt would also like to consider [facility] services. Pt has a CG that comes in the 5 x weekly. Pt stated goal is to be able to return to her home. Pt does tell me she is also willing to go to SNF for rehab. Palliative will follow for support/goals of care.     Review of any needed services:  [facility], [facility]            Electronically signed by Faustina Ceballos RN on 7/21/2020 at 1:54 PM

## 2020-07-21 NOTE — H&P
History and Physical      CHIEF COMPLAINT: SOA    Reason for Admission:  Acute resp failure, with hypoxia    History Obtained From:  Patient, chart    HISTORY OF PRESENT ILLNESS:      The patient is a 78 y.o. female who came to ER with SOA, low O2 sat. She has had a cough. No fevers. She denies any CP. No GI issues, but has had poor appetite. She has become very weak, and is mostly in bed. No c/o pain currently.      Past Medical History:        Diagnosis Date    Anxiety     Arthritis     Bowel obstruction (HCC)     adhesions with colectomy/colostomy    Cancer (HCC)     lung LLL ,  brain    Colostomy in place [facility])     Concussion with no loss of consciousness     COPD (chronic obstructive pulmonary disease) (HCC)     Cyst of kidney, acquired 8/3/2019    Depression     Hernia     History of blood transfusion     History of broken collarbone     Hyperlipidemia     Palliative care patient 09/16/2019    Seasonal allergies      Past Surgical History:        Procedure Laterality Date    ABDOMEN SURGERY      bowel blockage from scar tissue    COLONOSCOPY      COLOSTOMY      still has    CRANIOTOMY Right 01/15/2018    Dr. Ela Cruz, COLON, DIAGNOSTIC      FRACTURE SURGERY      left arm and elbow and finger    HERNIA REPAIR      HYSTERECTOMY      LOBECTOMY Left 1/6/2017    LEFT THORACOTOMY WITH LOBECTOMY  performed by Benny Villa MD at [address] [facility] INS TUNNELED CTR VAD W/SUBQ PORT AGE 4 YR/> N/A 7/3/2018    SINGLE LUMEN PORT INSERTION performed by Álvaro Moran MD at [facility]           Medications Prior to Admission:    Medications Prior to Admission: folic acid (FOLVITE) 1 MG tablet, Take 1 tablet by mouth once daily  ferrous sulfate (IRON 325) 325 (65 Fe) MG tablet, Take 1 tablet by mouth 2 times daily  magnesium oxide (MAG-OX) 400 MG tablet, Take 1 tablet by mouth daily  modafinil (PROVIGIL) 100 MG tablet, TAKE 1 TABLET BY MOUTH ONCE DAILY IN THE MORNING  ondansetron (ZOFRAN ODT) 4 MG disintegrating tablet, Take 1 tablet by mouth every 8 hours as needed for Nausea or Vomiting  [DISCONTINUED] potassium bicarbonate (EFFER-K) 25 MEQ disintegrating tablet, Take 1 tablet by mouth 2 times daily  fluticasone (FLONASE) 50 MCG/ACT nasal spray, 1 spray by Each Nostril route daily  pantoprazole (PROTONIX) 40 MG tablet, Take 1 tablet by mouth every morning (before breakfast)  tiZANidine (ZANAFLEX) 2 MG tablet, Take 2 mg by mouth daily  albuterol sulfate  (90 Base) MCG/ACT inhaler, Inhale 2 puffs into the lungs every 6 hours as needed for Wheezing  acetaminophen (TYLENOL) 325 MG tablet, Take 650 mg by mouth every 6 hours as needed for Pain  cetirizine (ZYRTEC) 10 MG tablet, Take 1 tablet by mouth daily  atorvastatin (LIPITOR) 20 MG tablet, Take 10 mg by mouth nightly   montelukast (SINGULAIR) 10 MG tablet, Take 10 mg by mouth nightly Indications: Seasonal Allergy   sertraline (ZOLOFT) 100 MG tablet, Take 200 mg by mouth daily     Allergies:  Kiwi extract; Hydromorphone hcl; Keytruda [pembrolizumab]; and Pineapple    Social History:   TOBACCO:   reports that she quit smoking about 3 years ago. She has never used smokeless tobacco.  ETOH:   reports previous alcohol use. DRUGS:   reports no history of drug use.   MARITAL STATUS:    OCCUPATION:  Not working  Patient currently lives with family       Family History:       Problem Relation Age of Onset    High Blood Pressure Mother     Stroke Mother     Cancer Father         Lung Cancer     REVIEW OF SYSTEMS:  Constitutional: neg  CV: neg  Pulmonary: SOA  GI: anorexia  : neg  Psych: neg  Neuro: neg  Skin: neg  MusculoSkeletal: neg  HEENT: neg  Joints: neg  Vitals:  BP 95/60   Pulse 90   Temp 98.1 °F (36.7 °C)   Resp 20   Ht 5' 4\" (1.626 m)   Wt 119 lb (54 kg)   SpO2 98%   BMI 20.43 kg/m²     PHYSICAL EXAM:  Gen: NAD, alert, pleasant, in bed  HEENT: WNL  Lymph: no LAD  Neck: no JVD or masses  Chest: CTA bilat  CV: RRR  Abdomen: NT/ND  Extrem: no C/C/trace LE edema  Neuro: non focal  Skin: no rashes  Joints: no redness  DATA:  I have reviewed the admission labs and imaging tests.     ASSESSMENT AND PLAN:      Active Problems:    Pneumonia, ARF with Hypoxia---continue antibiotics, appreciate Pulm input, continue O2    Severe malnutrition (HCC)--supportive care    Generalized You Box consult for SNF placement    Metastatic Lung CA        Karina Cedeño MD  5:25 PM 7/21/2020

## 2020-07-21 NOTE — PLAN OF CARE
Nutrition Problem #1: Severe malnutrition, In context of chronic illness  Intervention: Food and/or Nutrient Delivery: Continue Current Diet, Start Oral Nutrition Supplement  Nutritional Goals: PO intake >50%, wt stable

## 2020-07-21 NOTE — CONSULTS
Pulmonary and Critical Care Consult Note  [address]    MR# [id number]    Acct# [de-identified]  7/21/2020   8:10 AM CDT    Referring Shirley Harden MD    Chief Complaint: Respiratory failure in a patient with stage IV lung cancer and probable pneumonia    HPI: We have been consulted to see this 78y.o. year old female born on 1950. Patient complains of persistent shortness of breath and cough in the mid chest for several weeks, aggravated by nothing and alleviated by oxygen, and associated with Weakness and recent falls at home. She is known to our practice and followed by Dr. Edmundo uEgene for COPD and a history of stage IV lung cancer. She has one of her sitters at bedside with her. The sitter reports that she fell again over the weekend at home and continues to have worsening weakness. Her sitter reports that Hector Branham is at the point where she is going to require full-time care\". She is currently on 2 L nasal cannula O2 and feels that this is greatly benefiting her. She does not have oxygen at home. She last had a chemo treatment about 1 year ago. Chest x-ray shows a right lower lobe patchy infiltrate and she is being treated for pneumonia and reports that she is already feeling better. She has been afebrile.     Past Medical History:   Diagnosis Date    Anxiety     Arthritis     Bowel obstruction (HCC)     adhesions with colectomy/colostomy    Cancer (HCC)     lung LLL ,  brain    Colostomy in place [facility])     Concussion with no loss of consciousness     COPD (chronic obstructive pulmonary disease) (HCC)     Cyst of kidney, acquired 8/3/2019    Depression     Hernia     History of blood transfusion     History of broken collarbone     Hyperlipidemia     Palliative care patient 09/16/2019    Seasonal allergies      SurgicalHistory  Past Surgical History:   Procedure Laterality Date    ABDOMEN SURGERY      bowel blockage from scar tissue    COLONOSCOPY      COLOSTOMY      still has    CRANIOTOMY Right 01/15/2018    Dr. Orlin Masters ENDOSCOPY, COLON, DIAGNOSTIC      FRACTURE SURGERY      left arm and elbow and finger    HERNIA REPAIR      HYSTERECTOMY      LOBECTOMY Left 1/6/2017    LEFT THORACOTOMY WITH LOBECTOMY  performed by Dorian Sarmiento MD at [facility]      CT INSJ TUNNELED CTR VAD W/SUBQ PORT AGE 5 YR/> N/A 7/3/2018    SINGLE LUMEN PORT [facility]ION [facility]    TONSILLECTOMY       Allergies  Allergies   Allergen Reactions    Kiwi Extract Shortness Of Breath and Swelling    Hydromorphone Hcl Other (See Comments)     Hallucinations      Keytruda [Pembrolizumab]     Pineapple      Medications    sodium chloride flush, 10 mL, Intravenous, 2 times per day    enoxaparin, 40 mg, Subcutaneous, Q24H    ipratropium-albuterol, 1 ampule, Inhalation, Q4H WA    cefTRIAXone (ROCEPHIN) IV, 1 g, Intravenous, Q24H    azithromycin, 500 mg, Intravenous, Q24H    ferrous sulfate, 325 mg, Oral, BID    fluticasone, 1 spray, Each Nostril, Daily    folic acid, 1 mg, Oral, Daily    magnesium oxide, 400 mg, Oral, Daily    modafinil, 100 mg, Oral, Daily    montelukast, 10 mg, Oral, Nightly    pantoprazole, 40 mg, Oral, QAM AC    potassium bicarb-citric acid, 30 mEq, Oral, BID    sertraline, 200 mg, Oral, Daily    furosemide, 20 mg, Intravenous, BID   Social History   reports that she quit smoking about 3 years ago. She has never used smokeless tobacco. She reports previous alcohol use. She reports that she does not use drugs. Family History  family history includes Cancer in her father; High Blood Pressure in her mother; Stroke in her mother. Review of Systems:  Review of Systems   Constitutional: Positive for activity change and appetite change. Negative for chills and fever. Eyes: Negative. Respiratory: Positive for cough and shortness of breath.     Cardiovascular: Positive for leg swelling. Negative for chest pain and palpitations. Gastrointestinal: Negative. Genitourinary: Negative. Musculoskeletal: Negative. Skin: Negative for rash. Neurological: Positive for weakness. Negative for headaches. Psychiatric/Behavioral: Negative. Physical Exam:   weight is 119 lb (54 kg). Her temperature is 98.1 °F (36.7 °C). Her blood pressure is 84/58 (abnormal) and her pulse is 87. Her respiration is 15 and oxygen saturation is 97%. No intake or output data in the 24 hours ending 07/21/20 0810  Physical Exam  Vitals signs and nursing note reviewed. Constitutional:       Appearance: She is well-developed. She is ill-appearing (Chronically). Interventions: Nasal cannula in place. HENT:      Head: Normocephalic and atraumatic. Mouth/Throat:      Mouth: Mucous membranes are moist.   Eyes:      Pupils: Pupils are equal, round, and reactive to light. Neck:      Musculoskeletal: Normal range of motion and neck supple. Cardiovascular:      Rate and Rhythm: Regular rhythm. Pulmonary:      Effort: No respiratory distress. Breath sounds: Rales (Right lower lobe) present. No wheezing. Abdominal:      General: Bowel sounds are normal.      Palpations: Abdomen is soft. Musculoskeletal:      Right lower leg: Edema present. Left lower leg: Edema present. Comments: Lower extremities with erythema and tight edema   Skin:     General: Skin is warm and dry. Neurological:      Mental Status: She is alert and oriented to person, place, and time.       Comments: Appears weak       Recent Labs     07/20/20  1603   WBC 9.9   RBC 3.79*   HGB 9.1*   HCT 29.3*      MCV 77.3*   MCH 24.0*   MCHC 31.1*   RDW 17.3*      Recent Labs     07/20/20  1603 07/21/20  0714   * 133*   K 3.4* 3.3*   CL 93* 95*   CO2 21* 20*   BUN 15 14   CREATININE 0.8 0.9   CALCIUM 9.3 9.0   GLUCOSE 77 81      No results for input(s): PHART, KZU4PAV, PO2ART, NWM7HEH, C5YZIFKH, BEART in the last 72 hours. Recent Labs     07/20/20  1603 07/21/20  0714   AST 36*  --    ALT 15  --    ALKPHOS 186*  --    BILITOT 1.0  --    MG 1.8  --    CALCIUM 9.3 9.0   TROPONINI 0.01  --    LACTA 0.2*  --      No results for input(s): BC, LABGRAM, CULTRESP, BFCX in the last 72 hours. Radiograph: Xr Chest Portable    Result Date: 7/20/2020  XR CHEST PORTABLE 7/20/2020 12:15 PM HISTORY: Failure to thrive, history lung cancer COMPARISON: 5/14/2020 CXR: A single frontal view of the chest is performed. Findings: There are new patchy right lower lobe opacities with left basilar densities. There is a new small left pleural effusion with a stable right pleural fluid collection. There is mild cardiomegaly. Central pulmonary vessels prominent. Left subclavian port stable in position. Calcified left hilar lymph nodes. No pneumothorax. Degenerative change of the shoulders and spine. 1. New patchy right lower lobe opacities are suspicious for pneumonia with left basilar atelectasis versus multifocal pneumonia. New small left pleural effusion with stable right pleural fluid collection. Follow up imaging recommended to document improvement. 2. Mild cardiomegaly with pulmonary venous congestion. . Signed by Dr Zenaida Patiño on 7/20/2020 1:41 PM    My radiograph interpretation/independent review of imaging: Patchy right lower lobe infiltrate, left port in place    Other test results (not lab or imaging): PFTs from 2016:  1. Spirometry shows moderate airflow obstruction pre-bronchodilator. 2. Following bronchodilator, there is moderate airflow obstruction but a  significant 16% improvement in FEV1 such that post-bronchodilator FEV1 is 1.76  L.  3. Mid flows are severely reduced but markedly improved following  bronchodilator. 4. Maximum ventilation is reduced in the setting of obstruction and improved  following bronchodilator.   5. Lung volume measurements show elevated values for total lung capacity and  residual volume, indicating hyperinflation and gas trapping. 6. Diffusion capacity is normal.    Independent review of ekg: None available for review. Not even in the soft chart. 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 Metastatic brain tumor that was resected in 2018. Other problems either stable, failing to improve or worsenin. Moderate COPD  2. Former smoker  3. Hypotension  4. Anemia  5. Hypoxia, helped by oxygen  6. DNR status  7. Generalized weakness    Recommend/plan:   · Obtain sputum culture  · Add Mucinex  · Continue bronchodilator treatments  · Continue Zithromax and Rocephin  · Palliative care consult pending. · Will need home oxygen eval prior to discharge. · She would benefit from a physical therapy evaluation during this hospitalization. · Further recommendations per Dr. Samantha Grewal. Thank you for this consult. We will follow. Electronically signed by Diane Contreras on 20 at 8:10 AM    Physician Substantive portion:  Patient is admitted with increasing symptoms, probable right-sided pneumonia in setting of metastatic cancer. She is on oxygen.   She indicates to me that she feels better since interventions applied so far. Chest exam shows wheezing and a few rhonchi. She is awake and alert nasal cannula are in place. Plan continue antibiotics titrate oxygen as tolerated. Continue bronchodilators. Hold off on steroids for now in absence of severe wheezing and in the setting of cancer and weakness associated with this which could be aggravated by steroids. .    I have seen and examined patient personally, performing a face-to-face diagnostic evaluation with plan of care reviewed and developed with APRN and nursing staff. I have addended and/or modified the above history of present illness, physical examination, and assessment and plan to reflect my findings and impressions. Essential elements of the care plan were discussed with APRN above. Agree with findings and assessment/plan as documented above.     Electronically signed by Luz Maria David on 7/21/2020, 1:04 PM

## 2020-07-21 NOTE — PROGRESS NOTES
Comprehensive Nutrition Assessment    Type and Reason for Visit:  Initial, Positive Nutrition Screen    Nutrition Recommendations/Plan: Start ONS. Nutrition Assessment:  Positive nutrition screen for wt loss and and poor appetite. Pt meets criteria for severe malnutrition AEB recent wt loss, decreased PO intake, muscle/fat loss, and fluid accumulation. Pt and daughter at bedside report pt has had very poor appetite over the last few weeks. Pt at risk for further nutritional compromise r/t catabolic illness. Pt agreeable to receiving ONS BID at this time. Obtained food preferences. Will continue to monitor nutrition progression.     Malnutrition Assessment:  Malnutrition Status:  Severe malnutrition    Context:  Chronic Illness     Findings of the 6 clinical characteristics of malnutrition:  Energy Intake:  7 - 75% or less estimated energy requirements for 1 month or longer  Weight Loss:  7 - Greater than 10% over 6 months     Body Fat Loss:  7 - Severe body fat loss Orbital   Muscle Mass Loss:  7 - Severe muscle mass loss Temples (temporalis), Clavicles (pectoralis & deltoids)  Fluid Accumulation:  1 - Mild Extremities   Strength:  Not Performed    Estimated Daily Nutrient Needs:  Energy (kcal):  5410-7590; Weight Used for Energy Requirements:  Current     Protein (g):  65-86; Weight Used for Protein Requirements:  Current(1.2-1.6)        Fluid (ml/day):  2262-0707; Weight Used for Fluid Requirements:  Current      Nutrition Related Findings:  muscle/fat loss      Wounds:  Skin Tears       Current Nutrition Therapies:    DIET GENERAL;  Dietary Nutrition Supplements: Standard High Calorie Oral Supplement    Anthropometric Measures:  · Height: 5' 4\" (162.6 cm)  · Current Body Weight: 119 lb (54 kg)   · Admission Body Weight: 119 lb (54 kg)    · Usual Body Weight: 142 lb (64.4 kg)(1/2020)     · Ideal Body Weight: 120 lbs; % Ideal Body Weight 99.2 %   · BMI: 20.4  · Adjusted Body Weight:  ; No Adjustment · BMI Categories: Underweight (BMI less than 22) age over 72       Nutrition Diagnosis:   · Severe malnutrition, In context of chronic illness related to catabolic illness, inadequate protein-energy intake as evidenced by weight loss greater than or equal to 10% in 6 months, severe loss of subcutaneous fat, severe muscle loss, localized or generalized fluid accumulation, poor intake prior to admission      Nutrition Interventions:   Food and/or Nutrient Delivery:  Continue Current Diet, Start Oral Nutrition Supplement  Nutrition Education/Counseling:  No recommendation at this time   Coordination of Nutrition Care:  Continued Inpatient Monitoring    Goals:  PO intake >50%, wt stable       Nutrition Monitoring and Evaluation:   Food/Nutrient Intake Outcomes:  Food and Nutrient Intake, Supplement Intake  Physical Signs/Symptoms Outcomes:  Biochemical Data, Skin, Weight     Discharge Planning:    Continue Oral Nutrition Supplement     Electronically signed by MARY Godinez RD on 7/21/20 at 10:25 AM CDT    Contact: [phone]

## 2020-07-21 NOTE — ACP (ADVANCE CARE PLANNING)
prepared for Provider review and signature      Follow-up plan:    [] Schedule follow-up conversation to continue planning  [] Referred individual to Provider for additional questions/concerns   [] Advised patient/agent/surrogate to review completed ACP document and update if needed with changes in condition, patient preferences or care setting    [] This note routed to one or more involved healthcare providers

## 2020-07-22 LAB
ANION GAP SERPL CALCULATED.3IONS-SCNC: 14 MMOL/L (ref 7–19)
BILIRUBIN URINE: NEGATIVE
BLOOD, URINE: NEGATIVE
BUN BLDV-MCNC: 11 MG/DL (ref 8–23)
CALCIUM SERPL-MCNC: 8.7 MG/DL (ref 8.8–10.2)
CHLORIDE BLD-SCNC: 93 MMOL/L (ref 98–111)
CLARITY: CLEAR
CO2: 24 MMOL/L (ref 22–29)
COLOR: YELLOW
CREAT SERPL-MCNC: 0.8 MG/DL (ref 0.5–0.9)
GFR AFRICAN AMERICAN: >59
GFR NON-AFRICAN AMERICAN: >60
GLUCOSE BLD-MCNC: 108 MG/DL (ref 74–109)
GLUCOSE URINE: NEGATIVE MG/DL
HCT VFR BLD CALC: 27.8 % (ref 37–47)
HEMOGLOBIN: 8.4 G/DL (ref 12–16)
KETONES, URINE: NEGATIVE MG/DL
LEUKOCYTE ESTERASE, URINE: NEGATIVE
MCH RBC QN AUTO: 23.5 PG (ref 27–31)
MCHC RBC AUTO-ENTMCNC: 30.2 G/DL (ref 33–37)
MCV RBC AUTO: 77.9 FL (ref 81–99)
NITRITE, URINE: NEGATIVE
PDW BLD-RTO: 17.3 % (ref 11.5–14.5)
PH UA: 6 (ref 5–8)
PLATELET # BLD: 297 K/UL (ref 130–400)
PMV BLD AUTO: 10 FL (ref 9.4–12.3)
POTASSIUM SERPL-SCNC: 4.2 MMOL/L (ref 3.5–5)
PROTEIN UA: ABNORMAL MG/DL
RBC # BLD: 3.57 M/UL (ref 4.2–5.4)
SODIUM BLD-SCNC: 131 MMOL/L (ref 136–145)
SPECIFIC GRAVITY UA: 1.02 (ref 1–1.03)
UROBILINOGEN, URINE: 0.2 E.U./DL
WBC # BLD: 6.5 K/UL (ref 4.8–10.8)

## 2020-07-22 PROCEDURE — 6370000000 HC RX 637 (ALT 250 FOR IP): Performed by: NURSE PRACTITIONER

## 2020-07-22 PROCEDURE — 6360000002 HC RX W HCPCS: Performed by: FAMILY MEDICINE

## 2020-07-22 PROCEDURE — 6370000000 HC RX 637 (ALT 250 FOR IP): Performed by: FAMILY MEDICINE

## 2020-07-22 PROCEDURE — 81003 URINALYSIS AUTO W/O SCOPE: CPT

## 2020-07-22 PROCEDURE — 1210000000 HC MED SURG R&B

## 2020-07-22 PROCEDURE — 36415 COLL VENOUS BLD VENIPUNCTURE: CPT

## 2020-07-22 PROCEDURE — 85027 COMPLETE CBC AUTOMATED: CPT

## 2020-07-22 PROCEDURE — 97535 SELF CARE MNGMENT TRAINING: CPT

## 2020-07-22 PROCEDURE — 94640 AIRWAY INHALATION TREATMENT: CPT

## 2020-07-22 PROCEDURE — 2580000003 HC RX 258: Performed by: FAMILY MEDICINE

## 2020-07-22 PROCEDURE — 2700000000 HC OXYGEN THERAPY PER DAY

## 2020-07-22 PROCEDURE — 80048 BASIC METABOLIC PNL TOTAL CA: CPT

## 2020-07-22 PROCEDURE — 97166 OT EVAL MOD COMPLEX 45 MIN: CPT

## 2020-07-22 PROCEDURE — 99232 SBSQ HOSP IP/OBS MODERATE 35: CPT | Performed by: INTERNAL MEDICINE

## 2020-07-22 RX ADMIN — GUAIFENESIN 1200 MG: 600 TABLET, EXTENDED RELEASE ORAL at 21:18

## 2020-07-22 RX ADMIN — AZITHROMYCIN 500 MG: 500 INJECTION, POWDER, LYOPHILIZED, FOR SOLUTION INTRAVENOUS at 22:42

## 2020-07-22 RX ADMIN — Medication 400 MG: at 08:24

## 2020-07-22 RX ADMIN — SERTRALINE HYDROCHLORIDE 200 MG: 100 TABLET ORAL at 08:24

## 2020-07-22 RX ADMIN — FERROUS SULFATE TAB 325 MG (65 MG ELEMENTAL FE) 325 MG: 325 (65 FE) TAB at 08:24

## 2020-07-22 RX ADMIN — FERROUS SULFATE TAB 325 MG (65 MG ELEMENTAL FE) 325 MG: 325 (65 FE) TAB at 21:18

## 2020-07-22 RX ADMIN — IPRATROPIUM BROMIDE AND ALBUTEROL SULFATE 1 AMPULE: .5; 3 SOLUTION RESPIRATORY (INHALATION) at 19:51

## 2020-07-22 RX ADMIN — SODIUM CHLORIDE, PRESERVATIVE FREE 10 ML: 5 INJECTION INTRAVENOUS at 08:24

## 2020-07-22 RX ADMIN — ACETAMINOPHEN 650 MG: 325 TABLET, FILM COATED ORAL at 02:54

## 2020-07-22 RX ADMIN — IPRATROPIUM BROMIDE AND ALBUTEROL SULFATE 1 AMPULE: .5; 3 SOLUTION RESPIRATORY (INHALATION) at 10:41

## 2020-07-22 RX ADMIN — IPRATROPIUM BROMIDE AND ALBUTEROL SULFATE 1 AMPULE: .5; 3 SOLUTION RESPIRATORY (INHALATION) at 15:02

## 2020-07-22 RX ADMIN — SPIRONOLACTONE 25 MG: 25 TABLET ORAL at 08:24

## 2020-07-22 RX ADMIN — FLUTICASONE PROPIONATE 1 SPRAY: 50 SPRAY, METERED NASAL at 08:23

## 2020-07-22 RX ADMIN — ENOXAPARIN SODIUM 40 MG: 40 INJECTION SUBCUTANEOUS at 21:18

## 2020-07-22 RX ADMIN — GUAIFENESIN 1200 MG: 600 TABLET, EXTENDED RELEASE ORAL at 08:24

## 2020-07-22 RX ADMIN — CEFTRIAXONE 1 G: 1 INJECTION, POWDER, FOR SOLUTION INTRAMUSCULAR; INTRAVENOUS at 17:07

## 2020-07-22 RX ADMIN — SODIUM CHLORIDE, PRESERVATIVE FREE 10 ML: 5 INJECTION INTRAVENOUS at 21:23

## 2020-07-22 RX ADMIN — IPRATROPIUM BROMIDE AND ALBUTEROL SULFATE 1 AMPULE: .5; 3 SOLUTION RESPIRATORY (INHALATION) at 07:00

## 2020-07-22 RX ADMIN — SPIRONOLACTONE 25 MG: 25 TABLET ORAL at 17:08

## 2020-07-22 RX ADMIN — FOLIC ACID 1 MG: 1 TABLET ORAL at 08:23

## 2020-07-22 RX ADMIN — MODAFINIL 100 MG: 100 TABLET ORAL at 08:24

## 2020-07-22 RX ADMIN — ACETAMINOPHEN 650 MG: 325 TABLET, FILM COATED ORAL at 14:40

## 2020-07-22 RX ADMIN — POTASSIUM BICARBONATE 20 MEQ: 782 TABLET, EFFERVESCENT ORAL at 08:24

## 2020-07-22 RX ADMIN — PANTOPRAZOLE SODIUM 40 MG: 40 TABLET, DELAYED RELEASE ORAL at 05:44

## 2020-07-22 RX ADMIN — MONTELUKAST SODIUM 10 MG: 10 TABLET, FILM COATED ORAL at 21:18

## 2020-07-22 ASSESSMENT — PAIN SCALES - GENERAL
PAINLEVEL_OUTOF10: 5
PAINLEVEL_OUTOF10: 0
PAINLEVEL_OUTOF10: 10

## 2020-07-22 ASSESSMENT — ENCOUNTER SYMPTOMS
EYE DISCHARGE: 0
NAUSEA: 0
SHORTNESS OF BREATH: 1
EYE ITCHING: 0
SORE THROAT: 0
TROUBLE SWALLOWING: 0
COUGH: 1
ABDOMINAL PAIN: 0
PHOTOPHOBIA: 0
CONSTIPATION: 0
WHEEZING: 0
EYE REDNESS: 0
DIARRHEA: 0
VOMITING: 0

## 2020-07-22 ASSESSMENT — PAIN DESCRIPTION - LOCATION: LOCATION: GENERALIZED

## 2020-07-22 ASSESSMENT — PAIN DESCRIPTION - PAIN TYPE: TYPE: CHRONIC PAIN

## 2020-07-22 ASSESSMENT — PAIN DESCRIPTION - DESCRIPTORS: DESCRIPTORS: ACHING

## 2020-07-22 ASSESSMENT — PAIN DESCRIPTION - ORIENTATION: ORIENTATION: OTHER (COMMENT)

## 2020-07-22 ASSESSMENT — PAIN DESCRIPTION - FREQUENCY: FREQUENCY: CONTINUOUS

## 2020-07-22 NOTE — PROGRESS NOTES
malnutrition ([facility]  Resolved Problems:    * No resolved hospital problems. *    Metastatic Lung CA    Plan:  1. Continue present medication(s)   2. Follow with labs  3. Follow with others  4. D/C planning      Discharge planning:   a skilled nursing facility     Reviewed treatment plans with the patient and/or family.              Electronically signed by Natalia Lucas MD on 7/22/2020 at 2:27 PM

## 2020-07-22 NOTE — PROGRESS NOTES
Progress Note    Patient name: Efra Walker  Patient : 1950  MR #[id number]  Room: [facility]    Chief Complaint   Patient presents with    Fatigue     arrived via EMS with ongoing weakness, FTT     Subjective: feeling better this am. Still weak, weight loss    HISTORY OF PRESENT ILLNESS:  Roxanne Ruano is a pleasant 78years old female who is well-known to our clinic. She is a patient of Dr. Madelyn Cope. She has a diagnosis of lung cancer. Her last treatment was [place]. She was last seen in May 2020 by Dr. Madelyn Cope. She has had multiple hospitalizations. She presented to the hospital with complains of persistent shortness of breath and productive cough that is been progressively worse over the last.  A chest x-ray showed a right lower lobe infiltrate consistent morning. Therefore, she was admitted for further treatment. Oncology consultation was requested for continuity of care.     Cancer History:     TARGET TUMOR SITE:  1. Resected LLL of lung 2017   2. Resected 2 x 2.6 x 2.6 cm mass from the high right frontal vertex of the brain 1/15/2018     TUMOR HISTORY: Resected, Stage IB moderately differentiated adenocarcinoma of left lung, pT2a, N0,M0. 2017. Ms. Roxanne Ruano was seen in initial Oncology consultation on 2017 f referred by Dr. Micki Gaviria with a diagnosis of a resected moderately differentiated adenocarcinoma of the left lung. In 2016 Madi Felton presented to Dr. Latosha Hess with complaints of hemoptysis for 2 months. She has a significant history of nicotine abuse but quit smoking in 2016. CT scan of chest on 10/31/2016 revealed a partially necrotic, noncalcified mass measuring 4.1 x 3.2 x 3.7 cm in the posterior segment of the LLL with associated pleural effusions. There was no evidence of mediastinal or hilar mass nor lymphadenopathy. A 2.2 cm nodule was noted in the upper pole of the right kidney.   A transthorasic needle biopsy on 2016 by Dr. Baldo Alexandre revealed evidence of adenocarcinoma. PET scan on 12/16/2016 revealed a mass in the LLL with hypermetabolic activity with a maximum SUV of 7.1.   A chest x-ray on 1/4/2017 revealed a 3.8 cm mass lesion in the LLL that previously measured approximately 4.1 cm. On 1/6/2017, a left thoracotomy with left lower lobectomy and mediastinal lymph node dissection was performed by Dr. Lary Faye. Pathology revealed invasive moderately differentiated adenocarcinoma. The tumor measured 4.5 cm. Margins were clear of invasive carcinoma and  no lymphovascular space invasion was noted. 5 of 5 lymph nodes were negative for metastatic carcinoma. Bilateral renal ultrasounds on 2/28/2017 identified a complex cyst at the upper pole of the right kidney measuring up to 3 cm increased in size compared to a study on 12/28/2012 where it measured 2.1 cm in maximum diameter. Two small benign cysts were noted in the left kidney. A CT scan of the abdomen and pelvis on 3/8/2017 confirmed that this was a simple cyst in the upper pole of the right kidney. Adjuvant chemotherapy was discussed at her initial visit on 2/22/2017 as well as on followup visit 3/13/2017. She is agreeable and desires to proceed accordingly with adjuvant Cisplatinum and Alimta. Azalia desires however to postpone initiation of adjuvant chemotherapy until 4/11/2017. Her wishes were respected and chemotherapy delivered as noted below. CT scan of chest on 7/27/2017 revealed postsurgical changes  and no acute process. Calcified lymph nodes are present in the subcorneal region and left hilum  CT scan of abdomen and pelvis on 7/27/2017 revealed no intra-abdominal or pelvic abnormalities. ----------------------------------------------------------------------------------------_______________________________________________________   RECURRENT LUNG CARCINOMA TO THE BRAIN 1/15/2018  She presented to [facility] on 12/18/2017 with progressive headache.    MRI of the brain with and without contrast at [facility] on 12/18/2017 revealed a 2 x 2.6 x 2.6 cm rim-enhancing irregular mass within the high right frontal vertex with surrounding vasogenic edema.   CT chest with contrast at [facility] 12/19/2017 revealed some paravertebral soft tissue nodules present at T8, T9, T10 with largest being 2.5 cm. These are stable compared to 7/27/2017 PET scan which were NOT metabolically active. I.e. no obvious metastatic lesions  CT abdomen and pelvis with contrast at [facility] 12/19/2017 was unrevealing for any obvious metastatic disease. Bone scan at [facility] 12/19/2017 revealed focal areas of increased activity in the left seventh and 10th costovertebral junctions-indeterminate. A right craniotomy by Dr. Sara Brito on 1/15/2018 was performed with resection of the 2 x 2.6 x 2.6 cm high right frontal vertex mass   Pathology was consistent with metastatic adenocarcinoma with papillary features consistent with lung origin. Molecular testing on the 1/15/2018 pathology [facility] 3/15/2018 as follows:   EGFR -negative for mutation  ALK -negative for rearrangement  ROS 1 -negative for rearrangement   BRAF -negative for the V600E mutation  PDL-1 - expression is 80% i.e. in Positive  MRI of the brain W & WO for [facility] treatment planning purposes was performed on 3/6/2018 by Dr. Сергей Portillo. Postsurgical changes to the previous resection site from the right frontal lobe mass area with residual enhancement was noted. A PET scan on 2/6/2018 did not reveal scintigraphic evidence of recurrent malignancy or metastatic disease. [facility] the [facility] & [facility] was performed on 3/6/2018 by Dr. Сергей Portillo. Postsurgical changes to the previous resection site from the right frontal lobe mass area with residual enhancement was noted. Diane underwent SRS with 1600 cGy in one single treatment fraction on 3/26/2018 to the right frontal CNS lobe by Dima Portillo and Brian Rincon Isa Line. Delays in beginning systemic therapy included issues associated with her CNS treatment delivery and multiple dog bites on her arms and hands that required attention prior to starting systemic therapy. Diane received cycle #1 of chemotherapy with Alimta, carboplatin and Keytruda on 5/14/2018. Diane completed cycle # 6 of carboplatin, Alimta and Keytruda on 9/4/2018. MRI of the brain with and without on 9/12/2018 documented a stable 1.6 x 1.1 cm nodular enhancement along the medial aspect of the operative cavity. Mindi Bentley was seen by Dr. Angus Millan on 9/12/2018, who felt the MRI of the brain with stable without evidence of recurrence/progression. A CT scan of the chest on 9/24/2018 did not reveal evidence of new or suspicious for urinary nodules nor intrathoracic lymphadenopathy to suggest recurrent disease in the chest.  She completed 6 cycles of Carboplatin, Alimta and Keytruda on 9/4/2018 and then went on to receive maintenance treatment with Alimta/Keytruda. Nataly Pagan reported experiencing a significant skin rash after receiving Keytruda on 11/27/2018. Wayne Dereck was held from 12/18/2018-1/29/2019 (x3 cycles) during which time Nataly Pagan was only receiving Alimta. Mrs. Ebony Meng had repeat CT scans of the chest, abdomen and pelvis on 1/2/2019 that suggested stable disease. On 2/19/2019, Mrs. Isamar Funez resumed the combination of maintenance treatment with Alimta/Keytruda without rash or problems. Nataly Pagan was admitted to [facility] on 4/9/2019 with bilateral lower extremity cellulitis. She was discharged on 4/15/2019. CT of the abdomen and pelvis with contrast at [facility] on 4/9/2019 was compared to a CT of the abdomen and pelvis from February 2013. It revealed a left-sided 3.2 x 2.2 cm paravertebral T10 soft tissue mass.  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 Negative for fever. HENT: Negative for dental problem, mouth sores, nosebleeds, sore throat and trouble swallowing. Eyes: Negative for photophobia, discharge, redness and itching. No blurring of vision, no double vision   Respiratory: Positive for cough and shortness of breath. Negative for wheezing. Cardiovascular: Negative for chest pain, palpitations and leg swelling. Gastrointestinal: Negative for abdominal pain, constipation, diarrhea, nausea and vomiting. Endocrine: Negative for cold intolerance, heat intolerance, polydipsia and polyuria. Genitourinary: Negative for dysuria, frequency, hematuria and urgency. Musculoskeletal: Positive for arthralgias. Negative for joint swelling and myalgias. Generalized weakness    Skin: Negative for pallor and rash. Allergic/Immunologic: Negative for environmental allergies and immunocompromised state. Neurological: Positive for weakness. Negative for seizures, syncope and numbness. Hematological: Negative for adenopathy. Does not bruise/bleed easily. Psychiatric/Behavioral: Negative for agitation, behavioral problems, confusion and suicidal ideas. The patient is not nervous/anxious. Objective   PHYSICAL EXAM:  Physical Exam  Vitals signs reviewed. Constitutional:       General: She is not in acute distress. Appearance: She is well-developed. She is not toxic-appearing or diaphoretic. Comments: Appears chronically ill    HENT:      Head: Normocephalic and atraumatic. Right Ear: External ear normal.      Left Ear: External ear normal.      Nose: Nose normal.      Mouth/Throat:      Mouth: Mucous membranes are moist.   Eyes:      General: No scleral icterus. Right eye: No discharge. Left eye: No discharge. Conjunctiva/sclera: Conjunctivae normal.   Neck:      Musculoskeletal: Neck supple. 07/20/20  1603 07/22/20  0426   WBC 9.9 6.5   HGB 9.1* 8.4*    297     CMP:   Recent Labs     07/20/20  1603 07/21/20  0714 07/22/20  0426   GLUCOSE 77 81 108   BUN 15 14 11   CREATININE 0.8 0.9 0.8   CO2 21* 20* 24   CALCIUM 9.3 9.0 8.7*   ALKPHOS 186*  --   --    AST 36*  --   --    ALT 15  --   --      Hepatic:   Recent Labs     07/20/20  1603   AST 36*   ALT 15   BILITOT 1.0   ALKPHOS 186*     Troponin:   Recent Labs     07/20/20  1603   TROPONINI 0.01     BNP: No results for input(s): BNP in the last 72 hours. INR: No results for input(s): INR in the last 72 hours. ABG: No results for input(s): PHART, QJF7FRG, PO2ART, HRK7TVY, P4MNRGAP, BEART in the last 72 hours. 30 Day lookback of cultures:    Blood Culture Recent:   Recent Labs     07/20/20  1600   BC No Growth to date. Any change in status will be called. Gram Stain Recent:   Recent Labs     07/21/20  1715   LABGRAM Few WBC's (Polymorphonuclear)  Few Epithelial Cells  Rare Gram positive cocci  in pairs  Mixed bacterial morphotypes suggestive of  Normal respiratory yehuda       Resp Culture Recent: No results for input(s): CULTRESP in the last 720 hours. Body Fluid Recent : No results for input(s): BFCX in the last 720 hours. MRSA Recent : No results for input(s): [facility] in the last 720 hours. Urine Culture Recent : No results for input(s): LABURIN in the last 720 hours. Organism Recent : No results for input(s): ORG in the last 720 hours. ASSESSMENT/PLAN:  #adenocarcinoma of left lung with h/o recurrent disease to the brain (s/p resection)  Jennifer Case went on and off Keytruda that was eventually discontinued on 5/7/2019. She went on and off maintenance Alimta, and eventually discontinued single agent maintenance Alimta course #13 on 9/9/2019 due to poor tolerability, deconditioning and multiple hospital admissions. CT chest w/o contrast 7/21/2020:  1. RML and RLL consolidation, likely PNA. Adjacent exudative right pleural effusion. Recommend follow-up to exclude developing empyema. 2. Small left pleural effusion  3. Moderate-severe emphysema  4. Multiple newly enlarged axillary, supraclavicular and mediastinal lymph nodes  5. Partial image splenomegaly  6. Diffuse body wall edema/anasarca  7. Redemonstrated similar-appearing paraspinal soft tissue masses which may be related to extra medullary hematopoiesis    MRI brain May 2020:  1. Postoperative and post therapy changes in the right frontal lobe. Linear enhancement along the inferior aspect of the resection cavity may  be increased slightly. There is not appear to be any new mass effect. The enhancement is likely postoperative. There are no new metastatic  lesions identified. 2. No evidence of acute infarct. 3. Atrophy and chronic white matter changes. 4. Small chronic right cerebellar infarct.        #Healthcare associated pneumonia  Afebrile  BC x2 on 7/20/2020 - NGTD  Respiratory culture pending  She is currently on a azithromycin/ceftriaxone. followed by pulmonary    #Hyponatremia- Sodium 131  Per attending     #Hypokalemia -K+ 4.2, improved  Per attending     #Microcytic anemia   Hgb 8.4, MCV 77.9    Anemia work-up 5/14/2020  · Ferritin 259  · Iron 36  · Iron saturation 13%  · TIBC 284  · Folate 20  · B12 849     PLAN:  Continue current supportive care  Continue azithromycin/ceftriaxone  CT chest w/o contrast noted - will discuss with Dr. Onur Aguilar  Discussed with patient and her daughter at bedside    [address]  07/22/20  7:07 AM  Physician's attestation/substantial contribution:  I, Dr Dayanna Oliveira, independently performed an evaluation on Osmar Cleveland. I have reviewed relevant medical information/data to include but not limited to medication list, relevant appropriate labs and imaging when applicable. I reviewed other physician's notes, ancillary services and nurses assessment.  I have reviewed the above documentation completed by the Nurse Practitioner or Physician Assistant. Please see my additional contributions to the history of present illness, physical examination, and assessment/medical decision-making that reflect my findings and impressions. I discussed essential elements of the care plan with the NP or PA and the patient as well. I answered all the questions to the patient's satisfaction. I concur with above stated. Subjective-still feeling weak this morning. Breathing slightly better. Objective- cachectic appearing. Chronically ill-appearing. Decreased breath sounds right lower lobe. Assessment/plan:  Healthcare associated pneumonia-continue antibiotic as per pulmonary. Lung cancer-CT chest overnight likely showing disease progression with interval progression of bishop disease. Patient has also lost significant amount of weight. Overall, likely disease progression. Will discuss with Dr. Garcia Byunm.     Gary Obregon MD

## 2020-07-22 NOTE — PROGRESS NOTES
CA      Restrictions  Restrictions/Precautions  Restrictions/Precautions: Fall Risk  Position Activity Restriction  Other position/activity restrictions: has colostomy    Subjective   General  Chart Reviewed: Yes  Patient assessed for rehabilitation services?: Yes  Family / Caregiver Present: Yes(private caregiver)  Patient Currently in Pain: No  Pain Assessment  Pain Level: 5  Vital Signs  Resp: 20  Patient Currently in Pain: No  Oxygen Therapy  SpO2: 97 %  O2 Device: Nasal cannula  O2 Flow Rate (L/min): 2 L/min  Social/Functional History  Social/Functional History  ADL Assistance: Needs assistance  Homemaking Responsibilities: No  Ambulation Assistance: Needs assistance(walker, limited to short distances)  Transfer Assistance: Needs assistance  Additional Comments: Caregivers 12 hours daily       Objective        Orientation  Overall Orientation Status: Impaired  Orientation Level: Oriented to person;Disoriented to place; Disoriented to time;Disoriented to situation     Balance  Sitting Balance: Contact guard assistance(leans backward, has diffiuculty keeping feet in contact with floor)  Standing Balance: Moderate assistance(with walker, but CGA/MIN A with use of the safety bar while standing in the [facility])  Toilet Transfers  Toilet - Technique: Stand step  Toilet Transfer: Moderate assistance  Toilet Transfers Comments: but currently using Purewick  Shower Transfers  Shower Transfers: Maximal assistance  ADL  Feeding: Supervision;Minimal assistance  Grooming: Supervision;Minimal assistance  UE Bathing: Moderate assistance  LE Bathing: Maximum assistance  UE Dressing: Minimal assistance  LE Dressing: Moderate assistance  Toileting: Dependent/Total  Additional Comments: SOB is a major limiting factor  Quality of Movement Other  Comment: FM COORDINATION WFL     Bed mobility  Supine to Sit: Minimal assistance; Moderate assistance  Transfers  Stand Step Transfers:  Moderate assistance(posterior lean so her feet tend

## 2020-07-22 NOTE — PROGRESS NOTES
Pulmonary and Critical Care Progress Note [facility]    Patient: Courtney Andrade  1950   MR# [id number]   Acct# [de-identified]  07/22/20   8:36 AM  Referring Provider: Rob Freeman MD    Chief Complaint: respiratory failure    Interval history: She remains on 2 L O2. She is feeling somewhat better and has less edema in her legs today. Patient complains of persistent shortness of breath and cough in the mid chest for several weeks, aggravated by nothing and alleviated by oxygen, and associated with weakness and recent falls at home. She is known to our practice and followed by Dr. Lieutenant Santiago for COPD and a history of stage IV lung cancer. Medications:   guaiFENesin, 1,200 mg, Oral, BID    spironolactone, 25 mg, Oral, BID    potassium bicarb-citric acid, 20 mEq, Oral, BID    sodium chloride flush, 10 mL, Intravenous, 2 times per day    enoxaparin, 40 mg, Subcutaneous, Q24H    ipratropium-albuterol, 1 ampule, Inhalation, Q4H WA    cefTRIAXone (ROCEPHIN) IV, 1 g, Intravenous, Q24H    azithromycin, 500 mg, Intravenous, Q24H    ferrous sulfate, 325 mg, Oral, BID    fluticasone, 1 spray, Each Nostril, Daily    folic acid, 1 mg, Oral, Daily    magnesium oxide, 400 mg, Oral, Daily    modafinil, 100 mg, Oral, Daily    montelukast, 10 mg, Oral, Nightly    pantoprazole, 40 mg, Oral, QAM AC    sertraline, 200 mg, Oral, Daily     Review of Systems: Review of Systems   Constitutional: Positive for activity change and appetite change. Negative for chills and fever. Eyes: Negative. Respiratory: Positive for cough and shortness of breath-improving. Cardiovascular: Positive for leg swelling-improving. Negative for chest pain and palpitations. Gastrointestinal: Negative. Genitourinary: Negative. Musculoskeletal: Negative. Skin: Negative for rash. Neurological: Positive for weakness. Negative for headaches. Psychiatric/Behavioral: Negative.     Physical Exam:  Blood pressure signed by Saintclair Pilot, on 7/22/2020, 3:42 PM

## 2020-07-22 NOTE — PLAN OF CARE
Problem: Falls - Risk of:  Goal: Will remain free from falls  Description: Will remain free from falls  7/22/2020 1024 by Kwesi Bourne RN  Outcome: Ongoing  7/21/2020 2358 by Eric Osborne RN  Outcome: Ongoing  Goal: Absence of physical injury  Description: Absence of physical injury  7/22/2020 1024 by Kwesi Bourne RN  Outcome: Ongoing  7/21/2020 2358 by Eric Osborne RN  Outcome: Ongoing     Problem: Skin Integrity:  Goal: Will show no infection signs and symptoms  Description: Will show no infection signs and symptoms  7/22/2020 1024 by Kwesi Bourne RN  Outcome: Ongoing  7/21/2020 2358 by Eric Osborne RN  Outcome: Ongoing  Goal: Absence of new skin breakdown  Description: Absence of new skin breakdown  7/22/2020 1024 by Kwesi Bourne RN  Outcome: Ongoing  7/21/2020 2358 by Eric Osborne RN  Outcome: Ongoing     Problem: Nutrition  Goal: Optimal nutrition therapy  7/22/2020 1024 by Kwesi Bourne RN  Outcome: Ongoing  7/21/2020 2358 by Eric Osborne RN  Outcome: Ongoing

## 2020-07-23 LAB
ANION GAP SERPL CALCULATED.3IONS-SCNC: 15 MMOL/L (ref 7–19)
BUN BLDV-MCNC: 11 MG/DL (ref 8–23)
CALCIUM SERPL-MCNC: 9 MG/DL (ref 8.8–10.2)
CHLORIDE BLD-SCNC: 94 MMOL/L (ref 98–111)
CO2: 23 MMOL/L (ref 22–29)
CREAT SERPL-MCNC: 0.9 MG/DL (ref 0.5–0.9)
CULTURE, RESPIRATORY: ABNORMAL
CULTURE, RESPIRATORY: ABNORMAL
GFR AFRICAN AMERICAN: >59
GFR NON-AFRICAN AMERICAN: >60
GLUCOSE BLD-MCNC: 96 MG/DL (ref 74–109)
GRAM STAIN RESULT: ABNORMAL
ORGANISM: ABNORMAL
POTASSIUM SERPL-SCNC: 4.4 MMOL/L (ref 3.5–5)
SODIUM BLD-SCNC: 132 MMOL/L (ref 136–145)

## 2020-07-23 PROCEDURE — 97162 PT EVAL MOD COMPLEX 30 MIN: CPT

## 2020-07-23 PROCEDURE — 94640 AIRWAY INHALATION TREATMENT: CPT

## 2020-07-23 PROCEDURE — 99231 SBSQ HOSP IP/OBS SF/LOW 25: CPT | Performed by: INTERNAL MEDICINE

## 2020-07-23 PROCEDURE — 36415 COLL VENOUS BLD VENIPUNCTURE: CPT

## 2020-07-23 PROCEDURE — 1210000000 HC MED SURG R&B

## 2020-07-23 PROCEDURE — 6370000000 HC RX 637 (ALT 250 FOR IP): Performed by: FAMILY MEDICINE

## 2020-07-23 PROCEDURE — 80048 BASIC METABOLIC PNL TOTAL CA: CPT

## 2020-07-23 PROCEDURE — 2700000000 HC OXYGEN THERAPY PER DAY

## 2020-07-23 PROCEDURE — 6360000002 HC RX W HCPCS: Performed by: FAMILY MEDICINE

## 2020-07-23 PROCEDURE — 6370000000 HC RX 637 (ALT 250 FOR IP): Performed by: NURSE PRACTITIONER

## 2020-07-23 PROCEDURE — 97116 GAIT TRAINING THERAPY: CPT

## 2020-07-23 PROCEDURE — 2580000003 HC RX 258: Performed by: FAMILY MEDICINE

## 2020-07-23 RX ADMIN — SERTRALINE HYDROCHLORIDE 200 MG: 100 TABLET ORAL at 08:39

## 2020-07-23 RX ADMIN — ENOXAPARIN SODIUM 40 MG: 40 INJECTION SUBCUTANEOUS at 23:19

## 2020-07-23 RX ADMIN — SPIRONOLACTONE 25 MG: 25 TABLET ORAL at 17:45

## 2020-07-23 RX ADMIN — MONTELUKAST SODIUM 10 MG: 10 TABLET, FILM COATED ORAL at 21:11

## 2020-07-23 RX ADMIN — SPIRONOLACTONE 25 MG: 25 TABLET ORAL at 08:39

## 2020-07-23 RX ADMIN — GUAIFENESIN 1200 MG: 600 TABLET, EXTENDED RELEASE ORAL at 21:11

## 2020-07-23 RX ADMIN — FLUTICASONE PROPIONATE 1 SPRAY: 50 SPRAY, METERED NASAL at 08:40

## 2020-07-23 RX ADMIN — CEFTRIAXONE 1 G: 1 INJECTION, POWDER, FOR SOLUTION INTRAMUSCULAR; INTRAVENOUS at 17:45

## 2020-07-23 RX ADMIN — AZITHROMYCIN 500 MG: 500 INJECTION, POWDER, LYOPHILIZED, FOR SOLUTION INTRAVENOUS at 23:19

## 2020-07-23 RX ADMIN — IPRATROPIUM BROMIDE AND ALBUTEROL SULFATE 1 AMPULE: .5; 3 SOLUTION RESPIRATORY (INHALATION) at 06:38

## 2020-07-23 RX ADMIN — SODIUM CHLORIDE, PRESERVATIVE FREE 10 ML: 5 INJECTION INTRAVENOUS at 08:39

## 2020-07-23 RX ADMIN — FERROUS SULFATE TAB 325 MG (65 MG ELEMENTAL FE) 325 MG: 325 (65 FE) TAB at 08:39

## 2020-07-23 RX ADMIN — FOLIC ACID 1 MG: 1 TABLET ORAL at 08:39

## 2020-07-23 RX ADMIN — MODAFINIL 100 MG: 100 TABLET ORAL at 08:39

## 2020-07-23 RX ADMIN — IPRATROPIUM BROMIDE AND ALBUTEROL SULFATE 1 AMPULE: .5; 3 SOLUTION RESPIRATORY (INHALATION) at 19:58

## 2020-07-23 RX ADMIN — PANTOPRAZOLE SODIUM 40 MG: 40 TABLET, DELAYED RELEASE ORAL at 05:55

## 2020-07-23 RX ADMIN — POTASSIUM BICARBONATE 20 MEQ: 782 TABLET, EFFERVESCENT ORAL at 08:39

## 2020-07-23 RX ADMIN — IPRATROPIUM BROMIDE AND ALBUTEROL SULFATE 1 AMPULE: .5; 3 SOLUTION RESPIRATORY (INHALATION) at 13:58

## 2020-07-23 RX ADMIN — SODIUM CHLORIDE, PRESERVATIVE FREE 10 ML: 5 INJECTION INTRAVENOUS at 21:10

## 2020-07-23 RX ADMIN — FERROUS SULFATE TAB 325 MG (65 MG ELEMENTAL FE) 325 MG: 325 (65 FE) TAB at 21:11

## 2020-07-23 RX ADMIN — Medication 400 MG: at 08:38

## 2020-07-23 RX ADMIN — IPRATROPIUM BROMIDE AND ALBUTEROL SULFATE 1 AMPULE: .5; 3 SOLUTION RESPIRATORY (INHALATION) at 10:48

## 2020-07-23 RX ADMIN — GUAIFENESIN 1200 MG: 600 TABLET, EXTENDED RELEASE ORAL at 08:39

## 2020-07-23 ASSESSMENT — ENCOUNTER SYMPTOMS
DIARRHEA: 0
SHORTNESS OF BREATH: 1
VOMITING: 0
EYE ITCHING: 0
EYE REDNESS: 0
TROUBLE SWALLOWING: 0
ABDOMINAL PAIN: 0
WHEEZING: 0
CONSTIPATION: 0
PHOTOPHOBIA: 0
NAUSEA: 0
EYE DISCHARGE: 0
COUGH: 1
SORE THROAT: 0

## 2020-07-23 ASSESSMENT — PAIN SCALES - GENERAL
PAINLEVEL_OUTOF10: 0
PAINLEVEL_OUTOF10: 0

## 2020-07-23 NOTE — PROGRESS NOTES
Comprehensive Nutrition Assessment    Type and Reason for Visit:  Reassess    Nutrition Recommendations/Plan: Modify ONS to Ensure Compact and Magic Cup. Nutrition Assessment:  Pt states she feels her appetite has improved and feels she is \"eating too much. \" 1-25% consumed of meals documented at this time. Pt has not been drinking ONS and is agreeable to trying alternate ONS. Will order and continue to monitor.     Malnutrition Assessment:  Malnutrition Status:  Severe malnutrition    Context:  Chronic Illness     Findings of the 6 clinical characteristics of malnutrition:  Energy Intake:  7 - 75% or less estimated energy requirements for 1 month or longer  Weight Loss:  7 - Greater than 10% over 6 months     Body Fat Loss:  7 - Severe body fat loss Orbital   Muscle Mass Loss:  7 - Severe muscle mass loss Temples (temporalis), Clavicles (pectoralis & deltoids)  Fluid Accumulation:  1 - Mild Extremities   Strength:  Not Performed    Estimated Daily Nutrient Needs:  Energy (kcal):  7788-7538; Weight Used for Energy Requirements:  Current     Protein (g):  65-86; Weight Used for Protein Requirements:  Current(1.2-1.6)        Fluid (ml/day):  2099-0894; Weight Used for Fluid Requirements:  Current      Nutrition Related Findings:  muscle/fat loss      Wounds:  Skin Tears       Current Nutrition Therapies:    DIET GENERAL;  Dietary Nutrition Supplements: Low Volume Supplement    Anthropometric Measures:  · Height: 5' 4\" (162.6 cm)  · Current Body Weight: 119 lb (54 kg)   · Admission Body Weight: 119 lb (54 kg)    · Usual Body Weight: 142 lb (64.4 kg)(1/2020)     · Ideal Body Weight: 120 lbs; % Ideal Body Weight 99.2 %   · BMI: 20.4  · Adjusted Body Weight:  ; No Adjustment   · BMI Categories: Underweight (BMI less than 22) age over 72       Nutrition Diagnosis:   · Severe malnutrition, In context of chronic illness related to catabolic illness, inadequate protein-energy intake as evidenced by weight loss

## 2020-07-23 NOTE — PROGRESS NOTES
Physical Therapy    Facility/Department: [facility] ONCOLOGY UNIT  Initial Assessment    NAME: Marilyn Barrientos  : 1950  MRN: [id number]    Date of Service: 2020    Discharge Recommendations:  Continue to assess pending progress, Patient would benefit from continued therapy after discharge   PT Equipment Recommendations  Other: ASSESSING NEEDS    Assessment   Body structures, Functions, Activity limitations: Decreased functional mobility ; Decreased endurance;Decreased balance;Decreased strength;Decreased safe awareness;Decreased posture  Assessment: pt WOULD BENEFIT FROM SKILLED PT SERVICES TO ADDRESS HER DEFICITS OF STRENGTH, ENDURANCE AND MOBILITY. Pt APPPEARS TO REQUIRE EITHER 24 HR CAREGIVERS FOR D/C HOME OR TO BE D/C TO FACILITY FOR REHAB PRIOR TO D/C HOME  Prognosis: Fair;Good  Decision Making: Medium Complexity  PT Education: Gait Training;General Safety; Functional Mobility Training;Transfer Training;Home Exercise Program  Patient Education: Pt SHOWN UE/LE EX SHE CAN PERFORM IN RECLINER TO INC HER GENERALIZED STRENGTH  REQUIRES PT FOLLOW UP: Yes  Activity Tolerance  Activity Tolerance: Patient Tolerated treatment well       Patient Diagnosis(es): The primary encounter diagnosis was Pneumonia due to organism. Diagnoses of Hypoxia, Primary malignant neoplasm of lung metastatic to other site, unspecified laterality ([address] and Anemia, unspecified type were also pertinent to this visit. has a past medical history of Anxiety, Arthritis, Bowel obstruction ([address] Cancer ([address] Colostomy in place [facility]), Concussion with no loss of consciousness, COPD (chronic obstructive pulmonary disease) ([address] Cyst of kidney, acquired, Depression, Hernia, History of blood transfusion, History of broken collarbone, Hyperlipidemia, Palliative care patient, and Seasonal allergies.    has a past surgical history that includes Hysterectomy; Ectopic pregnancy surgery; colostomy; Colonoscopy; Endoscopy, colon, diagnostic; fracture surgery; hernia repair; Tonsillectomy; Abdomen surgery; lobectomy (Left, 1/6/2017); Lung cancer surgery; pr insj tunneled ctr vad w/subq port age 10 yr/> (N/A, 7/3/2018); and craniotomy (Right, 01/15/2018). Restrictions  Restrictions/Precautions  Restrictions/Precautions: Fall Risk  Position Activity Restriction  Other position/activity restrictions: has colostomy  Vision/Hearing        Subjective  General  Diagnosis: PNA, LUNG CA  Follows Commands: Impaired  Other (Comment): REQUIRES REPEAT INSTRUCTIONS  Subjective  Subjective: pt'S CAREGIVER STATES Pt MOSTLY TOILETS INTO HER DIAPER AT HOME AND THEN SHE JUST CHANGES HER. pt HAS NO REPORT OF PAIN AND IS READY TO WORK WITH PT TO INCREASE MOBILITY. CAREGIVER STATES pT RARELY AMBULATES AT HOME BUT MOSTLY PERFORMS TF'S  Pain Screening  Patient Currently in Pain: Denies          Orientation     Social/Functional History  Social/Functional History  ADL Assistance: Needs assistance  Homemaking Responsibilities: No  Ambulation Assistance: Needs assistance  Transfer Assistance: Needs assistance  Additional Comments: Caregivers 12 hours daily, THEN IS BY HERSELF AT NIGHT. CAREGIVER REPORTS THAT pt HAS BEEN FOUND AT TIMES IN THE FLOOR AT HOME IN THE MORNINGS. Cognition        Objective          PROM RLE (degrees)  RLE PROM: WFL  PROM LLE (degrees)  LLE PROM: WFL  Strength Other  Other: ANTIGRAVITY BILAT LE'S BUT NOT THROUGH FULL ROM WITH SLR. Bed mobility  Supine to Sit: Minimal assistance; Moderate assistance  Comment: pt INITIATED SUP TO SIT AND THEN REQUIRED MOD A TO FINISH SCOOT TO EOB  Transfers  Sit to Stand: Minimal Assistance; Moderate Assistance  Stand to sit: Minimal Assistance  Bed to Chair: Minimal assistance; Moderate assistance  Comment: Pt NOTED TO LEAN BW AND WAS ASSISTED TO OBTAIN COG OVER DWIGHT. Ambulation  Ambulation?: Yes  Ambulation 1  Surface: level tile  Device: Rolling Walker  Other Apparatus: O2  Assistance: Minimal assistance; Moderate

## 2020-07-23 NOTE — PROGRESS NOTES
Progress Note  Solomon Benitez  7/23/2020 6:16 PM  Subjective:   Admit Date:   7/20/2020      CC/ADMIT DX:       Interval History:   Reviewed overnight events and nursing notes. She has no new medical complaints. Her SOA is improved. I have reviewed all labs/diagnostics from the last 24hrs. ROS:   I have done a 10 point ROS and all are negative, except what is mentioned in the HPI. DIET GENERAL;  Dietary Nutrition Supplements: Low Volume Supplement    Medications:      potassium bicarb-citric acid  20 mEq Oral Daily    guaiFENesin  1,200 mg Oral BID    spironolactone  25 mg Oral BID    sodium chloride flush  10 mL Intravenous 2 times per day    enoxaparin  40 mg Subcutaneous Q24H    ipratropium-albuterol  1 ampule Inhalation Q4H WA    cefTRIAXone (ROCEPHIN) IV  1 g Intravenous Q24H    azithromycin  500 mg Intravenous Q24H    ferrous sulfate  325 mg Oral BID    fluticasone  1 spray Each Nostril Daily    folic acid  1 mg Oral Daily    magnesium oxide  400 mg Oral Daily    modafinil  100 mg Oral Daily    montelukast  10 mg Oral Nightly    pantoprazole  40 mg Oral QAM AC    sertraline  200 mg Oral Daily           Objective:   Vitals: BP (!) 95/55   Pulse 95   Temp 97.3 °F (36.3 °C)   Resp 20   Ht 5' 4\" (1.626 m)   Wt 119 lb (54 kg)   SpO2 96%   BMI 20.43 kg/m²      Intake/Output Summary (Last 24 hours) at 7/23/2020 1816  Last data filed at 7/23/2020 4459  Gross per 24 hour   Intake 120 ml   Output 500 ml   Net -380 ml     General appearance: alert and cooperative with exam, on O2  Lungs: clear to auscultation bilaterally  Heart: RRR  Abdomen: soft, non-tender; bowel sounds normal; no masses,  no organomegaly  Extremities: extremities normal, atraumatic, no cyanosis or edema  Neurologic:  No obvious focal neurologic deficits. Assessment and Plan: Active Problems:    Pneumonia    Severe malnutrition ([address]  Resolved Problems:    * No resolved hospital problems.  *    Metastatic Lung CA    Plan:  1. Continue present medication(s)   2. D/C planning for SNF  3. Continue resp treatments, O2 and antibiotics      Discharge planning:   a skilled nursing facility     Reviewed treatment plans with the patient and/or family.              Electronically signed by Bonny Oakley MD on 7/23/2020 at 6:16 PM

## 2020-07-23 NOTE — PLAN OF CARE
Nutrition Problem #1: Severe malnutrition, In context of chronic illness  Intervention: Food and/or Nutrient Delivery: Continue Current Diet, Modify Oral Nutrition Supplement  Nutritional Goals: PO intake >50%, wt stable

## 2020-07-23 NOTE — PROGRESS NOTES
Pulmonary and Critical Care Progress Note [facility]    Patient: Goldy Chand  1950   MR# [id number]   Acct# [de-identified]  07/23/20   7:11 AM  Referring Provider: Joanna Maki MD    Chief Complaint: respiratory failure    Interval history: She remains on 2 L O2 with a sat of 96%. She says she had a \"breathing attack last night\". Patient complains of persistent shortness of breath and cough in the mid chest for several weeks, aggravated by nothing and alleviated by oxygen, and associated with weakness and recent falls at home. She is known to our practice and followed by  [facility] for COPD and a history of stage IV lung cancer. Medications:   potassium bicarb-citric acid, 20 mEq, Oral, Daily    guaiFENesin, 1,200 mg, Oral, BID    spironolactone, 25 mg, Oral, BID    sodium chloride flush, 10 mL, Intravenous, 2 times per day    enoxaparin, 40 mg, Subcutaneous, Q24H    ipratropium-albuterol, 1 ampule, Inhalation, Q4H WA    cefTRIAXone (ROCEPHIN) IV, 1 g, Intravenous, Q24H    azithromycin, 500 mg, Intravenous, Q24H    ferrous sulfate, 325 mg, Oral, BID    fluticasone, 1 spray, Each Nostril, Daily    folic acid, 1 mg, Oral, Daily    magnesium oxide, 400 mg, Oral, Daily    modafinil, 100 mg, Oral, Daily    montelukast, 10 mg, Oral, Nightly    pantoprazole, 40 mg, Oral, QAM AC    sertraline, 200 mg, Oral, Daily     Review of Systems: Review of Systems   Constitutional: Positive for activity change and appetite change. Negative for chills and fever. Eyes: Negative. Respiratory: Positive for cough and shortness of breath-improving. Cardiovascular: Positive for leg swelling-improving. Negative for chest pain and palpitations. Gastrointestinal: Negative. Genitourinary: Negative. Musculoskeletal: Negative. Skin: Negative for rash. Neurological: Positive for weakness. Negative for headaches. Psychiatric/Behavioral: Negative.     Physical Exam:  Blood pressure --    TROPONINI 0.01  --   --   --    LACTA 0.2*  --   --   --       Recent Labs     07/20/20  1600 07/21/20  1715   BC No Growth to date. Any change in status will be called. --    LABGRAM  --  Few WBC's (Polymorphonuclear)  Few Epithelial Cells  Rare Gram positive cocci  in pairs  Mixed bacterial morphotypes suggestive of  Normal respiratory yehuda     CULTRESP  --  Normal respiratory yehuda     Radiograph:   My radiograph interpretation: No new today    Pulmonary Assessment:    1. RLL infiltrate concerning for PNA  2. Moderate COPD  3. Stage IV lung cancer, treated  4. Metastatic brain tumor with resection in January 2018  5. Former smoker   6. Hypotension, stable  7. Anemia , persistent  8. Hypoxia, stable  9. DNR status  10. Generalized weakness     Recommend:   · Sputum culture with normal yehuda  · Continue nebs and Mucinex  · Continue abx  · Palliative Care following  · Home 02 eval prior to discharge   · Awaiting [facility] at [facility] for rehab. Electronically signed by Boyd Alba on 7/23/2020 at 7:11 AM    Physician Substantive portion:  She seems overall better today. No resistant pathogens have been identified. Palliative care is seeing her. She remains on oxygen. She has less shortness of breath and less distress. Landon shows diminished breath sounds no accessory muscle use. Plan continue oxygen. Anticipating nursing home placement. Continue antibiotics. Could complete a 6 to 7-day course. No additional plans. Pulmonary signing off, available. I have seen and examined patient personally, performing a face-to-face diagnostic evaluation with plan of care reviewed and developed with APRN and nursing staff. I have addended and/or modified the above history of present illness, physical examination, and assessment and plan to reflect my findings and impressions. Essential elements of the care plan were discussed with APRN above.   Agree with findings and assessment/plan as documented above.    Electronically signed by Jazmín Sanabria, on 7/23/2020, 7:43 PM

## 2020-07-23 NOTE — PROGRESS NOTES
Visited with pt to provide spiritual care. Pt says she is a little better today. Provided spiritual care with sustaining presence, nurtured hope, and prayer. Pt expressed gratitude for spiritual care.     Electronically signed by Andrea Hair on 7/23/2020 at 2:28 PM

## 2020-07-23 NOTE — PROGRESS NOTES
Progress Note    Patient name: Catarina Friedman  Patient : 1950  MR #[id number]  Room: [facility]02    Chief Complaint   Patient presents with    Fatigue     arrived via EMS with ongoing weakness, FTT     Subjective: Feeling a little better although continues to have significant weakness. Afebrile. HISTORY OF PRESENT ILLNESS:  Jez Reyna is a pleasant 78years old female who is well-known to our clinic. She is a patient of Dr. Laura Heath. She has a diagnosis of lung cancer. Her last treatment was [place]. She was last seen in May 2020 by Dr. Laura Heath. She has had multiple hospitalizations. She presented to the hospital with complains of persistent shortness of breath and productive cough that is been progressively worse over the last.  A chest x-ray showed a right lower lobe infiltrate consistent morning. Therefore, she was admitted for further treatment. Oncology consultation was requested for continuity of care.     Cancer History:     TARGET TUMOR SITE:  1. Resected LLL of lung 2017   2. Resected 2 x 2.6 x 2.6 cm mass from the high right frontal vertex of the brain 1/15/2018     TUMOR HISTORY: Resected, Stage IB moderately differentiated adenocarcinoma of left lung, pT2a, N0,M0. 2017. Ms. Jez Reyna was seen in initial Oncology consultation on 2017 f referred by Dr. Nando Kenyon with a diagnosis of a resected moderately differentiated adenocarcinoma of the left lung. In 2016 Waldo Santiago presented to Dr. Bhakti Wahl with complaints of hemoptysis for 2 months. She has a significant history of nicotine abuse but quit smoking in 2016. CT scan of chest on 10/31/2016 revealed a partially necrotic, noncalcified mass measuring 4.1 x 3.2 x 3.7 cm in the posterior segment of the LLL with associated pleural effusions. There was no evidence of mediastinal or hilar mass nor lymphadenopathy. A 2.2 cm nodule was noted in the upper pole of the right kidney.   A transthorasic needle biopsy on 11/22/2016 by Dr. Rodrigo Bonds revealed evidence of adenocarcinoma. PET scan on 12/16/2016 revealed a mass in the LLL with hypermetabolic activity with a maximum SUV of 7.1.   A chest x-ray on 1/4/2017 revealed a 3.8 cm mass lesion in the LLL that previously measured approximately 4.1 cm. On 1/6/2017, a left thoracotomy with left lower lobectomy and mediastinal lymph node dissection was performed by Dr. Eufemia Squires. Pathology revealed invasive moderately differentiated adenocarcinoma. The tumor measured 4.5 cm. Margins were clear of invasive carcinoma and  no lymphovascular space invasion was noted. 5 of 5 lymph nodes were negative for metastatic carcinoma. Bilateral renal ultrasounds on 2/28/2017 identified a complex cyst at the upper pole of the right kidney measuring up to 3 cm increased in size compared to a study on 12/28/2012 where it measured 2.1 cm in maximum diameter. Two small benign cysts were noted in the left kidney. A CT scan of the abdomen and pelvis on 3/8/2017 confirmed that this was a simple cyst in the upper pole of the right kidney. Adjuvant chemotherapy was discussed at her initial visit on 2/22/2017 as well as on followup visit 3/13/2017. She is agreeable and desires to proceed accordingly with adjuvant Cisplatinum and Alimta. Israel Del Valle desires however to postpone initiation of adjuvant chemotherapy until 4/11/2017. Her wishes were respected and chemotherapy delivered as noted below. CT scan of chest on 7/27/2017 revealed postsurgical changes  and no acute process. Calcified lymph nodes are present in the subcorneal region and left hilum  CT scan of abdomen and pelvis on 7/27/2017 revealed no intra-abdominal or pelvic abnormalities.   ----------------------------------------------------------------------------------------_______________________________________________________   RECURRENT LUNG CARCINOMA TO THE BRAIN 1/15/2018  She presented to [facility] on 12/18/2017 with progressive headache. MRI of the brain with and without contrast at [facility] on 12/18/2017 revealed a 2 x 2.6 x 2.6 cm rim-enhancing irregular mass within the high right frontal vertex with surrounding vasogenic edema.   CT chest with contrast at [facility] 12/19/2017 revealed some paravertebral soft tissue nodules present at T8, T9, T10 with largest being 2.5 cm. These are stable compared to 7/27/2017 PET scan which were NOT metabolically active. I.e. no obvious metastatic lesions  CT abdomen and pelvis with contrast at [facility] 12/19/2017 was unrevealing for any obvious metastatic disease. Bone scan at [facility] 12/19/2017 revealed focal areas of increased activity in the left seventh and 10th costovertebral junctions-indeterminate. A right craniotomy by Dr. Mounika Romero on 1/15/2018 was performed with resection of the 2 x 2.6 x 2.6 cm high right frontal vertex mass   Pathology was consistent with metastatic adenocarcinoma with papillary features consistent with lung origin. Molecular testing on the 1/15/2018 pathology specimen was [facility] from [facility] on 3/15/2018 as follows:   EGFR -negative for mutation  ALK -negative for rearrangement  ROS 1 -negative for rearrangement   BRAF -negative for the V600E mutation  PDL-1 - expression is 80% i.e. in Positive  MRI of the brain W & WO for [facility] treatment planning purposes was performed on 3/6/2018 by Dr. Linda Chavez. Postsurgical changes to the previous resection site from the right frontal lobe mass area with residual enhancement was noted. A PET scan on 2/6/2018 did not reveal scintigraphic evidence of recurrent malignancy or metastatic disease. MRI of the brain W & WO for [facility] treatment planning purposes was performed on 3/6/2018 by Dr. Linda Chavez. Postsurgical changes to the previous resection site from the right frontal lobe mass area with residual enhancement was noted.   Diane underwent SRS with 1600 cGy in one single treatment fraction on 3/26/2018 to the right frontal CNS lobe by Dima Ohara and Amanda Nicholas. Delays in beginning systemic therapy included issues associated with her CNS treatment delivery and multiple dog bites on her arms and hands that required attention prior to starting systemic therapy. Diane received cycle #1 of chemotherapy with Alimta, carboplatin and Keytruda on 5/14/2018. Diane completed cycle # 6 of carboplatin, Alimta and Keytruda on 9/4/2018. MRI of the brain with and without on 9/12/2018 documented a stable 1.6 x 1.1 cm nodular enhancement along the medial aspect of the operative cavity. Nathaniel Winston was seen by Dr. Amanda Nicholas on 9/12/2018, who felt the MRI of the brain with stable without evidence of recurrence/progression. A CT scan of the chest on 9/24/2018 did not reveal evidence of new or suspicious for urinary nodules nor intrathoracic lymphadenopathy to suggest recurrent disease in the chest.  She completed 6 cycles of Carboplatin, Alimta and Keytruda on 9/4/2018 and then went on to receive maintenance treatment with Alimta/Keytruda. Tex Lyle reported experiencing a significant skin rash after receiving Keytruda on 11/27/2018. [facility]1/29/2019 (x3 cycles) during which time Tex Lyle was only receiving Alimta. Mrs. Marcell Hawley had repeat CT scans of the chest, abdomen and pelvis on 1/2/2019 that suggested stable disease. On 2/19/2019, Mrs. Chavez Hawley resumed the combination of maintenance treatment with Alimta/Keytruda without rash or problems. Tex Lyle was admitted to [facility] on 4/9/2019 with bilateral lower extremity cellulitis. She was discharged on 4/15/2019. CT of the abdomen and pelvis with contrast at [facility] on 4/9/2019 was compared to a CT of the abdomen and pelvis from February 2013. It revealed a left-sided 3.2 x 2.2 cm paravertebral T10 soft tissue mass.  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 No evidence of acute posttraumatic injury to the brain. CT scan of the abdomen and pelvis with contrast on 4/9/2019 documented no evidence of metastatic disease in the abdomen or pelvis. Paravertebral soft tissue masses at T10. Differential metastatic disease versus extra medullary hematopoiesis. Maintenance Alimta/Keytruda was initiated on 10/16/2018. Santi Kyle reported experiencing a significant skin rash after receiving Keytruda on 11/27/2018. [facility] was held from 12/18/2018-1/29/2019 (x3 cycles) during which time Santi Kyle was only receiving Alimta. Imaging studies on 1/2/2019 suggested stable disease. Santi Kyle resumed maintenance Alimta/Keytruda 2/19/2019 - 5/7/1209. Due to skin reaction manifestations, further maintenance Keytruda was not given after the 5/7/2019 dose. Diane received course #11 of maintenance Alimta on 7/2/2019. Thereafter, maintenance Alimta was scheduled monthly. TREATMENT SUMMARY:   1. Left thoracotomy with left lower lobectomy and mediastinal lymph node dissection 01/06/2017 by Dr. Gonzalo Kee   2. Adjuvant cisplatinum and Alimta x4 cycles. 4/11/2017 -6/21/17. 3. A right craniotomy by Dr. Marc Deleon on 1/15/2018 was performed with resection of the 2 x 2.6 x 2.6 cm high right frontal vertex mass   4. Diane underwent SRS with 1600 cGy in one single treatment fraction on 3/26/2018 to the right frontal CNS lobe by Dima Slade and Marc Deleon   5. Cycle #1 of chemotherapy with Alimta, carboplatin and [facility] was delivered on 5/14/2018 and the final cycle #6 was delivered on 9/4/2018   6. Maintenance Alimta/Keytruda was initiated on 10/16/2018 with the final cycle #9 delivered on 5/7/2019.   Cycle #10 of single agent maintenance Alimta was delivered on 6/11/2019 with schedule adjusted to monthly on Neck:      Musculoskeletal: Neck supple. Trachea: No tracheal deviation. Cardiovascular:      Rate and Rhythm: Normal rate and regular rhythm. Pulmonary:      Effort: Pulmonary effort is normal. No respiratory distress. Breath sounds: Examination of the right-lower field reveals decreased breath sounds. Examination of the left-lower field reveals decreased breath sounds. Decreased breath sounds present. No wheezing or rales. Abdominal:      General: Bowel sounds are normal. There is no distension. Palpations: Abdomen is soft. Tenderness: There is no abdominal tenderness. There is no guarding. Genitourinary:     Comments: Exam deferred  Musculoskeletal:         General: No tenderness or deformity. Comments: Normal ROM all four extremities. Generalized weakness    Lymphadenopathy:      Cervical: No cervical adenopathy. Right cervical: No superficial cervical adenopathy. Left cervical: No superficial cervical adenopathy. Upper Body:      Right upper body: No supraclavicular adenopathy. Left upper body: No supraclavicular adenopathy. Comments: No bulky palpable cervical, clavicular, axillary or inguinal adenopathies on the left or right. Skin:     General: Skin is warm and dry. Findings: No rash. Neurological:      Mental Status: She is alert and oriented to person, place, and time. Comments: follows commands, non-focal   Psychiatric:         Behavior: Behavior normal. Behavior is cooperative. Thought Content: Thought content normal.         Judgment: Judgment normal.      Comments: Alert and oriented to person, place and time.          Vital Signs  BP 98/64   Pulse 93   Temp 98.7 °F (37.1 °C) (Temporal)   Resp 22   Ht 5' 4\" (1.626 m)   Wt 119 lb (54 kg)   SpO2 97%   BMI 20.43 kg/m²     Intake/Output Summary (Last 24 hours) at 7/23/2020 0655  Last data filed at 7/23/2020 0339  Gross per 24 hour   Intake 120 ml   Output 500 ml   Net -380 ml       Labs:  CBC:   Recent Labs     07/20/20  1603 07/22/20  0426   WBC 9.9 6.5   HGB 9.1* 8.4*    297     CMP:   Recent Labs     07/20/20  1603 07/21/20  0714 07/22/20  0426 07/23/20  0436   GLUCOSE 77 81 108 96   BUN 15 14 11 11   CREATININE 0.8 0.9 0.8 0.9   CO2 21* 20* 24 23   CALCIUM 9.3 9.0 8.7* 9.0   ALKPHOS 186*  --   --   --    AST 36*  --   --   --    ALT 15  --   --   --      Hepatic:   Recent Labs     07/20/20  1603   AST 36*   ALT 15   BILITOT 1.0   ALKPHOS 186*     Troponin:   Recent Labs     07/20/20  1603   TROPONINI 0.01     BNP: No results for input(s): BNP in the last 72 hours. INR: No results for input(s): INR in the last 72 hours. ABG: No results for input(s): PHART, SQO4DFG, PO2ART, KNK9VMH, Z3XJNDKJ, BEART in the last 72 hours. 30 Day lookback of cultures:    Blood Culture Recent:   Recent Labs     07/20/20  1600   BC No Growth to date. Any change in status will be called. Gram Stain Recent:   Recent Labs     07/21/20  1715   LABGRAM Few WBC's (Polymorphonuclear)  Few Epithelial Cells  Rare Gram positive cocci  in pairs  Mixed bacterial morphotypes suggestive of  Normal respiratory yehuda       Resp Culture Recent:   Recent Labs     07/21/20  1715   CULTRESP Normal respiratory yehuda     Body Fluid Recent : No results for input(s): BFCX in the last 720 hours. MRSA Recent : No results for input(s): [facility] in the last 720 hours. Urine Culture Recent : No results for input(s): LABURIN in the last 720 hours. Organism Recent : No results for input(s): ORG in the last 720 hours. ASSESSMENT/PLAN:  #adenocarcinoma of left lung with h/o recurrent disease to the brain (s/p resection)  [facility] BENEDICT went on and off Keytruda that was eventually discontinued on 5/7/2019. She went on and off maintenance Alimta, and eventually discontinued single agent maintenance Alimta course #13 on 9/9/2019 due to poor tolerability, deconditioning and multiple hospital admissions.     MRI brain May 2020:  1. Postoperative and post therapy changes in the right frontal lobe. Linear enhancement along the inferior aspect of the resection cavity may  be increased slightly. There is not appear to be any new mass effect. The enhancement is likely postoperative. There are no new metastatic  lesions identified. 2. No evidence of acute infarct. 3. Atrophy and chronic white matter changes. 4. Small chronic right cerebellar infarct.        CT chest w/o contrast 7/21/2020:  1. RML and RLL consolidation, likely PNA. Adjacent exudative right pleural effusion. Recommend follow-up to exclude developing empyema. 2. Small left pleural effusion  3. Moderate-severe emphysema  4. Multiple newly enlarged axillary, supraclavicular and mediastinal lymph nodes  5. Partial image splenomegaly  6. Diffuse body wall edema/anasarca  7. Redemonstrated similar-appearing paraspinal soft tissue masses which may be related to extra medullary hematopoiesis    Planning biopsy of right axillary as outpatient. #Healthcare associated pneumonia  Afebrile  BC x2 on 7/20/2020 - NGTD  Respiratory culture on 7/21/2020   Few WBC's (Polymorphonuclear)   Few Epithelial Cells   Rare Gram positive cocci  in pairs   Mixed bacterial morphotypes suggestive of   Normal respiratory yehuda     She is currently on a azithromycin/ceftriaxone. followed by pulmonary    #Hyponatremia- Sodium 132  Per attending     #Hypokalemia -K+ 4.4, normalized  Per attending     #Microcytic anemia   Hgb 8.4, MCV 77.9 on 7/22/2020    Anemia work-up 5/14/2020  · Ferritin 259  · Iron 36  · Iron saturation 13%  · TIBC 284  · Folate 20  · B12 849     PLAN:  Continue current supportive care  Continue azithromycin/ceftriaxone  Planning biopsy of right axillary as outpatient.    Encouraged getting out of the bed and increasing activity as tolerated  Discussed with patient and her daughter at [address], APRN  07/23/20  6:55 AM  Physician's attestation/substantial contribution:  I, Dr Cleo Pinedo, independently performed an evaluation on Catarina Friedman. I have reviewed relevant medical information/data to include but not limited to medication list, relevant appropriate labs and imaging when applicable. I reviewed other physician's notes, ancillary services and nurses assessment. I have reviewed the above documentation completed by the Nurse Practitioner or Physician Assistant. Please see my additional contributions to the history of present illness, physical examination, and assessment/medical decision-making that reflect my findings and impressions. I discussed essential elements of the care plan with the NP or PA and the patient as well. I answered all the questions to the patient's satisfaction. I concur with above stated. Subjective-breathing slightly better this morning. Afebrile.   Objective- cachectic appearing, emaciated, decreased breath sounds right lower lobe  Assessment/plan:  Pneumonia-continue antibiotics  Lung cancer-will ultrasound-guided core biopsy axillary lymph node as outpatient      Cleo Pinedo MD

## 2020-07-24 VITALS
HEART RATE: 92 BPM | DIASTOLIC BLOOD PRESSURE: 68 MMHG | HEIGHT: 64 IN | BODY MASS INDEX: 20.32 KG/M2 | RESPIRATION RATE: 18 BRPM | TEMPERATURE: 98.6 F | OXYGEN SATURATION: 98 % | SYSTOLIC BLOOD PRESSURE: 100 MMHG | WEIGHT: 119 LBS

## 2020-07-24 LAB
ANION GAP SERPL CALCULATED.3IONS-SCNC: 13 MMOL/L (ref 7–19)
BUN BLDV-MCNC: 11 MG/DL (ref 8–23)
CALCIUM SERPL-MCNC: 8.8 MG/DL (ref 8.8–10.2)
CHLORIDE BLD-SCNC: 97 MMOL/L (ref 98–111)
CO2: 25 MMOL/L (ref 22–29)
CREAT SERPL-MCNC: 0.8 MG/DL (ref 0.5–0.9)
GFR AFRICAN AMERICAN: >59
GFR NON-AFRICAN AMERICAN: >60
GLUCOSE BLD-MCNC: 100 MG/DL (ref 74–109)
POTASSIUM SERPL-SCNC: 4.6 MMOL/L (ref 3.5–5)
SARS-COV-2, PCR: NOT DETECTED
SODIUM BLD-SCNC: 135 MMOL/L (ref 136–145)

## 2020-07-24 PROCEDURE — 80048 BASIC METABOLIC PNL TOTAL CA: CPT

## 2020-07-24 PROCEDURE — 2580000003 HC RX 258: Performed by: FAMILY MEDICINE

## 2020-07-24 PROCEDURE — 36415 COLL VENOUS BLD VENIPUNCTURE: CPT

## 2020-07-24 PROCEDURE — 2700000000 HC OXYGEN THERAPY PER DAY

## 2020-07-24 PROCEDURE — 6370000000 HC RX 637 (ALT 250 FOR IP): Performed by: FAMILY MEDICINE

## 2020-07-24 PROCEDURE — 99231 SBSQ HOSP IP/OBS SF/LOW 25: CPT | Performed by: INTERNAL MEDICINE

## 2020-07-24 PROCEDURE — U0003 INFECTIOUS AGENT DETECTION BY NUCLEIC ACID (DNA OR RNA); SEVERE ACUTE RESPIRATORY SYNDROME CORONAVIRUS 2 (SARS-COV-2) (CORONAVIRUS DISEASE [COVID-19]), AMPLIFIED PROBE TECHNIQUE, MAKING USE OF HIGH THROUGHPUT TECHNOLOGIES AS DESCRIBED BY CMS-2020-01-R: HCPCS

## 2020-07-24 PROCEDURE — 94640 AIRWAY INHALATION TREATMENT: CPT

## 2020-07-24 PROCEDURE — 6370000000 HC RX 637 (ALT 250 FOR IP): Performed by: NURSE PRACTITIONER

## 2020-07-24 RX ORDER — IPRATROPIUM BROMIDE AND ALBUTEROL SULFATE 2.5; .5 MG/3ML; MG/3ML
3 SOLUTION RESPIRATORY (INHALATION) EVERY 6 HOURS PRN
Qty: 360 ML | Refills: 0 | Status: SHIPPED | OUTPATIENT
Start: 2020-07-24

## 2020-07-24 RX ORDER — AZITHROMYCIN 500 MG/1
500 TABLET, FILM COATED ORAL DAILY
Qty: 1 PACKET | Refills: 0 | Status: SHIPPED | OUTPATIENT
Start: 2020-07-24 | End: 2020-07-27

## 2020-07-24 RX ORDER — SPIRONOLACTONE 25 MG/1
25 TABLET ORAL 2 TIMES DAILY
Qty: 30 TABLET | Refills: 3 | Status: SHIPPED | OUTPATIENT
Start: 2020-07-24

## 2020-07-24 RX ORDER — GUAIFENESIN 600 MG/1
1200 TABLET, EXTENDED RELEASE ORAL 2 TIMES DAILY
Qty: 1 TABLET | Refills: 0 | Status: SHIPPED | OUTPATIENT
Start: 2020-07-24

## 2020-07-24 RX ORDER — CEFDINIR 300 MG/1
300 CAPSULE ORAL 2 TIMES DAILY
Qty: 14 CAPSULE | Refills: 0 | Status: SHIPPED | OUTPATIENT
Start: 2020-07-24 | End: 2020-07-29

## 2020-07-24 RX ADMIN — FOLIC ACID 1 MG: 1 TABLET ORAL at 09:39

## 2020-07-24 RX ADMIN — MODAFINIL 100 MG: 100 TABLET ORAL at 09:39

## 2020-07-24 RX ADMIN — PANTOPRAZOLE SODIUM 40 MG: 40 TABLET, DELAYED RELEASE ORAL at 05:35

## 2020-07-24 RX ADMIN — IPRATROPIUM BROMIDE AND ALBUTEROL SULFATE 1 AMPULE: .5; 3 SOLUTION RESPIRATORY (INHALATION) at 10:46

## 2020-07-24 RX ADMIN — IPRATROPIUM BROMIDE AND ALBUTEROL SULFATE 1 AMPULE: .5; 3 SOLUTION RESPIRATORY (INHALATION) at 14:48

## 2020-07-24 RX ADMIN — ACETAMINOPHEN 650 MG: 325 TABLET, FILM COATED ORAL at 15:21

## 2020-07-24 RX ADMIN — SODIUM CHLORIDE, PRESERVATIVE FREE 10 ML: 5 INJECTION INTRAVENOUS at 09:41

## 2020-07-24 RX ADMIN — Medication 400 MG: at 09:39

## 2020-07-24 RX ADMIN — SERTRALINE HYDROCHLORIDE 200 MG: 100 TABLET ORAL at 09:39

## 2020-07-24 RX ADMIN — POTASSIUM BICARBONATE 20 MEQ: 782 TABLET, EFFERVESCENT ORAL at 09:57

## 2020-07-24 RX ADMIN — GUAIFENESIN 1200 MG: 600 TABLET, EXTENDED RELEASE ORAL at 09:39

## 2020-07-24 RX ADMIN — FLUTICASONE PROPIONATE 1 SPRAY: 50 SPRAY, METERED NASAL at 09:39

## 2020-07-24 RX ADMIN — IPRATROPIUM BROMIDE AND ALBUTEROL SULFATE 1 AMPULE: .5; 3 SOLUTION RESPIRATORY (INHALATION) at 07:13

## 2020-07-24 RX ADMIN — SPIRONOLACTONE 25 MG: 25 TABLET ORAL at 09:39

## 2020-07-24 RX ADMIN — FERROUS SULFATE TAB 325 MG (65 MG ELEMENTAL FE) 325 MG: 325 (65 FE) TAB at 09:39

## 2020-07-24 ASSESSMENT — ENCOUNTER SYMPTOMS
COUGH: 1
DIARRHEA: 0
EYE ITCHING: 0
EYE DISCHARGE: 0
WHEEZING: 0
EYE REDNESS: 0
SORE THROAT: 0
TROUBLE SWALLOWING: 0
ABDOMINAL PAIN: 0
CONSTIPATION: 0
PHOTOPHOBIA: 0
VOMITING: 0
NAUSEA: 0
SHORTNESS OF BREATH: 1

## 2020-07-24 ASSESSMENT — PAIN SCALES - GENERAL: PAINLEVEL_OUTOF10: 5

## 2020-07-24 NOTE — DISCHARGE SUMMARY
modafinil (PROVIGIL) 100 MG tablet  TAKE 1 TABLET BY MOUTH ONCE DAILY IN THE MORNING             montelukast (SINGULAIR) 10 MG tablet  Take 10 mg by mouth nightly Indications: Seasonal Allergy              ondansetron (ZOFRAN ODT) 4 MG disintegrating tablet  Take 1 tablet by mouth every 8 hours as needed for Nausea or Vomiting             pantoprazole (PROTONIX) 40 MG tablet  Take 1 tablet by mouth every morning (before breakfast)             sertraline (ZOLOFT) 100 MG tablet  Take 200 mg by mouth daily              spironolactone (ALDACTONE) 25 MG tablet  Take 1 tablet by mouth 2 times daily                 Consults: Oncology, Pulm    Significant Diagnostic Studies:  See complete admission record      Disposition: To [facility] in stable condition  Follow up with Mariah Curiel MD in 4 weeks. F/U with Oncology and Pulm as recommended    Diet: as tolerated    Activity: as tolerated with assistance    Special Instructions: has appt for US guided biopsy that has been scheduled by Oncology, BMP and CBC every Monday, repeat CXR in 2-3 weeks, continue O2 to keep sat > or equal to 92 %      The patient or family are to call or return if there are any problems, questions, concerns or change in her condition.      Signed:  Mariah Curiel MD  7/24/2020, 1:42 PM

## 2020-07-24 NOTE — PROGRESS NOTES
transthorasic needle biopsy on 11/22/2016 by Dr. Diana Boland revealed evidence of adenocarcinoma. PET scan on 12/16/2016 revealed a mass in the LLL with hypermetabolic activity with a maximum SUV of 7.1.   A chest x-ray on 1/4/2017 revealed a 3.8 cm mass lesion in the LLL that previously measured approximately 4.1 cm. On 1/6/2017, a left thoracotomy with left lower lobectomy and mediastinal lymph node dissection was performed by Dr. Junior Monday. Pathology revealed invasive moderately differentiated adenocarcinoma. The tumor measured 4.5 cm. Margins were clear of invasive carcinoma and  no lymphovascular space invasion was noted. 5 of 5 lymph nodes were negative for metastatic carcinoma. Bilateral renal ultrasounds on 2/28/2017 identified a complex cyst at the upper pole of the right kidney measuring up to 3 cm increased in size compared to a study on 12/28/2012 where it measured 2.1 cm in maximum diameter. Two small benign cysts were noted in the left kidney. A CT scan of the abdomen and pelvis on 3/8/2017 confirmed that this was a simple cyst in the upper pole of the right kidney. Adjuvant chemotherapy was discussed at her initial visit on 2/22/2017 as well as on followup visit 3/13/2017. She is agreeable and desires to proceed accordingly with adjuvant Cisplatinum and Alimta. Van Hathaway desires however to postpone initiation of adjuvant chemotherapy until 4/11/2017. Her wishes were respected and chemotherapy delivered as noted below. CT scan of chest on 7/27/2017 revealed postsurgical changes  and no acute process. Calcified lymph nodes are present in the subcorneal region and left hilum  CT scan of abdomen and pelvis on 7/27/2017 revealed no intra-abdominal or pelvic abnormalities.   ----------------------------------------------------------------------------------------_______________________________________________________   RECURRENT LUNG CARCINOMA TO THE BRAIN 1/15/2018  She presented to [facility] on 12/18/2017 with progressive headache. MRI of the brain with and without contrast at [facility] on 12/18/2017 revealed a 2 x 2.6 x 2.6 cm rim-enhancing irregular mass within the high right frontal vertex with surrounding vasogenic edema.   CT chest with contrast at [facility] 12/19/2017 revealed some paravertebral soft tissue nodules present at T8, T9, T10 with largest being 2.5 cm. These are stable compared to 7/27/2017 PET scan which were NOT metabolically active. I.e. no obvious metastatic lesions  CT abdomen and pelvis with contrast at [facility] 12/19/2017 was unrevealing for any obvious metastatic disease. Bone scan at [facility] 12/19/2017 revealed focal areas of increased activity in the left seventh and 10th costovertebral junctions-indeterminate. A right craniotomy by Dr. Parish Reza on 1/15/2018 was performed with resection of the 2 x 2.6 x 2.6 cm high right frontal vertex mass   Pathology was consistent with metastatic adenocarcinoma with papillary features consistent with lung origin. Molecular testing on the 1/15/2018 pathology specimen [facility] on 3/15/2018 as follows:   EGFR -negative for mutation  ALK -negative for rearrangement  ROS 1 -negative for rearrangement   BRAF -negative for the [id number] mutation  PDL-1 - expression is 80% i.e. in Positive  MRI of the brain W & [facility] was performed on 3/6/2018 by Dr. Philippe Acosta. Postsurgical changes to the previous resection site from the right frontal lobe mass area with residual enhancement was noted. A PET scan on 2/6/2018 did not reveal scintigraphic evidence of recurrent malignancy or metastatic disease. [facility] of the [facility] was performed on 3/6/2018 by Dr. Philippe Acosta. Postsurgical changes to the previous resection site from the right frontal lobe mass area with residual enhancement was noted.   Diane underwent SRS with 1600 cGy in one single treatment fraction on 3/26/2018 to the right frontal CNS lobe by Dima Collazo and Yoni Mercado. Delays in beginning systemic therapy included issues associated with her CNS treatment delivery and multiple dog bites on her arms and hands that required attention prior to starting systemic therapy. Diane received cycle #1 of chemotherapy with Alimta, carboplatin and Keytruda on 5/14/2018. Diane completed cycle # 6 of carboplatin, Alimta and Keytruda on 9/4/2018. MRI of the brain with and without on 9/12/2018 documented a stable 1.6 x 1.1 cm nodular enhancement along the medial aspect of the operative cavity. Cary Bhakta was seen by Dr. Yoni Mercado on 9/12/2018, who felt the MRI of the brain with stable without evidence of recurrence/progression. A CT scan of the chest on 9/24/2018 did not reveal evidence of new or suspicious for urinary nodules nor intrathoracic lymphadenopathy to suggest recurrent disease in the chest.  She completed 6 cycles of Carboplatin, Alimta and Keytruda on 9/4/2018 and then went on to receive maintenance treatment with Alimta/Keytruda. David Mae reported experiencing a significant skin rash after receiving Keytruda on 11/27/2018. Benita Duncan was held from 12/18/2018-1/29/2019 (x3 cycles) during which time David Mae was only receiving Alimta. Mrs. Hayden Noble had repeat CT scans of the chest, abdomen and pelvis on 1/2/2019 that suggested stable disease. On 2/19/2019, Mrs. Toñito Scott resumed the combination of maintenance treatment with Alimta/Keytruda without rash or problems. David Mae was admitted to [facility] on 4/9/2019 with bilateral lower extremity cellulitis. She was discharged on 4/15/2019. CT of the abdomen and pelvis with contrast at [facility] on 4/9/2019 was compared to a CT of the abdomen and pelvis from February 2013. It revealed a left-sided 3.2 x 2.2 cm paravertebral T10 soft tissue mass.  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 No evidence of acute posttraumatic injury to the brain. CT scan of the abdomen and pelvis with contrast on 4/9/2019 documented no evidence of metastatic disease in the abdomen or pelvis. Paravertebral soft tissue masses at T10. Differential metastatic disease versus extra medullary hematopoiesis. Maintenance Alimta/Keytruda was initiated on 10/16/2018. Hussain Jaramillo reported experiencing a significant skin rash after receiving Keytruda on 11/27/2018. Myrene Cristal was held from 12/18/2018-1/29/2019 (x3 cycles) during which time Hussain Jaramillo was only receiving Alimta. Imaging studies on 1/2/2019 suggested stable disease. Hussain Jaramillo resumed maintenance Alimta/Keytruda 2/19/2019 - 5/7/1209. Due to skin reaction manifestations, further maintenance Keytruda was not given after the 5/7/2019 dose. Diane received course #11 of maintenance Alimta on 7/2/2019. Thereafter, maintenance Alimta was scheduled monthly. TREATMENT SUMMARY:   1. Left thoracotomy with left lower lobectomy and mediastinal lymph node dissection 01/06/2017 by Dr. Enzo Marie   2. Adjuvant cisplatinum and Alimta x4 cycles. 4/11/2017 -6/21/17. 3. A right craniotomy by Dr. Jj Angel on 1/15/2018 was performed with resection of the 2 x 2.6 x 2.6 cm high right frontal vertex mass   4. Diane underwent SRS with 1600 cGy in one single treatment fraction on 3/26/2018 to the right frontal CNS lobe by Dima Crain and Jj Angel   5. Cycle #1 of chemotherapy with Alimta, carboplatin and Myrene Cristal was delivered on 5/14/2018 and the final cycle #6 was delivered on 9/4/2018   6. Maintenance Alimta/Keytruda was initiated on 10/16/2018 with the final cycle #9 delivered on 5/7/2019.   Cycle #10 of single agent maintenance Alimta was delivered on 6/11/2019 with schedule adjusted to monthly on 7/2/2019     Subjective   REVIEW OF SYSTEMS:   Review of Systems   Constitutional: Positive for fatigue and unexpected weight change (weight loss ). Negative for fever. HENT: Negative for dental problem, mouth sores, nosebleeds, sore throat and trouble swallowing. Eyes: Negative for photophobia, discharge, redness and itching. No blurring of vision, no double vision   Respiratory: Positive for cough and shortness of breath. Negative for wheezing. Cardiovascular: Negative for chest pain, palpitations and leg swelling. Gastrointestinal: Negative for abdominal pain, constipation, diarrhea, nausea and vomiting. Endocrine: Negative for cold intolerance, heat intolerance, polydipsia and polyuria. Genitourinary: Negative for dysuria, frequency, hematuria and urgency. Musculoskeletal: Positive for arthralgias. Negative for joint swelling and myalgias. Generalized weakness    Skin: Negative for pallor and rash. Allergic/Immunologic: Negative for environmental allergies and immunocompromised state. Neurological: Positive for weakness. Negative for seizures, syncope and numbness. Hematological: Negative for adenopathy. Does not bruise/bleed easily. Psychiatric/Behavioral: Negative for agitation, behavioral problems, confusion and suicidal ideas. The patient is not nervous/anxious. Objective   PHYSICAL EXAM:  Physical Exam  Vitals signs reviewed. Constitutional:       General: She is not in acute distress. Appearance: She is well-developed. She is not toxic-appearing or diaphoretic. Comments: Appears chronically ill    HENT:      Head: Normocephalic and atraumatic. Right Ear: External ear normal.      Left Ear: External ear normal.      Nose: Nose normal.      Mouth/Throat:      Mouth: Mucous membranes are moist.   Eyes:      General: No scleral icterus. Right eye: No discharge. Left eye: No discharge.       Conjunctiva/sclera: Conjunctivae normal. lobe.  Linear enhancement along the inferior aspect of the resection cavity may  be increased slightly. There is not appear to be any new mass effect. The enhancement is likely postoperative. There are no new metastatic  lesions identified. 2. No evidence of acute infarct. 3. Atrophy and chronic white matter changes. 4. Small chronic right cerebellar infarct.        CT chest w/o contrast 7/21/2020:  1. RML and RLL consolidation, likely PNA. Adjacent exudative right pleural effusion. Recommend follow-up to exclude developing empyema. 2. Small left pleural effusion  3. Moderate-severe emphysema  4. Multiple newly enlarged axillary, supraclavicular and mediastinal lymph nodes  5. Partial image splenomegaly  6. Diffuse body wall edema/anasarca  7. Redemonstrated similar-appearing paraspinal soft tissue masses which may be related to extra medullary hematopoiesis    Planning biopsy of right axillary as outpatient on 7/31/2020 at 1 PM.         #Healthcare associated pneumonia  Afebrile  BC x2 on 7/20/2020 - NGTD  Respiratory culture on 7/21/2020   Few WBC's (Polymorphonuclear)   Few Epithelial Cells   Rare Gram positive cocci  in pairs   Mixed bacterial morphotypes suggestive of   Normal respiratory yehuda     She is currently on a azithromycin/ceftriaxone. followed by pulmonary    #Hyponatremia- Sodium 135  Per attending     #Hypokalemia -K+ 4.6, normalized  Per attending     #Microcytic anemia   Hgb 8.4, MCV 77.9 on 7/22/2020    Anemia work-up 5/14/2020  · Ferritin 259  · Iron 36  · Iron saturation 13%  · TIBC 284  · Folate 20  · B12 849     PLAN:  Continue current supportive care  Planning ultra sound guided biopsy of right axillary as outpatient on 7/31/2020 at 1 PM.   Encouraged getting out of the bed and increasing activity as tolerated  Okay from oncology standpoint to discharge when appropriate with others, anticipating discharge to [facility] for rehab.   Follow-up appointment in clinic with  Jenna on 8/10/2020 at 9:45 AM.      Vinod Ramey, HAKEEM  07/24/20  7:03 AM  Physician's attestation/substantial contribution:  I, Dr Margarito Banerjee, independently performed an evaluation on Antoinette Rodriguez. I have reviewed relevant medical information/data to include but not limited to medication list, relevant appropriate labs and imaging when applicable. I reviewed other physician's notes, ancillary services and nurses assessment. I have reviewed the above documentation completed by the Nurse Practitioner or Physician Assistant. Please see my additional contributions to the history of present illness, physical examination, and assessment/medical decision-making that reflect my findings and impressions. I discussed essential elements of the care plan with the NP or PA and the patient as well. I answered all the questions to the patient's satisfaction. I concur with above stated. Subjective-tired, sleepy  Objective- no change  Assessment/plan:  Progressive lung cancer- CT-guided biopsy axillary node as outpatient  Pneumonia-continue antibiotics  Disposition- okay from my standpoint to discharge to SNF. Appointment Dr. Doug Farmer on 8/10/2020.     Margarito Banerjee MD

## 2020-07-25 LAB
BLOOD CULTURE, ROUTINE: NORMAL
CULTURE, BLOOD 2: NORMAL

## 2020-07-30 RX ORDER — MODAFINIL 100 MG/1
TABLET ORAL
Qty: 30 TABLET | Refills: 1 | Status: SHIPPED | OUTPATIENT
Start: 2020-07-30 | End: 2020-08-29

## 2020-08-12 ENCOUNTER — HOSPITAL ENCOUNTER (OUTPATIENT)
Dept: ULTRASOUND IMAGING | Age: 70
Discharge: HOME | End: 2020-08-12
Payer: COMMERCIAL

## 2020-08-12 PROCEDURE — 88172 CYTP DX EVAL FNA 1ST EA SITE: CPT

## 2020-08-12 PROCEDURE — 88333 PATH CONSLTJ SURG CYTO XM 1: CPT

## 2020-08-12 PROCEDURE — 88334 PATH CONSLTJ SURG CYTO XM EA: CPT

## 2020-08-12 PROCEDURE — 88329 PATH CONSLTJ DRG SURG: CPT

## 2020-08-12 PROCEDURE — 88177 CYTP FNA EVAL EA ADDL: CPT

## 2020-08-12 PROCEDURE — 88305 TISSUE EXAM BY PATHOLOGIST: CPT

## 2020-08-12 PROCEDURE — C1729 CATH, DRAINAGE: HCPCS

## 2020-08-12 PROCEDURE — 88173 CYTOPATH EVAL FNA REPORT: CPT

## 2020-08-12 PROCEDURE — 10005 FNA BX W/US GDN 1ST LES: CPT

## 2020-08-14 ENCOUNTER — HOSPITAL ENCOUNTER (OUTPATIENT)
Dept: CT IMAGING | Age: 70
Discharge: HOME | End: 2020-08-14
Payer: MEDICARE

## 2020-08-14 PROCEDURE — 6360000004 HC RX CONTRAST MEDICATION: Performed by: INTERNAL MEDICINE

## 2020-08-14 PROCEDURE — 74177 CT ABD & PELVIS W/CONTRAST: CPT

## 2020-08-14 PROCEDURE — 71260 CT THORAX DX C+: CPT

## 2020-08-14 RX ADMIN — IOPAMIDOL 75 ML: 755 INJECTION, SOLUTION INTRAVENOUS at 08:54

## 2020-08-19 ENCOUNTER — HOSPITAL ENCOUNTER (INPATIENT)
Age: 70
LOS: 8 days | Discharge: HOSPICE | DRG: 291 | End: 2020-08-27
Attending: EMERGENCY MEDICINE | Admitting: FAMILY MEDICINE
Payer: MEDICARE

## 2020-08-19 ENCOUNTER — APPOINTMENT (OUTPATIENT)
Dept: GENERAL RADIOLOGY | Age: 70
DRG: 291 | End: 2020-08-19
Payer: MEDICARE

## 2020-08-19 ENCOUNTER — HOSPITAL ENCOUNTER (OUTPATIENT)
Dept: INFUSION THERAPY | Age: 70
Discharge: HOME | DRG: 291 | End: 2020-08-19
Payer: MEDICARE

## 2020-08-19 ENCOUNTER — OFFICE VISIT (OUTPATIENT)
Dept: HEMATOLOGY | Age: 70
End: 2020-08-19
Payer: MEDICARE

## 2020-08-19 VITALS
WEIGHT: 120 LBS | TEMPERATURE: 97.5 F | SYSTOLIC BLOOD PRESSURE: 92 MMHG | BODY MASS INDEX: 20.49 KG/M2 | OXYGEN SATURATION: 92 % | HEIGHT: 64 IN | HEART RATE: 105 BPM | DIASTOLIC BLOOD PRESSURE: 58 MMHG

## 2020-08-19 DIAGNOSIS — C34.90 NON-SMALL CELL LUNG CANCER, UNSPECIFIED LATERALITY (HCC): Chronic | ICD-10-CM

## 2020-08-19 PROBLEM — I50.31 ACUTE DIASTOLIC (CONGESTIVE) HEART FAILURE (HCC): Status: ACTIVE | Noted: 2020-08-19

## 2020-08-19 PROBLEM — E87.70 VOLUME OVERLOAD: Status: ACTIVE | Noted: 2020-08-19

## 2020-08-19 LAB
ALBUMIN SERPL-MCNC: 3.8 G/DL (ref 3.5–5.2)
ALP BLD-CCNC: 162 U/L (ref 35–104)
ALT SERPL-CCNC: 14 U/L (ref 5–33)
ANION GAP SERPL CALCULATED.3IONS-SCNC: 16 MMOL/L (ref 7–19)
AST SERPL-CCNC: 26 U/L (ref 5–32)
BASE EXCESS ARTERIAL: -3.4 MMOL/L (ref -2–2)
BASOPHILS ABSOLUTE: 0 K/UL (ref 0–0.2)
BASOPHILS ABSOLUTE: 0.02 K/UL (ref 0.01–0.08)
BASOPHILS RELATIVE PERCENT: 0.2 % (ref 0.1–1.2)
BASOPHILS RELATIVE PERCENT: 0.2 % (ref 0–1)
BILIRUB SERPL-MCNC: 0.5 MG/DL (ref 0.2–1.2)
BUN BLDV-MCNC: 17 MG/DL (ref 8–23)
CALCIUM SERPL-MCNC: 9.4 MG/DL (ref 8.8–10.2)
CARBOXYHEMOGLOBIN ARTERIAL: 2.4 % (ref 0–5)
CHLORIDE BLD-SCNC: 101 MMOL/L (ref 98–111)
CO2: 20 MMOL/L (ref 22–29)
CREAT SERPL-MCNC: 0.8 MG/DL (ref 0.5–0.9)
EOSINOPHILS ABSOLUTE: 0.04 K/UL (ref 0.04–0.54)
EOSINOPHILS ABSOLUTE: 0.1 K/UL (ref 0–0.6)
EOSINOPHILS RELATIVE PERCENT: 0.5 % (ref 0.7–7)
EOSINOPHILS RELATIVE PERCENT: 0.7 % (ref 0–5)
GFR AFRICAN AMERICAN: >59
GFR NON-AFRICAN AMERICAN: >60
GLUCOSE BLD-MCNC: 90 MG/DL (ref 74–109)
HCO3 ARTERIAL: 21.3 MMOL/L (ref 22–26)
HCT VFR BLD CALC: 29 % (ref 37–47)
HCT VFR BLD CALC: 31.4 % (ref 34.1–44.9)
HEMOGLOBIN, ART, EXTENDED: 9.3 G/DL (ref 12–16)
HEMOGLOBIN: 8.8 G/DL (ref 12–16)
HEMOGLOBIN: 9.1 G/DL (ref 11.2–15.7)
IMMATURE GRANULOCYTES #: 0 K/UL
INR BLD: 1.29 (ref 0.88–1.18)
LACTIC ACID: 0.9 MMOL/L (ref 0.5–1.9)
LIPASE: 11 U/L (ref 13–60)
LYMPHOCYTES ABSOLUTE: 0.72 K/UL (ref 1.18–3.74)
LYMPHOCYTES ABSOLUTE: 0.8 K/UL (ref 1.1–4.5)
LYMPHOCYTES RELATIVE PERCENT: 10.1 % (ref 20–40)
LYMPHOCYTES RELATIVE PERCENT: 8.3 % (ref 19.3–53.1)
MCH RBC QN AUTO: 24 PG (ref 27–31)
MCH RBC QN AUTO: 24.4 PG (ref 25.6–32.2)
MCHC RBC AUTO-ENTMCNC: 29 G/DL (ref 32.3–35.5)
MCHC RBC AUTO-ENTMCNC: 30.3 G/DL (ref 33–37)
MCV RBC AUTO: 79.2 FL (ref 81–99)
MCV RBC AUTO: 84.2 FL (ref 79.4–94.8)
METHEMOGLOBIN ARTERIAL: 0.8 %
MONOCYTES ABSOLUTE: 0.7 K/UL (ref 0–0.9)
MONOCYTES ABSOLUTE: 0.91 K/UL (ref 0.24–0.82)
MONOCYTES RELATIVE PERCENT: 10.5 % (ref 4.7–12.5)
MONOCYTES RELATIVE PERCENT: 8.6 % (ref 0–10)
NEUTROPHILS ABSOLUTE: 6.5 K/UL (ref 1.5–7.5)
NEUTROPHILS ABSOLUTE: 6.98 K/UL (ref 1.56–6.13)
NEUTROPHILS RELATIVE PERCENT: 79.9 % (ref 50–65)
NEUTROPHILS RELATIVE PERCENT: 80.5 % (ref 34–71.1)
O2 CONTENT ARTERIAL: 12.7 ML/DL
O2 SAT, ARTERIAL: 95.8 %
O2 THERAPY: ABNORMAL
PCO2 ARTERIAL: 36 MMHG (ref 35–45)
PDW BLD-RTO: 17.3 % (ref 11.5–14.5)
PDW BLD-RTO: 17.5 % (ref 11.7–14.4)
PH ARTERIAL: 7.38 (ref 7.35–7.45)
PLATELET # BLD: 285 K/UL (ref 182–369)
PLATELET # BLD: 310 K/UL (ref 130–400)
PMV BLD AUTO: 10.1 FL (ref 9.4–12.3)
PMV BLD AUTO: 9.8 FL (ref 7.4–10.4)
PO2 ARTERIAL: 90 MMHG (ref 80–100)
POTASSIUM SERPL-SCNC: 4.1 MMOL/L (ref 3.5–5)
POTASSIUM, WHOLE BLOOD: 3.9
PRO-BNP: ABNORMAL PG/ML (ref 0–900)
PROTHROMBIN TIME: 16.1 SEC (ref 12–14.6)
RBC # BLD: 3.66 M/UL (ref 4.2–5.4)
RBC # BLD: 3.73 M/UL (ref 3.93–5.22)
SARS-COV-2, NAAT: NOT DETECTED
SODIUM BLD-SCNC: 137 MMOL/L (ref 136–145)
TOTAL PROTEIN: 6.8 G/DL (ref 6.6–8.7)
TROPONIN: 0.02 NG/ML (ref 0–0.03)
WBC # BLD: 8.2 K/UL (ref 4.8–10.8)
WBC # BLD: 8.67 K/UL (ref 3.98–10.04)

## 2020-08-19 PROCEDURE — 2700000000 HC OXYGEN THERAPY PER DAY

## 2020-08-19 PROCEDURE — 87040 BLOOD CULTURE FOR BACTERIA: CPT

## 2020-08-19 PROCEDURE — 80053 COMPREHEN METABOLIC PANEL: CPT

## 2020-08-19 PROCEDURE — 1123F ACP DISCUSS/DSCN MKR DOCD: CPT | Performed by: INTERNAL MEDICINE

## 2020-08-19 PROCEDURE — 2140000000 HC CCU INTERMEDIATE R&B

## 2020-08-19 PROCEDURE — 85610 PROTHROMBIN TIME: CPT

## 2020-08-19 PROCEDURE — 36415 COLL VENOUS BLD VENIPUNCTURE: CPT

## 2020-08-19 PROCEDURE — 84132 ASSAY OF SERUM POTASSIUM: CPT

## 2020-08-19 PROCEDURE — 94640 AIRWAY INHALATION TREATMENT: CPT

## 2020-08-19 PROCEDURE — 83880 ASSAY OF NATRIURETIC PEPTIDE: CPT

## 2020-08-19 PROCEDURE — 3017F COLORECTAL CA SCREEN DOC REV: CPT | Performed by: INTERNAL MEDICINE

## 2020-08-19 PROCEDURE — 1111F DSCHRG MED/CURRENT MED MERGE: CPT | Performed by: INTERNAL MEDICINE

## 2020-08-19 PROCEDURE — G8420 CALC BMI NORM PARAMETERS: HCPCS | Performed by: INTERNAL MEDICINE

## 2020-08-19 PROCEDURE — 6370000000 HC RX 637 (ALT 250 FOR IP): Performed by: FAMILY MEDICINE

## 2020-08-19 PROCEDURE — 99215 OFFICE O/P EST HI 40 MIN: CPT | Performed by: INTERNAL MEDICINE

## 2020-08-19 PROCEDURE — G8399 PT W/DXA RESULTS DOCUMENT: HCPCS | Performed by: INTERNAL MEDICINE

## 2020-08-19 PROCEDURE — 1036F TOBACCO NON-USER: CPT | Performed by: INTERNAL MEDICINE

## 2020-08-19 PROCEDURE — 36600 WITHDRAWAL OF ARTERIAL BLOOD: CPT

## 2020-08-19 PROCEDURE — 71045 X-RAY EXAM CHEST 1 VIEW: CPT

## 2020-08-19 PROCEDURE — 83605 ASSAY OF LACTIC ACID: CPT

## 2020-08-19 PROCEDURE — U0002 COVID-19 LAB TEST NON-CDC: HCPCS

## 2020-08-19 PROCEDURE — 4040F PNEUMOC VAC/ADMIN/RCVD: CPT | Performed by: INTERNAL MEDICINE

## 2020-08-19 PROCEDURE — 84484 ASSAY OF TROPONIN QUANT: CPT

## 2020-08-19 PROCEDURE — 85025 COMPLETE CBC W/AUTO DIFF WBC: CPT

## 2020-08-19 PROCEDURE — G8427 DOCREV CUR MEDS BY ELIG CLIN: HCPCS | Performed by: INTERNAL MEDICINE

## 2020-08-19 PROCEDURE — 99282 EMERGENCY DEPT VISIT SF MDM: CPT

## 2020-08-19 PROCEDURE — 82803 BLOOD GASES ANY COMBINATION: CPT

## 2020-08-19 PROCEDURE — 1090F PRES/ABSN URINE INCON ASSESS: CPT | Performed by: INTERNAL MEDICINE

## 2020-08-19 PROCEDURE — 99211 OFF/OP EST MAY X REQ PHY/QHP: CPT

## 2020-08-19 PROCEDURE — 6360000002 HC RX W HCPCS: Performed by: EMERGENCY MEDICINE

## 2020-08-19 PROCEDURE — 99999 PR OFFICE/OUTPT VISIT,PROCEDURE ONLY: CPT | Performed by: EMERGENCY MEDICINE

## 2020-08-19 PROCEDURE — 83690 ASSAY OF LIPASE: CPT

## 2020-08-19 RX ORDER — FUROSEMIDE 10 MG/ML
80 INJECTION INTRAMUSCULAR; INTRAVENOUS ONCE
Status: COMPLETED | OUTPATIENT
Start: 2020-08-19 | End: 2020-08-19

## 2020-08-19 RX ORDER — FOLIC ACID 1 MG/1
1 TABLET ORAL DAILY
Status: DISCONTINUED | OUTPATIENT
Start: 2020-08-20 | End: 2020-08-27 | Stop reason: HOSPADM

## 2020-08-19 RX ORDER — ACETAMINOPHEN 325 MG/1
650 TABLET ORAL EVERY 4 HOURS PRN
Status: DISCONTINUED | OUTPATIENT
Start: 2020-08-19 | End: 2020-08-22 | Stop reason: SDUPTHER

## 2020-08-19 RX ORDER — IPRATROPIUM BROMIDE AND ALBUTEROL SULFATE 2.5; .5 MG/3ML; MG/3ML
1 SOLUTION RESPIRATORY (INHALATION)
Status: DISCONTINUED | OUTPATIENT
Start: 2020-08-20 | End: 2020-08-27 | Stop reason: HOSPADM

## 2020-08-19 RX ORDER — SPIRONOLACTONE 25 MG/1
25 TABLET ORAL 2 TIMES DAILY
Status: DISCONTINUED | OUTPATIENT
Start: 2020-08-20 | End: 2020-08-27 | Stop reason: HOSPADM

## 2020-08-19 RX ORDER — ONDANSETRON 4 MG/1
4 TABLET, ORALLY DISINTEGRATING ORAL EVERY 8 HOURS PRN
Status: DISCONTINUED | OUTPATIENT
Start: 2020-08-19 | End: 2020-08-27 | Stop reason: HOSPADM

## 2020-08-19 RX ORDER — LANOLIN ALCOHOL/MO/W.PET/CERES
400 CREAM (GRAM) TOPICAL DAILY
Status: DISCONTINUED | OUTPATIENT
Start: 2020-08-20 | End: 2020-08-27 | Stop reason: HOSPADM

## 2020-08-19 RX ORDER — MODAFINIL 100 MG/1
100 TABLET ORAL DAILY
Status: DISCONTINUED | OUTPATIENT
Start: 2020-08-20 | End: 2020-08-27 | Stop reason: HOSPADM

## 2020-08-19 RX ORDER — SODIUM CHLORIDE 0.9 % (FLUSH) 0.9 %
10 SYRINGE (ML) INJECTION PRN
Status: DISCONTINUED | OUTPATIENT
Start: 2020-08-19 | End: 2020-08-27 | Stop reason: HOSPADM

## 2020-08-19 RX ORDER — FUROSEMIDE 10 MG/ML
40 INJECTION INTRAMUSCULAR; INTRAVENOUS 2 TIMES DAILY
Status: DISCONTINUED | OUTPATIENT
Start: 2020-08-20 | End: 2020-08-21 | Stop reason: ALTCHOICE

## 2020-08-19 RX ORDER — GUAIFENESIN 600 MG/1
1200 TABLET, EXTENDED RELEASE ORAL 2 TIMES DAILY
Status: DISCONTINUED | OUTPATIENT
Start: 2020-08-19 | End: 2020-08-27 | Stop reason: HOSPADM

## 2020-08-19 RX ORDER — LANOLIN ALCOHOL/MO/W.PET/CERES
5 CREAM (GRAM) TOPICAL NIGHTLY PRN
COMMUNITY

## 2020-08-19 RX ORDER — SERTRALINE HYDROCHLORIDE 100 MG/1
200 TABLET, FILM COATED ORAL DAILY
Status: DISCONTINUED | OUTPATIENT
Start: 2020-08-20 | End: 2020-08-27 | Stop reason: HOSPADM

## 2020-08-19 RX ORDER — CHOLECALCIFEROL (VITAMIN D3) 125 MCG
5 CAPSULE ORAL NIGHTLY PRN
Status: DISCONTINUED | OUTPATIENT
Start: 2020-08-19 | End: 2020-08-27 | Stop reason: HOSPADM

## 2020-08-19 RX ORDER — SODIUM CHLORIDE 0.9 % (FLUSH) 0.9 %
10 SYRINGE (ML) INJECTION EVERY 12 HOURS SCHEDULED
Status: DISCONTINUED | OUTPATIENT
Start: 2020-08-19 | End: 2020-08-27 | Stop reason: HOSPADM

## 2020-08-19 RX ORDER — FLUTICASONE PROPIONATE 50 MCG
1 SPRAY, SUSPENSION (ML) NASAL DAILY
Status: DISCONTINUED | OUTPATIENT
Start: 2020-08-20 | End: 2020-08-27 | Stop reason: HOSPADM

## 2020-08-19 RX ORDER — ONDANSETRON 2 MG/ML
4 INJECTION INTRAMUSCULAR; INTRAVENOUS EVERY 8 HOURS PRN
Status: DISCONTINUED | OUTPATIENT
Start: 2020-08-19 | End: 2020-08-27 | Stop reason: HOSPADM

## 2020-08-19 RX ORDER — BUSPIRONE HYDROCHLORIDE 15 MG/1
15 TABLET ORAL 3 TIMES DAILY
Status: DISCONTINUED | OUTPATIENT
Start: 2020-08-19 | End: 2020-08-27 | Stop reason: HOSPADM

## 2020-08-19 RX ORDER — IPRATROPIUM BROMIDE AND ALBUTEROL SULFATE 2.5; .5 MG/3ML; MG/3ML
SOLUTION RESPIRATORY (INHALATION)
Status: DISPENSED
Start: 2020-08-19 | End: 2020-08-20

## 2020-08-19 RX ORDER — ACETAMINOPHEN 325 MG/1
650 TABLET ORAL EVERY 6 HOURS PRN
Status: DISCONTINUED | OUTPATIENT
Start: 2020-08-19 | End: 2020-08-27 | Stop reason: HOSPADM

## 2020-08-19 RX ORDER — BUSPIRONE HYDROCHLORIDE 15 MG/1
15 TABLET ORAL 3 TIMES DAILY
COMMUNITY

## 2020-08-19 RX ORDER — PANTOPRAZOLE SODIUM 40 MG/1
40 TABLET, DELAYED RELEASE ORAL
Status: DISCONTINUED | OUTPATIENT
Start: 2020-08-20 | End: 2020-08-27 | Stop reason: HOSPADM

## 2020-08-19 RX ORDER — MONTELUKAST SODIUM 10 MG/1
10 TABLET ORAL NIGHTLY
Status: DISCONTINUED | OUTPATIENT
Start: 2020-08-19 | End: 2020-08-27 | Stop reason: HOSPADM

## 2020-08-19 RX ADMIN — IPRATROPIUM BROMIDE AND ALBUTEROL SULFATE 1 AMPULE: .5; 3 SOLUTION RESPIRATORY (INHALATION) at 21:15

## 2020-08-19 RX ADMIN — FUROSEMIDE 80 MG: 40 INJECTION, SOLUTION INTRAMUSCULAR; INTRAVENOUS at 18:35

## 2020-08-19 ASSESSMENT — ENCOUNTER SYMPTOMS
SHORTNESS OF BREATH: 1
COUGH: 1
ABDOMINAL PAIN: 0

## 2020-08-19 NOTE — ED PROVIDER NOTES
[facility] EMERGENCY DEPT  eMERGENCY dEPARTMENT eNCOUnter      Pt Name: Efra Walker  MRN: [id number]  [place] 1950  Date of evaluation: 8/19/2020  Provider: Joni Butcher MD    CHIEF COMPLAINT       Chief Complaint   Patient presents with    Shortness of Breath    COPD         HISTORY OF PRESENT ILLNESS   (Location/Symptom, Timing/Onset,Context/Setting, Quality, Duration, Modifying Factors, Severity)  Note limiting factors. Efra Walker is a 78 y.o. female who presents to the emergency department with cough and shortness of breath worsening over the last couple days. Patient is in a nursing home. Patient sent to the ER by Dr. Danuta Gross for admission. Patient has shortness of breath. She is also been swelling in her arms and her legs. Patient had been admitted back at the end of July for similar. And pneumonia. Patient has no known COVID. On O2 in SNF. The history is provided by the patient. NursingNotes were reviewed. REVIEW OF SYSTEMS    (2-9 systems for level 4, 10 or more for level 5)     Review of Systems   Constitutional: Negative for fever. Respiratory: Positive for cough and shortness of breath. Cardiovascular: Positive for leg swelling. Negative for chest pain. Gastrointestinal: Negative for abdominal pain. Neurological: Negative for seizures and syncope. A complete review of systems was performed and is negative except as noted above in the HPI.        PAST MEDICAL HISTORY     Past Medical History:   Diagnosis Date    Anxiety     Arthritis     Bowel obstruction ([facility] [address]     adhesions with colectomy/colostomy    Cancer ([facility] [address]     lung LLL ,  brain    Colostomy in place [facility])     Concussion with no loss of consciousness     COPD (chronic obstructive pulmonary disease) (HCC)     Cyst of kidney, acquired 8/3/2019    Depression     Hernia     History of blood transfusion     History of broken collarbone     Hyperlipidemia     Palliative care patient 09/16/2019  Seasonal allergies          SURGICAL HISTORY       Past Surgical History:   Procedure Laterality Date    ABDOMEN SURGERY      bowel blockage from scar tissue    COLONOSCOPY      COLOSTOMY      still has    CRANIOTOMY Right 01/15/2018    Dr. Nery Damian ENDOSCOPY, COLON, DIAGNOSTIC      FRACTURE SURGERY      left arm and elbow and finger    HERNIA REPAIR      HYSTERECTOMY      LOBECTOMY Left 1/6/2017    LEFT THORACOTOMY WITH LOBECTOMY  performed by Kati Lee MD at [facility]      [place] [facility] TUNNELED CTR VAD W/SUBQ PORT AGE 5 YR/> N/A 7/3/2018    SINGLE LUMEN PORT INSERTION performed by Arnold Floyd MD at [address]       Previous Medications    ACETAMINOPHEN (TYLENOL) 325 MG TABLET    Take 650 mg by mouth every 6 hours as needed for Pain    ATORVASTATIN (LIPITOR) 20 MG TABLET    Take 10 mg by mouth nightly     CETIRIZINE (ZYRTEC) 10 MG TABLET    Take 1 tablet by mouth daily    FERROUS SULFATE (IRON 325) 325 (65 FE) MG TABLET    Take 1 tablet by mouth 2 times daily    FLUTICASONE (FLONASE) 50 MCG/ACT NASAL SPRAY    1 spray by Each Nostril route daily    FOLIC ACID (FOLVITE) 1 MG TABLET    Take 1 tablet by mouth once daily    GUAIFENESIN (MUCINEX) 600 MG EXTENDED RELEASE TABLET    Take 2 tablets by mouth 2 times daily    IPRATROPIUM-ALBUTEROL (DUONEB) 0.5-2.5 (3) MG/3ML SOLN NEBULIZER SOLUTION    Inhale 3 mLs into the lungs every 6 hours as needed for Shortness of Breath    MAGNESIUM OXIDE (MAG-OX) 400 MG TABLET    Take 1 tablet by mouth daily    MODAFINIL (PROVIGIL) 100 MG TABLET    TAKE 1 TABLET BY MOUTH ONCE DAILY IN THE MORNING    MONTELUKAST (SINGULAIR) 10 MG TABLET    Take 10 mg by mouth nightly Indications: Seasonal Allergy     ONDANSETRON (ZOFRAN ODT) 4 MG DISINTEGRATING TABLET    Take 1 tablet by mouth every 8 hours as needed for Nausea or Vomiting    PANTOPRAZOLE (PROTONIX) 40 MG TABLET Take 1 tablet by mouth every morning (before breakfast)    SERTRALINE (ZOLOFT) 100 MG TABLET    Take 200 mg by mouth daily     SPIRONOLACTONE (ALDACTONE) 25 MG TABLET    Take 1 tablet by mouth 2 times daily       ALLERGIES     Kiwi extract;  Hydromorphone hcl; Keytruda [pembrolizumab]; and Pineapple    FAMILY HISTORY       Family History   Problem Relation Age of Onset    High Blood Pressure Mother     Stroke Mother     Cancer Father         Lung Cancer          SOCIAL HISTORY       Social History     Socioeconomic History    Marital status:      Spouse name: None    Number of children: None    Years of education: None    Highest education level: None   Occupational History    None   Social Needs    Financial resource strain: None    Food insecurity     Worry: None     Inability: None    Transportation needs     Medical: None     Non-medical: None   Tobacco Use    Smoking status: Former Smoker     Last attempt to quit: 10/22/2016     Years since quitting: 3.8    Smokeless tobacco: Never Used    Tobacco comment: smoked since 15, quit a few times   Substance and Sexual Activity    Alcohol use: Not Currently     Comment: 2-3 glasses of wine    Drug use: No    Sexual activity: None   Lifestyle    Physical activity     Days per week: None     Minutes per session: None    Stress: None   Relationships    Social connections     Talks on phone: None     Gets together: None     Attends Rastafari service: None     Active member of club or organization: None     Attends meetings of clubs or organizations: None     Relationship status: None    Intimate partner violence     Fear of current or ex partner: None     Emotionally abused: None     Physically abused: None     Forced sexual activity: None   Other Topics Concern    None   Social History Narrative    None       SCREENINGS             PHYSICAL EXAM    (up to 7 for level 4, 8 or more for level 5)     ED Triage Vitals   BP Temp Temp src Pulse Resp following components:       Result Value    HCO3, Arterial 21.3 (*)     Base Excess, Arterial -3.4 (*)     Hemoglobin, Art, Extended 9.3 (*)     All other components within normal limits   CULTURE, BLOOD 1   CULTURE, BLOOD 2   LEGIONELLA ANTIGEN, URINE   CULTURE, RESPIRATORY   STREP PNEUMONIAE ANTIGEN   POTASSIUM, WHOLE BLOOD   COMPREHENSIVE METABOLIC PANEL   CBC WITH AUTO DIFFERENTIAL   LIPASE   PROTIME-INR   URINE RT REFLEX TO CULTURE   TROPONIN   BRAIN NATRIURETIC PEPTIDE   LACTIC ACID, PLASMA   COVID-19   COVID-19   COVID-19       All other labs were within normal range or not returned as of this dictation. EMERGENCY DEPARTMENT COURSE and DIFFERENTIALDIAGNOSIS/MDM:   Vitals: There were no vitals filed for this visit. MDM  Number of Diagnoses or Management Options  Acute on chronic congestive heart failure, unspecified heart failure type [facility]):   Bilateral pleural effusion:   Chronic anemia:   COPD with acute exacerbation ([facility] [address]):   Rash and other nonspecific skin eruption:   Respiratory distress:   Diagnosis management comments: Covid test indicated, delay in getting labs back and so end of my shift has come signing out to Dr Siri Partida. Until covid test and labs are back doesn't know if more pna vs fluid overload, copd exacerbation    Work up in progress Dr Siri Partida assumes care. Amount and/or Complexity of Data Reviewed  Clinical lab tests: ordered  Tests in the radiology section of CPT®: ordered          CONSULTS:  None    PROCEDURES:  Unless otherwise notedbelow, none     Procedures    FINAL IMPRESSION   No diagnosis found. DISPOSITION/PLAN   DISPOSITION        PATIENT REFERRED TO:  No follow-up provider specified.     DISCHARGE MEDICATIONS:  New Prescriptions    No medications on file          (Please note that portions of this note were completed with a voice recognition program.  Efforts were made to edit the dictations butoccasionally words are mis-transcribed.)    Desiree Miranda MD (electronically signed)  AttendingEmergency Physician         Monica Arellano MD  08/20/20 [id number]

## 2020-08-19 NOTE — PROGRESS NOTES
Patient:  Marilyn Barrientos  YOB: 1950  Date of Service: 8/19/2020  MRN: [id number]    Primary Care Physician: Deanna Lopez MD    Chief Complaint   Patient presents with    Lung Cancer     Non-small cell lung cancer, unspecified laterality       Patient Seen, Chart, Consults notes, Labs, Radiology studies reviewed. Subjective:  Esau Story is a 71-year-old  female managed in this office with the following primary and secondary diagnoses as follows:  · Resected, stage IB adenocarcinoma of the left lung on 1/06/2017. · Adjuvant cisplatinum/Alimta x 4 completed on 6/21/17. · A recurrent 2 x 2.6 x 2.6 cm high right frontal vertex mass was resected 1/15/2018- adjuvant SRS to the right frontal CNS was delivered  · Adjuvant Carboplatin, Alimta and Keytruda x 6 completed on 9/4/2018. · 1.3 cm right adrenal nodule - stable  · 1.9 cm right kidney cyst     Maintenance Alimta/Keytruda was initiated on 10/16/2018. Lamona Im went on and off Leveda Coyne that was eventually discontinued on 5/7/2019. She went on and off maintenance Alimta, and eventually discontinued single agent maintenance Alimta course #13 on 9/9/2019 due to poor tolerability, deconditioning and multiple hospital admissions.     Lamona Im comes today again in hospital followup accompanied by a caregiver Jaron Lopez for evaluation, assessment and opinion on a recent left axillary lymph node biopsy and repeat imaging studies. TARGET TUMOR SITE:  1. Resected LLL of lung 01/06/2017   2. Resected 2 x 2.6 x 2.6 cm mass from the high right frontal vertex of the brain 1/15/2018     TUMOR HISTORY: Resected, Stage IB moderately differentiated adenocarcinoma of left lung, pT2a, N0,M0. 01/06/2017. Ms. Esau Story was seen in initial Oncology consultation on 2/22/2017 f referred by Dr. Kevin Gomez with a diagnosis of a resected moderately differentiated adenocarcinoma of the left lung.    In October of 2016 Jennifer Dana presented to Dr. Opal Huang with complaints of hemoptysis for 2 months. She has a significant history of nicotine abuse but quit smoking in October 2016.     CT scan of chest on 10/31/2016 revealed a partially necrotic, noncalcified mass measuring 4.1 x 3.2 x 3.7 cm in the posterior segment of the LLL with associated pleural effusions. There was no evidence of mediastinal or hilar mass nor lymphadenopathy. A 2.2 cm nodule was noted in the upper pole of the right kidney.     A transthorasic needle biopsy on 11/22/2016 by Dr. Savi Tenorio revealed evidence of adenocarcinoma.     PET scan on 12/16/2016 revealed a mass in the LLL with hypermetabolic activity with a maximum SUV of 7. 1.      A chest x-ray on 1/4/2017 revealed a 3.8 cm mass lesion in the LLL that previously measured approximately 4.1 cm.      On 1/6/2017, a left thoracotomy with left lower lobectomy and mediastinal lymph node dissection was performed by Dr. Rozina Montaño. Pathology revealed invasive moderately differentiated adenocarcinoma. The tumor measured 4.5 cm. Margins were clear of invasive carcinoma and  no lymphovascular space invasion was noted. 5 of 5 lymph nodes were negative for metastatic carcinoma.      Bilateral renal ultrasounds on 2/28/2017 identified a complex cyst at the upper pole of the right kidney measuring up to 3 cm increased in size compared to a study on 12/28/2012 where it measured 2.1 cm in maximum diameter. Two small benign cysts were noted in the left kidney.      A CT scan of the abdomen and pelvis on 3/8/2017 confirmed that this was a simple cyst in the upper pole of the right kidney.      Adjuvant chemotherapy was discussed at her initial visit on 2/22/2017 as well as on followup visit 3/13/2017. She is agreeable and desires to proceed accordingly with adjuvant Cisplatinum and Alimta. Denise Ga desires however to postpone initiation of adjuvant chemotherapy until 4/11/2017.   Her wishes were respected and chemotherapy delivered as noted below.     CT scan of chest on 7/27/2017 revealed postsurgical changes  and no acute process. Calcified lymph nodes are present in the subcorneal region and left hilum     CT scan of abdomen and pelvis on 7/27/2017 revealed no intra-abdominal or pelvic abnormalities.        ----------------------------------------------------------------------------------------__________________________________________        RECURRENT LUNG CARCINOMA TO THE BRAIN 1/15/2018  She presented to [facility] on 12/18/2017 with progressive headache.      MRI of the brain with and without contrast at [facility] on 12/18/2017 revealed a 2 x 2.6 x 2.6 cm rim-enhancing irregular mass within the high right frontal vertex with surrounding vasogenic edema.      CT chest with contrast at [facility] 12/19/2017 revealed some paravertebral soft tissue nodules present at T8, T9, T10 with largest being 2.5 cm. These are stable compared to 7/27/2017 PET scan which were NOT metabolically active. I.e. no obvious metastatic lesions     CT abdomen and pelvis with contrast at [facility] 12/19/2017 was unrevealing for any obvious metastatic disease.     Bone scan at [facility] 12/19/2017 revealed focal areas of increased activity in the left seventh and 10th costovertebral junctions-indeterminate.     A right craniotomy by Dr. Elver Regalado on 1/15/2018 was performed with resection of the 2 x 2.6 x 2.6 cm high right frontal vertex mass   Pathology was consistent with metastatic adenocarcinoma with papillary features consistent with lung origin. Molecular testing on the 1/15/2018 pathology specimen was reported from [facility] on 3/15/2018 as follows:   EGFR -negative for mutation  ALK -negative for rearrangement  ROS 1 -negative for rearrangement   BRAF -negative for the [id number] mutation  PDL-1 - expression is 80% i.e. in Positive     MRI of the brain W & WO for SRS treatment planning purposes was performed on 3/6/2018 by Dr. Damaris Closs.  Postsurgical changes to the previous resection site from the right frontal lobe mass area with residual enhancement was noted.      A PET scan on 2/6/2018 did not reveal scintigraphic evidence of recurrent malignancy or metastatic disease.     MRI of the brain W & WO for [facility] treatment planning purposes was performed on 3/6/2018 by Dr. Lorena Camacho. Postsurgical changes to the previous resection site from the right frontal lobe mass area with residual enhancement was noted.     Diane underwent SRS with 1600 cGy in one single treatment fraction on 3/26/2018 to the right frontal CNS lobe by Dima Camacho and Edgard Awan. Delays in beginning systemic therapy included issues associated with her CNS treatment delivery and multiple dog bites on her arms and hands that required attention prior to starting systemic therapy. Diane received cycle #1 of chemotherapy with Alimta, carboplatin and Keytruda on 5/14/2018. Todd Varela completed cycle # 6 of carboplatin, Alimta and Keytruda on 9/4/2018.     MRI of the brain with and without on 9/12/2018 documented a stable 1.6 x 1.1 cm nodular enhancement along the medial aspect of the operative cavity. [place] was seen by Dr. Edgard Awan on 9/12/2018, who felt the MRI of the brain with stable without evidence of recurrence/progression.     A CT scan of the chest on 9/24/2018 did not reveal evidence of new or suspicious for urinary nodules nor intrathoracic lymphadenopathy to suggest recurrent disease in the chest.     She completed 6 cycles of Carboplatin, Alimta and Keytruda on 9/4/2018 and then went on to receive maintenance treatment with Alimta/Keytruda. Todd Varela reported experiencing a significant skin rash after receiving Keytruda on 11/27/2018.     [place] was held from 12/18/2018-1/29/2019 (x3 cycles) during which time Todd Varela was only receiving Alimta.     Mrs. Sarah Mishra had repeat CT scans of the chest, abdomen and pelvis on 1/2/2019 that suggested stable disease.   On 2/19/2019, Mrs. Giancarlo Eagle resumed the combination of maintenance treatment with Alimta/Keytruda without rash or problems.     Edith Lama was admitted to [facility] on 4/9/2019 with bilateral lower extremity cellulitis. She was discharged on 4/15/2019.     CT of the abdomen and pelvis with contrast at [facility] on 4/9/2019 was compared to a CT of the abdomen and pelvis from February 2013. It revealed a left-sided 3.2 x 2.2 cm paravertebral T10 soft tissue mass. 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 No evidence of acute posttraumatic injury to the brain.      CT scan of the abdomen and pelvis with contrast on 4/9/2019 documented no evidence of metastatic disease in the abdomen or pelvis. Paravertebral soft tissue masses at T10. Differential metastatic disease versus extra medullary hematopoiesis.     Maintenance Alimta/Keytruda was initiated on 10/16/2018. Edith Lama reported experiencing a significant skin rash after receiving Keytruda on 11/27/2018. Eugune Baston was held from 12/18/2018-1/29/2019 (x3 cycles) during which time Edith Lama was only receiving Alimta. Imaging studies on 1/2/2019 suggested stable disease.      Diane resumed maintenance Alimta/Keytruda 2/19/2019 - 5/7/1209.     Due to skin reaction manifestations, further maintenance Keytruda was not given after the 5/7/2019 dose.     Diane received course #11 of maintenance Alimta on 7/2/2019. Thereafter, maintenance Alimta was scheduled monthly. The last dose of maintenance Alimta, #13, was delivered on 9/9/2019 due to poor tolerability, deconditioning, multiple hospital admissions.     TREATMENT SUMMARY:   1. Left thoracotomy with left lower lobectomy and mediastinal lymph node dissection 01/06/2017 by Dr. Conchita Huston   2.  Adjuvant cisplatinum and every morning (before breakfast) 30 tablet 3    acetaminophen (TYLENOL) 325 MG tablet Take 650 mg by mouth every 6 hours as needed for Pain      cetirizine (ZYRTEC) 10 MG tablet Take 1 tablet by mouth daily 1 tablet 0    atorvastatin (LIPITOR) 20 MG tablet Take 10 mg by mouth nightly       montelukast (SINGULAIR) 10 MG tablet Take 10 mg by mouth nightly Indications: Seasonal Allergy       sertraline (ZOLOFT) 100 MG tablet Take 200 mg by mouth daily        No current facility-administered medications for this visit.         Past Medical History:      Diagnosis Date    Anxiety     Arthritis     Bowel obstruction (HCC)     adhesions with colectomy/colostomy    Cancer (HCC)     lung LLL ,  brain    Colostomy in place [facility])     Concussion with no loss of consciousness     COPD (chronic obstructive pulmonary disease) (HCC)     Cyst of kidney, acquired 8/3/2019    Depression     Hernia     History of blood transfusion     History of broken collarbone     Hyperlipidemia     Palliative care patient 09/16/2019    Seasonal allergies         Past Surgical History:      Procedure Laterality Date    ABDOMEN SURGERY      bowel blockage from scar tissue    COLONOSCOPY      COLOSTOMY      still has    CRANIOTOMY Right 01/15/2018    Dr. Mora Loaiza, COLON, DIAGNOSTIC      FRACTURE SURGERY      left arm and elbow and finger    HERNIA REPAIR      HYSTERECTOMY      LOBECTOMY Left 1/6/2017    LEFT THORACOTOMY WITH LOBECTOMY  performed by Lucretia Nageotte, MD at [address] TUNNELED CTR VAD W/SUBQ PORT AGE 4 YR/> N/A 7/3/2018    SINGLE LUMEN PORT INSERTION performed by Tree Lucas MD at [address] [place] [address]          Family History:      Problem Relation Age of Onset    High Blood Pressure Mother     Stroke Mother     Cancer Father         Lung Cancer        Social History  Social History     Tobacco Use    Smoking status: Former Smoker     Last attempt to quit: 10/22/2016     Years since quitting: 3.8    Smokeless tobacco: Never Used    Tobacco comment: smoked since 15, quit a few times   Substance Use Topics    Alcohol use: Not Currently     Comment: 2-3 glasses of wine    Drug use: No          Review of Systems:  Constitutional: Negative for chills, fatigue, fever or significant weight loss. HENT: Negative for congestion, hearing loss, nosebleeds or sore throat. Eyes: Negative for photophobia, pain, discharge, redness and visual disturbance. Respiratory: Negative for cough, shortness of breath, or wheezing. Cardiovascular: Negative for chest pain, palpitations or leg swelling. Gastrointestinal: Negative for abdominal pain, blood in stool, constipation, diarrhea, nausea or vomiting. Genitourinary: Negative for dysuria, flank pain, frequency, hematuria or urgency. Musculoskeletal: Negative for back pain, joint swelling, myalgias or neck pain. Skin: Negative for rash or petechiae. Neurological: Negative for tremors, seizures, syncope, weakness or headaches. Hematological: No active bruising or bleeding. Psychiatric/Behavioral: Negative for hallucinations. Objective:  Vital Signs: Blood pressure (!) 92/58, pulse 105, temperature 97.5 °F (36.4 °C), temperature source Temporal, height 5' 4\" (1.626 m), weight 120 lb (54.4 kg), SpO2 92 %. Physical Exam   Constitutional: Oriented to person, place, and time. No acute distress. Head: Normocephalic and atraumatic. Nose: Nose normal.   Mouth/Throat: Oropharynx is clear and moist. No oropharyngeal exudate. Eyes: Pupils are equal and round. Conjunctivae and EOM are normal. No scleral icterus. Neck: Normal range of motion. Neck supple. No JVD. No appreciable thyromegaly. Cardiovascular: Normal rate, regular rhythm, normal heart sounds and intact distal pulses. Exam reveals no gallop, murmurs or friction rub.    Pulmonary/Chest: Effort normal and breath sounds normal. No respiratory distress. No wheezes. Abdominal: Soft. Bowel sounds are normal. No organomegally or masses. No tenderness. There is no rebound and no guarding. Musculoskeletal: Normal range of motion. No edema or tenderness. Lymphadenopathy: No cervical, axillary or inguinal lymphadenopathy. Neurological: Alert and oriented to person, place, and time. Cranial nerves are intact. Neurological exam is nonfocal  Skin: Skin is warm and dry. No rash noted. No erythema. No pallor. Psychiatric: Judgment normal.          Labs:  BMP: No results for input(s): NA, K, CL, CO2, PHOS, BUN, CREATININE, CALCIUM in the last 72 hours. CBC:   Recent Labs     08/19/20  1034   WBC 8.67   HGB 9.1*   HCT 31.4*   MCV 84.2        PT/INR: No results for input(s): PROTIME, INR in the last 72 hours. APTT: No results for input(s): APTT in the last 72 hours. Magnesium:No results for input(s): MG in the last 72 hours. Phosphorus:No results for input(s): PHOS in the last 72 hours. Hepatic: No results for input(s): ALKPHOS, ALT, AST, PROT, BILITOT, BILIDIR, LABALBU in the last 72 hours. Cultures:   No results for input(s): CULTURE in the last 72 hours. Radiology reports as per the Radiologist  Radiology: No results found. ASSESSMENT AND PLAN:  #1  Esau Story is a 21-year-old  female managed in this office with the following primary and secondary diagnoses as follows:  · Resected, stage IB adenocarcinoma of the left lung on 1/06/2017. · Adjuvant cisplatinum/Alimta x 4 completed on 6/21/17. · A recurrent 2 x 2.6 x 2.6 cm high right frontal vertex mass was resected 1/15/2018- adjuvant SRS to the right frontal CNS was delivered  · Adjuvant Carboplatin, Alimta and Keytruda x 6 completed on 9/4/2018. · 1.3 cm right adrenal nodule - stable  · 1.9 cm right kidney cyst     Maintenance Alimta/Keytruda was initiated on 10/16/2018.     Lamona Im went on and off Bryn Coyne that was eventually discontinued on 5/7/2019. She went on and off maintenance Alimta, and eventually discontinued single agent maintenance Alimta course #13 on 9/9/2019 due to poor tolerability, deconditioning and multiple hospital admissions.     Lamona Im comes today again in hospital followup accompanied by a caregiver Jaron Lopez for evaluation, assessment and opinion on a recent left axillary lymph node biopsy and repeat imaging studies. Physical examination: Temperature on admission to the facility was ~97.5, but He feels warm, her temperature will be taken with a thermometer. Temperature recheck was 99.7  On examination of her skin, her hands and fingers have thickened redness mildly edematous on the dorsal aspects of her hands as well as a few small 1 to 2 mm erythematous spots here and there on her arms and legs. Her legs have some discoloration with erythema and edema of both dorsal aspects of her feet, her toes. She comes without her socks or shoes on today in a wheelchair. Lungs have audible wheezing and rales on inspiration and expiration. She sounds wet. This is not her baseline, she sounds fluid overloaded. Heart has a mildly tachycardic rate normal S1 and S2, I am unable to auscultate an S3  Abdominal exam is benign. CBC 5/14/2020 revealed a WBC of 9.94. Hgb is 9.8 with an MCV of 81.9 and platelet count of 700,925. CBC today (8/19/2020) reveals a WBC of 8.67. Hgb is 9.1 with an MCV of 84.2 and platelet count of 709,180. The CBC is relatively stable although persistently with anemia. Imaging studies done prior to today's visit are as follows:    US renal on 5/7/2020 documented:  · 1.2 x 1.7 x 1.9 cm hypoechoic cyst is present upper pole the right kidney  · Small 6 x 9 x 6 mm cyst is present lateral cortex of the left kidney near the lower pole  · Ascites fluid is present around the liver and spleen    The ultrasound of the kidney only shows cysts.   The right adrenal gland nodule measuring 1.3 cm is stable. CT chest without contrast on 7/21/2020 was compared to 10/20/2019 and documented:  · RIGHT middle and lower lobe consolidation, likely resenting pneumonia. Adjacent exudative RIGHT pleural effusion. Recommend follow-up to exclude developing empyema. · Small LEFT pleural effusion. · Moderate-severe emphysema. · Multiple newly enlarged axillary, supraclavicular, and mediastinal lymph nodes. · Partial imaged splenomegaly. · Diffuse body wall edema/anasarca. · Redemonstrated similar-appearing paraspinal soft tissue masses which may be related to extramedullary hematopoiesis      US-guided FNA and core biopsy of the 1.4 cm left axillary lymph node was performed on 8/12/2020. Pathology revealed:  · Benign lymphoid tissue and fibro connective tissue  · No evidence of metastatic disease   Cytology revealed:   · Negative for malignant cells  · Polymorphous lymphocyte population    CT chest with contrast on 8/14/2020 was compared to 7/21/2020 and documented:  · 1.5 cm lymph node anterior to the joel, previously 1.2 cm   · 8 mm lymph node just to the left of the aortic arch, previously 6 mm   · New lymph nodes in the axillary regions bilaterally left greater than right  · Multiple paraspinal masses appear relatively stable and may be related to extra medullary hematopoiesis  · Infiltrates in the right middle and right lower lobes are improved compared to the prior study. There are linear opacities in both lungs likely due to atelectasis. · Centrilobular emphysema. Dilated pulmonary artery suggesting pulmonary hypertension    CT abdomen and pelvis with contrast on 8/14/2020 was compared to 12/30/2019 and documented:  · Loculated appearing effusion within the right lateral lung base. Thickening of the visceral and parietal pleura as well as peripheral consolidation within the right lower lobe. A smaller left-sided effusion is present with bibasilar atelectasis.   · Paraspinal masses are stable from the previous study, could represent metastatic disease or schwannomas  · Persistent splenomegaly, 11.7 x 8.5 cm   · Adrenal glands remain enlarged with a small right adrenal nodule present. · Cortical cysts of the kidneys. No evidence of obstructive uropathy. · Moderate constipation. A left lower quadrant colostomy is present. . No evidence of obstruction. There is retained fecal material within the Kim's pouch. · Rather diffuse third spacing of fluid/anasarca with diffuse induration of the subcutaneous fat as well as induration of the intraperitoneal fat. This could be related to hypoalbuminemia    There is no evidence of obvious or overt recurrence or progression of her lung cancer. The biopsy of the left axilla lymph node is negative for malignancy. From the lung cancer standpoint, she will be monitored conservatively and I will repeat CT scans of the chest in 2 months with a follow-up thereafter. #2  From the pulmonary standpoint, He is having difficulty breathing. A bit tachypneic although in no acute distress. She has rales rhonchi wheezing and sounds wet on auscultation. I placed a call and spoke to Dr. SAMSON who agreed with transfer to the emergency department at [facility] for evaluation and possible admission. She would be happy to admit Shakira Springer if she requires admission. An ambulance was called. Karla Louis am scribing for Sonny Alicea MD. Electronically signed by Jayesh Briceno LPN on 4/39/9303 at 96:93 PM       Hussain Jaramillo was seen today for lung cancer. Diagnoses and all orders for this visit:    Malignant neoplasm of lower lobe of left lung ([address]  -     [address]; Future    Secondary malignant neoplasm of brain [facility])  -     CT CHEST W CONTRAST;  Future    Adrenal nodule (HCC)    Cyst of right kidney    Cyst of kidney, acquired    Follow-up    Orders Placed This Encounter   Procedures    CT CHEST W CONTRAST     Standing Status:   Future     Standing Expiration Date:   10/19/2020     Order Specific Question:   Reason for exam:     Answer:   metastasis screening       No orders of the defined types were placed in this encounter. Return in about 10 weeks (around 10/28/2020) for F/U WITH DR Ophelia Patton. I, Dr. Faith Oakes, personally performed the services described in this documentation as scribed by [facility] LPN in my presence, and it is both accurate and complete.

## 2020-08-19 NOTE — PROGRESS NOTES
Contains abnormal data  Blood Gas, Arterial   Status:  Final result    Ref Range & Units  08/19/20 1409   pH, Arterial  7.350 - 7.450  7.380    pCO2, Arterial  35.0 - 45.0 mmHg  36.0    pO2, Arterial  80.0 - 100.0 mmHg  90.0    HCO3, Arterial  22.0 - 26.0 mmol/L  21.3Low      Base Excess, Arterial  -2.0 - 2.0 mmol/L  -3.4Low      Hemoglobin, Art, Extended  12.0 - 16.0 g/dL  9.3Low      O2 Sat, Arterial  >92 %  95.8    Carboxyhgb, Arterial  0.0 - 5.0 %  2.4    Comment:      0.0-1.5   (Smokers 1.5-5.0)    Methemoglobin, Arterial  <1.5 %  0.8    O2 Content, Arterial  Not Established mL/dL  12.7    O2 Therapy   Unknown    Resulting Agency   [address] [facility]         Specimen Collected: 08/19/20 1409  Last Resulted: 08/19/20 1409  View Other Order Details         Pt on 3 lpm NC RR 28 site RB

## 2020-08-20 LAB
ANION GAP SERPL CALCULATED.3IONS-SCNC: 18 MMOL/L (ref 7–19)
BUN BLDV-MCNC: 17 MG/DL (ref 8–23)
CALCIUM SERPL-MCNC: 9.2 MG/DL (ref 8.8–10.2)
CHLORIDE BLD-SCNC: 101 MMOL/L (ref 98–111)
CO2: 21 MMOL/L (ref 22–29)
CREAT SERPL-MCNC: 0.9 MG/DL (ref 0.5–0.9)
GFR AFRICAN AMERICAN: >59
GFR NON-AFRICAN AMERICAN: >60
GLUCOSE BLD-MCNC: 147 MG/DL (ref 70–99)
GLUCOSE BLD-MCNC: 162 MG/DL (ref 70–99)
GLUCOSE BLD-MCNC: 166 MG/DL (ref 70–99)
GLUCOSE BLD-MCNC: 229 MG/DL (ref 70–99)
GLUCOSE BLD-MCNC: 94 MG/DL (ref 74–109)
LV EF: 58 %
LVEF MODALITY: NORMAL
PERFORMED ON: ABNORMAL
POTASSIUM SERPL-SCNC: 3.7 MMOL/L (ref 3.5–5)
SODIUM BLD-SCNC: 140 MMOL/L (ref 136–145)

## 2020-08-20 PROCEDURE — 92610 EVALUATE SWALLOWING FUNCTION: CPT

## 2020-08-20 PROCEDURE — 36415 COLL VENOUS BLD VENIPUNCTURE: CPT

## 2020-08-20 PROCEDURE — 80048 BASIC METABOLIC PNL TOTAL CA: CPT

## 2020-08-20 PROCEDURE — 2140000000 HC CCU INTERMEDIATE R&B

## 2020-08-20 PROCEDURE — 82947 ASSAY GLUCOSE BLOOD QUANT: CPT

## 2020-08-20 PROCEDURE — 93306 TTE W/DOPPLER COMPLETE: CPT

## 2020-08-20 PROCEDURE — 2580000003 HC RX 258: Performed by: FAMILY MEDICINE

## 2020-08-20 PROCEDURE — 6370000000 HC RX 637 (ALT 250 FOR IP): Performed by: FAMILY MEDICINE

## 2020-08-20 PROCEDURE — 92522 EVALUATE SPEECH PRODUCTION: CPT

## 2020-08-20 PROCEDURE — 94640 AIRWAY INHALATION TREATMENT: CPT

## 2020-08-20 PROCEDURE — 2700000000 HC OXYGEN THERAPY PER DAY

## 2020-08-20 PROCEDURE — 6360000002 HC RX W HCPCS: Performed by: FAMILY MEDICINE

## 2020-08-20 RX ORDER — METHYLPREDNISOLONE SODIUM SUCCINATE 40 MG/ML
40 INJECTION, POWDER, LYOPHILIZED, FOR SOLUTION INTRAMUSCULAR; INTRAVENOUS EVERY 8 HOURS
Status: DISCONTINUED | OUTPATIENT
Start: 2020-08-20 | End: 2020-08-23

## 2020-08-20 RX ADMIN — BUSPIRONE HYDROCHLORIDE 15 MG: 15 TABLET ORAL at 23:39

## 2020-08-20 RX ADMIN — METHYLPREDNISOLONE SODIUM SUCCINATE 40 MG: 40 INJECTION, POWDER, FOR SOLUTION INTRAMUSCULAR; INTRAVENOUS at 10:42

## 2020-08-20 RX ADMIN — IPRATROPIUM BROMIDE AND ALBUTEROL SULFATE 1 AMPULE: .5; 3 SOLUTION RESPIRATORY (INHALATION) at 13:50

## 2020-08-20 RX ADMIN — ACETAMINOPHEN 650 MG: 325 TABLET, FILM COATED ORAL at 23:38

## 2020-08-20 RX ADMIN — METHYLPREDNISOLONE SODIUM SUCCINATE 40 MG: 40 INJECTION, POWDER, FOR SOLUTION INTRAMUSCULAR; INTRAVENOUS at 17:27

## 2020-08-20 RX ADMIN — GUAIFENESIN 1200 MG: 600 TABLET, EXTENDED RELEASE ORAL at 10:42

## 2020-08-20 RX ADMIN — Medication 400 MG: at 10:42

## 2020-08-20 RX ADMIN — IPRATROPIUM BROMIDE AND ALBUTEROL SULFATE 1 AMPULE: .5; 3 SOLUTION RESPIRATORY (INHALATION) at 18:47

## 2020-08-20 RX ADMIN — BUSPIRONE HYDROCHLORIDE 15 MG: 15 TABLET ORAL at 00:06

## 2020-08-20 RX ADMIN — SPIRONOLACTONE 25 MG: 25 TABLET ORAL at 17:27

## 2020-08-20 RX ADMIN — AZITHROMYCIN DIHYDRATE 500 MG: 500 INJECTION, POWDER, LYOPHILIZED, FOR SOLUTION INTRAVENOUS at 10:42

## 2020-08-20 RX ADMIN — FUROSEMIDE 40 MG: 10 INJECTION, SOLUTION INTRAMUSCULAR; INTRAVENOUS at 10:43

## 2020-08-20 RX ADMIN — BUSPIRONE HYDROCHLORIDE 15 MG: 15 TABLET ORAL at 10:42

## 2020-08-20 RX ADMIN — MONTELUKAST SODIUM 10 MG: 10 TABLET, FILM COATED ORAL at 23:39

## 2020-08-20 RX ADMIN — SODIUM CHLORIDE, PRESERVATIVE FREE 10 ML: 5 INJECTION INTRAVENOUS at 23:40

## 2020-08-20 RX ADMIN — IPRATROPIUM BROMIDE AND ALBUTEROL SULFATE 1 AMPULE: .5; 3 SOLUTION RESPIRATORY (INHALATION) at 10:21

## 2020-08-20 RX ADMIN — SODIUM CHLORIDE, PRESERVATIVE FREE 1 G: 5 INJECTION INTRAVENOUS at 10:43

## 2020-08-20 RX ADMIN — MONTELUKAST SODIUM 10 MG: 10 TABLET, FILM COATED ORAL at 00:06

## 2020-08-20 RX ADMIN — GUAIFENESIN 1200 MG: 600 TABLET, EXTENDED RELEASE ORAL at 23:39

## 2020-08-20 RX ADMIN — FLUTICASONE PROPIONATE 1 SPRAY: 50 SPRAY, METERED NASAL at 10:43

## 2020-08-20 RX ADMIN — MODAFINIL 100 MG: 100 TABLET ORAL at 10:42

## 2020-08-20 RX ADMIN — SODIUM CHLORIDE, PRESERVATIVE FREE 10 ML: 5 INJECTION INTRAVENOUS at 10:43

## 2020-08-20 RX ADMIN — IPRATROPIUM BROMIDE AND ALBUTEROL SULFATE 1 AMPULE: .5; 3 SOLUTION RESPIRATORY (INHALATION) at 06:47

## 2020-08-20 RX ADMIN — FUROSEMIDE 40 MG: 10 INJECTION, SOLUTION INTRAMUSCULAR; INTRAVENOUS at 17:27

## 2020-08-20 RX ADMIN — GUAIFENESIN 1200 MG: 600 TABLET, EXTENDED RELEASE ORAL at 00:06

## 2020-08-20 RX ADMIN — SPIRONOLACTONE 25 MG: 25 TABLET ORAL at 10:42

## 2020-08-20 RX ADMIN — FOLIC ACID 1 MG: 1 TABLET ORAL at 10:42

## 2020-08-20 RX ADMIN — ACETAMINOPHEN 650 MG: 325 TABLET, FILM COATED ORAL at 12:04

## 2020-08-20 RX ADMIN — SODIUM CHLORIDE, PRESERVATIVE FREE 10 ML: 5 INJECTION INTRAVENOUS at 00:06

## 2020-08-20 RX ADMIN — SERTRALINE HYDROCHLORIDE 200 MG: 100 TABLET ORAL at 10:42

## 2020-08-20 RX ADMIN — ENOXAPARIN SODIUM 40 MG: 40 INJECTION SUBCUTANEOUS at 23:38

## 2020-08-20 RX ADMIN — ENOXAPARIN SODIUM 40 MG: 40 INJECTION SUBCUTANEOUS at 00:06

## 2020-08-20 ASSESSMENT — PAIN SCALES - GENERAL
PAINLEVEL_OUTOF10: 3
PAINLEVEL_OUTOF10: 4
PAINLEVEL_OUTOF10: 0
PAINLEVEL_OUTOF10: 6
PAINLEVEL_OUTOF10: 0

## 2020-08-20 ASSESSMENT — PAIN - FUNCTIONAL ASSESSMENT: PAIN_FUNCTIONAL_ASSESSMENT: ACTIVITIES ARE NOT PREVENTED

## 2020-08-20 ASSESSMENT — PAIN DESCRIPTION - LOCATION: LOCATION: GENERALIZED

## 2020-08-20 ASSESSMENT — PAIN DESCRIPTION - ONSET: ONSET: ON-GOING

## 2020-08-20 ASSESSMENT — PAIN DESCRIPTION - PAIN TYPE: TYPE: ACUTE PAIN

## 2020-08-20 ASSESSMENT — PAIN DESCRIPTION - PROGRESSION: CLINICAL_PROGRESSION: GRADUALLY IMPROVING

## 2020-08-20 ASSESSMENT — PAIN DESCRIPTION - DIRECTION: RADIATING_TOWARDS: GENERALIZED

## 2020-08-20 ASSESSMENT — PAIN DESCRIPTION - DESCRIPTORS: DESCRIPTORS: ACHING;DISCOMFORT

## 2020-08-20 ASSESSMENT — PAIN DESCRIPTION - FREQUENCY: FREQUENCY: INTERMITTENT

## 2020-08-20 NOTE — PROGRESS NOTES
Spoke to Carine Fish, and Bud Gaines who are healthcare decision makers about pt status, attempted to get information to complete admission and code status, they state she is a DNR CC. They also stated she has been on amBX, contacted [facility] to get information on pt and they could not find her in the system. Spoke to [facility]. Will contact Dr. Namita Vail office.

## 2020-08-20 NOTE — CONSULTS
Pt's new 2D echo from today 8/20 shows EF of 98-44% with no diastolic dysfunction. Pt is not a CHF clinic pt. Teaching would also be inappropriate.

## 2020-08-20 NOTE — PROGRESS NOTES
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    Date of Eval: 2020  Evaluating Therapist: Uma Suresh    Reason for Referral  Shiva Spangler was referred for a bedside swallow evaluation to assess the efficiency of her swallow function, identify signs and symptoms of aspiration and make recommendations regarding safe dietary consistencies, effective compensatory strategies, and safe eating environment. Impression  Assessed patient's swallowing function. Patient exhibits slow, decreased oral prep of more solid consistencies, inconsistently slow oral transit and suspected swallow delay with even the slowest moving food/drink consistencies possible, and sluggish, inconsistently mild-moderately decreased laryngeal elevation for swallow airway protection. No outward S/S penetration/aspiration was noted with any puree consistency trial or honey thick liquid trial administered during evaluation this date. Mild delayed coughs were observed with ice chip trials, regular solid consistency trials, and nectar thick liquid trials.  It is noted that patient exhibited frequent tendency of speaking with trials still in the mouth. At this time, would trial bite sized consistency with honey thick liquids. NO STRAWS. Recommend meds crushed in pudding/applesauce. If patient receives mouth care prior to intake, okay for ice chips IN BETWEEN MEALS for comfort. Will continue to follow. Thank you for this consult. Treatment Plan  Requires SLP Intervention: Yes     Recommended Diet and Intervention  Diet Solids Recommendation: Bite sized  Liquid Consistency Recommendation: Honey thick   Recommended Form of Meds: Meds crushed in puree as able  Therapeutic Interventions: Patient/Family education;Diet tolerance monitoring; Therapeutic PO trials with SLP     Compensatory Swallowing Strategies  Compensatory Swallowing Strategies: Upright as possible for all oral intake;Small bites/sips;Eat/Feed slowly; Alternate solids and liquids; Remain upright for 30-45 minutes after meals     Treatment/Goals  Timeframe for Short-term Goals: 1x/day for 3 days   Goal 1: Patient will tolerate bite sized consistency and honey thick liquids with min S/S penetration/aspiration during PO intake. Goal 2: Patient staff will follow swallow safety recommendations to decrease risk of penetration/aspiration during PO intake. Goal 3: Re-assessment of swallow function for potential diet upgrade. Goal 4: Monitor speech production. General  Chart Reviewed: Yes  Behavior/Cognition: Alert; Cooperative  O2 Device: Nasal Cannula   Communication Observation: (Assessed patient's speech production. Patient exhibits decreased volume of speech, decreased labial movements, and slow, decreased lingual movements during verbalizations. SLP ranked functional intelligibility for unfamiliar listeners at 70-80% in utterances without background noise present.)  Follows Directions: Simple, with provision of cues/prompts.     Dentition: Poor   Patient Positioning: Upright in bed  Consistencies Administered: Ice chips;Dysphagia Pureed (Dysphagia I); Regular solid; Honey - cup;Nectar - cup    Oral Motor Examination   Labial ROM: (Adequate during labial retraction trials and labial protrusion trials.)  Labial Strength: (Adequate during labial compression trials.)  Labial Coordination: (Slowed volitional movements were noted.)  Lingual ROM: (Decreased during lingual extension trials with no full point achieved; decreased during lingual elevation trials without use of accessory jaw movement; adequate movements noted bilaterally.)  Lingual Strength: (Decreased with fasciculation noted during lingual extension trials.)  Lingual Coordination: (Slowed movements were noted.)     Assessed patient's swallowing function with the following observations noted:     Oral Phase  Mastication: Ice chips;Puree;Regular solid (Patient exhibited slow oral prep with primarily vertical jaw movement noted at the front of the mouth during ice chip trials and puree consistency trials-presented by SLP- and regular solid consistency trials presented independently.)  Suspected Premature Bolus Loss: Ice chips;Puree;Regular solid; Honey - cup;Nectar - cup (Oral transit of ice chip trials, puree consistency trials, and regular solid consistency trials varied from 1-3 seconds in length. Oral transit of honey thick liquid trials and nectar thick liquid trials, all presented via cup by SLP, varied from 1-3 seconds in length.)  Oral Phase - Comment: No puree consistency residue was noted post swallows. Min regular solid consistency residue was observed post swallows; residue cleared from the mouth with additional dry swallows. It is noted that patient exhibited frequent tendency of speaking with boluses still in the mouth. Pharyngeal Phase  Suspected Swallow Delay: Ice chips;Puree;Regular solid; Honey - cup;Nectar - cup (Suspect secondary to oral transit times.)  Decreased Laryngeal Elevation: (Patient exhibited sluggish, inconsistently mild-moderately decreased laryngeal elevation for swallow airway protection.)  Delayed Cough: Ice chips;Regular solid; Nectar - cup   Pharyngeal: No outward S/S penetration/aspiration was noted with any puree consistency trial or honey thick liquid trial administered during assessment this date. Mild delayed coughs were observed with ice chip trials, regular solid consistency trials, and nectar thick liquid trials. At this time, would trial bite sized consistency with honey thick liquids. NO STRAWS. Recommend meds crushed in pudding/applesauce. If patient receives mouth care prior to intake, okay for ice chips IN BETWEEN MEALS for comfort. Will continue to follow.     Electronically signed by NEGIN Adler on 8/20/2020 at 1:02 PM

## 2020-08-20 NOTE — PROGRESS NOTES
Consult received. Spoke with the pt in her room. She is alert but somewhat confused. She states she has been living at home and not in a snf. She denies pain. She c/o soa. The pts nurse is present. [facility] Hospice services were explained . Placed a call to the pts dtr. The pts spouse was also present on the call. Hospice services were explained. They state they would like for the pt to go to the hospice care center if/ when she qualifies if not they do want hospice to follow the pt at the SNF when dc'd. Emotional and sc support provided.

## 2020-08-20 NOTE — PROGRESS NOTES
4 Eyes Skin Assessment    Alverto Valverde is being assessed upon: Admission    I agree that I, Андрей Lopez, along with Rahul Camejo RN (either 2 RN's or 1 LPN and 1 RN) have performed a thorough Head to Toe Skin Assessment on the patient. ALL assessment sites listed below have been assessed. Areas assessed by both nurses:     [x]   Head, Face, and Ears   [x]   Shoulders, Back, and Chest  [x]   Arms, Elbows, and Hands   [x]   Coccyx, Sacrum, and Ischium  [x]   Legs, Feet, and Heels    Does the Patient have Skin Breakdown?  No    Russell Prevention initiated: Yes  Wound Care Orders initiated: No    WOC nurse consulted for Pressure Injury (Stage 3,4, Unstageable, DTI, NWPT, and Complex wounds) and New or Established Ostomies: Yes        Primary Nurse eSignature: Андрей Lopez RN on 8/20/2020 at 2:11 AM      Co-Signer eSignature: Electronically signed by Ragini Sifuentes RN on 8/20/20 at 2:12 AM CDT

## 2020-08-20 NOTE — PROGRESS NOTES
Shiva Spangler arrived to room # 725. Presented with: CHF   Mental Status: Patient is alert. Vitals:    08/19/20 2115   BP:    Pulse:    Resp: 21   Temp:    SpO2: 91%     Patient safety contract and falls prevention contract reviewed with patient   Oriented Patient to room. Call light within reach. Yes.   Needs, issues or concerns expressed at this time: no.      Electronically signed by Alexa Appiah RN on 8/20/2020 at 2:11 AM

## 2020-08-20 NOTE — ED PROVIDER NOTES
[address]      Pt Name: Alverto Valverde  MRN: [id number]  [place] 1950  Date of evaluation: 8/19/2020  Provider: Courtney Cruz MD    [address] [place] [address]       Chief Complaint   Patient presents with    Shortness of Breath    COPD         PHYSICAL EXAM    (up to 7 for level 4, 8 or more for level 5)     ED Triage Vitals   BP Temp Temp Source Pulse Resp SpO2 Height Weight   08/19/20 1815 08/19/20 1815 08/19/20 2037 08/19/20 1815 08/19/20 1815 08/19/20 1815 -- 08/19/20 2037   (!) 150/98 98.2 °F (36.8 °C) Temporal 101 30 92 %  126 lb 12.8 oz (57.5 kg)       Physical Exam    DIAGNOSTIC RESULTS     EKG: All EKG's are interpreted by the Emergency Department Physician who either signs or Co-signs this chart in the absence of a cardiologist.        RADIOLOGY:   Non-plain film images such as CT, Ultrasound and MRI are read by the radiologist. [facility] radiographicimages are visualized and preliminarily interpreted by the emergency physician with the below findings:        XR CHEST PORTABLE   Final Result   1. Similar size small bilateral pleural fluid collections with   improving right lower lobe opacities compared to 7/20/2020. Residual   peripheral right basilar densities. Diffuse interstitial densities may   indicate underlying edema. Stable mild cardiomegaly with left   subclavian port. .   Signed by Dr Brena Burkitt on 8/19/2020 2:19 PM              LABS:  Labs Reviewed   COMPREHENSIVE METABOLIC PANEL - Abnormal; Notable for the following components:       Result Value    CO2 20 (*)     Alkaline Phosphatase 162 (*)     All other components within normal limits   CBC WITH AUTO DIFFERENTIAL - Abnormal; Notable for the following components:    RBC 3.66 (*)     Hemoglobin 8.8 (*)     Hematocrit 29.0 (*)     MCV 79.2 (*)     MCH 24.0 (*)     MCHC 30.3 (*)     RDW 17.3 (*)     Neutrophils % 79.9 (*)     Lymphocytes % 10.1 (*)     Lymphocytes Absolute 0.8 (*)     All other components within normal limits   LIPASE - Abnormal; Notable for the following components:    Lipase 11 (*)     All other components within normal limits   PROTIME-INR - Abnormal; Notable for the following components:    Protime 16.1 (*)     INR 1.29 (*)     All other components within normal limits   BRAIN NATRIURETIC PEPTIDE - Abnormal; Notable for the following components:    Pro-BNP 10,979 (*)     All other components within normal limits   BLOOD GAS, ARTERIAL - Abnormal; Notable for the following components:    HCO3, Arterial 21.3 (*)     Base Excess, Arterial -3.4 (*)     Hemoglobin, Art, Extended 9.3 (*)     All other components within normal limits   CULTURE, BLOOD 1   CULTURE, BLOOD 2   LEGIONELLA ANTIGEN, URINE   CULTURE, RESPIRATORY   STREP PNEUMONIAE ANTIGEN   TROPONIN   LACTIC ACID, PLASMA   COVID-19   POTASSIUM, WHOLE BLOOD   URINE RT REFLEX TO CULTURE   BASIC METABOLIC PANEL       All other labs were within normal range or not returned as of this dictation. EMERGENCY DEPARTMENT COURSE and DIFFERENTIALDIAGNOSIS/MDM:   Vitals:    Vitals:    08/19/20 1815 08/19/20 1829 08/19/20 1832 08/19/20 2037   BP: (!) 150/98 131/74 119/74 (!) 120/90   Pulse: 101   105   Resp: 30   20   Temp: 98.2 °F (36.8 °C)   98.9 °F (37.2 °C)   TempSrc:    Temporal   SpO2: 92% (!) 89% 90% 90%   Weight:    126 lb 12.8 oz (57.5 kg)       MDM    Reassessment      CONSULTS:  None    PROCEDURES:  Unless otherwise noted below, none     Procedures    ED Course as of Aug 19 2312   Wed Aug 19, 2020   1846 Patient received in checkout from Dr. Kaz Thornton pending further work-up after patient presented here with worsening shortness of breath. Work-up shows negative COVID swab with elevated BNP. Chest x-ray consistent with fluid overload. Patient has normal white blood cell count no other signs of underlying infection. Patient also noted to have nonblanching erythematous patchy rash to bilateral upper and lower extremities.   Looks more

## 2020-08-20 NOTE — CONSULTS
Palliative care initiated:  Pt recently here 7/20-7/24/20. Dc'd to SNF([facility]). Pt caregiver, Edgard May is at bedside. Pt is alert and oriented x 3. Reviewed her visit with hospice chaplain. She is open to hospice services. Pt would like to go home with hospice and CG in agreement. However, I believe note from [facility]  states hospice services in facility on DC. Pt has good family/CG support. Denies pain. Audible congestion. RT here to administer neb tx. Palliative will continue to follow for supportive care. Goal is to admit to hospice on dc at [facility].   Electronically signed by Adan Hendricks RN on 8/20/2020 at 1:59 PM

## 2020-08-20 NOTE — H&P
IN THE MORNING  ipratropium-albuterol (DUONEB) 0.5-2.5 (3) MG/3ML SOLN nebulizer solution, Inhale 3 mLs into the lungs every 6 hours as needed for Shortness of Breath  guaiFENesin (MUCINEX) 600 MG extended release tablet, Take 2 tablets by mouth 2 times daily  spironolactone (ALDACTONE) 25 MG tablet, Take 1 tablet by mouth 2 times daily  folic acid (FOLVITE) 1 MG tablet, Take 1 tablet by mouth once daily  ferrous sulfate (IRON 325) 325 (65 Fe) MG tablet, Take 1 tablet by mouth 2 times daily  magnesium oxide (MAG-OX) 400 MG tablet, Take 1 tablet by mouth daily  ondansetron (ZOFRAN ODT) 4 MG disintegrating tablet, Take 1 tablet by mouth every 8 hours as needed for Nausea or Vomiting  fluticasone (FLONASE) 50 MCG/ACT nasal spray, 1 spray by Each Nostril route daily  pantoprazole (PROTONIX) 40 MG tablet, Take 1 tablet by mouth every morning (before breakfast)  acetaminophen (TYLENOL) 325 MG tablet, Take 650 mg by mouth every 6 hours as needed for Pain  cetirizine (ZYRTEC) 10 MG tablet, Take 1 tablet by mouth daily  atorvastatin (LIPITOR) 20 MG tablet, Take 10 mg by mouth nightly   montelukast (SINGULAIR) 10 MG tablet, Take 10 mg by mouth nightly Indications: Seasonal Allergy   sertraline (ZOLOFT) 100 MG tablet, Take 200 mg by mouth daily     Allergies:  Kiwi extract; Hydromorphone hcl; Keytruda [pembrolizumab]; and Pineapple    Social History:   TOBACCO:   reports that she quit smoking about 3 years ago. She has never used smokeless tobacco.  ETOH:   reports previous alcohol use. DRUGS:   reports no history of drug use.   MARITAL STATUS:    OCCUPATION:  Not working  Patient currently lives in a nursing home      Family History:       Problem Relation Age of Onset    High Blood Pressure Mother     Stroke Mother     Cancer Father         Lung Cancer     REVIEW OF SYSTEMS:  Constitutional: neg  CV: edema  Pulmonary: SOA  GI: anorexia  : neg  Psych: neg  Neuro: neg  Skin: neg  MusculoSkeletal: neg  HEENT:

## 2020-08-20 NOTE — PROGRESS NOTES
Called [facility] to verify where pt came from, home medications reviewed with her. States she picked pt up from nursing home today and took her to pcp Dr. Slime Lee, he sent pt to ER, Dr. Jacobson Mean agreed. Called [facility] again, a different nurse Makayla Vazquez found pt in system, getting information right now. Code status DNRcc verified with nursing home as well.

## 2020-08-21 LAB
ANION GAP SERPL CALCULATED.3IONS-SCNC: 14 MMOL/L (ref 7–19)
BASOPHILS ABSOLUTE: 0 K/UL (ref 0–0.2)
BASOPHILS RELATIVE PERCENT: 0 % (ref 0–1)
BUN BLDV-MCNC: 18 MG/DL (ref 8–23)
CALCIUM SERPL-MCNC: 8.9 MG/DL (ref 8.8–10.2)
CHLORIDE BLD-SCNC: 100 MMOL/L (ref 98–111)
CO2: 24 MMOL/L (ref 22–29)
CREAT SERPL-MCNC: 0.8 MG/DL (ref 0.5–0.9)
EOSINOPHILS ABSOLUTE: 0 K/UL (ref 0–0.6)
EOSINOPHILS RELATIVE PERCENT: 0 % (ref 0–5)
GFR AFRICAN AMERICAN: >59
GFR NON-AFRICAN AMERICAN: >60
GLUCOSE BLD-MCNC: 131 MG/DL (ref 74–109)
GLUCOSE BLD-MCNC: 148 MG/DL (ref 70–99)
GLUCOSE BLD-MCNC: 151 MG/DL (ref 70–99)
GLUCOSE BLD-MCNC: 165 MG/DL (ref 70–99)
GLUCOSE BLD-MCNC: 190 MG/DL (ref 70–99)
HCT VFR BLD CALC: 25.5 % (ref 37–47)
HEMOGLOBIN: 7.7 G/DL (ref 12–16)
IMMATURE GRANULOCYTES #: 0 K/UL
LYMPHOCYTES ABSOLUTE: 0.5 K/UL (ref 1.1–4.5)
LYMPHOCYTES RELATIVE PERCENT: 8.4 % (ref 20–40)
MCH RBC QN AUTO: 23.7 PG (ref 27–31)
MCHC RBC AUTO-ENTMCNC: 30.2 G/DL (ref 33–37)
MCV RBC AUTO: 78.5 FL (ref 81–99)
MONOCYTES ABSOLUTE: 0.3 K/UL (ref 0–0.9)
MONOCYTES RELATIVE PERCENT: 5 % (ref 0–10)
NEUTROPHILS ABSOLUTE: 5.2 K/UL (ref 1.5–7.5)
NEUTROPHILS RELATIVE PERCENT: 86.1 % (ref 50–65)
PDW BLD-RTO: 17.1 % (ref 11.5–14.5)
PERFORMED ON: ABNORMAL
PLATELET # BLD: 249 K/UL (ref 130–400)
PMV BLD AUTO: 10.3 FL (ref 9.4–12.3)
POTASSIUM SERPL-SCNC: 3.5 MMOL/L (ref 3.5–5)
RBC # BLD: 3.25 M/UL (ref 4.2–5.4)
SODIUM BLD-SCNC: 138 MMOL/L (ref 136–145)
WBC # BLD: 6 K/UL (ref 4.8–10.8)

## 2020-08-21 PROCEDURE — 2700000000 HC OXYGEN THERAPY PER DAY

## 2020-08-21 PROCEDURE — 2580000003 HC RX 258: Performed by: FAMILY MEDICINE

## 2020-08-21 PROCEDURE — 6370000000 HC RX 637 (ALT 250 FOR IP): Performed by: FAMILY MEDICINE

## 2020-08-21 PROCEDURE — 6360000002 HC RX W HCPCS: Performed by: FAMILY MEDICINE

## 2020-08-21 PROCEDURE — 6360000002 HC RX W HCPCS: Performed by: INTERNAL MEDICINE

## 2020-08-21 PROCEDURE — 94640 AIRWAY INHALATION TREATMENT: CPT

## 2020-08-21 PROCEDURE — 36415 COLL VENOUS BLD VENIPUNCTURE: CPT

## 2020-08-21 PROCEDURE — 85025 COMPLETE CBC W/AUTO DIFF WBC: CPT

## 2020-08-21 PROCEDURE — 2580000003 HC RX 258: Performed by: INTERNAL MEDICINE

## 2020-08-21 PROCEDURE — 99222 1ST HOSP IP/OBS MODERATE 55: CPT | Performed by: INTERNAL MEDICINE

## 2020-08-21 PROCEDURE — 92526 ORAL FUNCTION THERAPY: CPT

## 2020-08-21 PROCEDURE — 82947 ASSAY GLUCOSE BLOOD QUANT: CPT

## 2020-08-21 PROCEDURE — 80048 BASIC METABOLIC PNL TOTAL CA: CPT

## 2020-08-21 PROCEDURE — 2140000000 HC CCU INTERMEDIATE R&B

## 2020-08-21 RX ADMIN — BUSPIRONE HYDROCHLORIDE 15 MG: 15 TABLET ORAL at 09:31

## 2020-08-21 RX ADMIN — ACETAMINOPHEN 650 MG: 325 TABLET, FILM COATED ORAL at 12:35

## 2020-08-21 RX ADMIN — MONTELUKAST SODIUM 10 MG: 10 TABLET, FILM COATED ORAL at 21:41

## 2020-08-21 RX ADMIN — SPIRONOLACTONE 25 MG: 25 TABLET ORAL at 18:11

## 2020-08-21 RX ADMIN — SODIUM CHLORIDE, PRESERVATIVE FREE 10 ML: 5 INJECTION INTRAVENOUS at 09:32

## 2020-08-21 RX ADMIN — BUSPIRONE HYDROCHLORIDE 15 MG: 15 TABLET ORAL at 15:35

## 2020-08-21 RX ADMIN — ENOXAPARIN SODIUM 40 MG: 40 INJECTION SUBCUTANEOUS at 21:41

## 2020-08-21 RX ADMIN — METHYLPREDNISOLONE SODIUM SUCCINATE 40 MG: 40 INJECTION, POWDER, FOR SOLUTION INTRAMUSCULAR; INTRAVENOUS at 03:38

## 2020-08-21 RX ADMIN — IPRATROPIUM BROMIDE AND ALBUTEROL SULFATE 1 AMPULE: .5; 3 SOLUTION RESPIRATORY (INHALATION) at 11:28

## 2020-08-21 RX ADMIN — BUSPIRONE HYDROCHLORIDE 15 MG: 15 TABLET ORAL at 21:41

## 2020-08-21 RX ADMIN — SODIUM CHLORIDE, PRESERVATIVE FREE 1 G: 5 INJECTION INTRAVENOUS at 09:38

## 2020-08-21 RX ADMIN — METHYLPREDNISOLONE SODIUM SUCCINATE 40 MG: 40 INJECTION, POWDER, FOR SOLUTION INTRAMUSCULAR; INTRAVENOUS at 09:31

## 2020-08-21 RX ADMIN — IPRATROPIUM BROMIDE AND ALBUTEROL SULFATE 1 AMPULE: .5; 3 SOLUTION RESPIRATORY (INHALATION) at 18:44

## 2020-08-21 RX ADMIN — IPRATROPIUM BROMIDE AND ALBUTEROL SULFATE 1 AMPULE: .5; 3 SOLUTION RESPIRATORY (INHALATION) at 06:46

## 2020-08-21 RX ADMIN — MODAFINIL 100 MG: 100 TABLET ORAL at 09:31

## 2020-08-21 RX ADMIN — FLUTICASONE PROPIONATE 1 SPRAY: 50 SPRAY, METERED NASAL at 09:32

## 2020-08-21 RX ADMIN — FUROSEMIDE 40 MG: 10 INJECTION, SOLUTION INTRAMUSCULAR; INTRAVENOUS at 09:31

## 2020-08-21 RX ADMIN — GUAIFENESIN 1200 MG: 600 TABLET, EXTENDED RELEASE ORAL at 09:31

## 2020-08-21 RX ADMIN — PANTOPRAZOLE SODIUM 40 MG: 40 TABLET, DELAYED RELEASE ORAL at 09:38

## 2020-08-21 RX ADMIN — AZITHROMYCIN DIHYDRATE 500 MG: 500 INJECTION, POWDER, LYOPHILIZED, FOR SOLUTION INTRAVENOUS at 11:17

## 2020-08-21 RX ADMIN — GUAIFENESIN 1200 MG: 600 TABLET, EXTENDED RELEASE ORAL at 21:41

## 2020-08-21 RX ADMIN — METHYLPREDNISOLONE SODIUM SUCCINATE 40 MG: 40 INJECTION, POWDER, FOR SOLUTION INTRAMUSCULAR; INTRAVENOUS at 18:11

## 2020-08-21 RX ADMIN — FOLIC ACID 1 MG: 1 TABLET ORAL at 09:31

## 2020-08-21 RX ADMIN — Medication 400 MG: at 09:31

## 2020-08-21 RX ADMIN — FUROSEMIDE 10 MG/HR: 10 INJECTION, SOLUTION INTRAVENOUS at 15:35

## 2020-08-21 RX ADMIN — IPRATROPIUM BROMIDE AND ALBUTEROL SULFATE 1 AMPULE: .5; 3 SOLUTION RESPIRATORY (INHALATION) at 15:22

## 2020-08-21 RX ADMIN — SERTRALINE HYDROCHLORIDE 200 MG: 100 TABLET ORAL at 09:38

## 2020-08-21 RX ADMIN — SPIRONOLACTONE 25 MG: 25 TABLET ORAL at 09:31

## 2020-08-21 RX ADMIN — SALINE NASAL SPRAY 1 SPRAY: 1.5 SOLUTION NASAL at 15:36

## 2020-08-21 ASSESSMENT — PAIN SCALES - GENERAL
PAINLEVEL_OUTOF10: 2
PAINLEVEL_OUTOF10: 5

## 2020-08-21 ASSESSMENT — ENCOUNTER SYMPTOMS
GASTROINTESTINAL NEGATIVE: 1
ALLERGIC/IMMUNOLOGIC NEGATIVE: 1
SHORTNESS OF BREATH: 1
EYES NEGATIVE: 1

## 2020-08-21 NOTE — CONSULTS
[id number] [facility]  Cardiology Consult      Requesting MD:  Maninder San MD   Admit Status:  Inpatient [101]       History obtained from:   [] Patient  [] Other (specify):     Patient:  Tiffani Chapa  [id number]     Chief Complaint:   Chief Complaint   Patient presents with    Shortness of Breath    COPD         HPI:     The patient is a 78 y.o. female who was admitted with worsening SOA, edema. She has had a dry cough. She has had a low grade fever, below 100. No CP. She has no abdominal pain or N/V. No dysuria. She has had weight loss and poor PO intake. Has sob and swelling of feet. ECHO showed normal EF. Review of Systems:  Review of Systems   Constitutional: Positive for fatigue and unexpected weight change. HENT: Negative. Eyes: Negative. Respiratory: Positive for shortness of breath. Cardiovascular: Positive for leg swelling. Gastrointestinal: Negative. Endocrine: Negative. Genitourinary: Negative. Musculoskeletal: Negative. Skin: Negative. Allergic/Immunologic: Negative. Neurological: Negative. Hematological: Negative. Psychiatric/Behavioral: Negative.         Cardiac Specific Data:  Specialty Problems        Cardiology Problems    Mixed hyperlipidemia        Acute diastolic (congestive) heart failure (HCC)              Past Medical History:  Past Medical History:   Diagnosis Date    Acute diastolic (congestive) heart failure (HCC) 8/19/2020    Anxiety     Arthritis     Bowel obstruction (HCC)     adhesions with colectomy/colostomy    Cancer (HCC)     lung LLL ,  brain    Colostomy in place [facility])     Concussion with no loss of consciousness     COPD (chronic obstructive pulmonary disease) (HCC)     Cyst of kidney, acquired 8/3/2019    Depression     Hernia     History of blood transfusion     History of broken collarbone     Hyperlipidemia     Palliative care patient 09/16/2019    Seasonal allergies         Past Surgical History:  Past Surgical History:   Procedure Laterality Date    ABDOMEN SURGERY      bowel blockage from scar tissue    COLONOSCOPY      COLOSTOMY      still has    CRANIOTOMY Right 01/15/2018    Dr. Jabier Lundberg, COLON, DIAGNOSTIC      FRACTURE SURGERY      left arm and elbow and finger    HERNIA REPAIR      HYSTERECTOMY      LOBECTOMY Left 1/6/2017    LEFT THORACOTOMY WITH LOBECTOMY  performed by Letty Morley MD at [facility]      OH INSJ TUNNELED CTR VAD W/SUBQ PORT AGE 5 YR/> N/A 7/3/2018    SINGLE LUMEN PORT INSERTION performed by Siri Ramsey MD at [address] [place] [address]         Past Family History:  Family History   Problem Relation Age of Onset    High Blood Pressure Mother     Stroke Mother     Cancer Father         Lung Cancer       Past Social History:  Social History     Socioeconomic History    Marital status:      Spouse name: Not on file    Number of children: Not on file    Years of education: Not on file    Highest education level: Not on file   Occupational History    Not on file   Social Needs    Financial resource strain: Not on file    Food insecurity     Worry: Not on file     Inability: Not on file    Transportation needs     Medical: Not on file     Non-medical: Not on file   Tobacco Use    Smoking status: Former Smoker     Last attempt to quit: 10/22/2016     Years since quitting: 3.8    Smokeless tobacco: Never Used    Tobacco comment: smoked since 15, quit a few times   Substance and Sexual Activity    Alcohol use: Not Currently     Comment: 2-3 glasses of wine    Drug use: No    Sexual activity: Not on file   Lifestyle    Physical activity     Days per week: Not on file     Minutes per session: Not on file    Stress: Not on file   Relationships    Social connections     Talks on phone: Not on file     Gets together: Not on file     Attends Latter-day service: Not on file     Active member of club or organization: Not on file     Attends meetings of clubs or organizations: Not on file     Relationship status: Not on file    Intimate partner violence     Fear of current or ex partner: Not on file     Emotionally abused: Not on file     Physically abused: Not on file     Forced sexual activity: Not on file   Other Topics Concern    Not on file   Social History Narrative    Not on file       Allergies: Allergies   Allergen Reactions    Kiwi Extract Shortness Of Breath and Swelling    Hydromorphone Hcl Other (See Comments)     Hallucinations      Keytruda [Pembrolizumab]     Pineapple        Home Meds:  Prior to Admission medications    Medication Sig Start Date End Date Taking?  Authorizing Provider   busPIRone (BUSPAR) 15 MG tablet Take 15 mg by mouth 3 times daily   Yes Historical Provider, MD   melatonin 3 MG TABS tablet Take 5 mg by mouth nightly as needed   Yes Historical Provider, MD   modafinil (PROVIGIL) 100 MG tablet TAKE 1 TABLET BY MOUTH ONCE DAILY IN THE MORNING 7/30/20 8/29/20  Deep Ram MD   ipratropium-albuterol (DUONEB) 0.5-2.5 (3) MG/3ML SOLN nebulizer solution Inhale 3 mLs into the lungs every 6 hours as needed for Shortness of Breath 7/24/20   Chris Simmons MD   guaiFENesin (MUCINEX) 600 MG extended release tablet Take 2 tablets by mouth 2 times daily 7/24/20   Chris Simmons MD   spironolactone (ALDACTONE) 25 MG tablet Take 1 tablet by mouth 2 times daily 7/24/20   Chris Simmons MD   folic acid (FOLVITE) 1 MG tablet Take 1 tablet by mouth once daily 7/14/20   Ophelia Thompson MD   ferrous sulfate (IRON 325) 325 (65 Fe) MG tablet Take 1 tablet by mouth 2 times daily 5/18/20   Ophelia Thompson MD   magnesium oxide (MAG-OX) 400 MG tablet Take 1 tablet by mouth daily 5/18/20   Ophelia Thompson MD   ondansetron (ZOFRAN ODT) 4 MG disintegrating tablet Take 1 tablet by mouth every 8 hours as needed for Nausea or Vomiting 9/20/19   Esteban Bella MD fluticasone (FLONASE) 50 MCG/ACT nasal spray 1 spray by Each Nostril route daily 8/14/19   Emmanuel Opitz, MD   pantoprazole (PROTONIX) 40 MG tablet Take 1 tablet by mouth every morning (before breakfast) 8/14/19   Emmanuel Opitz, MD   acetaminophen (TYLENOL) 325 MG tablet Take 650 mg by mouth every 6 hours as needed for Pain    Historical Provider, MD   cetirizine (ZYRTEC) 10 MG tablet Take 1 tablet by mouth daily 12/22/17   Emmanuel Opitz, MD   atorvastatin (LIPITOR) 20 MG tablet Take 10 mg by mouth nightly  11/29/17   Historical Provider, MD   montelukast (SINGULAIR) 10 MG tablet Take 10 mg by mouth nightly Indications: Seasonal Allergy  11/29/16   Historical Provider, MD   sertraline (ZOLOFT) 100 MG tablet Take 200 mg by mouth daily     Historical Provider, MD       Current Meds:   methylPREDNISolone  40 mg Intravenous Q8H    cefTRIAXone (ROCEPHIN) IV  1 g Intravenous Q24H    azithromycin  500 mg Intravenous Q24H    sodium chloride flush  10 mL Intravenous 2 times per day    enoxaparin  40 mg Subcutaneous Q24H    ipratropium-albuterol  1 ampule Inhalation Q4H WA    guaiFENesin  1,200 mg Oral BID    spironolactone  25 mg Oral BID    sertraline  200 mg Oral Daily    pantoprazole  40 mg Oral QAM AC    montelukast  10 mg Oral Nightly    modafinil  100 mg Oral Daily    magnesium oxide  400 mg Oral Daily    folic acid  1 mg Oral Daily    fluticasone  1 spray Each Nostril Daily    busPIRone  15 mg Oral TID       Current Infused Meds:   furosemide (LASIX) 1mg/ml infusion         Physical Exam:  Vitals:    08/21/20 1027   BP: 91/61   Pulse: 100   Resp: 20   Temp: 97.7 °F (36.5 °C)   SpO2: 98%       Intake/Output Summary (Last 24 hours) at 8/21/2020 1323  Last data filed at 8/21/2020 1132  Gross per 24 hour   Intake 110 ml   Output 1150 ml   Net -1040 ml     Estimated body mass index is 22.85 kg/m² as calculated from the following:    Height as of this encounter: 5' 4\" (1.626 m).     Weight as of this not very large but they are definitely increased in number and I believe represent lymphadenopathy. There is atheromatous calcification of the thoracic aorta and coronary arteries. The pulmonary arteries are dilated measuring 2.9 cm on the left and 3.1 cm on the right. Wall thickening of the trachea diffusely may be an artifact of under distention. In the posterior mediastinum, there are similar appearing paraspinal soft tissue masses. LUNGS: Small pleural effusions bilaterally appear relatively stable. There has been prior left lower lobe resection. Focal areas of consolidation in the right middle lobe and right lower lobe are improved compared to the prior study. There are predominantly linear opacities in the left lung base and in the mid and lower lung zone on the right likely representing areas of atelectasis. There is centrilobular emphysema. BONES AND SOFT TISSUES: There are degenerative changes of the spine. No definite acute bony abnormality is seen. UPPER ABDOMEN: Please see the abdomen and pelvis CT report separately. 1. Increasing lymphadenopathy, as described above. 2. Multiple paraspinal masses appear relatively stable and may be related to extra medullary hematopoiesis. 3. Small bilateral pleural effusions. 4. Infiltrates in the right middle and right lower lobes are improved compared to the prior study. There are linear opacities in both lungs likely due to atelectasis. 5. Centrilobular emphysema. Dilated pulmonary artery suggesting pulmonary hypertension. Signed by Dr Sean Del Real on 8/14/2020 9:14 AM    Ct Abdomen Pelvis W Iv Contrast Additional Contrast? Radiologist Recommendation    Result Date: 8/14/2020  CT ABDOMEN PELVIS W IV CONTRAST 8/14/2020 7:30 AM HISTORY: C34.90 COMPARISON: 12/30/2019. DLP: 314 mGy cm. Automated exposure control was utilized to diminish patient radiation dose.  TECHNIQUE: Following the intravenous administration of contrast, helical CT tomographic images of the abdomen and pelvis were acquired. Coronal reformatted images were also provided for review. FINDINGS: A loculated right-sided effusion is present with peripheral consolidation and thickening of the visceral and parietal pleura. These are new findings from the previous study. Bibasilar atelectasis is present. There is mild cardiomegaly. No evidence of pericardial effusion. . Bilateral paraspinal masses are again demonstrated essentially unchanged from the previous exam. These are of uncertain etiology with extra medullary hematopoiesis in the differential as well as schwannomas. LIVER: No focal liver lesion. The hepatic vasculature is patent. BILIARY SYSTEM: The gallbladder is moderately distended. There is some free fluid in the infrahepatic space. [place] PANCREAS: No focal pancreatic lesion. SPLEEN: There is again splenomegaly with the spleen measuring 11.7 cm in anterior to posterior dimension by 8.5 cm in transverse dimension unchanged from the previous exam.. KIDNEYS AND ADRENALS: Both adrenal glands remain enlarged but remain adreniform in shape stable from the previous study. There are cortical cysts of both kidneys. No evidence of nephrolithiasis. . The ureters are difficult to trace but I do not see definite ureteral stone or obstructive uropathy. [place] RETROPERITONEUM: There is heavy atheromatous calcification of the abdominal aorta and iliac arteries. No evidence of aneurysm. No evidence of retroperitoneal hematoma or adenopathy. [place] GI TRACT: A left lower quadrant ostomy is present. There is moderate constipation with increased stool throughout the colon. There is a Kim's pouch which contains fecal material within the distal sigmoid colon and rectum. . The appendix is not visualized and may be surgically absent. . OTHER: There is extensive third spacing of fluid with stranding within the intraperitoneal and subcutaneous fat. . The abdominopelvic vasculature is patent.  The osseous structures and soft tissues demonstrate no worrisome lesions. No localized fluid collections identified to suggest abscess. PELVIS: No mass lesion, fluid collection or significant lymphadenopathy is seen in the pelvis. The urinary bladder is normal in appearance. 1. Loculated appearing effusion within the right lateral lung base. There is thickening of the visceral and parietal pleura as well as peripheral consolidation within the right lower lobe. A smaller left-sided effusion is present with bibasilar atelectasis. 2. Paraspinal masses are stable from the previous study. Could represent metastatic disease or schwannomas. Overall no change from the previous exam in these lesions. 3. Persistent splenomegaly. The adrenal glands remain enlarged with a small right adrenal nodule present. 4. Cortical cysts of the kidneys. No evidence of obstructive uropathy. 5. Moderate constipation. A left lower quadrant colostomy is present. . No evidence of obstruction. There is retained fecal material within the Kim's pouch. 6. Rather diffuse third spacing of fluid/anasarca with diffuse induration of the subcutaneous fat as well as induration of the intraperitoneal fat. This could be related to hypoalbuminemia. Signed by Dr Abbey Vidal on 8/14/2020 9:11 AM    Xr Chest Portable    Result Date: 8/19/2020  XR CHEST PORTABLE 8/19/2020 12:47 PM HISTORY: Shortness of breath COMPARISON: 7/20/2020 CXR: A single frontal view of the chest is performed. Findings: There are similar size small bilateral pleural fluid collections. There are improving patchy right lower lobe opacities. Stable cardiomegaly with similar prominence of the central pulmonary vascular structures. Calcified left hilar lymph nodes with left perihilar surgical clips. Left subclavian port tip again seen in the SVC. No pneumothorax. Arthritic changes of the shoulders with degenerative change of the spine.     1. Similar size small bilateral pleural fluid collections with improving right lower lobe opacities pt.

## 2020-08-21 NOTE — PROGRESS NOTES
Follow contact with the pts dtr Jessica Day. She states she would like for her mother to get physical therapy after her lungs clear up in hopes of bringing the pt home with home health. She states she does not feel hospice is the way to go at this time. Emotional and sc support provided.

## 2020-08-21 NOTE — PROGRESS NOTES
Behavioral-Environmental Outcomes:      Food/Nutrient Intake Outcomes:  Food and Nutrient Intake  Physical Signs/Symptoms Outcomes:  Biochemical Data, Skin, Weight, Chewing or Swallowing     Discharge Planning:     Too soon to determine     Electronically signed by Thalia Fortune MS, RD, LD on 8/21/20 at 1:16 PM CDT    Contact: [phone]

## 2020-08-21 NOTE — PROGRESS NOTES
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    Date of Treat: 2020  Evaluating Therapist: Yolanda Santana    Reason for Referral  Osmar Cleveland was referred for a bedside swallow evaluation to assess the efficiency of her swallow function, identify signs and symptoms of aspiration and make recommendations regarding safe dietary consistencies, effective compensatory strategies, and safe eating environment. Impression  Monitored patient's swallowing function. Patient exhibits slow, decreased oral prep of more solid consistencies as well as sluggish, inconsistently mild-moderately decreased laryngeal elevation for swallow airway protection. It is noted that mild delayed coughs were observed with every consistency administered during treatment session this date (bite sized consistency; honey thick - cup). However, do not feel that all coughs were related to penetration/aspiration secondary to timing of swallows and presence of throat movement and patient exhibited coughs inconsistently at rest.    At this time, would recommend continuation current diet. NO STRAWS. Recommend meds crushed in pudding/applesauce.  If patient receives mouth care prior to intake, okay for ice chips IN BETWEEN MEALS for comfort. Will continue to follow.     Treatment Plan  Requires SLP Intervention: Yes     Recommended Diet and Intervention  Diet Solids Recommendation: Bite sized  Liquid Consistency Recommendation: Honey thick   Recommended Form of Meds: Meds crushed in puree as able  Therapeutic Interventions: Patient/Family education;Diet tolerance monitoring; Therapeutic PO trials with SLP     Compensatory Swallowing Strategies  Compensatory Swallowing Strategies: Upright as possible for all oral intake;Small bites/sips;Eat/Feed slowly; Alternate solids and liquids; Remain upright for 30-45 minutes after meals     Treatment/Goals  Timeframe for Short-term Goals: 1x/day for 3 days   Goal 1: Patient will tolerate bite sized consistency and honey thick liquids with min S/S penetration/aspiration during PO intake. Goal 2: Patient staff will follow swallow safety recommendations to decrease risk of penetration/aspiration during PO intake. Goal 3: Re-assessment of swallow function for potential diet upgrade. Goal 4: Monitor speech production.      General  Chart Reviewed: Yes  Behavior/Cognition: Alert; Cooperative  O2 Device: Nasal Cannula   Communication Observation: (Patient exhibits decreased volume of speech, decreased labial movements, and slow, decreased lingual movements during verbalizations.)  Follows Directions: Simple, with provision of cues/prompts.    Dentition: Poor   Patient Positioning: Upright in bed  Consistencies Administered: Bite sized; Honey - cup     Monitored patient's swallowing function with the following observations noted:     Oral Phase  Mastication: Bite sized (Patient exhibited slow oral prep with primarily vertical jaw movement noted at the front of the mouth during bite sized consistency presentations administered by SLP and independently.)  Suspected Premature Bolus Loss: Bite sized; Honey - cup (Oral transit of bite sized

## 2020-08-21 NOTE — PLAN OF CARE
Problem: Skin Integrity:  Goal: Will show no infection signs and symptoms  Description: Will show no infection signs and symptoms  Outcome: Ongoing  Goal: Absence of new skin breakdown  Description: Absence of new skin breakdown  Outcome: Ongoing     Problem: Falls - Risk of:  Goal: Will remain free from falls  Description: Will remain free from falls  Outcome: Ongoing  Goal: Absence of physical injury  Description: Absence of physical injury  Outcome: Ongoing     Problem: SAFETY  Goal: Free from accidental physical injury  Outcome: Ongoing     Problem: DAILY CARE  Goal: Daily care needs are met  Outcome: Ongoing     Problem: SKIN INTEGRITY  Goal: Skin integrity is maintained or improved  Outcome: Ongoing     Problem: Discharge Planning:  Goal: Discharged to appropriate level of care  Description: Discharged to appropriate level of care  Outcome: Ongoing  Goal: Participates in care planning  Description: Participates in care planning  Outcome: Ongoing     Problem: Airway Clearance - Ineffective:  Goal: Clear lung sounds  Description: Clear lung sounds  Outcome: Ongoing  Goal: Ability to maintain a clear airway will improve  Description: Ability to maintain a clear airway will improve  Outcome: Ongoing     Problem: Fluid Volume - Deficit:  Goal: Achieves intake and output within specified parameters  Description: Achieves intake and output within specified parameters  Outcome: Ongoing     Problem: Gas Exchange - Impaired:  Goal: Levels of oxygenation will improve  Description: Levels of oxygenation will improve  Outcome: Ongoing     Problem: Hyperthermia:  Goal: Ability to maintain a body temperature in the normal range will improve  Description: Ability to maintain a body temperature in the normal range will improve  Outcome: Ongoing

## 2020-08-21 NOTE — CONSULTS
MEDICAL ONCOLOGY CONSULTATION    Pt Name: Shiva Spangler  MRN: [id number]  YOB: 1950  Date of evaluation: 8/21/2020    REASON FOR CONSULTATION: Lung cancer  REQUESTING PHYSICIAN: Dr. Claudetta Junker    History Obtained From:    patient, electronic medical record    HISTORY OF PRESENT ILLNESS:      The patient is well-known to my clinic. She is a 78years old female who has a history of advanced lung cancer. She is currently being seen by Dr. Jeniffer Umanzor. She was last seen 8/19/2020. She presented to clinic with increasing short of breath. She was referred to the emergency to be admitted. The patient was seen by Dr. Claudetta Junker this morning and a hospice consult was placed. She met with hospice and decided to go home with hospice care. Subjective:  Laura Sanabria is a 78-year-old  female managed in this office with the following primary and secondary diagnoses as follows:  · Resected, stage IB adenocarcinoma of the left lung on 1/06/2017. · Adjuvant cisplatinum/Alimta x 4 completed on 6/21/17. · A recurrent 2 x 2.6 x 2.6 cm high right frontal vertex mass was resected 1/15/2018- adjuvant SRS to the right frontal CNS was delivered  · Adjuvant Carboplatin, Alimta and Keytruda x 6 completed on 9/4/2018. · 1.3 cm right adrenal nodule - stable  · 1.9 cm right kidney cyst     Maintenance Alimta/Keytruda was initiated on 10/16/2018.    Chrissy Gonzalez went on and off [employer] Gerold that was eventually discontinued on 5/7/2019.    She went on and off maintenance Alimta, and eventually discontinued single agent maintenance Alimta course #13 on 9/9/2019 due to poor tolerability, deconditioning and multiple hospital admissions.     May comes today again in hospital followup accompanied by a caregiver Desiree for evaluation, assessment and opinion on a recent left axillary lymph node biopsy and repeat imaging studies.        TARGET TUMOR SITE:  1. Resected LLL of lung 01/06/2017   2.  Resected 2 x 2.6 x 2.6 cm mass from the high right frontal vertex of the brain 1/15/2018     TUMOR HISTORY: Resected, Stage IB moderately differentiated adenocarcinoma of left lung, pT2a, N0,M0. 01/06/2017. Ms. Tammie Lorenzo was seen in initial Oncology consultation on 2/22/2017 f referred by Dr. Anthony Segovia with a diagnosis of a resected moderately differentiated adenocarcinoma of the left lung. In October of 2016 [facility] presented to Dr. Linnea Wynne with complaints of hemoptysis for 2 months. She has a significant history of nicotine abuse but quit smoking in October 2016.     CT scan of chest on 10/31/2016 revealed a partially necrotic, noncalcified mass measuring 4.1 x 3.2 x 3.7 cm in the posterior segment of the LLL with associated pleural effusions. There was no evidence of mediastinal or hilar mass nor lymphadenopathy. A 2.2 cm nodule was noted in the upper pole of the right kidney.     A transthorasic needle biopsy on 11/22/2016 by Dr. Denver Jensen revealed evidence of adenocarcinoma.     PET scan on 12/16/2016 revealed a mass in the LLL with hypermetabolic activity with a maximum SUV of 7. 1.      A chest x-ray on 1/4/2017 revealed a 3.8 cm mass lesion in the LLL that previously measured approximately 4.1 cm.      On 1/6/2017, a left thoracotomy with left lower lobectomy and mediastinal lymph node dissection was performed by Dr. Carmel Rosario. Pathology revealed invasive moderately differentiated adenocarcinoma. The tumor measured 4.5 cm. Margins were clear of invasive carcinoma and  no lymphovascular space invasion was noted. 5 of 5 lymph nodes were negative for metastatic carcinoma.      Bilateral renal ultrasounds on 2/28/2017 identified a complex cyst at the upper pole of the right kidney measuring up to 3 cm increased in size compared to a study on 12/28/2012 where it measured 2.1 cm in maximum diameter.  Two small benign cysts were noted in the left kidney.      A CT scan of the abdomen and pelvis on 3/8/2017 confirmed that this was a simple cyst in the upper pole of the right kidney.      Adjuvant chemotherapy was discussed at her initial visit on 2/22/2017 as well as on followup visit 3/13/2017. She is agreeable and desires to proceed accordingly with adjuvant Cisplatinum and Alimta. Azalia desires however to postpone initiation of adjuvant chemotherapy until 4/11/2017. Her wishes were respected and chemotherapy delivered as noted below.     CT scan of chest on 7/27/2017 revealed postsurgical changes  and no acute process. Calcified lymph nodes are present in the subcorneal region and left hilum     CT scan of abdomen and pelvis on 7/27/2017 revealed no intra-abdominal or pelvic abnormalities.        ----------------------------------------------------------------------------------------__________________________________________        RECURRENT LUNG CARCINOMA TO THE BRAIN 1/15/2018  She presented to [facility] on 12/18/2017 with progressive headache.      MRI of the brain with and without contrast at [facility] on 12/18/2017 revealed a 2 x 2.6 x 2.6 cm rim-enhancing irregular mass within the high right frontal vertex with surrounding vasogenic edema.      CT chest with contrast at [facility] 12/19/2017 revealed some paravertebral soft tissue nodules present at T8, T9, T10 with largest being 2.5 cm. These are stable compared to 7/27/2017 PET scan which were NOT metabolically active. I.e. no obvious metastatic lesions     CT abdomen and pelvis with contrast at [facility] 12/19/2017 was unrevealing for any obvious metastatic disease.     Bone scan at [facility] 12/19/2017 revealed focal areas of increased activity in the left seventh and 10th costovertebral junctions-indeterminate.     A right craniotomy by Dr. Georges Clifton on 1/15/2018 was performed with resection of the 2 x 2.6 x 2.6 cm high right frontal vertex mass   Pathology was consistent with metastatic adenocarcinoma with papillary features consistent with lung origin.   Molecular testing on the 1/15/2018 pathology specimen Alimta/Keytruda. Salbador Perez reported experiencing a significant skin rash after receiving Keytruda on 11/27/2018.     [email or web address] [place] [facility]\"" was held from 12/18/2018-1/29/2019 (x3 cycles) during which time Salbador Perez was only receiving Alimta.     Mrs. Justen Morales had repeat CT scans of the chest, abdomen and pelvis on 1/2/2019 that suggested stable disease. On 2/19/2019, Mrs. Emerson Perez resumed the combination of maintenance treatment with Alimta/Keytruda without rash or problems.     Salbador Perez was admitted to [facility] on 4/9/2019 with bilateral lower extremity cellulitis. She was discharged on 4/15/2019.     CT of the abdomen and pelvis with contrast at [facility] on 4/9/2019 was compared to a CT of the abdomen and pelvis from February 2013. It revealed a left-sided 3.2 x 2.2 cm paravertebral T10 soft tissue mass. 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 No evidence of acute posttraumatic injury to the brain.      CT scan of the abdomen and pelvis with contrast on 4/9/2019 documented no evidence of metastatic disease in the abdomen or pelvis. Paravertebral soft tissue masses at T10. Differential metastatic disease versus extra medullary hematopoiesis.     Maintenance Alimta/Keytruda was initiated on 10/16/2018. Salbador Perez reported experiencing a significant skin rash after receiving Keytruda on 11/27/2018. [employer] was held from 12/18/2018-1/29/2019 (x3 cycles) during which time Salbador Perez was only receiving Alimta.  Imaging studies on 1/2/2019 suggested stable disease.      Diane resumed maintenance Alimta/Keytruda 2/19/2019 - 5/7/1209.     Due to skin reaction manifestations, further maintenance Keytruda was not given after the 5/7/2019 dose.     Diane received course #11 of maintenance Alimta on 7/2/2019. Thereafter, maintenance Alimta was scheduled monthly. The last dose of maintenance Alimta, #13, was delivered on 9/9/2019 due to poor tolerability, deconditioning, multiple hospital admissions.     TREATMENT SUMMARY:   1. Left thoracotomy with left lower lobectomy and mediastinal lymph node dissection 01/06/2017 by Dr. Kay Hines   2. Adjuvant cisplatinum and Alimta x4 cycles. 4/11/2017 -6/21/17. 3. A right craniotomy by Dr. Dawit Delcid on 1/15/2018 was performed with resection of the 2 x 2.6 x 2.6 cm high right frontal vertex mass   4. Diane underwent SRS with 1600 cGy in one single treatment fraction on 3/26/2018 to the right frontal CNS lobe by Dima Garcia and Dawit Delcid   5. Cycle #1 of chemotherapy with Alimta, carboplatin and [place] was delivered on 5/14/2018 and the final cycle #6 was delivered on 9/4/2018   6. Maintenance Alimta/Keytruda was initiated on 10/16/2018 with the final cycle #9 delivered on 5/7/2019. Cycle #10 of single agent maintenance Alimta was delivered on 6/11/2019 with schedule adjusted to monthly on 7/2/2019.  Last dose of Alimta, #13, delivered on 9/9/2019             Past Medical History:    Past Medical History:   Diagnosis Date    Acute diastolic (congestive) heart failure ([facility] [address] 8/19/2020    Anxiety     Arthritis     Bowel obstruction (HCC)     adhesions with colectomy/colostomy    Cancer (HCC)     lung LLL ,  brain    Colostomy in place [facility])     Concussion with no loss of consciousness     COPD (chronic obstructive pulmonary disease) (HCC)     Cyst of kidney, acquired 8/3/2019    Depression     Hernia     History of blood transfusion     History of broken collarbone     Hyperlipidemia     Palliative care patient 09/16/2019    Seasonal allergies        Past Surgical History:    Past Surgical History:   Procedure Laterality Date    ABDOMEN SURGERY      bowel blockage from scar tissue    COLONOSCOPY      COLOSTOMY still has    CRANIOTOMY Right 01/15/2018    Dr. Elise Crews, COLON, DIAGNOSTIC      FRACTURE SURGERY      left arm and elbow and finger    HERNIA REPAIR      HYSTERECTOMY      LOBECTOMY Left 1/6/2017    LEFT THORACOTOMY WITH LOBECTOMY  performed by Poly Vang MD at [place] [address]      OK INSJ TUNNELED CTR VAD W/SUBQ PORT AGE 5 YR/> N/A 7/3/2018    SINGLE LUMEN PORT INSERTION performed by Satya Brown MD at [address] [place] [address]         Social History:    Social History     Socioeconomic History    Marital status:      Spouse name: Not on file    Number of children: Not on file    Years of education: Not on file    Highest education level: Not on file   Occupational History    Not on file   Social Needs    Financial resource strain: Not on file    Food insecurity     Worry: Not on file     Inability: Not on file    Transportation needs     Medical: Not on file     Non-medical: Not on file   Tobacco Use    Smoking status: Former Smoker     Last attempt to quit: 10/22/2016     Years since quitting: 3.8    Smokeless tobacco: Never Used    Tobacco comment: smoked since 15, quit a few times   Substance and Sexual Activity    Alcohol use: Not Currently     Comment: 2-3 glasses of wine    Drug use: No    Sexual activity: Not on file   Lifestyle    Physical activity     Days per week: Not on file     Minutes per session: Not on file    Stress: Not on file   Relationships    Social connections     Talks on phone: Not on file     Gets together: Not on file     Attends [employer] service: Not on file     Active member of club or organization: Not on file     Attends meetings of clubs or organizations: Not on file     Relationship status: Not on file    Intimate partner violence     Fear of current or ex partner: Not on file     Emotionally abused: Not on file     Physically abused: Not on file     Forced sexual activity: Not on file   Other Topics Concern    Not on file   Social History Narrative    Not on file       Family History:   Family History   Problem Relation Age of Onset    High Blood Pressure Mother     Stroke Mother     Cancer Father         Lung Cancer       Current Hospital Medications:    Current Facility-Administered Medications: methylPREDNISolone sodium (SOLU-MEDROL) injection 40 mg, 40 mg, Intravenous, Q8H  cefTRIAXone (ROCEPHIN) 1 g in sodium chloride (PF) 10 mL IV syringe, 1 g, Intravenous, Q24H  azithromycin (ZITHROMAX) 500 mg in D5W 250ml Vial Mate, 500 mg, Intravenous, Q24H  sodium chloride flush 0.9 % injection 10 mL, 10 mL, Intravenous, 2 times per day  sodium chloride flush 0.9 % injection 10 mL, 10 mL, Intravenous, PRN  acetaminophen (TYLENOL) tablet 650 mg, 650 mg, Oral, Q4H PRN  enoxaparin (LOVENOX) injection 40 mg, 40 mg, Subcutaneous, Q24H  ondansetron (ZOFRAN) injection 4 mg, 4 mg, Intravenous, Q8H PRN  ipratropium-albuterol (DUONEB) nebulizer solution 1 ampule, 1 ampule, Inhalation, Q4H WA  furosemide (LASIX) injection 40 mg, 40 mg, Intravenous, BID  guaiFENesin (MUCINEX) extended release tablet 1,200 mg, 1,200 mg, Oral, BID  acetaminophen (TYLENOL) tablet 650 mg, 650 mg, Oral, Q6H PRN  spironolactone (ALDACTONE) tablet 25 mg, 25 mg, Oral, BID  sertraline (ZOLOFT) tablet 200 mg, 200 mg, Oral, Daily  pantoprazole (PROTONIX) tablet 40 mg, 40 mg, Oral, QAM AC  ondansetron (ZOFRAN-ODT) disintegrating tablet 4 mg, 4 mg, Oral, Q8H PRN  montelukast (SINGULAIR) tablet 10 mg, 10 mg, Oral, Nightly  modafinil (PROVIGIL) tablet 100 mg, 100 mg, Oral, Daily  melatonin tablet 5 mg, 5 mg, Oral, Nightly PRN  magnesium oxide (MAG-OX) tablet 400 mg, 400 mg, Oral, Daily  folic acid (FOLVITE) tablet 1 mg, 1 mg, Oral, Daily  fluticasone (FLONASE) 50 MCG/ACT nasal spray 1 spray, 1 spray, Each Nostril, Daily  busPIRone (BUSPAR) tablet 15 mg, 15 mg, Oral, TID    Allergies:    Allergies   Allergen Reactions    Kiwi Extract Shortness Of Breath and Swelling    Hydromorphone Hcl Other (See Comments)     Hallucinations      Keytruda [Pembrolizumab]     Pineapple          Subjective   REVIEW OF SYSTEMS:   CONSTITUTIONAL: no fever, no night sweats, fatigue; generalized weakness, decreased mobility  HEENT: no blurring of vision, no double vision, no hearing difficulty, no tinnitus, no ulceration, no dysplasia, no epistaxis;  LUNGS: no cough, no hemoptysis, no wheeze,   shortness of breath;  CARDIOVASCULAR: no palpitation, no chest pain, shortness of breath;  GI: no abdominal pain, no nausea, no vomiting, no diarrhea, no constipation;  MARJORIE: no dysuria, no hematuria, no frequency or urgency, no nephrolithiasis;  MUSCULOSKELETAL: no joint pain, no swelling, no stiffness;  ENDOCRINE: no polyuria, no polydipsia, no cold or heat intolerance;  HEMATOLOGY: no easy bruising or bleeding, no history of clotting disorder;  DERMATOLOGY: no skin rash, no eczema, no pruritus;  PSYCHIATRY: no depression, no anxiety, no panic attacks, no suicidal ideation, no homicidal ideation;  NEUROLOGY: no syncope, no seizures, no numbness or tingling of hands, no numbness or tingling of feet, no paresis;    Objective   BP 91/61   Pulse 100   Temp 97.7 °F (36.5 °C) (Temporal)   Resp 20   Ht 5' 4\" (1.626 m)   Wt 133 lb 1.6 oz (60.4 kg)   SpO2 98%   BMI 22.85 kg/m²     PHYSICAL EXAM:  CONSTITUTIONAL: Alert, appropriate, no acute distress, sick appearing  EYES: Non icteric, EOM intact, pupils equal round   ENT: Mucus membranes moist, no oral pharyngeal lesions, external inspection of ears and nose are normal  NECK: Supple, no masses. No palpable thyroid mass  CHEST/LUNGS: Tachypneic, decreased breath sounds bilateral lung bases   CARDIOVASCULAR: RRR, no murmurs. No lower extremity edema  ABDOMEN: soft non-tender, active bowel sounds, no HSM. No palpable masses  EXTREMITIES: warm, full ROM in all 4 extremities, no focal weakness.   SKIN: warm, dry with no rashes or lesions  LYMPH: No cervical, clavicular, axillary, or inguinal lymphadenopathy  NEUROLOGIC: follows commands, non focal   PSYCH: mood and affect appropriate. Alert and oriented to time, place, person        LABORATORY RESULTS REVIEWED BY ME:  Recent Labs     08/21/20  0307 08/19/20  1443 08/19/20  1034   WBC 6.0 8.2 8.67   HGB 7.7* 8.8* 9.1*   HCT 25.5* 29.0* 31.4*   MCV 78.5* 79.2* 84.2    310 285       Lab Results   Component Value Date     08/21/2020    K 3.5 08/21/2020     08/21/2020    CO2 24 08/21/2020    BUN 18 08/21/2020    CREATININE 0.8 08/21/2020    GLUCOSE 131 (H) 08/21/2020    CALCIUM 8.9 08/21/2020    PROT 6.8 08/19/2020    LABALBU 3.8 08/19/2020    BILITOT 0.5 08/19/2020    ALKPHOS 162 (H) 08/19/2020    AST 26 08/19/2020    ALT 14 08/19/2020    LABGLOM >60 08/21/2020    GFRAA >59 08/21/2020    GLOB 2.3 01/04/2017       Lab Results   Component Value Date    INR 1.29 (H) 08/19/2020    INR 1.26 (H) 10/20/2019    INR 1.34 (H) 07/10/2019    PROTIME 16.1 (H) 08/19/2020    PROTIME 15.2 (H) 10/20/2019    PROTIME 15.9 (H) 07/10/2019       RADIOLOGY STUDIES REPORT/REVIEWED AND INTERPRETED BY ME:  Ct Chest W Contrast    Result Date: 8/14/2020  EXAMINATION:  CT CHEST W CONTRAST  8/14/2020 8:56 AM HISTORY: C34.90. Non-small cell lung cancer. COMPARISON: 7/21/2020. DLP: 174 mGy-cm. Automated exposure control was utilized. TECHNIQUE: Spiral CT was performed of the chest with contrast. Multiplanar images were reconstructed. MEDIASTINUM/VASCULAR: A lymph node anterior to the joel now measures 1.5 cm and previously measured 1.2 cm. Lymph node just to the left of the aortic arch now measures 8 mm and previously measured 6 mm. There are new lymph nodes in the axillary regions bilaterally left greater than right. The lymph nodes are not very large but they are definitely increased in number and I believe represent lymphadenopathy.  There is atheromatous calcification of the thoracic aorta and coronary arteries. The pulmonary arteries are dilated measuring 2.9 cm on the left and 3.1 cm on the right. Wall thickening of the trachea diffusely may be an artifact of under distention. In the posterior mediastinum, there are similar appearing paraspinal soft tissue masses. LUNGS: Small pleural effusions bilaterally appear relatively stable. There has been prior left lower lobe resection. Focal areas of consolidation in the right middle lobe and right lower lobe are improved compared to the prior study. There are predominantly linear opacities in the left lung base and in the mid and lower lung zone on the right likely representing areas of atelectasis. There is centrilobular emphysema. BONES AND SOFT TISSUES: There are degenerative changes of the spine. No definite acute bony abnormality is seen. UPPER ABDOMEN: Please see the abdomen and pelvis CT report separately. 1. Increasing lymphadenopathy, as described above. 2. Multiple paraspinal masses appear relatively stable and may be related to extra medullary hematopoiesis. 3. Small bilateral pleural effusions. 4. Infiltrates in the right middle and right lower lobes are improved compared to the prior study. There are linear opacities in both lungs likely due to atelectasis. 5. Centrilobular emphysema. Dilated pulmonary artery suggesting pulmonary hypertension. Signed by Dr David Sarmiento on 8/14/2020 9:14 AM    Ct Abdomen Pelvis W Iv Contrast Additional Contrast? Radiologist Recommendation    Result Date: 8/14/2020  CT ABDOMEN PELVIS W IV CONTRAST 8/14/2020 7:30 AM HISTORY: C34.90 COMPARISON: 12/30/2019. DLP: 314 mGy cm. Automated exposure control was utilized to diminish patient radiation dose. TECHNIQUE: Following the intravenous administration of contrast, helical CT tomographic images of the abdomen and pelvis were acquired. Coronal reformatted images were also provided for review.  FINDINGS: A loculated right-sided effusion is present with peripheral consolidation and thickening of the visceral and parietal pleura. These are new findings from the previous study. Bibasilar atelectasis is present. There is mild cardiomegaly. No evidence of pericardial effusion. . Bilateral paraspinal masses are again demonstrated essentially unchanged from the previous exam. These are of uncertain etiology with extra medullary hematopoiesis in the differential as well as schwannomas. LIVER: No focal liver lesion. The hepatic vasculature is patent. BILIARY SYSTEM: The gallbladder is moderately distended. There is some free fluid in the infrahepatic space. Bladimir Kirks PANCREAS: No focal pancreatic lesion. SPLEEN: There is again splenomegaly with the spleen measuring 11.7 cm in anterior to posterior dimension by 8.5 cm in transverse dimension unchanged from the previous exam.. KIDNEYS AND ADRENALS: Both adrenal glands remain enlarged but remain adreniform in shape stable from the previous study. There are cortical cysts of both kidneys. No evidence of nephrolithiasis. . The ureters are difficult to trace but I do not see definite ureteral stone or obstructive uropathy. Bladimir Bhatt RETROPERITONEUM: There is heavy atheromatous calcification of the abdominal aorta and iliac arteries. No evidence of aneurysm. No evidence of retroperitoneal hematoma or adenopathy. Bladimir Bhatt GI TRACT: A left lower quadrant ostomy is present. There is moderate constipation with increased stool throughout the colon. There is a Kim's pouch which contains fecal material within the distal sigmoid colon and rectum. . The appendix is not visualized and may be surgically absent. . OTHER: There is extensive third spacing of fluid with stranding within the intraperitoneal and subcutaneous fat. . The abdominopelvic vasculature is patent. The osseous structures and soft tissues demonstrate no worrisome lesions. No localized fluid collections identified to suggest abscess.  PELVIS: No mass lesion, fluid collection or significant lymphadenopathy is left subclavian port. . Signed by Dr Ford Jefferson on 8/19/2020 2:19 PM    Us Guided Needle Placement    Result Date: 8/12/2020  EXAMINATION: US GUIDED NEEDLE PLACEMENT 8/12/2020 12:37 PM HISTORY: Lung carcinoma, history of metastatic involvement of the brain. Bilateral axillary, mediastinal and supraclavicular lymphadenopathy. Ultrasound-guided lymph node biopsy requested. Report: The risks and benefits of the procedure were discussed with the patient in detail, including the specific risks of bleeding, infection and pain. All of the patient's questions were answered and informed consent was obtained. Timeout occurred. Sonographic images were obtained at the axilla bilaterally, there are several slightly enlarged lymph nodes in both axilla. The largest lymph nodes are in the left axilla, measuring in the range of 1.4 cm. This may be reactive. The largest lymph node in the left axilla is targeted for ultrasound-guided FNA and core biopsy. Using sterile technique and 1% lidocaine for local anesthesia, initially, FNA was performed of the enlarged lymph node in the left axilla with a 25-gauge needle, 3 separate times. Then, 3 18-gauge core 1.1 cm throw biopsy samples were obtained. Each sample was submitted to the pathology team. Pathological results are pending. The patient tolerated the procedure well. There were no immediate complications. Estimated blood loss was minimal.    Technically successful ultrasound-guided FNA and core biopsy of an enlarged lymph node in the left axilla, without immediate competitions. Pathological results are pending. Signed by Dr Britt Perez on 8/12/2020 12:40 PM      My interpretation: Agree with radiology. ASSESSMENT:  #Acute respiratory failure- combination of lung cancer progression/COPD. Continue current breathing treatments and supportive care. #Advanced lung cancer- patient has received several lines treatment the past.  Clinical progression.   Patient has discussed hospice and will follow-up at discharge. #Poor prognosis- follow-up with hospice. #Microcytic anemia- no intervention. Iron profile performed in May 2020 was consistent with anemia of chronic disease  PLAN:  Agree with hospice care  Continue current supportive treatment    I have seen, examined and reviewed this patient medication list, appropriate labs and imaging studies. I reviewed relevant medical records and others physicians notes. I discussed the plans of care with the patient. I answered all the questions to the patients satisfaction. I have also reviewed the chief complaint (CC) and part of the history (History of Present Illness (HPI), Past Family Social History [facility]), or Review of Systems (ROS) and made changes when appropriated.        (Please note that portions of this note were completed with a voice recognition program. Efforts were made to edit the dictations but occasionally words are mis-transcribed.)    Los Balderas MD    08/21/20  11:28 AM

## 2020-08-22 LAB
ANION GAP SERPL CALCULATED.3IONS-SCNC: 17 MMOL/L (ref 7–19)
BUN BLDV-MCNC: 19 MG/DL (ref 8–23)
CALCIUM SERPL-MCNC: 9.1 MG/DL (ref 8.8–10.2)
CHLORIDE BLD-SCNC: 99 MMOL/L (ref 98–111)
CO2: 26 MMOL/L (ref 22–29)
CREAT SERPL-MCNC: 0.8 MG/DL (ref 0.5–0.9)
GFR AFRICAN AMERICAN: >59
GFR NON-AFRICAN AMERICAN: >60
GLUCOSE BLD-MCNC: 126 MG/DL (ref 74–109)
GLUCOSE BLD-MCNC: 135 MG/DL (ref 70–99)
GLUCOSE BLD-MCNC: 198 MG/DL (ref 70–99)
GLUCOSE BLD-MCNC: 227 MG/DL (ref 70–99)
HCT VFR BLD CALC: 28.8 % (ref 37–47)
HEMOGLOBIN: 8.5 G/DL (ref 12–16)
MCH RBC QN AUTO: 23.7 PG (ref 27–31)
MCHC RBC AUTO-ENTMCNC: 29.5 G/DL (ref 33–37)
MCV RBC AUTO: 80.2 FL (ref 81–99)
PDW BLD-RTO: 17.4 % (ref 11.5–14.5)
PERFORMED ON: ABNORMAL
PLATELET # BLD: 278 K/UL (ref 130–400)
PMV BLD AUTO: 10.1 FL (ref 9.4–12.3)
POTASSIUM SERPL-SCNC: 3.7 MMOL/L (ref 3.5–5)
RBC # BLD: 3.59 M/UL (ref 4.2–5.4)
SODIUM BLD-SCNC: 142 MMOL/L (ref 136–145)
WBC # BLD: 6.6 K/UL (ref 4.8–10.8)

## 2020-08-22 PROCEDURE — 82947 ASSAY GLUCOSE BLOOD QUANT: CPT

## 2020-08-22 PROCEDURE — 6370000000 HC RX 637 (ALT 250 FOR IP): Performed by: FAMILY MEDICINE

## 2020-08-22 PROCEDURE — 2580000003 HC RX 258: Performed by: FAMILY MEDICINE

## 2020-08-22 PROCEDURE — 94640 AIRWAY INHALATION TREATMENT: CPT

## 2020-08-22 PROCEDURE — 80048 BASIC METABOLIC PNL TOTAL CA: CPT

## 2020-08-22 PROCEDURE — 2140000000 HC CCU INTERMEDIATE R&B

## 2020-08-22 PROCEDURE — 6360000002 HC RX W HCPCS: Performed by: FAMILY MEDICINE

## 2020-08-22 PROCEDURE — 36415 COLL VENOUS BLD VENIPUNCTURE: CPT

## 2020-08-22 PROCEDURE — 85027 COMPLETE CBC AUTOMATED: CPT

## 2020-08-22 PROCEDURE — 2700000000 HC OXYGEN THERAPY PER DAY

## 2020-08-22 PROCEDURE — 6370000000 HC RX 637 (ALT 250 FOR IP): Performed by: INTERNAL MEDICINE

## 2020-08-22 PROCEDURE — 99231 SBSQ HOSP IP/OBS SF/LOW 25: CPT | Performed by: INTERNAL MEDICINE

## 2020-08-22 RX ORDER — FUROSEMIDE 20 MG/1
20 TABLET ORAL DAILY
Status: DISCONTINUED | OUTPATIENT
Start: 2020-08-22 | End: 2020-08-24

## 2020-08-22 RX ADMIN — Medication 400 MG: at 09:30

## 2020-08-22 RX ADMIN — BUSPIRONE HYDROCHLORIDE 15 MG: 15 TABLET ORAL at 09:31

## 2020-08-22 RX ADMIN — METHYLPREDNISOLONE SODIUM SUCCINATE 40 MG: 40 INJECTION, POWDER, FOR SOLUTION INTRAMUSCULAR; INTRAVENOUS at 09:29

## 2020-08-22 RX ADMIN — SODIUM CHLORIDE, PRESERVATIVE FREE 10 ML: 5 INJECTION INTRAVENOUS at 09:29

## 2020-08-22 RX ADMIN — SODIUM CHLORIDE, PRESERVATIVE FREE 10 ML: 5 INJECTION INTRAVENOUS at 21:10

## 2020-08-22 RX ADMIN — MONTELUKAST SODIUM 10 MG: 10 TABLET, FILM COATED ORAL at 21:10

## 2020-08-22 RX ADMIN — BUSPIRONE HYDROCHLORIDE 15 MG: 15 TABLET ORAL at 15:20

## 2020-08-22 RX ADMIN — METHYLPREDNISOLONE SODIUM SUCCINATE 40 MG: 40 INJECTION, POWDER, FOR SOLUTION INTRAMUSCULAR; INTRAVENOUS at 17:44

## 2020-08-22 RX ADMIN — SPIRONOLACTONE 25 MG: 25 TABLET ORAL at 17:44

## 2020-08-22 RX ADMIN — GUAIFENESIN 1200 MG: 600 TABLET, EXTENDED RELEASE ORAL at 09:29

## 2020-08-22 RX ADMIN — MODAFINIL 100 MG: 100 TABLET ORAL at 09:31

## 2020-08-22 RX ADMIN — SERTRALINE HYDROCHLORIDE 200 MG: 100 TABLET ORAL at 09:29

## 2020-08-22 RX ADMIN — SPIRONOLACTONE 25 MG: 25 TABLET ORAL at 09:30

## 2020-08-22 RX ADMIN — PANTOPRAZOLE SODIUM 40 MG: 40 TABLET, DELAYED RELEASE ORAL at 09:35

## 2020-08-22 RX ADMIN — IPRATROPIUM BROMIDE AND ALBUTEROL SULFATE 1 AMPULE: .5; 3 SOLUTION RESPIRATORY (INHALATION) at 10:41

## 2020-08-22 RX ADMIN — IPRATROPIUM BROMIDE AND ALBUTEROL SULFATE 1 AMPULE: .5; 3 SOLUTION RESPIRATORY (INHALATION) at 14:32

## 2020-08-22 RX ADMIN — BUSPIRONE HYDROCHLORIDE 15 MG: 15 TABLET ORAL at 21:10

## 2020-08-22 RX ADMIN — AZITHROMYCIN DIHYDRATE 500 MG: 500 INJECTION, POWDER, LYOPHILIZED, FOR SOLUTION INTRAVENOUS at 09:42

## 2020-08-22 RX ADMIN — GUAIFENESIN 1200 MG: 600 TABLET, EXTENDED RELEASE ORAL at 21:10

## 2020-08-22 RX ADMIN — SODIUM CHLORIDE, PRESERVATIVE FREE 1 G: 5 INJECTION INTRAVENOUS at 09:31

## 2020-08-22 RX ADMIN — FOLIC ACID 1 MG: 1 TABLET ORAL at 09:29

## 2020-08-22 RX ADMIN — FUROSEMIDE 20 MG: 20 TABLET ORAL at 09:30

## 2020-08-22 RX ADMIN — IPRATROPIUM BROMIDE AND ALBUTEROL SULFATE 1 AMPULE: .5; 3 SOLUTION RESPIRATORY (INHALATION) at 06:35

## 2020-08-22 RX ADMIN — IPRATROPIUM BROMIDE AND ALBUTEROL SULFATE 1 AMPULE: .5; 3 SOLUTION RESPIRATORY (INHALATION) at 18:32

## 2020-08-22 RX ADMIN — ACETAMINOPHEN 650 MG: 325 TABLET, FILM COATED ORAL at 17:44

## 2020-08-22 RX ADMIN — METHYLPREDNISOLONE SODIUM SUCCINATE 40 MG: 40 INJECTION, POWDER, FOR SOLUTION INTRAMUSCULAR; INTRAVENOUS at 02:21

## 2020-08-22 RX ADMIN — FLUTICASONE PROPIONATE 1 SPRAY: 50 SPRAY, METERED NASAL at 09:29

## 2020-08-22 ASSESSMENT — ENCOUNTER SYMPTOMS
EYE ITCHING: 0
VOMITING: 0
NAUSEA: 0
CONSTIPATION: 0
DIARRHEA: 0
SORE THROAT: 0
COUGH: 0
ABDOMINAL PAIN: 0
SHORTNESS OF BREATH: 1
WHEEZING: 0
EYE DISCHARGE: 0

## 2020-08-22 ASSESSMENT — PAIN DESCRIPTION - LOCATION: LOCATION: GENERALIZED

## 2020-08-22 ASSESSMENT — PAIN DESCRIPTION - PAIN TYPE: TYPE: CHRONIC PAIN

## 2020-08-22 ASSESSMENT — PAIN SCALES - GENERAL
PAINLEVEL_OUTOF10: 7
PAINLEVEL_OUTOF10: 4
PAINLEVEL_OUTOF10: 0

## 2020-08-22 NOTE — PROGRESS NOTES
Progress Note    Patient name: Jabier Nair  Patient : 1950  MR #[id number]  Room: 725    Subjective: feeling better. Less short of breath. Good urinary output. Eating bkfast, sitter Anayeli at bedside. Honey thickened liquids. HISTORY OF PRESENT ILLNESS:  Guerita aHnsen is well-known to the clinic. She is a 78year old female with advanced lung cancer. She is currently being seen by Dr. Hailey Rudd. She was last seen 2020. She presented to clinic with increasing short of breath. She was referred to the emergency to be admitted. The patient was seen by Dr. Susana Pfeiffer this morning and a hospice consult was placed. She met with hospice and decided to go home with hospice care.     Subjective:  Guerita Hansen is a 78-year-old  female managed in this office with the following primary and secondary diagnoses as follows:  · Resected, stage IB adenocarcinoma of the left lung on 2017. · Adjuvant cisplatinum/Alimta x 4 completed on 17. · A recurrent 2 x 2.6 x 2.6 cm high right frontal vertex mass was resected 1/15/2018- adjuvant SRS to the right frontal CNS was delivered  · Adjuvant Carboplatin, Alimta and Keytruda x 6 completed on 2018. · 1.3 cm right adrenal nodule - stable  · 1.9 cm right kidney cyst     Maintenance Alimta/Keytruda was initiated on 10/16/2018.    Madelaine Wilkins went on and off Keytruda that was eventually discontinued on 2019.    She went on and off maintenance Alimta, and eventually discontinued single agent maintenance Alimta course #13 on 2019 due to poor tolerability, deconditioning and multiple hospital admissions.     May comes today again in hospital followup accompanied by a caregiver Quincy Scott for evaluation, assessment and opinion on a recent left axillary lymph node biopsy and repeat imaging studies.        TARGET TUMOR SITE:  1. Resected LLL of lung 2017   2.  Resected 2 x 2.6 x 2.6 cm mass from the high right frontal vertex of the brain 1/15/2018     TUMOR HISTORY: Resected, Stage IB moderately differentiated adenocarcinoma of left lung, pT2a, N0,M0. 01/06/2017. Ms. Esau Story was seen in initial Oncology consultation on 2/22/2017 f referred by Dr. Kevin Gomez with a diagnosis of a resected moderately differentiated adenocarcinoma of the left lung. In October of 2016 Jennifer Case presented to Dr. Opal Huang with complaints of hemoptysis for 2 months. She has a significant history of nicotine abuse but quit smoking in October 2016.     CT scan of chest on 10/31/2016 revealed a partially necrotic, noncalcified mass measuring 4.1 x 3.2 x 3.7 cm in the posterior segment of the LLL with associated pleural effusions. There was no evidence of mediastinal or hilar mass nor lymphadenopathy. A 2.2 cm nodule was noted in the upper pole of the right kidney.     A transthorasic needle biopsy on 11/22/2016 by Dr. Kelvin Grier revealed evidence of adenocarcinoma.     PET scan on 12/16/2016 revealed a mass in the LLL with hypermetabolic activity with a maximum SUV of 7. 1.      A chest x-ray on 1/4/2017 revealed a 3.8 cm mass lesion in the LLL that previously measured approximately 4.1 cm.      On 1/6/2017, a left thoracotomy with left lower lobectomy and mediastinal lymph node dissection was performed by Dr. Rob Mcdaniel. Pathology revealed invasive moderately differentiated adenocarcinoma. The tumor measured 4.5 cm. Margins were clear of invasive carcinoma and  no lymphovascular space invasion was noted. 5 of 5 lymph nodes were negative for metastatic carcinoma.      Bilateral renal ultrasounds on 2/28/2017 identified a complex cyst at the upper pole of the right kidney measuring up to 3 cm increased in size compared to a study on 12/28/2012 where it measured 2.1 cm in maximum diameter.  Two small benign cysts were noted in the left kidney.      A CT scan of the abdomen and pelvis on 3/8/2017 confirmed that this was a simple cyst in the upper pole of the right kidney.      Adjuvant chemotherapy was discussed at her initial visit on 2/22/2017 as well as on followup visit 3/13/2017. She is agreeable and desires to proceed accordingly with adjuvant Cisplatinum and Alimta. Blu Brunson desires however to postpone initiation of adjuvant chemotherapy until 4/11/2017. Her wishes were respected and chemotherapy delivered as noted below.     CT scan of chest on 7/27/2017 revealed postsurgical changes  and no acute process. Calcified lymph nodes are present in the subcorneal region and left hilum     CT scan of abdomen and pelvis on 7/27/2017 revealed no intra-abdominal or pelvic abnormalities.        ----------------------------------------------------------------------------------------__________________________________________        RECURRENT LUNG CARCINOMA TO THE BRAIN 1/15/2018  She presented to [facility] on 12/18/2017 with progressive headache.      MRI of the brain with and without contrast at [facility] on 12/18/2017 revealed a 2 x 2.6 x 2.6 cm rim-enhancing irregular mass within the high right frontal vertex with surrounding vasogenic edema.      CT chest with contrast at [facility] 12/19/2017 revealed some paravertebral soft tissue nodules present at T8, T9, T10 with largest being 2.5 cm. These are stable compared to 7/27/2017 PET scan which were NOT metabolically active. I.e. no obvious metastatic lesions     CT abdomen and pelvis with contrast at [facility] 12/19/2017 was unrevealing for any obvious metastatic disease.     Bone scan at [facility] 12/19/2017 revealed focal areas of increased activity in the left seventh and 10th costovertebral junctions-indeterminate.     A right craniotomy by Dr. Taye Hernandez on 1/15/2018 was performed with resection of the 2 x 2.6 x 2.6 cm high right frontal vertex mass   Pathology was consistent with metastatic adenocarcinoma with papillary features consistent with lung origin.   Molecular testing on the 1/15/2018 pathology specimen was reported from [address] [place] [address] [facility] on 3/15/2018 as follows:   EGFR -negative for mutation  ALK -negative for rearrangement  ROS 1 -negative for rearrangement   BRAF -negative for the V600E mutation  PDL-1 - expression is 80% i.e. in Positive     MRI of the brain W & WO for SRS treatment planning purposes was performed on 3/6/2018 by Dr. Mary Carmen Vega. Postsurgical changes to the previous resection site from the right frontal lobe mass area with residual enhancement was noted.      A PET scan on 2/6/2018 did not reveal scintigraphic evidence of recurrent malignancy or metastatic disease.     MRI of the brain W & WO for [facility] treatment planning purposes was performed on 3/6/2018 by Dr. Mary Carmen Vega. Postsurgical changes to the previous resection site from the right frontal lobe mass area with residual enhancement was noted.     Diane underwent SRS with 1600 cGy in one single treatment fraction on 3/26/2018 to the right frontal CNS lobe by Dima Vega and Mary Ignacio. Delays in beginning systemic therapy included issues associated with her CNS treatment delivery and multiple dog bites on her arms and hands that required attention prior to starting systemic therapy. Diane received cycle #1 of chemotherapy with Alimta, carboplatin and Keytruda on 5/14/2018. Maddie Blair completed cycle # 6 of carboplatin, Alimta and Keytruda on 9/4/2018.     MRI of the brain with and without on 9/12/2018 documented a stable 1.6 x 1.1 cm nodular enhancement along the medial aspect of the operative cavity. Kale Tony was seen by Dr. Mary Ignacio on 9/12/2018, who felt the MRI of the brain with stable without evidence of recurrence/progression.     A CT scan of the chest on 9/24/2018 did not reveal evidence of new or suspicious for urinary nodules nor intrathoracic lymphadenopathy to suggest recurrent disease in the chest.     She completed 6 cycles of Carboplatin, Alimta and Keytruda on 9/4/2018 and then went on to receive maintenance treatment with Alimta/Keytruda.   Maddie Blair reported experiencing a significant skin rash after receiving Keytruda on 11/27/2018.     Iva Imam was held from 12/18/2018-1/29/2019 (x3 cycles) during which time Sivan Mckeon was only receiving Alimta.     Mrs. Juan Soliz had repeat CT scans of the chest, abdomen and pelvis on 1/2/2019 that suggested stable disease. On 2/19/2019, Mrs. Maddie Allen resumed the combination of maintenance treatment with Alimta/Keytruda without rash or problems.     Sivan Mckeon was admitted to [facility] on 4/9/2019 with bilateral lower extremity cellulitis. She was discharged on 4/15/2019.     CT of the abdomen and pelvis with contrast at [facility] on 4/9/2019 was compared to a CT of the abdomen and pelvis from February 2013. It revealed a left-sided 3.2 x 2.2 cm paravertebral T10 soft tissue mass. 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 No evidence of acute posttraumatic injury to the brain.      CT scan of the abdomen and pelvis with contrast on 4/9/2019 documented no evidence of metastatic disease in the abdomen or pelvis. Paravertebral soft tissue masses at T10. Differential metastatic disease versus extra medullary hematopoiesis.     Maintenance Alimta/Keytruda was initiated on 10/16/2018. Sivan Mckeon reported experiencing a significant skin rash after receiving Keytruda on 11/27/2018. Iva Imam was held from 12/18/2018-1/29/2019 (x3 cycles) during which time Sivan Mckeon was only receiving Alimta.  Imaging studies on 1/2/2019 suggested stable disease.      Diane resumed maintenance Alimta/Keytruda 2/19/2019 - 5/7/1209.     Due to skin reaction manifestations, further maintenance Keytruda was not given after the 5/7/2019 dose.     Diane received course #11 of maintenance Alimta on 7/2/2019. Thereafter, maintenance Alimta was scheduled monthly. The last dose of maintenance Alimta, #13, was delivered on 9/9/2019 due to poor tolerability, deconditioning, multiple hospital admissions.     TREATMENT SUMMARY:   1. Left thoracotomy with left lower lobectomy and mediastinal lymph node dissection 01/06/2017 by Dr. Usha Cason   2. Adjuvant cisplatinum and Alimta x4 cycles. 4/11/2017 -6/21/17. 3. A right craniotomy by Dr. Mounika Romero on 1/15/2018 was performed with resection of the 2 x 2.6 x 2.6 cm high right frontal vertex mass   4. Diane underwent SRS with 1600 cGy in one single treatment fraction on 3/26/2018 to the right frontal CNS lobe by Dima Chavez and Mounika Romero   5. Cycle #1 of chemotherapy with Alimta, carboplatin and [place] was delivered on 5/14/2018 and the final cycle #6 was delivered on 9/4/2018   6. Maintenance Alimta/Keytruda was initiated on 10/16/2018 with the final cycle #9 delivered on 5/7/2019. Cycle #10 of single agent maintenance Alimta was delivered on 6/11/2019 with schedule adjusted to monthly on 7/2/2019. Last dose of Alimta, #13, delivered on 9/9/2019    Subjective   REVIEW OF SYSTEMS:   Review of Systems   Constitutional: Positive for activity change and fatigue. Negative for fever. HENT: Negative for dental problem, hearing loss, mouth sores, nosebleeds and sore throat. Eyes: Negative for discharge and itching. Respiratory: Positive for shortness of breath. Negative for cough and wheezing. No hemoptysis   Cardiovascular: Negative for chest pain, palpitations and leg swelling. Gastrointestinal: Negative for abdominal pain, constipation, diarrhea, nausea and vomiting. Endocrine: Negative for cold intolerance and heat intolerance. Genitourinary: Negative for dysuria, frequency, hematuria and urgency. Musculoskeletal: Positive for arthralgias. Negative for joint swelling and myalgias.         Generalized weakness    Skin: Negative for pallor and rash. Allergic/Immunologic: Negative for environmental allergies and immunocompromised state. Neurological: Negative for seizures, syncope and numbness. Hematological: Negative for adenopathy. Bruises/bleeds easily. Psychiatric/Behavioral: Negative for agitation, behavioral problems and confusion. The patient is not nervous/anxious. Objective   PHYSICAL EXAM:  Physical Exam  Vitals signs reviewed. Constitutional:       General: She is not in acute distress. Appearance: She is well-developed. She is ill-appearing. She is not toxic-appearing or diaphoretic. HENT:      Head: Normocephalic and atraumatic. Right Ear: External ear normal.      Left Ear: External ear normal.      Nose: Nose normal.      Mouth/Throat:      Mouth: Mucous membranes are moist.   Eyes:      General: No scleral icterus. Right eye: No discharge. Left eye: No discharge. Conjunctiva/sclera: Conjunctivae normal.   Neck:      Musculoskeletal: Normal range of motion and neck supple. Trachea: No tracheal deviation. Cardiovascular:      Rate and Rhythm: Normal rate and regular rhythm. Pulmonary:      Effort: Pulmonary effort is normal. No respiratory distress. Breath sounds: No wheezing or rales. Comments: Supplemental O2 in use via NC. Coarse breath sounds RLL  Abdominal:      General: Bowel sounds are normal. There is no distension. Palpations: Abdomen is soft. Tenderness: There is no abdominal tenderness. There is no guarding. Genitourinary:     Comments: Exam deferred  Musculoskeletal:         General: No tenderness or deformity. Comments: Normal ROM all four extremities. Generalized weakness   Lymphadenopathy:      Cervical:      Right cervical: No superficial cervical adenopathy. Left cervical: No superficial cervical adenopathy. Upper Body:      Right upper body: No supraclavicular adenopathy.       Left upper body: No supraclavicular adenopathy. Comments:      Skin:     General: Skin is warm and dry. Findings: Bruising (scattered, upper and lower extremities ) present. No rash. Comments: Dressing left lateral chest/coccyx. Trace edema bilateral lower extremities    Neurological:      Mental Status: She is alert and oriented to person, place, and time. Comments: follows commands, non-focal   Psychiatric:         Behavior: Behavior normal. Behavior is cooperative. Thought Content: Thought content normal.         Judgment: Judgment normal.      Comments: Alert and oriented to person, place and time. Vital Signs  /68   Pulse 87   Temp 97.9 °F (36.6 °C)   Resp 22   Ht 5' 4\" (1.626 m)   Wt 117 lb 4.8 oz (53.2 kg)   SpO2 100%   BMI 20.13 kg/m²     Intake/Output Summary (Last 24 hours) at 8/22/2020 0823  Last data filed at 8/22/2020 0747  Gross per 24 hour   Intake 100 ml   Output 2650 ml   Net -2550 ml     Labs:  CBC:   Recent Labs     08/19/20  1443 08/21/20  0307 08/22/20  0343   WBC 8.2 6.0 6.6   HGB 8.8* 7.7* 8.5*    249 278     CMP:   Recent Labs     08/19/20  1443 08/20/20  0233 08/21/20  0307 08/22/20  0343   GLUCOSE 90 94 131* 126*   BUN 17 17 18 19   CREATININE 0.8 0.9 0.8 0.8   CO2 20* 21* 24 26   CALCIUM 9.4 9.2 8.9 9.1   ALKPHOS 162*  --   --   --    AST 26  --   --   --    ALT 14  --   --   --      Hepatic:   Recent Labs     08/19/20  1443   AST 26   ALT 14   BILITOT 0.5   ALKPHOS 162*     Troponin:   Recent Labs     08/19/20  1443   TROPONINI 0.02     BNP: No results for input(s): BNP in the last 72 hours. INR:   Recent Labs     08/19/20  1443   INR 1.29*     ABG:   Recent Labs     08/19/20  1409   PHART 7.380   OKZ8SZN 36.0   PO2ART 90.0   EPI5EPM 21.3*   F1CBBKYG 95.8   BEART -3.4*     30 Day lookback of cultures:    Blood Culture Recent:   Recent Labs     08/19/20  1430   BC No Growth to date. Any change in status will be called.      Gram Stain Recent: No results for input(s): LABGRAM in the last 720 hours. Resp Culture Recent: No results for input(s): CULTRESP in the last 720 hours. Body Fluid Recent : No results for input(s): BFCX in the last 720 hours. MRSA Recent : No results for input(s): [address] in the last 720 hours. Urine Culture Recent : No results for input(s): LABURIN in the last 720 hours. Organism Recent : No results for input(s): ORG in the last 720 hours. Contrasted chest CT 8/14/2020:  1. Increasing lymphadenopathy, see body of report. 2. Multiple paraspinal masses appear relatively stable and may be    related to extra medullary hematopoiesis. 3. Small bilateral pleural effusions. 4. Infiltrates in the right middle and right lower lobes are improved    compared to the prior study. There are linear opacities in both lungs    likely due to atelectasis. 5. Centrilobular emphysema. Dilated pulmonary artery suggesting    pulmonary hypertension. Signed by Dr Tera Aj on 8/14/2020 9:14 AM      Contrasted abdominal/pelvic CT 8/14/2020:  1. Loculated appearing effusion within the right lateral lung base. There is thickening of the visceral and parietal pleura as well as    peripheral consolidation within the right lower lobe. A smaller    left-sided effusion is present with bibasilar atelectasis. 2. Paraspinal masses are stable from the previous study. Could    represent metastatic disease or schwannomas. Overall no change from    the previous exam in these lesions. 3. Persistent splenomegaly. The adrenal glands remain enlarged with a    small right adrenal nodule present. 4. Cortical cysts of the kidneys. No evidence of obstructive uropathy. 5. Moderate constipation. A left lower quadrant colostomy is present. .    No evidence of obstruction. There is retained fecal material within    the Kim's pouch.     6. Rather diffuse third spacing of fluid/anasarca with diffuse    induration of the subcutaneous fat as well as induration of the intraperitoneal fat. This could be related to hypoalbuminemia. Signed by Dr Renea Carroll on 8/14/2020 9:11 AM       ASSESSMENT/PLAN:  #Acute respiratory failure- combination of lung cancer progression/COPD/acute diastolic heart failure  off IV Lasix drip as of this morning, followed by cardiology  Continue current breathing treatments and supportive care. #Advanced lung cancer  Patient has received several lines treatment the past.  Clinical progression. Patient has discussed hospice and will follow-up at discharge. #Poor prognosis- follow-up with hospice. #Microcytic anemia- no intervention. Hgb 8.5, MCV 80.2  Iron profile performed in May 2020 was consistent with anemia of chronic disease    #risk for aspiration  ST following, honey thickened liquids    PLAN:  Continue current supportive treatment  Diuresing, follow-up with cardiology  Disposition - palliative care assisting    Discussed with patient and Anayeli ocampo at bedside. Martha Brambila, HAKEEM  08/22/20  8:23 AM  Physician's attestation/substantial contribution:  I, Dr Sameera Shepherd, independently performed an evaluation on Raynald Perches. I have reviewed relevant medical information/data to include but not limited to medication list, relevant appropriate labs and imaging when applicable. I reviewed other physician's notes, ancillary services and nurses assessment. I have reviewed the above documentation completed by the Nurse Practitioner or Physician Assistant. Please see my additional contributions to the history of present illness, physical examination, and assessment/medical decision-making that reflect my findings and impressions. I discussed essential elements of the care plan with the NP or PA and the patient as well. I answered all the questions to the patient's satisfaction. I concur with above stated. Subjective-feeling better. Objective- ill-appearing, cachectic  Assessment/plan:  Lung cancer- advanced disease. Poor prognosis. Discussion with palliative care/hospice ongoing.     Breezy Carolina MD

## 2020-08-22 NOTE — PROGRESS NOTES
Progress Note  Oleksandr De La Vega  8/21/2020 8:31 PM  Subjective:   Admit Date:   8/19/2020      CC/ADMIT DX:       Interval History:   Reviewed overnight events and nursing notes. She has less SOA. No CP. Her edema is improved. I have reviewed all labs/diagnostics from the last 24hrs. ROS:   I have done a 10 point ROS and all are negative, except what is mentioned in the HPI. DIET DYSPHAGIA SOFT AND BITE-SIZED; Moderately Thick (Honey)    Medications:      furosemide (LASIX) 1mg/ml infusion 10 mg/hr (08/21/20 1535)      methylPREDNISolone  40 mg Intravenous Q8H    cefTRIAXone (ROCEPHIN) IV  1 g Intravenous Q24H    azithromycin  500 mg Intravenous Q24H    sodium chloride flush  10 mL Intravenous 2 times per day    enoxaparin  40 mg Subcutaneous Q24H    ipratropium-albuterol  1 ampule Inhalation Q4H WA    guaiFENesin  1,200 mg Oral BID    spironolactone  25 mg Oral BID    sertraline  200 mg Oral Daily    pantoprazole  40 mg Oral QAM AC    montelukast  10 mg Oral Nightly    modafinil  100 mg Oral Daily    magnesium oxide  400 mg Oral Daily    folic acid  1 mg Oral Daily    fluticasone  1 spray Each Nostril Daily    busPIRone  15 mg Oral TID           Objective:   Vitals: BP 93/66   Pulse 102   Temp 99 °F (37.2 °C) (Temporal)   Resp 18   Ht 5' 4\" (1.626 m)   Wt 133 lb 1.6 oz (60.4 kg)   SpO2 100%   BMI 22.85 kg/m²      Intake/Output Summary (Last 24 hours) at 8/21/2020 2031  Last data filed at 8/21/2020 1132  Gross per 24 hour   Intake 110 ml   Output 1150 ml   Net -1040 ml     General appearance: alert and cooperative with exam  Lungs: coarse  Heart: RRR  Abdomen: soft, non-tender; bowel sounds normal; no masses,  no organomegaly  Extremities: extremities normal, atraumatic, no cyanosis or edema  Neurologic:  No obvious focal neurologic deficits. Assessment and Plan:    Active Problems:    Acute diastolic (congestive) heart failure (HCC)    Volume overload  Resolved Problems:    * No resolved hospital problems. *    Metastatic Lung CA    COPD Exacerbation    Plan:  1. Continue present medication(s)   2. Follow with Oncology and Cardiology  3. Continue with diuresis and follow renal function  4. Supportive care      Discharge planning:   her home     Reviewed treatment plans with the patient and/or family.              Electronically signed by Kirstin Escobar MD on 8/21/2020 at 8:31 PM

## 2020-08-22 NOTE — PROGRESS NOTES
Progress Note  Adeel Cason  8/22/2020 3:54 PM  Subjective:   Admit Date:   8/19/2020      CC/ADMIT DX:       Interval History:   Reviewed overnight events and nursing notes. She has no CP. Her SOA is improved. No GI issues. I have reviewed all labs/diagnostics from the last 24hrs. ROS:   I have done a 10 point ROS and all are negative, except what is mentioned in the HPI. DIET DYSPHAGIA SOFT AND BITE-SIZED; Moderately Thick (Honey)    Medications:      furosemide (LASIX) 1mg/ml infusion Stopped (08/22/20 0840)      furosemide  20 mg Oral Daily    methylPREDNISolone  40 mg Intravenous Q8H    cefTRIAXone (ROCEPHIN) IV  1 g Intravenous Q24H    azithromycin  500 mg Intravenous Q24H    sodium chloride flush  10 mL Intravenous 2 times per day    enoxaparin  40 mg Subcutaneous Q24H    ipratropium-albuterol  1 ampule Inhalation Q4H WA    guaiFENesin  1,200 mg Oral BID    spironolactone  25 mg Oral BID    sertraline  200 mg Oral Daily    pantoprazole  40 mg Oral QAM AC    montelukast  10 mg Oral Nightly    modafinil  100 mg Oral Daily    magnesium oxide  400 mg Oral Daily    folic acid  1 mg Oral Daily    fluticasone  1 spray Each Nostril Daily    busPIRone  15 mg Oral TID           Objective:   Vitals: BP (!) 88/59   Pulse 102   Temp 97.2 °F (36.2 °C) (Temporal)   Resp 18   Ht 5' 4\" (1.626 m)   Wt 117 lb 4.8 oz (53.2 kg)   SpO2 91%   BMI 20.13 kg/m²      Intake/Output Summary (Last 24 hours) at 8/22/2020 1554  Last data filed at 8/22/2020 0747  Gross per 24 hour   Intake --   Output 2150 ml   Net -2150 ml     General appearance: alert and cooperative with exam  Lungs: coarse  Heart: RRR  Abdomen: soft, non-tender; bowel sounds normal; no masses,  no organomegaly  Extremities: extremities normal, atraumatic, no cyanosis or edema  Neurologic:  No obvious focal neurologic deficits. Assessment and Plan:    Active Problems:    Acute diastolic (congestive) heart failure (HCC)    Volume

## 2020-08-22 NOTE — PLAN OF CARE
Problem: Skin Integrity:  Goal: Will show no infection signs and symptoms  Description: Will show no infection signs and symptoms  Outcome: Ongoing  Goal: Absence of new skin breakdown  Description: Absence of new skin breakdown  Outcome: Ongoing     Problem: Falls - Risk of:  Goal: Will remain free from falls  Description: Will remain free from falls  Outcome: Ongoing  Goal: Absence of physical injury  Description: Absence of physical injury  Outcome: Ongoing     Problem: SAFETY  Goal: Free from accidental physical injury  Outcome: Ongoing     Problem: DAILY CARE  Goal: Daily care needs are met  Outcome: Ongoing

## 2020-08-23 LAB
ANION GAP SERPL CALCULATED.3IONS-SCNC: 12 MMOL/L (ref 7–19)
BUN BLDV-MCNC: 18 MG/DL (ref 8–23)
CALCIUM SERPL-MCNC: 9 MG/DL (ref 8.8–10.2)
CHLORIDE BLD-SCNC: 95 MMOL/L (ref 98–111)
CO2: 30 MMOL/L (ref 22–29)
CREAT SERPL-MCNC: 0.7 MG/DL (ref 0.5–0.9)
GFR AFRICAN AMERICAN: >59
GFR NON-AFRICAN AMERICAN: >60
GLUCOSE BLD-MCNC: 106 MG/DL (ref 74–109)
GLUCOSE BLD-MCNC: 123 MG/DL (ref 70–99)
GLUCOSE BLD-MCNC: 131 MG/DL (ref 70–99)
GLUCOSE BLD-MCNC: 142 MG/DL (ref 70–99)
GLUCOSE BLD-MCNC: 151 MG/DL (ref 70–99)
HCT VFR BLD CALC: 29 % (ref 37–47)
HEMOGLOBIN: 8.7 G/DL (ref 12–16)
MCH RBC QN AUTO: 24.1 PG (ref 27–31)
MCHC RBC AUTO-ENTMCNC: 30 G/DL (ref 33–37)
MCV RBC AUTO: 80.3 FL (ref 81–99)
PDW BLD-RTO: 17.6 % (ref 11.5–14.5)
PERFORMED ON: ABNORMAL
PLATELET # BLD: 275 K/UL (ref 130–400)
PMV BLD AUTO: 10 FL (ref 9.4–12.3)
POTASSIUM SERPL-SCNC: 3.4 MMOL/L (ref 3.5–5)
RBC # BLD: 3.61 M/UL (ref 4.2–5.4)
SODIUM BLD-SCNC: 137 MMOL/L (ref 136–145)
WBC # BLD: 7.4 K/UL (ref 4.8–10.8)

## 2020-08-23 PROCEDURE — 94640 AIRWAY INHALATION TREATMENT: CPT

## 2020-08-23 PROCEDURE — 80048 BASIC METABOLIC PNL TOTAL CA: CPT

## 2020-08-23 PROCEDURE — 2700000000 HC OXYGEN THERAPY PER DAY

## 2020-08-23 PROCEDURE — 82947 ASSAY GLUCOSE BLOOD QUANT: CPT

## 2020-08-23 PROCEDURE — 2580000003 HC RX 258: Performed by: FAMILY MEDICINE

## 2020-08-23 PROCEDURE — 36415 COLL VENOUS BLD VENIPUNCTURE: CPT

## 2020-08-23 PROCEDURE — 6370000000 HC RX 637 (ALT 250 FOR IP): Performed by: FAMILY MEDICINE

## 2020-08-23 PROCEDURE — 2140000000 HC CCU INTERMEDIATE R&B

## 2020-08-23 PROCEDURE — 6370000000 HC RX 637 (ALT 250 FOR IP): Performed by: INTERNAL MEDICINE

## 2020-08-23 PROCEDURE — 6360000002 HC RX W HCPCS: Performed by: FAMILY MEDICINE

## 2020-08-23 PROCEDURE — 85027 COMPLETE CBC AUTOMATED: CPT

## 2020-08-23 PROCEDURE — 99231 SBSQ HOSP IP/OBS SF/LOW 25: CPT | Performed by: INTERNAL MEDICINE

## 2020-08-23 RX ORDER — METHYLPREDNISOLONE SODIUM SUCCINATE 40 MG/ML
40 INJECTION, POWDER, LYOPHILIZED, FOR SOLUTION INTRAMUSCULAR; INTRAVENOUS EVERY 12 HOURS
Status: DISCONTINUED | OUTPATIENT
Start: 2020-08-23 | End: 2020-08-25

## 2020-08-23 RX ORDER — POTASSIUM CHLORIDE 20 MEQ/1
40 TABLET, EXTENDED RELEASE ORAL ONCE
Status: COMPLETED | OUTPATIENT
Start: 2020-08-23 | End: 2020-08-23

## 2020-08-23 RX ADMIN — PANTOPRAZOLE SODIUM 40 MG: 40 TABLET, DELAYED RELEASE ORAL at 09:05

## 2020-08-23 RX ADMIN — ACETAMINOPHEN 650 MG: 325 TABLET, FILM COATED ORAL at 15:26

## 2020-08-23 RX ADMIN — SPIRONOLACTONE 25 MG: 25 TABLET ORAL at 08:58

## 2020-08-23 RX ADMIN — SPIRONOLACTONE 25 MG: 25 TABLET ORAL at 17:52

## 2020-08-23 RX ADMIN — ENOXAPARIN SODIUM 40 MG: 40 INJECTION SUBCUTANEOUS at 20:34

## 2020-08-23 RX ADMIN — MODAFINIL 100 MG: 100 TABLET ORAL at 08:58

## 2020-08-23 RX ADMIN — GUAIFENESIN 1200 MG: 600 TABLET, EXTENDED RELEASE ORAL at 08:58

## 2020-08-23 RX ADMIN — MONTELUKAST SODIUM 10 MG: 10 TABLET, FILM COATED ORAL at 20:34

## 2020-08-23 RX ADMIN — Medication 400 MG: at 08:59

## 2020-08-23 RX ADMIN — FUROSEMIDE 20 MG: 20 TABLET ORAL at 08:58

## 2020-08-23 RX ADMIN — METHYLPREDNISOLONE SODIUM SUCCINATE 40 MG: 40 INJECTION, POWDER, FOR SOLUTION INTRAMUSCULAR; INTRAVENOUS at 08:54

## 2020-08-23 RX ADMIN — METHYLPREDNISOLONE SODIUM SUCCINATE 40 MG: 40 INJECTION, POWDER, FOR SOLUTION INTRAMUSCULAR; INTRAVENOUS at 02:03

## 2020-08-23 RX ADMIN — GUAIFENESIN 1200 MG: 600 TABLET, EXTENDED RELEASE ORAL at 20:34

## 2020-08-23 RX ADMIN — IPRATROPIUM BROMIDE AND ALBUTEROL SULFATE 1 AMPULE: .5; 3 SOLUTION RESPIRATORY (INHALATION) at 15:11

## 2020-08-23 RX ADMIN — BUSPIRONE HYDROCHLORIDE 15 MG: 15 TABLET ORAL at 13:16

## 2020-08-23 RX ADMIN — BUSPIRONE HYDROCHLORIDE 15 MG: 15 TABLET ORAL at 20:34

## 2020-08-23 RX ADMIN — SODIUM CHLORIDE, PRESERVATIVE FREE 10 ML: 5 INJECTION INTRAVENOUS at 20:34

## 2020-08-23 RX ADMIN — FOLIC ACID 1 MG: 1 TABLET ORAL at 08:58

## 2020-08-23 RX ADMIN — POTASSIUM CHLORIDE 40 MEQ: 1500 TABLET, EXTENDED RELEASE ORAL at 17:51

## 2020-08-23 RX ADMIN — ENOXAPARIN SODIUM 40 MG: 40 INJECTION SUBCUTANEOUS at 00:20

## 2020-08-23 RX ADMIN — AZITHROMYCIN DIHYDRATE 500 MG: 500 INJECTION, POWDER, LYOPHILIZED, FOR SOLUTION INTRAVENOUS at 12:00

## 2020-08-23 RX ADMIN — BUSPIRONE HYDROCHLORIDE 15 MG: 15 TABLET ORAL at 08:59

## 2020-08-23 RX ADMIN — IPRATROPIUM BROMIDE AND ALBUTEROL SULFATE 1 AMPULE: .5; 3 SOLUTION RESPIRATORY (INHALATION) at 18:39

## 2020-08-23 RX ADMIN — SERTRALINE HYDROCHLORIDE 200 MG: 100 TABLET ORAL at 08:59

## 2020-08-23 RX ADMIN — IPRATROPIUM BROMIDE AND ALBUTEROL SULFATE 1 AMPULE: .5; 3 SOLUTION RESPIRATORY (INHALATION) at 06:52

## 2020-08-23 RX ADMIN — SODIUM CHLORIDE, PRESERVATIVE FREE 10 ML: 5 INJECTION INTRAVENOUS at 08:59

## 2020-08-23 RX ADMIN — SODIUM CHLORIDE, PRESERVATIVE FREE 1 G: 5 INJECTION INTRAVENOUS at 08:54

## 2020-08-23 RX ADMIN — METHYLPREDNISOLONE SODIUM SUCCINATE 40 MG: 40 INJECTION, POWDER, FOR SOLUTION INTRAMUSCULAR; INTRAVENOUS at 20:34

## 2020-08-23 RX ADMIN — FLUTICASONE PROPIONATE 1 SPRAY: 50 SPRAY, METERED NASAL at 08:54

## 2020-08-23 RX ADMIN — IPRATROPIUM BROMIDE AND ALBUTEROL SULFATE 1 AMPULE: .5; 3 SOLUTION RESPIRATORY (INHALATION) at 10:44

## 2020-08-23 ASSESSMENT — ENCOUNTER SYMPTOMS
COUGH: 1
DIARRHEA: 0
ABDOMINAL PAIN: 0
WHEEZING: 0
EYE ITCHING: 0
SORE THROAT: 0
CONSTIPATION: 0
EYE DISCHARGE: 0
VOMITING: 0
NAUSEA: 0
SHORTNESS OF BREATH: 1

## 2020-08-23 ASSESSMENT — PAIN DESCRIPTION - LOCATION: LOCATION: OTHER (COMMENT)

## 2020-08-23 ASSESSMENT — PAIN DESCRIPTION - PAIN TYPE: TYPE: CHRONIC PAIN

## 2020-08-23 ASSESSMENT — PAIN SCALES - GENERAL
PAINLEVEL_OUTOF10: 0
PAINLEVEL_OUTOF10: 7
PAINLEVEL_OUTOF10: 0

## 2020-08-23 NOTE — PROGRESS NOTES
problems. *    Metastatic Lung CA    COPD Exacerbation    Plan:  1. Continue present medication(s)   2. Replace K +  3. Wean steroids  4. Follow with labs      Discharge planning:   her home with Hospice vs back to [facility] with Hospice    Reviewed treatment plans with the patient and/or family.              Electronically signed by Rob Freeman MD on 8/23/2020 at 3:23 PM

## 2020-08-23 NOTE — PROGRESS NOTES
Progress Note    Patient name: Buck Lucero  Patient : 1950  MR #[id number]  Room: 725    Subjective: feeling better. Less short of breath. States \"had 1 episode yesterday\" otherwise better. Continuing to wean oxygen, currently 4L via NC. Generalized weakness    HISTORY OF PRESENT ILLNESS:  Saida Luevano is well-known to the clinic. She is a 78year old female with advanced lung cancer. She is currently being seen by Dr. Suly Orozco. She was last seen 2020. She presented to clinic with increasing short of breath. She was referred to the emergency to be admitted. The patient was seen by Dr. Dominic Sanders this morning and a hospice consult was placed. She met with hospice and decided to go home with hospice care.     Subjective:  Saida Luevano is a 78-year-old  female managed in this office with the following primary and secondary diagnoses as follows:  · Resected, stage IB adenocarcinoma of the left lung on 2017. · Adjuvant cisplatinum/Alimta x 4 completed on 17. · A recurrent 2 x 2.6 x 2.6 cm high right frontal vertex mass was resected 1/15/2018- adjuvant SRS to the right frontal CNS was delivered  · Adjuvant Carboplatin, Alimta and Keytruda x 6 completed on 2018. · 1.3 cm right adrenal nodule - stable  · 1.9 cm right kidney cyst     Maintenance Alimta/Keytruda was initiated on 10/16/2018.    Tyrone Rodriguez went on and off Keytruda that was eventually discontinued on 2019.    She went on and off maintenance Alimta, and eventually discontinued single agent maintenance Alimta course #13 on 2019 due to poor tolerability, deconditioning and multiple hospital admissions.     May comes today again in hospital followup accompanied by a caregiver Andie Pena for evaluation, assessment and opinion on a recent left axillary lymph node biopsy and repeat imaging studies.        TARGET TUMOR SITE:  1. Resected LLL of lung 2017   2.  Resected 2 x 2.6 x 2.6 cm mass from the high right frontal vertex of the brain 1/15/2018     TUMOR HISTORY: Resected, Stage IB moderately differentiated adenocarcinoma of left lung, pT2a, N0,M0. 01/06/2017. Ms. Kellie Ni was seen in initial Oncology consultation on 2/22/2017 f referred by Dr. Helga Marcos with a diagnosis of a resected moderately differentiated adenocarcinoma of the left lung. In October of 2016 [facility] presented to Dr. Ayaan Fox with complaints of hemoptysis for 2 months. She has a significant history of nicotine abuse but quit smoking in October 2016.     CT scan of chest on 10/31/2016 revealed a partially necrotic, noncalcified mass measuring 4.1 x 3.2 x 3.7 cm in the posterior segment of the LLL with associated pleural effusions. There was no evidence of mediastinal or hilar mass nor lymphadenopathy. A 2.2 cm nodule was noted in the upper pole of the right kidney.     A transthorasic needle biopsy on 11/22/2016 by Dr. Shari Douglas revealed evidence of adenocarcinoma.     PET scan on 12/16/2016 revealed a mass in the LLL with hypermetabolic activity with a maximum SUV of 7. 1.      A chest x-ray on 1/4/2017 revealed a 3.8 cm mass lesion in the LLL that previously measured approximately 4.1 cm.      On 1/6/2017, a left thoracotomy with left lower lobectomy and mediastinal lymph node dissection was performed by Dr. Ilnee Munoz. Pathology revealed invasive moderately differentiated adenocarcinoma. The tumor measured 4.5 cm. Margins were clear of invasive carcinoma and  no lymphovascular space invasion was noted. 5 of 5 lymph nodes were negative for metastatic carcinoma.      Bilateral renal ultrasounds on 2/28/2017 identified a complex cyst at the upper pole of the right kidney measuring up to 3 cm increased in size compared to a study on 12/28/2012 where it measured 2.1 cm in maximum diameter.  Two small benign cysts were noted in the left kidney.      A CT scan of the abdomen and pelvis on 3/8/2017 confirmed that this was a Alimta/Keytruda. Jake Shea reported experiencing a significant skin rash after receiving Keytruda on 11/27/2018.     Katy Males was held from 12/18/2018-1/29/2019 (x3 cycles) during which time Jake Shea was only receiving Alimta.     Mrs. Valerio First had repeat CT scans of the chest, abdomen and pelvis on 1/2/2019 that suggested stable disease. On 2/19/2019, Mrs. Troy Sanchez resumed the combination of maintenance treatment with Alimta/Keytruda without rash or problems.     Jake Shea was admitted to [facility] on 4/9/2019 with bilateral lower extremity cellulitis. She was discharged on 4/15/2019.     CT of the abdomen and pelvis with contrast at [facility] on 4/9/2019 was compared to a CT of the abdomen and pelvis from February 2013. It revealed a left-sided 3.2 x 2.2 cm paravertebral T10 soft tissue mass. (Her prior scans were all done at 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 No evidence of acute posttraumatic injury to the brain.      CT scan of the abdomen and pelvis with contrast on 4/9/2019 documented no evidence of metastatic disease in the abdomen or pelvis. Paravertebral soft tissue masses at T10. Differential metastatic disease versus extra medullary hematopoiesis.     Maintenance Alimta/Keytruda was initiated on 10/16/2018. Jake Shea reported experiencing a significant skin rash after receiving Keytruda on 11/27/2018. Katy Zapata was held from 12/18/2018-1/29/2019 (x3 cycles) during which time Jake Shea was only receiving Alimta.  Imaging studies on 1/2/2019 suggested stable disease.      Diane resumed maintenance Alimta/Keytruda 2/19/2019 - 5/7/1209.     Due to skin reaction manifestations, further maintenance Keytruda was not given after the 5/7/2019 dose.     Diane received course #11 of maintenance Alimta on 7/2/2019. Thereafter, maintenance Alimta was scheduled monthly. The last dose of maintenance Alimta, #13, was delivered on 9/9/2019 due to poor tolerability, deconditioning, multiple hospital admissions.     TREATMENT SUMMARY:   1. Left thoracotomy with left lower lobectomy and mediastinal lymph node dissection 01/06/2017 by Dr. Felipe Christianson   2. Adjuvant cisplatinum and Alimta x4 cycles. 4/11/2017 -6/21/17. 3. A right craniotomy by Dr. Yoni Mercado on 1/15/2018 was performed with resection of the 2 x 2.6 x 2.6 cm high right frontal vertex mass   4. Diane underwent SRS with 1600 cGy in one single treatment fraction on 3/26/2018 to the right frontal CNS lobe by Dima Collazo and Yoni Mercado   5. Cycle #1 of chemotherapy with Alimta, carboplatin and Benita Chock was delivered on 5/14/2018 and the final cycle #6 was delivered on 9/4/2018   6. Maintenance Alimta/Keytruda was initiated on 10/16/2018 with the final cycle #9 delivered on 5/7/2019. Cycle #10 of single agent maintenance Alimta was delivered on 6/11/2019 with schedule adjusted to monthly on 7/2/2019. Last dose of Alimta, #13, delivered on 9/9/2019    Subjective   REVIEW OF SYSTEMS:   Review of Systems   Constitutional: Positive for activity change and fatigue. Negative for fever. HENT: Negative for dental problem, hearing loss, mouth sores, nosebleeds and sore throat. Eyes: Negative for discharge and itching. Respiratory: Positive for cough (productive; whitish/yellow sputum) and shortness of breath. Negative for wheezing. No hemoptysis   Cardiovascular: Negative for chest pain, palpitations and leg swelling. Gastrointestinal: Negative for abdominal pain, constipation, diarrhea, nausea and vomiting. Endocrine: Negative for cold intolerance and heat intolerance. Genitourinary: Negative for dysuria, frequency, hematuria and urgency. Musculoskeletal: Positive for arthralgias.  Negative for joint swelling and myalgias. Generalized weakness    Skin: Negative for pallor and rash. Allergic/Immunologic: Negative for environmental allergies and immunocompromised state. Neurological: Negative for seizures, syncope and numbness. Hematological: Negative for adenopathy. Bruises/bleeds easily. Psychiatric/Behavioral: Negative for agitation, behavioral problems and confusion. The patient is not nervous/anxious. Objective   PHYSICAL EXAM:  Physical Exam  Vitals signs reviewed. Constitutional:       General: She is not in acute distress. Appearance: She is well-developed. She is ill-appearing. She is not toxic-appearing or diaphoretic. HENT:      Head: Normocephalic and atraumatic. Right Ear: External ear normal.      Left Ear: External ear normal.      Nose: Nose normal.      Mouth/Throat:      Mouth: Mucous membranes are moist.   Eyes:      General: No scleral icterus. Right eye: No discharge. Left eye: No discharge. Conjunctiva/sclera: Conjunctivae normal.   Neck:      Musculoskeletal: Normal range of motion and neck supple. Trachea: No tracheal deviation. Cardiovascular:      Rate and Rhythm: Normal rate and regular rhythm. Pulmonary:      Effort: Pulmonary effort is normal. No respiratory distress. Breath sounds: Examination of the right-lower field reveals decreased breath sounds and rales. Decreased breath sounds and rales present. No wheezing. Comments: Supplemental O2, 4L via NC. Abdominal:      General: Bowel sounds are normal. There is no distension. Palpations: Abdomen is soft. Tenderness: There is no abdominal tenderness. There is no guarding. Genitourinary:     Comments: Exam deferred. Pure wic catheter noted to suction  Musculoskeletal:         General: No tenderness or deformity. Comments: Normal ROM all four extremities.   Generalized weakness   Lymphadenopathy:      Cervical:      Right cervical: No superficial cervical adenopathy. Left cervical: No superficial cervical adenopathy. Upper Body:      Right upper body: No supraclavicular adenopathy. Left upper body: No supraclavicular adenopathy. Comments:      Skin:     General: Skin is warm and dry. Findings: Bruising (scattered, upper and lower extremities ) present. No rash. Comments: Dressing left lateral chest/coccyx. Trace edema bilateral lower extremities    Neurological:      Mental Status: She is alert and oriented to person, place, and time. Comments: follows commands, non-focal   Psychiatric:         Behavior: Behavior normal. Behavior is cooperative. Thought Content: Thought content normal.         Judgment: Judgment normal.      Comments: Alert and oriented to person, place and time. Vital Signs  BP 99/65   Pulse 94   Temp 97.5 °F (36.4 °C) (Temporal)   Resp 18   Ht 5' 4\" (1.626 m)   Wt 117 lb 9.6 oz (53.3 kg)   SpO2 100%   BMI 20.19 kg/m²     Intake/Output Summary (Last 24 hours) at 8/23/2020 0754  Last data filed at 8/23/2020 0644  Gross per 24 hour   Intake 190 ml   Output 1650 ml   Net -1460 ml     Labs:  CBC:   Recent Labs     08/21/20  0307 08/22/20  0343 08/23/20  0419   WBC 6.0 6.6 7.4   HGB 7.7* 8.5* 8.7*    278 275     CMP:   Recent Labs     08/21/20  0307 08/22/20  0343 08/23/20  0419   GLUCOSE 131* 126* 106   BUN 18 19 18   CREATININE 0.8 0.8 0.7   CO2 24 26 30*   CALCIUM 8.9 9.1 9.0     Hepatic:   No results for input(s): AST, ALT, ALB, BILITOT, ALKPHOS in the last 72 hours. Troponin:   No results for input(s): TROPONINI in the last 72 hours. BNP: No results for input(s): BNP in the last 72 hours. INR:   No results for input(s): INR in the last 72 hours. ABG:   No results for input(s): PHART, KMC1FXD, PO2ART, JOO5SXF, W1BCMQKL, BEART in the last 72 hours. 30 Day lookback of cultures:    Blood Culture Recent:   Recent Labs     08/19/20  1430   BC No Growth to date.   Any change in status will be called. Gram Stain Recent: No results for input(s): LABGRAM in the last 720 hours. Resp Culture Recent: No results for input(s): CULTRESP in the last 720 hours. Body Fluid Recent : No results for input(s): BFCX in the last 720 hours. MRSA Recent : No results for input(s): [address] in the last 720 hours. Urine Culture Recent : No results for input(s): LABURIN in the last 720 hours. Organism Recent : No results for input(s): ORG in the last 720 hours. Contrasted chest CT 8/14/2020:  1. Increasing lymphadenopathy, see body of report. 2. Multiple paraspinal masses appear relatively stable and may be    related to extra medullary hematopoiesis. 3. Small bilateral pleural effusions. 4. Infiltrates in the right middle and right lower lobes are improved    compared to the prior study. There are linear opacities in both lungs    likely due to atelectasis. 5. Centrilobular emphysema. Dilated pulmonary artery suggesting    pulmonary hypertension. Signed by Dr Louis Gan on 8/14/2020 9:14 AM      Contrasted abdominal/pelvic CT 8/14/2020:  1. Loculated appearing effusion within the right lateral lung base. There is thickening of the visceral and parietal pleura as well as    peripheral consolidation within the right lower lobe. A smaller    left-sided effusion is present with bibasilar atelectasis. 2. Paraspinal masses are stable from the previous study. Could    represent metastatic disease or schwannomas. Overall no change from    the previous exam in these lesions. 3. Persistent splenomegaly. The adrenal glands remain enlarged with a    small right adrenal nodule present. 4. Cortical cysts of the kidneys. No evidence of obstructive uropathy. 5. Moderate constipation. A left lower quadrant colostomy is present. .    No evidence of obstruction. There is retained fecal material within    the Kim's pouch.     6. Rather diffuse third spacing of fluid/anasarca with diffuse induration of the subcutaneous fat as well as induration of the    intraperitoneal fat. This could be related to hypoalbuminemia. Signed by Dr Anna Bishop on 8/14/2020 9:11 AM       ASSESSMENT/PLAN:  #Acute respiratory failure- combination of lung cancer progression/COPD/acute diastolic heart failure  off IV Lasix drip, currently treated with oral Lasix/Aldactone. followed by cardiology  Continue current breathing treatments, steroids and supportive care. #Advanced lung cancer  Patient has received several lines treatment the past.  Clinical progression. Patient has discussed hospice and will follow-up at discharge. #Poor prognosis. Defer treatment decisions with Dr. Shaneka Domínguez as outpatient    #Microcytic anemia- no intervention. Hgb 8.7, MCV 80.3 - improved  Iron profile performed in May 2020 was consistent with anemia of chronic disease    #risk for aspiration  ST following, honey thickened liquids    PLAN:  Continue current supportive treatment  Diuresing, follow-up with cardiology  Disposition - palliative care assisting  OOB, ?PT - defer to attending    Discussed with patient and Anayeli ocampo at bedside. Kristi Kelley, HAKEEM  08/23/20  7:54 AM  Physician's attestation/substantial contribution:  I, Dr Mady Aldridge, independently performed an evaluation on Aleyda Clay. I have reviewed relevant medical information/data to include but not limited to medication list, relevant appropriate labs and imaging when applicable. I reviewed other physician's notes, ancillary services and nurses assessment. I have reviewed the above documentation completed by the Nurse Practitioner or Physician Assistant. Please see my additional contributions to the history of present illness, physical examination, and assessment/medical decision-making that reflect my findings and impressions. I discussed essential elements of the care plan with the NP or PA and the patient as well.  I answered all the questions to the patient's satisfaction. I concur with above stated. Subjective-no change, very weak. Objective- cachectic  Assessment/plan:  Advanced lung cancer-palliative care assisting. The patient is not a candidate for further anticancer therapy. Disposition being discussed the patient family members.       Breezy Carolina MD

## 2020-08-24 LAB
ANION GAP SERPL CALCULATED.3IONS-SCNC: 10 MMOL/L (ref 7–19)
BLOOD CULTURE, ROUTINE: NORMAL
BUN BLDV-MCNC: 16 MG/DL (ref 8–23)
CALCIUM SERPL-MCNC: 8.8 MG/DL (ref 8.8–10.2)
CHLORIDE BLD-SCNC: 99 MMOL/L (ref 98–111)
CO2: 30 MMOL/L (ref 22–29)
CREAT SERPL-MCNC: 0.7 MG/DL (ref 0.5–0.9)
CULTURE, BLOOD 2: NORMAL
GFR AFRICAN AMERICAN: >59
GFR NON-AFRICAN AMERICAN: >60
GLUCOSE BLD-MCNC: 103 MG/DL (ref 70–99)
GLUCOSE BLD-MCNC: 107 MG/DL (ref 70–99)
GLUCOSE BLD-MCNC: 107 MG/DL (ref 74–109)
GLUCOSE BLD-MCNC: 111 MG/DL (ref 70–99)
GLUCOSE BLD-MCNC: 127 MG/DL (ref 70–99)
PERFORMED ON: ABNORMAL
POTASSIUM SERPL-SCNC: 3.7 MMOL/L (ref 3.5–5)
SODIUM BLD-SCNC: 139 MMOL/L (ref 136–145)

## 2020-08-24 PROCEDURE — 2580000003 HC RX 258: Performed by: FAMILY MEDICINE

## 2020-08-24 PROCEDURE — 92526 ORAL FUNCTION THERAPY: CPT

## 2020-08-24 PROCEDURE — 80048 BASIC METABOLIC PNL TOTAL CA: CPT

## 2020-08-24 PROCEDURE — 6360000002 HC RX W HCPCS: Performed by: FAMILY MEDICINE

## 2020-08-24 PROCEDURE — 99231 SBSQ HOSP IP/OBS SF/LOW 25: CPT | Performed by: INTERNAL MEDICINE

## 2020-08-24 PROCEDURE — 6370000000 HC RX 637 (ALT 250 FOR IP): Performed by: FAMILY MEDICINE

## 2020-08-24 PROCEDURE — 6370000000 HC RX 637 (ALT 250 FOR IP): Performed by: INTERNAL MEDICINE

## 2020-08-24 PROCEDURE — 2140000000 HC CCU INTERMEDIATE R&B

## 2020-08-24 PROCEDURE — 36415 COLL VENOUS BLD VENIPUNCTURE: CPT

## 2020-08-24 PROCEDURE — 82947 ASSAY GLUCOSE BLOOD QUANT: CPT

## 2020-08-24 PROCEDURE — 2700000000 HC OXYGEN THERAPY PER DAY

## 2020-08-24 PROCEDURE — 94640 AIRWAY INHALATION TREATMENT: CPT

## 2020-08-24 RX ORDER — FUROSEMIDE 40 MG/1
40 TABLET ORAL DAILY
Status: DISCONTINUED | OUTPATIENT
Start: 2020-08-25 | End: 2020-08-27 | Stop reason: HOSPADM

## 2020-08-24 RX ADMIN — SPIRONOLACTONE 25 MG: 25 TABLET ORAL at 09:11

## 2020-08-24 RX ADMIN — IPRATROPIUM BROMIDE AND ALBUTEROL SULFATE 1 AMPULE: .5; 3 SOLUTION RESPIRATORY (INHALATION) at 18:32

## 2020-08-24 RX ADMIN — FOLIC ACID 1 MG: 1 TABLET ORAL at 09:11

## 2020-08-24 RX ADMIN — FUROSEMIDE 20 MG: 20 TABLET ORAL at 09:11

## 2020-08-24 RX ADMIN — GUAIFENESIN 1200 MG: 600 TABLET, EXTENDED RELEASE ORAL at 19:57

## 2020-08-24 RX ADMIN — IPRATROPIUM BROMIDE AND ALBUTEROL SULFATE 1 AMPULE: .5; 3 SOLUTION RESPIRATORY (INHALATION) at 15:13

## 2020-08-24 RX ADMIN — METHYLPREDNISOLONE SODIUM SUCCINATE 40 MG: 40 INJECTION, POWDER, FOR SOLUTION INTRAMUSCULAR; INTRAVENOUS at 09:13

## 2020-08-24 RX ADMIN — GUAIFENESIN 1200 MG: 600 TABLET, EXTENDED RELEASE ORAL at 09:11

## 2020-08-24 RX ADMIN — MODAFINIL 100 MG: 100 TABLET ORAL at 09:11

## 2020-08-24 RX ADMIN — Medication 400 MG: at 09:10

## 2020-08-24 RX ADMIN — SODIUM CHLORIDE, PRESERVATIVE FREE 10 ML: 5 INJECTION INTRAVENOUS at 09:14

## 2020-08-24 RX ADMIN — AZITHROMYCIN DIHYDRATE 500 MG: 500 INJECTION, POWDER, LYOPHILIZED, FOR SOLUTION INTRAVENOUS at 09:11

## 2020-08-24 RX ADMIN — SPIRONOLACTONE 25 MG: 25 TABLET ORAL at 17:31

## 2020-08-24 RX ADMIN — IPRATROPIUM BROMIDE AND ALBUTEROL SULFATE 1 AMPULE: .5; 3 SOLUTION RESPIRATORY (INHALATION) at 11:03

## 2020-08-24 RX ADMIN — MONTELUKAST SODIUM 10 MG: 10 TABLET, FILM COATED ORAL at 19:57

## 2020-08-24 RX ADMIN — SODIUM CHLORIDE, PRESERVATIVE FREE 10 ML: 5 INJECTION INTRAVENOUS at 19:59

## 2020-08-24 RX ADMIN — SODIUM CHLORIDE, PRESERVATIVE FREE 1 G: 5 INJECTION INTRAVENOUS at 09:13

## 2020-08-24 RX ADMIN — METHYLPREDNISOLONE SODIUM SUCCINATE 40 MG: 40 INJECTION, POWDER, FOR SOLUTION INTRAMUSCULAR; INTRAVENOUS at 19:59

## 2020-08-24 RX ADMIN — ENOXAPARIN SODIUM 40 MG: 40 INJECTION SUBCUTANEOUS at 19:58

## 2020-08-24 RX ADMIN — SERTRALINE HYDROCHLORIDE 200 MG: 100 TABLET ORAL at 09:11

## 2020-08-24 RX ADMIN — FLUTICASONE PROPIONATE 1 SPRAY: 50 SPRAY, METERED NASAL at 09:13

## 2020-08-24 RX ADMIN — BUSPIRONE HYDROCHLORIDE 15 MG: 15 TABLET ORAL at 19:58

## 2020-08-24 RX ADMIN — BUSPIRONE HYDROCHLORIDE 15 MG: 15 TABLET ORAL at 09:11

## 2020-08-24 RX ADMIN — IPRATROPIUM BROMIDE AND ALBUTEROL SULFATE 1 AMPULE: .5; 3 SOLUTION RESPIRATORY (INHALATION) at 06:58

## 2020-08-24 ASSESSMENT — ENCOUNTER SYMPTOMS
EYE DISCHARGE: 0
ABDOMINAL PAIN: 0
SHORTNESS OF BREATH: 1
CONSTIPATION: 0
WHEEZING: 0
VOMITING: 0
NAUSEA: 0
SORE THROAT: 0
EYE ITCHING: 0
COUGH: 1
DIARRHEA: 0

## 2020-08-24 ASSESSMENT — PAIN SCALES - GENERAL
PAINLEVEL_OUTOF10: 0
PAINLEVEL_OUTOF10: 0

## 2020-08-24 NOTE — PROGRESS NOTES
hospital problems. *    Metastatic Lung CA    COPD Exacerbation    Plan:  1. Continue present medication(s)   2. Wean steroids  3. D/C with Hospice in next 1-2 days      Discharge planning:   her home with Hospice vs back to [facility] with Hospice    Reviewed treatment plans with the patient and/or family.              Electronically signed by Luciano Burnette MD on 8/24/2020 at 1:51 PM

## 2020-08-24 NOTE — PROGRESS NOTES
Progress Note    Patient name: Oleksandr De La Vega  Patient : 1950  MR #[id number]  Room: 725    Subjective: Sleeping, arouses easily. Feeling somewhat better although continues to have some shortness of breath. Generalized weakness. HISTORY OF PRESENT ILLNESS:  Deepti Pappas is well-known to the clinic. She is a 78year old female with advanced lung cancer. She is currently being seen by Dr. Elaine Mae. She was last seen 2020. She presented to clinic with increasing short of breath. She was referred to the emergency to be admitted. The patient was seen by Dr. Matilde Self this morning and a hospice consult was placed. She met with hospice and decided to go home with hospice care.     Subjective:  Deepti Pappas is a 78-year-old  female managed in this office with the following primary and secondary diagnoses as follows:  · Resected, stage IB adenocarcinoma of the left lung on 2017. · Adjuvant cisplatinum/Alimta x 4 completed on 17. · A recurrent 2 x 2.6 x 2.6 cm high right frontal vertex mass was resected 1/15/2018- adjuvant SRS to the right frontal CNS was delivered  · Adjuvant Carboplatin, Alimta and Keytruda x 6 completed on 2018. · 1.3 cm right adrenal nodule - stable  · 1.9 cm right kidney cyst     Maintenance Alimta/Keytruda was initiated on 10/16/2018.    Tracy Juan went on and off Keytruda that was eventually discontinued on 2019.    She went on and off maintenance Alimta, and eventually discontinued single agent maintenance Alimta course #13 on 2019 due to poor tolerability, deconditioning and multiple hospital admissions.     May comes today again in hospital followup accompanied by a caregiver Mehran Maharaj for evaluation, assessment and opinion on a recent left axillary lymph node biopsy and repeat imaging studies.        TARGET TUMOR SITE:  1. Resected LLL of lung 2017   2.  Resected 2 x 2.6 x 2.6 cm mass from the high right frontal vertex of the brain 1/15/2018     TUMOR HISTORY: Resected, Stage IB moderately differentiated adenocarcinoma of left lung, pT2a, N0,M0. 01/06/2017. Ms. Deepti Pappas was seen in initial Oncology consultation on 2/22/2017 f referred by Dr. Nichelle Watts with a diagnosis of a resected moderately differentiated adenocarcinoma of the left lung. In October of 2016 Tawnya Amaya presented to Dr. Reid Torres with complaints of hemoptysis for 2 months. She has a significant history of nicotine abuse but quit smoking in October 2016.     CT scan of chest on 10/31/2016 revealed a partially necrotic, noncalcified mass measuring 4.1 x 3.2 x 3.7 cm in the posterior segment of the LLL with associated pleural effusions. There was no evidence of mediastinal or hilar mass nor lymphadenopathy. A 2.2 cm nodule was noted in the upper pole of the right kidney.     A transthorasic needle biopsy on 11/22/2016 by Dr. Gabriela Roque revealed evidence of adenocarcinoma.     PET scan on 12/16/2016 revealed a mass in the LLL with hypermetabolic activity with a maximum SUV of 7. 1.      A chest x-ray on 1/4/2017 revealed a 3.8 cm mass lesion in the LLL that previously measured approximately 4.1 cm.      On 1/6/2017, a left thoracotomy with left lower lobectomy and mediastinal lymph node dissection was performed by Dr. Kay Hines. Pathology revealed invasive moderately differentiated adenocarcinoma. The tumor measured 4.5 cm. Margins were clear of invasive carcinoma and  no lymphovascular space invasion was noted. 5 of 5 lymph nodes were negative for metastatic carcinoma.      Bilateral renal ultrasounds on 2/28/2017 identified a complex cyst at the upper pole of the right kidney measuring up to 3 cm increased in size compared to a study on 12/28/2012 where it measured 2.1 cm in maximum diameter.  Two small benign cysts were noted in the left kidney.      A CT scan of the abdomen and pelvis on 3/8/2017 confirmed that this was a simple cyst in the upper pole of 3/15/2018 as follows:   EGFR -negative for mutation  ALK -negative for rearrangement  ROS 1 -negative for rearrangement   BRAF -negative for the V600E mutation  PDL-1 - expression is 80% i.e. in Positive     MRI of the brain W & WO for SRS treatment planning purposes was performed on 3/6/2018 by Dr. Sania Coombs. Postsurgical changes to the previous resection site from the right frontal lobe mass area with residual enhancement was noted.      A PET scan on 2/6/2018 did not reveal scintigraphic evidence of recurrent malignancy or metastatic disease.     MRI of the brain W & WO for [facility] treatment planning purposes was performed on 3/6/2018 by Dr. Sania Coombs. Postsurgical changes to the previous resection site from the right frontal lobe mass area with residual enhancement was noted.     Diane underwent SRS with 1600 cGy in one single treatment fraction on 3/26/2018 to the right frontal CNS lobe by Dima oCombs and Winsome Martinez. Delays in beginning systemic therapy included issues associated with her CNS treatment delivery and multiple dog bites on her arms and hands that required attention prior to starting systemic therapy. Diane received cycle #1 of chemotherapy with Alimta, carboplatin and Keytruda on 5/14/2018. Analisa Bradford completed cycle # 6 of carboplatin, Alimta and Keytruda on 9/4/2018.     MRI of the brain with and without on 9/12/2018 documented a stable 1.6 x 1.1 cm nodular enhancement along the medial aspect of the operative cavity. Kvng sawant was seen by Dr. Winsome Martinez on 9/12/2018, who felt the MRI of the brain with stable without evidence of recurrence/progression.     A CT scan of the chest on 9/24/2018 did not reveal evidence of new or suspicious for urinary nodules nor intrathoracic lymphadenopathy to suggest recurrent disease in the chest.     She completed 6 cycles of Carboplatin, Alimta and Keytruda on 9/4/2018 and then went on to receive maintenance treatment with Alimta/Keytruda.   Analisa Bradford reported experiencing a significant skin rash after receiving Keytruda on 11/27/2018.     Chica Bis was held from 12/18/2018-1/29/2019 (x3 cycles) during which time Glynn Arellano was only receiving Alimta.     Mrs. Shante Lemus had repeat CT scans of the chest, abdomen and pelvis on 1/2/2019 that suggested stable disease. On 2/19/2019, Mrs. Bradley Renner resumed the combination of maintenance treatment with Alimta/Keytruda without rash or problems.     Glynn Arellano was admitted to [facility] on 4/9/2019 with bilateral lower extremity cellulitis. She was discharged on 4/15/2019.     CT of the abdomen and pelvis with contrast at [facility] on 4/9/2019 was compared to a CT of the abdomen and pelvis from February 2013. It revealed a left-sided 3.2 x 2.2 cm paravertebral T10 soft tissue mass. 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 No evidence of acute posttraumatic injury to the brain.      CT scan of the abdomen and pelvis with contrast on 4/9/2019 documented no evidence of metastatic disease in the abdomen or pelvis. Paravertebral soft tissue masses at T10. Differential metastatic disease versus extra medullary hematopoiesis.     Maintenance Alimta/Keytruda was initiated on 10/16/2018. Glynn Arellano reported experiencing a significant skin rash after receiving Keytruda on 11/27/2018. Chica Bis was held from 12/18/2018-1/29/2019 (x3 cycles) during which time Glynn Arellano was only receiving Alimta.  Imaging studies on 1/2/2019 suggested stable disease.      Diane resumed maintenance Alimta/Keytruda 2/19/2019 - 5/7/1209.     Due to skin reaction manifestations, further maintenance Keytruda was not given after the 5/7/2019 dose.     Diane received course #11 of maintenance Alimta on 7/2/2019. Thereafter, maintenance Alimta was scheduled monthly. The last dose of maintenance Alimta, #13, was delivered on 9/9/2019 due to poor tolerability, deconditioning, multiple hospital admissions.     TREATMENT SUMMARY:   1. Left thoracotomy with left lower lobectomy and mediastinal lymph node dissection 01/06/2017 by Dr. Conchita Huston   2. Adjuvant cisplatinum and Alimta x4 cycles. 4/11/2017 -6/21/17. 3. A right craniotomy by Dr. Domenica Shin on 1/15/2018 was performed with resection of the 2 x 2.6 x 2.6 cm high right frontal vertex mass   4. Diane underwent SRS with 1600 cGy in one single treatment fraction on 3/26/2018 to the right frontal CNS lobe by Dima Huerat and Domenica Shin   5. Cycle #1 of chemotherapy with Alimta, carboplatin and Eugune Baston was delivered on 5/14/2018 and the final cycle #6 was delivered on 9/4/2018   6. Maintenance Alimta/Keytruda was initiated on 10/16/2018 with the final cycle #9 delivered on 5/7/2019. Cycle #10 of single agent maintenance Alimta was delivered on 6/11/2019 with schedule adjusted to monthly on 7/2/2019. Last dose of Alimta, #13, delivered on 9/9/2019    Subjective   REVIEW OF SYSTEMS:   Review of Systems   Constitutional: Positive for activity change and fatigue. Negative for fever. HENT: Negative for dental problem, hearing loss, mouth sores, nosebleeds and sore throat. Eyes: Negative for discharge and itching. Respiratory: Positive for cough (productive; whitish/yellow sputum) and shortness of breath. Negative for wheezing. No hemoptysis   Cardiovascular: Negative for chest pain, palpitations and leg swelling. Gastrointestinal: Negative for abdominal pain, constipation, diarrhea, nausea and vomiting. Endocrine: Negative for cold intolerance and heat intolerance. Genitourinary: Negative for dysuria, frequency, hematuria and urgency. Musculoskeletal: Positive for arthralgias. Negative for joint swelling and myalgias.         Generalized weakness    Skin: Negative for pallor and rash. Allergic/Immunologic: Negative for environmental allergies and immunocompromised state. Neurological: Negative for seizures, syncope and numbness. Hematological: Negative for adenopathy. Bruises/bleeds easily. Psychiatric/Behavioral: Negative for agitation, behavioral problems and confusion. The patient is not nervous/anxious. Objective   PHYSICAL EXAM:  Physical Exam  Vitals signs reviewed. Constitutional:       General: She is not in acute distress. Appearance: She is well-developed. She is ill-appearing. She is not toxic-appearing or diaphoretic. HENT:      Head: Normocephalic and atraumatic. Right Ear: External ear normal.      Left Ear: External ear normal.      Nose: Nose normal.      Mouth/Throat:      Mouth: Mucous membranes are moist.   Eyes:      General: No scleral icterus. Right eye: No discharge. Left eye: No discharge. Conjunctiva/sclera: Conjunctivae normal.   Neck:      Musculoskeletal: Normal range of motion and neck supple. Trachea: No tracheal deviation. Cardiovascular:      Rate and Rhythm: Normal rate and regular rhythm. Pulmonary:      Effort: Pulmonary effort is normal. No respiratory distress. Breath sounds: Examination of the right-lower field reveals decreased breath sounds and rales. Decreased breath sounds and rales present. No wheezing. Comments: Supplemental O2, 4L via NC. Abdominal:      General: Bowel sounds are normal. There is no distension. Palpations: Abdomen is soft. Tenderness: There is no abdominal tenderness. There is no guarding. Genitourinary:     Comments: Exam deferred. Pure wic catheter noted to suction  Musculoskeletal:         General: No tenderness or deformity. Comments: Normal ROM all four extremities. Generalized weakness   Lymphadenopathy:      Cervical:      Right cervical: No superficial cervical adenopathy.      Left cervical: No superficial cervical adenopathy. Upper Body:      Right upper body: No supraclavicular adenopathy. Left upper body: No supraclavicular adenopathy. Comments:      Skin:     General: Skin is warm and dry. Findings: Bruising (scattered, upper and lower extremities ) present. No rash. Comments: Dressing left lateral chest/coccyx. Trace edema bilateral lower extremities    Neurological:      Mental Status: She is alert and oriented to person, place, and time. Comments: follows commands, non-focal   Psychiatric:         Behavior: Behavior normal. Behavior is cooperative. Thought Content: Thought content normal.         Judgment: Judgment normal.      Comments: Alert and oriented to person, place and time. Vital Signs  /75   Pulse 97   Temp 97.4 °F (36.3 °C) (Temporal)   Resp 18   Ht 5' 4\" (1.626 m)   Wt 120 lb 6.4 oz (54.6 kg)   SpO2 98%   BMI 20.67 kg/m²     Intake/Output Summary (Last 24 hours) at 8/24/2020 0723  Last data filed at 8/24/2020 7155  Gross per 24 hour   Intake 1020 ml   Output 1400 ml   Net -380 ml     Labs:  CBC:   Recent Labs     08/22/20  0343 08/23/20  0419   WBC 6.6 7.4   HGB 8.5* 8.7*    275     CMP:   Recent Labs     08/22/20  0343 08/23/20  0419 08/24/20  0349   GLUCOSE 126* 106 107   BUN 19 18 16   CREATININE 0.8 0.7 0.7   CO2 26 30* 30*   CALCIUM 9.1 9.0 8.8     Hepatic:   No results for input(s): AST, ALT, ALB, BILITOT, ALKPHOS in the last 72 hours. Troponin:   No results for input(s): TROPONINI in the last 72 hours. BNP: No results for input(s): BNP in the last 72 hours. INR:   No results for input(s): INR in the last 72 hours. ABG:   No results for input(s): PHART, VIH2QDB, PO2ART, KMD9EHY, X7VLALAF, BEART in the last 72 hours. 30 Day lookback of cultures:    Blood Culture Recent:   Recent Labs     08/19/20  1430   BC No Growth to date. Any change in status will be called.      Gram Stain Recent: No results for input(s): LABGRAM in the last 720 hours. Resp Culture Recent: No results for input(s): CULTRESP in the last 720 hours. Body Fluid Recent : No results for input(s): BFCX in the last 720 hours. MRSA Recent : No results for input(s): [address] in the last 720 hours. Urine Culture Recent : No results for input(s): LABURIN in the last 720 hours. Organism Recent : No results for input(s): ORG in the last 720 hours. Contrasted chest CT 8/14/2020:  1. Increasing lymphadenopathy, see body of report. 2. Multiple paraspinal masses appear relatively stable and may be    related to extra medullary hematopoiesis. 3. Small bilateral pleural effusions. 4. Infiltrates in the right middle and right lower lobes are improved    compared to the prior study. There are linear opacities in both lungs    likely due to atelectasis. 5. Centrilobular emphysema. Dilated pulmonary artery suggesting    pulmonary hypertension. Signed by Dr Estanislado Boxer on 8/14/2020 9:14 AM      Contrasted abdominal/pelvic CT 8/14/2020:  1. Loculated appearing effusion within the right lateral lung base. There is thickening of the visceral and parietal pleura as well as    peripheral consolidation within the right lower lobe. A smaller    left-sided effusion is present with bibasilar atelectasis. 2. Paraspinal masses are stable from the previous study. Could    represent metastatic disease or schwannomas. Overall no change from    the previous exam in these lesions. 3. Persistent splenomegaly. The adrenal glands remain enlarged with a    small right adrenal nodule present. 4. Cortical cysts of the kidneys. No evidence of obstructive uropathy. 5. Moderate constipation. A left lower quadrant colostomy is present. .    No evidence of obstruction. There is retained fecal material within    the Kim's pouch.     6. Rather diffuse third spacing of fluid/anasarca with diffuse    induration of the subcutaneous fat as well as induration of the Subjective-continues to feel weak  Objective- no change  Assessment/plan:  Advanced lung cancer- the patient is not a candidate for anticancer therapy. Recommended hospice. Apparently, hospice will follow-up as outpatient.       Thang Curiel MD

## 2020-08-24 NOTE — PLAN OF CARE
Nutrition Problem #1: Severe malnutrition  Intervention: Food and/or Nutrient Delivery: Continue Current Diet  Nutritional Goals: New Goal: Pt will continue to consume % of meals

## 2020-08-24 NOTE — CARE COORDINATION
Informed by Andrés Chapin the pt's family has agreed for the pt to dc home with hospice services.  reports DME will be delivered to pt's home today and pt can dc home when attending determines dc stable.  did mention pt family is requesting \"a day or two to set up sitters/CGs\" prior to pt's dc home.

## 2020-08-24 NOTE — PLAN OF CARE
Problem: Skin Integrity:  Goal: Will show no infection signs and symptoms  Outcome: Ongoing  Goal: Absence of new skin breakdown  Outcome: Ongoing     Problem: Falls - Risk of:  Goal: Will remain free from falls  Outcome: Ongoing  Goal: Absence of physical injury  Outcome: Ongoing     Problem: SAFETY  Goal: Free from accidental physical injury  Outcome: Ongoing     Problem: DAILY CARE  Goal: Daily care needs are met  Outcome: Ongoing     Problem: SKIN INTEGRITY  Goal: Skin integrity is maintained or improved  Outcome: Ongoing     Problem: Discharge Planning:  Goal: Discharged to appropriate level of care  Outcome: Ongoing  Goal: Participates in care planning  Outcome: Ongoing     Problem: Airway Clearance - Ineffective:  Goal: Clear lung sounds  Outcome: Ongoing  Goal: Ability to maintain a clear airway will improve  Outcome: Ongoing     Problem: Fluid Volume - Deficit:  Goal: Achieves intake and output within specified parameters  Outcome: Ongoing     Problem: Gas Exchange - Impaired:  Goal: Levels of oxygenation will improve  Outcome: Ongoing     Problem: Hyperthermia:  Goal: Ability to maintain a body temperature in the normal range will improve  Outcome: Ongoing

## 2020-08-24 NOTE — PROGRESS NOTES
Comprehensive Nutrition Assessment    Type and Reason for Visit:  Reassess    Nutrition Assessment:  Pt's PO intake has improved. Aware pt will possibly be discharging home with Hospice. Will continue to monitor nutrition progression and implement nutrition intervention as needed. Malnutrition Assessment:  Malnutrition Status:  Severe malnutrition    Context:  Acute Illness     Findings of the 6 clinical characteristics of malnutrition:  Energy Intake:  Unable to assess  Weight Loss:  No significant weight loss     Body Fat Loss:  7 - Severe body fat loss Orbital   Muscle Mass Loss:  7 - Severe muscle mass loss Temples (temporalis), Clavicles (pectoralis & deltoids)  Fluid Accumulation:  1 - Mild Extremities    Estimated Daily Nutrient Needs:  Energy (kcal):  2737-7964 kcals/day; Weight Used for Energy Requirements:  Current(30-35)     Protein (g):  65-81 g/PRO/day; Weight Used for Protein Requirements:  Current(1.2-1.5)        Fluid (ml/day):  8433-7110 mL/day; Weight Used for Fluid Requirements:  Current(30-35)      Nutrition Related Findings:  colostomy. Aware glucose elevated--on Solumedrol       Current Nutrition Therapies:    DIET DYSPHAGIA SOFT AND BITE-SIZED;  Moderately Thick (Honey)    Anthropometric Measures:  · Height: 5' 4\" (162.6 cm)  · Current Body Weight: 120 lb (54.4 kg)   · Admission Body Weight: 120 lb (54.4 kg)     · Ideal Body Weight: 120 lbs  · BMI: 20.6  · BMI Categories: Underweight (BMI less than 22) age over 72       Nutrition Diagnosis:   · Severe malnutrition related to catabolic illness as evidenced by severe loss of subcutaneous fat, severe muscle loss, localized or generalized fluid accumulation    Nutrition Interventions:   Food and/or Nutrient Delivery:  Continue Current Diet  Nutrition Education/Counseling:  No recommendation at this time   Coordination of Nutrition Care:  Continued Inpatient Monitoring    Goals:  New Goal: Pt will continue to consume % of meals

## 2020-08-24 NOTE — PROGRESS NOTES
Visit with the pt and David Ovens the pts CG/POA. They do want the pt to dc home with hospice. The CG states the pts spouse wants the pt to dc to a SNF. The CG is going now to talk to the Spouse and see about the pt coming home with Hospice. The CG is going  to call this ch when a decision has been made. This ch will order 02 for the pt to dc home if that is the decision. 02 is the only equipment needed for dc. Trenton Nunez

## 2020-08-24 NOTE — CARE COORDINATION
Confirmed pt is a resident from [facility] and has a bed hold at the facility per Richard at [facility]. Pt's POA is agreeable to the pt dc home with hospice services and is discussing with the pt's spouse at this time. To notify  Bevely Gal of decision.

## 2020-08-24 NOTE — PROGRESS NOTES
with hospice services    Recommended Diet and Intervention  Diet Solids Recommendation: Bite sized  Liquid Consistency Recommendation: Honey thick   Recommended Form of Meds: Meds crushed in puree as able    Compensatory Swallowing Strategies  Compensatory Swallowing Strategies: Upright as possible for all oral intake;Small bites/sips;Eat/Feed slowly; Alternate solids and liquids; Remain upright for 30-45 minutes after meals    General  Chart Reviewed: Yes  Behavior/Cognition: Alert; Cooperative;Pleasant;Confused  O2 Device: Nasal Cannula   Communication Observation: (Patient exhibits decreased volume of speech, decreased labial movements, and slow, decreased lingual movements during verbalizations.)  Follows Directions: Simple, with provision of cues/prompts.    Dentition: Poor   Patient Positioning: Upright in bed  Consistencies Administered: Bite sized; Honey - cup     Monitored patient's swallowing function with the following observations noted:     Oral Phase  Mastication: Bite sized (Patient exhibited slow oral prep with primarily vertical jaw movement noted at the front of the mouth during bite sized consistency presentations administered independently.)  Suspected Premature Bolus Loss: Bite sized; Honey - cup (Oral transit of bite sized consistency varied from 1-3 seconds in length. Oral transit of honey thick liquid presentations, administered independently via cup, varied from 1-3 seconds in length.)  Oral Phase - Comment: Min bite sized consistency residue was noted post swallows; residue cleared from the mouth with additional dry swallows.      Pharyngeal Phase  Suspected Swallow Delay: Bite sized; Honey - cup (Suspect secondary to oral transit times.)  Decreased Laryngeal Elevation: (Patient exhibited sluggish, inconsistently mild-moderately decreased laryngeal elevation for swallow airway protection.)  Delayed Cough:  All  Pharyngeal: Just mild delayed coughs were noted with bite sized consistency presentations and honey thick liquid presentations administered during treatment session this date.     At this time, recommend continuation bite sized consistency and honey thick liquids. NO STRAWS. Recommend meds crushed in pudding/applesauce. If patient receives mouth care prior to intake, okay for ice chips IN BETWEEN MEALS for comfort. Per progress note documentation, patient family have decided upon hospice services.  No further acute speech therapy is felt to be warranted.      Electronically signed by NEGIN Encarnacion on 8/24/2020 at 1:10 PM

## 2020-08-25 LAB
GLUCOSE BLD-MCNC: 133 MG/DL (ref 70–99)
GLUCOSE BLD-MCNC: 143 MG/DL (ref 70–99)
GLUCOSE BLD-MCNC: 154 MG/DL (ref 70–99)
GLUCOSE BLD-MCNC: 99 MG/DL (ref 70–99)
PERFORMED ON: ABNORMAL
PERFORMED ON: NORMAL

## 2020-08-25 PROCEDURE — 6370000000 HC RX 637 (ALT 250 FOR IP): Performed by: FAMILY MEDICINE

## 2020-08-25 PROCEDURE — 2580000003 HC RX 258: Performed by: FAMILY MEDICINE

## 2020-08-25 PROCEDURE — 6360000002 HC RX W HCPCS: Performed by: FAMILY MEDICINE

## 2020-08-25 PROCEDURE — 2140000000 HC CCU INTERMEDIATE R&B

## 2020-08-25 PROCEDURE — 94640 AIRWAY INHALATION TREATMENT: CPT

## 2020-08-25 PROCEDURE — 2700000000 HC OXYGEN THERAPY PER DAY

## 2020-08-25 PROCEDURE — 82947 ASSAY GLUCOSE BLOOD QUANT: CPT

## 2020-08-25 PROCEDURE — 6370000000 HC RX 637 (ALT 250 FOR IP): Performed by: INTERNAL MEDICINE

## 2020-08-25 RX ORDER — PREDNISONE 10 MG/1
20 TABLET ORAL 2 TIMES DAILY
Status: DISCONTINUED | OUTPATIENT
Start: 2020-08-25 | End: 2020-08-27 | Stop reason: HOSPADM

## 2020-08-25 RX ADMIN — PREDNISONE 20 MG: 10 TABLET ORAL at 12:10

## 2020-08-25 RX ADMIN — FOLIC ACID 1 MG: 1 TABLET ORAL at 08:50

## 2020-08-25 RX ADMIN — FUROSEMIDE 40 MG: 40 TABLET ORAL at 08:54

## 2020-08-25 RX ADMIN — IPRATROPIUM BROMIDE AND ALBUTEROL SULFATE 1 AMPULE: .5; 3 SOLUTION RESPIRATORY (INHALATION) at 10:56

## 2020-08-25 RX ADMIN — SERTRALINE HYDROCHLORIDE 200 MG: 100 TABLET ORAL at 08:50

## 2020-08-25 RX ADMIN — FLUTICASONE PROPIONATE 1 SPRAY: 50 SPRAY, METERED NASAL at 08:50

## 2020-08-25 RX ADMIN — GUAIFENESIN 1200 MG: 600 TABLET, EXTENDED RELEASE ORAL at 22:11

## 2020-08-25 RX ADMIN — SPIRONOLACTONE 25 MG: 25 TABLET ORAL at 17:34

## 2020-08-25 RX ADMIN — IPRATROPIUM BROMIDE AND ALBUTEROL SULFATE 1 AMPULE: .5; 3 SOLUTION RESPIRATORY (INHALATION) at 06:57

## 2020-08-25 RX ADMIN — SPIRONOLACTONE 25 MG: 25 TABLET ORAL at 08:50

## 2020-08-25 RX ADMIN — SODIUM CHLORIDE, PRESERVATIVE FREE 10 ML: 5 INJECTION INTRAVENOUS at 08:51

## 2020-08-25 RX ADMIN — BUSPIRONE HYDROCHLORIDE 15 MG: 15 TABLET ORAL at 22:12

## 2020-08-25 RX ADMIN — IPRATROPIUM BROMIDE AND ALBUTEROL SULFATE 1 AMPULE: .5; 3 SOLUTION RESPIRATORY (INHALATION) at 15:18

## 2020-08-25 RX ADMIN — MODAFINIL 100 MG: 100 TABLET ORAL at 08:50

## 2020-08-25 RX ADMIN — METHYLPREDNISOLONE SODIUM SUCCINATE 40 MG: 40 INJECTION, POWDER, FOR SOLUTION INTRAMUSCULAR; INTRAVENOUS at 08:50

## 2020-08-25 RX ADMIN — BUSPIRONE HYDROCHLORIDE 15 MG: 15 TABLET ORAL at 08:50

## 2020-08-25 RX ADMIN — BUSPIRONE HYDROCHLORIDE 15 MG: 15 TABLET ORAL at 14:23

## 2020-08-25 RX ADMIN — AZITHROMYCIN DIHYDRATE 500 MG: 500 INJECTION, POWDER, LYOPHILIZED, FOR SOLUTION INTRAVENOUS at 09:05

## 2020-08-25 RX ADMIN — PREDNISONE 20 MG: 10 TABLET ORAL at 22:11

## 2020-08-25 RX ADMIN — SODIUM CHLORIDE, PRESERVATIVE FREE 1 G: 5 INJECTION INTRAVENOUS at 08:51

## 2020-08-25 RX ADMIN — IPRATROPIUM BROMIDE AND ALBUTEROL SULFATE 1 AMPULE: .5; 3 SOLUTION RESPIRATORY (INHALATION) at 18:48

## 2020-08-25 RX ADMIN — GUAIFENESIN 1200 MG: 600 TABLET, EXTENDED RELEASE ORAL at 08:50

## 2020-08-25 RX ADMIN — ENOXAPARIN SODIUM 40 MG: 40 INJECTION SUBCUTANEOUS at 22:14

## 2020-08-25 RX ADMIN — SODIUM CHLORIDE, PRESERVATIVE FREE 10 ML: 5 INJECTION INTRAVENOUS at 22:13

## 2020-08-25 RX ADMIN — Medication 400 MG: at 08:50

## 2020-08-25 RX ADMIN — MONTELUKAST SODIUM 10 MG: 10 TABLET, FILM COATED ORAL at 22:13

## 2020-08-25 ASSESSMENT — PAIN SCALES - GENERAL
PAINLEVEL_OUTOF10: 0

## 2020-08-25 NOTE — PROGRESS NOTES
Progress Note  Carina Lomax  8/25/2020 10:58 AM  Subjective:   Admit Date:   8/19/2020      CC/ADMIT DX:       Interval History:   Reviewed overnight events and nursing notes. She has no new medical issues. No CP. Her SOA is improving. I have reviewed all labs/diagnostics from the last 24hrs. ROS:   I have done a 10 point ROS and all are negative, except what is mentioned in the HPI. DIET DYSPHAGIA SOFT AND BITE-SIZED; Moderately Thick (Honey)    Medications:        furosemide  40 mg Oral Daily    methylPREDNISolone  40 mg Intravenous Q12H    cefTRIAXone (ROCEPHIN) IV  1 g Intravenous Q24H    azithromycin  500 mg Intravenous Q24H    sodium chloride flush  10 mL Intravenous 2 times per day    enoxaparin  40 mg Subcutaneous Q24H    ipratropium-albuterol  1 ampule Inhalation Q4H WA    guaiFENesin  1,200 mg Oral BID    spironolactone  25 mg Oral BID    sertraline  200 mg Oral Daily    pantoprazole  40 mg Oral QAM AC    montelukast  10 mg Oral Nightly    modafinil  100 mg Oral Daily    magnesium oxide  400 mg Oral Daily    folic acid  1 mg Oral Daily    fluticasone  1 spray Each Nostril Daily    busPIRone  15 mg Oral TID           Objective:   Vitals: /66   Pulse 98   Temp 96.8 °F (36 °C) (Temporal)   Resp 20   Ht 5' 4\" (1.626 m)   Wt 120 lb 6.4 oz (54.6 kg)   SpO2 100%   BMI 20.67 kg/m²      Intake/Output Summary (Last 24 hours) at 8/25/2020 1058  Last data filed at 8/25/2020 1013  Gross per 24 hour   Intake 720 ml   Output 2400 ml   Net -1680 ml     General appearance: alert and cooperative with exam  Lungs: coarse  Heart: RRR  Abdomen: soft, non-tender; bowel sounds normal; no masses,  no organomegaly  Extremities: extremities normal, atraumatic, no cyanosis or edema  Neurologic:  No obvious focal neurologic deficits. Assessment and Plan:    Active Problems:    Severe malnutrition (HCC)    Acute diastolic (congestive) heart failure (HCC)    Volume overload  Resolved Problems:    * No resolved hospital problems. *    Metastatic Lung CA    COPD Exacerbation    Plan:  1. Continue present medication(s)   2. Transition to PO medicine  3. D/C with Hospice, hopefully tomorrow      Discharge planning:   her home with Hospice vs back to [facility] with Hospice    Reviewed treatment plans with the patient and/or family.              Electronically signed by Rob Freeman MD on 8/25/2020 at 10:58 AM

## 2020-08-25 NOTE — PROGRESS NOTES
Received a call from the pts spouse and CG David Rivas this am.  The 02 is scheduled to be set up this am and they are interviewing sitters today. David Rivas thinks they will have sitters in place for the pt to dc on Thursday if that is possible. This ch explained that the dc is up to the dr and the current plan of care but that this ch would convey this info  to Dr Fer Soto. Emotional and sc support provided.

## 2020-08-26 LAB
GLUCOSE BLD-MCNC: 100 MG/DL (ref 70–99)
GLUCOSE BLD-MCNC: 118 MG/DL (ref 70–99)
GLUCOSE BLD-MCNC: 135 MG/DL (ref 70–99)
GLUCOSE BLD-MCNC: 175 MG/DL (ref 70–99)
PERFORMED ON: ABNORMAL

## 2020-08-26 PROCEDURE — 2580000003 HC RX 258: Performed by: FAMILY MEDICINE

## 2020-08-26 PROCEDURE — 6360000002 HC RX W HCPCS: Performed by: FAMILY MEDICINE

## 2020-08-26 PROCEDURE — 82947 ASSAY GLUCOSE BLOOD QUANT: CPT

## 2020-08-26 PROCEDURE — 2140000000 HC CCU INTERMEDIATE R&B

## 2020-08-26 PROCEDURE — 94640 AIRWAY INHALATION TREATMENT: CPT

## 2020-08-26 PROCEDURE — 2700000000 HC OXYGEN THERAPY PER DAY

## 2020-08-26 PROCEDURE — 6370000000 HC RX 637 (ALT 250 FOR IP): Performed by: INTERNAL MEDICINE

## 2020-08-26 PROCEDURE — 6370000000 HC RX 637 (ALT 250 FOR IP): Performed by: FAMILY MEDICINE

## 2020-08-26 RX ADMIN — SERTRALINE HYDROCHLORIDE 200 MG: 100 TABLET ORAL at 09:45

## 2020-08-26 RX ADMIN — IPRATROPIUM BROMIDE AND ALBUTEROL SULFATE 1 AMPULE: .5; 3 SOLUTION RESPIRATORY (INHALATION) at 18:37

## 2020-08-26 RX ADMIN — BUSPIRONE HYDROCHLORIDE 15 MG: 15 TABLET ORAL at 10:28

## 2020-08-26 RX ADMIN — GUAIFENESIN 1200 MG: 600 TABLET, EXTENDED RELEASE ORAL at 20:44

## 2020-08-26 RX ADMIN — PREDNISONE 20 MG: 10 TABLET ORAL at 20:44

## 2020-08-26 RX ADMIN — SODIUM CHLORIDE, PRESERVATIVE FREE 10 ML: 5 INJECTION INTRAVENOUS at 20:44

## 2020-08-26 RX ADMIN — MONTELUKAST SODIUM 10 MG: 10 TABLET, FILM COATED ORAL at 20:44

## 2020-08-26 RX ADMIN — IPRATROPIUM BROMIDE AND ALBUTEROL SULFATE 1 AMPULE: .5; 3 SOLUTION RESPIRATORY (INHALATION) at 06:13

## 2020-08-26 RX ADMIN — SODIUM CHLORIDE, PRESERVATIVE FREE 10 ML: 5 INJECTION INTRAVENOUS at 09:45

## 2020-08-26 RX ADMIN — FUROSEMIDE 40 MG: 40 TABLET ORAL at 09:45

## 2020-08-26 RX ADMIN — PANTOPRAZOLE SODIUM 40 MG: 40 TABLET, DELAYED RELEASE ORAL at 06:43

## 2020-08-26 RX ADMIN — FOLIC ACID 1 MG: 1 TABLET ORAL at 09:45

## 2020-08-26 RX ADMIN — ENOXAPARIN SODIUM 40 MG: 40 INJECTION SUBCUTANEOUS at 20:43

## 2020-08-26 RX ADMIN — BUSPIRONE HYDROCHLORIDE 15 MG: 15 TABLET ORAL at 20:44

## 2020-08-26 RX ADMIN — IPRATROPIUM BROMIDE AND ALBUTEROL SULFATE 1 AMPULE: .5; 3 SOLUTION RESPIRATORY (INHALATION) at 10:21

## 2020-08-26 RX ADMIN — SPIRONOLACTONE 25 MG: 25 TABLET ORAL at 09:45

## 2020-08-26 RX ADMIN — FLUTICASONE PROPIONATE 1 SPRAY: 50 SPRAY, METERED NASAL at 09:46

## 2020-08-26 RX ADMIN — BUSPIRONE HYDROCHLORIDE 15 MG: 15 TABLET ORAL at 14:30

## 2020-08-26 RX ADMIN — Medication 400 MG: at 09:45

## 2020-08-26 RX ADMIN — GUAIFENESIN 1200 MG: 600 TABLET, EXTENDED RELEASE ORAL at 09:45

## 2020-08-26 RX ADMIN — PREDNISONE 20 MG: 10 TABLET ORAL at 09:45

## 2020-08-26 RX ADMIN — IPRATROPIUM BROMIDE AND ALBUTEROL SULFATE 1 AMPULE: .5; 3 SOLUTION RESPIRATORY (INHALATION) at 14:21

## 2020-08-26 RX ADMIN — SPIRONOLACTONE 25 MG: 25 TABLET ORAL at 17:39

## 2020-08-26 RX ADMIN — MODAFINIL 100 MG: 100 TABLET ORAL at 09:45

## 2020-08-26 ASSESSMENT — PAIN SCALES - GENERAL
PAINLEVEL_OUTOF10: 0
PAINLEVEL_OUTOF10: 0

## 2020-08-26 NOTE — PROGRESS NOTES
Visited with pt to provide spiritual care. Pt seemed to welcome the visit. Provided spiritual care with sustaining presence, nurtured hope, and prayer. Pt expressed gratitude for spiritual care. Maryana Stanton, Palliative care nurse says pt is supposed to go home with Hospice tomorrow.      Electronically signed by Savannah Zhao on 8/26/2020 at 2:52 PM

## 2020-08-26 NOTE — PROGRESS NOTES
Progress Note  Korey Johnson  8/26/2020 6:00 PM  Subjective:   Admit Date:   8/19/2020      CC/ADMIT DX:       Interval History:   Reviewed overnight events and nursing notes. She denies any complaints. I have reviewed all labs/diagnostics from the last 24hrs. ROS:   I have done a 10 point ROS and all are negative, except what is mentioned in the HPI. DIET DYSPHAGIA SOFT AND BITE-SIZED; Moderately Thick (Honey)    Medications:        predniSONE  20 mg Oral BID    furosemide  40 mg Oral Daily    sodium chloride flush  10 mL Intravenous 2 times per day    enoxaparin  40 mg Subcutaneous Q24H    ipratropium-albuterol  1 ampule Inhalation Q4H WA    guaiFENesin  1,200 mg Oral BID    spironolactone  25 mg Oral BID    sertraline  200 mg Oral Daily    pantoprazole  40 mg Oral QAM AC    montelukast  10 mg Oral Nightly    modafinil  100 mg Oral Daily    magnesium oxide  400 mg Oral Daily    folic acid  1 mg Oral Daily    fluticasone  1 spray Each Nostril Daily    busPIRone  15 mg Oral TID           Objective:   Vitals: /67   Pulse 102   Temp 98.3 °F (36.8 °C) (Temporal)   Resp 20   Ht 5' 4\" (1.626 m)   Wt 113 lb 3.2 oz (51.3 kg)   SpO2 99%   BMI 19.43 kg/m²      Intake/Output Summary (Last 24 hours) at 8/26/2020 1800  Last data filed at 8/26/2020 1755  Gross per 24 hour   Intake 580 ml   Output 1700 ml   Net -1120 ml     General appearance: alert and cooperative with exam  Lungs: coarse  Heart: RRR  Abdomen: soft, non-tender; bowel sounds normal; no masses,  no organomegaly  Extremities: extremities normal, atraumatic, no cyanosis or edema  Neurologic:  No obvious focal neurologic deficits. Assessment and Plan: Active Problems:    Severe malnutrition (HCC)    Acute diastolic (congestive) heart failure (HCC)    Volume overload  Resolved Problems:    * No resolved hospital problems. *    Metastatic Lung CA    COPD Exacerbation    Plan:  1. Continue present medication(s)   2. Transition to  medicine  3. D/C with Hospice, tomorrow when sitters in place      Discharge planning:   her home with Hospice vs back to [facility] with Hospice    Reviewed treatment plans with the patient and/or family.              Electronically signed by Theo Salazar MD on 8/26/2020 at 6:00 PM

## 2020-08-26 NOTE — PROGRESS NOTES
Physician Progress Note      Manny Arboleda  [facility] #:                  [id number]  :                       1950  ADMIT DATE:       2020 1:12 PM  [address] [place] DATE:  RESPONDING  PROVIDER #:        Iraida Alcala MD          QUERY TEXT:    Patient admitted with CHF exacerbation. Noted documentation of acute   respiratory failure in consult note on . Please indicate one of the   following and document in the medical record: The medical record reflects the following:  Risk Factors: COPD, CHF, Lung cancer  Clinical Indicators: supplemental oxygen above normal range, 91% on 5 LPM, RR   21, ABG's: pO2 90,  SOB no use of accessory muscle. Bilateral pleural fluid   collections. Treatment: supplemental oxygen 6L, IV Lasix, Mucinex, Nebs, ABG's, IV   steroids, Cxray    Acute Respiratory Failure Clinical Indicators per [employer] MS-DRG Training Guide and   Quick Reference Guide:  pO2 < 60 mmHg or SpO2 (pulse oximetry) < 91% breathing room air  pCO2 > 50 and pH < 7.35  P/F ratio (pO2 / FIO2) < 300  pO2 decrease or pCO2 increase by 10 mmHg from baseline (if known)  Supplemental oxygen of 40% or more  Presence of respiratory distress, tachypnea, dyspnea, shortness of breath,   wheezing  Unable to speak in complete sentences  Use of accessory muscles to breathe  Extreme anxiety and feeling of impending doom  Tripod position  Confusion/altered mental status/obtunded    Thank you  Julita Chavez RN, BSN, CDS  [email or web address]  Options provided:  -- Acute Respiratory Failure currently as evidenced by, Please document   evidence.   -- Currently resolved Acute Respiratory Failure was evidenced by, Please   document evidence  -- Acute Respiratory Failure ruled out after study  -- No respiratory failure, hypoxia only  -- Other - I will add my own diagnosis  -- Disagree - Not applicable / Not valid  -- Disagree - Clinically unable to determine / Unknown  -- Refer to Clinical Documentation Reviewer    PROVIDER RESPONSE TEXT:    This patient?s now resolved acute respiratory failure was evidenced by   increasing dyspnea and hypoxia room air.     Query created by: Sophia Hall on 8/24/2020 5:35 PM      Electronically signed by:  Naveed Mack MD 8/26/2020 5:40 PM

## 2020-08-27 VITALS
SYSTOLIC BLOOD PRESSURE: 108 MMHG | BODY MASS INDEX: 19.22 KG/M2 | HEART RATE: 101 BPM | RESPIRATION RATE: 20 BRPM | WEIGHT: 112.56 LBS | HEIGHT: 64 IN | OXYGEN SATURATION: 97 % | DIASTOLIC BLOOD PRESSURE: 76 MMHG | TEMPERATURE: 97 F

## 2020-08-27 LAB
GLUCOSE BLD-MCNC: 109 MG/DL (ref 70–99)
GLUCOSE BLD-MCNC: 133 MG/DL (ref 70–99)
GLUCOSE BLD-MCNC: 97 MG/DL (ref 70–99)
PERFORMED ON: ABNORMAL
PERFORMED ON: ABNORMAL
PERFORMED ON: NORMAL

## 2020-08-27 PROCEDURE — 6370000000 HC RX 637 (ALT 250 FOR IP): Performed by: INTERNAL MEDICINE

## 2020-08-27 PROCEDURE — 6370000000 HC RX 637 (ALT 250 FOR IP): Performed by: FAMILY MEDICINE

## 2020-08-27 PROCEDURE — 94640 AIRWAY INHALATION TREATMENT: CPT

## 2020-08-27 PROCEDURE — 2580000003 HC RX 258: Performed by: FAMILY MEDICINE

## 2020-08-27 PROCEDURE — 82947 ASSAY GLUCOSE BLOOD QUANT: CPT

## 2020-08-27 PROCEDURE — 2700000000 HC OXYGEN THERAPY PER DAY

## 2020-08-27 RX ORDER — FUROSEMIDE 40 MG/1
40 TABLET ORAL DAILY
Qty: 60 TABLET | Refills: 3 | Status: SHIPPED | OUTPATIENT
Start: 2020-08-28

## 2020-08-27 RX ADMIN — IPRATROPIUM BROMIDE AND ALBUTEROL SULFATE 1 AMPULE: .5; 3 SOLUTION RESPIRATORY (INHALATION) at 14:34

## 2020-08-27 RX ADMIN — SPIRONOLACTONE 25 MG: 25 TABLET ORAL at 10:11

## 2020-08-27 RX ADMIN — PREDNISONE 20 MG: 10 TABLET ORAL at 10:10

## 2020-08-27 RX ADMIN — IPRATROPIUM BROMIDE AND ALBUTEROL SULFATE 1 AMPULE: .5; 3 SOLUTION RESPIRATORY (INHALATION) at 10:43

## 2020-08-27 RX ADMIN — IPRATROPIUM BROMIDE AND ALBUTEROL SULFATE 1 AMPULE: .5; 3 SOLUTION RESPIRATORY (INHALATION) at 06:39

## 2020-08-27 RX ADMIN — FLUTICASONE PROPIONATE 1 SPRAY: 50 SPRAY, METERED NASAL at 10:10

## 2020-08-27 RX ADMIN — MODAFINIL 100 MG: 100 TABLET ORAL at 10:11

## 2020-08-27 RX ADMIN — FOLIC ACID 1 MG: 1 TABLET ORAL at 10:11

## 2020-08-27 RX ADMIN — BUSPIRONE HYDROCHLORIDE 15 MG: 15 TABLET ORAL at 15:18

## 2020-08-27 RX ADMIN — Medication 400 MG: at 10:11

## 2020-08-27 RX ADMIN — GUAIFENESIN 1200 MG: 600 TABLET, EXTENDED RELEASE ORAL at 10:11

## 2020-08-27 RX ADMIN — SERTRALINE HYDROCHLORIDE 200 MG: 100 TABLET ORAL at 10:11

## 2020-08-27 RX ADMIN — FUROSEMIDE 40 MG: 40 TABLET ORAL at 10:11

## 2020-08-27 RX ADMIN — BUSPIRONE HYDROCHLORIDE 15 MG: 15 TABLET ORAL at 10:11

## 2020-08-27 RX ADMIN — PANTOPRAZOLE SODIUM 40 MG: 40 TABLET, DELAYED RELEASE ORAL at 05:46

## 2020-08-27 RX ADMIN — SODIUM CHLORIDE, PRESERVATIVE FREE 10 ML: 5 INJECTION INTRAVENOUS at 10:10

## 2020-08-28 NOTE — DISCHARGE SUMMARY
Hospital Discharge Summary    Goldy Chand  :  1950  MRN:  [id number]    Admit date:  2020  Discharge date:  2020    Admitting Physician:    Joanna Maki MD    Discharge Diagnoses: Active Problems:    Severe malnutrition (HCC)    Acute diastolic (congestive) heart failure (HCC)    Volume overload  Resolved Problems:    * No resolved hospital problems. [facility] Course:   She was admitted with volume overload. She was diuresed. She has slowly improved. She was seen by Palliative care and Hospice and she and her Family have decided to d/c home with Hospice.      Discharge Medications:       Saida New   Home Medication Instructions VTW:[id number]    Printed on:20 3567   Medication Information                      acetaminophen (TYLENOL) 325 MG tablet  Take 650 mg by mouth every 6 hours as needed for Pain             atorvastatin (LIPITOR) 20 MG tablet  Take 10 mg by mouth nightly              busPIRone (BUSPAR) 15 MG tablet  Take 15 mg by mouth 3 times daily             cetirizine (ZYRTEC) 10 MG tablet  Take 1 tablet by mouth daily             ferrous sulfate (IRON 325) 325 (65 Fe) MG tablet  Take 1 tablet by mouth 2 times daily             fluticasone (FLONASE) 50 MCG/ACT nasal spray  1 spray by Each Nostril route daily             folic acid (FOLVITE) 1 MG tablet  Take 1 tablet by mouth once daily             furosemide (LASIX) 40 MG tablet  Take 1 tablet by mouth daily             guaiFENesin (MUCINEX) 600 MG extended release tablet  Take 2 tablets by mouth 2 times daily             ipratropium-albuterol (DUONEB) 0.5-2.5 (3) MG/3ML SOLN nebulizer solution  Inhale 3 mLs into the lungs every 6 hours as needed for Shortness of Breath             magnesium oxide (MAG-OX) 400 MG tablet  Take 1 tablet by mouth daily             melatonin 3 MG TABS tablet  Take 5 mg by mouth nightly as needed             modafinil (PROVIGIL) 100 MG tablet  TAKE 1 TABLET BY MOUTH ONCE DAILY IN THE MORNING             montelukast (SINGULAIR) 10 MG tablet  Take 10 mg by mouth nightly Indications: Seasonal Allergy              ondansetron (ZOFRAN ODT) 4 MG disintegrating tablet  Take 1 tablet by mouth every 8 hours as needed for Nausea or Vomiting             pantoprazole (PROTONIX) 40 MG tablet  Take 1 tablet by mouth every morning (before breakfast)             sertraline (ZOLOFT) 100 MG tablet  Take 200 mg by mouth daily              spironolactone (ALDACTONE) 25 MG tablet  Take 1 tablet by mouth 2 times daily                 Consults: Oncology, Cardiology    Significant Diagnostic Studies:  See complete admission record      Disposition:   Home with Hospice in stable condition  Follow up with Kiran Mojica MD in 1-2 weeks. Diet: as tolerated    Activity: as tolerated    Special Instructions: none      The patient or family are to call or return if there are any problems, questions, concerns or change in her condition.      Signed:  Kiran Mojica MD  8/27/2020, 11:24 PM

## 2020-09-09 ENCOUNTER — TELEPHONE (OUTPATIENT)
Dept: HEMATOLOGY | Age: 70
End: 2020-09-09

## 2020-10-22 ENCOUNTER — CARE COORDINATION (OUTPATIENT)
Dept: CARE COORDINATION | Age: 70
End: 2020-10-22

## 2020-11-09 ENCOUNTER — HOSPITAL ENCOUNTER (OUTPATIENT)
Dept: INFUSION THERAPY | Age: 70
End: 2020-11-09
Payer: MEDICARE

## 2020-12-21 NOTE — PROGRESS NOTES
the brain with and without on 9/12/2018 documented a stable 1.6 x 1.1 cm nodular enhancement along the medial aspect of the operative cavity. Augustine Height was seen by Dr. Jaja Wagner on 9/12/2018, who felt the MRI of the brain with stable without evidence of recurrence/progression. A CT scan of the chest on 9/24/2018 did not reveal evidence of new or suspicious for urinary nodules nor intrathoracic lymphadenopathy to suggest recurrent disease in the chest.  She completed 6 cycles of Carboplatin, Alimta and Keytruda on 9/4/2018 and then went on to receive maintenance treatment with Alimta/Keytruda. Ally Fierro reported experiencing a significant skin rash after receiving Keytruda on 11/27/2018. John Asencioer was held from 12/18/2018-1/29/2019 (x3 cycles) during which time Ally Fierro was only receiving Alimta. Mrs. Mima Farmer had repeat CT scans of the chest, abdomen and pelvis on 1/2/2019 that suggested stable disease. On 2/19/2019, Mrs. Shen Wong resumed the combination of maintenance treatment with Alimta/Keytruda without rash or problems. Ally Fierro was admitted to [facility] on 4/9/2019 with bilateral lower extremity cellulitis. She was discharged on 4/15/2019. CT of the abdomen and pelvis with contrast at [facility] on 4/9/2019 was compared to a CT of the abdomen and pelvis from February 2013. It revealed a left-sided 3.2 x 2.2 cm paravertebral T10 soft tissue mass. 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 No evidence of acute posttraumatic injury to the brain. CT scan of the abdomen and pelvis with contrast on 4/9/2019 documented no evidence of metastatic disease in the abdomen or pelvis. Paravertebral soft tissue masses at T10.  Differential metastatic ,

## 2021-01-01 ENCOUNTER — TELEPHONE (OUTPATIENT)
Dept: VASCULAR SURGERY | Facility: CLINIC | Age: 71
End: 2021-01-01

## 2021-01-02 LAB
BACTERIA: NEGATIVE /HPF
BILIRUBIN URINE: NEGATIVE
BLOOD, URINE: NEGATIVE
CLARITY: CLEAR
COLOR: YELLOW
CRYSTALS, UA: ABNORMAL /HPF
EPITHELIAL CELLS, UA: 2 /HPF (ref 0–5)
GLUCOSE URINE: NEGATIVE MG/DL
HYALINE CASTS: 6 /HPF (ref 0–8)
KETONES, URINE: NEGATIVE MG/DL
LEUKOCYTE ESTERASE, URINE: ABNORMAL
NITRITE, URINE: NEGATIVE
PH UA: 7.5 (ref 5–8)
PROTEIN UA: NEGATIVE MG/DL
RBC UA: 1 /HPF (ref 0–4)
SPECIFIC GRAVITY UA: 1.01 (ref 1–1.03)
UROBILINOGEN, URINE: 0.2 E.U./DL
WBC UA: 15 /HPF (ref 0–5)

## 2021-01-05 LAB
ORGANISM: ABNORMAL
URINE CULTURE, ROUTINE: ABNORMAL
URINE CULTURE, ROUTINE: ABNORMAL

## 2021-04-01 ENCOUNTER — HOSPITAL ENCOUNTER (OUTPATIENT)
Age: 71
Setting detail: SPECIMEN
Discharge: HOME | End: 2021-04-01
Payer: COMMERCIAL

## 2021-04-01 LAB
BACTERIA: NEGATIVE /HPF
BILIRUBIN URINE: NEGATIVE
BLOOD, URINE: NEGATIVE
CLARITY: CLEAR
COLOR: YELLOW
CRYSTALS, UA: ABNORMAL /HPF
EPITHELIAL CELLS, UA: 1 /HPF (ref 0–5)
GLUCOSE URINE: NEGATIVE MG/DL
HYALINE CASTS: 5 /HPF (ref 0–8)
KETONES, URINE: NEGATIVE MG/DL
LEUKOCYTE ESTERASE, URINE: NEGATIVE
NITRITE, URINE: NEGATIVE
PH UA: 5 (ref 5–8)
PROTEIN UA: 100 MG/DL
RBC UA: 7 /HPF (ref 0–4)
SPECIFIC GRAVITY UA: 1.02 (ref 1–1.03)
UROBILINOGEN, URINE: 0.2 E.U./DL
WBC UA: 1 /HPF (ref 0–5)

## 2021-04-01 PROCEDURE — 81001 URINALYSIS AUTO W/SCOPE: CPT

## 2021-04-01 PROCEDURE — 87086 URINE CULTURE/COLONY COUNT: CPT

## 2021-04-03 LAB — URINE CULTURE, ROUTINE: NORMAL

## 2024-11-06 NOTE — CARE COORDINATION
LUIS spoke with Pt re: d/c planning. Pt has caregivers at home that help care for her and her spouse, Alexis Gregory. One is named: Christopher Zhou ([phone]). It is noted per one of her caregivers that Alexis Gregory is disabled and physically unable to care for the patient or himself. Pt indicates she plans to have her brain mass resection at [facility]. She is uncertain of her scheduled appt with a neurologist there as one of her caregivers made the appt. She gave permission for SW to speak with her friend, Cherie Nuñez, that lives in [facility] and has been a long-time family friend and who is also attempting to help facilitate this appointment and potential upcoming surgery plan for the Pt. Pt's friend Deirdre Barrera indicates Pt lives at home with spouse, and has been experiencing issues with forgetfulness; She advised that the pt had an appt with Dr. Chely Hastings at [facility], but in light of her recent neurological problems, told her he needed to refer her to a neurologist.  She is uncertain who the appt is with and when, but will contact Pt's caregiver and find out. SW advised to Pt and to Deirdre Barrera, if there is not a medically necessary reason, it is unlikely that she would be transferred to [facility] directly or that Medicare would pay for such a transfer and it would be out-of-pocket for her, they both voiced understanding. Typically, in these types of situations, unless the physicians feel she needs to be directly transferred, patient would be discharged from this hospital and then could be taken by whatever means the pt has available to her appt at the other hospital, whenever that might be. Per the note from Neurology, it seems Pt has further testing to be completed. Pt will await guidance and comments from Pt's physicians.      Electronically signed by YOLETTE Marquez on 12/19/2017 at 4:25 PM 36.9

## (undated) DEVICE — DERMAHOOK 1/2HOOK PK/6

## (undated) DEVICE — DURAHOOK 1/4HOOK PK/6

## (undated) DEVICE — ADHESIVE LIQ MASTISOL ST

## (undated) DEVICE — SUT NUROLON 4/0 TF18 CR8 I8IN C584D

## (undated) DEVICE — PIN SKULL A/ W/PROTECT CAP PK/3

## (undated) DEVICE — GLV SURG TRIUMPH GREEN W/ALOE PF LTX 8 STRL

## (undated) DEVICE — 3M™ STERI-STRIP™ REINFORCED ADHESIVE SKIN CLOSURES, R1546, 1/4 IN X 4 IN (6 MM X 100 MM), 10 STRIPS/ENVELOPE: Brand: 3M™ STERI-STRIP™

## (undated) DEVICE — MAJOR BSIN SETUP PK

## (undated) DEVICE — STANDARD SURGICAL GOWN, L: Brand: CONVERTORS

## (undated) DEVICE — THREE QUARTER SHEET: Brand: CONVERTORS

## (undated) DEVICE — SPHR MARKR LOCATION CI SYS REFL 3PK 30BX

## (undated) DEVICE — SUTURE VCRL SZ 4-0 L18IN ABSRB UD L19MM PS-2 3/8 CIR PRIM J496H

## (undated) DEVICE — 9165 UNIVERSAL PATIENT PLATE: Brand: 3M™

## (undated) DEVICE — STERILE LATEX POWDER FREE SURGICAL GLOVES WITH HYDROGEL COATING: Brand: PROTEXIS

## (undated) DEVICE — PETROLATUM GAUZE STRIP: Brand: VASELINE

## (undated) DEVICE — PK TURNOVER RM ADV

## (undated) DEVICE — GLV SURG BIOGEL LTX PF 8

## (undated) DEVICE — BANDAGE,GAUZE,BULKEE II,4.5"X4.1YD,STRL: Brand: MEDLINE

## (undated) DEVICE — CATH IV ANGIO FEP 12G 3IN LTBLU 10PK

## (undated) DEVICE — CHLORAPREP 26ML ORANGE

## (undated) DEVICE — HEMOST ABS GELFOAM GELATIN SPNG SZ100

## (undated) DEVICE — PK CRANI 30

## (undated) DEVICE — 2.3MM TAPERED ROUTER

## (undated) DEVICE — SUTURE VCRL SZ 3-0 L27IN ABSRB UD L26MM SH 1/2 CIR J416H

## (undated) DEVICE — SUTURE VCRL SZ 2-0 L27IN ABSRB UD L26MM SH 1/2 CIR J417H

## (undated) DEVICE — TOTAL TRAY, 16FR 10ML SIL FOLEY, URN: Brand: MEDLINE

## (undated) DEVICE — AIRLIFE™ NASAL OXYGEN CANNULA CURVED, NONFLARED TIP, WITH 7' FEET (2.1 M) CRUSH RESISTANT TUBING, OVER-THE-EAR STYLE: Brand: AIRLIFE™

## (undated) DEVICE — C-ARM: Brand: UNBRANDED

## (undated) DEVICE — TRY PREP SCRB VAG PVP

## (undated) DEVICE — PROXIMATE RH ROTATING HEAD SKIN STAPLERS (35 REGULAR) CONTAINS 35 STAINLESS STEEL STAPLES: Brand: PROXIMATE

## (undated) DEVICE — GLV SURG BIOGEL LTX PF 6 1/2

## (undated) DEVICE — Device: Brand: IQ SYSTEM

## (undated) DEVICE — DECANTER: Brand: UNBRANDED

## (undated) DEVICE — ANTIBACTERIAL UNDYED BRAIDED (POLYGLACTIN 910), SYNTHETIC ABSORBABLE SUTURE: Brand: COATED VICRYL

## (undated) DEVICE — KIT CG8901 CLIP GUN 10 PK: Brand: CLIP GUN

## (undated) DEVICE — WIPE THERAWASH SLV SPEC CARE 2PK

## (undated) DEVICE — SPONGE GZ W4XL4IN RAYON POLY FILL CVR W/ NONWOVEN FAB

## (undated) DEVICE — 3M™ TEGADERM™ TRANSPARENT FILM DRESSING FRAME STYLE, 1626, 4 IN X 4-3/4 IN (10 CM X 12 CM), 50/CT 4CT/CASE: Brand: 3M™ TEGADERM™

## (undated) DEVICE — ELECTRD BLD EDGE/INSUL1P 2.4X5.1MM STRL